# Patient Record
Sex: FEMALE | Race: WHITE | Employment: OTHER | URBAN - NONMETROPOLITAN AREA
[De-identification: names, ages, dates, MRNs, and addresses within clinical notes are randomized per-mention and may not be internally consistent; named-entity substitution may affect disease eponyms.]

---

## 2017-01-26 ENCOUNTER — OFFICE VISIT (OUTPATIENT)
Dept: FAMILY MEDICINE CLINIC | Age: 77
End: 2017-01-26
Payer: COMMERCIAL

## 2017-01-26 ENCOUNTER — HOSPITAL ENCOUNTER (OUTPATIENT)
Dept: OTHER | Age: 77
Discharge: HOME | End: 2017-01-26
Attending: FAMILY MEDICINE | Admitting: FAMILY MEDICINE

## 2017-01-26 VITALS
WEIGHT: 175 LBS | DIASTOLIC BLOOD PRESSURE: 68 MMHG | HEIGHT: 64 IN | OXYGEN SATURATION: 96 % | HEART RATE: 83 BPM | RESPIRATION RATE: 18 BRPM | SYSTOLIC BLOOD PRESSURE: 138 MMHG | BODY MASS INDEX: 29.88 KG/M2

## 2017-01-26 DIAGNOSIS — E11.9 TYPE 2 DIABETES MELLITUS WITHOUT COMPLICATION, WITHOUT LONG-TERM CURRENT USE OF INSULIN (HCC): ICD-10-CM

## 2017-01-26 DIAGNOSIS — F41.9 ANXIETY: ICD-10-CM

## 2017-01-26 DIAGNOSIS — E78.9 LIPID DISORDER: ICD-10-CM

## 2017-01-26 DIAGNOSIS — I10 HTN (HYPERTENSION), BENIGN: ICD-10-CM

## 2017-01-26 LAB
A/G RATIO: 1.5 (ref 0.8–2)
ALBUMIN SERPL-MCNC: 4.1 G/DL (ref 3.4–4.8)
ALP BLD-CCNC: 57 U/L (ref 25–100)
ALT SERPL-CCNC: 8 U/L (ref 4–36)
ANION GAP SERPL CALCULATED.3IONS-SCNC: 15 MMOL/L (ref 3–16)
AST SERPL-CCNC: 11 U/L (ref 8–33)
BILIRUB SERPL-MCNC: 0.4 MG/DL (ref 0.3–1.2)
BUN BLDV-MCNC: 20 MG/DL (ref 6–20)
CALCIUM SERPL-MCNC: 9.6 MG/DL (ref 8.5–10.5)
CHLORIDE BLD-SCNC: 98 MMOL/L (ref 98–107)
CO2: 27 MMOL/L (ref 20–30)
CREAT SERPL-MCNC: 0.9 MG/DL (ref 0.4–1.2)
GFR AFRICAN AMERICAN: >59
GFR NON-AFRICAN AMERICAN: >60
GLOBULIN: 2.8 G/DL
GLUCOSE BLD-MCNC: 191 MG/DL (ref 74–106)
HBA1C MFR BLD: 6.5 %
POTASSIUM SERPL-SCNC: 4.9 MMOL/L (ref 3.4–5.1)
SODIUM BLD-SCNC: 140 MMOL/L (ref 136–145)
TOTAL PROTEIN: 6.9 G/DL (ref 6.4–8.3)

## 2017-01-26 PROCEDURE — 99214 OFFICE O/P EST MOD 30 MIN: CPT | Performed by: FAMILY MEDICINE

## 2017-01-26 RX ORDER — SIMVASTATIN 20 MG
20 TABLET ORAL NIGHTLY
Qty: 30 TABLET | Refills: 5 | Status: SHIPPED | OUTPATIENT
Start: 2017-01-26 | End: 2017-07-05 | Stop reason: SDUPTHER

## 2017-01-26 RX ORDER — LISINOPRIL AND HYDROCHLOROTHIAZIDE 20; 12.5 MG/1; MG/1
TABLET ORAL
Qty: 45 TABLET | Refills: 5 | Status: SHIPPED | OUTPATIENT
Start: 2017-01-26 | End: 2017-07-05 | Stop reason: SDUPTHER

## 2017-01-26 RX ORDER — ATENOLOL 100 MG/1
100 TABLET ORAL DAILY
Qty: 30 TABLET | Refills: 5 | Status: SHIPPED | OUTPATIENT
Start: 2017-01-26 | End: 2017-07-05 | Stop reason: SDUPTHER

## 2017-01-26 RX ORDER — CLONAZEPAM 0.5 MG/1
0.5 TABLET ORAL 2 TIMES DAILY PRN
Qty: 60 TABLET | Refills: 2 | Status: SHIPPED | OUTPATIENT
Start: 2017-01-26 | End: 2017-04-26 | Stop reason: SDUPTHER

## 2017-04-26 ENCOUNTER — OFFICE VISIT (OUTPATIENT)
Dept: FAMILY MEDICINE CLINIC | Age: 77
End: 2017-04-26
Payer: COMMERCIAL

## 2017-04-26 ENCOUNTER — HOSPITAL ENCOUNTER (OUTPATIENT)
Dept: OTHER | Age: 77
Discharge: HOME | End: 2017-04-26
Attending: FAMILY MEDICINE | Admitting: FAMILY MEDICINE

## 2017-04-26 VITALS
WEIGHT: 171.2 LBS | BODY MASS INDEX: 29.23 KG/M2 | HEART RATE: 68 BPM | SYSTOLIC BLOOD PRESSURE: 120 MMHG | OXYGEN SATURATION: 98 % | RESPIRATION RATE: 16 BRPM | HEIGHT: 64 IN | DIASTOLIC BLOOD PRESSURE: 70 MMHG

## 2017-04-26 DIAGNOSIS — F41.9 ANXIETY: ICD-10-CM

## 2017-04-26 DIAGNOSIS — E78.9 LIPID DISORDER: ICD-10-CM

## 2017-04-26 DIAGNOSIS — I10 HTN (HYPERTENSION), BENIGN: ICD-10-CM

## 2017-04-26 DIAGNOSIS — R53.83 FATIGUE, UNSPECIFIED TYPE: ICD-10-CM

## 2017-04-26 DIAGNOSIS — E11.9 TYPE 2 DIABETES MELLITUS WITHOUT COMPLICATION, WITHOUT LONG-TERM CURRENT USE OF INSULIN (HCC): Primary | ICD-10-CM

## 2017-04-26 DIAGNOSIS — E11.9 TYPE 2 DIABETES MELLITUS WITHOUT COMPLICATION, WITHOUT LONG-TERM CURRENT USE OF INSULIN (HCC): ICD-10-CM

## 2017-04-26 LAB
A/G RATIO: 1.6 (ref 0.8–2)
ALBUMIN SERPL-MCNC: 4.1 G/DL (ref 3.4–4.8)
ALP BLD-CCNC: 55 U/L (ref 25–100)
ALT SERPL-CCNC: 7 U/L (ref 4–36)
ANION GAP SERPL CALCULATED.3IONS-SCNC: 15 MMOL/L (ref 3–16)
AST SERPL-CCNC: 13 U/L (ref 8–33)
BASOPHILS ABSOLUTE: 0 K/UL (ref 0–0.1)
BASOPHILS RELATIVE PERCENT: 0.3 %
BILIRUB SERPL-MCNC: <0.2 MG/DL (ref 0.3–1.2)
BUN BLDV-MCNC: 20 MG/DL (ref 6–20)
CALCIUM SERPL-MCNC: 9.4 MG/DL (ref 8.5–10.5)
CHLORIDE BLD-SCNC: 94 MMOL/L (ref 98–107)
CO2: 27 MMOL/L (ref 20–30)
CREAT SERPL-MCNC: 1 MG/DL (ref 0.4–1.2)
EOSINOPHILS ABSOLUTE: 0.3 K/UL (ref 0–0.4)
EOSINOPHILS RELATIVE PERCENT: 4.2 %
GFR AFRICAN AMERICAN: >59
GFR NON-AFRICAN AMERICAN: 54
GLOBULIN: 2.6 G/DL
GLUCOSE BLD-MCNC: 168 MG/DL (ref 74–106)
HBA1C MFR BLD: 6.8 %
HCT VFR BLD CALC: 38.1 % (ref 37–47)
HEMOGLOBIN: 12.5 G/DL (ref 11.5–16.5)
LYMPHOCYTES ABSOLUTE: 2.7 K/UL (ref 1.5–4)
LYMPHOCYTES RELATIVE PERCENT: 34.6 % (ref 20–40)
MCH RBC QN AUTO: 29.4 PG (ref 27–32)
MCHC RBC AUTO-ENTMCNC: 32.8 G/DL (ref 31–35)
MCV RBC AUTO: 89.6 FL (ref 80–100)
MONOCYTES ABSOLUTE: 0.5 K/UL (ref 0.2–0.8)
MONOCYTES RELATIVE PERCENT: 6.2 % (ref 3–10)
NEUTROPHILS ABSOLUTE: 4.2 K/UL (ref 2–7.5)
NEUTROPHILS RELATIVE PERCENT: 54.7 %
PDW BLD-RTO: 14.9 % (ref 11–16)
PLATELET # BLD: 254 K/UL (ref 150–400)
PMV BLD AUTO: 14 FL (ref 6–10)
POTASSIUM SERPL-SCNC: 4.4 MMOL/L (ref 3.4–5.1)
RBC # BLD: 4.25 M/UL (ref 3.8–5.8)
SODIUM BLD-SCNC: 136 MMOL/L (ref 136–145)
TOTAL PROTEIN: 6.7 G/DL (ref 6.4–8.3)
TSH SERPL DL<=0.05 MIU/L-ACNC: 5.1 UIU/ML (ref 0.35–5.5)
WBC # BLD: 7.7 K/UL (ref 4–11)

## 2017-04-26 PROCEDURE — 99214 OFFICE O/P EST MOD 30 MIN: CPT | Performed by: FAMILY MEDICINE

## 2017-04-26 RX ORDER — CLONAZEPAM 0.5 MG/1
0.5 TABLET ORAL 2 TIMES DAILY PRN
Qty: 60 TABLET | Refills: 2 | Status: CANCELLED | OUTPATIENT
Start: 2017-04-26

## 2017-04-26 RX ORDER — CLONAZEPAM 0.5 MG/1
0.5 TABLET ORAL 2 TIMES DAILY PRN
Qty: 60 TABLET | Refills: 2 | Status: SHIPPED | OUTPATIENT
Start: 2017-04-26 | End: 2017-07-05 | Stop reason: SDUPTHER

## 2017-04-26 ASSESSMENT — PATIENT HEALTH QUESTIONNAIRE - PHQ9
2. FEELING DOWN, DEPRESSED OR HOPELESS: 0
1. LITTLE INTEREST OR PLEASURE IN DOING THINGS: 0
SUM OF ALL RESPONSES TO PHQ QUESTIONS 1-9: 0
SUM OF ALL RESPONSES TO PHQ9 QUESTIONS 1 & 2: 0

## 2017-07-05 DIAGNOSIS — I10 HTN (HYPERTENSION), BENIGN: ICD-10-CM

## 2017-07-05 DIAGNOSIS — F41.9 ANXIETY: ICD-10-CM

## 2017-07-05 DIAGNOSIS — E11.9 TYPE 2 DIABETES MELLITUS WITHOUT COMPLICATION, WITHOUT LONG-TERM CURRENT USE OF INSULIN (HCC): ICD-10-CM

## 2017-07-05 DIAGNOSIS — E78.9 LIPID DISORDER: ICD-10-CM

## 2017-07-05 RX ORDER — CLONAZEPAM 0.5 MG/1
0.5 TABLET ORAL 2 TIMES DAILY PRN
Qty: 60 TABLET | Refills: 2 | Status: SHIPPED | OUTPATIENT
Start: 2017-07-05 | End: 2017-08-14 | Stop reason: SDUPTHER

## 2017-07-05 RX ORDER — SIMVASTATIN 20 MG
20 TABLET ORAL NIGHTLY
Qty: 30 TABLET | Refills: 5 | Status: SHIPPED | OUTPATIENT
Start: 2017-07-05 | End: 2017-12-29 | Stop reason: SDUPTHER

## 2017-07-05 RX ORDER — GLIMEPIRIDE 4 MG/1
TABLET ORAL
Qty: 60 TABLET | Refills: 5 | Status: SHIPPED | OUTPATIENT
Start: 2017-07-05 | End: 2018-03-13 | Stop reason: SDUPTHER

## 2017-07-05 RX ORDER — LISINOPRIL AND HYDROCHLOROTHIAZIDE 20; 12.5 MG/1; MG/1
TABLET ORAL
Qty: 45 TABLET | Refills: 5 | Status: SHIPPED | OUTPATIENT
Start: 2017-07-05 | End: 2017-12-29 | Stop reason: SDUPTHER

## 2017-07-05 RX ORDER — ATENOLOL 100 MG/1
100 TABLET ORAL DAILY
Qty: 30 TABLET | Refills: 5 | Status: SHIPPED | OUTPATIENT
Start: 2017-07-05 | End: 2017-12-29 | Stop reason: SDUPTHER

## 2017-08-14 ENCOUNTER — OFFICE VISIT (OUTPATIENT)
Dept: FAMILY MEDICINE CLINIC | Age: 77
End: 2017-08-14
Payer: COMMERCIAL

## 2017-08-14 ENCOUNTER — HOSPITAL ENCOUNTER (OUTPATIENT)
Dept: OTHER | Age: 77
Discharge: HOME | End: 2017-08-14
Attending: FAMILY MEDICINE | Admitting: FAMILY MEDICINE

## 2017-08-14 VITALS
DIASTOLIC BLOOD PRESSURE: 74 MMHG | BODY MASS INDEX: 29.71 KG/M2 | HEART RATE: 64 BPM | HEIGHT: 64 IN | OXYGEN SATURATION: 98 % | SYSTOLIC BLOOD PRESSURE: 138 MMHG | WEIGHT: 174 LBS | RESPIRATION RATE: 18 BRPM

## 2017-08-14 DIAGNOSIS — E78.9 LIPID DISORDER: ICD-10-CM

## 2017-08-14 DIAGNOSIS — I10 HTN (HYPERTENSION), BENIGN: ICD-10-CM

## 2017-08-14 DIAGNOSIS — E11.9 TYPE 2 DIABETES MELLITUS WITHOUT COMPLICATION, WITHOUT LONG-TERM CURRENT USE OF INSULIN (HCC): Primary | ICD-10-CM

## 2017-08-14 DIAGNOSIS — E11.9 TYPE 2 DIABETES MELLITUS WITHOUT COMPLICATION, WITHOUT LONG-TERM CURRENT USE OF INSULIN (HCC): ICD-10-CM

## 2017-08-14 DIAGNOSIS — F41.9 ANXIETY: ICD-10-CM

## 2017-08-14 LAB
A/G RATIO: 1.6 (ref 0.8–2)
ALBUMIN SERPL-MCNC: 4.2 G/DL (ref 3.4–4.8)
ALP BLD-CCNC: 52 U/L (ref 25–100)
ALT SERPL-CCNC: 9 U/L (ref 4–36)
ANION GAP SERPL CALCULATED.3IONS-SCNC: 11 MMOL/L (ref 3–16)
AST SERPL-CCNC: 12 U/L (ref 8–33)
BILIRUB SERPL-MCNC: <0.2 MG/DL (ref 0.3–1.2)
BUN BLDV-MCNC: 23 MG/DL (ref 6–20)
CALCIUM SERPL-MCNC: 9.5 MG/DL (ref 8.5–10.5)
CHLORIDE BLD-SCNC: 98 MMOL/L (ref 98–107)
CHOLESTEROL, TOTAL: 145 MG/DL (ref 0–200)
CO2: 29 MMOL/L (ref 20–30)
CREAT SERPL-MCNC: 0.8 MG/DL (ref 0.4–1.2)
GFR AFRICAN AMERICAN: >59
GFR NON-AFRICAN AMERICAN: >60
GLOBULIN: 2.6 G/DL
GLUCOSE BLD-MCNC: 218 MG/DL (ref 74–106)
HBA1C MFR BLD: 6.7 %
HDLC SERPL-MCNC: 40 MG/DL (ref 40–60)
LDL CHOLESTEROL CALCULATED: 64 MG/DL
POTASSIUM SERPL-SCNC: 4.6 MMOL/L (ref 3.4–5.1)
SODIUM BLD-SCNC: 138 MMOL/L (ref 136–145)
TOTAL PROTEIN: 6.8 G/DL (ref 6.4–8.3)
TRIGL SERPL-MCNC: 207 MG/DL (ref 0–249)
VLDLC SERPL CALC-MCNC: 41 MG/DL

## 2017-08-14 PROCEDURE — 99214 OFFICE O/P EST MOD 30 MIN: CPT | Performed by: FAMILY MEDICINE

## 2017-08-14 RX ORDER — CLONAZEPAM 0.5 MG/1
0.5 TABLET ORAL 2 TIMES DAILY PRN
Qty: 60 TABLET | Refills: 0 | Status: SHIPPED | OUTPATIENT
Start: 2017-08-14 | End: 2018-02-21 | Stop reason: ALTCHOICE

## 2017-11-02 RX ORDER — LANCETS 30 GAUGE
1 EACH MISCELLANEOUS DAILY
Qty: 100 EACH | Refills: 3 | Status: SHIPPED | OUTPATIENT
Start: 2017-11-02

## 2017-11-02 RX ORDER — GLUCOSAMINE HCL/CHONDROITIN SU 500-400 MG
CAPSULE ORAL
Qty: 100 STRIP | Refills: 5 | Status: SHIPPED | OUTPATIENT
Start: 2017-11-02

## 2017-11-22 ENCOUNTER — OFFICE VISIT (OUTPATIENT)
Dept: FAMILY MEDICINE CLINIC | Age: 77
End: 2017-11-22
Payer: COMMERCIAL

## 2017-11-22 ENCOUNTER — HOSPITAL ENCOUNTER (OUTPATIENT)
Dept: OTHER | Age: 77
Discharge: HOME | End: 2017-11-22
Attending: FAMILY MEDICINE | Admitting: FAMILY MEDICINE

## 2017-11-22 VITALS
HEIGHT: 64 IN | DIASTOLIC BLOOD PRESSURE: 76 MMHG | OXYGEN SATURATION: 97 % | RESPIRATION RATE: 18 BRPM | WEIGHT: 163 LBS | BODY MASS INDEX: 27.83 KG/M2 | SYSTOLIC BLOOD PRESSURE: 134 MMHG | HEART RATE: 61 BPM

## 2017-11-22 DIAGNOSIS — I10 HTN (HYPERTENSION), BENIGN: ICD-10-CM

## 2017-11-22 DIAGNOSIS — E11.9 TYPE 2 DIABETES MELLITUS WITHOUT COMPLICATION, WITHOUT LONG-TERM CURRENT USE OF INSULIN (HCC): ICD-10-CM

## 2017-11-22 DIAGNOSIS — E78.9 LIPID DISORDER: ICD-10-CM

## 2017-11-22 DIAGNOSIS — Z23 NEED FOR PROPHYLACTIC VACCINATION AGAINST STREPTOCOCCUS PNEUMONIAE (PNEUMOCOCCUS): Primary | ICD-10-CM

## 2017-11-22 DIAGNOSIS — J30.2 CHRONIC SEASONAL ALLERGIC RHINITIS, UNSPECIFIED TRIGGER: ICD-10-CM

## 2017-11-22 DIAGNOSIS — F41.9 ANXIETY: ICD-10-CM

## 2017-11-22 LAB
A/G RATIO: 1.6 (ref 0.8–2)
ALBUMIN SERPL-MCNC: 4.1 G/DL (ref 3.4–4.8)
ALP BLD-CCNC: 44 U/L (ref 25–100)
ALT SERPL-CCNC: 9 U/L (ref 4–36)
ANION GAP SERPL CALCULATED.3IONS-SCNC: 14 MMOL/L (ref 3–16)
AST SERPL-CCNC: 14 U/L (ref 8–33)
BILIRUB SERPL-MCNC: <0.2 MG/DL (ref 0.3–1.2)
BUN BLDV-MCNC: 17 MG/DL (ref 6–20)
CALCIUM SERPL-MCNC: 9.6 MG/DL (ref 8.5–10.5)
CHLORIDE BLD-SCNC: 95 MMOL/L (ref 98–107)
CO2: 27 MMOL/L (ref 20–30)
CREAT SERPL-MCNC: 1 MG/DL (ref 0.4–1.2)
GFR AFRICAN AMERICAN: >59
GFR NON-AFRICAN AMERICAN: 53
GLOBULIN: 2.6 G/DL
GLUCOSE BLD-MCNC: 171 MG/DL (ref 74–106)
HBA1C MFR BLD: 6.6 %
POTASSIUM SERPL-SCNC: 4.7 MMOL/L (ref 3.4–5.1)
SODIUM BLD-SCNC: 136 MMOL/L (ref 136–145)
TOTAL PROTEIN: 6.7 G/DL (ref 6.4–8.3)

## 2017-11-22 PROCEDURE — 90732 PPSV23 VACC 2 YRS+ SUBQ/IM: CPT | Performed by: FAMILY MEDICINE

## 2017-11-22 PROCEDURE — 99214 OFFICE O/P EST MOD 30 MIN: CPT | Performed by: FAMILY MEDICINE

## 2017-11-22 PROCEDURE — G0009 ADMIN PNEUMOCOCCAL VACCINE: HCPCS | Performed by: FAMILY MEDICINE

## 2017-11-22 RX ORDER — TRAZODONE HYDROCHLORIDE 50 MG/1
TABLET ORAL
Qty: 60 TABLET | Refills: 3 | Status: SHIPPED | OUTPATIENT
Start: 2017-11-22 | End: 2018-02-21 | Stop reason: SDUPTHER

## 2017-11-22 RX ORDER — FLUTICASONE PROPIONATE 50 MCG
2 SPRAY, SUSPENSION (ML) NASAL DAILY
Qty: 1 BOTTLE | Refills: 2 | Status: SHIPPED | OUTPATIENT
Start: 2017-11-22 | End: 2019-02-19 | Stop reason: SDUPTHER

## 2017-12-06 ENCOUNTER — TELEPHONE (OUTPATIENT)
Dept: FAMILY MEDICINE CLINIC | Age: 77
End: 2017-12-06

## 2017-12-07 RX ORDER — AZITHROMYCIN 250 MG/1
TABLET, FILM COATED ORAL
Qty: 1 PACKET | Refills: 0 | Status: SHIPPED | OUTPATIENT
Start: 2017-12-07 | End: 2017-12-17

## 2017-12-29 DIAGNOSIS — E78.9 LIPID DISORDER: ICD-10-CM

## 2017-12-29 DIAGNOSIS — I10 HTN (HYPERTENSION), BENIGN: ICD-10-CM

## 2017-12-29 RX ORDER — ATENOLOL 50 MG/1
TABLET ORAL
Qty: 180 TABLET | Refills: 4 | Status: SHIPPED | OUTPATIENT
Start: 2017-12-29 | End: 2018-03-29 | Stop reason: SDUPTHER

## 2017-12-29 RX ORDER — SIMVASTATIN 20 MG
TABLET ORAL
Qty: 90 TABLET | Refills: 4 | Status: SHIPPED | OUTPATIENT
Start: 2017-12-29 | End: 2018-03-29 | Stop reason: SDUPTHER

## 2017-12-29 RX ORDER — LISINOPRIL AND HYDROCHLOROTHIAZIDE 20; 12.5 MG/1; MG/1
TABLET ORAL
Qty: 90 TABLET | Refills: 4 | Status: SHIPPED | OUTPATIENT
Start: 2017-12-29 | End: 2018-03-29 | Stop reason: SDUPTHER

## 2017-12-29 NOTE — TELEPHONE ENCOUNTER
Refill request received from pharmacy.  Medication pending for approval.     Patient information below:      Hemoglobin A1C (%)   Date Value   11/22/2017 6.6 (H)   08/14/2017 6.7 (H)   04/26/2017 6.8 (H)     LDL Calculated (mg/dL)   Date Value   08/14/2017 64     AST (U/L)   Date Value   11/22/2017 14     ALT (U/L)   Date Value   11/22/2017 9     BUN (mg/dL)   Date Value   11/22/2017 17      (goal A1C is < 7)   (goal LDL is <100) need 30-50% reduction from baseline     BP Readings from Last 3 Encounters:   11/22/17 134/76   08/14/17 138/74   04/26/17 120/70    (goal /80)      All Future Testing planned in [facility]:      Last Office Visit With PCP:  11/22/2017      Next Visit Date:  Future Appointments  Date Time Provider Vonda Lincoln   2/21/2018 11:00 AM Clif Zhang MD [address]            Patient Active Problem List:     Lipid disorder     Type 2 diabetes mellitus ([facility] [address]     HTN (hypertension), benign     Anxiety

## 2018-02-21 ENCOUNTER — HOSPITAL ENCOUNTER (OUTPATIENT)
Dept: OTHER | Age: 78
Discharge: HOME | End: 2018-02-21
Attending: FAMILY MEDICINE | Admitting: FAMILY MEDICINE

## 2018-02-21 ENCOUNTER — OFFICE VISIT (OUTPATIENT)
Dept: FAMILY MEDICINE CLINIC | Age: 78
End: 2018-02-21
Payer: COMMERCIAL

## 2018-02-21 VITALS
WEIGHT: 170 LBS | BODY MASS INDEX: 29.02 KG/M2 | RESPIRATION RATE: 18 BRPM | OXYGEN SATURATION: 97 % | SYSTOLIC BLOOD PRESSURE: 138 MMHG | HEIGHT: 64 IN | DIASTOLIC BLOOD PRESSURE: 68 MMHG | HEART RATE: 69 BPM

## 2018-02-21 DIAGNOSIS — E11.9 TYPE 2 DIABETES MELLITUS WITHOUT COMPLICATION, WITHOUT LONG-TERM CURRENT USE OF INSULIN (HCC): ICD-10-CM

## 2018-02-21 LAB
A/G RATIO: 1.8 (ref 0.8–2)
ALBUMIN SERPL-MCNC: 4.3 G/DL (ref 3.4–4.8)
ALP BLD-CCNC: 49 U/L (ref 25–100)
ALT SERPL-CCNC: 8 U/L (ref 4–36)
ANION GAP SERPL CALCULATED.3IONS-SCNC: 14 MMOL/L (ref 3–16)
AST SERPL-CCNC: 12 U/L (ref 8–33)
BILIRUB SERPL-MCNC: 0.3 MG/DL (ref 0.3–1.2)
BUN BLDV-MCNC: 26 MG/DL (ref 6–20)
CALCIUM SERPL-MCNC: 9.6 MG/DL (ref 8.5–10.5)
CHLORIDE BLD-SCNC: 99 MMOL/L (ref 98–107)
CO2: 27 MMOL/L (ref 20–30)
CREAT SERPL-MCNC: 1.1 MG/DL (ref 0.4–1.2)
GFR AFRICAN AMERICAN: 58
GFR NON-AFRICAN AMERICAN: 48
GLOBULIN: 2.4 G/DL
GLUCOSE BLD-MCNC: 154 MG/DL (ref 74–106)
HBA1C MFR BLD: 6.6 %
POTASSIUM SERPL-SCNC: 5.1 MMOL/L (ref 3.4–5.1)
SODIUM BLD-SCNC: 140 MMOL/L (ref 136–145)
TOTAL PROTEIN: 6.7 G/DL (ref 6.4–8.3)

## 2018-02-21 PROCEDURE — 99214 OFFICE O/P EST MOD 30 MIN: CPT | Performed by: FAMILY MEDICINE

## 2018-02-21 RX ORDER — TRAZODONE HYDROCHLORIDE 50 MG/1
TABLET ORAL
Qty: 60 TABLET | Refills: 5 | Status: SHIPPED | OUTPATIENT
Start: 2018-02-21 | End: 2018-11-19 | Stop reason: ALTCHOICE

## 2018-03-13 DIAGNOSIS — E11.9 TYPE 2 DIABETES MELLITUS WITHOUT COMPLICATION, WITHOUT LONG-TERM CURRENT USE OF INSULIN (HCC): ICD-10-CM

## 2018-03-13 RX ORDER — GLIMEPIRIDE 4 MG/1
TABLET ORAL
Qty: 60 TABLET | Refills: 5 | Status: SHIPPED | OUTPATIENT
Start: 2018-03-13 | End: 2018-06-12 | Stop reason: SDUPTHER

## 2018-03-29 DIAGNOSIS — E78.9 LIPID DISORDER: ICD-10-CM

## 2018-03-29 DIAGNOSIS — I10 HTN (HYPERTENSION), BENIGN: ICD-10-CM

## 2018-03-29 RX ORDER — LISINOPRIL AND HYDROCHLOROTHIAZIDE 20; 12.5 MG/1; MG/1
TABLET ORAL
Qty: 90 TABLET | Refills: 4 | Status: SHIPPED | OUTPATIENT
Start: 2018-03-29 | End: 2019-01-15 | Stop reason: SDUPTHER

## 2018-03-29 RX ORDER — SIMVASTATIN 20 MG
TABLET ORAL
Qty: 90 TABLET | Refills: 4 | Status: SHIPPED | OUTPATIENT
Start: 2018-03-29 | End: 2019-03-19 | Stop reason: SDUPTHER

## 2018-03-29 RX ORDER — ATENOLOL 50 MG/1
TABLET ORAL
Qty: 180 TABLET | Refills: 4 | Status: SHIPPED | OUTPATIENT
Start: 2018-03-29 | End: 2019-04-15 | Stop reason: SDUPTHER

## 2018-05-21 ENCOUNTER — OFFICE VISIT (OUTPATIENT)
Dept: FAMILY MEDICINE CLINIC | Age: 78
End: 2018-05-21
Payer: COMMERCIAL

## 2018-05-21 ENCOUNTER — HOSPITAL ENCOUNTER (OUTPATIENT)
Dept: OTHER | Age: 78
Discharge: HOME | End: 2018-05-21
Attending: FAMILY MEDICINE | Admitting: FAMILY MEDICINE

## 2018-05-21 VITALS
OXYGEN SATURATION: 98 % | HEART RATE: 78 BPM | BODY MASS INDEX: 29.02 KG/M2 | HEIGHT: 64 IN | DIASTOLIC BLOOD PRESSURE: 70 MMHG | WEIGHT: 170 LBS | RESPIRATION RATE: 20 BRPM | SYSTOLIC BLOOD PRESSURE: 117 MMHG

## 2018-05-21 DIAGNOSIS — I10 HTN (HYPERTENSION), BENIGN: ICD-10-CM

## 2018-05-21 DIAGNOSIS — F41.9 ANXIETY: ICD-10-CM

## 2018-05-21 DIAGNOSIS — E11.8 TYPE 2 DIABETES MELLITUS WITH COMPLICATION, WITHOUT LONG-TERM CURRENT USE OF INSULIN (HCC): ICD-10-CM

## 2018-05-21 DIAGNOSIS — E78.9 LIPID DISORDER: ICD-10-CM

## 2018-05-21 DIAGNOSIS — E11.8 TYPE 2 DIABETES MELLITUS WITH COMPLICATION, WITHOUT LONG-TERM CURRENT USE OF INSULIN (HCC): Primary | ICD-10-CM

## 2018-05-21 LAB
A/G RATIO: 1.6 (ref 0.8–2)
ALBUMIN SERPL-MCNC: 4.2 G/DL (ref 3.4–4.8)
ALP BLD-CCNC: 52 U/L (ref 25–100)
ALT SERPL-CCNC: 7 U/L (ref 4–36)
ANION GAP SERPL CALCULATED.3IONS-SCNC: 14 MMOL/L (ref 3–16)
AST SERPL-CCNC: 14 U/L (ref 8–33)
BILIRUB SERPL-MCNC: <0.2 MG/DL (ref 0.3–1.2)
BUN BLDV-MCNC: 22 MG/DL (ref 6–20)
CALCIUM SERPL-MCNC: 9.6 MG/DL (ref 8.5–10.5)
CHLORIDE BLD-SCNC: 95 MMOL/L (ref 98–107)
CO2: 28 MMOL/L (ref 20–30)
CREAT SERPL-MCNC: 1 MG/DL (ref 0.4–1.2)
GFR AFRICAN AMERICAN: >59
GFR NON-AFRICAN AMERICAN: 53
GLOBULIN: 2.6 G/DL
GLUCOSE BLD-MCNC: 171 MG/DL (ref 74–106)
HBA1C MFR BLD: 6.2 %
POTASSIUM SERPL-SCNC: 5.1 MMOL/L (ref 3.4–5.1)
SODIUM BLD-SCNC: 137 MMOL/L (ref 136–145)
TOTAL PROTEIN: 6.8 G/DL (ref 6.4–8.3)

## 2018-05-21 PROCEDURE — 99214 OFFICE O/P EST MOD 30 MIN: CPT | Performed by: FAMILY MEDICINE

## 2018-05-21 RX ORDER — BENZONATATE 200 MG/1
200 CAPSULE ORAL 3 TIMES DAILY PRN
Qty: 21 CAPSULE | Refills: 0 | Status: SHIPPED | OUTPATIENT
Start: 2018-05-21 | End: 2018-05-28

## 2018-05-21 RX ORDER — CEFDINIR 300 MG/1
300 CAPSULE ORAL 2 TIMES DAILY
Qty: 20 CAPSULE | Refills: 0 | Status: SHIPPED | OUTPATIENT
Start: 2018-05-21 | End: 2018-05-31

## 2018-05-21 ASSESSMENT — PATIENT HEALTH QUESTIONNAIRE - PHQ9
2. FEELING DOWN, DEPRESSED OR HOPELESS: 0
SUM OF ALL RESPONSES TO PHQ QUESTIONS 1-9: 0
SUM OF ALL RESPONSES TO PHQ9 QUESTIONS 1 & 2: 0
1. LITTLE INTEREST OR PLEASURE IN DOING THINGS: 0

## 2018-06-12 DIAGNOSIS — E11.9 TYPE 2 DIABETES MELLITUS WITHOUT COMPLICATION, WITHOUT LONG-TERM CURRENT USE OF INSULIN (HCC): ICD-10-CM

## 2018-06-13 RX ORDER — GLIMEPIRIDE 4 MG/1
TABLET ORAL
Qty: 180 TABLET | Refills: 3 | Status: SHIPPED | OUTPATIENT
Start: 2018-06-13 | End: 2019-06-18 | Stop reason: SDUPTHER

## 2018-06-25 ENCOUNTER — TELEPHONE (OUTPATIENT)
Dept: FAMILY MEDICINE CLINIC | Age: 78
End: 2018-06-25

## 2018-08-21 ENCOUNTER — OFFICE VISIT (OUTPATIENT)
Dept: FAMILY MEDICINE CLINIC | Age: 78
End: 2018-08-21
Payer: MEDICARE

## 2018-08-21 ENCOUNTER — HOSPITAL ENCOUNTER (OUTPATIENT)
Facility: HOSPITAL | Age: 78
Discharge: HOME | End: 2018-08-21
Payer: MEDICARE

## 2018-08-21 VITALS
HEART RATE: 78 BPM | OXYGEN SATURATION: 98 % | DIASTOLIC BLOOD PRESSURE: 72 MMHG | RESPIRATION RATE: 20 BRPM | HEIGHT: 64 IN | SYSTOLIC BLOOD PRESSURE: 130 MMHG | BODY MASS INDEX: 27.83 KG/M2 | WEIGHT: 163 LBS

## 2018-08-21 DIAGNOSIS — F41.9 ANXIETY: ICD-10-CM

## 2018-08-21 DIAGNOSIS — I10 HTN (HYPERTENSION), BENIGN: ICD-10-CM

## 2018-08-21 DIAGNOSIS — E03.9 HYPOTHYROIDISM, UNSPECIFIED TYPE: ICD-10-CM

## 2018-08-21 DIAGNOSIS — E78.9 LIPID DISORDER: ICD-10-CM

## 2018-08-21 DIAGNOSIS — E11.9 TYPE 2 DIABETES MELLITUS WITHOUT COMPLICATION, WITHOUT LONG-TERM CURRENT USE OF INSULIN (HCC): Primary | ICD-10-CM

## 2018-08-21 DIAGNOSIS — E11.9 TYPE 2 DIABETES MELLITUS WITHOUT COMPLICATION, WITHOUT LONG-TERM CURRENT USE OF INSULIN (HCC): ICD-10-CM

## 2018-08-21 LAB
A/G RATIO: 1.6 (ref 0.8–2)
ALBUMIN SERPL-MCNC: 4.2 G/DL (ref 3.4–4.8)
ALP BLD-CCNC: 52 U/L (ref 25–100)
ALT SERPL-CCNC: 12 U/L (ref 4–36)
ANION GAP SERPL CALCULATED.3IONS-SCNC: 16 MMOL/L (ref 3–16)
AST SERPL-CCNC: 16 U/L (ref 8–33)
BILIRUB SERPL-MCNC: <0.2 MG/DL (ref 0.3–1.2)
BUN BLDV-MCNC: 21 MG/DL (ref 6–20)
CALCIUM SERPL-MCNC: 9.8 MG/DL (ref 8.5–10.5)
CHLORIDE BLD-SCNC: 98 MMOL/L (ref 98–107)
CHOLESTEROL, TOTAL: 188 MG/DL (ref 0–200)
CO2: 26 MMOL/L (ref 20–30)
CREAT SERPL-MCNC: 1.2 MG/DL (ref 0.4–1.2)
GFR AFRICAN AMERICAN: 52
GFR NON-AFRICAN AMERICAN: 43
GLOBULIN: 2.6 G/DL
GLUCOSE BLD-MCNC: 194 MG/DL (ref 74–106)
HBA1C MFR BLD: 7 %
HCT VFR BLD CALC: 38.4 % (ref 37–47)
HDLC SERPL-MCNC: 42 MG/DL (ref 40–60)
HEMOGLOBIN: 11.6 G/DL (ref 11.5–16.5)
LDL CHOLESTEROL CALCULATED: 95 MG/DL
MCH RBC QN AUTO: 28.3 PG (ref 27–32)
MCHC RBC AUTO-ENTMCNC: 30.2 G/DL (ref 31–35)
MCV RBC AUTO: 93.7 FL (ref 80–100)
PDW BLD-RTO: 13.9 % (ref 11–16)
PLATELET # BLD: 262 K/UL (ref 150–400)
PMV BLD AUTO: 12.6 FL (ref 6–10)
POTASSIUM SERPL-SCNC: 4.8 MMOL/L (ref 3.4–5.1)
RBC # BLD: 4.1 M/UL (ref 3.8–5.8)
SODIUM BLD-SCNC: 140 MMOL/L (ref 136–145)
T4 FREE: 1.06 NG/DL (ref 0.89–1.76)
TOTAL PROTEIN: 6.8 G/DL (ref 6.4–8.3)
TRIGL SERPL-MCNC: 254 MG/DL (ref 0–249)
TSH SERPL DL<=0.05 MIU/L-ACNC: 5.7 UIU/ML (ref 0.35–5.5)
VLDLC SERPL CALC-MCNC: 51 MG/DL
WBC # BLD: 7.1 K/UL (ref 4–11)

## 2018-08-21 PROCEDURE — 85027 COMPLETE CBC AUTOMATED: CPT

## 2018-08-21 PROCEDURE — 80061 LIPID PANEL: CPT

## 2018-08-21 PROCEDURE — 83036 HEMOGLOBIN GLYCOSYLATED A1C: CPT

## 2018-08-21 PROCEDURE — 99214 OFFICE O/P EST MOD 30 MIN: CPT | Performed by: FAMILY MEDICINE

## 2018-08-21 PROCEDURE — 84439 ASSAY OF FREE THYROXINE: CPT

## 2018-08-21 PROCEDURE — 36415 COLL VENOUS BLD VENIPUNCTURE: CPT

## 2018-08-21 PROCEDURE — 84443 ASSAY THYROID STIM HORMONE: CPT

## 2018-08-21 PROCEDURE — 80053 COMPREHEN METABOLIC PANEL: CPT

## 2018-08-21 NOTE — PROGRESS NOTES
SUBJECTIVE:    Patient ID: Naldo White is a [de-identified] y.o. female. Chief Complaint   Patient presents with    Diabetes     f/u        HPI: she is doing well with her medication. She is still struggling to try to take care of her ill . She says she's had good blood pressure readings at home. She's not have any medication problems that she can tell. She's not taking anything to help her sleep anymore. She says she feels like she's doing okay with that. Her blood pressures have been good. She denies any chest pain or shortness of breath. She does complain of significant amount of fatigue. Review of Systems   Constitutional: Positive for fatigue. Musculoskeletal: Positive for arthralgias. Psychiatric/Behavioral: The patient is nervous/anxious. All other systems reviewed and are negative. OBJECTIVE:  Wt Readings from Last 3 Encounters:   08/21/18 163 lb (73.9 kg)   05/21/18 170 lb (77.1 kg)   02/21/18 170 lb (77.1 kg)     BP Readings from Last 3 Encounters:   08/21/18 130/72   05/21/18 117/70   02/21/18 138/68      Pulse Readings from Last 3 Encounters:   08/21/18 78   05/21/18 78   02/21/18 69     Body mass index is 27.98 kg/m². Resp Readings from Last 3 Encounters:   08/21/18 20   05/21/18 20   02/21/18 18     Physical Exam   Constitutional: She is oriented to person, place, and time. Vital signs are normal. She appears well-developed and well-nourished. HENT:   Head: Normocephalic and atraumatic. Right Ear: Hearing and external ear normal.   Left Ear: Hearing and external ear normal.   Nose: Nose normal.   Mouth/Throat: Uvula is midline, oropharynx is clear and moist and mucous membranes are normal.   Eyes: Pupils are equal, round, and reactive to light. Conjunctivae, EOM and lids are normal.   Neck: Trachea normal, normal range of motion and phonation normal. Neck supple. Carotid bruit is not present. No thyroid mass and no thyromegaly present.    Cardiovascular: Normal rate, regular

## 2018-09-20 DIAGNOSIS — E11.9 TYPE 2 DIABETES MELLITUS WITHOUT COMPLICATION, WITHOUT LONG-TERM CURRENT USE OF INSULIN (HCC): ICD-10-CM

## 2018-11-19 ENCOUNTER — OFFICE VISIT (OUTPATIENT)
Dept: FAMILY MEDICINE CLINIC | Age: 78
End: 2018-11-19
Payer: MEDICARE

## 2018-11-19 ENCOUNTER — HOSPITAL ENCOUNTER (OUTPATIENT)
Facility: HOSPITAL | Age: 78
Discharge: HOME | End: 2018-11-19
Payer: MEDICARE

## 2018-11-19 VITALS
BODY MASS INDEX: 28.09 KG/M2 | SYSTOLIC BLOOD PRESSURE: 130 MMHG | OXYGEN SATURATION: 98 % | DIASTOLIC BLOOD PRESSURE: 70 MMHG | WEIGHT: 164.5 LBS | RESPIRATION RATE: 20 BRPM | HEIGHT: 64 IN | HEART RATE: 78 BPM

## 2018-11-19 DIAGNOSIS — E11.9 TYPE 2 DIABETES MELLITUS WITHOUT COMPLICATION, WITHOUT LONG-TERM CURRENT USE OF INSULIN (HCC): Primary | ICD-10-CM

## 2018-11-19 DIAGNOSIS — R53.83 FATIGUE, UNSPECIFIED TYPE: ICD-10-CM

## 2018-11-19 DIAGNOSIS — I49.9 IRREGULAR HEARTBEAT: ICD-10-CM

## 2018-11-19 DIAGNOSIS — I10 HTN (HYPERTENSION), BENIGN: ICD-10-CM

## 2018-11-19 DIAGNOSIS — E11.9 TYPE 2 DIABETES MELLITUS WITHOUT COMPLICATION, WITHOUT LONG-TERM CURRENT USE OF INSULIN (HCC): ICD-10-CM

## 2018-11-19 LAB
A/G RATIO: 1.9 (ref 0.8–2)
ALBUMIN SERPL-MCNC: 4.5 G/DL (ref 3.4–4.8)
ALP BLD-CCNC: 50 U/L (ref 25–100)
ALT SERPL-CCNC: 8 U/L (ref 4–36)
ANION GAP SERPL CALCULATED.3IONS-SCNC: 13 MMOL/L (ref 3–16)
AST SERPL-CCNC: 14 U/L (ref 8–33)
BILIRUB SERPL-MCNC: 0.3 MG/DL (ref 0.3–1.2)
BUN BLDV-MCNC: 23 MG/DL (ref 6–20)
CALCIUM SERPL-MCNC: 10 MG/DL (ref 8.5–10.5)
CHLORIDE BLD-SCNC: 94 MMOL/L (ref 98–107)
CO2: 28 MMOL/L (ref 20–30)
CREAT SERPL-MCNC: 1.1 MG/DL (ref 0.4–1.2)
GFR AFRICAN AMERICAN: 58
GFR NON-AFRICAN AMERICAN: 48
GLOBULIN: 2.4 G/DL
GLUCOSE BLD-MCNC: 216 MG/DL (ref 74–106)
HBA1C MFR BLD: 6.8 %
POTASSIUM SERPL-SCNC: 4.5 MMOL/L (ref 3.4–5.1)
SODIUM BLD-SCNC: 135 MMOL/L (ref 136–145)
T4 FREE: 1.21 NG/DL (ref 0.89–1.76)
TOTAL PROTEIN: 6.9 G/DL (ref 6.4–8.3)
TSH SERPL DL<=0.05 MIU/L-ACNC: 6.66 UIU/ML (ref 0.35–5.5)

## 2018-11-19 PROCEDURE — 99214 OFFICE O/P EST MOD 30 MIN: CPT | Performed by: FAMILY MEDICINE

## 2018-11-19 PROCEDURE — 80053 COMPREHEN METABOLIC PANEL: CPT

## 2018-11-19 PROCEDURE — 36415 COLL VENOUS BLD VENIPUNCTURE: CPT

## 2018-11-19 PROCEDURE — 84439 ASSAY OF FREE THYROXINE: CPT

## 2018-11-19 PROCEDURE — 84443 ASSAY THYROID STIM HORMONE: CPT

## 2018-11-19 PROCEDURE — 83036 HEMOGLOBIN GLYCOSYLATED A1C: CPT

## 2018-11-19 RX ORDER — LEVOTHYROXINE SODIUM 50 MCG
50 TABLET ORAL DAILY
Qty: 30 TABLET | Refills: 2 | Status: SHIPPED | OUTPATIENT
Start: 2018-11-19 | End: 2019-02-19 | Stop reason: SDUPTHER

## 2018-11-19 NOTE — PROGRESS NOTES
and no thyromegaly present. Cardiovascular: Normal rate, regular rhythm, S1 normal, S2 normal, normal heart sounds, intact distal pulses and normal pulses. Exam reveals no gallop and no friction rub. No murmur heard. Pulmonary/Chest: Effort normal and breath sounds normal.   Abdominal: Soft. Bowel sounds are normal.   Musculoskeletal: Normal range of motion. Neurological: She is alert and oriented to person, place, and time. Skin: Skin is warm and dry. Psychiatric: Her mood appears anxious. Nursing note and vitals reviewed.       Results in Past 30 Days  Result Component Current Result Ref Range Previous Result Ref Range   Alb 4.5 (11/19/2018) 3.4 - 4.8 g/dL Not in Time Range    Albumin/Globulin Ratio 1.9 (11/19/2018) 0.8 - 2.0 Not in Time Range    Alkaline Phosphatase 50 (11/19/2018) 25 - 100 U/L Not in Time Range    ALT 8 (11/19/2018) 4 - 36 U/L Not in Time Range    AST 14 (11/19/2018) 8 - 33 U/L Not in Time Range    BUN 23 (H) (11/19/2018) 6 - 20 mg/dL Not in Time Range    Calcium 10.0 (11/19/2018) 8.5 - 10.5 mg/dL Not in Time Range    Chloride 94 (L) (11/19/2018) 98 - 107 mmol/L Not in Time Range    CO2 28 (11/19/2018) 20 - 30 mmol/L Not in Time Range    CREATININE 1.1 (11/19/2018) 0.4 - 1.2 mg/dL Not in Time Range    GFR  58 (L) (11/19/2018) >59 Not in Time Range    GFR Non- 48 (L) (11/19/2018) >59 Not in Time Range    Globulin 2.4 (11/19/2018) g/dL Not in Time Range    Glucose 216 (H) (11/19/2018) 74 - 106 mg/dL Not in Time Range    Potassium 4.5 (11/19/2018) 3.4 - 5.1 mmol/L Not in Time Range    Sodium 135 (L) (11/19/2018) 136 - 145 mmol/L Not in Time Range    Total Bilirubin 0.3 (11/19/2018) 0.3 - 1.2 mg/dL Not in Time Range    Total Protein 6.9 (11/19/2018) 6.4 - 8.3 g/dL Not in Time Range        Hemoglobin A1C (%)   Date Value   11/19/2018 6.8 (H)     LDL Calculated (mg/dL)   Date Value   08/21/2018 95         Lab Results   Component Value Date    WBC 7.1 08/21/2018    NEUTROABS 4.2 04/26/2017    HGB 11.6 08/21/2018    HCT 38.4 08/21/2018    HCT 37.3 05/25/2012    MCV 93.7 08/21/2018     08/21/2018     05/25/2012       Lab Results   Component Value Date    TSH 6.66 (H) 11/19/2018         ASSESSMENT:    Diagnosis Orders   1. Type 2 diabetes mellitus without complication, without long-term current use of insulin ([facility])  COMPREHENSIVE METABOLIC PANEL    HEMOGLOBIN A1C   2. HTN (hypertension), benign     3. Fatigue, unspecified type  TSH without Reflex    T4, FREE   4. Irregular heartbeat  EKG 12 Lead        PLAN:  Orders Placed This Encounter   Medications    SYNTHROID 50 MCG tablet     Sig: Take 1 tablet by mouth daily Take with water on an empty stomach- wait 30 minutes before eating or taking other meds.      Dispense:  30 tablet     Refill:  2        Medications Discontinued During This Encounter   Medication Reason    traZODone (DESYREL) 50 MG tablet Therapy completed       Controlled Substances Monitoring:

## 2019-01-15 DIAGNOSIS — I10 HTN (HYPERTENSION), BENIGN: ICD-10-CM

## 2019-01-15 RX ORDER — LISINOPRIL AND HYDROCHLOROTHIAZIDE 20; 12.5 MG/1; MG/1
TABLET ORAL
Qty: 135 TABLET | Refills: 3 | Status: SHIPPED | OUTPATIENT
Start: 2019-01-15 | End: 2019-05-20 | Stop reason: SDUPTHER

## 2019-02-19 ENCOUNTER — HOSPITAL ENCOUNTER (OUTPATIENT)
Facility: HOSPITAL | Age: 79
Discharge: HOME | End: 2019-02-19
Payer: MEDICARE

## 2019-02-19 ENCOUNTER — OFFICE VISIT (OUTPATIENT)
Dept: FAMILY MEDICINE CLINIC | Age: 79
End: 2019-02-19
Payer: MEDICARE

## 2019-02-19 VITALS
BODY MASS INDEX: 29.37 KG/M2 | OXYGEN SATURATION: 98 % | HEART RATE: 60 BPM | SYSTOLIC BLOOD PRESSURE: 160 MMHG | RESPIRATION RATE: 18 BRPM | DIASTOLIC BLOOD PRESSURE: 84 MMHG | HEIGHT: 64 IN | WEIGHT: 172 LBS

## 2019-02-19 DIAGNOSIS — E03.9 HYPOTHYROIDISM, UNSPECIFIED TYPE: ICD-10-CM

## 2019-02-19 DIAGNOSIS — I10 HTN (HYPERTENSION), BENIGN: ICD-10-CM

## 2019-02-19 DIAGNOSIS — E11.9 TYPE 2 DIABETES MELLITUS WITHOUT COMPLICATION, WITHOUT LONG-TERM CURRENT USE OF INSULIN (HCC): ICD-10-CM

## 2019-02-19 DIAGNOSIS — R03.0 ELEVATED BLOOD PRESSURE READING: ICD-10-CM

## 2019-02-19 DIAGNOSIS — E11.9 TYPE 2 DIABETES MELLITUS WITHOUT COMPLICATION, WITHOUT LONG-TERM CURRENT USE OF INSULIN (HCC): Primary | ICD-10-CM

## 2019-02-19 DIAGNOSIS — E78.9 LIPID DISORDER: ICD-10-CM

## 2019-02-19 LAB
A/G RATIO: 1.6 (ref 0.8–2)
ALBUMIN SERPL-MCNC: 4.1 G/DL (ref 3.4–4.8)
ALP BLD-CCNC: 49 U/L (ref 25–100)
ALT SERPL-CCNC: 10 U/L (ref 4–36)
ANION GAP SERPL CALCULATED.3IONS-SCNC: 10 MMOL/L (ref 3–16)
AST SERPL-CCNC: 14 U/L (ref 8–33)
BILIRUB SERPL-MCNC: <0.2 MG/DL (ref 0.3–1.2)
BUN BLDV-MCNC: 24 MG/DL (ref 6–20)
CALCIUM SERPL-MCNC: 9.2 MG/DL (ref 8.5–10.5)
CHLORIDE BLD-SCNC: 99 MMOL/L (ref 98–107)
CO2: 27 MMOL/L (ref 20–30)
CREAT SERPL-MCNC: 1 MG/DL (ref 0.4–1.2)
GFR AFRICAN AMERICAN: >59
GFR NON-AFRICAN AMERICAN: 53
GLOBULIN: 2.5 G/DL
GLUCOSE BLD-MCNC: 207 MG/DL (ref 74–106)
HBA1C MFR BLD: 7 %
POTASSIUM SERPL-SCNC: 4.9 MMOL/L (ref 3.4–5.1)
SODIUM BLD-SCNC: 136 MMOL/L (ref 136–145)
TOTAL PROTEIN: 6.6 G/DL (ref 6.4–8.3)
TSH SERPL DL<=0.05 MIU/L-ACNC: 3.03 UIU/ML (ref 0.35–5.5)

## 2019-02-19 PROCEDURE — 84443 ASSAY THYROID STIM HORMONE: CPT

## 2019-02-19 PROCEDURE — 99214 OFFICE O/P EST MOD 30 MIN: CPT | Performed by: FAMILY MEDICINE

## 2019-02-19 PROCEDURE — 83036 HEMOGLOBIN GLYCOSYLATED A1C: CPT

## 2019-02-19 PROCEDURE — 80053 COMPREHEN METABOLIC PANEL: CPT

## 2019-02-19 RX ORDER — FLUTICASONE PROPIONATE 50 MCG
2 SPRAY, SUSPENSION (ML) NASAL DAILY
Qty: 1 BOTTLE | Refills: 2 | Status: SHIPPED | OUTPATIENT
Start: 2019-02-19 | End: 2019-08-20 | Stop reason: SDUPTHER

## 2019-02-19 RX ORDER — CLONIDINE HYDROCHLORIDE 0.1 MG/1
0.1 TABLET ORAL ONCE
Status: COMPLETED | OUTPATIENT
Start: 2019-02-19 | End: 2019-02-19

## 2019-02-19 RX ORDER — LORATADINE 10 MG/1
10 TABLET ORAL DAILY
Qty: 30 TABLET | Refills: 5 | Status: SHIPPED | OUTPATIENT
Start: 2019-02-19 | End: 2019-05-20 | Stop reason: SDUPTHER

## 2019-02-19 RX ORDER — LEVOTHYROXINE SODIUM 50 MCG
50 TABLET ORAL DAILY
Qty: 90 TABLET | Refills: 3 | Status: SHIPPED | OUTPATIENT
Start: 2019-02-19 | End: 2019-05-20

## 2019-02-19 RX ADMIN — CLONIDINE HYDROCHLORIDE 0.1 MG: 0.1 TABLET ORAL at 11:29

## 2019-02-19 ASSESSMENT — PATIENT HEALTH QUESTIONNAIRE - PHQ9
SUM OF ALL RESPONSES TO PHQ QUESTIONS 1-9: 0
SUM OF ALL RESPONSES TO PHQ QUESTIONS 1-9: 0
SUM OF ALL RESPONSES TO PHQ9 QUESTIONS 1 & 2: 0
2. FEELING DOWN, DEPRESSED OR HOPELESS: 0
1. LITTLE INTEREST OR PLEASURE IN DOING THINGS: 0

## 2019-03-19 DIAGNOSIS — E78.9 LIPID DISORDER: ICD-10-CM

## 2019-03-19 RX ORDER — SIMVASTATIN 20 MG
TABLET ORAL
Qty: 45 TABLET | Refills: 3 | Status: SHIPPED | OUTPATIENT
Start: 2019-03-19 | End: 2019-05-20

## 2019-04-15 DIAGNOSIS — I10 HTN (HYPERTENSION), BENIGN: ICD-10-CM

## 2019-04-15 RX ORDER — ATENOLOL 50 MG/1
TABLET ORAL
Qty: 60 TABLET | Refills: 3 | Status: SHIPPED | OUTPATIENT
Start: 2019-04-15 | End: 2019-07-31 | Stop reason: SDUPTHER

## 2019-05-20 ENCOUNTER — OFFICE VISIT (OUTPATIENT)
Dept: FAMILY MEDICINE CLINIC | Age: 79
End: 2019-05-20
Payer: MEDICARE

## 2019-05-20 ENCOUNTER — HOSPITAL ENCOUNTER (OUTPATIENT)
Facility: HOSPITAL | Age: 79
Discharge: HOME | End: 2019-05-20
Payer: MEDICARE

## 2019-05-20 VITALS
RESPIRATION RATE: 20 BRPM | OXYGEN SATURATION: 98 % | HEIGHT: 64 IN | WEIGHT: 166 LBS | SYSTOLIC BLOOD PRESSURE: 132 MMHG | DIASTOLIC BLOOD PRESSURE: 74 MMHG | BODY MASS INDEX: 28.34 KG/M2 | HEART RATE: 72 BPM

## 2019-05-20 DIAGNOSIS — I10 HTN (HYPERTENSION), BENIGN: ICD-10-CM

## 2019-05-20 DIAGNOSIS — E11.9 TYPE 2 DIABETES MELLITUS WITHOUT COMPLICATION, WITHOUT LONG-TERM CURRENT USE OF INSULIN (HCC): ICD-10-CM

## 2019-05-20 DIAGNOSIS — E03.9 HYPOTHYROIDISM, UNSPECIFIED TYPE: ICD-10-CM

## 2019-05-20 DIAGNOSIS — E78.9 LIPID DISORDER: ICD-10-CM

## 2019-05-20 DIAGNOSIS — E11.9 TYPE 2 DIABETES MELLITUS WITHOUT COMPLICATION, WITHOUT LONG-TERM CURRENT USE OF INSULIN (HCC): Primary | ICD-10-CM

## 2019-05-20 LAB
A/G RATIO: 1.7 (ref 0.8–2)
ALBUMIN SERPL-MCNC: 4.4 G/DL (ref 3.4–4.8)
ALP BLD-CCNC: 54 U/L (ref 25–100)
ALT SERPL-CCNC: 9 U/L (ref 4–36)
ANION GAP SERPL CALCULATED.3IONS-SCNC: 12 MMOL/L (ref 3–16)
AST SERPL-CCNC: 14 U/L (ref 8–33)
BILIRUB SERPL-MCNC: <0.2 MG/DL (ref 0.3–1.2)
BUN BLDV-MCNC: 26 MG/DL (ref 6–20)
CALCIUM SERPL-MCNC: 9.8 MG/DL (ref 8.5–10.5)
CHLORIDE BLD-SCNC: 99 MMOL/L (ref 98–107)
CO2: 26 MMOL/L (ref 20–30)
CREAT SERPL-MCNC: 1.1 MG/DL (ref 0.4–1.2)
GFR AFRICAN AMERICAN: 58
GFR NON-AFRICAN AMERICAN: 48
GLOBULIN: 2.6 G/DL
GLUCOSE BLD-MCNC: 174 MG/DL (ref 74–106)
HBA1C MFR BLD: 6.6 %
POTASSIUM SERPL-SCNC: 5 MMOL/L (ref 3.4–5.1)
SODIUM BLD-SCNC: 137 MMOL/L (ref 136–145)
TOTAL PROTEIN: 7 G/DL (ref 6.4–8.3)
TSH SERPL DL<=0.05 MIU/L-ACNC: 1.8 UIU/ML (ref 0.35–5.5)

## 2019-05-20 PROCEDURE — 99214 OFFICE O/P EST MOD 30 MIN: CPT | Performed by: FAMILY MEDICINE

## 2019-05-20 PROCEDURE — 80053 COMPREHEN METABOLIC PANEL: CPT

## 2019-05-20 PROCEDURE — 84443 ASSAY THYROID STIM HORMONE: CPT

## 2019-05-20 PROCEDURE — 83036 HEMOGLOBIN GLYCOSYLATED A1C: CPT

## 2019-05-20 RX ORDER — LORATADINE 10 MG/1
10 TABLET ORAL DAILY
Qty: 30 TABLET | Refills: 5 | Status: SHIPPED | OUTPATIENT
Start: 2019-05-20 | End: 2019-08-20

## 2019-05-20 RX ORDER — SIMVASTATIN 20 MG
TABLET ORAL
Qty: 90 TABLET | Refills: 3 | Status: SHIPPED | OUTPATIENT
Start: 2019-05-20

## 2019-05-20 RX ORDER — LEVOTHYROXINE SODIUM 0.05 MG/1
50 TABLET ORAL DAILY
Qty: 30 TABLET | Refills: 5 | Status: SHIPPED | OUTPATIENT
Start: 2019-05-20 | End: 2019-08-20 | Stop reason: SDUPTHER

## 2019-05-20 RX ORDER — LISINOPRIL AND HYDROCHLOROTHIAZIDE 20; 12.5 MG/1; MG/1
TABLET ORAL
Qty: 135 TABLET | Refills: 3 | Status: SHIPPED | OUTPATIENT
Start: 2019-05-20 | End: 2019-08-20 | Stop reason: SDUPTHER

## 2019-05-20 NOTE — PROGRESS NOTES
SUBJECTIVE:    Patient ID: Dyan Baker is a [de-identified] y.o. female. Chief Complaint   Patient presents with    Diabetes     f/u        HPI: she is doing pretty well with her medication. She is having good blood pressures at home. She is not having high blood sugars. She is not having any chest pain or increase in sob. She is not having any medication problmes. She is staying active. She is not havign any othr new issues. Review of Systems   Constitutional: Positive for fatigue. Musculoskeletal: Positive for arthralgias. Psychiatric/Behavioral: The patient is nervous/anxious. All other systems reviewed and are negative. OBJECTIVE:  Wt Readings from Last 3 Encounters:   05/20/19 166 lb (75.3 kg)   02/19/19 172 lb (78 kg)   11/19/18 164 lb 8 oz (74.6 kg)     BP Readings from Last 3 Encounters:   05/20/19 132/74   02/19/19 (!) 160/84   11/19/18 130/70      Pulse Readings from Last 3 Encounters:   05/20/19 72   02/19/19 60   11/19/18 78     Body mass index is 28.49 kg/m². Resp Readings from Last 3 Encounters:   05/20/19 20   02/19/19 18   11/19/18 20     Physical Exam   Constitutional: She is oriented to person, place, and time. Vital signs are normal. She appears well-developed and well-nourished. HENT:   Head: Normocephalic and atraumatic. Right Ear: Hearing and external ear normal.   Left Ear: Hearing and external ear normal.   Nose: Nose normal.   Mouth/Throat: Uvula is midline, oropharynx is clear and moist and mucous membranes are normal.   Eyes: Pupils are equal, round, and reactive to light. Conjunctivae, EOM and lids are normal.   Neck: Trachea normal, normal range of motion and phonation normal. Neck supple. Carotid bruit is not present. No thyroid mass and no thyromegaly present. Cardiovascular: Normal rate, regular rhythm, S1 normal, S2 normal, normal heart sounds, intact distal pulses and normal pulses. Exam reveals no gallop and no friction rub.    No murmur heard.  Pulmonary/Chest: Effort normal and breath sounds normal.   Abdominal: Soft. Bowel sounds are normal.   Musculoskeletal: Normal range of motion. Neurological: She is alert and oriented to person, place, and time. Skin: Skin is warm and dry. Psychiatric: Her mood appears anxious. Nursing note and vitals reviewed. No results found for requested labs within last 30 days. Hemoglobin A1C (%)   Date Value   02/19/2019 7.0 (H)     LDL Calculated (mg/dL)   Date Value   08/21/2018 95         Lab Results   Component Value Date    WBC 7.1 08/21/2018    NEUTROABS 4.2 04/26/2017    HGB 11.6 08/21/2018    HCT 38.4 08/21/2018    HCT 37.3 05/25/2012    MCV 93.7 08/21/2018     08/21/2018     05/25/2012       Lab Results   Component Value Date    TSH 3.03 02/19/2019         ASSESSMENT:    Diagnosis Orders   1. Type 2 diabetes mellitus without complication, without long-term current use of insulin ([facility])  COMPREHENSIVE METABOLIC PANEL    HEMOGLOBIN A1C   2. HTN (hypertension), benign  lisinopril-hydrochlorothiazide (PRINZIDE;ZESTORETIC) 20-12.5 MG per tablet   3. Lipid disorder  simvastatin (ZOCOR) 20 MG tablet   4.  Hypothyroidism, unspecified type  TSH without Reflex        PLAN:  Orders Placed This Encounter   Medications    lisinopril-hydrochlorothiazide (PRINZIDE;ZESTORETIC) 20-12.5 MG per tablet     Sig: TAKE ONE TABLET BY MOUTH EVERY MORNING, AND ONE-HALF TABLET EVERY EVENING     Dispense:  135 tablet     Refill:  3    loratadine (CLARITIN) 10 MG tablet     Sig: Take 1 tablet by mouth daily     Dispense:  30 tablet     Refill:  5    simvastatin (ZOCOR) 20 MG tablet     Sig: TAKE ONE TABLET BY MOUTH ONCE NIGHTLY     Dispense:  90 tablet     Refill:  3    levothyroxine (SYNTHROID) 50 MCG tablet     Sig: Take 1 tablet by mouth daily     Dispense:  30 tablet     Refill:  5           Medications Discontinued During This Encounter   Medication Reason    SYNTHROID 50 MCG tablet     simvastatin (ZOCOR) 20 MG tablet     lisinopril-hydrochlorothiazide (PRINZIDE;ZESTORETIC) 20-12.5 MG per tablet REORDER    loratadine (CLARITIN) 10 MG tablet REORDER       Controlled Substances Monitoring:

## 2019-06-18 DIAGNOSIS — E11.9 TYPE 2 DIABETES MELLITUS WITHOUT COMPLICATION, WITHOUT LONG-TERM CURRENT USE OF INSULIN (HCC): ICD-10-CM

## 2019-06-18 RX ORDER — GLIMEPIRIDE 4 MG/1
TABLET ORAL
Qty: 60 TABLET | Refills: 2 | Status: SHIPPED | OUTPATIENT
Start: 2019-06-18 | End: 2019-08-20 | Stop reason: SDUPTHER

## 2019-06-18 NOTE — TELEPHONE ENCOUNTER
Refill request received from pharmacy.  Medication pending for approval.     Patient information below:      Hemoglobin A1C (%)   Date Value   05/20/2019 6.6 (H)   02/19/2019 7.0 (H)   11/19/2018 6.8 (H)     LDL Calculated (mg/dL)   Date Value   08/21/2018 95     AST (U/L)   Date Value   05/20/2019 14     ALT (U/L)   Date Value   05/20/2019 9     BUN (mg/dL)   Date Value   05/20/2019 26 (H)      (goal A1C is < 7)   (goal LDL is <100) need 30-50% reduction from baseline     BP Readings from Last 3 Encounters:   05/20/19 132/74   02/19/19 (!) 160/84   11/19/18 130/70    (goal /80)      All Future Testing planned in [facility]:      Last Office Visit With PCP:  6/14/2019      Next Visit Date:  Future Appointments   Date Time Provider Vonda Carbonei   8/20/2019 10:30 AM Minerva Combs MD [address] [place] [address]            Patient Active Problem List:     Lipid disorder     Type 2 diabetes mellitus ([facility] [address]     HTN (hypertension), benign     Anxiety

## 2019-07-31 DIAGNOSIS — I10 HTN (HYPERTENSION), BENIGN: ICD-10-CM

## 2019-07-31 RX ORDER — ATENOLOL 50 MG/1
TABLET ORAL
Qty: 36 TABLET | Refills: 2 | Status: SHIPPED | OUTPATIENT
Start: 2019-07-31 | End: 2019-08-02 | Stop reason: SDUPTHER

## 2019-08-02 DIAGNOSIS — I10 HTN (HYPERTENSION), BENIGN: ICD-10-CM

## 2019-08-02 RX ORDER — ATENOLOL 50 MG/1
TABLET ORAL
Qty: 60 TABLET | Refills: 2 | OUTPATIENT
Start: 2019-08-02 | End: 2019-08-20 | Stop reason: SDUPTHER

## 2019-08-20 ENCOUNTER — HOSPITAL ENCOUNTER (OUTPATIENT)
Facility: HOSPITAL | Age: 79
Discharge: HOME | End: 2019-08-20
Payer: MEDICARE

## 2019-08-20 ENCOUNTER — OFFICE VISIT (OUTPATIENT)
Dept: FAMILY MEDICINE CLINIC | Age: 79
End: 2019-08-20
Payer: MEDICARE

## 2019-08-20 VITALS
HEIGHT: 64 IN | SYSTOLIC BLOOD PRESSURE: 124 MMHG | HEART RATE: 78 BPM | RESPIRATION RATE: 20 BRPM | OXYGEN SATURATION: 98 % | DIASTOLIC BLOOD PRESSURE: 72 MMHG | WEIGHT: 166.5 LBS | BODY MASS INDEX: 28.42 KG/M2

## 2019-08-20 DIAGNOSIS — E78.9 LIPID DISORDER: ICD-10-CM

## 2019-08-20 DIAGNOSIS — E11.9 TYPE 2 DIABETES MELLITUS WITHOUT COMPLICATION, WITHOUT LONG-TERM CURRENT USE OF INSULIN (HCC): ICD-10-CM

## 2019-08-20 DIAGNOSIS — E11.9 TYPE 2 DIABETES MELLITUS WITHOUT COMPLICATION, WITHOUT LONG-TERM CURRENT USE OF INSULIN (HCC): Primary | ICD-10-CM

## 2019-08-20 DIAGNOSIS — I10 HTN (HYPERTENSION), BENIGN: ICD-10-CM

## 2019-08-20 LAB
A/G RATIO: 1.6 (ref 0.8–2)
ALBUMIN SERPL-MCNC: 4.1 G/DL (ref 3.4–4.8)
ALP BLD-CCNC: 47 U/L (ref 25–100)
ALT SERPL-CCNC: 9 U/L (ref 4–36)
ANION GAP SERPL CALCULATED.3IONS-SCNC: 15 MMOL/L (ref 3–16)
AST SERPL-CCNC: 13 U/L (ref 8–33)
BILIRUB SERPL-MCNC: 0.3 MG/DL (ref 0.3–1.2)
BUN BLDV-MCNC: 18 MG/DL (ref 6–20)
CALCIUM SERPL-MCNC: 9.6 MG/DL (ref 8.5–10.5)
CHLORIDE BLD-SCNC: 95 MMOL/L (ref 98–107)
CHOLESTEROL, TOTAL: 152 MG/DL (ref 0–200)
CO2: 25 MMOL/L (ref 20–30)
CREAT SERPL-MCNC: 1 MG/DL (ref 0.4–1.2)
GFR AFRICAN AMERICAN: >59
GFR NON-AFRICAN AMERICAN: 53
GLOBULIN: 2.5 G/DL
GLUCOSE BLD-MCNC: 202 MG/DL (ref 74–106)
HBA1C MFR BLD: 7 %
HDLC SERPL-MCNC: 45 MG/DL (ref 40–60)
LDL CHOLESTEROL CALCULATED: 72 MG/DL
POTASSIUM SERPL-SCNC: 5 MMOL/L (ref 3.4–5.1)
SODIUM BLD-SCNC: 135 MMOL/L (ref 136–145)
TOTAL PROTEIN: 6.6 G/DL (ref 6.4–8.3)
TRIGL SERPL-MCNC: 175 MG/DL (ref 0–249)
VLDLC SERPL CALC-MCNC: 35 MG/DL

## 2019-08-20 PROCEDURE — 99214 OFFICE O/P EST MOD 30 MIN: CPT | Performed by: FAMILY MEDICINE

## 2019-08-20 PROCEDURE — 80053 COMPREHEN METABOLIC PANEL: CPT

## 2019-08-20 PROCEDURE — 80061 LIPID PANEL: CPT

## 2019-08-20 PROCEDURE — 83036 HEMOGLOBIN GLYCOSYLATED A1C: CPT

## 2019-08-20 RX ORDER — ATENOLOL 50 MG/1
TABLET ORAL
Qty: 180 TABLET | Refills: 3 | Status: SHIPPED | OUTPATIENT
Start: 2019-08-20

## 2019-08-20 RX ORDER — GLIMEPIRIDE 4 MG/1
TABLET ORAL
Qty: 180 TABLET | Refills: 3 | Status: SHIPPED | OUTPATIENT
Start: 2019-08-20

## 2019-08-20 RX ORDER — LISINOPRIL AND HYDROCHLOROTHIAZIDE 20; 12.5 MG/1; MG/1
TABLET ORAL
Qty: 135 TABLET | Refills: 3 | Status: SHIPPED | OUTPATIENT
Start: 2019-08-20

## 2019-08-20 RX ORDER — LEVOTHYROXINE SODIUM 0.05 MG/1
50 TABLET ORAL DAILY
Qty: 90 TABLET | Refills: 3 | Status: SHIPPED | OUTPATIENT
Start: 2019-08-20

## 2019-08-20 RX ORDER — FLUTICASONE PROPIONATE 50 MCG
2 SPRAY, SUSPENSION (ML) NASAL DAILY
Qty: 3 BOTTLE | Refills: 3 | Status: SHIPPED | OUTPATIENT
Start: 2019-08-20

## 2019-10-21 ENCOUNTER — OFFICE VISIT (OUTPATIENT)
Dept: FAMILY MEDICINE CLINIC | Facility: CLINIC | Age: 79
End: 2019-10-21
Payer: MEDICARE

## 2019-10-21 VITALS
SYSTOLIC BLOOD PRESSURE: 138 MMHG | WEIGHT: 162 LBS | HEIGHT: 63.5 IN | DIASTOLIC BLOOD PRESSURE: 86 MMHG | BODY MASS INDEX: 28.35 KG/M2 | HEART RATE: 80 BPM | TEMPERATURE: 98 F

## 2019-10-21 DIAGNOSIS — E78.2 MIXED HYPERLIPIDEMIA: ICD-10-CM

## 2019-10-21 DIAGNOSIS — Z23 IMMUNIZATION DUE: ICD-10-CM

## 2019-10-21 DIAGNOSIS — Z00.00 ENCOUNTER FOR ANNUAL HEALTH EXAMINATION: Primary | ICD-10-CM

## 2019-10-21 DIAGNOSIS — I10 ESSENTIAL HYPERTENSION: ICD-10-CM

## 2019-10-21 DIAGNOSIS — E11.9 TYPE 2 DIABETES MELLITUS WITHOUT COMPLICATION, WITHOUT LONG-TERM CURRENT USE OF INSULIN (HCC): ICD-10-CM

## 2019-10-21 DIAGNOSIS — E03.9 ACQUIRED HYPOTHYROIDISM: ICD-10-CM

## 2019-10-21 DIAGNOSIS — Z13.820 SCREENING FOR OSTEOPOROSIS: ICD-10-CM

## 2019-10-21 PROCEDURE — G0439 PPPS, SUBSEQ VISIT: HCPCS | Performed by: FAMILY MEDICINE

## 2019-10-21 PROCEDURE — 99397 PER PM REEVAL EST PAT 65+ YR: CPT | Performed by: FAMILY MEDICINE

## 2019-10-21 PROCEDURE — 96160 PT-FOCUSED HLTH RISK ASSMT: CPT | Performed by: FAMILY MEDICINE

## 2019-10-21 RX ORDER — ATENOLOL 100 MG/1
100 TABLET ORAL DAILY
Qty: 90 TABLET | Refills: 1 | Status: SHIPPED | OUTPATIENT
Start: 2019-10-21 | End: 2019-12-09

## 2019-10-21 RX ORDER — LEVOTHYROXINE SODIUM 0.05 MG/1
50 TABLET ORAL
Qty: 90 TABLET | Refills: 1 | Status: SHIPPED | OUTPATIENT
Start: 2019-10-21 | End: 2019-12-09

## 2019-10-21 RX ORDER — LISINOPRIL AND HYDROCHLOROTHIAZIDE 20; 12.5 MG/1; MG/1
TABLET ORAL
COMMUNITY
End: 2019-10-21

## 2019-10-21 RX ORDER — LEVOTHYROXINE SODIUM 0.05 MG/1
50 TABLET ORAL
COMMUNITY
End: 2019-10-21

## 2019-10-21 RX ORDER — SIMVASTATIN 20 MG
20 TABLET ORAL NIGHTLY
COMMUNITY
End: 2019-10-21

## 2019-10-21 RX ORDER — SIMVASTATIN 20 MG
20 TABLET ORAL NIGHTLY
Qty: 90 TABLET | Refills: 1 | Status: SHIPPED | OUTPATIENT
Start: 2019-10-21 | End: 2019-12-09

## 2019-10-21 RX ORDER — ATENOLOL 50 MG/1
100 TABLET ORAL DAILY
COMMUNITY
End: 2019-12-09

## 2019-10-21 RX ORDER — GLIMEPIRIDE 4 MG/1
4 TABLET ORAL 2 TIMES DAILY WITH MEALS
Qty: 180 TABLET | Refills: 1 | Status: SHIPPED | OUTPATIENT
Start: 2019-10-21 | End: 2019-12-09

## 2019-10-21 RX ORDER — GLIMEPIRIDE 4 MG/1
TABLET ORAL
COMMUNITY
End: 2019-10-21

## 2019-10-21 RX ORDER — LISINOPRIL AND HYDROCHLOROTHIAZIDE 20; 12.5 MG/1; MG/1
TABLET ORAL
Qty: 135 TABLET | Refills: 0 | Status: SHIPPED | OUTPATIENT
Start: 2019-10-21 | End: 2019-12-09

## 2019-10-21 NOTE — PATIENT INSTRUCTIONS
-- Check BP at home daily  -- goal is less than 140/90  -- if higher than that consistently, followup in 1 month    -- if hearing on right side worsening, let me know    -- Continue all other meds    -- [facility]    -- followup in 3 month • Men who are 73-68 years old and have smoked more than 100 cigarettes in their lifetime   • Anyone with a family history    Colorectal Cancer Screening  Covered up to Age 76     Colonoscopy Screen   Covered every 10 years- more often if abnormal There a Influenza  Covered Annually No orders found for this or any previous visit. Please get every year    Pneumococcal 13 (Prevnar)  Covered Once after 65 No orders found for this or any previous visit.  Please get once after your 65th birthday    Pneumococcal 2

## 2019-10-31 NOTE — PROGRESS NOTES
HPI:   Abraham Bond is a 78year old female who presents for a MA (Medicare Advantage) Supervisit (Once per calendar year). Her last annual assessment has been over 1 year: Annual Physical due on 07/16/1943      1.  Encounter for annual health examination Health Maintenance Summary     Topic Due On Due Status Completed On Postpone Until Reason    Pap Smear - Cervical Cancer Screening  May 20, 2021 Not Due May 20, 2016      Immunization - TDAP Pregnancy  Hidden       IMMUNIZATION - DTaP/Tdap/Td Sep 19, 2018 Not Due Sep 19, 2008      Immunization-Influenza Sep 1, 2017 Postponed  Jun 1, 2018 Patient Refused    Depression Screening Mar 22, 2019 Not Due Mar 22, 2018            Patient is up to date, no discussion needed .              for [facility] on file in [place].    Advance care planning including the explanation and discussion of advance directives standard forms performed Face to Face with patient and Family/surrogate (if present), and forms available to patient in AVS history includes Other in her father and mother. SOCIAL HISTORY:   She  reports that she has quit smoking. She smoked 0.00 packs per day. She has never used smokeless tobacco. She reports that she does not drink alcohol or use drugs.      REVIEW OF SYSTEM ASSESSMENT AND OTHER RELEVANT CHRONIC CONDITIONS:   Radha Ryan is a 78year old female who presents for a Medicare Assessment.      PLAN SUMMARY:   Diagnoses and all orders for this visit:    Encounter for annual health examination  -reviewed age appro you describe your current health state?: Good  How do you maintain positive mental well-being?: Visiting Friends      This section provided for quick review of chart, separate sheet to patient  [address] Internal Lab o year    Pneumococcal 13 (Prevnar)  Covered Once after 65 No vaccine history found Please get once after your 65th birthday    Pneumococcal 23 (Pneumovax)  Covered Once after 65 No vaccine history found Please get once after your 65th birthday    Hepatitis

## 2019-11-14 ENCOUNTER — TELEPHONE (OUTPATIENT)
Dept: FAMILY MEDICINE CLINIC | Facility: CLINIC | Age: 79
End: 2019-11-14

## 2019-11-14 ENCOUNTER — APPOINTMENT (OUTPATIENT)
Dept: GENERAL RADIOLOGY | Age: 79
End: 2019-11-14
Attending: FAMILY MEDICINE
Payer: MEDICARE

## 2019-11-14 ENCOUNTER — HOSPITAL ENCOUNTER (OUTPATIENT)
Age: 79
Discharge: HOME | End: 2019-11-14
Attending: FAMILY MEDICINE
Payer: MEDICARE

## 2019-11-14 VITALS
RESPIRATION RATE: 18 BRPM | HEART RATE: 77 BPM | SYSTOLIC BLOOD PRESSURE: 160 MMHG | TEMPERATURE: 98 F | DIASTOLIC BLOOD PRESSURE: 100 MMHG | OXYGEN SATURATION: 97 %

## 2019-11-14 DIAGNOSIS — S90.122A CONTUSION OF SECOND TOE OF LEFT FOOT, INITIAL ENCOUNTER: ICD-10-CM

## 2019-11-14 DIAGNOSIS — L03.032 CELLULITIS OF SECOND TOE OF LEFT FOOT: Primary | ICD-10-CM

## 2019-11-14 PROCEDURE — 73630 X-RAY EXAM OF FOOT: CPT | Performed by: FAMILY MEDICINE

## 2019-11-14 PROCEDURE — 99203 OFFICE O/P NEW LOW 30 MIN: CPT

## 2019-11-14 PROCEDURE — 99213 OFFICE O/P EST LOW 20 MIN: CPT

## 2019-11-14 RX ORDER — CEPHALEXIN 500 MG/1
500 CAPSULE ORAL 3 TIMES DAILY
Qty: 30 CAPSULE | Refills: 0 | Status: SHIPPED | OUTPATIENT
Start: 2019-11-14 | End: 2019-11-24

## 2019-11-14 NOTE — ED INITIAL ASSESSMENT (HPI)
Patient presents with cc of stubbing left second toe on Monday on the bathroom door. Now swollen and red. +DM.+CMS. No fever noted,

## 2019-11-14 NOTE — TELEPHONE ENCOUNTER
Pt's daughter called and said Blas Mayen is diabetic and her 2nd toe is red and the redness is going up her leg. She said it funny looking.

## 2019-11-14 NOTE — TELEPHONE ENCOUNTER
Spoke with Caitie Griggs, reports redness started on great toe but has started to radiate up foot and ankle. C/o some pain. Denies fevers, chills, sweats, dark discoloration and loss of feeling.      Daughter asking for an appointment today as she does not want to

## 2019-11-16 NOTE — ED PROVIDER NOTES
Patient Seen in: [address] [facility]      History   Patient presents with:  Lower Extremity Injury (musculoskeletal)  Redness    Stated Complaint: toe injury poss infection,patient is diabetic    HPI    78year old female presents for le with mild soft tissue swelling and erythema of the second toe. Tenderness and pain with movement of the toe. Skin:     General: Skin is warm and dry. Neurological:      Mental Status: She is alert and oriented to person, place, and time.            ED

## 2019-12-09 ENCOUNTER — OFFICE VISIT (OUTPATIENT)
Dept: FAMILY MEDICINE CLINIC | Facility: CLINIC | Age: 79
End: 2019-12-09
Payer: MEDICARE

## 2019-12-09 VITALS
WEIGHT: 161 LBS | BODY MASS INDEX: 28.17 KG/M2 | HEART RATE: 72 BPM | DIASTOLIC BLOOD PRESSURE: 80 MMHG | TEMPERATURE: 98 F | HEIGHT: 63.5 IN | SYSTOLIC BLOOD PRESSURE: 192 MMHG

## 2019-12-09 DIAGNOSIS — I10 ESSENTIAL HYPERTENSION: ICD-10-CM

## 2019-12-09 DIAGNOSIS — M79.675 PAIN IN LEFT TOE(S): Primary | ICD-10-CM

## 2019-12-09 PROCEDURE — 99214 OFFICE O/P EST MOD 30 MIN: CPT | Performed by: FAMILY MEDICINE

## 2019-12-09 RX ORDER — LISINOPRIL AND HYDROCHLOROTHIAZIDE 20; 12.5 MG/1; MG/1
1 TABLET ORAL 2 TIMES DAILY
Qty: 180 TABLET | Refills: 1 | Status: SHIPPED | OUTPATIENT
Start: 2019-12-09 | End: 2019-12-11

## 2019-12-09 RX ORDER — ATENOLOL 100 MG/1
100 TABLET ORAL DAILY
Qty: 90 TABLET | Refills: 1 | Status: SHIPPED | OUTPATIENT
Start: 2019-12-09 | End: 2020-07-01

## 2019-12-09 RX ORDER — SIMVASTATIN 20 MG
20 TABLET ORAL NIGHTLY
Qty: 90 TABLET | Refills: 1 | Status: SHIPPED | OUTPATIENT
Start: 2019-12-09 | End: 2020-07-01

## 2019-12-09 RX ORDER — LEVOTHYROXINE SODIUM 0.05 MG/1
50 TABLET ORAL
Qty: 90 TABLET | Refills: 1 | Status: SHIPPED | OUTPATIENT
Start: 2019-12-09 | End: 2020-07-01

## 2019-12-09 RX ORDER — GLIMEPIRIDE 4 MG/1
4 TABLET ORAL 2 TIMES DAILY WITH MEALS
Qty: 180 TABLET | Refills: 1 | Status: SHIPPED | OUTPATIENT
Start: 2019-12-09 | End: 2020-07-01

## 2019-12-09 RX ORDER — LISINOPRIL AND HYDROCHLOROTHIAZIDE 20; 12.5 MG/1; MG/1
TABLET ORAL
Qty: 180 TABLET | Refills: 1 | Status: SHIPPED | OUTPATIENT
Start: 2019-12-09 | End: 2019-12-09

## 2019-12-09 RX ORDER — AMLODIPINE BESYLATE 5 MG/1
5 TABLET ORAL DAILY
Qty: 90 TABLET | Refills: 0 | Status: SHIPPED | OUTPATIENT
Start: 2019-12-09 | End: 2020-03-02

## 2019-12-09 NOTE — PROGRESS NOTES
Patient will schedule appointment with Ophthalmologist for DM eye exam and she will  have them fax consult report.

## 2019-12-09 NOTE — PATIENT INSTRUCTIONS
Increase lisinopril- hydrochlorothiazide to 1 full tablet 2x/day    Check BP daily at home  If consistently > 140/90, start new medication (amlodipine 5mg once daily)    Soak foot in epsom salt warm soaks 2x/day    Followup in 2 wks, sooner if needed

## 2019-12-09 NOTE — PROGRESS NOTES
Reuben Cheadle is a 78year old female here for Patient presents with:  Swelling: Left second toe swellings and pain x 3 weeks after injury      HPI:       1.  Pain in left toe(s)  -stubbed left 2nd toe 3 wks ago  -pain was worsening associated with spreading in the evening 180 tablet 1       Allergies:  No Known Allergies      ROS:     --GEN: Denies  --HEENT: Denies  --RESP: Denies  --CV: Denies  --GI: Denies  --: Denies  --MSK: Denies  --NEURO: Denies  --PSYCH: Denies  --HEME/LYMPH/IMMUN: Denies  --ENDO: [facility] Lisinopril-hydroCHLOROthiazide 20-12.5 MG Oral Tab 180 tablet 1     Sig: Take 1 tablet by mouth 2 (two) times daily.  1 tablet in the morning and half a tablet in the evening       Imaging & Referrals:  None     Nancy Bedolla MD    I spent a total of 25 m

## 2019-12-10 ENCOUNTER — TELEPHONE (OUTPATIENT)
Dept: FAMILY MEDICINE CLINIC | Facility: CLINIC | Age: 79
End: 2019-12-10

## 2019-12-11 RX ORDER — LISINOPRIL AND HYDROCHLOROTHIAZIDE 20; 12.5 MG/1; MG/1
1 TABLET ORAL 2 TIMES DAILY
Qty: 180 TABLET | Refills: 1 | Status: SHIPPED | OUTPATIENT
Start: 2019-12-11 | End: 2020-07-01

## 2019-12-11 NOTE — TELEPHONE ENCOUNTER
Prescription sig for lisinopril-hydrochlorothiazide changed to 1 tab BID and re-sent to [address].

## 2019-12-13 ENCOUNTER — HOSPITAL ENCOUNTER (OUTPATIENT)
Dept: BONE DENSITY | Age: 79
Discharge: HOME | End: 2019-12-13
Attending: FAMILY MEDICINE
Payer: MEDICARE

## 2019-12-13 DIAGNOSIS — Z13.820 SCREENING FOR OSTEOPOROSIS: ICD-10-CM

## 2019-12-13 PROCEDURE — 77080 DXA BONE DENSITY AXIAL: CPT | Performed by: FAMILY MEDICINE

## 2019-12-23 ENCOUNTER — OFFICE VISIT (OUTPATIENT)
Dept: FAMILY MEDICINE CLINIC | Facility: CLINIC | Age: 79
End: 2019-12-23
Payer: MEDICARE

## 2019-12-23 VITALS
SYSTOLIC BLOOD PRESSURE: 178 MMHG | HEIGHT: 63.5 IN | BODY MASS INDEX: 28.17 KG/M2 | WEIGHT: 161 LBS | DIASTOLIC BLOOD PRESSURE: 78 MMHG | TEMPERATURE: 97 F | HEART RATE: 80 BPM

## 2019-12-23 DIAGNOSIS — M79.675 PAIN IN LEFT TOE(S): ICD-10-CM

## 2019-12-23 DIAGNOSIS — I10 ESSENTIAL HYPERTENSION: Primary | ICD-10-CM

## 2019-12-23 PROCEDURE — 99214 OFFICE O/P EST MOD 30 MIN: CPT | Performed by: FAMILY MEDICINE

## 2019-12-23 NOTE — PROGRESS NOTES
Linda Garcia is a 78year old female here for Patient presents with:  Blood Pressure: Follow up      HPI:       1. Essential hypertension  -started taking 1 full tab of hctz-lisinopril less than 1 wk ago  -at home, BP running 140-150s/80s  -no issues    2. Denies  All other systems reviewed are negative    PHYSICAL EXAM:   BP (!) 178/78 (BP Location: Left arm, Patient Position: Sitting, Cuff Size: adult)   Pulse 80   Temp 97.3 °F (36.3 °C) (Oral)   Ht 63.5\"   Wt 161 lb (73 kg)   BMI 28.07 kg/m²     Gen: NAD

## 2019-12-23 NOTE — PATIENT INSTRUCTIONS
Continue full tab of lisinopril- hydrochlorothiazide 2x/day    Continue to check BP at home    Goal in top number under 140 consistently    If after 2 wks, still above 140, increase amlodipine to 10mg (2 tablets)    Followup in early Feb as planned, touc

## 2020-01-29 ENCOUNTER — OFFICE VISIT (OUTPATIENT)
Dept: FAMILY MEDICINE CLINIC | Facility: CLINIC | Age: 80
End: 2020-01-29
Payer: MEDICARE

## 2020-01-29 ENCOUNTER — TELEPHONE (OUTPATIENT)
Dept: FAMILY MEDICINE CLINIC | Facility: CLINIC | Age: 80
End: 2020-01-29

## 2020-01-29 VITALS
SYSTOLIC BLOOD PRESSURE: 138 MMHG | WEIGHT: 160 LBS | DIASTOLIC BLOOD PRESSURE: 60 MMHG | BODY MASS INDEX: 28 KG/M2 | TEMPERATURE: 97 F | OXYGEN SATURATION: 97 % | HEART RATE: 72 BPM

## 2020-01-29 DIAGNOSIS — Z13.820 SCREENING FOR OSTEOPOROSIS: ICD-10-CM

## 2020-01-29 DIAGNOSIS — I10 ESSENTIAL HYPERTENSION: ICD-10-CM

## 2020-01-29 DIAGNOSIS — E11.9 TYPE 2 DIABETES MELLITUS WITHOUT COMPLICATION, WITHOUT LONG-TERM CURRENT USE OF INSULIN (HCC): ICD-10-CM

## 2020-01-29 DIAGNOSIS — E78.2 MIXED HYPERLIPIDEMIA: ICD-10-CM

## 2020-01-29 DIAGNOSIS — Z00.00 ENCOUNTER FOR ANNUAL HEALTH EXAMINATION: Primary | ICD-10-CM

## 2020-01-29 DIAGNOSIS — E03.9 ACQUIRED HYPOTHYROIDISM: ICD-10-CM

## 2020-01-29 PROCEDURE — G0439 PPPS, SUBSEQ VISIT: HCPCS | Performed by: FAMILY MEDICINE

## 2020-01-29 PROCEDURE — 96160 PT-FOCUSED HLTH RISK ASSMT: CPT | Performed by: FAMILY MEDICINE

## 2020-01-29 PROCEDURE — 99397 PER PM REEVAL EST PAT 65+ YR: CPT | Performed by: FAMILY MEDICINE

## 2020-01-29 NOTE — TELEPHONE ENCOUNTER
Patient signed HIPAA medical records authorization form for the Facility identified below to disclose patient health information to [address]:        [facility]  Fax [phone]   [phone]    Specific PHI to be [facility]

## 2020-01-29 NOTE — PROGRESS NOTES
HPI:   El Chatman is a 78year old female who presents for a MA (Medicare Advantage) Supervisit (Once per calendar year). Her last annual assessment has been over 1 year: Annual Physical due on 07/16/1943      1.  Encounter for annual health examination and forms available to patient in AVS         She smoked tobacco in the past but quit greater than 12 months ago.   Social History    Tobacco Use      Smoking status: Former Smoker        Packs/day: 0.00      Smokeless tobacco: Never Used         CAGE Alcoh never used smokeless tobacco. She reports that she does not drink alcohol or use drugs.      REVIEW OF SYSTEMS:      Negative except above    EXAM:   /60 (BP Location: Left arm, Patient Position: Sitting, Cuff Size: adult)   Pulse 72   Temp 97.4 °F (3 Date(s) Administered   • Influenza 09/30/2019        ASSESSMENT AND OTHER RELEVANT CHRONIC CONDITIONS:   Scarlett Martinez is a 78year old female who presents for a Medicare Assessment.      PLAN SUMMARY:   Diagnoses and all orders for this visit:    Encounter f Annually No results found for: FOB No flowsheet data found.     Glaucoma Screening      Ophthalmology Visit Annually: Diabetics, FHx Glaucoma, AA>50, > 65 Data entered on: 1/11/2020   Last Dilated Eye Exam 1/10/2020       Bone Density Screening DISEASE MONITORING Internal Lab or Procedure External Lab or Procedure      Annual Monitoring of Persistent     Medications (ACE/ARB, digoxin diuretics, anticonvulsants.)    Potassium  Annually No results found for: K No flowsheet data found.     Creatinine

## 2020-01-29 NOTE — PATIENT INSTRUCTIONS
Check urine test     Continue all meds as prescribed    Continue to check BP at home    If under 140/90, followup in 6 months    If running over 140/90, come back in 3 months      Ciarra Salcido's SCREENING SCHEDULE   Tests on this list are recommended by y Anyone with a family history    Colorectal Cancer Screening  Covered up to Age 76     Colonoscopy Screen   Covered every 10 years- more often if abnormal There are no preventive care reminders to display for this patient.  Update [employer] if appli orders found for this or any previous visit. Please get every year    Pneumococcal 13 (Prevnar)  Covered Once after 65 No orders found for this or any previous visit.  Please get once after your 65th birthday    Pneumococcal 23 (Pneumovax)  Covered Once aft

## 2020-01-30 LAB — MICROALBUMIN: 29 MG/DL

## 2020-02-11 ENCOUNTER — MED REC SCAN ONLY (OUTPATIENT)
Dept: FAMILY MEDICINE CLINIC | Facility: CLINIC | Age: 80
End: 2020-02-11

## 2020-02-11 LAB
ALBUMIN/GLOBULIN RATIO: 1.5 (CALC) (ref 1–2.5)
ALBUMIN: 4.1 G/DL (ref 3.6–5.1)
ALKALINE PHOSPHATASE: 36 U/L (ref 37–153)
ALT: 7 U/L (ref 6–29)
AST: 14 U/L (ref 10–35)
BILIRUBIN, TOTAL: 0.4 MG/DL (ref 0.2–1.2)
BUN/CREATININE RATIO: 28 (CALC) (ref 6–22)
BUN: 34 MG/DL (ref 7–25)
CALCIUM: 10.5 MG/DL (ref 8.6–10.4)
CARBON DIOXIDE: 28 MMOL/L (ref 20–32)
CHLORIDE: 96 MMOL/L (ref 98–110)
CHOL/HDLC RATIO: 3.7 (CALC)
CHOLESTEROL, TOTAL: 146 MG/DL
CREATININE: 1.23 MG/DL (ref 0.6–0.88)
EGFR IF AFRICN AM: 48 ML/MIN/1.73M2
EGFR IF NONAFRICN AM: 41 ML/MIN/1.73M2
GLOBULIN: 2.8 G/DL (CALC) (ref 1.9–3.7)
GLUCOSE: 330 MG/DL (ref 65–99)
HDL CHOLESTEROL: 40 MG/DL
HEMOGLOBIN A1C: 7 % OF TOTAL HGB
LDL-CHOLESTEROL: 79 MG/DL (CALC)
NON-HDL CHOLESTEROL: 106 MG/DL (CALC)
POTASSIUM: 4.8 MMOL/L (ref 3.5–5.3)
PROTEIN, TOTAL: 6.9 G/DL (ref 6.1–8.1)
SODIUM: 134 MMOL/L (ref 135–146)
T4, FREE: 1.4 NG/DL (ref 0.8–1.8)
TRIGLYCERIDES: 169 MG/DL
TSH: 3.42 MIU/L (ref 0.4–4.5)

## 2020-03-02 RX ORDER — AMLODIPINE BESYLATE 5 MG/1
5 TABLET ORAL DAILY
Qty: 90 TABLET | Refills: 0 | Status: SHIPPED | OUTPATIENT
Start: 2020-03-02 | End: 2020-06-02

## 2020-03-02 NOTE — TELEPHONE ENCOUNTER
Bud Houser is calling regarding Ciarra's blood pressure medication, her mom only has 5 pills left and she needs a refill sent to her [address], it is the new medication of Amlodipine 5 mg Dr Katey Mishra put her on, Please call Bud Houser at [phone] with any Bud Multanis

## 2020-03-25 ENCOUNTER — TELEPHONE (OUTPATIENT)
Dept: FAMILY MEDICINE CLINIC | Facility: CLINIC | Age: 80
End: 2020-03-25

## 2020-03-25 NOTE — TELEPHONE ENCOUNTER
Refills for 90 days plus 1 refill was sent 12/2019. Request is to soon. Patient should be okay until June. Left message for patient advising above.

## 2020-03-25 NOTE — TELEPHONE ENCOUNTER
Patient daughter called requesting refill on Lisinopril-hydroCHLOROthiazide 20-12.5 MG Oral Tab, simvastatin 20 MG Oral Tab, Levothyroxine Sodium 50 MCG Oral Tab, glimepiride 4 MG Oral Tab, metFORMIN HCl 850 MG Oral Tab and atenolol 100 MG Oral Tab to [facility]

## 2020-03-26 DIAGNOSIS — E11.9 TYPE 2 DIABETES MELLITUS WITHOUT COMPLICATION, WITHOUT LONG-TERM CURRENT USE OF INSULIN (HCC): Primary | ICD-10-CM

## 2020-03-26 RX ORDER — BLOOD-GLUCOSE METER
1 EACH MISCELLANEOUS 2 TIMES DAILY
Qty: 1 KIT | Refills: 0 | Status: SHIPPED | OUTPATIENT
Start: 2020-03-26 | End: 2021-03-26

## 2020-03-26 RX ORDER — BLOOD SUGAR DIAGNOSTIC
STRIP MISCELLANEOUS
Qty: 200 STRIP | Refills: 1 | Status: SHIPPED | OUTPATIENT
Start: 2020-03-26 | End: 2021-03-26

## 2020-03-26 RX ORDER — LANCETS
1 EACH MISCELLANEOUS 2 TIMES DAILY
Qty: 2 BOX | Refills: 0 | Status: SHIPPED | OUTPATIENT
Start: 2020-03-26 | End: 2020-12-30

## 2020-03-26 NOTE — TELEPHONE ENCOUNTER
Requesting   Accu-Chek Chiara Plus Meter  ACCU-CHEK CHIARA PLUS TEST STRP  ACCU-CHEK SOFTCLIX LANCETS   This is a NEW RX request

## 2020-05-26 ENCOUNTER — HOSPITAL ENCOUNTER (EMERGENCY)
Facility: HOSPITAL | Age: 80
Discharge: HOME | End: 2020-05-26
Payer: MEDICARE

## 2020-05-26 ENCOUNTER — HOSPITAL ENCOUNTER (EMERGENCY)
Age: 80
Discharge: HOME | End: 2020-05-26
Attending: EMERGENCY MEDICINE
Payer: MEDICARE

## 2020-05-26 VITALS
SYSTOLIC BLOOD PRESSURE: 141 MMHG | TEMPERATURE: 98 F | RESPIRATION RATE: 18 BRPM | BODY MASS INDEX: 25.58 KG/M2 | DIASTOLIC BLOOD PRESSURE: 58 MMHG | OXYGEN SATURATION: 97 % | HEART RATE: 77 BPM | WEIGHT: 163 LBS | HEIGHT: 67 IN

## 2020-05-26 DIAGNOSIS — B02.9 HERPES ZOSTER WITHOUT COMPLICATION: Primary | ICD-10-CM

## 2020-05-26 PROCEDURE — 99283 EMERGENCY DEPT VISIT LOW MDM: CPT

## 2020-05-26 RX ORDER — VALACYCLOVIR HYDROCHLORIDE 1 G/1
1 TABLET, FILM COATED ORAL 3 TIMES DAILY
Qty: 21 TABLET | Refills: 0 | Status: SHIPPED | OUTPATIENT
Start: 2020-05-26 | End: 2020-06-02

## 2020-05-26 NOTE — ED PROVIDER NOTES
Patient Seen in: THE [facility] In [place]      History   Patient presents with:  Rash Skin Problem    Stated Complaint: rash to the back and abd area since Sat.     HPI    78-year-old female complaint of rash that started about 4 days ago pain              Disposition and Plan     Clinical Impression:  Herpes zoster without complication  (primary encounter diagnosis)    Disposition:  Discharge  5/26/2020  9:33 am    Follow-up:  [address]  [place] [id number]

## 2020-05-29 ENCOUNTER — TELEPHONE (OUTPATIENT)
Dept: FAMILY MEDICINE CLINIC | Facility: CLINIC | Age: 80
End: 2020-05-29

## 2020-05-29 RX ORDER — ACETAMINOPHEN AND CODEINE PHOSPHATE 300; 30 MG/1; MG/1
1 TABLET ORAL EVERY 8 HOURS PRN
Qty: 20 TABLET | Refills: 0 | Status: SHIPPED | OUTPATIENT
Start: 2020-05-29 | End: 2020-06-02

## 2020-05-29 NOTE — TELEPHONE ENCOUNTER
Sent in tylenol with codeine - only use as needed for most severe pain    Followup with me next week - via virtual doximity visit     Thanks

## 2020-05-29 NOTE — TELEPHONE ENCOUNTER
doximity appt scheduled with daughter.  She will be at appointment with patient to help with video call

## 2020-05-29 NOTE — TELEPHONE ENCOUNTER
ED Visit 5/26/20:  Clinical Impression:  Herpes zoster without complication   valACYclovir HCl 1 G Oral Tab  Take 1 tablet (1,000 mg total) by mouth 3 (three) times daily for 7 days.   Qty: 21 tablet Refills: 0  Disposition:  Discharge  5/26/2020  9:33 am

## 2020-05-29 NOTE — TELEPHONE ENCOUNTER
Pt's daughter called and said she took her mom to [facility] on Thursday and she was diagnosed with shingles. They told her to take Tylenol but her mom needs something stronger. She was also given Veltrex at the ER. She uses [facility] on [address].

## 2020-06-02 ENCOUNTER — TELEMEDICINE (OUTPATIENT)
Dept: FAMILY MEDICINE CLINIC | Facility: CLINIC | Age: 80
End: 2020-06-02
Payer: MEDICARE

## 2020-06-02 DIAGNOSIS — E11.9 TYPE 2 DIABETES MELLITUS WITHOUT COMPLICATION, WITHOUT LONG-TERM CURRENT USE OF INSULIN (HCC): ICD-10-CM

## 2020-06-02 DIAGNOSIS — B02.9 HERPES ZOSTER WITHOUT COMPLICATION: Primary | ICD-10-CM

## 2020-06-02 DIAGNOSIS — I10 ESSENTIAL HYPERTENSION: ICD-10-CM

## 2020-06-02 PROBLEM — N18.30 CKD (CHRONIC KIDNEY DISEASE) STAGE 3, GFR 30-59 ML/MIN (HCC): Chronic | Status: ACTIVE | Noted: 2020-06-02

## 2020-06-02 PROCEDURE — 99443 PHONE E/M BY PHYS 21-30 MIN: CPT | Performed by: FAMILY MEDICINE

## 2020-06-02 RX ORDER — ACETAMINOPHEN AND CODEINE PHOSPHATE 300; 30 MG/1; MG/1
1 TABLET ORAL EVERY 8 HOURS PRN
Qty: 20 TABLET | Refills: 0 | Status: SHIPPED | OUTPATIENT
Start: 2020-06-02 | End: 2020-06-15

## 2020-06-02 RX ORDER — AMLODIPINE BESYLATE 5 MG/1
5 TABLET ORAL DAILY
Qty: 90 TABLET | Refills: 1 | Status: SHIPPED | OUTPATIENT
Start: 2020-06-02 | End: 2020-07-01

## 2020-06-02 NOTE — PROGRESS NOTES
Virtual/Telephone Check-In    Diego Brooks is a 78year old female here today for a telemedicine audio only visit. Patient understands and accepts financial responsibility for any deductible, co-insurance and/or co-pays associated with this service.     D Family History   Problem Relation Age of Onset   • Other (Other) Mother         Old Age   • Other (Other) Father         Old age      Social History: Social History    Tobacco Use      Smoking status: Former Smoker        Packs/day: 0.00      Smokeless t Denies  --RESP: Denies  --CV: Denies  --GI: Denies  --: Denies  --MSK: Denies  All other systems reviewed are negative      Milli Koroma MD     Please note that the following visit was completed using two-way, real-time interactive audio communication

## 2020-06-15 DIAGNOSIS — B02.9 HERPES ZOSTER WITHOUT COMPLICATION: Primary | ICD-10-CM

## 2020-06-15 RX ORDER — ACETAMINOPHEN AND CODEINE PHOSPHATE 300; 30 MG/1; MG/1
1 TABLET ORAL EVERY 8 HOURS PRN
Qty: 20 TABLET | Refills: 0 | Status: SHIPPED | OUTPATIENT
Start: 2020-06-15 | End: 2020-07-27 | Stop reason: ALTCHOICE

## 2020-06-15 NOTE — TELEPHONE ENCOUNTER
Pt's daughter called and said her mom is still in a lot of pain and wants to know if she can get more Tylenol #3. She uses [facility] on [address].

## 2020-06-15 NOTE — TELEPHONE ENCOUNTER
Per daughter, patient is still having shingle pain. Reports pain during the day is 5-6/10 on pain scale but at night it goes up to 8/10. Reports blisters are almost all scabbed over and dry. Does not appear to be worsening.    Requesting refill of Tylenol #

## 2020-07-01 DIAGNOSIS — I10 ESSENTIAL HYPERTENSION: ICD-10-CM

## 2020-07-01 DIAGNOSIS — E03.9 ACQUIRED HYPOTHYROIDISM: ICD-10-CM

## 2020-07-01 DIAGNOSIS — E78.2 MIXED HYPERLIPIDEMIA: ICD-10-CM

## 2020-07-01 DIAGNOSIS — E11.9 TYPE 2 DIABETES MELLITUS WITHOUT COMPLICATION, WITHOUT LONG-TERM CURRENT USE OF INSULIN (HCC): ICD-10-CM

## 2020-07-01 DIAGNOSIS — N18.30 CKD (CHRONIC KIDNEY DISEASE) STAGE 3, GFR 30-59 ML/MIN (HCC): Primary | Chronic | ICD-10-CM

## 2020-07-01 RX ORDER — LEVOTHYROXINE SODIUM 0.05 MG/1
50 TABLET ORAL
Qty: 90 TABLET | Refills: 1 | Status: SHIPPED | OUTPATIENT
Start: 2020-07-01 | End: 2020-12-30

## 2020-07-01 RX ORDER — LISINOPRIL AND HYDROCHLOROTHIAZIDE 20; 12.5 MG/1; MG/1
1 TABLET ORAL 2 TIMES DAILY
Qty: 180 TABLET | Refills: 1 | Status: SHIPPED | OUTPATIENT
Start: 2020-07-01 | End: 2020-07-15

## 2020-07-01 RX ORDER — AMLODIPINE BESYLATE 5 MG/1
5 TABLET ORAL DAILY
Qty: 90 TABLET | Refills: 1 | Status: SHIPPED | OUTPATIENT
Start: 2020-07-01 | End: 2020-12-30

## 2020-07-01 RX ORDER — SIMVASTATIN 20 MG
20 TABLET ORAL NIGHTLY
Qty: 90 TABLET | Refills: 1 | Status: SHIPPED | OUTPATIENT
Start: 2020-07-01 | End: 2020-12-30

## 2020-07-01 RX ORDER — ATENOLOL 100 MG/1
100 TABLET ORAL DAILY
Qty: 90 TABLET | Refills: 1 | Status: SHIPPED | OUTPATIENT
Start: 2020-07-01 | End: 2020-12-30

## 2020-07-01 RX ORDER — GLIMEPIRIDE 4 MG/1
4 TABLET ORAL 2 TIMES DAILY WITH MEALS
Qty: 180 TABLET | Refills: 1 | Status: SHIPPED | OUTPATIENT
Start: 2020-07-01 | End: 2020-12-30

## 2020-07-01 NOTE — TELEPHONE ENCOUNTER
Britni Eric is calling to get refills of amLODIPine Besylate 5 MGOralTab,Lisinopril-hydroCHLOROthiazide 20-12.5 MG Oral Tab,simvastatin 20 MG Oral Tab,Levothyroxine Sodium 50 MCG Oral Tab,glimepiride 4 MG Oral Tab,metFORMIN HCl 850 MG Oral Tab,and atenolol 100 M

## 2020-07-15 ENCOUNTER — TELEPHONE (OUTPATIENT)
Dept: FAMILY MEDICINE CLINIC | Facility: CLINIC | Age: 80
End: 2020-07-15

## 2020-07-15 DIAGNOSIS — N18.30 CKD (CHRONIC KIDNEY DISEASE) STAGE 3, GFR 30-59 ML/MIN (HCC): Chronic | ICD-10-CM

## 2020-07-15 DIAGNOSIS — I10 ESSENTIAL HYPERTENSION: ICD-10-CM

## 2020-07-15 RX ORDER — LISINOPRIL AND HYDROCHLOROTHIAZIDE 20; 12.5 MG/1; MG/1
1 TABLET ORAL 2 TIMES DAILY
Qty: 14 TABLET | Refills: 0 | Status: SHIPPED | OUTPATIENT
Start: 2020-07-15 | End: 2020-07-27

## 2020-07-15 NOTE — TELEPHONE ENCOUNTER
Pt's daughter called and said Ciarra's mailorder meds won't be delivered for a few more days. She wants to know if she can get a 5-7 day supply to hold her over. It's for Lisinopril 20/12.5mg. [facility] on [address].

## 2020-07-27 ENCOUNTER — OFFICE VISIT (OUTPATIENT)
Dept: FAMILY MEDICINE CLINIC | Facility: CLINIC | Age: 80
End: 2020-07-27
Payer: MEDICARE

## 2020-07-27 VITALS
DIASTOLIC BLOOD PRESSURE: 60 MMHG | HEART RATE: 74 BPM | SYSTOLIC BLOOD PRESSURE: 100 MMHG | HEIGHT: 67 IN | TEMPERATURE: 98 F | WEIGHT: 160 LBS | BODY MASS INDEX: 25.11 KG/M2

## 2020-07-27 DIAGNOSIS — N18.30 CKD (CHRONIC KIDNEY DISEASE) STAGE 3, GFR 30-59 ML/MIN (HCC): Chronic | ICD-10-CM

## 2020-07-27 DIAGNOSIS — E11.9 TYPE 2 DIABETES MELLITUS WITHOUT COMPLICATION, WITHOUT LONG-TERM CURRENT USE OF INSULIN (HCC): Primary | ICD-10-CM

## 2020-07-27 DIAGNOSIS — I10 ESSENTIAL HYPERTENSION: ICD-10-CM

## 2020-07-27 PROCEDURE — 3074F SYST BP LT 130 MM HG: CPT | Performed by: FAMILY MEDICINE

## 2020-07-27 PROCEDURE — 3008F BODY MASS INDEX DOCD: CPT | Performed by: FAMILY MEDICINE

## 2020-07-27 PROCEDURE — 99214 OFFICE O/P EST MOD 30 MIN: CPT | Performed by: FAMILY MEDICINE

## 2020-07-27 PROCEDURE — 3078F DIAST BP <80 MM HG: CPT | Performed by: FAMILY MEDICINE

## 2020-07-27 RX ORDER — LISINOPRIL AND HYDROCHLOROTHIAZIDE 20; 12.5 MG/1; MG/1
1 TABLET ORAL 2 TIMES DAILY
Qty: 180 TABLET | Refills: 1 | COMMUNITY
Start: 2020-07-27 | End: 2020-12-30

## 2020-07-27 NOTE — PATIENT INSTRUCTIONS
Go to [facility] for fasting bloodwork    Continue all meds as prescribed    Followup in 6 months, sooner if needed

## 2020-08-02 NOTE — PROGRESS NOTES
Silvano Espino is a 78year old female here for No chief complaint on file. HPI:       1. Type 2 diabetes mellitus without complication, without long-term current use of insulin ([facility] [address]  -doing well on meds  -due for labs    2.  Essential hypertension  -BP Other route 2 (two) times daily. 1 kit 0   • Glucose Blood (ACCU-CHEK JASWANT PLUS) In Vitro Strip Test blood sugar 2 times daily as directed 200 strip 1   • Accu-Chek Softclix Lancets Does not apply Misc 1 lancet by Finger stick route 2 (two) times daily.  U

## 2020-08-06 LAB
ALBUMIN/GLOBULIN RATIO: 1.5 (CALC) (ref 1–2.5)
ALBUMIN: 4.1 G/DL (ref 3.6–5.1)
ALKALINE PHOSPHATASE: 43 U/L (ref 37–153)
ALT: 13 U/L (ref 6–29)
AST: 15 U/L (ref 10–35)
BILIRUBIN, TOTAL: 0.4 MG/DL (ref 0.2–1.2)
BUN/CREATININE RATIO: 25 (CALC) (ref 6–22)
BUN: 26 MG/DL (ref 7–25)
CALCIUM: 9.8 MG/DL (ref 8.6–10.4)
CARBON DIOXIDE: 30 MMOL/L (ref 20–32)
CHLORIDE: 101 MMOL/L (ref 98–110)
CHOL/HDLC RATIO: 4 (CALC)
CHOLESTEROL, TOTAL: 176 MG/DL
CREATININE: 1.04 MG/DL (ref 0.6–0.88)
EGFR IF AFRICN AM: 58 ML/MIN/1.73M2
EGFR IF NONAFRICN AM: 50 ML/MIN/1.73M2
GLOBULIN: 2.7 G/DL (CALC) (ref 1.9–3.7)
GLUCOSE: 197 MG/DL (ref 65–99)
HDL CHOLESTEROL: 44 MG/DL
HEMOGLOBIN A1C: 7.2 % OF TOTAL HGB
LDL-CHOLESTEROL: 101 MG/DL (CALC)
NON-HDL CHOLESTEROL: 132 MG/DL (CALC)
POTASSIUM: 4.7 MMOL/L (ref 3.5–5.3)
PROTEIN, TOTAL: 6.8 G/DL (ref 6.1–8.1)
SODIUM: 137 MMOL/L (ref 135–146)
TRIGLYCERIDES: 191 MG/DL

## 2020-09-17 ENCOUNTER — NURSE ONLY (OUTPATIENT)
Dept: FAMILY MEDICINE CLINIC | Facility: CLINIC | Age: 80
End: 2020-09-17
Payer: MEDICARE

## 2020-09-17 ENCOUNTER — IMMUNIZATION (OUTPATIENT)
Dept: FAMILY MEDICINE CLINIC | Facility: CLINIC | Age: 80
End: 2020-09-17
Payer: MEDICARE

## 2020-09-17 DIAGNOSIS — Z23 NEED FOR VACCINATION: ICD-10-CM

## 2020-09-17 PROCEDURE — G0008 ADMIN INFLUENZA VIRUS VAC: HCPCS | Performed by: FAMILY MEDICINE

## 2020-09-17 PROCEDURE — 90662 IIV NO PRSV INCREASED AG IM: CPT | Performed by: FAMILY MEDICINE

## 2020-09-17 PROCEDURE — G0009 ADMIN PNEUMOCOCCAL VACCINE: HCPCS | Performed by: FAMILY MEDICINE

## 2020-09-17 PROCEDURE — 90732 PPSV23 VACC 2 YRS+ SUBQ/IM: CPT | Performed by: FAMILY MEDICINE

## 2020-12-30 DIAGNOSIS — I10 ESSENTIAL HYPERTENSION: ICD-10-CM

## 2020-12-30 DIAGNOSIS — E11.9 TYPE 2 DIABETES MELLITUS WITHOUT COMPLICATION, WITHOUT LONG-TERM CURRENT USE OF INSULIN (HCC): ICD-10-CM

## 2020-12-30 DIAGNOSIS — N18.30 CKD (CHRONIC KIDNEY DISEASE) STAGE 3, GFR 30-59 ML/MIN (HCC): Chronic | ICD-10-CM

## 2020-12-30 DIAGNOSIS — E78.2 MIXED HYPERLIPIDEMIA: ICD-10-CM

## 2020-12-30 DIAGNOSIS — E03.9 ACQUIRED HYPOTHYROIDISM: ICD-10-CM

## 2020-12-30 RX ORDER — LANCETS
EACH MISCELLANEOUS
Qty: 200 EACH | Refills: 0 | Status: SHIPPED | OUTPATIENT
Start: 2020-12-30 | End: 2021-05-03 | Stop reason: CLARIF

## 2020-12-30 RX ORDER — LEVOTHYROXINE SODIUM 0.05 MG/1
TABLET ORAL
Qty: 90 TABLET | Refills: 1 | Status: SHIPPED | OUTPATIENT
Start: 2020-12-30 | End: 2021-03-22

## 2020-12-30 RX ORDER — ATENOLOL 100 MG/1
TABLET ORAL
Qty: 90 TABLET | Refills: 1 | Status: SHIPPED | OUTPATIENT
Start: 2020-12-30 | End: 2021-03-22

## 2020-12-30 RX ORDER — GLIMEPIRIDE 4 MG/1
TABLET ORAL
Qty: 180 TABLET | Refills: 1 | Status: SHIPPED | OUTPATIENT
Start: 2020-12-30 | End: 2021-05-03 | Stop reason: CLARIF

## 2020-12-30 RX ORDER — AMLODIPINE BESYLATE 5 MG/1
TABLET ORAL
Qty: 90 TABLET | Refills: 1 | Status: SHIPPED | OUTPATIENT
Start: 2020-12-30 | End: 2021-05-03 | Stop reason: CLARIF

## 2020-12-30 RX ORDER — SIMVASTATIN 20 MG
TABLET ORAL
Qty: 90 TABLET | Refills: 1 | Status: SHIPPED | OUTPATIENT
Start: 2020-12-30 | End: 2021-05-03 | Stop reason: CLARIF

## 2020-12-30 RX ORDER — LISINOPRIL AND HYDROCHLOROTHIAZIDE 20; 12.5 MG/1; MG/1
TABLET ORAL
Qty: 180 TABLET | Refills: 1 | Status: SHIPPED | OUTPATIENT
Start: 2020-12-30 | End: 2021-05-03 | Stop reason: CLARIF

## 2021-03-03 DIAGNOSIS — Z23 NEED FOR VACCINATION: ICD-10-CM

## 2021-03-22 DIAGNOSIS — I10 ESSENTIAL HYPERTENSION: ICD-10-CM

## 2021-03-22 DIAGNOSIS — E03.9 ACQUIRED HYPOTHYROIDISM: ICD-10-CM

## 2021-03-22 DIAGNOSIS — N18.30 CKD (CHRONIC KIDNEY DISEASE) STAGE 3, GFR 30-59 ML/MIN (HCC): Chronic | ICD-10-CM

## 2021-03-22 RX ORDER — LEVOTHYROXINE SODIUM 0.05 MG/1
TABLET ORAL
Qty: 90 TABLET | Refills: 0 | Status: SHIPPED | OUTPATIENT
Start: 2021-03-22 | End: 2021-05-03 | Stop reason: CLARIF

## 2021-03-22 RX ORDER — ATENOLOL 100 MG/1
TABLET ORAL
Qty: 90 TABLET | Refills: 0 | Status: SHIPPED | OUTPATIENT
Start: 2021-03-22 | End: 2021-05-03 | Stop reason: CLARIF

## 2021-03-24 ENCOUNTER — TELEPHONE (OUTPATIENT)
Dept: FAMILY MEDICINE CLINIC | Facility: CLINIC | Age: 81
End: 2021-03-24

## 2021-03-24 ENCOUNTER — OFFICE VISIT (OUTPATIENT)
Dept: FAMILY MEDICINE CLINIC | Facility: CLINIC | Age: 81
End: 2021-03-24
Payer: MEDICARE

## 2021-03-24 VITALS
TEMPERATURE: 98 F | HEART RATE: 70 BPM | HEIGHT: 64.17 IN | DIASTOLIC BLOOD PRESSURE: 80 MMHG | OXYGEN SATURATION: 98 % | SYSTOLIC BLOOD PRESSURE: 162 MMHG | BODY MASS INDEX: 27.14 KG/M2 | WEIGHT: 159 LBS

## 2021-03-24 DIAGNOSIS — N18.31 TYPE 2 DIABETES MELLITUS WITH STAGE 3A CHRONIC KIDNEY DISEASE, WITHOUT LONG-TERM CURRENT USE OF INSULIN (HCC): ICD-10-CM

## 2021-03-24 DIAGNOSIS — I10 ESSENTIAL HYPERTENSION: ICD-10-CM

## 2021-03-24 DIAGNOSIS — N18.31 STAGE 3A CHRONIC KIDNEY DISEASE (HCC): ICD-10-CM

## 2021-03-24 DIAGNOSIS — E11.9 TYPE 2 DIABETES MELLITUS WITHOUT COMPLICATION, WITHOUT LONG-TERM CURRENT USE OF INSULIN (HCC): ICD-10-CM

## 2021-03-24 DIAGNOSIS — R42 EPISODIC LIGHTHEADEDNESS: ICD-10-CM

## 2021-03-24 DIAGNOSIS — M85.88 OSTEOPENIA OF OTHER SITE: ICD-10-CM

## 2021-03-24 DIAGNOSIS — E78.2 MIXED HYPERLIPIDEMIA: ICD-10-CM

## 2021-03-24 DIAGNOSIS — E03.9 ACQUIRED HYPOTHYROIDISM: ICD-10-CM

## 2021-03-24 DIAGNOSIS — E11.22 TYPE 2 DIABETES MELLITUS WITH STAGE 3A CHRONIC KIDNEY DISEASE, WITHOUT LONG-TERM CURRENT USE OF INSULIN (HCC): ICD-10-CM

## 2021-03-24 DIAGNOSIS — Z00.00 ENCOUNTER FOR ANNUAL HEALTH EXAMINATION: Primary | ICD-10-CM

## 2021-03-24 PROBLEM — M85.80 OSTEOPENIA: Status: ACTIVE | Noted: 2021-03-24

## 2021-03-24 LAB
CARTRIDGE LOT#: 742 NUMERIC
HEMOGLOBIN A1C: 6.6 % (ref 4.3–5.6)

## 2021-03-24 PROCEDURE — 3079F DIAST BP 80-89 MM HG: CPT | Performed by: FAMILY MEDICINE

## 2021-03-24 PROCEDURE — 96160 PT-FOCUSED HLTH RISK ASSMT: CPT | Performed by: FAMILY MEDICINE

## 2021-03-24 PROCEDURE — 3008F BODY MASS INDEX DOCD: CPT | Performed by: FAMILY MEDICINE

## 2021-03-24 PROCEDURE — G0439 PPPS, SUBSEQ VISIT: HCPCS | Performed by: FAMILY MEDICINE

## 2021-03-24 PROCEDURE — 99397 PER PM REEVAL EST PAT 65+ YR: CPT | Performed by: FAMILY MEDICINE

## 2021-03-24 PROCEDURE — 93000 ELECTROCARDIOGRAM COMPLETE: CPT | Performed by: FAMILY MEDICINE

## 2021-03-24 PROCEDURE — 3077F SYST BP >= 140 MM HG: CPT | Performed by: FAMILY MEDICINE

## 2021-03-24 PROCEDURE — 83036 HEMOGLOBIN GLYCOSYLATED A1C: CPT | Performed by: FAMILY MEDICINE

## 2021-03-24 NOTE — TELEPHONE ENCOUNTER
Patient signed HIPAA medical records authorization form for the Facility identified below to disclose patient health information to Eduardo 26:     [address], [place], [facility] [address]  Phone: [phone]

## 2021-03-24 NOTE — PATIENT INSTRUCTIONS
Increase amlodipine to 10mg daily (2 tabs of 5mg for now)  Check BP at home  Check fasting sugar daily    Go to [facility] for fasting bloodwork    Followup in 1 month for BP check    Ciarra Salcido's SCREENING SCHEDULE   Tests on this list are recommended by your who are 73-68 years old and have smoked more than 100 cigarettes in their lifetime   • Anyone with a family history    Colorectal Cancer Screening  Covered up to Age 76     Colonoscopy Screen   Covered every 10 years- more often if abnormal There are no pr Please get this Mammogram regularly   Immunizations      Influenza  Covered Annually Orders placed or performed in visit on 09/17/20   • FLU VACC HIGH DOSE PRSV FREE    Please get every year    Pneumococcal 13 (Prevnar)  Covered Once after 65 No orders fou [facility] regarding Advance Directives.

## 2021-03-30 RX ORDER — BLOOD SUGAR DIAGNOSTIC
STRIP MISCELLANEOUS
Qty: 200 STRIP | Refills: 1 | Status: SHIPPED | OUTPATIENT
Start: 2021-03-30 | End: 2021-05-03 | Stop reason: CLARIF

## 2021-03-30 NOTE — TELEPHONE ENCOUNTER
Requested Prescriptions     Signed Prescriptions Disp Refills   • ACCU-CHEK JASWANT PLUS In Vitro Strip 200 strip 1     Sig: TEST BLOOD SUGAR TWICE DAILY AS DIRECTED     Authorizing Provider: Henna Jones     Ordering User: Olivia Tellezmet protocol

## 2021-04-13 ENCOUNTER — TELEPHONE (OUTPATIENT)
Dept: FAMILY MEDICINE CLINIC | Facility: CLINIC | Age: 81
End: 2021-04-13

## 2021-04-13 NOTE — TELEPHONE ENCOUNTER
Pt daughter called believes Dr. Malgorzata Montelongo increased Amlodopine and pt is experiencing dizziness and swelling of the ankles and pt daughter Fartun Weaver would like to speak to a nurse.

## 2021-04-13 NOTE — TELEPHONE ENCOUNTER
Spoke to daughter [employer]. States mother continues to have dizzy spells to the point that she has to sit down. C.o feeling very weak. Also, has bilat ankle swelling which has started since the increase in the amlodipine. No shortness of breath.  Does have

## 2021-04-22 ENCOUNTER — HOSPITAL ENCOUNTER (OUTPATIENT)
Dept: CV DIAGNOSTICS | Age: 81
Discharge: HOME | End: 2021-04-22
Attending: FAMILY MEDICINE
Payer: MEDICARE

## 2021-04-22 DIAGNOSIS — R42 EPISODIC LIGHTHEADEDNESS: ICD-10-CM

## 2021-04-22 PROCEDURE — 93306 TTE W/DOPPLER COMPLETE: CPT | Performed by: FAMILY MEDICINE

## 2021-04-26 NOTE — TELEPHONE ENCOUNTER
Please call daughter, patient did not show up to appt and no one was answered phone    If still having episodes, can decrease amlodipine to 5mg daily (1/2 tab)  Check bp at home daily    Echocardiogram looks fine - no explanation of symptoms from that    I

## 2021-04-26 NOTE — TELEPHONE ENCOUNTER
Spoke to daughter. States patient is no longer dizzy. States she thinks swelling is less but has appeared on great toe and thinks it is gout. She checked with her mother and the toe now feels better.   She will keep appointment scheduled in May and call b

## 2021-05-03 ENCOUNTER — APPOINTMENT (OUTPATIENT)
Dept: GENERAL RADIOLOGY | Facility: HOSPITAL | Age: 81
DRG: 683 | End: 2021-05-03
Attending: EMERGENCY MEDICINE
Payer: MEDICARE

## 2021-05-03 ENCOUNTER — HOSPITAL ENCOUNTER (INPATIENT)
Facility: HOSPITAL | Age: 81
LOS: 2 days | Discharge: HOME | DRG: 683 | End: 2021-05-05
Attending: EMERGENCY MEDICINE | Admitting: INTERNAL MEDICINE
Payer: MEDICARE

## 2021-05-03 DIAGNOSIS — N17.9 ACUTE KIDNEY INJURY (HCC): ICD-10-CM

## 2021-05-03 DIAGNOSIS — R00.1 SYMPTOMATIC BRADYCARDIA: Primary | ICD-10-CM

## 2021-05-03 DIAGNOSIS — E87.5 HYPERKALEMIA: ICD-10-CM

## 2021-05-03 PROBLEM — R73.9 HYPERGLYCEMIA: Status: ACTIVE | Noted: 2021-05-03

## 2021-05-03 PROBLEM — E87.1 HYPONATREMIA: Status: ACTIVE | Noted: 2021-05-03

## 2021-05-03 PROBLEM — E87.20 METABOLIC ACIDOSIS: Status: ACTIVE | Noted: 2021-05-03

## 2021-05-03 PROBLEM — E87.2 METABOLIC ACIDOSIS: Status: ACTIVE | Noted: 2021-05-03

## 2021-05-03 PROCEDURE — 71045 X-RAY EXAM CHEST 1 VIEW: CPT | Performed by: EMERGENCY MEDICINE

## 2021-05-03 PROCEDURE — 99223 1ST HOSP IP/OBS HIGH 75: CPT | Performed by: INTERNAL MEDICINE

## 2021-05-03 RX ORDER — DEXTROSE MONOHYDRATE 25 G/50ML
50 INJECTION, SOLUTION INTRAVENOUS
Status: DISCONTINUED | OUTPATIENT
Start: 2021-05-03 | End: 2021-05-05

## 2021-05-03 RX ORDER — ATENOLOL 100 MG/1
100 TABLET ORAL DAILY
Status: ON HOLD | COMMUNITY
End: 2021-05-05

## 2021-05-03 RX ORDER — LEVOTHYROXINE SODIUM 0.05 MG/1
50 TABLET ORAL
Status: DISCONTINUED | OUTPATIENT
Start: 2021-05-04 | End: 2021-05-05

## 2021-05-03 RX ORDER — GLIMEPIRIDE 4 MG/1
4 TABLET ORAL 2 TIMES DAILY WITH MEALS
Status: ON HOLD | COMMUNITY
End: 2021-05-05

## 2021-05-03 RX ORDER — LISINOPRIL AND HYDROCHLOROTHIAZIDE 20; 12.5 MG/1; MG/1
1 TABLET ORAL DAILY
Status: ON HOLD | COMMUNITY
End: 2021-05-05

## 2021-05-03 RX ORDER — SODIUM CHLORIDE 9 MG/ML
INJECTION, SOLUTION INTRAVENOUS CONTINUOUS
Status: DISCONTINUED | OUTPATIENT
Start: 2021-05-03 | End: 2021-05-04

## 2021-05-03 RX ORDER — SODIUM POLYSTYRENE SULFONATE 4.1 MEQ/G
15 POWDER, FOR SUSPENSION ORAL; RECTAL ONCE
Status: COMPLETED | OUTPATIENT
Start: 2021-05-03 | End: 2021-05-03

## 2021-05-03 RX ORDER — ACETAMINOPHEN 500 MG
500 TABLET ORAL EVERY 6 HOURS PRN
Status: DISCONTINUED | OUTPATIENT
Start: 2021-05-03 | End: 2021-05-05

## 2021-05-03 RX ORDER — AMLODIPINE BESYLATE 5 MG/1
5 TABLET ORAL 2 TIMES DAILY
Status: DISCONTINUED | OUTPATIENT
Start: 2021-05-03 | End: 2021-05-05

## 2021-05-03 RX ORDER — SODIUM CHLORIDE 9 MG/ML
INJECTION, SOLUTION INTRAVENOUS CONTINUOUS
Status: DISCONTINUED | OUTPATIENT
Start: 2021-05-03 | End: 2021-05-05

## 2021-05-03 RX ORDER — DEXTROSE MONOHYDRATE 25 G/50ML
50 INJECTION, SOLUTION INTRAVENOUS ONCE
Status: COMPLETED | OUTPATIENT
Start: 2021-05-03 | End: 2021-05-03

## 2021-05-03 RX ORDER — SIMVASTATIN 20 MG
20 TABLET ORAL NIGHTLY
COMMUNITY
End: 2021-05-25

## 2021-05-03 RX ORDER — HEPARIN SODIUM 5000 [USP'U]/ML
5000 INJECTION, SOLUTION INTRAVENOUS; SUBCUTANEOUS EVERY 8 HOURS SCHEDULED
Status: DISCONTINUED | OUTPATIENT
Start: 2021-05-03 | End: 2021-05-05

## 2021-05-03 RX ORDER — AMLODIPINE BESYLATE 5 MG/1
5 TABLET ORAL DAILY
Status: ON HOLD | COMMUNITY
End: 2021-05-05

## 2021-05-03 RX ORDER — LEVOTHYROXINE SODIUM 0.05 MG/1
50 TABLET ORAL
COMMUNITY
End: 2021-05-25

## 2021-05-03 RX ORDER — ACETAMINOPHEN 500 MG
1000 TABLET ORAL EVERY 6 HOURS PRN
COMMUNITY

## 2021-05-04 PROCEDURE — 99222 1ST HOSP IP/OBS MODERATE 55: CPT | Performed by: INTERNAL MEDICINE

## 2021-05-04 PROCEDURE — 99232 SBSQ HOSP IP/OBS MODERATE 35: CPT | Performed by: HOSPITALIST

## 2021-05-04 NOTE — PLAN OF CARE
Assumed care of patient ~2200 when admitted from ED. Admitted for bradycardia/hyperkalemia, but no bradycardia noted since transfer. K+ trending down. Pt treated for hypoglycemia last night, protocol followed, see FS and MAR.  Pt has no c/o dizziness, weakn

## 2021-05-04 NOTE — ED PROVIDER NOTES
Patient Seen in: [facility] Emergency Department      History   Patient presents with:  Dizziness    Stated Complaint: dizziness    HPI/Subjective:   HPI    78year-old with history of hypertension, high cholesterol, diabetes presents for evaluation icteric. Conjunctivae within normal limits. Cardiovascular: Bradycardia  Respiratory: No cough no conversational dyspnea. Abdomen: Soft, nontender, nondistended. Back: No CVA tenderness. Extremities: No edema.   No unilateral calf swelling or calf interstitial edema  2D echo 4/22/2021:  1. Left ventricle:  The cavity size was normal. Wall thickness was normal.      Systolic function was normal. The estimated ejection fraction was 60-65%.      No diagnostic evidence for regional wall motion abnormalit condition. Necessary history is obtained from the patient and previous medical record. The patient required my constant attention. Time was spent ordering, reviewing, and interpreting ancillary testing and imaging.  The patient was frequently reevaluated, a

## 2021-05-04 NOTE — PHYSICAL THERAPY NOTE
PHYSICAL THERAPY QUICK EVALUATION - INPATIENT    Room Number: 6295/7119-M  Evaluation Date: 5/4/2021  Presenting Problem: Symptomatic Bradycardia, Hyperkalemia, PRISCILLA  Physician Order: PT Eval and Treat    Problem List  Principal Problem:    Symptomatic br Very slight   2 Slight   3 Moderate   4 Somewhat severe   5 Strong or hard breathing   6    7 Very hard breathing   8    9 Very, Very Severe   10 MAX - You need to stop       AM-PAC '6-Clicks' INPATIENT SHORT FORM - BASIC MOBILITY  How much difficulty does Impairment: Walks 20’, slow speed, abnormal gait pattern, evidence of imbalance. (0)  Severe Impairment: Cannot walk 20’ without assistance, severe gait deviations or imbalance.      2. Change in gait speed: 1  Grading: Dayday the lowest category that applie Impairment: Performs head turns smoothly with slight change in gait velocity, i.e., minor disruption to smooth gait path or uses walking aid.   (1) Moderate Impairment: Performs head turns with moderate change in gait velocity, slows down, staggers but keila therapist. The rehab aide will communicate with overseeing PT regarding any change in functional mobility. RN aware. GOALS  Patient was able to achieve the following goals . ..     Patient was able to transfer At previous, functional level  Safely and in

## 2021-05-04 NOTE — PLAN OF CARE
Assumed care at 0730. Pt alert and oriented x4, neurologically intact. VSS, NSR with occasiobal PVCs on tele. Pt up to bathroom and ambulating with standby assist, tolerating well. Pt denies pain or shortness of breath. Report called to Casandra Blair RN.  Pt to

## 2021-05-04 NOTE — OCCUPATIONAL THERAPY NOTE
OCCUPATIONAL THERAPY QUICK EVALUATION - INPATIENT    Room Number: 9213/2004-Y  Evaluation Date: 5/4/2021     Type of Evaluation: Quick Eval  Presenting Problem: bradycardia, dizziness    Physician Order: IP Consult to Occupational Therapy  Reason for CESAR SAMUELS [facility] Alternating Movement; Coordination - Finger Opposition  Coordination - Finger to Nose: Symmetrical  Coordination - Rapid Alternating Movement: Symmetrical  Coordination - Finger Opposition: Symmetrical       ACTIVITY TOLERANCE                         O2 SAT records    Specific performance deficits impacting engagement in ADL/IADL  LOW  1 - 3 performance deficits    Client Assessment/Performance Deficits  LOW - No comorbidities nor modifications of tasks    Clinical Decision Making  LOW - Analysis of occupatio

## 2021-05-04 NOTE — CONSULTS
[facility]  Cardiology Consultation    Scarlett Martinez Patient Status:  Inpatient    1940 MRN [id number]   [facility] 6NE-A Attending Donna Ta MD   Hosp Day # 1 PCP Benito Gonzalez MD     Reason for Consultation:  Bradycardia in PRN **OR** dextrose 50 % injection 50 mL, 50 mL, Intravenous, Q15 Min PRN **OR** glucose (DEX4) oral liquid 30 g, 30 g, Oral, Q15 Min PRN **OR** Glucose-Vitamin C (DEX-4) chewable tab 8 tablet, 8 tablet, Oral, Q15 Min PRN  •  0.9% NaCl infusion, , Intraven 05/04/2021     05/04/2021    CA 8.9 05/04/2021    ALB 3.2 05/04/2021    ALKPHO 59 05/04/2021    BILT 0.3 05/04/2021    TP 6.5 05/04/2021    AST 79 05/04/2021    ALT 90 05/04/2021    TSH 3.240 05/03/2021    MG 2.2 05/04/2021    PHOS 4.0 05/04/2021 testing needed  - ok to transfer out of CCU from cardiac standpoint    Thank you for allowing me to participate in the care of your patient.     Steve Gilman MD  5/4/2021  9:59 AM

## 2021-05-04 NOTE — CM/SW NOTE
PT recommending OP PT. Our therapy team will provide prescription to patient for OP PT. Patient to call for her appointments.

## 2021-05-04 NOTE — PAYOR COMM NOTE
Appropriate for inpatient status per guidelines for weakness, difficulty walking, dizzy due to symptomatic bradycardia with severe hyperkalemia and PRISCILLA.     --------------  ADMISSION REVIEW     Payor: [employer] MEDICARE ADV PPO  Subscriber #:  [id number]  TTZUTERI the bed and able to converse without difficulty  HEENT:  Sclera are not icteric. Conjunctivae within normal limits. Cardiovascular: Bradycardia  Respiratory: No cough no conversational dyspnea. Abdomen: Soft, nontender, nondistended.   Back: No CVA te or life threatening deterioration of the patient's clinical status due to bradycardia.   The patient required my time at the bedside performing direct personal management, the absence of which would likely result in sudden, clinically significant or life th BMI 27.14 kg/m²   General: No acute distress. Alert and oriented x 3. HEENT: Normocephalic atraumatic. Moist mucous membranes. EOM-I. PERRLA. Anicteric. Neck: No lymphadenopathy. Respiratory: Clear to auscultation bilaterally. No wheezes. No rhonchi. ACE-I/HCTZ  2.  Amlodipine      **Certification      PHYSICIAN Certification of Need for Inpatient Hospitalization - Initial Certification    Patient will require inpatient services that will reasonably be expected to span two midnight's based on the clinic 23.0   ALKPHO 72  --  59   *  --  79*   *  --  90*   BILT 0.3  --  0.3   TP 7.4  --  6.5             05/04/21 0400 96.8 °F (36 °C) 76 18 139/66 96 % — Nasal cannula 4 L/min       05/03/21 1945 — 42Abnormal  21 119/51 97 %             MEDICATI Date Action Dose Route User    5/3/2021 2326 New Bag (none) Intravenous Kerry Beverly RN      sodium chloride 0.9% IV bolus 1,000 mL     Date Action Dose Route User    5/3/2021 2053 New Bag 1,000 mL Intravenous Ines Martin, RN      Sodium Polysty

## 2021-05-04 NOTE — PROGRESS NOTES
[facility]  Progress Note     Melanie Randall Patient Status:  Inpatient    1940 MRN [id number]   [facility] 6NE-A Attending Alysa Solorzano MD   Hosp Day # 1 PCP Jose Strauss MD     Chief Complaint: feel much better    S: Refugio Quezada 3.240 05/03/2021       COVID-19 Lab Results    COVID-19  Lab Results   Component Value Date    COVID19 Not Detected 05/03/2021       Pro-Calcitonin  No results for input(s): PCT in the last 168 hours. Cardiac  Recent Labs   Lab 05/03/21 1945   TROP <0. Pulse 78   Temp 98.2 °F (36.8 °C) (Oral)   Resp 20   Ht 5' 4.17\" (1.63 m)   Wt 158 lb 15.2 oz (72.1 kg)   SpO2 93%   BMI 27.14 kg/m²   AOx3, NAD  S1+S2, RRR  CTA b/l  Soft, NT, ND, +BS  No LE edema    K improved, HR improved, dc planning likely tomorrow.

## 2021-05-05 ENCOUNTER — APPOINTMENT (OUTPATIENT)
Dept: GENERAL RADIOLOGY | Facility: HOSPITAL | Age: 81
DRG: 683 | End: 2021-05-05
Attending: HOSPITALIST
Payer: MEDICARE

## 2021-05-05 ENCOUNTER — TELEPHONE (OUTPATIENT)
Dept: FAMILY MEDICINE CLINIC | Facility: CLINIC | Age: 81
End: 2021-05-05

## 2021-05-05 VITALS
SYSTOLIC BLOOD PRESSURE: 166 MMHG | DIASTOLIC BLOOD PRESSURE: 80 MMHG | HEART RATE: 89 BPM | RESPIRATION RATE: 16 BRPM | BODY MASS INDEX: 27.13 KG/M2 | WEIGHT: 158.94 LBS | HEIGHT: 64.17 IN | OXYGEN SATURATION: 92 % | TEMPERATURE: 98 F

## 2021-05-05 PROCEDURE — 99239 HOSP IP/OBS DSCHRG MGMT >30: CPT | Performed by: HOSPITALIST

## 2021-05-05 PROCEDURE — 99232 SBSQ HOSP IP/OBS MODERATE 35: CPT | Performed by: NURSE PRACTITIONER

## 2021-05-05 PROCEDURE — 71045 X-RAY EXAM CHEST 1 VIEW: CPT | Performed by: HOSPITALIST

## 2021-05-05 RX ORDER — GLIMEPIRIDE 4 MG/1
4 TABLET ORAL DAILY
Qty: 30 TABLET | Refills: 0 | Status: SHIPPED | OUTPATIENT
Start: 2021-05-05 | End: 2021-05-27

## 2021-05-05 RX ORDER — HYDROCHLOROTHIAZIDE 12.5 MG/1
12.5 TABLET ORAL DAILY
Qty: 30 TABLET | Refills: 0 | Status: SHIPPED | OUTPATIENT
Start: 2021-05-05 | End: 2021-05-27

## 2021-05-05 RX ORDER — AMLODIPINE BESYLATE 5 MG/1
5 TABLET ORAL 2 TIMES DAILY
Qty: 60 TABLET | Refills: 3 | Status: SHIPPED | OUTPATIENT
Start: 2021-05-05 | End: 2021-05-27

## 2021-05-05 NOTE — PROGRESS NOTES
05/05/21 0104 05/05/21 0105 05/05/21 0106   Oxygen Therapy   SpO2 (!) 85 % (!) 85 % (!) 84 %      05/05/21 0107 05/05/21 0108 05/05/21 0109   Oxygen Therapy   SpO2 (!) 83 % (!) 84 % (!) 88 %      05/05/21 0110 05/05/21 0111 05/05/21 0112   Oxygen Therap

## 2021-05-05 NOTE — DIETARY NOTE
[facility]     Admitting diagnosis:  Hyperkalemia [E87.5]  Acute kidney injury ([facility] [address] [N17.9]  Symptomatic bradycardia [R00.1]    Ht: 163 cm (5' 4.17\")  Wt: 72.1 kg (158 lb 15.2 oz).    BMI: Body mass index is 27.14

## 2021-05-05 NOTE — PLAN OF CARE
Received pt at 2300. A&Ox4, NSR on tele, lungs clear, on RA. No complaints of pain. QID. Up with SBA.  Plan is to monitor K+, possible discharge in am. Pt resting comfortably in bed and updated on poc  Problem: Diabetes/Glucose Control  Goal: Glucose mainta

## 2021-05-05 NOTE — DISCHARGE SUMMARY
[facility] [place] [facility]  DISCHARGE SUMMARY     Jovany Ervin Patient Status:  Inpatient    1940 MRN [id number]   [facility] 2NE-A Attending Jostin Gonzalez MD   Hosp Day # 2 PCP Rosalia Ledesma MD     Date of Admission: 5/3/2021  Date of Disc once daily. Repeat labs this coming Friday at PCP office. She was discharged once cleared by consultants. Lace+ Score: 56  59-90 High Risk  29-58 Medium Risk  0-28   Low Risk         TCM Follow-Up Recommendation:  LACE 29-58:  Moderate Risk of readmissi Please  your prescriptions at the location directed by your doctor or nurse    Bring a paper prescription for each of these medications  · amLODIPine Besylate 5 MG Tabs  · glimepiride 4 MG Tabs  · hydrochlorothiazide 12.5 MG Tabs         [place]

## 2021-05-05 NOTE — PROGRESS NOTES
Patient sitting at side of chair, denies CP, SOB and dizziness. Patient AOx4; NSR on cardiac monitor; no edema noted. Plan for possible discharge. PT oxygen saturations 98% on RA. Plan reviewed with patient, verbalized understanding.      Patient ambulated

## 2021-05-05 NOTE — PLAN OF CARE
Patient tele D/C, IV discontinued with catheter intact. Patient denies CP, SOB, dizziness, or palpitations. Patient denies calf tenderness. Patient discharge instructions reviewed with patient and son, verbalize understanding.  Patient medication and their

## 2021-05-05 NOTE — PROGRESS NOTES
[facility]  Cardiology Progress Note    Subjective:  No chest pain or shortness of breath.     Objective:  BP (!) 122/95 (BP Location: Left arm)   Pulse 79   Temp 97.7 °F (36.5 °C) (Oral)   Resp 16   Ht 5' 4.17\" (1.63 m)   Wt 158 lb 15.2 oz (72.1 kg)

## 2021-05-05 NOTE — PROGRESS NOTES
[facility]  Progress Note     Morgan Parsons Patient Status:  Inpatient    1940 MRN [id number]   [facility] 6NE-A Attending Calvin hernández MD   Hosp Day # 2 PCP Heather Barraza MD     Chief Complaint: feel much better    S: Redia Lank 7.4  --   --  6.5  --     < > = values in this interval not displayed. Estimated Creatinine Clearance: 30.7 mL/min (A) (based on SCr of 1.23 mg/dL (H)). No results for input(s): PTP, INR in the last 168 hours.          COVID-19 Lab Results    COVID oral diabetics meds. F/u on cards recs also.      Giorgio A[place], [place]  9:03 AM     Patient seen and examined  Feels well  On room air  No chest pain or shortness of breath  Wants to go home    BP (!) 122/95 (BP Location: Left arm)   Pulse 79   Temp 97.7 °F

## 2021-05-05 NOTE — PAYOR COMM NOTE
--------------  CONTINUED STAY REVIEW    [employer]Arthur[employer] Pintos MEDICARE ADV PPO  Subscriber #:  [id number]  Authorization Number: [id number]        5/4 HOSP    1. Symptomatic Bradycardia 2/2 Severe Hyperkalemia, resolved  1.  S/p Calcium, Insulin/dextrose, Kayexelate (NORVASC) tab 5 mg     Date Action Dose Route User    5/4/2021 2001 Given 5 mg Oral Aderozinae [place]    5/4/2021 8054 Given 5 mg Oral Cyndi MOIRA Naylor      Heparin Sodium (Porcine) 5000 UNIT/ML injection 5,000 Units     Date Action Dose Rout

## 2021-05-05 NOTE — TELEPHONE ENCOUNTER
CESAR Cruz  called with update on patient:    Patient was admitted on 5/3/21 for dizziness and bradycardia. Due to bradycardia, lisinopril-hydrochlorothiazide and atenolol have been discontinued. BP is stable and bradycardia resolved .    Amlodipin

## 2021-05-05 NOTE — PLAN OF CARE
Assumed pt care around 1500 this afternoon. Pt A&O x4, glasses, bilat hearing aids. Slight right facial droop/enlarged right cheek. SPO2 maintained on RA, no c/o SOB. NSR on tele, receiving heparin subcutaneous for DVT prophylaxis.  K 4.1 this AM. Pt is con

## 2021-05-06 ENCOUNTER — PATIENT OUTREACH (OUTPATIENT)
Dept: CASE MANAGEMENT | Age: 81
End: 2021-05-06

## 2021-05-06 DIAGNOSIS — Z02.9 ENCOUNTERS FOR UNSPECIFIED ADMINISTRATIVE PURPOSE: ICD-10-CM

## 2021-05-06 PROCEDURE — 1111F DSCHRG MED/CURRENT MED MERGE: CPT

## 2021-05-06 NOTE — PAYOR COMM NOTE
Discharge Notification    Patient Name: Sonya Coroan  Payor: [facility] MEDICARE ADV PPO  Subscriber #: [id number]  Authorization Number: [id number]  Admit Date/Time: 5/3/2021 7:37 PM  Discharge Date/Time: 5/5/2021 3:12 PM

## 2021-05-08 LAB
BUN SERPL-MCNC: 27 MG/DL (ref 7–25)
BUN/CREAT SERPL: 21 (CALC) (ref 6–22)
CALCIUM SERPL-MCNC: 9.8 MG/DL (ref 8.6–10.4)
CHLORIDE SERPL-SCNC: 101 MMOL/L (ref 98–110)
CO2 SERPL-SCNC: 25 MMOL/L (ref 20–32)
CREAT SERPL-MCNC: 1.29 MG/DL (ref 0.6–0.88)
GLUCOSE SERPL-MCNC: 155 MG/DL (ref 65–99)
POTASSIUM SERPL-SCNC: 4.4 MMOL/L (ref 3.5–5.3)
SODIUM SERPL-SCNC: 138 MMOL/L (ref 135–146)

## 2021-05-11 ENCOUNTER — OFFICE VISIT (OUTPATIENT)
Dept: FAMILY MEDICINE CLINIC | Facility: CLINIC | Age: 81
End: 2021-05-11
Payer: MEDICARE

## 2021-05-11 VITALS
SYSTOLIC BLOOD PRESSURE: 158 MMHG | WEIGHT: 154 LBS | BODY MASS INDEX: 26.29 KG/M2 | TEMPERATURE: 98 F | HEIGHT: 64.17 IN | HEART RATE: 118 BPM | OXYGEN SATURATION: 98 % | DIASTOLIC BLOOD PRESSURE: 80 MMHG

## 2021-05-11 DIAGNOSIS — E87.5 HYPERKALEMIA: ICD-10-CM

## 2021-05-11 DIAGNOSIS — N18.31 TYPE 2 DIABETES MELLITUS WITH STAGE 3A CHRONIC KIDNEY DISEASE, WITHOUT LONG-TERM CURRENT USE OF INSULIN (HCC): ICD-10-CM

## 2021-05-11 DIAGNOSIS — R00.1 SYMPTOMATIC BRADYCARDIA: ICD-10-CM

## 2021-05-11 DIAGNOSIS — I10 ESSENTIAL HYPERTENSION: Primary | ICD-10-CM

## 2021-05-11 DIAGNOSIS — E11.22 TYPE 2 DIABETES MELLITUS WITH STAGE 3A CHRONIC KIDNEY DISEASE, WITHOUT LONG-TERM CURRENT USE OF INSULIN (HCC): ICD-10-CM

## 2021-05-11 PROCEDURE — 3008F BODY MASS INDEX DOCD: CPT | Performed by: FAMILY MEDICINE

## 2021-05-11 PROCEDURE — 3079F DIAST BP 80-89 MM HG: CPT | Performed by: FAMILY MEDICINE

## 2021-05-11 PROCEDURE — 3077F SYST BP >= 140 MM HG: CPT | Performed by: FAMILY MEDICINE

## 2021-05-11 PROCEDURE — 1111F DSCHRG MED/CURRENT MED MERGE: CPT | Performed by: FAMILY MEDICINE

## 2021-05-11 PROCEDURE — 99496 TRANSJ CARE MGMT HIGH F2F 7D: CPT | Performed by: FAMILY MEDICINE

## 2021-05-11 NOTE — PATIENT INSTRUCTIONS
Continue all medication as prescribed    Call to schedule appt with Dr. Lakisha Baxter with me in 4-6 wks, touch base sooner if needed

## 2021-05-13 NOTE — PROGRESS NOTES
Multiple attempts made to reach the patient for hospital follow up, with no response or return call. Patient completed HFU on 5-11-21 with PCP. NCM closing encounter.

## 2021-05-13 NOTE — PROGRESS NOTES
HPI:    Abraham Bond is a 78year old female here today for hospital follow up.    She was discharged from Inpatient hospital, [facility] to Home   Admission Date: 5/3/21   Discharge Date: 5/5/21  Hospital Discharge Diagnoses (since 4/13/2021)   None mouth daily. Dose reduced as your a1c is <7  amLODIPine Besylate 5 MG Oral Tab, Take 1 tablet (5 mg total) by mouth 2 (two) times a day. hydrochlorothiazide 12.5 MG Oral Tab, Take 1 tablet (12.5 mg total) by mouth daily.   acetaminophen 500 MG Oral Tab, Ta following:    Height as of this encounter: 5' 4.17\" (1.63 m). Weight as of this encounter: 154 lb (69.9 kg).    /80   Pulse 118   Temp 98.1 °F (36.7 °C) (Other)   Ht 5' 4.17\" (1.63 m)   Wt 154 lb (69.9 kg)   SpO2 98%   BMI 26.29 kg/m²    GENERAL: and/or Management Options: severe  · Amount and/or Complexity of Data to Be Reviewed: severe  · Risk of Significant Complications, Morbidity, and/or Mortality: severe    Overall Risk:   severe    Patient seen within 7 days from date of discharge.      CHERI

## 2021-05-19 ENCOUNTER — TELEPHONE (OUTPATIENT)
Dept: FAMILY MEDICINE CLINIC | Facility: CLINIC | Age: 81
End: 2021-05-19

## 2021-05-19 NOTE — TELEPHONE ENCOUNTER
If she is doing better, ok to wait and f/u with me in 1 month  If any issues, let us know, we can try to get her in sooner (with us and/or cardiology)  Thanks

## 2021-05-19 NOTE — TELEPHONE ENCOUNTER
LOV 5/11/21:  Continue all medication as prescribed  Call to schedule appt with Dr. Burak Webb with me in 4-6 wks, touch base sooner if needed.       Has future OV w/   6/15/21

## 2021-05-19 NOTE — TELEPHONE ENCOUNTER
Pt's daughter called and said they are not able to get in to see Dr. Néstor Perdomo until July, she wants to know if it is ok to wait until July. She said she will need an authorization code if it is ok to wait until July.

## 2021-05-19 NOTE — TELEPHONE ENCOUNTER
Relayed message to Jesus Mott. She agreed with plan  Requested auth referral be faxed to cardiology office.      Fax#[phone]

## 2021-05-24 DIAGNOSIS — E78.2 MIXED HYPERLIPIDEMIA: ICD-10-CM

## 2021-05-24 DIAGNOSIS — I10 ESSENTIAL HYPERTENSION: Primary | ICD-10-CM

## 2021-05-24 DIAGNOSIS — E03.9 ACQUIRED HYPOTHYROIDISM: ICD-10-CM

## 2021-05-24 DIAGNOSIS — E11.22 TYPE 2 DIABETES MELLITUS WITH STAGE 3A CHRONIC KIDNEY DISEASE, WITHOUT LONG-TERM CURRENT USE OF INSULIN (HCC): ICD-10-CM

## 2021-05-24 DIAGNOSIS — N18.31 TYPE 2 DIABETES MELLITUS WITH STAGE 3A CHRONIC KIDNEY DISEASE, WITHOUT LONG-TERM CURRENT USE OF INSULIN (HCC): ICD-10-CM

## 2021-05-25 RX ORDER — LEVOTHYROXINE SODIUM 0.05 MG/1
TABLET ORAL
Qty: 90 TABLET | Refills: 0 | Status: SHIPPED | OUTPATIENT
Start: 2021-05-25 | End: 2021-08-02

## 2021-05-25 RX ORDER — SIMVASTATIN 20 MG
TABLET ORAL
Qty: 90 TABLET | Refills: 0 | Status: SHIPPED | OUTPATIENT
Start: 2021-05-25 | End: 2021-08-06

## 2021-05-25 RX ORDER — LISINOPRIL AND HYDROCHLOROTHIAZIDE 20; 12.5 MG/1; MG/1
TABLET ORAL
Qty: 180 TABLET | Refills: 0 | Status: SHIPPED | OUTPATIENT
Start: 2021-05-25 | End: 2021-05-27

## 2021-05-26 ENCOUNTER — TELEPHONE (OUTPATIENT)
Dept: CARDIOLOGY | Age: 81
End: 2021-05-26

## 2021-05-26 RX ORDER — HYDROCHLOROTHIAZIDE 12.5 MG/1
12.5 TABLET ORAL DAILY
COMMUNITY
Start: 2021-05-05 | End: 2021-05-28 | Stop reason: SDUPTHER

## 2021-05-26 RX ORDER — GLIMEPIRIDE 4 MG/1
4 TABLET ORAL
COMMUNITY
Start: 2021-05-05 | End: 2023-10-11

## 2021-05-26 RX ORDER — LEVOTHYROXINE SODIUM 0.05 MG/1
TABLET ORAL
COMMUNITY
Start: 2021-05-25 | End: 2023-08-31 | Stop reason: DRUGHIGH

## 2021-05-26 RX ORDER — LISINOPRIL AND HYDROCHLOROTHIAZIDE 20; 12.5 MG/1; MG/1
TABLET ORAL
COMMUNITY
Start: 2021-05-25 | End: 2021-05-28

## 2021-05-26 RX ORDER — ATENOLOL 100 MG/1
TABLET ORAL DAILY
COMMUNITY
Start: 2021-03-24 | End: 2021-05-28

## 2021-05-26 RX ORDER — AMLODIPINE BESYLATE 5 MG/1
5 TABLET ORAL DAILY
COMMUNITY
Start: 2021-05-05 | End: 2023-10-11 | Stop reason: ALTCHOICE

## 2021-05-26 RX ORDER — SIMVASTATIN 20 MG
TABLET ORAL
COMMUNITY
Start: 2021-05-25

## 2021-05-26 RX ORDER — ACETAMINOPHEN 500 MG
1000 TABLET ORAL EVERY 6 HOURS PRN
COMMUNITY

## 2021-05-27 RX ORDER — HYDROCHLOROTHIAZIDE 12.5 MG/1
12.5 TABLET ORAL DAILY
Qty: 90 TABLET | Refills: 0 | Status: SHIPPED | OUTPATIENT
Start: 2021-05-27 | End: 2021-06-15

## 2021-05-27 RX ORDER — GLIMEPIRIDE 4 MG/1
4 TABLET ORAL DAILY
Qty: 90 TABLET | Refills: 0 | Status: SHIPPED | OUTPATIENT
Start: 2021-05-27 | End: 2021-08-10

## 2021-05-27 RX ORDER — AMLODIPINE BESYLATE 5 MG/1
5 TABLET ORAL 2 TIMES DAILY
Qty: 180 TABLET | Refills: 0 | Status: SHIPPED | OUTPATIENT
Start: 2021-05-27 | End: 2021-08-10

## 2021-05-27 NOTE — TELEPHONE ENCOUNTER
Requested Prescriptions     Signed Prescriptions Disp Refills   • glimepiride 4 MG Oral Tab 90 tablet 0     Sig: Take 1 tablet (4 mg total) by mouth daily.  Dose reduced as your a1c is <7     Authorizing Provider: Krystal Hassan     Ordering User: Laure Eugene

## 2021-05-28 ENCOUNTER — TELEPHONE (OUTPATIENT)
Dept: CARDIOLOGY | Age: 81
End: 2021-05-28

## 2021-05-28 ENCOUNTER — OFFICE VISIT (OUTPATIENT)
Dept: CARDIOLOGY | Age: 81
End: 2021-05-28

## 2021-05-28 ENCOUNTER — HOSPITAL ENCOUNTER (OUTPATIENT)
Dept: LAB | Facility: HOSPITAL | Age: 81
Discharge: HOME | End: 2021-05-28
Attending: INTERNAL MEDICINE
Payer: MEDICARE

## 2021-05-28 ENCOUNTER — TELEPHONE (OUTPATIENT)
Dept: FAMILY MEDICINE CLINIC | Facility: CLINIC | Age: 81
End: 2021-05-28

## 2021-05-28 VITALS
SYSTOLIC BLOOD PRESSURE: 146 MMHG | HEART RATE: 104 BPM | HEIGHT: 64 IN | WEIGHT: 154 LBS | DIASTOLIC BLOOD PRESSURE: 84 MMHG | BODY MASS INDEX: 26.29 KG/M2

## 2021-05-28 DIAGNOSIS — E78.00 PURE HYPERCHOLESTEROLEMIA: ICD-10-CM

## 2021-05-28 DIAGNOSIS — I10 ESSENTIAL HYPERTENSION, BENIGN: Primary | ICD-10-CM

## 2021-05-28 DIAGNOSIS — N18.31 STAGE 3A CHRONIC KIDNEY DISEASE (CMD): ICD-10-CM

## 2021-05-28 PROCEDURE — 99214 OFFICE O/P EST MOD 30 MIN: CPT | Performed by: INTERNAL MEDICINE

## 2021-05-28 PROCEDURE — 3079F DIAST BP 80-89 MM HG: CPT | Performed by: INTERNAL MEDICINE

## 2021-05-28 PROCEDURE — 3077F SYST BP >= 140 MM HG: CPT | Performed by: INTERNAL MEDICINE

## 2021-05-28 PROCEDURE — 36415 COLL VENOUS BLD VENIPUNCTURE: CPT | Performed by: INTERNAL MEDICINE

## 2021-05-28 PROCEDURE — 80048 BASIC METABOLIC PNL TOTAL CA: CPT | Performed by: INTERNAL MEDICINE

## 2021-05-28 RX ORDER — ATENOLOL 25 MG/1
25 TABLET ORAL DAILY
Qty: 90 TABLET | Refills: 3 | Status: SHIPPED | OUTPATIENT
Start: 2021-05-28 | End: 2023-06-28

## 2021-05-28 RX ORDER — HYDROCHLOROTHIAZIDE 12.5 MG/1
12.5 TABLET ORAL DAILY
Qty: 14 TABLET | Refills: 0 | Status: SHIPPED | OUTPATIENT
Start: 2021-05-28 | End: 2023-08-31

## 2021-05-28 RX ORDER — HYDROCHLOROTHIAZIDE 12.5 MG/1
12.5 TABLET ORAL DAILY
Qty: 90 TABLET | Refills: 3 | Status: SHIPPED | OUTPATIENT
Start: 2021-05-28 | End: 2021-05-28 | Stop reason: SDUPTHER

## 2021-05-28 ASSESSMENT — ENCOUNTER SYMPTOMS
BRUISES/BLEEDS EASILY: 0
ALLERGIC/IMMUNOLOGIC COMMENTS: NO NEW FOOD ALLERGIES
CHILLS: 0
HEMOPTYSIS: 0
SUSPICIOUS LESIONS: 0
WEIGHT LOSS: 0
FEVER: 0
COUGH: 0
HEMATOCHEZIA: 0
WEIGHT GAIN: 0

## 2021-05-28 ASSESSMENT — PATIENT HEALTH QUESTIONNAIRE - PHQ9
SUM OF ALL RESPONSES TO PHQ9 QUESTIONS 1 AND 2: 0
CLINICAL INTERPRETATION OF PHQ9 SCORE: NO FURTHER SCREENING NEEDED
CLINICAL INTERPRETATION OF PHQ2 SCORE: NO FURTHER SCREENING NEEDED
1. LITTLE INTEREST OR PLEASURE IN DOING THINGS: NOT AT ALL
SUM OF ALL RESPONSES TO PHQ9 QUESTIONS 1 AND 2: 0
2. FEELING DOWN, DEPRESSED OR HOPELESS: NOT AT ALL

## 2021-05-28 NOTE — TELEPHONE ENCOUNTER
Pt's daughter called and wants to know if we received [facility] She said [facility] has sent it [facility] sometime this week.     Kandy Jones (daughter) also needed the referral sent to the office of the doctor Kerrie Irving is seeing so I faxed it to [phone]

## 2021-05-28 NOTE — TELEPHONE ENCOUNTER
Called Sobeida Akers and let her know we sent for refills yesterday to [facility].

## 2021-06-14 ENCOUNTER — TELEPHONE (OUTPATIENT)
Dept: CARDIOLOGY | Age: 81
End: 2021-06-14

## 2021-06-15 ENCOUNTER — OFFICE VISIT (OUTPATIENT)
Dept: FAMILY MEDICINE CLINIC | Facility: CLINIC | Age: 81
End: 2021-06-15
Payer: MEDICARE

## 2021-06-15 VITALS
WEIGHT: 154 LBS | SYSTOLIC BLOOD PRESSURE: 162 MMHG | HEART RATE: 82 BPM | BODY MASS INDEX: 26.29 KG/M2 | HEIGHT: 64.17 IN | TEMPERATURE: 98 F | DIASTOLIC BLOOD PRESSURE: 70 MMHG

## 2021-06-15 DIAGNOSIS — E87.5 HYPERKALEMIA: ICD-10-CM

## 2021-06-15 DIAGNOSIS — E11.22 TYPE 2 DIABETES MELLITUS WITH STAGE 3A CHRONIC KIDNEY DISEASE, WITHOUT LONG-TERM CURRENT USE OF INSULIN (HCC): ICD-10-CM

## 2021-06-15 DIAGNOSIS — I10 ESSENTIAL HYPERTENSION: Primary | ICD-10-CM

## 2021-06-15 DIAGNOSIS — N18.31 TYPE 2 DIABETES MELLITUS WITH STAGE 3A CHRONIC KIDNEY DISEASE, WITHOUT LONG-TERM CURRENT USE OF INSULIN (HCC): ICD-10-CM

## 2021-06-15 PROCEDURE — 3078F DIAST BP <80 MM HG: CPT | Performed by: FAMILY MEDICINE

## 2021-06-15 PROCEDURE — 3008F BODY MASS INDEX DOCD: CPT | Performed by: FAMILY MEDICINE

## 2021-06-15 PROCEDURE — 3077F SYST BP >= 140 MM HG: CPT | Performed by: FAMILY MEDICINE

## 2021-06-15 PROCEDURE — 99214 OFFICE O/P EST MOD 30 MIN: CPT | Performed by: FAMILY MEDICINE

## 2021-06-15 RX ORDER — HYDROCHLOROTHIAZIDE 25 MG/1
25 TABLET ORAL DAILY
Qty: 90 TABLET | Refills: 0 | Status: SHIPPED | OUTPATIENT
Start: 2021-06-15 | End: 2021-08-10

## 2021-06-15 RX ORDER — ATENOLOL 25 MG/1
25 TABLET ORAL DAILY
COMMUNITY
Start: 2021-05-28

## 2021-06-15 NOTE — PATIENT INSTRUCTIONS
Increase hydrochlorothiazide to 25mg daily (can take 2 until you run out)    Look for [employer] brand blood pressure machine  Cheaper at [facility]    Check bloodwork in 2 wks    Followup with me in August    We can also check BP again when Leni Iron comes in

## 2021-06-15 NOTE — PROGRESS NOTES
Diego Brooks is a 78year old female here for Patient presents with:  Hypertension      HPI:       1. Essential hypertension  3.  Hyperkalemia  -bp still high on amlodipine 5 bid and hydrochlorothiazide 12.5  -not checking at home  -recent potassium on this See HPI for relevant ROS    --GEN: No other complaints  --HEENT: No other complaints  --RESP: No other complaints  --CV: No other complaints  --GI: No other complaints  --: No other complaints  --MSK: No other complaints  --NEURO: No other complaints

## 2021-07-23 ENCOUNTER — TELEPHONE (OUTPATIENT)
Dept: FAMILY MEDICINE CLINIC | Facility: CLINIC | Age: 81
End: 2021-07-23

## 2021-07-23 DIAGNOSIS — N18.31 TYPE 2 DIABETES MELLITUS WITH STAGE 3A CHRONIC KIDNEY DISEASE, WITHOUT LONG-TERM CURRENT USE OF INSULIN (HCC): Primary | ICD-10-CM

## 2021-07-23 DIAGNOSIS — E11.22 TYPE 2 DIABETES MELLITUS WITH STAGE 3A CHRONIC KIDNEY DISEASE, WITHOUT LONG-TERM CURRENT USE OF INSULIN (HCC): Primary | ICD-10-CM

## 2021-07-23 DIAGNOSIS — D64.89 ANEMIA DUE TO OTHER CAUSE, NOT CLASSIFIED: ICD-10-CM

## 2021-07-23 NOTE — TELEPHONE ENCOUNTER
Daughter states she has been seeing this physician for years for eye problems. Referral placed. Will fax to office once approved.

## 2021-07-23 NOTE — TELEPHONE ENCOUNTER
Pt's daughter called and said Ciarra needs a referral for the eye doctor, Dr. Jose Franklin in [place]. She would like it faxed to [phone].

## 2021-08-02 DIAGNOSIS — E03.9 ACQUIRED HYPOTHYROIDISM: ICD-10-CM

## 2021-08-02 RX ORDER — LEVOTHYROXINE SODIUM 0.05 MG/1
TABLET ORAL
Qty: 90 TABLET | Refills: 1 | Status: SHIPPED | OUTPATIENT
Start: 2021-08-02

## 2021-08-02 NOTE — TELEPHONE ENCOUNTER
Pt requesting refill of LEVOTHYROXINE     Passed protocol, refill approved, sent to pharmacy.     Last Office Visit with Provider: 6/15/21  Last thyroid labs 5/3/21    Appt scheduled on 8/10/21

## 2021-08-03 LAB
ABSOLUTE BASOPHILS: 55 CELLS/UL (ref 0–200)
ABSOLUTE EOSINOPHILS: 473 CELLS/UL (ref 15–500)
ABSOLUTE LYMPHOCYTES: 2912 CELLS/UL (ref 850–3900)
ABSOLUTE MONOCYTES: 573 CELLS/UL (ref 200–950)
ABSOLUTE NEUTROPHILS: 5087 CELLS/UL (ref 1500–7800)
ALBUMIN/GLOBULIN RATIO: 1.4 (CALC) (ref 1–2.5)
ALBUMIN: 3.9 G/DL (ref 3.6–5.1)
ALKALINE PHOSPHATASE: 50 U/L (ref 37–153)
ALT: 29 U/L (ref 6–29)
AST: 17 U/L (ref 10–35)
BASOPHILS: 0.6 %
BILIRUBIN, TOTAL: 0.4 MG/DL (ref 0.2–1.2)
BUN/CREATININE RATIO: 24 (CALC) (ref 6–22)
BUN: 34 MG/DL (ref 7–25)
CALCIUM: 9.3 MG/DL (ref 8.6–10.4)
CARBON DIOXIDE: 26 MMOL/L (ref 20–32)
CHLORIDE: 102 MMOL/L (ref 98–110)
CREATININE: 1.4 MG/DL (ref 0.6–0.88)
EGFR IF AFRICN AM: 40 ML/MIN/1.73M2
EGFR IF NONAFRICN AM: 35 ML/MIN/1.73M2
EOSINOPHILS: 5.2 %
GLOBULIN: 2.8 G/DL (CALC) (ref 1.9–3.7)
GLUCOSE: 203 MG/DL (ref 65–99)
HEMATOCRIT: 37.8 % (ref 35–45)
HEMOGLOBIN: 12 G/DL (ref 11.7–15.5)
LYMPHOCYTES: 32 %
MCH: 28.7 PG (ref 27–33)
MCHC: 31.7 G/DL (ref 32–36)
MCV: 90.4 FL (ref 80–100)
MONOCYTES: 6.3 %
MPV: 13.8 FL (ref 7.5–12.5)
NEUTROPHILS: 55.9 %
PLATELET COUNT: 227 THOUSAND/UL (ref 140–400)
POTASSIUM: 5.1 MMOL/L (ref 3.5–5.3)
PROTEIN, TOTAL: 6.7 G/DL (ref 6.1–8.1)
RDW: 14.1 % (ref 11–15)
RED BLOOD CELL COUNT: 4.18 MILLION/UL (ref 3.8–5.1)
SODIUM: 137 MMOL/L (ref 135–146)
WHITE BLOOD CELL COUNT: 9.1 THOUSAND/UL (ref 3.8–10.8)

## 2021-08-06 DIAGNOSIS — E11.22 TYPE 2 DIABETES MELLITUS WITH STAGE 3A CHRONIC KIDNEY DISEASE, WITHOUT LONG-TERM CURRENT USE OF INSULIN (HCC): ICD-10-CM

## 2021-08-06 DIAGNOSIS — E78.2 MIXED HYPERLIPIDEMIA: ICD-10-CM

## 2021-08-06 DIAGNOSIS — N18.31 TYPE 2 DIABETES MELLITUS WITH STAGE 3A CHRONIC KIDNEY DISEASE, WITHOUT LONG-TERM CURRENT USE OF INSULIN (HCC): ICD-10-CM

## 2021-08-06 RX ORDER — SIMVASTATIN 20 MG
TABLET ORAL
Qty: 90 TABLET | Refills: 1 | Status: SHIPPED | OUTPATIENT
Start: 2021-08-06 | End: 2022-01-02

## 2021-08-06 NOTE — TELEPHONE ENCOUNTER
Requested Prescriptions     Signed Prescriptions Disp Refills   • METFORMIN  MG Oral Tab 180 tablet 1     Sig: TAKE 1 TABLET TWICE DAILY WITH MEALS     Authorizing Provider: Carrie Sadny     Ordering User: Da Villa   • SIMVASTATIN 20 MG Oral

## 2021-08-07 DIAGNOSIS — I10 ESSENTIAL HYPERTENSION: ICD-10-CM

## 2021-08-09 RX ORDER — LISINOPRIL AND HYDROCHLOROTHIAZIDE 20; 12.5 MG/1; MG/1
TABLET ORAL
Qty: 180 TABLET | Refills: 0 | Status: SHIPPED | OUTPATIENT
Start: 2021-08-09 | End: 2021-08-10

## 2021-08-09 NOTE — TELEPHONE ENCOUNTER
Pt requesting refill of LISINOPRIL HCTZ    Passed protocol, refill approved, sent to pharmacy.     Last Office Visit with Provider: 6/15/21    Appt scheduled on 8/10/21

## 2021-08-10 ENCOUNTER — TELEPHONE (OUTPATIENT)
Dept: FAMILY MEDICINE CLINIC | Facility: CLINIC | Age: 81
End: 2021-08-10

## 2021-08-10 ENCOUNTER — OFFICE VISIT (OUTPATIENT)
Dept: FAMILY MEDICINE CLINIC | Facility: CLINIC | Age: 81
End: 2021-08-10
Payer: MEDICARE

## 2021-08-10 VITALS
SYSTOLIC BLOOD PRESSURE: 136 MMHG | WEIGHT: 154 LBS | OXYGEN SATURATION: 97 % | TEMPERATURE: 97 F | BODY MASS INDEX: 26.29 KG/M2 | DIASTOLIC BLOOD PRESSURE: 70 MMHG | HEIGHT: 64.17 IN | HEART RATE: 82 BPM

## 2021-08-10 DIAGNOSIS — E87.5 HYPERKALEMIA: ICD-10-CM

## 2021-08-10 DIAGNOSIS — I10 ESSENTIAL HYPERTENSION: Primary | ICD-10-CM

## 2021-08-10 PROCEDURE — 3008F BODY MASS INDEX DOCD: CPT | Performed by: FAMILY MEDICINE

## 2021-08-10 PROCEDURE — 3078F DIAST BP <80 MM HG: CPT | Performed by: FAMILY MEDICINE

## 2021-08-10 PROCEDURE — 3075F SYST BP GE 130 - 139MM HG: CPT | Performed by: FAMILY MEDICINE

## 2021-08-10 PROCEDURE — 99214 OFFICE O/P EST MOD 30 MIN: CPT | Performed by: FAMILY MEDICINE

## 2021-08-10 RX ORDER — AMLODIPINE BESYLATE 5 MG/1
5 TABLET ORAL 2 TIMES DAILY
Qty: 180 TABLET | Refills: 1 | Status: SHIPPED | OUTPATIENT
Start: 2021-08-10 | End: 2021-12-29

## 2021-08-10 RX ORDER — HYDROCHLOROTHIAZIDE 25 MG/1
25 TABLET ORAL DAILY
Qty: 90 TABLET | Refills: 1 | Status: SHIPPED | OUTPATIENT
Start: 2021-08-10 | End: 2022-01-17

## 2021-08-10 NOTE — TELEPHONE ENCOUNTER
Spoke with Daughter, Kandy Jones. Went over medication list.  States only thing we did not have was the glimepiride 4mg daily. States needs a refill. Pended. Advised we could not stop shipment of lisinopril-hydrochlorothiazide.   She said will either call H

## 2021-08-10 NOTE — TELEPHONE ENCOUNTER
Advised by Dr. Everett Camacho to please cancel refill on lisinopril - hydrochlorothiazide script that was sent yesterday. Called [facility]. Unfortunately, they shipped the script early this a.m.   They did provide number for Clinical Services that the patient can cassie

## 2021-08-11 RX ORDER — AMLODIPINE BESYLATE 5 MG/1
TABLET ORAL
Qty: 180 TABLET | Refills: 0 | OUTPATIENT
Start: 2021-08-11

## 2021-08-11 RX ORDER — GLIMEPIRIDE 4 MG/1
TABLET ORAL
Qty: 90 TABLET | Refills: 0 | OUTPATIENT
Start: 2021-08-11

## 2021-08-11 RX ORDER — GLIMEPIRIDE 4 MG/1
4 TABLET ORAL DAILY
Qty: 90 TABLET | Refills: 1 | Status: SHIPPED | OUTPATIENT
Start: 2021-08-11 | End: 2021-12-29

## 2021-08-11 RX ORDER — HYDROCHLOROTHIAZIDE 25 MG/1
TABLET ORAL
Qty: 90 TABLET | Refills: 0 | OUTPATIENT
Start: 2021-08-11

## 2021-08-11 NOTE — PROGRESS NOTES
Monita Mohs is a 78year old female here for Patient presents with:  Hypertension  Lab Results      HPI:       1. Essential hypertension  2.  Hyperkalemia  -here for followup  -BP is better  -recent labs are better - potassium is on high end of normal  -she complaints  --GI: No other complaints  --: No other complaints  --MSK: No other complaints  --NEURO: No other complaints  --PSYCH: No other complaints  --HEME/LYMPH/IMMUN: No other complaints  --ENDO: No other complaints  --SKIN: No other complaints  All

## 2021-08-11 NOTE — TELEPHONE ENCOUNTER
Requested Prescriptions     Refused Prescriptions Disp Refills   • GLIMEPIRIDE 4 MG Oral Tab [Pharmacy Med Name: GLIMEPIRIDE 4 MG Tablet] 90 tablet 0     Sig: TAKE 1 TABLET (4 MG TOTAL) BY MOUTH DAILY. DOSE REDUCED AS YOUR A1C IS LESS THAN 7.      Refused

## 2021-10-04 ENCOUNTER — PATIENT OUTREACH (OUTPATIENT)
Dept: FAMILY MEDICINE CLINIC | Facility: CLINIC | Age: 81
End: 2021-10-04

## 2021-10-09 DIAGNOSIS — E03.9 ACQUIRED HYPOTHYROIDISM: ICD-10-CM

## 2021-10-11 ENCOUNTER — IMMUNIZATION (OUTPATIENT)
Dept: FAMILY MEDICINE CLINIC | Facility: CLINIC | Age: 81
End: 2021-10-11
Payer: MEDICARE

## 2021-10-11 DIAGNOSIS — Z23 NEED FOR VACCINATION: Primary | ICD-10-CM

## 2021-10-11 PROCEDURE — 90662 IIV NO PRSV INCREASED AG IM: CPT | Performed by: FAMILY MEDICINE

## 2021-10-11 PROCEDURE — G0008 ADMIN INFLUENZA VIRUS VAC: HCPCS | Performed by: FAMILY MEDICINE

## 2021-10-11 RX ORDER — LEVOTHYROXINE SODIUM 0.05 MG/1
TABLET ORAL
Qty: 90 TABLET | Refills: 1 | OUTPATIENT
Start: 2021-10-11

## 2021-10-11 NOTE — TELEPHONE ENCOUNTER
Requested Prescriptions     Refused Prescriptions Disp Refills   • LEVOTHYROXINE 50 MCG Oral Tab [Pharmacy Med Name: LEVOTHYROXINE SODIUM 50 MCG Tablet] 90 tablet 1     Sig: TAKE 1 TABLET BEFORE BREAKFAST     Refused By: Bud Dill     Reason for Refusa

## 2021-10-29 ENCOUNTER — HOSPITAL ENCOUNTER (OUTPATIENT)
Dept: CV DIAGNOSTICS | Age: 81
Discharge: HOME | End: 2021-10-29
Attending: INTERNAL MEDICINE
Payer: MEDICARE

## 2021-10-29 DIAGNOSIS — R00.1 BRADYCARDIA: ICD-10-CM

## 2021-10-29 PROCEDURE — 93227 XTRNL ECG REC<48 HR R&I: CPT | Performed by: INTERNAL MEDICINE

## 2021-10-29 PROCEDURE — 93225 XTRNL ECG REC<48 HRS REC: CPT | Performed by: INTERNAL MEDICINE

## 2021-10-29 PROCEDURE — 93226 XTRNL ECG REC<48 HR SCAN A/R: CPT | Performed by: INTERNAL MEDICINE

## 2021-12-13 ENCOUNTER — TELEPHONE (OUTPATIENT)
Dept: FAMILY MEDICINE CLINIC | Facility: CLINIC | Age: 81
End: 2021-12-13

## 2021-12-13 DIAGNOSIS — E87.5 HYPERKALEMIA: ICD-10-CM

## 2021-12-13 DIAGNOSIS — I10 ESSENTIAL HYPERTENSION: Primary | ICD-10-CM

## 2021-12-13 NOTE — TELEPHONE ENCOUNTER
Pt's daughter called and said she would like to get an order for a blood test for her mom for her potassium level. She will be going to [place] tomorrow with her dad and she would like to get it done while she is there.

## 2021-12-13 NOTE — TELEPHONE ENCOUNTER
Daughter is wanting a potassium level checked because it was high in the past.  Pended CMP. FOV 2/22/22.

## 2021-12-29 RX ORDER — GLIMEPIRIDE 4 MG/1
TABLET ORAL
Qty: 90 TABLET | Refills: 0 | Status: SHIPPED | OUTPATIENT
Start: 2021-12-29

## 2021-12-29 RX ORDER — AMLODIPINE BESYLATE 5 MG/1
5 TABLET ORAL 2 TIMES DAILY
Qty: 180 TABLET | Refills: 0 | Status: SHIPPED | OUTPATIENT
Start: 2021-12-29

## 2021-12-29 NOTE — TELEPHONE ENCOUNTER
Requested Prescriptions     Signed Prescriptions Disp Refills   • GLIMEPIRIDE 4 MG Oral Tab 90 tablet 0     Sig: TAKE 1 TABLET EVERY DAY     Authorizing Provider: Magui Chacon     Ordering User: Itzel Shaffer   • AMLODIPINE 5 MG Oral Tab 180 tablet 0

## 2022-01-01 DIAGNOSIS — N18.31 TYPE 2 DIABETES MELLITUS WITH STAGE 3A CHRONIC KIDNEY DISEASE, WITHOUT LONG-TERM CURRENT USE OF INSULIN (HCC): ICD-10-CM

## 2022-01-01 DIAGNOSIS — E78.2 MIXED HYPERLIPIDEMIA: ICD-10-CM

## 2022-01-01 DIAGNOSIS — E11.22 TYPE 2 DIABETES MELLITUS WITH STAGE 3A CHRONIC KIDNEY DISEASE, WITHOUT LONG-TERM CURRENT USE OF INSULIN (HCC): ICD-10-CM

## 2022-01-02 RX ORDER — SIMVASTATIN 20 MG
TABLET ORAL
Qty: 90 TABLET | Refills: 0 | Status: SHIPPED | OUTPATIENT
Start: 2022-01-02

## 2022-01-10 RX ORDER — AMLODIPINE BESYLATE 5 MG/1
TABLET ORAL
Qty: 180 TABLET | Refills: 0 | OUTPATIENT
Start: 2022-01-10

## 2022-01-10 RX ORDER — GLIMEPIRIDE 4 MG/1
TABLET ORAL
Qty: 90 TABLET | Refills: 0 | OUTPATIENT
Start: 2022-01-10

## 2022-01-10 NOTE — TELEPHONE ENCOUNTER
Requested Prescriptions     Refused Prescriptions Disp Refills   • AMLODIPINE 5 MG Oral Tab [Pharmacy Med Name: AMLODIPINE BESYLATE 5 MG Tablet] 180 tablet 0     Sig: TAKE 1 TABLET TWICE DAILY     Refused By: Danay Morales     Reason for Refusal: Patient h

## 2022-01-17 RX ORDER — HYDROCHLOROTHIAZIDE 25 MG/1
25 TABLET ORAL DAILY
Qty: 90 TABLET | Refills: 0 | Status: SHIPPED | OUTPATIENT
Start: 2022-01-17

## 2022-01-17 NOTE — TELEPHONE ENCOUNTER
Requested Prescriptions     Pending Prescriptions Disp Refills   • HYDROCHLOROTHIAZIDE 25 MG Oral Tab [Pharmacy Med Name: HYDROCHLOROTHIAZIDE 25 MG Tablet] 90 tablet 1     Sig: TAKE 1 TABLET (25 MG TOTAL) BY MOUTH DAILY.      Last fill was 8/10/21 90 1 refi

## 2022-02-22 ENCOUNTER — OFFICE VISIT (OUTPATIENT)
Dept: FAMILY MEDICINE CLINIC | Facility: CLINIC | Age: 82
End: 2022-02-22
Payer: MEDICARE

## 2022-02-22 VITALS
WEIGHT: 152 LBS | DIASTOLIC BLOOD PRESSURE: 60 MMHG | HEIGHT: 64.17 IN | TEMPERATURE: 97 F | OXYGEN SATURATION: 96 % | BODY MASS INDEX: 25.95 KG/M2 | SYSTOLIC BLOOD PRESSURE: 132 MMHG | HEART RATE: 70 BPM

## 2022-02-22 DIAGNOSIS — M85.88 OSTEOPENIA OF OTHER SITE: ICD-10-CM

## 2022-02-22 DIAGNOSIS — Z00.00 ENCOUNTER FOR ANNUAL HEALTH EXAMINATION: Primary | ICD-10-CM

## 2022-02-22 DIAGNOSIS — E78.2 MIXED HYPERLIPIDEMIA: ICD-10-CM

## 2022-02-22 DIAGNOSIS — E11.22 TYPE 2 DIABETES MELLITUS WITH STAGE 3A CHRONIC KIDNEY DISEASE, WITHOUT LONG-TERM CURRENT USE OF INSULIN (HCC): ICD-10-CM

## 2022-02-22 DIAGNOSIS — R00.1 SYMPTOMATIC BRADYCARDIA: ICD-10-CM

## 2022-02-22 DIAGNOSIS — N18.31 TYPE 2 DIABETES MELLITUS WITH STAGE 3A CHRONIC KIDNEY DISEASE, WITHOUT LONG-TERM CURRENT USE OF INSULIN (HCC): ICD-10-CM

## 2022-02-22 DIAGNOSIS — N18.31 STAGE 3A CHRONIC KIDNEY DISEASE (HCC): ICD-10-CM

## 2022-02-22 DIAGNOSIS — D22.9 ATYPICAL MOLE: ICD-10-CM

## 2022-02-22 DIAGNOSIS — I10 ESSENTIAL HYPERTENSION: ICD-10-CM

## 2022-02-22 DIAGNOSIS — E11.42 DIABETIC POLYNEUROPATHY ASSOCIATED WITH TYPE 2 DIABETES MELLITUS (HCC): ICD-10-CM

## 2022-02-22 DIAGNOSIS — E03.9 ACQUIRED HYPOTHYROIDISM: ICD-10-CM

## 2022-02-22 PROBLEM — R73.9 HYPERGLYCEMIA: Status: RESOLVED | Noted: 2021-05-03 | Resolved: 2022-02-22

## 2022-02-22 PROBLEM — E87.1 HYPONATREMIA: Status: RESOLVED | Noted: 2021-05-03 | Resolved: 2022-02-22

## 2022-02-22 PROBLEM — E87.5 HYPERKALEMIA: Status: RESOLVED | Noted: 2021-05-03 | Resolved: 2022-02-22

## 2022-02-22 PROBLEM — N17.9 ACUTE KIDNEY INJURY (HCC): Status: RESOLVED | Noted: 2021-05-03 | Resolved: 2022-02-22

## 2022-02-22 PROBLEM — E87.2 METABOLIC ACIDOSIS: Status: RESOLVED | Noted: 2021-05-03 | Resolved: 2022-02-22

## 2022-02-22 PROBLEM — N17.9 ACUTE RENAL FAILURE (ARF): Status: RESOLVED | Noted: 2021-05-03 | Resolved: 2022-02-22

## 2022-02-22 PROBLEM — N17.9 ACUTE KIDNEY INJURY: Status: RESOLVED | Noted: 2021-05-03 | Resolved: 2022-02-22

## 2022-02-22 PROBLEM — E87.20 METABOLIC ACIDOSIS: Status: RESOLVED | Noted: 2021-05-03 | Resolved: 2022-02-22

## 2022-02-22 PROBLEM — N17.9 ACUTE RENAL FAILURE (ARF) (HCC): Status: RESOLVED | Noted: 2021-05-03 | Resolved: 2022-02-22

## 2022-02-22 PROCEDURE — G0439 PPPS, SUBSEQ VISIT: HCPCS | Performed by: FAMILY MEDICINE

## 2022-02-22 PROCEDURE — 3078F DIAST BP <80 MM HG: CPT | Performed by: FAMILY MEDICINE

## 2022-02-22 PROCEDURE — 96160 PT-FOCUSED HLTH RISK ASSMT: CPT | Performed by: FAMILY MEDICINE

## 2022-02-22 PROCEDURE — 3075F SYST BP GE 130 - 139MM HG: CPT | Performed by: FAMILY MEDICINE

## 2022-02-22 PROCEDURE — 99397 PER PM REEVAL EST PAT 65+ YR: CPT | Performed by: FAMILY MEDICINE

## 2022-02-22 PROCEDURE — 3008F BODY MASS INDEX DOCD: CPT | Performed by: FAMILY MEDICINE

## 2022-02-22 RX ORDER — GABAPENTIN 100 MG/1
100 CAPSULE ORAL NIGHTLY
Qty: 90 CAPSULE | Refills: 0 | Status: SHIPPED | OUTPATIENT
Start: 2022-02-22 | End: 2022-03-31

## 2022-02-28 RX ORDER — LEVOTHYROXINE SODIUM 0.05 MG/1
TABLET ORAL
Qty: 90 TABLET | Refills: 1 | Status: SHIPPED | OUTPATIENT
Start: 2022-02-28

## 2022-03-17 RX ORDER — SIMVASTATIN 20 MG
TABLET ORAL
Qty: 90 TABLET | Refills: 0 | Status: SHIPPED | OUTPATIENT
Start: 2022-03-17

## 2022-03-21 ENCOUNTER — TELEPHONE (OUTPATIENT)
Dept: FAMILY MEDICINE CLINIC | Facility: CLINIC | Age: 82
End: 2022-03-21

## 2022-03-22 LAB
ABSOLUTE BASOPHILS: 62 CELLS/UL (ref 0–200)
ABSOLUTE EOSINOPHILS: 476 CELLS/UL (ref 15–500)
ABSOLUTE LYMPHOCYTES: 2077 CELLS/UL (ref 850–3900)
ABSOLUTE MONOCYTES: 386 CELLS/UL (ref 200–950)
ABSOLUTE NEUTROPHILS: 3899 CELLS/UL (ref 1500–7800)
ALBUMIN/GLOBULIN RATIO: 1.2 (CALC) (ref 1–2.5)
ALBUMIN: 4.1 G/DL (ref 3.6–5.1)
ALKALINE PHOSPHATASE: 60 U/L (ref 37–153)
ALT: 39 U/L (ref 6–29)
AST: 30 U/L (ref 10–35)
BASOPHILS: 0.9 %
BILIRUBIN, TOTAL: 0.5 MG/DL (ref 0.2–1.2)
BUN/CREATININE RATIO: 31 (CALC) (ref 6–22)
BUN: 44 MG/DL (ref 7–25)
CALCIUM: 9.7 MG/DL (ref 8.6–10.4)
CARBON DIOXIDE: 26 MMOL/L (ref 20–32)
CHLORIDE: 105 MMOL/L (ref 98–110)
CHOL/HDLC RATIO: 3.5 (CALC)
CHOLESTEROL, TOTAL: 149 MG/DL
CREATININE: 1.44 MG/DL (ref 0.6–0.88)
EGFR IF AFRICN AM: 39 ML/MIN/1.73M2
EGFR IF NONAFRICN AM: 33 ML/MIN/1.73M2
EOSINOPHILS: 6.9 %
GLOBULIN: 3.3 G/DL (CALC) (ref 1.9–3.7)
GLUCOSE: 131 MG/DL (ref 65–99)
HDL CHOLESTEROL: 43 MG/DL
HEMATOCRIT: 38.7 % (ref 35–45)
HEMOGLOBIN A1C: 6.8 % OF TOTAL HGB
HEMOGLOBIN: 12.6 G/DL (ref 11.7–15.5)
LDL-CHOLESTEROL: 77 MG/DL (CALC)
LYMPHOCYTES: 30.1 %
MCH: 28.8 PG (ref 27–33)
MCV: 88.6 FL (ref 80–100)
MICROALBUMIN: 41.2 MG/DL
MONOCYTES: 5.6 %
MPV: 13.6 FL (ref 7.5–12.5)
NEUTROPHILS: 56.5 %
NON-HDL CHOLESTEROL: 106 MG/DL (CALC)
PLATELET COUNT: 204 THOUSAND/UL (ref 140–400)
POTASSIUM: 5.6 MMOL/L (ref 3.5–5.3)
PROTEIN, TOTAL: 7.4 G/DL (ref 6.1–8.1)
RDW: 13.2 % (ref 11–15)
RED BLOOD CELL COUNT: 4.37 MILLION/UL (ref 3.8–5.1)
SODIUM: 141 MMOL/L (ref 135–146)
T4, FREE: 1.3 NG/DL (ref 0.8–1.8)
TRIGLYCERIDES: 191 MG/DL
TSH W/REFLEX TO FT4: 5.15 MIU/L (ref 0.4–4.5)
WHITE BLOOD CELL COUNT: 6.9 THOUSAND/UL (ref 3.8–10.8)

## 2022-03-25 ENCOUNTER — TELEPHONE (OUTPATIENT)
Dept: FAMILY MEDICINE CLINIC | Facility: CLINIC | Age: 82
End: 2022-03-25

## 2022-03-25 NOTE — TELEPHONE ENCOUNTER
Noemi Grande MD  [facility] Clinical Staff  Potassium and thyroid mildly elevated.   Limit potassium rich foods (banana, avocado, spinach)   Recheck bmp and tsh, free t4 prior to next appt (hyperkalemia, acquired hypothyroid)   Followup as planned

## 2022-03-31 ENCOUNTER — TELEPHONE (OUTPATIENT)
Dept: FAMILY MEDICINE CLINIC | Facility: CLINIC | Age: 82
End: 2022-03-31

## 2022-03-31 RX ORDER — GABAPENTIN 100 MG/1
100 CAPSULE ORAL 3 TIMES DAILY
Qty: 270 CAPSULE | Refills: 0 | Status: SHIPPED | OUTPATIENT
Start: 2022-03-31

## 2022-03-31 RX ORDER — BLOOD SUGAR DIAGNOSTIC
STRIP MISCELLANEOUS
Qty: 200 STRIP | Refills: 1 | Status: SHIPPED | OUTPATIENT
Start: 2022-03-31 | End: 2022-08-25

## 2022-03-31 NOTE — TELEPHONE ENCOUNTER
Patient daughter calling states patient has appt with Derm Dr Hayder Enrique April 11th waiting for referral to be approved     Please fax [phone]

## 2022-04-01 RX ORDER — HYDROCHLOROTHIAZIDE 25 MG/1
TABLET ORAL
Qty: 90 TABLET | Refills: 0 | Status: SHIPPED | OUTPATIENT
Start: 2022-04-01

## 2022-05-13 LAB
BUN/CREATININE RATIO: 28 (CALC) (ref 6–22)
BUN: 34 MG/DL (ref 7–25)
CALCIUM: 9.7 MG/DL (ref 8.6–10.4)
CARBON DIOXIDE: 23 MMOL/L (ref 20–32)
CHLORIDE: 104 MMOL/L (ref 98–110)
CREATININE: 1.22 MG/DL (ref 0.6–0.88)
EGFR IF AFRICN AM: 47 ML/MIN/1.73M2
EGFR IF NONAFRICN AM: 41 ML/MIN/1.73M2
GLUCOSE: 106 MG/DL (ref 65–99)
POTASSIUM: 5 MMOL/L (ref 3.5–5.3)
SODIUM: 137 MMOL/L (ref 135–146)
T4, FREE: 1.3 NG/DL (ref 0.8–1.8)
TSH: 5.98 MIU/L (ref 0.4–4.5)

## 2022-05-18 RX ORDER — GABAPENTIN 100 MG/1
CAPSULE ORAL
Qty: 270 CAPSULE | Refills: 0 | OUTPATIENT
Start: 2022-05-18

## 2022-05-24 ENCOUNTER — OFFICE VISIT (OUTPATIENT)
Dept: FAMILY MEDICINE CLINIC | Facility: CLINIC | Age: 82
End: 2022-05-24
Payer: MEDICARE

## 2022-05-24 VITALS
HEIGHT: 64 IN | OXYGEN SATURATION: 97 % | HEART RATE: 50 BPM | WEIGHT: 155 LBS | TEMPERATURE: 98 F | BODY MASS INDEX: 26.46 KG/M2 | DIASTOLIC BLOOD PRESSURE: 60 MMHG | SYSTOLIC BLOOD PRESSURE: 128 MMHG

## 2022-05-24 DIAGNOSIS — N18.31 STAGE 3A CHRONIC KIDNEY DISEASE (HCC): ICD-10-CM

## 2022-05-24 DIAGNOSIS — N18.31 TYPE 2 DIABETES MELLITUS WITH STAGE 3A CHRONIC KIDNEY DISEASE, WITHOUT LONG-TERM CURRENT USE OF INSULIN (HCC): Primary | ICD-10-CM

## 2022-05-24 DIAGNOSIS — E87.5 HYPERKALEMIA: ICD-10-CM

## 2022-05-24 DIAGNOSIS — I10 ESSENTIAL HYPERTENSION: ICD-10-CM

## 2022-05-24 DIAGNOSIS — R00.1 SYMPTOMATIC BRADYCARDIA: ICD-10-CM

## 2022-05-24 DIAGNOSIS — R42 EPISODIC LIGHTHEADEDNESS: ICD-10-CM

## 2022-05-24 DIAGNOSIS — E03.9 ACQUIRED HYPOTHYROIDISM: ICD-10-CM

## 2022-05-24 DIAGNOSIS — E11.22 TYPE 2 DIABETES MELLITUS WITH STAGE 3A CHRONIC KIDNEY DISEASE, WITHOUT LONG-TERM CURRENT USE OF INSULIN (HCC): Primary | ICD-10-CM

## 2022-05-24 PROCEDURE — 3008F BODY MASS INDEX DOCD: CPT | Performed by: FAMILY MEDICINE

## 2022-05-24 PROCEDURE — 99214 OFFICE O/P EST MOD 30 MIN: CPT | Performed by: FAMILY MEDICINE

## 2022-05-24 PROCEDURE — 3074F SYST BP LT 130 MM HG: CPT | Performed by: FAMILY MEDICINE

## 2022-05-24 PROCEDURE — 3078F DIAST BP <80 MM HG: CPT | Performed by: FAMILY MEDICINE

## 2022-05-24 RX ORDER — AMLODIPINE BESYLATE 5 MG/1
5 TABLET ORAL 2 TIMES DAILY
Qty: 28 TABLET | Refills: 0 | Status: SHIPPED | OUTPATIENT
Start: 2022-05-24

## 2022-05-24 RX ORDER — LEVOTHYROXINE SODIUM 0.05 MG/1
75 TABLET ORAL
Qty: 135 TABLET | Refills: 1 | COMMUNITY
Start: 2022-05-24

## 2022-05-24 RX ORDER — LEVOTHYROXINE SODIUM 0.07 MG/1
75 TABLET ORAL
Qty: 90 TABLET | Refills: 0 | Status: CANCELLED | OUTPATIENT
Start: 2022-05-24

## 2022-05-24 RX ORDER — AMLODIPINE BESYLATE 5 MG/1
5 TABLET ORAL 2 TIMES DAILY
Qty: 180 TABLET | Refills: 0 | Status: SHIPPED | OUTPATIENT
Start: 2022-05-24 | End: 2022-05-24

## 2022-05-30 DIAGNOSIS — E78.2 MIXED HYPERLIPIDEMIA: ICD-10-CM

## 2022-05-30 DIAGNOSIS — E11.22 TYPE 2 DIABETES MELLITUS WITH STAGE 3A CHRONIC KIDNEY DISEASE, WITHOUT LONG-TERM CURRENT USE OF INSULIN (HCC): ICD-10-CM

## 2022-05-30 DIAGNOSIS — N18.31 TYPE 2 DIABETES MELLITUS WITH STAGE 3A CHRONIC KIDNEY DISEASE, WITHOUT LONG-TERM CURRENT USE OF INSULIN (HCC): ICD-10-CM

## 2022-05-31 RX ORDER — SIMVASTATIN 20 MG
TABLET ORAL
Qty: 90 TABLET | Refills: 0 | Status: SHIPPED | OUTPATIENT
Start: 2022-05-31

## 2022-06-23 RX ORDER — AMLODIPINE BESYLATE 5 MG/1
TABLET ORAL
Qty: 180 TABLET | Refills: 0 | Status: SHIPPED | OUTPATIENT
Start: 2022-06-23

## 2022-06-24 RX ORDER — GABAPENTIN 100 MG/1
CAPSULE ORAL
Qty: 270 CAPSULE | Refills: 0 | Status: SHIPPED | OUTPATIENT
Start: 2022-06-24

## 2022-07-25 DIAGNOSIS — E03.9 ACQUIRED HYPOTHYROIDISM: ICD-10-CM

## 2022-07-25 RX ORDER — LEVOTHYROXINE SODIUM 0.05 MG/1
TABLET ORAL
Qty: 90 TABLET | Refills: 0 | OUTPATIENT
Start: 2022-07-25

## 2022-08-03 ENCOUNTER — OFFICE VISIT (OUTPATIENT)
Dept: FAMILY MEDICINE CLINIC | Facility: CLINIC | Age: 82
End: 2022-08-03
Payer: MEDICARE

## 2022-08-03 VITALS
WEIGHT: 151 LBS | BODY MASS INDEX: 25.78 KG/M2 | TEMPERATURE: 98 F | HEIGHT: 64.17 IN | DIASTOLIC BLOOD PRESSURE: 78 MMHG | OXYGEN SATURATION: 95 % | SYSTOLIC BLOOD PRESSURE: 150 MMHG | HEART RATE: 56 BPM

## 2022-08-03 DIAGNOSIS — F43.29 STRESS AND ADJUSTMENT REACTION: ICD-10-CM

## 2022-08-03 DIAGNOSIS — I10 ESSENTIAL HYPERTENSION: Primary | ICD-10-CM

## 2022-08-03 PROCEDURE — 99214 OFFICE O/P EST MOD 30 MIN: CPT | Performed by: FAMILY MEDICINE

## 2022-08-03 PROCEDURE — 3078F DIAST BP <80 MM HG: CPT | Performed by: FAMILY MEDICINE

## 2022-08-03 PROCEDURE — 3008F BODY MASS INDEX DOCD: CPT | Performed by: FAMILY MEDICINE

## 2022-08-03 PROCEDURE — 3077F SYST BP >= 140 MM HG: CPT | Performed by: FAMILY MEDICINE

## 2022-08-03 RX ORDER — ESCITALOPRAM OXALATE 5 MG/1
5 TABLET ORAL DAILY
Qty: 90 TABLET | Refills: 1 | Status: SHIPPED | OUTPATIENT
Start: 2022-08-03

## 2022-08-25 RX ORDER — BLOOD SUGAR DIAGNOSTIC
STRIP MISCELLANEOUS
Qty: 200 STRIP | Refills: 1 | Status: SHIPPED | OUTPATIENT
Start: 2022-08-25

## 2022-10-04 RX ORDER — GABAPENTIN 100 MG/1
CAPSULE ORAL
Qty: 270 CAPSULE | Refills: 0 | Status: SHIPPED | OUTPATIENT
Start: 2022-10-04

## 2022-10-10 ENCOUNTER — TELEPHONE (OUTPATIENT)
Dept: FAMILY MEDICINE CLINIC | Facility: CLINIC | Age: 82
End: 2022-10-10

## 2022-10-10 RX ORDER — ESCITALOPRAM OXALATE 5 MG/1
5 TABLET ORAL DAILY
Qty: 90 TABLET | Refills: 0 | Status: SHIPPED | OUTPATIENT
Start: 2022-10-10

## 2022-10-10 NOTE — TELEPHONE ENCOUNTER
Pt's daughter called requesting to have pt's med Escitalopram refilled through [facility] mail order. Pt had a refill left through [facility] but no longer wants med sent to [facility].      [address] [place] [address] [phone], [phone]

## 2022-10-14 LAB
ABSOLUTE BASOPHILS: 60 CELLS/UL (ref 0–200)
ABSOLUTE EOSINOPHILS: 369 CELLS/UL (ref 15–500)
ABSOLUTE LYMPHOCYTES: 1722 CELLS/UL (ref 850–3900)
ABSOLUTE MONOCYTES: 409 CELLS/UL (ref 200–950)
ABSOLUTE NEUTROPHILS: 4141 CELLS/UL (ref 1500–7800)
BASOPHILS: 0.9 %
EOSINOPHILS: 5.5 %
HEMATOCRIT: 40.2 % (ref 35–45)
HEMOGLOBIN A1C: 6.1 % OF TOTAL HGB
HEMOGLOBIN: 12.7 G/DL (ref 11.7–15.5)
LYMPHOCYTES: 25.7 %
MCH: 28.6 PG (ref 27–33)
MCHC: 31.6 G/DL (ref 32–36)
MCV: 90.5 FL (ref 80–100)
MONOCYTES: 6.1 %
MPV: 13.5 FL (ref 7.5–12.5)
NEUTROPHILS: 61.8 %
PLATELET COUNT: 297 THOUSAND/UL (ref 140–400)
RDW: 13.9 % (ref 11–15)
RED BLOOD CELL COUNT: 4.44 MILLION/UL (ref 3.8–5.1)
T4, FREE: 1.2 NG/DL (ref 0.8–1.8)
TSH: 1.56 MIU/L (ref 0.4–4.5)
WHITE BLOOD CELL COUNT: 6.7 THOUSAND/UL (ref 3.8–10.8)

## 2022-10-24 ENCOUNTER — OFFICE VISIT (OUTPATIENT)
Dept: FAMILY MEDICINE CLINIC | Facility: CLINIC | Age: 82
End: 2022-10-24
Payer: MEDICARE

## 2022-10-24 VITALS
BODY MASS INDEX: 25.3 KG/M2 | DIASTOLIC BLOOD PRESSURE: 62 MMHG | TEMPERATURE: 97 F | WEIGHT: 150 LBS | SYSTOLIC BLOOD PRESSURE: 118 MMHG | OXYGEN SATURATION: 97 % | HEART RATE: 50 BPM | HEIGHT: 64.7 IN

## 2022-10-24 DIAGNOSIS — F32.1 CURRENT MODERATE EPISODE OF MAJOR DEPRESSIVE DISORDER WITHOUT PRIOR EPISODE (HCC): ICD-10-CM

## 2022-10-24 DIAGNOSIS — F43.29 STRESS AND ADJUSTMENT REACTION: ICD-10-CM

## 2022-10-24 DIAGNOSIS — N18.31 STAGE 3A CHRONIC KIDNEY DISEASE (HCC): ICD-10-CM

## 2022-10-24 DIAGNOSIS — I10 ESSENTIAL HYPERTENSION: ICD-10-CM

## 2022-10-24 DIAGNOSIS — E11.22 TYPE 2 DIABETES MELLITUS WITH STAGE 3A CHRONIC KIDNEY DISEASE, WITHOUT LONG-TERM CURRENT USE OF INSULIN (HCC): Primary | ICD-10-CM

## 2022-10-24 DIAGNOSIS — E03.9 ACQUIRED HYPOTHYROIDISM: ICD-10-CM

## 2022-10-24 DIAGNOSIS — N18.31 TYPE 2 DIABETES MELLITUS WITH STAGE 3A CHRONIC KIDNEY DISEASE, WITHOUT LONG-TERM CURRENT USE OF INSULIN (HCC): Primary | ICD-10-CM

## 2022-10-24 PROCEDURE — 3078F DIAST BP <80 MM HG: CPT | Performed by: FAMILY MEDICINE

## 2022-10-24 PROCEDURE — 99214 OFFICE O/P EST MOD 30 MIN: CPT | Performed by: FAMILY MEDICINE

## 2022-10-24 PROCEDURE — 90662 IIV NO PRSV INCREASED AG IM: CPT | Performed by: FAMILY MEDICINE

## 2022-10-24 PROCEDURE — 3008F BODY MASS INDEX DOCD: CPT | Performed by: FAMILY MEDICINE

## 2022-10-24 PROCEDURE — G0008 ADMIN INFLUENZA VIRUS VAC: HCPCS | Performed by: FAMILY MEDICINE

## 2022-10-24 PROCEDURE — 3074F SYST BP LT 130 MM HG: CPT | Performed by: FAMILY MEDICINE

## 2022-10-24 RX ORDER — LEVOTHYROXINE SODIUM 0.07 MG/1
75 TABLET ORAL
Qty: 90 TABLET | Refills: 1 | Status: SHIPPED | OUTPATIENT
Start: 2022-10-24

## 2022-10-26 DIAGNOSIS — E11.22 TYPE 2 DIABETES MELLITUS WITH STAGE 3A CHRONIC KIDNEY DISEASE, WITHOUT LONG-TERM CURRENT USE OF INSULIN (HCC): ICD-10-CM

## 2022-10-26 DIAGNOSIS — E78.2 MIXED HYPERLIPIDEMIA: ICD-10-CM

## 2022-10-26 DIAGNOSIS — N18.31 TYPE 2 DIABETES MELLITUS WITH STAGE 3A CHRONIC KIDNEY DISEASE, WITHOUT LONG-TERM CURRENT USE OF INSULIN (HCC): ICD-10-CM

## 2022-10-26 RX ORDER — SIMVASTATIN 20 MG
TABLET ORAL
Qty: 90 TABLET | Refills: 1 | Status: SHIPPED | OUTPATIENT
Start: 2022-10-26

## 2022-10-27 DIAGNOSIS — E78.2 MIXED HYPERLIPIDEMIA: ICD-10-CM

## 2022-10-27 RX ORDER — SIMVASTATIN 20 MG
TABLET ORAL
Qty: 90 TABLET | Refills: 1 | OUTPATIENT
Start: 2022-10-27

## 2022-11-21 ENCOUNTER — TELEPHONE (OUTPATIENT)
Dept: FAMILY MEDICINE CLINIC | Facility: CLINIC | Age: 82
End: 2022-11-21

## 2022-11-21 DIAGNOSIS — E03.9 ACQUIRED HYPOTHYROIDISM: Primary | ICD-10-CM

## 2022-11-21 RX ORDER — LEVOTHYROXINE SODIUM 0.07 MG/1
75 TABLET ORAL
Qty: 90 TABLET | Refills: 1 | Status: SHIPPED | OUTPATIENT
Start: 2022-11-21

## 2022-11-21 NOTE — TELEPHONE ENCOUNTER
Tp's daughter called states patient   Needs 75 mg of Levothyroxine - she was on 50 mg -but Dr. Robel Christina changed dosage and she has about 10 left.  Tala Anaya takes 7-10 days to ship

## 2022-12-07 RX ORDER — ESCITALOPRAM OXALATE 5 MG/1
TABLET ORAL
Qty: 90 TABLET | Refills: 0 | Status: SHIPPED | OUTPATIENT
Start: 2022-12-07

## 2022-12-13 RX ORDER — GLIMEPIRIDE 4 MG/1
4 TABLET ORAL DAILY
Qty: 90 TABLET | Refills: 0 | Status: SHIPPED | OUTPATIENT
Start: 2022-12-13

## 2022-12-20 RX ORDER — GLIMEPIRIDE 4 MG/1
TABLET ORAL
Qty: 90 TABLET | Refills: 0 | OUTPATIENT
Start: 2022-12-20

## 2023-01-19 ENCOUNTER — TELEPHONE (OUTPATIENT)
Dept: FAMILY MEDICINE CLINIC | Facility: CLINIC | Age: 83
End: 2023-01-19

## 2023-01-19 ENCOUNTER — OFFICE VISIT (OUTPATIENT)
Dept: FAMILY MEDICINE CLINIC | Facility: CLINIC | Age: 83
End: 2023-01-19
Payer: MEDICARE

## 2023-01-19 VITALS
DIASTOLIC BLOOD PRESSURE: 60 MMHG | HEIGHT: 63.39 IN | HEART RATE: 78 BPM | SYSTOLIC BLOOD PRESSURE: 126 MMHG | TEMPERATURE: 98 F | BODY MASS INDEX: 27.82 KG/M2 | WEIGHT: 159 LBS

## 2023-01-19 DIAGNOSIS — I10 ESSENTIAL HYPERTENSION: ICD-10-CM

## 2023-01-19 DIAGNOSIS — F32.1 CURRENT MODERATE EPISODE OF MAJOR DEPRESSIVE DISORDER WITHOUT PRIOR EPISODE (HCC): ICD-10-CM

## 2023-01-19 DIAGNOSIS — M85.88 OSTEOPENIA OF OTHER SITE: ICD-10-CM

## 2023-01-19 DIAGNOSIS — Z66 DNAR (DO NOT ATTEMPT RESUSCITATION): ICD-10-CM

## 2023-01-19 DIAGNOSIS — R60.0 BILATERAL LOWER EXTREMITY EDEMA: ICD-10-CM

## 2023-01-19 DIAGNOSIS — R00.1 SYMPTOMATIC BRADYCARDIA: ICD-10-CM

## 2023-01-19 DIAGNOSIS — E11.22 TYPE 2 DIABETES MELLITUS WITH STAGE 3A CHRONIC KIDNEY DISEASE, WITHOUT LONG-TERM CURRENT USE OF INSULIN (HCC): ICD-10-CM

## 2023-01-19 DIAGNOSIS — E03.9 ACQUIRED HYPOTHYROIDISM: ICD-10-CM

## 2023-01-19 DIAGNOSIS — N18.31 TYPE 2 DIABETES MELLITUS WITH STAGE 3A CHRONIC KIDNEY DISEASE, WITHOUT LONG-TERM CURRENT USE OF INSULIN (HCC): ICD-10-CM

## 2023-01-19 DIAGNOSIS — Z00.00 ENCOUNTER FOR ANNUAL HEALTH EXAMINATION: Primary | ICD-10-CM

## 2023-01-19 DIAGNOSIS — D64.89 ANEMIA DUE TO OTHER CAUSE, NOT CLASSIFIED: ICD-10-CM

## 2023-01-19 DIAGNOSIS — E11.42 DIABETIC POLYNEUROPATHY ASSOCIATED WITH TYPE 2 DIABETES MELLITUS (HCC): ICD-10-CM

## 2023-01-19 DIAGNOSIS — N18.31 STAGE 3A CHRONIC KIDNEY DISEASE (HCC): ICD-10-CM

## 2023-01-19 DIAGNOSIS — E78.2 MIXED HYPERLIPIDEMIA: ICD-10-CM

## 2023-01-19 PROCEDURE — 3074F SYST BP LT 130 MM HG: CPT | Performed by: FAMILY MEDICINE

## 2023-01-19 PROCEDURE — 1125F AMNT PAIN NOTED PAIN PRSNT: CPT | Performed by: FAMILY MEDICINE

## 2023-01-19 PROCEDURE — 3078F DIAST BP <80 MM HG: CPT | Performed by: FAMILY MEDICINE

## 2023-01-19 PROCEDURE — 99397 PER PM REEVAL EST PAT 65+ YR: CPT | Performed by: FAMILY MEDICINE

## 2023-01-19 PROCEDURE — 3008F BODY MASS INDEX DOCD: CPT | Performed by: FAMILY MEDICINE

## 2023-01-19 PROCEDURE — G0439 PPPS, SUBSEQ VISIT: HCPCS | Performed by: FAMILY MEDICINE

## 2023-01-19 PROCEDURE — 93000 ELECTROCARDIOGRAM COMPLETE: CPT | Performed by: FAMILY MEDICINE

## 2023-01-19 PROCEDURE — 96160 PT-FOCUSED HLTH RISK ASSMT: CPT | Performed by: FAMILY MEDICINE

## 2023-01-19 RX ORDER — HYDROCHLOROTHIAZIDE 12.5 MG/1
12.5 TABLET ORAL DAILY
Qty: 90 TABLET | Refills: 0 | Status: SHIPPED | OUTPATIENT
Start: 2023-01-19 | End: 2024-01-14

## 2023-01-19 RX ORDER — AMLODIPINE BESYLATE 5 MG/1
5 TABLET ORAL DAILY
Qty: 90 TABLET | Refills: 1 | Status: SHIPPED | OUTPATIENT
Start: 2023-01-19

## 2023-01-19 NOTE — TELEPHONE ENCOUNTER
Daughter states edema to bilat lower extremities has been increasing over the last month. Legs are very tight. This is making ambulation difficult. I did schedule an appointment for today.

## 2023-01-19 NOTE — PATIENT INSTRUCTIONS
Start amlodipine 5mg once daily    Start hydrochlorothiazide 12.5mg every morning    Go to lab for fasting bloodwork in 2 weeks    Call to schedule ultrasound of heart    Call to schedule appt with Dr. Tasha Lizarraga for followup    Call to schedule bone density    Followup with me in 4-6 wks, sooner if needed      [facility]   Tests on this list are recommended by your physician but may not be covered, or covered at this frequency, by your insurer. Please check with your insurance carrier before scheduling to verify coverage.    PREVENTATIVE SERVICES FREQUENCY &  COVERAGE DETAILS LAST COMPLETION DATE   Diabetes Screening    Fasting Blood Sugar /  Glucose    One screening every 12 months if never tested or if previously tested but not diagnosed with pre-diabetes   One screening every 6 months if diagnosed with pre-diabetes Lab Results   Component Value Date     (H) 05/12/2022        Cardiovascular Disease Screening    Lipid Panel  Cholesterol  Lipoprotein (HDL)  Triglycerides Covered every 5 years for all Medicare beneficiaries without apparent signs or symptoms of cardiovascular disease Lab Results   Component Value Date    CHOLEST 149 03/21/2022    HDL 43 (L) 03/21/2022    LDL 77 03/21/2022    LDLD 72 08/20/2019    TRIG 191 (H) 03/21/2022         Electrocardiogram (EKG)   Covered if needed at Welcome to Medicare, and non-screening if indicated for medical reasons 05/04/2021      Ultrasound Screening for Abdominal Aortic Aneurysm (AAA) Covered once in a lifetime for one of the following risk factors    Men who are 73-68 years old and have ever smoked    Anyone with a family history -     Colorectal Cancer Screening  Covered for ages 52-80; only need ONE of the following:    Colonoscopy   Covered every 10 years    Covered every 2 years if patient is at high risk or previous colonoscopy was abnormal -    No recommendations at this time    Flexible Sigmoidoscopy   Covered every 4 years - Fecal Occult Blood Test Covered annually -   Bone Density Screening    Bone density screening    Covered every 2 years after age 72 if diagnosed with risk of osteoporosis or estrogen deficiency. Covered yearly for long-term glucocorticoid medication use (Steroids) Last Dexa Scan:    XR DEXA BONE DENSITOMETRY (CPT=77080) 12/13/2019      No recommendations at this time   Pap and Pelvic    Pap   Covered every 2 years for women at normal risk; Annually if at high risk -  No recommendations at this time    Chlamydia Annually if high risk -  No recommendations at this time   Screening Mammogram    Mammogram     Recommend annually for all female patients aged 36 and older    One baseline mammogram covered for patients aged 32-38 -    No recommendations at this time    Immunizations    Influenza Covered once per flu season  Please get every year 10/24/2022  No recommendations at this time    Pneumococcal Each vaccine (Koaijsl42 & Xffuystet14) covered once after 72 Prevnar 13: 09/28/2015    Hntuxswda96: 09/17/2020     No recommendations at this time    Hepatitis B One screening covered for patients with certain risk factors   -  No recommendations at this time    Tetanus Toxoid Not covered by Medicare Part B unless medically necessary (cut with metal); may be covered with your pharmacy prescription benefits -    Tetanus, Diptheria and Pertusis TD and TDaP Not covered by Medicare Part B -  No recommendations at this time    Zoster Not covered by Medicare Part B; may be covered with your pharmacy  prescription benefits -  No recommendations at this time       Diabetes      Hemoglobin A1C Annually; if last result is elevated, may be asked to retest more frequently.     Medicare covers every 3 months Lab Results   Component Value Date    A1C 6.1 (H) 10/13/2022       No recommendations at this time    Creat/alb ratio Annually No results found for: MICROALBCREA, MALBCRECALC    LDL Annually Lab Results   Component Value Date LDL 77 03/21/2022       Dilated Eye Exam Annually 01/14/2022       Annual Monitoring of Persistent Medications (ACE/ARB, digoxin diuretics, anticonvulsants)    Potassium Annually Lab Results   Component Value Date    K 5.0 05/12/2022         Creatinine   Annually Lab Results   Component Value Date    CREATSERUM 1.22 (H) 05/12/2022         BUN Annually Lab Results   Component Value Date    BUN 34 (H) 05/12/2022       Drug Serum Conc Annually No results found for: DIGOXIN, DIG, VALP

## 2023-01-19 NOTE — TELEPHONE ENCOUNTER
Pt's daughter called and states that pt's ankles and leg swelling has gotten worse.  Pt is daughter is seeking advice

## 2023-01-25 ENCOUNTER — APPOINTMENT (OUTPATIENT)
Dept: GENERAL RADIOLOGY | Age: 83
End: 2023-01-25
Attending: STUDENT IN AN ORGANIZED HEALTH CARE EDUCATION/TRAINING PROGRAM
Payer: MEDICARE

## 2023-01-25 ENCOUNTER — TELEPHONE (OUTPATIENT)
Dept: FAMILY MEDICINE CLINIC | Facility: CLINIC | Age: 83
End: 2023-01-25

## 2023-01-25 ENCOUNTER — HOSPITAL ENCOUNTER (INPATIENT)
Facility: HOSPITAL | Age: 83
LOS: 10 days | Discharge: NURSING, REHAB OR LONG-TERM CARE | End: 2023-02-04
Attending: STUDENT IN AN ORGANIZED HEALTH CARE EDUCATION/TRAINING PROGRAM | Admitting: STUDENT IN AN ORGANIZED HEALTH CARE EDUCATION/TRAINING PROGRAM
Payer: MEDICARE

## 2023-01-25 ENCOUNTER — HOSPITAL ENCOUNTER (OUTPATIENT)
Dept: CV DIAGNOSTICS | Age: 83
Discharge: HOME | End: 2023-01-25
Attending: FAMILY MEDICINE
Payer: MEDICARE

## 2023-01-25 DIAGNOSIS — R60.0 BILATERAL LOWER EXTREMITY EDEMA: ICD-10-CM

## 2023-01-25 DIAGNOSIS — J81.0 ACUTE PULMONARY EDEMA (HCC): ICD-10-CM

## 2023-01-25 DIAGNOSIS — R00.1 SYMPTOMATIC BRADYCARDIA: ICD-10-CM

## 2023-01-25 DIAGNOSIS — R06.02 SOB (SHORTNESS OF BREATH): Primary | ICD-10-CM

## 2023-01-25 DIAGNOSIS — I50.9 ACUTE ON CHRONIC CONGESTIVE HEART FAILURE, UNSPECIFIED HEART FAILURE TYPE (HCC): Primary | ICD-10-CM

## 2023-01-25 PROBLEM — N18.9 CHRONIC KIDNEY DISEASE: Status: ACTIVE | Noted: 2020-06-02

## 2023-01-25 LAB
ALBUMIN SERPL-MCNC: 3.2 G/DL (ref 3.4–5)
ALBUMIN/GLOB SERPL: 0.7 {RATIO} (ref 1–2)
ALP LIVER SERPL-CCNC: 55 U/L
ALT SERPL-CCNC: 14 U/L
ANION GAP SERPL CALC-SCNC: 10 MMOL/L (ref 0–18)
AST SERPL-CCNC: 12 U/L (ref 15–37)
ATRIAL RATE: 83 BPM
BASOPHILS # BLD AUTO: 0.05 X10(3) UL (ref 0–0.2)
BASOPHILS NFR BLD AUTO: 0.6 %
BILIRUB SERPL-MCNC: 0.4 MG/DL (ref 0.1–2)
BUN BLD-MCNC: 37 MG/DL (ref 7–18)
CALCIUM BLD-MCNC: 9.2 MG/DL (ref 8.5–10.1)
CHLORIDE SERPL-SCNC: 104 MMOL/L (ref 98–112)
CO2 SERPL-SCNC: 23 MMOL/L (ref 21–32)
CREAT BLD-MCNC: 1.45 MG/DL
EOSINOPHIL # BLD AUTO: 0.06 X10(3) UL (ref 0–0.7)
EOSINOPHIL NFR BLD AUTO: 0.7 %
ERYTHROCYTE [DISTWIDTH] IN BLOOD BY AUTOMATED COUNT: 15.1 %
GFR SERPLBLD BASED ON 1.73 SQ M-ARVRAT: 36 ML/MIN/1.73M2 (ref 60–?)
GLOBULIN PLAS-MCNC: 4.3 G/DL (ref 2.8–4.4)
GLUCOSE BLD-MCNC: 141 MG/DL (ref 70–99)
GLUCOSE BLD-MCNC: 144 MG/DL (ref 70–99)
GLUCOSE BLD-MCNC: 170 MG/DL (ref 70–99)
GLUCOSE BLD-MCNC: 61 MG/DL (ref 70–99)
HCT VFR BLD AUTO: 44.5 %
HGB BLD-MCNC: 14.2 G/DL
IMM GRANULOCYTES # BLD AUTO: 0.05 X10(3) UL (ref 0–1)
IMM GRANULOCYTES NFR BLD: 0.6 %
LYMPHOCYTES # BLD AUTO: 1.55 X10(3) UL (ref 1–4)
LYMPHOCYTES NFR BLD AUTO: 17.4 %
MCH RBC QN AUTO: 27.5 PG (ref 26–34)
MCHC RBC AUTO-ENTMCNC: 31.9 G/DL (ref 31–37)
MCV RBC AUTO: 86.2 FL
MONOCYTES # BLD AUTO: 0.67 X10(3) UL (ref 0.1–1)
MONOCYTES NFR BLD AUTO: 7.5 %
NEUTROPHILS # BLD AUTO: 6.55 X10 (3) UL (ref 1.5–7.7)
NEUTROPHILS # BLD AUTO: 6.55 X10(3) UL (ref 1.5–7.7)
NEUTROPHILS NFR BLD AUTO: 73.2 %
NT-PROBNP SERPL-MCNC: ABNORMAL PG/ML (ref ?–450)
OSMOLALITY SERPL CALC.SUM OF ELEC: 295 MOSM/KG (ref 275–295)
P AXIS: 61 DEGREES
P-R INTERVAL: 126 MS
PLATELET # BLD AUTO: 342 10(3)UL (ref 150–450)
POTASSIUM SERPL-SCNC: 5.1 MMOL/L (ref 3.5–5.1)
PROT SERPL-MCNC: 7.5 G/DL (ref 6.4–8.2)
Q-T INTERVAL: 364 MS
QRS DURATION: 74 MS
QTC CALCULATION (BEZET): 427 MS
R AXIS: 259 DEGREES
RBC # BLD AUTO: 5.16 X10(6)UL
SARS-COV-2 RNA RESP QL NAA+PROBE: NOT DETECTED
SODIUM SERPL-SCNC: 137 MMOL/L (ref 136–145)
T AXIS: 32 DEGREES
TROPONIN I HIGH SENSITIVITY: 44 NG/L
VENTRICULAR RATE: 83 BPM
WBC # BLD AUTO: 8.9 X10(3) UL (ref 4–11)

## 2023-01-25 PROCEDURE — 71045 X-RAY EXAM CHEST 1 VIEW: CPT | Performed by: STUDENT IN AN ORGANIZED HEALTH CARE EDUCATION/TRAINING PROGRAM

## 2023-01-25 PROCEDURE — 93306 TTE W/DOPPLER COMPLETE: CPT | Performed by: FAMILY MEDICINE

## 2023-01-25 PROCEDURE — 99223 1ST HOSP IP/OBS HIGH 75: CPT | Performed by: HOSPITALIST

## 2023-01-25 RX ORDER — ATORVASTATIN CALCIUM 10 MG/1
10 TABLET, FILM COATED ORAL NIGHTLY
Status: DISCONTINUED | OUTPATIENT
Start: 2023-01-25 | End: 2023-02-04

## 2023-01-25 RX ORDER — POLYETHYLENE GLYCOL 3350 17 G/17G
17 POWDER, FOR SOLUTION ORAL DAILY PRN
Status: DISCONTINUED | OUTPATIENT
Start: 2023-01-25 | End: 2023-02-02

## 2023-01-25 RX ORDER — NICOTINE POLACRILEX 4 MG
30 LOZENGE BUCCAL
Status: DISCONTINUED | OUTPATIENT
Start: 2023-01-25 | End: 2023-02-04

## 2023-01-25 RX ORDER — ONDANSETRON 2 MG/ML
4 INJECTION INTRAMUSCULAR; INTRAVENOUS EVERY 6 HOURS PRN
Status: DISCONTINUED | OUTPATIENT
Start: 2023-01-25 | End: 2023-02-04

## 2023-01-25 RX ORDER — LEVOTHYROXINE SODIUM 0.07 MG/1
75 TABLET ORAL
Status: DISCONTINUED | OUTPATIENT
Start: 2023-01-26 | End: 2023-02-04

## 2023-01-25 RX ORDER — ATENOLOL 25 MG/1
25 TABLET ORAL DAILY
Status: DISCONTINUED | OUTPATIENT
Start: 2023-01-26 | End: 2023-01-26

## 2023-01-25 RX ORDER — DEXTROSE MONOHYDRATE 25 G/50ML
INJECTION, SOLUTION INTRAVENOUS
Status: COMPLETED
Start: 2023-01-25 | End: 2023-01-25

## 2023-01-25 RX ORDER — ESCITALOPRAM OXALATE 5 MG/1
5 TABLET ORAL DAILY
Status: DISCONTINUED | OUTPATIENT
Start: 2023-01-26 | End: 2023-02-04

## 2023-01-25 RX ORDER — FUROSEMIDE 10 MG/ML
60 INJECTION INTRAMUSCULAR; INTRAVENOUS
Status: DISCONTINUED | OUTPATIENT
Start: 2023-01-25 | End: 2023-01-27

## 2023-01-25 RX ORDER — ACETAMINOPHEN 500 MG
500 TABLET ORAL EVERY 4 HOURS PRN
Status: DISCONTINUED | OUTPATIENT
Start: 2023-01-25 | End: 2023-02-04

## 2023-01-25 RX ORDER — NICOTINE POLACRILEX 4 MG
15 LOZENGE BUCCAL
Status: DISCONTINUED | OUTPATIENT
Start: 2023-01-25 | End: 2023-02-04

## 2023-01-25 RX ORDER — FUROSEMIDE 10 MG/ML
40 INJECTION INTRAMUSCULAR; INTRAVENOUS ONCE
Status: COMPLETED | OUTPATIENT
Start: 2023-01-25 | End: 2023-01-25

## 2023-01-25 RX ORDER — DEXTROSE MONOHYDRATE 25 G/50ML
50 INJECTION, SOLUTION INTRAVENOUS AS NEEDED
Status: DISCONTINUED | OUTPATIENT
Start: 2023-01-25 | End: 2023-02-04

## 2023-01-25 RX ORDER — METOCLOPRAMIDE HYDROCHLORIDE 5 MG/ML
5 INJECTION INTRAMUSCULAR; INTRAVENOUS EVERY 8 HOURS PRN
Status: DISCONTINUED | OUTPATIENT
Start: 2023-01-25 | End: 2023-02-04

## 2023-01-25 RX ORDER — BISACODYL 10 MG
10 SUPPOSITORY, RECTAL RECTAL
Status: DISCONTINUED | OUTPATIENT
Start: 2023-01-25 | End: 2023-02-04

## 2023-01-25 RX ORDER — GABAPENTIN 100 MG/1
100 CAPSULE ORAL 3 TIMES DAILY
Status: DISCONTINUED | OUTPATIENT
Start: 2023-01-25 | End: 2023-02-04

## 2023-01-25 RX ORDER — SENNOSIDES 8.6 MG
17.2 TABLET ORAL NIGHTLY PRN
Status: DISCONTINUED | OUTPATIENT
Start: 2023-01-25 | End: 2023-02-02

## 2023-01-25 RX ORDER — HEPARIN SODIUM 5000 [USP'U]/ML
5000 INJECTION, SOLUTION INTRAVENOUS; SUBCUTANEOUS EVERY 8 HOURS SCHEDULED
Status: DISCONTINUED | OUTPATIENT
Start: 2023-01-25 | End: 2023-01-28

## 2023-01-25 RX ORDER — DEXTROSE MONOHYDRATE 25 G/50ML
50 INJECTION, SOLUTION INTRAVENOUS
Status: DISCONTINUED | OUTPATIENT
Start: 2023-01-25 | End: 2023-02-04

## 2023-01-25 RX ORDER — AMLODIPINE BESYLATE 5 MG/1
5 TABLET ORAL DAILY
Status: DISCONTINUED | OUTPATIENT
Start: 2023-01-26 | End: 2023-01-26

## 2023-01-25 RX ORDER — HYDROCHLOROTHIAZIDE 12.5 MG/1
12.5 TABLET ORAL DAILY
Status: DISCONTINUED | OUTPATIENT
Start: 2023-01-26 | End: 2023-01-27

## 2023-01-25 RX ORDER — MELATONIN
3 NIGHTLY PRN
Status: DISCONTINUED | OUTPATIENT
Start: 2023-01-25 | End: 2023-02-04

## 2023-01-25 NOTE — TELEPHONE ENCOUNTER
Roddy Ta from [facility] calling just completed Echo Heart patient is c/o SOB for the last 2 days     Also did notice possible fluid on lung     Wants to know if Dr Avis Kim is wanting to do further testing while she is at the hospital

## 2023-01-25 NOTE — TELEPHONE ENCOUNTER
CXR order placed by Joaquina Walton informed. Per Jamey Cogan patient is short of breath, frail, lives alone, asking if she should be evaluated at ER. Also spoke to patient's daughter Joel Heard, stated patient's legs are swollen and she is short of breath. Concerned because she lives alone, agrees that she should go to the ER. Instructed to proceed to ER for evaluation. Verbalized understanding.

## 2023-01-25 NOTE — ED QUICK NOTES
Orders for admission, patient is aware of plan and ready to go upstairs. Any questions, please call ED RN ronen  at extension [phone]     Vaccinated? yes  Type of COVID test sent:rapid  COVID Suspicion level: negative      Titratable drug(s) infusing:n/a  Rate:  n/a  LOC at time of transport:aaox4    Other pertinent information:on bipap, large pleural effusions.      CIWA score=  NIH score=

## 2023-01-26 LAB
ANION GAP SERPL CALC-SCNC: 6 MMOL/L (ref 0–18)
BUN BLD-MCNC: 34 MG/DL (ref 7–18)
CALCIUM BLD-MCNC: 8.7 MG/DL (ref 8.5–10.1)
CHLORIDE SERPL-SCNC: 105 MMOL/L (ref 98–112)
CO2 SERPL-SCNC: 28 MMOL/L (ref 21–32)
CREAT BLD-MCNC: 1.32 MG/DL
EST. AVERAGE GLUCOSE BLD GHB EST-MCNC: 134 MG/DL (ref 68–126)
GFR SERPLBLD BASED ON 1.73 SQ M-ARVRAT: 40 ML/MIN/1.73M2 (ref 60–?)
GLUCOSE BLD-MCNC: 135 MG/DL (ref 70–99)
GLUCOSE BLD-MCNC: 138 MG/DL (ref 70–99)
GLUCOSE BLD-MCNC: 152 MG/DL (ref 70–99)
GLUCOSE BLD-MCNC: 152 MG/DL (ref 70–99)
GLUCOSE BLD-MCNC: 244 MG/DL (ref 70–99)
GLUCOSE BLD-MCNC: 264 MG/DL (ref 70–99)
GLUCOSE BLD-MCNC: 63 MG/DL (ref 70–99)
GLUCOSE BLD-MCNC: 88 MG/DL (ref 70–99)
GLUCOSE BLD-MCNC: 97 MG/DL (ref 70–99)
GLUCOSE BLD-MCNC: 98 MG/DL (ref 70–99)
HBA1C MFR BLD: 6.3 % (ref ?–5.7)
OSMOLALITY SERPL CALC.SUM OF ELEC: 298 MOSM/KG (ref 275–295)
POTASSIUM SERPL-SCNC: 4.2 MMOL/L (ref 3.5–5.1)
SODIUM SERPL-SCNC: 139 MMOL/L (ref 136–145)

## 2023-01-26 PROCEDURE — 99232 SBSQ HOSP IP/OBS MODERATE 35: CPT | Performed by: INTERNAL MEDICINE

## 2023-01-26 RX ORDER — ATENOLOL 25 MG/1
50 TABLET ORAL DAILY
Status: DISCONTINUED | OUTPATIENT
Start: 2023-01-27 | End: 2023-02-04

## 2023-01-26 RX ORDER — LISINOPRIL 2.5 MG/1
2.5 TABLET ORAL DAILY
Status: DISCONTINUED | OUTPATIENT
Start: 2023-01-26 | End: 2023-02-03

## 2023-01-26 NOTE — PLAN OF CARE
NURSING ADMISSION NOTE      Patient admitted via cart. Oriented to room. Safety precautions initiated. Bed in low position. Call light in reach. Admitted an 78 yrs old female from [facility] ER alert and oriented x4 with diagnosis of CHF. Placed on bed comfortably and on cardiac monitor hr 60's sinus rhythm. On bipap in used. Denies any pain. C/o feeling hungry upon arrival to floor and accucheck done and 61. d50 iv given as ordered and dr. Carol Maher notified. Plan of care given and verbalized understanding. All needs attended and will continue to monitor. Dr. Olamide Hoyos notified of accucheck result 80 @ 01:00 inspite patient ate and d50iv given during admission.

## 2023-01-26 NOTE — ED QUICK NOTES
Assumed care of PT from [address] [place] [address], [facility].

## 2023-01-26 NOTE — PLAN OF CARE
Received patient at 0730. Alert and Oriented x4. Tele Rhythm NSR. Pt on 4L per nasal cannula (baseline RA). Breath sounds clear/diminished. Bed is locked and in low position. Call light and personal items within reach. No C/O chest pain. Pt voiding with purewick in place. Pt ambulates with stand by assist. Skin dry and intact. IV lasix. Spoke to MD about blood sugar and orders to hold levemir. POLST form explained to pt and family. Will continue education when daughter is present. Reviewed plan of care and patient verbalizes understanding. Plan:  diurese  Wean O2    Weaned to 3L    Problem: CARDIOVASCULAR - ADULT  Goal: Maintains optimal cardiac output and hemodynamic stability  Description: INTERVENTIONS:  - Monitor vital signs, rhythm, and trends  - Monitor for bleeding, hypotension and signs of decreased cardiac output  - Evaluate effectiveness of vasoactive medications to optimize hemodynamic stability  - Monitor arterial and/or venous puncture sites for bleeding and/or hematoma  - Assess quality of pulses, skin color and temperature  - Assess for signs of decreased coronary artery perfusion - ex.  Angina  - Evaluate fluid balance, assess for edema, trend weights  Outcome: Progressing  Goal: Absence of cardiac arrhythmias or at baseline  Description: INTERVENTIONS:  - Continuous cardiac monitoring, monitor vital signs, obtain 12 lead EKG if indicated  - Evaluate effectiveness of antiarrhythmic and heart rate control medications as ordered  - Initiate emergency measures for life threatening arrhythmias  - Monitor electrolytes and administer replacement therapy as ordered  Outcome: Progressing     Problem: RESPIRATORY - ADULT  Goal: Achieves optimal ventilation and oxygenation  Description: INTERVENTIONS:  - Assess for changes in respiratory status  - Assess for changes in mentation and behavior  - Position to facilitate oxygenation and minimize respiratory effort  - Oxygen supplementation based on oxygen saturation or ABGs  - Provide Smoking Cessation handout, if applicable  - Encourage broncho-pulmonary hygiene including cough, deep breathe, Incentive Spirometry  - Assess the need for suctioning and perform as needed  - Assess and instruct to report SOB or any respiratory difficulty  - Respiratory Therapy support as indicated  - Manage/alleviate anxiety  - Monitor for signs/symptoms of CO2 retention  Outcome: Progressing     Problem: METABOLIC/FLUID AND ELECTROLYTES - ADULT  Goal: Glucose maintained within prescribed range  Description: INTERVENTIONS:  - Monitor Blood Glucose as ordered  - Assess for signs and symptoms of hyperglycemia and hypoglycemia  - Administer ordered medications to maintain glucose within target range  - Assess barriers to adequate nutritional intake and initiate nutrition consult as needed  - Instruct patient on self management of diabetes  Outcome: Progressing

## 2023-01-27 ENCOUNTER — APPOINTMENT (OUTPATIENT)
Dept: GENERAL RADIOLOGY | Facility: HOSPITAL | Age: 83
End: 2023-01-27
Attending: INTERNAL MEDICINE
Payer: MEDICARE

## 2023-01-27 LAB
ALBUMIN SERPL-MCNC: 2.1 G/DL (ref 3.4–5)
ALBUMIN/GLOB SERPL: 0.6 {RATIO} (ref 1–2)
ALP LIVER SERPL-CCNC: 41 U/L
ALT SERPL-CCNC: 7 U/L
ANION GAP SERPL CALC-SCNC: 6 MMOL/L (ref 0–18)
AST SERPL-CCNC: 8 U/L (ref 15–37)
BILIRUB SERPL-MCNC: 0.4 MG/DL (ref 0.1–2)
BILIRUB UR QL STRIP.AUTO: NEGATIVE
BUN BLD-MCNC: 37 MG/DL (ref 7–18)
CALCIUM BLD-MCNC: 8.4 MG/DL (ref 8.5–10.1)
CHLORIDE SERPL-SCNC: 102 MMOL/L (ref 98–112)
CLARITY UR REFRACT.AUTO: CLEAR
CO2 SERPL-SCNC: 30 MMOL/L (ref 21–32)
COLOR UR AUTO: YELLOW
CREAT BLD-MCNC: 1.47 MG/DL
ERYTHROCYTE [DISTWIDTH] IN BLOOD BY AUTOMATED COUNT: 14.8 %
GFR SERPLBLD BASED ON 1.73 SQ M-ARVRAT: 35 ML/MIN/1.73M2 (ref 60–?)
GLOBULIN PLAS-MCNC: 3.3 G/DL (ref 2.8–4.4)
GLUCOSE BLD-MCNC: 129 MG/DL (ref 70–99)
GLUCOSE BLD-MCNC: 133 MG/DL (ref 70–99)
GLUCOSE BLD-MCNC: 198 MG/DL (ref 70–99)
GLUCOSE BLD-MCNC: 253 MG/DL (ref 70–99)
GLUCOSE UR STRIP.AUTO-MCNC: NEGATIVE MG/DL
HCT VFR BLD AUTO: 38 %
HGB BLD-MCNC: 11.8 G/DL
KETONES UR STRIP.AUTO-MCNC: NEGATIVE MG/DL
L PNEUMO AG UR QL: NEGATIVE
LEUKOCYTE ESTERASE UR QL STRIP.AUTO: NEGATIVE
MAGNESIUM SERPL-MCNC: 1.7 MG/DL (ref 1.6–2.6)
MCH RBC QN AUTO: 27.3 PG (ref 26–34)
MCHC RBC AUTO-ENTMCNC: 31.1 G/DL (ref 31–37)
MCV RBC AUTO: 88 FL
NITRITE UR QL STRIP.AUTO: POSITIVE
OSMOLALITY SERPL CALC.SUM OF ELEC: 297 MOSM/KG (ref 275–295)
PH UR STRIP.AUTO: 6.5 [PH] (ref 5–8)
PLATELET # BLD AUTO: 216 10(3)UL (ref 150–450)
POTASSIUM SERPL-SCNC: 4.1 MMOL/L (ref 3.5–5.1)
PROCALCITONIN SERPL-MCNC: 0.17 NG/ML (ref ?–0.16)
PROT SERPL-MCNC: 5.4 G/DL (ref 6.4–8.2)
RBC # BLD AUTO: 4.32 X10(6)UL
RBC UR QL AUTO: NEGATIVE
SODIUM SERPL-SCNC: 138 MMOL/L (ref 136–145)
SP GR UR STRIP.AUTO: 1.01 (ref 1–1.03)
STREP PNEUMO ANTIGEN, URINE: NEGATIVE
UROBILINOGEN UR STRIP.AUTO-MCNC: 0.2 MG/DL
WBC # BLD AUTO: 8.3 X10(3) UL (ref 4–11)

## 2023-01-27 PROCEDURE — 99291 CRITICAL CARE FIRST HOUR: CPT | Performed by: INTERNAL MEDICINE

## 2023-01-27 PROCEDURE — 71045 X-RAY EXAM CHEST 1 VIEW: CPT | Performed by: INTERNAL MEDICINE

## 2023-01-27 RX ORDER — FUROSEMIDE 10 MG/ML
80 INJECTION INTRAMUSCULAR; INTRAVENOUS ONCE
Status: COMPLETED | OUTPATIENT
Start: 2023-01-27 | End: 2023-01-27

## 2023-01-27 RX ORDER — MAGNESIUM OXIDE 400 MG/1
400 TABLET ORAL ONCE
Status: COMPLETED | OUTPATIENT
Start: 2023-01-27 | End: 2023-01-27

## 2023-01-27 RX ORDER — FUROSEMIDE 10 MG/ML
40 INJECTION INTRAMUSCULAR; INTRAVENOUS ONCE
Status: DISCONTINUED | OUTPATIENT
Start: 2023-01-27 | End: 2023-01-27

## 2023-01-27 RX ORDER — FUROSEMIDE 10 MG/ML
40 INJECTION INTRAMUSCULAR; INTRAVENOUS
Status: DISCONTINUED | OUTPATIENT
Start: 2023-01-27 | End: 2023-01-27

## 2023-01-27 RX ORDER — FUROSEMIDE 10 MG/ML
40 INJECTION INTRAMUSCULAR; INTRAVENOUS
Status: DISCONTINUED | OUTPATIENT
Start: 2023-01-27 | End: 2023-01-28

## 2023-01-27 RX ORDER — FUROSEMIDE 20 MG/1
20 TABLET ORAL DAILY
Status: DISCONTINUED | OUTPATIENT
Start: 2023-01-27 | End: 2023-01-27

## 2023-01-27 NOTE — PLAN OF CARE
Alert and oriented x4 on tele monitor hr 80's sinus rhythm. Denies any pain. O2 at 3l nc in used. Updated w/ poc and verbalized understanding. All needs attended and will continue to monitor. Call light within reach. Problem: CARDIOVASCULAR - ADULT  Goal: Maintains optimal cardiac output and hemodynamic stability  Description: INTERVENTIONS:  - Monitor vital signs, rhythm, and trends  - Monitor for bleeding, hypotension and signs of decreased cardiac output  - Evaluate effectiveness of vasoactive medications to optimize hemodynamic stability  - Monitor arterial and/or venous puncture sites for bleeding and/or hematoma  - Assess quality of pulses, skin color and temperature  - Assess for signs of decreased coronary artery perfusion - ex.  Angina  - Evaluate fluid balance, assess for edema, trend weights  Outcome: Progressing  Goal: Absence of cardiac arrhythmias or at baseline  Description: INTERVENTIONS:  - Continuous cardiac monitoring, monitor vital signs, obtain 12 lead EKG if indicated  - Evaluate effectiveness of antiarrhythmic and heart rate control medications as ordered  - Initiate emergency measures for life threatening arrhythmias  - Monitor electrolytes and administer replacement therapy as ordered  Outcome: Progressing     Problem: RESPIRATORY - ADULT  Goal: Achieves optimal ventilation and oxygenation  Description: INTERVENTIONS:  - Assess for changes in respiratory status  - Assess for changes in mentation and behavior  - Position to facilitate oxygenation and minimize respiratory effort  - Oxygen supplementation based on oxygen saturation or ABGs  - Provide Smoking Cessation handout, if applicable  - Encourage broncho-pulmonary hygiene including cough, deep breathe, Incentive Spirometry  - Assess the need for suctioning and perform as needed  - Assess and instruct to report SOB or any respiratory difficulty  - Respiratory Therapy support as indicated  - Manage/alleviate anxiety  - Monitor for signs/symptoms of CO2 retention  Outcome: Progressing     Problem: METABOLIC/FLUID AND ELECTROLYTES - ADULT  Goal: Glucose maintained within prescribed range  Description: INTERVENTIONS:  - Monitor Blood Glucose as ordered  - Assess for signs and symptoms of hyperglycemia and hypoglycemia  - Administer ordered medications to maintain glucose within target range  - Assess barriers to adequate nutritional intake and initiate nutrition consult as needed  - Instruct patient on self management of diabetes  Outcome: Progressing

## 2023-01-27 NOTE — PLAN OF CARE
Received patient at 0730. Alert and Oriented x4. Tele Rhythm NSR. Pt on 2L per nasal cannula. Breath sounds diminished and crackles present. Bed is locked and in low position. Call light and personal items within reach. No C/O chest pain or shortness of breath. Pt voiding with purewick in. Pt standby assist and up in chair. Skin dry and intact. Pt on IV lasix, may change to PO. Chest x ray improved so no thoracentesis. Edema present in lower extremities. Reviewed plan of care and patient verbalizes understanding. Plan:  Continue diuresing  Wean O2    O2 weaned to 2L      Pt had sudden increase in oxygen needs. Pt is now on 10L high flow nasal cannula. Respiratory notified. Pt placed back on Bipap. Pulm notified    Bladder scanned and retaining >800, Straight cath performed and 700ml out. Pt UA came back, IV rocephin started and zithromax. Problem: CARDIOVASCULAR - ADULT  Goal: Maintains optimal cardiac output and hemodynamic stability  Description: INTERVENTIONS:  - Monitor vital signs, rhythm, and trends  - Monitor for bleeding, hypotension and signs of decreased cardiac output  - Evaluate effectiveness of vasoactive medications to optimize hemodynamic stability  - Monitor arterial and/or venous puncture sites for bleeding and/or hematoma  - Assess quality of pulses, skin color and temperature  - Assess for signs of decreased coronary artery perfusion - ex.  Angina  - Evaluate fluid balance, assess for edema, trend weights  Outcome: Progressing  Goal: Absence of cardiac arrhythmias or at baseline  Description: INTERVENTIONS:  - Continuous cardiac monitoring, monitor vital signs, obtain 12 lead EKG if indicated  - Evaluate effectiveness of antiarrhythmic and heart rate control medications as ordered  - Initiate emergency measures for life threatening arrhythmias  - Monitor electrolytes and administer replacement therapy as ordered  Outcome: Progressing     Problem: RESPIRATORY - ADULT  Goal: Achieves optimal ventilation and oxygenation  Description: INTERVENTIONS:  - Assess for changes in respiratory status  - Assess for changes in mentation and behavior  - Position to facilitate oxygenation and minimize respiratory effort  - Oxygen supplementation based on oxygen saturation or ABGs  - Provide Smoking Cessation handout, if applicable  - Encourage broncho-pulmonary hygiene including cough, deep breathe, Incentive Spirometry  - Assess the need for suctioning and perform as needed  - Assess and instruct to report SOB or any respiratory difficulty  - Respiratory Therapy support as indicated  - Manage/alleviate anxiety  - Monitor for signs/symptoms of CO2 retention  Outcome: Progressing     Problem: METABOLIC/FLUID AND ELECTROLYTES - ADULT  Goal: Glucose maintained within prescribed range  Description: INTERVENTIONS:  - Monitor Blood Glucose as ordered  - Assess for signs and symptoms of hyperglycemia and hypoglycemia  - Administer ordered medications to maintain glucose within target range  - Assess barriers to adequate nutritional intake and initiate nutrition consult as needed  - Instruct patient on self management of diabetes  Outcome: Progressing

## 2023-01-28 ENCOUNTER — APPOINTMENT (OUTPATIENT)
Dept: CT IMAGING | Facility: HOSPITAL | Age: 83
End: 2023-01-28
Attending: NURSE PRACTITIONER
Payer: MEDICARE

## 2023-01-28 ENCOUNTER — APPOINTMENT (OUTPATIENT)
Dept: ULTRASOUND IMAGING | Facility: HOSPITAL | Age: 83
End: 2023-01-28
Attending: NURSE PRACTITIONER
Payer: MEDICARE

## 2023-01-28 LAB
ANION GAP SERPL CALC-SCNC: 4 MMOL/L (ref 0–18)
ANION GAP SERPL CALC-SCNC: 6 MMOL/L (ref 0–18)
APTT PPP: 115.9 SECONDS (ref 23.3–35.6)
APTT PPP: 32.5 SECONDS (ref 23.3–35.6)
ATRIAL RATE: 84 BPM
BUN BLD-MCNC: 41 MG/DL (ref 7–18)
BUN BLD-MCNC: 42 MG/DL (ref 7–18)
CALCIUM BLD-MCNC: 8.9 MG/DL (ref 8.5–10.1)
CALCIUM BLD-MCNC: 9 MG/DL (ref 8.5–10.1)
CHLORIDE SERPL-SCNC: 100 MMOL/L (ref 98–112)
CHLORIDE SERPL-SCNC: 99 MMOL/L (ref 98–112)
CO2 SERPL-SCNC: 30 MMOL/L (ref 21–32)
CO2 SERPL-SCNC: 32 MMOL/L (ref 21–32)
CREAT BLD-MCNC: 1.3 MG/DL
CREAT BLD-MCNC: 1.42 MG/DL
ERYTHROCYTE [DISTWIDTH] IN BLOOD BY AUTOMATED COUNT: 14.7 %
GFR SERPLBLD BASED ON 1.73 SQ M-ARVRAT: 36 ML/MIN/1.73M2 (ref 60–?)
GFR SERPLBLD BASED ON 1.73 SQ M-ARVRAT: 41 ML/MIN/1.73M2 (ref 60–?)
GLUCOSE BLD-MCNC: 174 MG/DL (ref 70–99)
GLUCOSE BLD-MCNC: 189 MG/DL (ref 70–99)
GLUCOSE BLD-MCNC: 209 MG/DL (ref 70–99)
GLUCOSE BLD-MCNC: 251 MG/DL (ref 70–99)
GLUCOSE BLD-MCNC: 275 MG/DL (ref 70–99)
GLUCOSE BLD-MCNC: 321 MG/DL (ref 70–99)
HCT VFR BLD AUTO: 42.1 %
HGB BLD-MCNC: 13.3 G/DL
MAGNESIUM SERPL-MCNC: 1.9 MG/DL (ref 1.6–2.6)
MCH RBC QN AUTO: 27.3 PG (ref 26–34)
MCHC RBC AUTO-ENTMCNC: 31.6 G/DL (ref 31–37)
MCV RBC AUTO: 86.4 FL
OSMOLALITY SERPL CALC.SUM OF ELEC: 297 MOSM/KG (ref 275–295)
OSMOLALITY SERPL CALC.SUM OF ELEC: 303 MOSM/KG (ref 275–295)
P AXIS: 48 DEGREES
P-R INTERVAL: 146 MS
PLATELET # BLD AUTO: 203 10(3)UL (ref 150–450)
PLATELET # BLD AUTO: 216 10(3)UL (ref 150–450)
POTASSIUM SERPL-SCNC: 3.8 MMOL/L (ref 3.5–5.1)
POTASSIUM SERPL-SCNC: 4.1 MMOL/L (ref 3.5–5.1)
Q-T INTERVAL: 362 MS
QRS DURATION: 70 MS
QTC CALCULATION (BEZET): 427 MS
R AXIS: -50 DEGREES
RBC # BLD AUTO: 4.87 X10(6)UL
SODIUM SERPL-SCNC: 135 MMOL/L (ref 136–145)
SODIUM SERPL-SCNC: 136 MMOL/L (ref 136–145)
T AXIS: 0 DEGREES
TROPONIN I HIGH SENSITIVITY: 42 NG/L
VENTRICULAR RATE: 84 BPM
WBC # BLD AUTO: 10.4 X10(3) UL (ref 4–11)

## 2023-01-28 PROCEDURE — 99233 SBSQ HOSP IP/OBS HIGH 50: CPT | Performed by: INTERNAL MEDICINE

## 2023-01-28 PROCEDURE — 71260 CT THORAX DX C+: CPT | Performed by: NURSE PRACTITIONER

## 2023-01-28 PROCEDURE — 93970 EXTREMITY STUDY: CPT | Performed by: NURSE PRACTITIONER

## 2023-01-28 RX ORDER — HEPARIN SODIUM 1000 [USP'U]/ML
80 INJECTION, SOLUTION INTRAVENOUS; SUBCUTANEOUS ONCE
Status: COMPLETED | OUTPATIENT
Start: 2023-01-28 | End: 2023-01-28

## 2023-01-28 RX ORDER — FUROSEMIDE 10 MG/ML
40 INJECTION INTRAMUSCULAR; INTRAVENOUS
Status: DISCONTINUED | OUTPATIENT
Start: 2023-01-28 | End: 2023-01-28

## 2023-01-28 RX ORDER — HEPARIN SODIUM AND DEXTROSE 10000; 5 [USP'U]/100ML; G/100ML
18 INJECTION INTRAVENOUS ONCE
Status: COMPLETED | OUTPATIENT
Start: 2023-01-28 | End: 2023-01-28

## 2023-01-28 RX ORDER — BUMETANIDE 0.25 MG/ML
2 INJECTION, SOLUTION INTRAMUSCULAR; INTRAVENOUS
Status: DISCONTINUED | OUTPATIENT
Start: 2023-01-28 | End: 2023-01-28

## 2023-01-28 RX ORDER — HEPARIN SODIUM AND DEXTROSE 10000; 5 [USP'U]/100ML; G/100ML
INJECTION INTRAVENOUS CONTINUOUS
Status: DISPENSED | OUTPATIENT
Start: 2023-01-28 | End: 2023-02-01

## 2023-01-28 RX ORDER — POTASSIUM CHLORIDE 20 MEQ/1
40 TABLET, EXTENDED RELEASE ORAL ONCE
Status: COMPLETED | OUTPATIENT
Start: 2023-01-28 | End: 2023-01-28

## 2023-01-28 NOTE — PLAN OF CARE
Assumed care of patient @ 0730. Patient is A&O x4, up standby assist with walker x1. No complaints of pain. Patient is normal sinus rhythm with PVCs. Began shift on 15L HFNC. Weaned to 7L. SpO2 95%. Heparin gtt started per PE/DVT protocol. APTT redraw @ 1830. No chest pain, dyspnea on exertion noted. Patient is continent of bladder and bowel. Last BM 1/25. Fall precautions in place, bed and chair alarm on. Patient and family updated on plan of care. Problem: CARDIOVASCULAR - ADULT  Goal: Maintains optimal cardiac output and hemodynamic stability  Description: INTERVENTIONS:  - Monitor vital signs, rhythm, and trends  - Monitor for bleeding, hypotension and signs of decreased cardiac output  - Evaluate effectiveness of vasoactive medications to optimize hemodynamic stability  - Monitor arterial and/or venous puncture sites for bleeding and/or hematoma  - Assess quality of pulses, skin color and temperature  - Assess for signs of decreased coronary artery perfusion - ex.  Angina  - Evaluate fluid balance, assess for edema, trend weights  Outcome: Progressing  Goal: Absence of cardiac arrhythmias or at baseline  Description: INTERVENTIONS:  - Continuous cardiac monitoring, monitor vital signs, obtain 12 lead EKG if indicated  - Evaluate effectiveness of antiarrhythmic and heart rate control medications as ordered  - Initiate emergency measures for life threatening arrhythmias  - Monitor electrolytes and administer replacement therapy as ordered  Outcome: Progressing

## 2023-01-29 LAB
ALBUMIN SERPL-MCNC: 2.1 G/DL (ref 3.4–5)
ANION GAP SERPL CALC-SCNC: 4 MMOL/L (ref 0–18)
APTT PPP: 73.8 SECONDS (ref 23.3–35.6)
BUN BLD-MCNC: 41 MG/DL (ref 7–18)
CALCIUM BLD-MCNC: 8.7 MG/DL (ref 8.5–10.1)
CHLORIDE SERPL-SCNC: 100 MMOL/L (ref 98–112)
CO2 SERPL-SCNC: 34 MMOL/L (ref 21–32)
CREAT BLD-MCNC: 1.39 MG/DL
ERYTHROCYTE [DISTWIDTH] IN BLOOD BY AUTOMATED COUNT: 14.7 %
GFR SERPLBLD BASED ON 1.73 SQ M-ARVRAT: 37 ML/MIN/1.73M2 (ref 60–?)
GLUCOSE BLD-MCNC: 169 MG/DL (ref 70–99)
GLUCOSE BLD-MCNC: 176 MG/DL (ref 70–99)
GLUCOSE BLD-MCNC: 181 MG/DL (ref 70–99)
GLUCOSE BLD-MCNC: 228 MG/DL (ref 70–99)
GLUCOSE BLD-MCNC: 314 MG/DL (ref 70–99)
HCT VFR BLD AUTO: 39.7 %
HGB BLD-MCNC: 12.4 G/DL
MCH RBC QN AUTO: 27.4 PG (ref 26–34)
MCHC RBC AUTO-ENTMCNC: 31.2 G/DL (ref 31–37)
MCV RBC AUTO: 87.8 FL
OSMOLALITY SERPL CALC.SUM OF ELEC: 301 MOSM/KG (ref 275–295)
PHOSPHATE SERPL-MCNC: 3.5 MG/DL (ref 2.5–4.9)
PLATELET # BLD AUTO: 186 10(3)UL (ref 150–450)
PLATELET # BLD AUTO: 186 10(3)UL (ref 150–450)
POTASSIUM SERPL-SCNC: 4.8 MMOL/L (ref 3.5–5.1)
POTASSIUM SERPL-SCNC: 4.8 MMOL/L (ref 3.5–5.1)
RBC # BLD AUTO: 4.52 X10(6)UL
SODIUM SERPL-SCNC: 138 MMOL/L (ref 136–145)
WBC # BLD AUTO: 10 X10(3) UL (ref 4–11)

## 2023-01-29 PROCEDURE — 99232 SBSQ HOSP IP/OBS MODERATE 35: CPT | Performed by: INTERNAL MEDICINE

## 2023-01-29 RX ORDER — COLCHICINE 0.6 MG/1
0.6 TABLET ORAL DAILY
Status: DISCONTINUED | OUTPATIENT
Start: 2023-01-29 | End: 2023-02-04

## 2023-01-29 NOTE — PLAN OF CARE
Received pt at 0730. AxOx4. 7L HF O2 w stats maintaining >90%, kept . Denies pain/SOB. Vitals stable. NSR on tele. Heparin gtt infusing per PE/DVT protocol. Please refer to flowsheets for further assessments. Plan to continue heparin gtt, wean O2 as able, IV abx. Family at bedside. Plan of care updated with pt and family, questions answered, verbalized understanding. Safety precautions in place. Instructed to call staff for help. Will continue to monitor. Problem: CARDIOVASCULAR - ADULT  Goal: Maintains optimal cardiac output and hemodynamic stability  Description: INTERVENTIONS:  - Monitor vital signs, rhythm, and trends  - Monitor for bleeding, hypotension and signs of decreased cardiac output  - Evaluate effectiveness of vasoactive medications to optimize hemodynamic stability  - Monitor arterial and/or venous puncture sites for bleeding and/or hematoma  - Assess quality of pulses, skin color and temperature  - Assess for signs of decreased coronary artery perfusion - ex.  Angina  - Evaluate fluid balance, assess for edema, trend weights  Outcome: Progressing  Goal: Absence of cardiac arrhythmias or at baseline  Description: INTERVENTIONS:  - Continuous cardiac monitoring, monitor vital signs, obtain 12 lead EKG if indicated  - Evaluate effectiveness of antiarrhythmic and heart rate control medications as ordered  - Initiate emergency measures for life threatening arrhythmias  - Monitor electrolytes and administer replacement therapy as ordered  Outcome: Progressing     Problem: RESPIRATORY - ADULT  Goal: Achieves optimal ventilation and oxygenation  Description: INTERVENTIONS:  - Assess for changes in respiratory status  - Assess for changes in mentation and behavior  - Position to facilitate oxygenation and minimize respiratory effort  - Oxygen supplementation based on oxygen saturation or ABGs  - Provide Smoking Cessation handout, if applicable  - Encourage broncho-pulmonary hygiene including cough, deep breathe, Incentive Spirometry  - Assess the need for suctioning and perform as needed  - Assess and instruct to report SOB or any respiratory difficulty  - Respiratory Therapy support as indicated  - Manage/alleviate anxiety  - Monitor for signs/symptoms of CO2 retention  Outcome: Progressing     Problem: METABOLIC/FLUID AND ELECTROLYTES - ADULT  Goal: Glucose maintained within prescribed range  Description: INTERVENTIONS:  - Monitor Blood Glucose as ordered  - Assess for signs and symptoms of hyperglycemia and hypoglycemia  - Administer ordered medications to maintain glucose within target range  - Assess barriers to adequate nutritional intake and initiate nutrition consult as needed  - Instruct patient on self management of diabetes  Outcome: Progressing

## 2023-01-30 ENCOUNTER — APPOINTMENT (OUTPATIENT)
Dept: GENERAL RADIOLOGY | Facility: HOSPITAL | Age: 83
End: 2023-01-30
Attending: INTERNAL MEDICINE
Payer: MEDICARE

## 2023-01-30 LAB
ANION GAP SERPL CALC-SCNC: 3 MMOL/L (ref 0–18)
APTT PPP: 52.1 SECONDS (ref 23.3–35.6)
APTT PPP: 57.7 SECONDS (ref 23.3–35.6)
APTT PPP: 59 SECONDS (ref 23.3–35.6)
BUN BLD-MCNC: 50 MG/DL (ref 7–18)
CALCIUM BLD-MCNC: 8 MG/DL (ref 8.5–10.1)
CHLORIDE SERPL-SCNC: 102 MMOL/L (ref 98–112)
CO2 SERPL-SCNC: 32 MMOL/L (ref 21–32)
CREAT BLD-MCNC: 1.67 MG/DL
ERYTHROCYTE [DISTWIDTH] IN BLOOD BY AUTOMATED COUNT: 14.9 %
GFR SERPLBLD BASED ON 1.73 SQ M-ARVRAT: 30 ML/MIN/1.73M2 (ref 60–?)
GLUCOSE BLD-MCNC: 123 MG/DL (ref 70–99)
GLUCOSE BLD-MCNC: 158 MG/DL (ref 70–99)
GLUCOSE BLD-MCNC: 175 MG/DL (ref 70–99)
GLUCOSE BLD-MCNC: 185 MG/DL (ref 70–99)
GLUCOSE BLD-MCNC: 275 MG/DL (ref 70–99)
HCT VFR BLD AUTO: 36.1 %
HGB BLD-MCNC: 11.3 G/DL
MCH RBC QN AUTO: 27.6 PG (ref 26–34)
MCHC RBC AUTO-ENTMCNC: 31.3 G/DL (ref 31–37)
MCV RBC AUTO: 88.3 FL
OSMOLALITY SERPL CALC.SUM OF ELEC: 302 MOSM/KG (ref 275–295)
PLATELET # BLD AUTO: 196 10(3)UL (ref 150–450)
PLATELET # BLD AUTO: 196 10(3)UL (ref 150–450)
POTASSIUM SERPL-SCNC: 4.2 MMOL/L (ref 3.5–5.1)
RBC # BLD AUTO: 4.09 X10(6)UL
SODIUM SERPL-SCNC: 137 MMOL/L (ref 136–145)
WBC # BLD AUTO: 10 X10(3) UL (ref 4–11)

## 2023-01-30 PROCEDURE — 73630 X-RAY EXAM OF FOOT: CPT | Performed by: INTERNAL MEDICINE

## 2023-01-30 PROCEDURE — 99233 SBSQ HOSP IP/OBS HIGH 50: CPT | Performed by: INTERNAL MEDICINE

## 2023-01-30 PROCEDURE — 71045 X-RAY EXAM CHEST 1 VIEW: CPT | Performed by: INTERNAL MEDICINE

## 2023-01-30 RX ORDER — FUROSEMIDE 20 MG/1
20 TABLET ORAL DAILY
Status: DISCONTINUED | OUTPATIENT
Start: 2023-01-30 | End: 2023-02-04

## 2023-01-30 RX ORDER — HEPARIN SODIUM 1000 [USP'U]/ML
30 INJECTION, SOLUTION INTRAVENOUS; SUBCUTANEOUS ONCE
Status: COMPLETED | OUTPATIENT
Start: 2023-01-30 | End: 2023-01-30

## 2023-01-30 NOTE — PROGRESS NOTES
01/30/23 0346   BiPAP   $ RT Standby Charge (per 15 min) 1   Device V60   BiPAP / CPAP CE# v24   BiPAP bacteria filter Yes   BIPAP plugged into main power? Yes   Mode Spontaneous/Timed   Interface Full face mask   Mask Size Medium   Control Settings   Set Rate 18 breaths/min   Set IPAP 15   Set EPAP 8   Oxygen Percent (S)  35 %   Inspiratory time 0.9   Insp rise time 3   BiPAP/CPAP Alarm Settings   Hi Rate 40   Low Rate 10   Hi VT 1200   Low    Hi Pressure 20   Low Pressure 2   Low Pressure Delay 20   Low MV 2   BiPAP/CPAP Monitored Parameters   PIP 16   Total Rate 18 breaths/min   Minute Volume 9   Tidal Volume 450   Total Leak 32   Trigger % 30   Ti/Ttot % 24   IPAP 15   EPAP 8   Toleration Well   Received patient on bipap 15/8 @ 50%. FiO2 weaned down to 35% overnight. Patient wore bipap throughout night, and tolerated well. Will continue to monitor.

## 2023-01-30 NOTE — PLAN OF CARE
Assumed care at [facility]. Pt is A&Ox4. Pt is on bipap with , sats maintaining >90%. NSR on tele, VSS. No complaints of cardiac symptoms. Incontinent, purewick in place. Denies pain at this time. Up x1 w/ walker, tolerating well. Plan of care reviewed with patient, verbalizes understanding, all needs addressed at this time, pt seems to be resting comfortably. Problem: CARDIOVASCULAR - ADULT  Goal: Maintains optimal cardiac output and hemodynamic stability  Description: INTERVENTIONS:  - Monitor vital signs, rhythm, and trends  - Monitor for bleeding, hypotension and signs of decreased cardiac output  - Evaluate effectiveness of vasoactive medications to optimize hemodynamic stability  - Monitor arterial and/or venous puncture sites for bleeding and/or hematoma  - Assess quality of pulses, skin color and temperature  - Assess for signs of decreased coronary artery perfusion - ex.  Angina  - Evaluate fluid balance, assess for edema, trend weights  Outcome: Progressing  Goal: Absence of cardiac arrhythmias or at baseline  Description: INTERVENTIONS:  - Continuous cardiac monitoring, monitor vital signs, obtain 12 lead EKG if indicated  - Evaluate effectiveness of antiarrhythmic and heart rate control medications as ordered  - Initiate emergency measures for life threatening arrhythmias  - Monitor electrolytes and administer replacement therapy as ordered  Outcome: Progressing     Problem: RESPIRATORY - ADULT  Goal: Achieves optimal ventilation and oxygenation  Description: INTERVENTIONS:  - Assess for changes in respiratory status  - Assess for changes in mentation and behavior  - Position to facilitate oxygenation and minimize respiratory effort  - Oxygen supplementation based on oxygen saturation or ABGs  - Provide Smoking Cessation handout, if applicable  - Encourage broncho-pulmonary hygiene including cough, deep breathe, Incentive Spirometry  - Assess the need for suctioning and perform as needed  - Assess and instruct to report SOB or any respiratory difficulty  - Respiratory Therapy support as indicated  - Manage/alleviate anxiety  - Monitor for signs/symptoms of CO2 retention  Outcome: Progressing     Problem: METABOLIC/FLUID AND ELECTROLYTES - ADULT  Goal: Glucose maintained within prescribed range  Description: INTERVENTIONS:  - Monitor Blood Glucose as ordered  - Assess for signs and symptoms of hyperglycemia and hypoglycemia  - Administer ordered medications to maintain glucose within target range  - Assess barriers to adequate nutritional intake and initiate nutrition consult as needed  - Instruct patient on self management of diabetes  Outcome: Progressing

## 2023-01-30 NOTE — PLAN OF CARE
Received patient at 0730. Alert and Oriented x4. Tele Rhythm NSR. Pt has been weaned to 4L high flow and is 94%. Will continue to wean as able. Breath sounds diminished. Bed is locked and in low position. Call light and personal items within reach. Pt with urgency, purewick used while in bed. Pt up with walker and assist, generalized weakness noted, PT/OT consulted. PO diuretic started at low dose, heparin drip continues until decision made about thoracentesis. Mirilax given for constipation. Reviewed plan of care and patient verbalizes understanding. Problem: CARDIOVASCULAR - ADULT  Goal: Maintains optimal cardiac output and hemodynamic stability  Description: INTERVENTIONS:  - Monitor vital signs, rhythm, and trends  - Monitor for bleeding, hypotension and signs of decreased cardiac output  - Evaluate effectiveness of vasoactive medications to optimize hemodynamic stability  - Monitor arterial and/or venous puncture sites for bleeding and/or hematoma  - Assess quality of pulses, skin color and temperature  - Assess for signs of decreased coronary artery perfusion - ex.  Angina  - Evaluate fluid balance, assess for edema, trend weights  Outcome: Progressing  Goal: Absence of cardiac arrhythmias or at baseline  Description: INTERVENTIONS:  - Continuous cardiac monitoring, monitor vital signs, obtain 12 lead EKG if indicated  - Evaluate effectiveness of antiarrhythmic and heart rate control medications as ordered  - Initiate emergency measures for life threatening arrhythmias  - Monitor electrolytes and administer replacement therapy as ordered  Outcome: Progressing     Problem: RESPIRATORY - ADULT  Goal: Achieves optimal ventilation and oxygenation  Description: INTERVENTIONS:  - Assess for changes in respiratory status  - Assess for changes in mentation and behavior  - Position to facilitate oxygenation and minimize respiratory effort  - Oxygen supplementation based on oxygen saturation or ABGs  - Provide Smoking Cessation handout, if applicable  - Encourage broncho-pulmonary hygiene including cough, deep breathe, Incentive Spirometry  - Assess the need for suctioning and perform as needed  - Assess and instruct to report SOB or any respiratory difficulty  - Respiratory Therapy support as indicated  - Manage/alleviate anxiety  - Monitor for signs/symptoms of CO2 retention  Outcome: Progressing     Problem: METABOLIC/FLUID AND ELECTROLYTES - ADULT  Goal: Glucose maintained within prescribed range  Description: INTERVENTIONS:  - Monitor Blood Glucose as ordered  - Assess for signs and symptoms of hyperglycemia and hypoglycemia  - Administer ordered medications to maintain glucose within target range  - Assess barriers to adequate nutritional intake and initiate nutrition consult as needed  - Instruct patient on self management of diabetes  Outcome: Progressing     Problem: Patient/Family Goals  Goal: Patient/Family Long Term Goal  Description: Patient's Long Term Goal: to go home    Interventions:  - diuretics, consults, labs, tele monitoring, increase activity, wean O2      - See additional Care Plan goals for specific interventions  Outcome: Progressing  Goal: Patient/Family Short Term Goal  Description: Patient's Short Term Goal: feel better    Interventions:   - - diuretics, consults, labs, tele monitoring, increase activity, wean O2    - See additional Care Plan goals for specific interventions  Outcome: Progressing

## 2023-01-31 ENCOUNTER — APPOINTMENT (OUTPATIENT)
Dept: ULTRASOUND IMAGING | Facility: HOSPITAL | Age: 83
End: 2023-01-31
Attending: INTERNAL MEDICINE
Payer: MEDICARE

## 2023-01-31 PROBLEM — I26.94 MULTIPLE SUBSEGMENTAL PULMONARY EMBOLI WITHOUT ACUTE COR PULMONALE (HCC): Status: ACTIVE | Noted: 2023-01-31

## 2023-01-31 PROBLEM — I82.431 ACUTE DEEP VEIN THROMBOSIS (DVT) OF POPLITEAL VEIN OF RIGHT LOWER EXTREMITY (HCC): Status: ACTIVE | Noted: 2023-01-31

## 2023-01-31 PROBLEM — J90 PLEURAL EFFUSION: Status: ACTIVE | Noted: 2023-01-31

## 2023-01-31 LAB
ANION GAP SERPL CALC-SCNC: 5 MMOL/L (ref 0–18)
APTT PPP: 72.1 SECONDS (ref 23.3–35.6)
BUN BLD-MCNC: 51 MG/DL (ref 7–18)
CALCIUM BLD-MCNC: 8.7 MG/DL (ref 8.5–10.1)
CHLORIDE SERPL-SCNC: 99 MMOL/L (ref 98–112)
CO2 SERPL-SCNC: 31 MMOL/L (ref 21–32)
CREAT BLD-MCNC: 1.48 MG/DL
GFR SERPLBLD BASED ON 1.73 SQ M-ARVRAT: 35 ML/MIN/1.73M2 (ref 60–?)
GLUCOSE BLD-MCNC: 126 MG/DL (ref 70–99)
GLUCOSE BLD-MCNC: 127 MG/DL (ref 70–99)
GLUCOSE BLD-MCNC: 140 MG/DL (ref 70–99)
GLUCOSE BLD-MCNC: 169 MG/DL (ref 70–99)
GLUCOSE BLD-MCNC: 220 MG/DL (ref 70–99)
OSMOLALITY SERPL CALC.SUM OF ELEC: 295 MOSM/KG (ref 275–295)
PLATELET # BLD AUTO: 208 10(3)UL (ref 150–450)
POTASSIUM SERPL-SCNC: 4.6 MMOL/L (ref 3.5–5.1)
SODIUM SERPL-SCNC: 135 MMOL/L (ref 136–145)

## 2023-01-31 PROCEDURE — 99233 SBSQ HOSP IP/OBS HIGH 50: CPT | Performed by: INTERNAL MEDICINE

## 2023-01-31 PROCEDURE — 76604 US EXAM CHEST: CPT | Performed by: INTERNAL MEDICINE

## 2023-01-31 RX ORDER — FUROSEMIDE 10 MG/ML
40 INJECTION INTRAMUSCULAR; INTRAVENOUS ONCE
Status: COMPLETED | OUTPATIENT
Start: 2023-01-31 | End: 2023-01-31

## 2023-01-31 NOTE — PLAN OF CARE
Patient alert and oriented x 4. Up  Up with x1-2 and walker. On 4L via NC, Bipap at night, O2 sat> 90%. NSR on tele. Patient with urgency, purewick in placed. Heparin drip infusing. No complaints of pain, shortness of breath or chest pain/discomfort. POC : Heparin drip. Fall precautions in place. Call light within reach. Problem: CARDIOVASCULAR - ADULT  Goal: Maintains optimal cardiac output and hemodynamic stability  Description: INTERVENTIONS:  - Monitor vital signs, rhythm, and trends  - Monitor for bleeding, hypotension and signs of decreased cardiac output  - Evaluate effectiveness of vasoactive medications to optimize hemodynamic stability  - Monitor arterial and/or venous puncture sites for bleeding and/or hematoma  - Assess quality of pulses, skin color and temperature  - Assess for signs of decreased coronary artery perfusion - ex.  Angina  - Evaluate fluid balance, assess for edema, trend weights  Outcome: Progressing  Goal: Absence of cardiac arrhythmias or at baseline  Description: INTERVENTIONS:  - Continuous cardiac monitoring, monitor vital signs, obtain 12 lead EKG if indicated  - Evaluate effectiveness of antiarrhythmic and heart rate control medications as ordered  - Initiate emergency measures for life threatening arrhythmias  - Monitor electrolytes and administer replacement therapy as ordered  Outcome: Progressing     Problem: RESPIRATORY - ADULT  Goal: Achieves optimal ventilation and oxygenation  Description: INTERVENTIONS:  - Assess for changes in respiratory status  - Assess for changes in mentation and behavior  - Position to facilitate oxygenation and minimize respiratory effort  - Oxygen supplementation based on oxygen saturation or ABGs  - Provide Smoking Cessation handout, if applicable  - Encourage broncho-pulmonary hygiene including cough, deep breathe, Incentive Spirometry  - Assess the need for suctioning and perform as needed  - Assess and instruct to report SOB or any respiratory difficulty  - Respiratory Therapy support as indicated  - Manage/alleviate anxiety  - Monitor for signs/symptoms of CO2 retention  Outcome: Progressing     Problem: METABOLIC/FLUID AND ELECTROLYTES - ADULT  Goal: Glucose maintained within prescribed range  Description: INTERVENTIONS:  - Monitor Blood Glucose as ordered  - Assess for signs and symptoms of hyperglycemia and hypoglycemia  - Administer ordered medications to maintain glucose within target range  - Assess barriers to adequate nutritional intake and initiate nutrition consult as needed  - Instruct patient on self management of diabetes  Outcome: Progressing     Problem: Patient/Family Goals  Goal: Patient/Family Long Term Goal  Description: Patient's Long Term Goal: to go home    Interventions:  - diuretics, consults, labs, tele monitoring, increase activity, wean O2      - See additional Care Plan goals for specific interventions  Outcome: Progressing  Goal: Patient/Family Short Term Goal  Description: Patient's Short Term Goal: feel better    Interventions:   - - diuretics, consults, labs, tele monitoring, increase activity, wean O2    - See additional Care Plan goals for specific interventions  Outcome: Progressing

## 2023-01-31 NOTE — PLAN OF CARE
Assumed care at 0730. Pt is A&Ox4. Pt is on 4L NC with lung sounds diminished. NSR on tele, VSS. No complaints of cardiac symptoms. Incontinent of B&B, purewick in place. Denies pain at this time. 1-2 person assist with walker, tolerating well. Plan of care reviewed with patient, verbalizes understanding, all needs addressed at this time. Bed locked and in lowest position. Call light at bedside. Problem: CARDIOVASCULAR - ADULT  Goal: Maintains optimal cardiac output and hemodynamic stability  Description: INTERVENTIONS:  - Monitor vital signs, rhythm, and trends  - Monitor for bleeding, hypotension and signs of decreased cardiac output  - Evaluate effectiveness of vasoactive medications to optimize hemodynamic stability  - Monitor arterial and/or venous puncture sites for bleeding and/or hematoma  - Assess quality of pulses, skin color and temperature  - Assess for signs of decreased coronary artery perfusion - ex.  Angina  - Evaluate fluid balance, assess for edema, trend weights  Outcome: Progressing  Goal: Absence of cardiac arrhythmias or at baseline  Description: INTERVENTIONS:  - Continuous cardiac monitoring, monitor vital signs, obtain 12 lead EKG if indicated  - Evaluate effectiveness of antiarrhythmic and heart rate control medications as ordered  - Initiate emergency measures for life threatening arrhythmias  - Monitor electrolytes and administer replacement therapy as ordered  Outcome: Progressing     Problem: RESPIRATORY - ADULT  Goal: Achieves optimal ventilation and oxygenation  Description: INTERVENTIONS:  - Assess for changes in respiratory status  - Assess for changes in mentation and behavior  - Position to facilitate oxygenation and minimize respiratory effort  - Oxygen supplementation based on oxygen saturation or ABGs  - Provide Smoking Cessation handout, if applicable  - Encourage broncho-pulmonary hygiene including cough, deep breathe, Incentive Spirometry  - Assess the need for suctioning and perform as needed  - Assess and instruct to report SOB or any respiratory difficulty  - Respiratory Therapy support as indicated  - Manage/alleviate anxiety  - Monitor for signs/symptoms of CO2 retention  Outcome: Progressing

## 2023-02-01 LAB
ANION GAP SERPL CALC-SCNC: 2 MMOL/L (ref 0–18)
APTT PPP: 81.5 SECONDS (ref 23.3–35.6)
BUN BLD-MCNC: 52 MG/DL (ref 7–18)
CALCIUM BLD-MCNC: 8.6 MG/DL (ref 8.5–10.1)
CHLORIDE SERPL-SCNC: 100 MMOL/L (ref 98–112)
CO2 SERPL-SCNC: 33 MMOL/L (ref 21–32)
CREAT BLD-MCNC: 1.42 MG/DL
ERYTHROCYTE [DISTWIDTH] IN BLOOD BY AUTOMATED COUNT: 14.6 %
GFR SERPLBLD BASED ON 1.73 SQ M-ARVRAT: 36 ML/MIN/1.73M2 (ref 60–?)
GLUCOSE BLD-MCNC: 139 MG/DL (ref 70–99)
GLUCOSE BLD-MCNC: 146 MG/DL (ref 70–99)
GLUCOSE BLD-MCNC: 150 MG/DL (ref 70–99)
GLUCOSE BLD-MCNC: 177 MG/DL (ref 70–99)
GLUCOSE BLD-MCNC: 190 MG/DL (ref 70–99)
HCT VFR BLD AUTO: 36.1 %
HGB BLD-MCNC: 11.2 G/DL
MCH RBC QN AUTO: 26.8 PG (ref 26–34)
MCHC RBC AUTO-ENTMCNC: 31 G/DL (ref 31–37)
MCV RBC AUTO: 86.4 FL
OSMOLALITY SERPL CALC.SUM OF ELEC: 297 MOSM/KG (ref 275–295)
PLATELET # BLD AUTO: 210 10(3)UL (ref 150–450)
POTASSIUM SERPL-SCNC: 4.4 MMOL/L (ref 3.5–5.1)
RBC # BLD AUTO: 4.18 X10(6)UL
SODIUM SERPL-SCNC: 135 MMOL/L (ref 136–145)
WBC # BLD AUTO: 6.3 X10(3) UL (ref 4–11)

## 2023-02-01 PROCEDURE — 99232 SBSQ HOSP IP/OBS MODERATE 35: CPT | Performed by: INTERNAL MEDICINE

## 2023-02-01 RX ORDER — CIPROFLOXACIN 500 MG/1
500 TABLET, FILM COATED ORAL
Status: DISCONTINUED | OUTPATIENT
Start: 2023-02-01 | End: 2023-02-01

## 2023-02-01 RX ORDER — CIPROFLOXACIN 500 MG/1
500 TABLET, FILM COATED ORAL NIGHTLY
Status: DISCONTINUED | OUTPATIENT
Start: 2023-02-01 | End: 2023-02-03

## 2023-02-01 NOTE — CONSULTS
[facility] Pharmacy Note:  Renal Adjustment for ciprofloxacin (CIPRO)    Joselin Flaherty is a 78year old patient who has been prescribed ciprofloxacin (CIPRO) 500 mg every 12 hrs. The estimated creatinine clearance is 24.4 mL/min (A) (based on SCr of 1.42 mg/dL (H)). The dose has been adjusted to ciprofloxacin (CIPRO) 500 mg every 24 hrs per hospital renal dose adjustment protocol for treatment of UTI. Pharmacy will follow and adjust dose as warranted for additional renal function changes.     Thank you,    Cuba Nuñez, PharmD  2/1/2023  12:56 PM

## 2023-02-01 NOTE — PLAN OF CARE
Assumed care of patient at 1. Pt A/Ox 4. O2 sats maintained on 4L NC, room air baseline. BiPAP at night. NSR/SB on tele. Last BM 1/31, uses commode. Purewick in place for increased frequency. Pt reports no pain. Pt up x1 and walker. Pt updated on plan of care. Care needs met. Bed in lowest position, Call light within reach. Bed alarm on. Heparin drip per PE/DVT protocol. 1935: Paged Dr. Pedro Guardado with pulm asking about resuming heparin drip overnight that had been off all day. Orders given to resume heparin drip at previous rate, 1400 units, with no bolus. Order to stop heparin drip at 0300 on 2/1 for possible thoracentesis. POC: heparin drip, possible thoracentesis?, wean oxygen, IV antibiotics     Problem: CARDIOVASCULAR - ADULT  Goal: Maintains optimal cardiac output and hemodynamic stability  Description: INTERVENTIONS:  - Monitor vital signs, rhythm, and trends  - Monitor for bleeding, hypotension and signs of decreased cardiac output  - Evaluate effectiveness of vasoactive medications to optimize hemodynamic stability  - Monitor arterial and/or venous puncture sites for bleeding and/or hematoma  - Assess quality of pulses, skin color and temperature  - Assess for signs of decreased coronary artery perfusion - ex.  Angina  - Evaluate fluid balance, assess for edema, trend weights  Outcome: Progressing  Goal: Absence of cardiac arrhythmias or at baseline  Description: INTERVENTIONS:  - Continuous cardiac monitoring, monitor vital signs, obtain 12 lead EKG if indicated  - Evaluate effectiveness of antiarrhythmic and heart rate control medications as ordered  - Initiate emergency measures for life threatening arrhythmias  - Monitor electrolytes and administer replacement therapy as ordered  Outcome: Progressing     Problem: RESPIRATORY - ADULT  Goal: Achieves optimal ventilation and oxygenation  Description: INTERVENTIONS:  - Assess for changes in respiratory status  - Assess for changes in mentation and behavior  - Position to facilitate oxygenation and minimize respiratory effort  - Oxygen supplementation based on oxygen saturation or ABGs  - Provide Smoking Cessation handout, if applicable  - Encourage broncho-pulmonary hygiene including cough, deep breathe, Incentive Spirometry  - Assess the need for suctioning and perform as needed  - Assess and instruct to report SOB or any respiratory difficulty  - Respiratory Therapy support as indicated  - Manage/alleviate anxiety  - Monitor for signs/symptoms of CO2 retention  Outcome: Progressing     Problem: METABOLIC/FLUID AND ELECTROLYTES - ADULT  Goal: Glucose maintained within prescribed range  Description: INTERVENTIONS:  - Monitor Blood Glucose as ordered  - Assess for signs and symptoms of hyperglycemia and hypoglycemia  - Administer ordered medications to maintain glucose within target range  - Assess barriers to adequate nutritional intake and initiate nutrition consult as needed  - Instruct patient on self management of diabetes  Outcome: Progressing     Problem: Patient/Family Goals  Goal: Patient/Family Long Term Goal  Description: Patient's Long Term Goal: to go home    Interventions:  - diuretics, consults, labs, tele monitoring, increase activity, wean O2      - See additional Care Plan goals for specific interventions  Outcome: Progressing  Goal: Patient/Family Short Term Goal  Description: Patient's Short Term Goal: feel better    Interventions:   - - diuretics, consults, labs, tele monitoring, increase activity, wean O2    - See additional Care Plan goals for specific interventions  Outcome: Progressing

## 2023-02-01 NOTE — PLAN OF CARE
Assumed care at 0730. Pt is A&Ox4. Pt is on [facility] lung sounds diminished bilaterally. NSR on tele, VSS. No complaints of cardiac symptoms. Incontinent of B&B, purewick in place. Denies pain at this time. One person assist with walker, tolerating well. Plan of care reviewed with patient, verbalizes understanding, all needs addressed at this time. Bed locked and in lowest position. Call light at bedside. POC:  No thoracentesis per pulm  Will continue heparin gtt @ 14mL, no bolus. Transition to eliquis tonight. Problem: CARDIOVASCULAR - ADULT  Goal: Maintains optimal cardiac output and hemodynamic stability  Description: INTERVENTIONS:  - Monitor vital signs, rhythm, and trends  - Monitor for bleeding, hypotension and signs of decreased cardiac output  - Evaluate effectiveness of vasoactive medications to optimize hemodynamic stability  - Monitor arterial and/or venous puncture sites for bleeding and/or hematoma  - Assess quality of pulses, skin color and temperature  - Assess for signs of decreased coronary artery perfusion - ex.  Angina  - Evaluate fluid balance, assess for edema, trend weights  Outcome: Progressing  Goal: Absence of cardiac arrhythmias or at baseline  Description: INTERVENTIONS:  - Continuous cardiac monitoring, monitor vital signs, obtain 12 lead EKG if indicated  - Evaluate effectiveness of antiarrhythmic and heart rate control medications as ordered  - Initiate emergency measures for life threatening arrhythmias  - Monitor electrolytes and administer replacement therapy as ordered  Outcome: Progressing     Problem: RESPIRATORY - ADULT  Goal: Achieves optimal ventilation and oxygenation  Description: INTERVENTIONS:  - Assess for changes in respiratory status  - Assess for changes in mentation and behavior  - Position to facilitate oxygenation and minimize respiratory effort  - Oxygen supplementation based on oxygen saturation or ABGs  - Provide Smoking Cessation handout, if applicable  - Encourage broncho-pulmonary hygiene including cough, deep breathe, Incentive Spirometry  - Assess the need for suctioning and perform as needed  - Assess and instruct to report SOB or any respiratory difficulty  - Respiratory Therapy support as indicated  - Manage/alleviate anxiety  - Monitor for signs/symptoms of CO2 retention  Outcome: Progressing

## 2023-02-02 LAB
ANION GAP SERPL CALC-SCNC: 3 MMOL/L (ref 0–18)
BUN BLD-MCNC: 49 MG/DL (ref 7–18)
CALCIUM BLD-MCNC: 8.7 MG/DL (ref 8.5–10.1)
CHLORIDE SERPL-SCNC: 102 MMOL/L (ref 98–112)
CO2 SERPL-SCNC: 31 MMOL/L (ref 21–32)
CREAT BLD-MCNC: 1.29 MG/DL
GFR SERPLBLD BASED ON 1.73 SQ M-ARVRAT: 41 ML/MIN/1.73M2 (ref 60–?)
GLUCOSE BLD-MCNC: 143 MG/DL (ref 70–99)
GLUCOSE BLD-MCNC: 153 MG/DL (ref 70–99)
GLUCOSE BLD-MCNC: 161 MG/DL (ref 70–99)
GLUCOSE BLD-MCNC: 171 MG/DL (ref 70–99)
GLUCOSE BLD-MCNC: 186 MG/DL (ref 70–99)
OSMOLALITY SERPL CALC.SUM OF ELEC: 300 MOSM/KG (ref 275–295)
POTASSIUM SERPL-SCNC: 4.5 MMOL/L (ref 3.5–5.1)
SODIUM SERPL-SCNC: 136 MMOL/L (ref 136–145)

## 2023-02-02 PROCEDURE — 99232 SBSQ HOSP IP/OBS MODERATE 35: CPT | Performed by: INTERNAL MEDICINE

## 2023-02-02 RX ORDER — SENNA AND DOCUSATE SODIUM 50; 8.6 MG/1; MG/1
2 TABLET, FILM COATED ORAL DAILY
Status: DISCONTINUED | OUTPATIENT
Start: 2023-02-02 | End: 2023-02-03

## 2023-02-02 RX ORDER — POLYETHYLENE GLYCOL 3350 17 G/17G
17 POWDER, FOR SOLUTION ORAL DAILY
Status: DISCONTINUED | OUTPATIENT
Start: 2023-02-02 | End: 2023-02-03

## 2023-02-02 NOTE — PLAN OF CARE
Assumed care at 0730. Pt alert, oriented x4. Oxygen saturation maintained greater than 89% on 1L nasal cannula. Lung sounds clear, diminished bilaterally. Tele: NSR. Continent. Denies pain. Ambulates standby with walker. Plan of care: PO antibiotics, wean oxygen as tolerated, discharge to [facility]. Pat's ZACHERY when cleared by all consults. Pt updated on plan of care. Questions answered. 1630 - /70, 174/69. Cardiology MD notified. No new orders. Problem: CARDIOVASCULAR - ADULT  Goal: Maintains optimal cardiac output and hemodynamic stability  Description: INTERVENTIONS:  - Monitor vital signs, rhythm, and trends  - Monitor for bleeding, hypotension and signs of decreased cardiac output  - Evaluate effectiveness of vasoactive medications to optimize hemodynamic stability  - Monitor arterial and/or venous puncture sites for bleeding and/or hematoma  - Assess quality of pulses, skin color and temperature  - Assess for signs of decreased coronary artery perfusion - ex.  Angina  - Evaluate fluid balance, assess for edema, trend weights  Outcome: Progressing  Goal: Absence of cardiac arrhythmias or at baseline  Description: INTERVENTIONS:  - Continuous cardiac monitoring, monitor vital signs, obtain 12 lead EKG if indicated  - Evaluate effectiveness of antiarrhythmic and heart rate control medications as ordered  - Initiate emergency measures for life threatening arrhythmias  - Monitor electrolytes and administer replacement therapy as ordered  Outcome: Progressing     Problem: RESPIRATORY - ADULT  Goal: Achieves optimal ventilation and oxygenation  Description: INTERVENTIONS:  - Assess for changes in respiratory status  - Assess for changes in mentation and behavior  - Position to facilitate oxygenation and minimize respiratory effort  - Oxygen supplementation based on oxygen saturation or ABGs  - Provide Smoking Cessation handout, if applicable  - Encourage broncho-pulmonary hygiene including cough, deep breathe, Incentive Spirometry  - Assess the need for suctioning and perform as needed  - Assess and instruct to report SOB or any respiratory difficulty  - Respiratory Therapy support as indicated  - Manage/alleviate anxiety  - Monitor for signs/symptoms of CO2 retention  Outcome: Progressing     Problem: METABOLIC/FLUID AND ELECTROLYTES - ADULT  Goal: Glucose maintained within prescribed range  Description: INTERVENTIONS:  - Monitor Blood Glucose as ordered  - Assess for signs and symptoms of hyperglycemia and hypoglycemia  - Administer ordered medications to maintain glucose within target range  - Assess barriers to adequate nutritional intake and initiate nutrition consult as needed  - Instruct patient on self management of diabetes  Outcome: Progressing     Problem: Patient/Family Goals  Goal: Patient/Family Long Term Goal  Description: Patient's Long Term Goal: to go home    Interventions:  - diuretics, consults, labs, tele monitoring, increase activity, wean O2      - See additional Care Plan goals for specific interventions  Outcome: Progressing  Goal: Patient/Family Short Term Goal  Description: Patient's Short Term Goal: feel better    Interventions:   - - diuretics, consults, labs, tele monitoring, increase activity, wean O2    - See additional Care Plan goals for specific interventions  Outcome: Progressing

## 2023-02-02 NOTE — PAYOR COMM NOTE
--------------  CONTINUED STAY REVIEW    [facility] [employer] MEDICARE ADV PPO  Subscriber #:  [id number]  Authorization Number: [id number]    Admit date: 1/25/23  Admit time:  8:43 PM    Admitting Physician: Krista Guardado MD  Attending Physician:  Quentin Adams MD  Primary Care Physician: Jeannette Valiente MD    REVIEW DOCUMENTATION:    DISCHARGE PLANNING    Met with patient and provided accepting ZACHERY list from [employer]. Left at bedside. Spoke with patient's daughter Pavel Hutchison) who confirmed they would like . [facility] for ZACHERY. Reserved in [place]. Spoke with Dixon Leon from [facility] to update her.  Will have [address] submit for [employer] auth once closer to discharge

## 2023-02-02 NOTE — PLAN OF CARE
Assumed care of patient at [facility]. Pt A/Ox 4. O2 sats maintained on 1L NC, room air baseline. BiPAP at night. NSR/SB on tele. Last BM 1/31, uses commode. Purewick in place for increased frequency. Pt reports no pain. Pt up x1 and walker. Pt updated on plan of care. Care needs met. Bed in lowest position, Call light within reach. Bed alarm on. Heparin drip stopped tonight with first dose of Eliquis. PO antibiotic started tonight as well. Mepilex on sacrum for redness. POC: wean oxygen, ZACHERY at discharge       Problem: CARDIOVASCULAR - ADULT  Goal: Maintains optimal cardiac output and hemodynamic stability  Description: INTERVENTIONS:  - Monitor vital signs, rhythm, and trends  - Monitor for bleeding, hypotension and signs of decreased cardiac output  - Evaluate effectiveness of vasoactive medications to optimize hemodynamic stability  - Monitor arterial and/or venous puncture sites for bleeding and/or hematoma  - Assess quality of pulses, skin color and temperature  - Assess for signs of decreased coronary artery perfusion - ex.  Angina  - Evaluate fluid balance, assess for edema, trend weights  Outcome: Progressing  Goal: Absence of cardiac arrhythmias or at baseline  Description: INTERVENTIONS:  - Continuous cardiac monitoring, monitor vital signs, obtain 12 lead EKG if indicated  - Evaluate effectiveness of antiarrhythmic and heart rate control medications as ordered  - Initiate emergency measures for life threatening arrhythmias  - Monitor electrolytes and administer replacement therapy as ordered  Outcome: Progressing     Problem: RESPIRATORY - ADULT  Goal: Achieves optimal ventilation and oxygenation  Description: INTERVENTIONS:  - Assess for changes in respiratory status  - Assess for changes in mentation and behavior  - Position to facilitate oxygenation and minimize respiratory effort  - Oxygen supplementation based on oxygen saturation or ABGs  - Provide Smoking Cessation handout, if applicable  - Encourage broncho-pulmonary hygiene including cough, deep breathe, Incentive Spirometry  - Assess the need for suctioning and perform as needed  - Assess and instruct to report SOB or any respiratory difficulty  - Respiratory Therapy support as indicated  - Manage/alleviate anxiety  - Monitor for signs/symptoms of CO2 retention  Outcome: Progressing     Problem: METABOLIC/FLUID AND ELECTROLYTES - ADULT  Goal: Glucose maintained within prescribed range  Description: INTERVENTIONS:  - Monitor Blood Glucose as ordered  - Assess for signs and symptoms of hyperglycemia and hypoglycemia  - Administer ordered medications to maintain glucose within target range  - Assess barriers to adequate nutritional intake and initiate nutrition consult as needed  - Instruct patient on self management of diabetes  Outcome: Progressing     Problem: Patient/Family Goals  Goal: Patient/Family Long Term Goal  Description: Patient's Long Term Goal: to go home    Interventions:  - diuretics, consults, labs, tele monitoring, increase activity, wean O2      - See additional Care Plan goals for specific interventions  Outcome: Progressing  Goal: Patient/Family Short Term Goal  Description: Patient's Short Term Goal: feel better    Interventions:   - - diuretics, consults, labs, tele monitoring, increase activity, wean O2    - See additional Care Plan goals for specific interventions  Outcome: Progressing

## 2023-02-03 LAB
ANION GAP SERPL CALC-SCNC: 3 MMOL/L (ref 0–18)
BUN BLD-MCNC: 43 MG/DL (ref 7–18)
CALCIUM BLD-MCNC: 9 MG/DL (ref 8.5–10.1)
CHLORIDE SERPL-SCNC: 103 MMOL/L (ref 98–112)
CO2 SERPL-SCNC: 31 MMOL/L (ref 21–32)
CREAT BLD-MCNC: 1.09 MG/DL
GFR SERPLBLD BASED ON 1.73 SQ M-ARVRAT: 50 ML/MIN/1.73M2 (ref 60–?)
GLUCOSE BLD-MCNC: 136 MG/DL (ref 70–99)
GLUCOSE BLD-MCNC: 167 MG/DL (ref 70–99)
GLUCOSE BLD-MCNC: 167 MG/DL (ref 70–99)
GLUCOSE BLD-MCNC: 168 MG/DL (ref 70–99)
GLUCOSE BLD-MCNC: 227 MG/DL (ref 70–99)
OSMOLALITY SERPL CALC.SUM OF ELEC: 299 MOSM/KG (ref 275–295)
POTASSIUM SERPL-SCNC: 4.4 MMOL/L (ref 3.5–5.1)
SODIUM SERPL-SCNC: 137 MMOL/L (ref 136–145)

## 2023-02-03 PROCEDURE — 99232 SBSQ HOSP IP/OBS MODERATE 35: CPT | Performed by: INTERNAL MEDICINE

## 2023-02-03 RX ORDER — LISINOPRIL 5 MG/1
5 TABLET ORAL DAILY
Qty: 30 TABLET | Refills: 2 | Status: SHIPPED | OUTPATIENT
Start: 2023-02-04

## 2023-02-03 RX ORDER — FUROSEMIDE 20 MG/1
20 TABLET ORAL DAILY
Qty: 30 TABLET | Refills: 1 | Status: SHIPPED | OUTPATIENT
Start: 2023-02-04

## 2023-02-03 RX ORDER — COLCHICINE 0.6 MG/1
0.6 TABLET ORAL DAILY
Qty: 30 TABLET | Refills: 0 | Status: SHIPPED | OUTPATIENT
Start: 2023-02-04 | End: 2023-02-04

## 2023-02-03 RX ORDER — ATENOLOL 50 MG/1
50 TABLET ORAL DAILY
Qty: 30 TABLET | Refills: 2 | Status: SHIPPED | OUTPATIENT
Start: 2023-02-04

## 2023-02-03 RX ORDER — LISINOPRIL 2.5 MG/1
2.5 TABLET ORAL ONCE
Status: COMPLETED | OUTPATIENT
Start: 2023-02-03 | End: 2023-02-03

## 2023-02-03 RX ORDER — SENNA AND DOCUSATE SODIUM 50; 8.6 MG/1; MG/1
2 TABLET, FILM COATED ORAL
Status: DISCONTINUED | OUTPATIENT
Start: 2023-02-03 | End: 2023-02-04

## 2023-02-03 RX ORDER — LISINOPRIL 5 MG/1
5 TABLET ORAL DAILY
Status: DISCONTINUED | OUTPATIENT
Start: 2023-02-04 | End: 2023-02-04

## 2023-02-03 NOTE — CM/SW NOTE
01/27/23 1500   CM/SW Referral Data   Referral Source Social Work (self-referral); Nurse   Reason for Referral Discharge planning   Informant Patient;Son;Daughter;EMR   Patient [address]   Number of Levels in Home 1   Patient lives with Alone   Patient Status Prior to Admission   Independent with ADLs and Mobility Yes   Discharge Needs   Anticipated D/C needs To be determined     Informed by RN that patient/family requesting to meet with SW. Patient is an 77 y/o female who admitted with acute pulmonary edema and acute on chronic congestive heart failure. Met with patient, daughter Reaure Dieter), FAZAL, and son (Vignesh Mooring) at bedside. Patient was unable to participate in conversation as she was on bipap. Patient lives alone in a one story house, no stairs, in the [facility] in [place]. She is normally independent with ambulation and ADLs. Discussed SW will continue to follow patient during clinical course and at this time patient is not stable for discharge therefore discharge needs are uncertain. Family states patient has never had HH or ZACHERY. RN updated. SW will continue to follow. Addendum  Family provided POLST, living will, and poa paperwork which RN placed in patient's paper chart.     KEO Altamirano  Discharge Planner  [phone]
Informed by PT that they are recommending ZACHERY. Met with patient, who was alert and oriented (sitting up in the chair), and son (Mike So) at bedside to discuss discharge planning. Discussed rec of ZACHERY, they were agreeable. Patient's  was recently at [address] [place] [address] but has since passed away. She/son state they prefer [address] [place] [address] for ZACHERY, sent message to [address] [place] [address] to inform them. Sent referrals in aidin. Will complete PASRR. Await accepting ZACHERY. SW will remain available.     KEO Garcia  Discharge Planner  [phone]
Met with patient and provided accepting ZACHERY list from su. Left at bedside. Spoke with patient's daughter Akira Walton) who confirmed they would like [facility] for ZACHERY. Reserved in [place]. Spoke with Silvestre Sanchez from [facility] to update her. Will have [address] [facility] [address] [facility] [address] submit for [employer] auth once closer to discharge. MARTIN will continue to follow.      KEO Meadows  Discharge Planner  [phone]
PASRR assessment uploaded in [address].
Spoke with Bryan Emerson from [address] [place] [address] and she will submit for [employer]. Spoke with patient's [address] who states Eliquis costs $47/month with her insurance. Informed them that she will be discharging to rehab so patient to bring 1 month free coupon when she picks up medication after ZACHERY. SW will remain available.      KEO Seals  Discharge Planner  [phone]
Spoke with Paresh Mccain from [address] [place] [address] who confirmed insurance Maria Guadalupe Lino has been approved however they do not have bed available until Saturday. Updated RN and discussed arranging discharge for Saturday 1/4 at [facility] with Attila Lagos and scheduled  for 2pm on Saturday 2/4. PCS form completed. Spoke with patient's daughter Earl Ramsay who was agreeable with discharge tomorrow. Discussed medicar and cost, she was agreeable. SW will remain available. [address]  Phone: [phone].     [facility]  [phone] or Joseph Barr , [facility]  Discharge Planner  [phone]
no

## 2023-02-03 NOTE — PLAN OF CARE
Assumed care of pt at 0730  A&Ox4, up with standby assist and a walker, no complaints of pain  R/A, NSR, no chest pain, no shortness of breath  Skin is clean, dry, intact, no wounds present, sacrum assessed, no redness noted  Continent of bowel and bladder, last BM 2/2, briefed for stress incontinence  Fall precautions in place, bed and chair alarm on, call light within reach, pt updated on plan of care, will continue to monitor  Awaiting insurance authorization for discharge to [facility]                      Problem: CARDIOVASCULAR - ADULT  Goal: Maintains optimal cardiac output and hemodynamic stability  Description: INTERVENTIONS:  - Monitor vital signs, rhythm, and trends  - Monitor for bleeding, hypotension and signs of decreased cardiac output  - Evaluate effectiveness of vasoactive medications to optimize hemodynamic stability  - Monitor arterial and/or venous puncture sites for bleeding and/or hematoma  - Assess quality of pulses, skin color and temperature  - Assess for signs of decreased coronary artery perfusion - ex.  Angina  - Evaluate fluid balance, assess for edema, trend weights  Outcome: Progressing  Goal: Absence of cardiac arrhythmias or at baseline  Description: INTERVENTIONS:  - Continuous cardiac monitoring, monitor vital signs, obtain 12 lead EKG if indicated  - Evaluate effectiveness of antiarrhythmic and heart rate control medications as ordered  - Initiate emergency measures for life threatening arrhythmias  - Monitor electrolytes and administer replacement therapy as ordered  Outcome: Progressing     Problem: RESPIRATORY - ADULT  Goal: Achieves optimal ventilation and oxygenation  Description: INTERVENTIONS:  - Assess for changes in respiratory status  - Assess for changes in mentation and behavior  - Position to facilitate oxygenation and minimize respiratory effort  - Oxygen supplementation based on oxygen saturation or ABGs  - Provide Smoking Cessation handout, if applicable  - Encourage broncho-pulmonary hygiene including cough, deep breathe, Incentive Spirometry  - Assess the need for suctioning and perform as needed  - Assess and instruct to report SOB or any respiratory difficulty  - Respiratory Therapy support as indicated  - Manage/alleviate anxiety  - Monitor for signs/symptoms of CO2 retention  Outcome: Progressing     Problem: METABOLIC/FLUID AND ELECTROLYTES - ADULT  Goal: Glucose maintained within prescribed range  Description: INTERVENTIONS:  - Monitor Blood Glucose as ordered  - Assess for signs and symptoms of hyperglycemia and hypoglycemia  - Administer ordered medications to maintain glucose within target range  - Assess barriers to adequate nutritional intake and initiate nutrition consult as needed  - Instruct patient on self management of diabetes  Outcome: Progressing     Problem: Patient/Family Goals  Goal: Patient/Family Long Term Goal  Description: Patient's Long Term Goal: to go home    Interventions:  - diuretics, consults, labs, tele monitoring, increase activity, wean O2      - See additional Care Plan goals for specific interventions  Outcome: Progressing  Goal: Patient/Family Short Term Goal  Description: Patient's Short Term Goal: feel better    Interventions:   - - diuretics, consults, labs, tele monitoring, increase activity, wean O2    - See additional Care Plan goals for specific interventions  Outcome: Progressing

## 2023-02-03 NOTE — PLAN OF CARE
Assumed care of pt around [address] x4, up x1 and walker  1 L NC during day, pt refused bipap tonight, currently on 2 L NC sating >90  NSR/SB, no cardiac symptoms  Pt denies any pain  Po cipro, Po lasix  Continent of bowel, last BM 1/31, purewick in place  Fall precautions in place, bed alarm on  Pt updated on plan of care, all needs met at this time          Problem: CARDIOVASCULAR - ADULT  Goal: Maintains optimal cardiac output and hemodynamic stability  Description: INTERVENTIONS:  - Monitor vital signs, rhythm, and trends  - Monitor for bleeding, hypotension and signs of decreased cardiac output  - Evaluate effectiveness of vasoactive medications to optimize hemodynamic stability  - Monitor arterial and/or venous puncture sites for bleeding and/or hematoma  - Assess quality of pulses, skin color and temperature  - Assess for signs of decreased coronary artery perfusion - ex.  Angina  - Evaluate fluid balance, assess for edema, trend weights  Outcome: Progressing  Goal: Absence of cardiac arrhythmias or at baseline  Description: INTERVENTIONS:  - Continuous cardiac monitoring, monitor vital signs, obtain 12 lead EKG if indicated  - Evaluate effectiveness of antiarrhythmic and heart rate control medications as ordered  - Initiate emergency measures for life threatening arrhythmias  - Monitor electrolytes and administer replacement therapy as ordered  Outcome: Progressing     Problem: RESPIRATORY - ADULT  Goal: Achieves optimal ventilation and oxygenation  Description: INTERVENTIONS:  - Assess for changes in respiratory status  - Assess for changes in mentation and behavior  - Position to facilitate oxygenation and minimize respiratory effort  - Oxygen supplementation based on oxygen saturation or ABGs  - Provide Smoking Cessation handout, if applicable  - Encourage broncho-pulmonary hygiene including cough, deep breathe, Incentive Spirometry  - Assess the need for suctioning and perform as needed  - Assess and instruct to report SOB or any respiratory difficulty  - Respiratory Therapy support as indicated  - Manage/alleviate anxiety  - Monitor for signs/symptoms of CO2 retention  Outcome: Progressing     Problem: METABOLIC/FLUID AND ELECTROLYTES - ADULT  Goal: Glucose maintained within prescribed range  Description: INTERVENTIONS:  - Monitor Blood Glucose as ordered  - Assess for signs and symptoms of hyperglycemia and hypoglycemia  - Administer ordered medications to maintain glucose within target range  - Assess barriers to adequate nutritional intake and initiate nutrition consult as needed  - Instruct patient on self management of diabetes  Outcome: Progressing     Problem: Patient/Family Goals  Goal: Patient/Family Long Term Goal  Description: Patient's Long Term Goal: to go home    Interventions:  - diuretics, consults, labs, tele monitoring, increase activity, wean O2      - See additional Care Plan goals for specific interventions  Outcome: Progressing  Goal: Patient/Family Short Term Goal  Description: Patient's Short Term Goal: feel better    Interventions:   - - diuretics, consults, labs, tele monitoring, increase activity, wean O2    - See additional Care Plan goals for specific interventions  Outcome: Progressing

## 2023-02-04 VITALS
RESPIRATION RATE: 18 BRPM | HEART RATE: 57 BPM | BODY MASS INDEX: 26.75 KG/M2 | TEMPERATURE: 98 F | HEIGHT: 63 IN | OXYGEN SATURATION: 97 % | DIASTOLIC BLOOD PRESSURE: 64 MMHG | SYSTOLIC BLOOD PRESSURE: 176 MMHG | WEIGHT: 151 LBS

## 2023-02-04 LAB
GLUCOSE BLD-MCNC: 162 MG/DL (ref 70–99)
GLUCOSE BLD-MCNC: 224 MG/DL (ref 70–99)

## 2023-02-04 PROCEDURE — 99239 HOSP IP/OBS DSCHRG MGMT >30: CPT | Performed by: INTERNAL MEDICINE

## 2023-02-04 RX ORDER — COLCHICINE 0.6 MG/1
0.6 TABLET ORAL DAILY
Qty: 30 TABLET | Refills: 0 | Status: SHIPPED | OUTPATIENT
Start: 2023-02-04

## 2023-02-04 RX ORDER — HYDROCHLOROTHIAZIDE 12.5 MG/1
12.5 TABLET ORAL ONCE
Status: COMPLETED | OUTPATIENT
Start: 2023-02-04 | End: 2023-02-04

## 2023-02-04 RX ORDER — AMLODIPINE BESYLATE 5 MG/1
5 TABLET ORAL DAILY
Status: DISCONTINUED | OUTPATIENT
Start: 2023-02-04 | End: 2023-02-04

## 2023-02-04 NOTE — PLAN OF CARE
Alert and oriented x4 on tele monitor hr 70's sinus rhythm. Refused bipap tonight. Placed on o2 2l nc in used. Denies any pain. Updated w/ poc and verbalized understanding. All needs attended and will continue to monitor. Call light within reach. Problem: CARDIOVASCULAR - ADULT  Goal: Maintains optimal cardiac output and hemodynamic stability  Description: INTERVENTIONS:  - Monitor vital signs, rhythm, and trends  - Monitor for bleeding, hypotension and signs of decreased cardiac output  - Evaluate effectiveness of vasoactive medications to optimize hemodynamic stability  - Monitor arterial and/or venous puncture sites for bleeding and/or hematoma  - Assess quality of pulses, skin color and temperature  - Assess for signs of decreased coronary artery perfusion - ex.  Angina  - Evaluate fluid balance, assess for edema, trend weights  Outcome: Progressing  Goal: Absence of cardiac arrhythmias or at baseline  Description: INTERVENTIONS:  - Continuous cardiac monitoring, monitor vital signs, obtain 12 lead EKG if indicated  - Evaluate effectiveness of antiarrhythmic and heart rate control medications as ordered  - Initiate emergency measures for life threatening arrhythmias  - Monitor electrolytes and administer replacement therapy as ordered  Outcome: Progressing     Problem: RESPIRATORY - ADULT  Goal: Achieves optimal ventilation and oxygenation  Description: INTERVENTIONS:  - Assess for changes in respiratory status  - Assess for changes in mentation and behavior  - Position to facilitate oxygenation and minimize respiratory effort  - Oxygen supplementation based on oxygen saturation or ABGs  - Provide Smoking Cessation handout, if applicable  - Encourage broncho-pulmonary hygiene including cough, deep breathe, Incentive Spirometry  - Assess the need for suctioning and perform as needed  - Assess and instruct to report SOB or any respiratory difficulty  - Respiratory Therapy support as indicated  - Manage/alleviate anxiety  - Monitor for signs/symptoms of CO2 retention  Outcome: Progressing     Problem: METABOLIC/FLUID AND ELECTROLYTES - ADULT  Goal: Glucose maintained within prescribed range  Description: INTERVENTIONS:  - Monitor Blood Glucose as ordered  - Assess for signs and symptoms of hyperglycemia and hypoglycemia  - Administer ordered medications to maintain glucose within target range  - Assess barriers to adequate nutritional intake and initiate nutrition consult as needed  - Instruct patient on self management of diabetes  Outcome: Progressing     Problem: Patient/Family Goals  Goal: Patient/Family Long Term Goal  Description: Patient's Long Term Goal: to go home    Interventions:  - diuretics, consults, labs, tele monitoring, increase activity, wean O2      - See additional Care Plan goals for specific interventions  Outcome: Progressing  Goal: Patient/Family Short Term Goal  Description: Patient's Short Term Goal: feel better    Interventions:   - - diuretics, consults, labs, tele monitoring, increase activity, wean O2    - See additional Care Plan goals for specific interventions  Outcome: Progressing

## 2023-02-04 NOTE — PLAN OF CARE
Received pt at 0730. AxOx4. 2L O2 w stats maintaining >90%. Denies pain/SOB. Vitals stable. SBP elevated this am, amlodipine added per cards. NSR on tele. Please refer to flowsheets for further assessments. Plan to dc to [facility] today. Plan of care updated with pt, questions answered, verbalized understanding. Safety precautions in place. Instructed to call staff for help. Will continue to monitor. Problem: CARDIOVASCULAR - ADULT  Goal: Maintains optimal cardiac output and hemodynamic stability  Description: INTERVENTIONS:  - Monitor vital signs, rhythm, and trends  - Monitor for bleeding, hypotension and signs of decreased cardiac output  - Evaluate effectiveness of vasoactive medications to optimize hemodynamic stability  - Monitor arterial and/or venous puncture sites for bleeding and/or hematoma  - Assess quality of pulses, skin color and temperature  - Assess for signs of decreased coronary artery perfusion - ex.  Angina  - Evaluate fluid balance, assess for edema, trend weights  Outcome: Progressing  Goal: Absence of cardiac arrhythmias or at baseline  Description: INTERVENTIONS:  - Continuous cardiac monitoring, monitor vital signs, obtain 12 lead EKG if indicated  - Evaluate effectiveness of antiarrhythmic and heart rate control medications as ordered  - Initiate emergency measures for life threatening arrhythmias  - Monitor electrolytes and administer replacement therapy as ordered  Outcome: Progressing     Problem: RESPIRATORY - ADULT  Goal: Achieves optimal ventilation and oxygenation  Description: INTERVENTIONS:  - Assess for changes in respiratory status  - Assess for changes in mentation and behavior  - Position to facilitate oxygenation and minimize respiratory effort  - Oxygen supplementation based on oxygen saturation or ABGs  - Provide Smoking Cessation handout, if applicable  - Encourage broncho-pulmonary hygiene including cough, deep breathe, Incentive Spirometry  - Assess the need for suctioning and perform as needed  - Assess and instruct to report SOB or any respiratory difficulty  - Respiratory Therapy support as indicated  - Manage/alleviate anxiety  - Monitor for signs/symptoms of CO2 retention  Outcome: Progressing     Problem: METABOLIC/FLUID AND ELECTROLYTES - ADULT  Goal: Glucose maintained within prescribed range  Description: INTERVENTIONS:  - Monitor Blood Glucose as ordered  - Assess for signs and symptoms of hyperglycemia and hypoglycemia  - Administer ordered medications to maintain glucose within target range  - Assess barriers to adequate nutritional intake and initiate nutrition consult as needed  - Instruct patient on self management of diabetes  Outcome: Progressing     Problem: Patient/Family Goals  Goal: Patient/Family Long Term Goal  Description: Patient's Long Term Goal: to go home    Interventions:  - diuretics, consults, labs, tele monitoring, increase activity, wean O2      - See additional Care Plan goals for specific interventions  Outcome: Progressing  Goal: Patient/Family Short Term Goal  Description: Patient's Short Term Goal: feel better    Interventions:   - - diuretics, consults, labs, tele monitoring, increase activity, wean O2    - See additional Care Plan goals for specific interventions  Outcome: Progressing

## 2023-02-04 NOTE — PROGRESS NOTES
Called [facility] RN for report. Explained discharge instructions including medications and follow-ups. Copy of POLST sent to [facility], original sent w family. IV removed, tele monitor discontinued. [facility] [address] [address]

## 2023-02-17 ENCOUNTER — PATIENT OUTREACH (OUTPATIENT)
Dept: CASE MANAGEMENT | Age: 83
End: 2023-02-17

## 2023-02-17 DIAGNOSIS — Z02.9 ENCOUNTERS FOR ADMINISTRATIVE PURPOSE: ICD-10-CM

## 2023-02-23 ENCOUNTER — TELEPHONE (OUTPATIENT)
Dept: FAMILY MEDICINE CLINIC | Facility: CLINIC | Age: 83
End: 2023-02-23

## 2023-02-23 ENCOUNTER — OFFICE VISIT (OUTPATIENT)
Dept: FAMILY MEDICINE CLINIC | Facility: CLINIC | Age: 83
End: 2023-02-23
Payer: MEDICARE

## 2023-02-23 VITALS
DIASTOLIC BLOOD PRESSURE: 60 MMHG | OXYGEN SATURATION: 96 % | TEMPERATURE: 97 F | HEIGHT: 63.39 IN | SYSTOLIC BLOOD PRESSURE: 124 MMHG | BODY MASS INDEX: 24.5 KG/M2 | HEART RATE: 76 BPM | WEIGHT: 140 LBS

## 2023-02-23 DIAGNOSIS — M10.272 ACUTE DRUG-INDUCED GOUT OF LEFT FOOT: ICD-10-CM

## 2023-02-23 DIAGNOSIS — N18.31 TYPE 2 DIABETES MELLITUS WITH STAGE 3A CHRONIC KIDNEY DISEASE, WITHOUT LONG-TERM CURRENT USE OF INSULIN (HCC): Primary | ICD-10-CM

## 2023-02-23 DIAGNOSIS — J90 PLEURAL EFFUSION: ICD-10-CM

## 2023-02-23 DIAGNOSIS — E11.22 TYPE 2 DIABETES MELLITUS WITH STAGE 3A CHRONIC KIDNEY DISEASE, WITHOUT LONG-TERM CURRENT USE OF INSULIN (HCC): Primary | ICD-10-CM

## 2023-02-23 DIAGNOSIS — E11.22 TYPE 2 DIABETES MELLITUS WITH STAGE 3A CHRONIC KIDNEY DISEASE, WITHOUT LONG-TERM CURRENT USE OF INSULIN (HCC): ICD-10-CM

## 2023-02-23 DIAGNOSIS — I82.4Y2 ACUTE DEEP VEIN THROMBOSIS (DVT) OF PROXIMAL VEIN OF LEFT LOWER EXTREMITY (HCC): ICD-10-CM

## 2023-02-23 DIAGNOSIS — N18.31 TYPE 2 DIABETES MELLITUS WITH STAGE 3A CHRONIC KIDNEY DISEASE, WITHOUT LONG-TERM CURRENT USE OF INSULIN (HCC): ICD-10-CM

## 2023-02-23 DIAGNOSIS — I10 ESSENTIAL HYPERTENSION: ICD-10-CM

## 2023-02-23 DIAGNOSIS — I26.94 MULTIPLE SUBSEGMENTAL PULMONARY EMBOLI WITHOUT ACUTE COR PULMONALE (HCC): ICD-10-CM

## 2023-02-23 DIAGNOSIS — I50.33 ACUTE ON CHRONIC DIASTOLIC CONGESTIVE HEART FAILURE (HCC): Primary | ICD-10-CM

## 2023-02-23 PROCEDURE — 99215 OFFICE O/P EST HI 40 MIN: CPT | Performed by: FAMILY MEDICINE

## 2023-02-23 PROCEDURE — 3074F SYST BP LT 130 MM HG: CPT | Performed by: FAMILY MEDICINE

## 2023-02-23 PROCEDURE — 3078F DIAST BP <80 MM HG: CPT | Performed by: FAMILY MEDICINE

## 2023-02-23 PROCEDURE — 1111F DSCHRG MED/CURRENT MED MERGE: CPT | Performed by: FAMILY MEDICINE

## 2023-02-23 PROCEDURE — 3008F BODY MASS INDEX DOCD: CPT | Performed by: FAMILY MEDICINE

## 2023-02-23 NOTE — TELEPHONE ENCOUNTER
Radha from [facility] requesting referral for DM check up       Patient has appt 2/28 please write referral for 3 visits    Dr Pieter Alcala  Fax# [phone]

## 2023-02-23 NOTE — PATIENT INSTRUCTIONS
Continue all meds as prescribed    Call to schedule appt with hematologist    Followup with me in 2 months, sooner if needed    Call [facility] to see if you have a preferred lab or if you can go to [facility] for bloodwork

## 2023-03-09 ENCOUNTER — TELEPHONE (OUTPATIENT)
Dept: FAMILY MEDICINE CLINIC | Facility: CLINIC | Age: 83
End: 2023-03-09

## 2023-03-16 RX ORDER — GLIMEPIRIDE 4 MG/1
TABLET ORAL
Qty: 90 TABLET | Refills: 0 | Status: SHIPPED | OUTPATIENT
Start: 2023-03-16

## 2023-03-17 ENCOUNTER — OFFICE VISIT (OUTPATIENT)
Dept: HEMATOLOGY/ONCOLOGY | Facility: HOSPITAL | Age: 83
End: 2023-03-17
Attending: SPECIALIST
Payer: MEDICARE

## 2023-03-17 VITALS
DIASTOLIC BLOOD PRESSURE: 67 MMHG | BODY MASS INDEX: 24.32 KG/M2 | SYSTOLIC BLOOD PRESSURE: 178 MMHG | RESPIRATION RATE: 16 BRPM | WEIGHT: 139 LBS | HEART RATE: 68 BPM | OXYGEN SATURATION: 95 % | TEMPERATURE: 97 F | HEIGHT: 63.39 IN

## 2023-03-17 DIAGNOSIS — I82.419 ACUTE DEEP VEIN THROMBOSIS (DVT) OF FEMORAL VEIN, UNSPECIFIED LATERALITY (HCC): ICD-10-CM

## 2023-03-17 DIAGNOSIS — I26.94 MULTIPLE SUBSEGMENTAL PULMONARY EMBOLI WITHOUT ACUTE COR PULMONALE (HCC): ICD-10-CM

## 2023-03-17 DIAGNOSIS — I26.09 ACUTE PULMONARY EMBOLISM WITH ACUTE COR PULMONALE, UNSPECIFIED PULMONARY EMBOLISM TYPE (HCC): Primary | ICD-10-CM

## 2023-03-17 DIAGNOSIS — I82.431 ACUTE DEEP VEIN THROMBOSIS (DVT) OF POPLITEAL VEIN OF RIGHT LOWER EXTREMITY (HCC): ICD-10-CM

## 2023-03-17 DIAGNOSIS — Z79.01 ON ANTICOAGULANT THERAPY: ICD-10-CM

## 2023-03-17 PROCEDURE — 99205 OFFICE O/P NEW HI 60 MIN: CPT | Performed by: SPECIALIST

## 2023-03-17 NOTE — PROGRESS NOTES
Patient is here today for Consult with [facility] for DVT - Eliquis Anticoagulation. Patient denies pain. Medication list and medical history were reviewed and updated. Education Record    Learner:  Patient and daughter    Disease / Diagnosis: Consult DVT    Barriers / Limitations:  None   Comments:    Method:  Brief focused, Discussion, Printed material and Reinforcement   Comments:    General Topics:  Medication, Pain, Procedure and Plan of care reviewed   Comments:    Outcome:  Shows understanding   Comments:    AVS provided and follow up reviewed. Patient instructed to call as needed.

## 2023-03-21 ENCOUNTER — HOME HEALTH CHARGES (OUTPATIENT)
Dept: FAMILY MEDICINE CLINIC | Facility: CLINIC | Age: 83
End: 2023-03-21

## 2023-03-21 ENCOUNTER — MED REC SCAN ONLY (OUTPATIENT)
Dept: FAMILY MEDICINE CLINIC | Facility: CLINIC | Age: 83
End: 2023-03-21

## 2023-03-21 DIAGNOSIS — E11.22 TYPE 2 DIABETES MELLITUS WITH DIABETIC CHRONIC KIDNEY DISEASE, UNSPECIFIED CKD STAGE, UNSPECIFIED WHETHER LONG TERM INSULIN USE (HCC): ICD-10-CM

## 2023-03-21 DIAGNOSIS — I13.0: Primary | ICD-10-CM

## 2023-03-21 DIAGNOSIS — I50.9 HEART FAILURE, UNSPECIFIED HF CHRONICITY, UNSPECIFIED HEART FAILURE TYPE (HCC): ICD-10-CM

## 2023-03-21 DIAGNOSIS — N18.9 CHRONIC KIDNEY DISEASE, UNSPECIFIED CKD STAGE: ICD-10-CM

## 2023-03-23 ENCOUNTER — TELEPHONE (OUTPATIENT)
Dept: FAMILY MEDICINE CLINIC | Facility: CLINIC | Age: 83
End: 2023-03-23

## 2023-03-23 NOTE — TELEPHONE ENCOUNTER
I wouldn't worry about it - as she is being treated for the blood clot    Ok to wait     If worsening significantly or causing pain before upcoming appt, let me know

## 2023-03-23 NOTE — TELEPHONE ENCOUNTER
Patient daughter calling states patient has noticed bumpy area on side of thigh  Is worried because she just had a blood clot

## 2023-03-23 NOTE — TELEPHONE ENCOUNTER
Shubhamchaitanya Gunderson describes what seem like varicose veins to bilateral outer thighs of patient. Veins protrude and are \"lumpy\". Denies pain or swelling. Denies warmth. She never noticed them before so was a little concerned that they just showed up. She does have FOV 4/25/23 and is ok to wait to show you if you are not concerned.

## 2023-03-24 RX ORDER — ESCITALOPRAM OXALATE 5 MG/1
TABLET ORAL
Qty: 90 TABLET | Refills: 0 | Status: SHIPPED | OUTPATIENT
Start: 2023-03-24

## 2023-04-13 ENCOUNTER — TELEPHONE (OUTPATIENT)
Dept: FAMILY MEDICINE CLINIC | Facility: CLINIC | Age: 83
End: 2023-04-13

## 2023-04-13 NOTE — TELEPHONE ENCOUNTER
Agree with instructions  If doesn't hear from cardiologist, can cut atenolol dose in half (from whatever dose she is currently taking) in the meantime
Informed Lottie Cat
Patient daughter called requesting to talk to nurse, states moms pulse has been at 40 last night and at 47 today. Mom is in an oxygen.      [phone] Danya Kelly    Please advise     Thank you
Spoke to daughter. States Ciarra's HR has been in the 40s since yesterday. States she feels fine. No dizziness or shortness of breath or chest pains. She has had a recent dose change in her atenolol. I advised she contact cardiologist office. She will do that now. I advised for any symptoms to take her directly to ER. Please advise further if needed.
call  PACU  [phone]/OSKARJ ASU (Adult):

## 2023-04-16 DIAGNOSIS — E03.9 ACQUIRED HYPOTHYROIDISM: ICD-10-CM

## 2023-04-17 RX ORDER — LEVOTHYROXINE SODIUM 0.07 MG/1
TABLET ORAL
Qty: 90 TABLET | Refills: 1 | Status: SHIPPED | OUTPATIENT
Start: 2023-04-17

## 2023-04-18 ENCOUNTER — HOSPITAL ENCOUNTER (OUTPATIENT)
Dept: GENERAL RADIOLOGY | Age: 83
Discharge: HOME | End: 2023-04-18
Attending: FAMILY MEDICINE
Payer: MEDICARE

## 2023-04-18 DIAGNOSIS — R06.02 SOB (SHORTNESS OF BREATH): ICD-10-CM

## 2023-04-18 PROCEDURE — 71046 X-RAY EXAM CHEST 2 VIEWS: CPT | Performed by: FAMILY MEDICINE

## 2023-04-25 ENCOUNTER — OFFICE VISIT (OUTPATIENT)
Dept: FAMILY MEDICINE CLINIC | Facility: CLINIC | Age: 83
End: 2023-04-25
Payer: MEDICARE

## 2023-04-25 VITALS
OXYGEN SATURATION: 96 % | TEMPERATURE: 97 F | BODY MASS INDEX: 23.97 KG/M2 | HEIGHT: 63.39 IN | SYSTOLIC BLOOD PRESSURE: 130 MMHG | DIASTOLIC BLOOD PRESSURE: 80 MMHG | HEART RATE: 66 BPM | WEIGHT: 137 LBS

## 2023-04-25 DIAGNOSIS — I13.0: ICD-10-CM

## 2023-04-25 DIAGNOSIS — I26.94 MULTIPLE SUBSEGMENTAL PULMONARY EMBOLI WITHOUT ACUTE COR PULMONALE (HCC): ICD-10-CM

## 2023-04-25 DIAGNOSIS — I10 ESSENTIAL HYPERTENSION: ICD-10-CM

## 2023-04-25 DIAGNOSIS — R00.1 BRADYCARDIA: ICD-10-CM

## 2023-04-25 DIAGNOSIS — I50.33 ACUTE ON CHRONIC DIASTOLIC CONGESTIVE HEART FAILURE (HCC): ICD-10-CM

## 2023-04-25 DIAGNOSIS — E11.22 TYPE 2 DIABETES MELLITUS WITH DIABETIC CHRONIC KIDNEY DISEASE, UNSPECIFIED CKD STAGE, UNSPECIFIED WHETHER LONG TERM INSULIN USE (HCC): Primary | ICD-10-CM

## 2023-04-25 DIAGNOSIS — I82.4Y2 ACUTE DEEP VEIN THROMBOSIS (DVT) OF PROXIMAL VEIN OF LEFT LOWER EXTREMITY (HCC): ICD-10-CM

## 2023-04-25 DIAGNOSIS — E03.9 ACQUIRED HYPOTHYROIDISM: ICD-10-CM

## 2023-04-25 PROBLEM — J41.0 SMOKERS' COUGH (HCC): Chronic | Status: ACTIVE | Noted: 2023-04-25

## 2023-04-25 LAB
ALBUMIN SERPL-MCNC: 3.6 G/DL (ref 3.4–5)
ALBUMIN/GLOB SERPL: 0.8 {RATIO} (ref 1–2)
ALP LIVER SERPL-CCNC: 59 U/L
ALT SERPL-CCNC: 31 U/L
ANION GAP SERPL CALC-SCNC: 7 MMOL/L (ref 0–18)
AST SERPL-CCNC: 15 U/L (ref 15–37)
BASOPHILS # BLD AUTO: 0.03 X10(3) UL (ref 0–0.2)
BASOPHILS NFR BLD AUTO: 0.4 %
BILIRUB SERPL-MCNC: 0.4 MG/DL (ref 0.1–2)
BUN BLD-MCNC: 33 MG/DL (ref 7–18)
CALCIUM BLD-MCNC: 9.5 MG/DL (ref 8.5–10.1)
CHLORIDE SERPL-SCNC: 105 MMOL/L (ref 98–112)
CHOLEST SERPL-MCNC: 141 MG/DL (ref ?–200)
CO2 SERPL-SCNC: 24 MMOL/L (ref 21–32)
CREAT BLD-MCNC: 1.33 MG/DL
CREAT UR-SCNC: <13 MG/DL
EOSINOPHIL # BLD AUTO: 0.38 X10(3) UL (ref 0–0.7)
EOSINOPHIL NFR BLD AUTO: 4.8 %
ERYTHROCYTE [DISTWIDTH] IN BLOOD BY AUTOMATED COUNT: 21 %
EST. AVERAGE GLUCOSE BLD GHB EST-MCNC: 134 MG/DL (ref 68–126)
FASTING PATIENT LIPID ANSWER: YES
FASTING STATUS PATIENT QL REPORTED: YES
GFR SERPLBLD BASED ON 1.73 SQ M-ARVRAT: 39 ML/MIN/1.73M2 (ref 60–?)
GLOBULIN PLAS-MCNC: 4.3 G/DL (ref 2.8–4.4)
GLUCOSE BLD-MCNC: 110 MG/DL (ref 70–99)
HBA1C MFR BLD: 6.3 % (ref ?–5.7)
HCT VFR BLD AUTO: 40 %
HDLC SERPL-MCNC: 56 MG/DL (ref 40–59)
HGB BLD-MCNC: 12.3 G/DL
IMM GRANULOCYTES # BLD AUTO: 0.03 X10(3) UL (ref 0–1)
IMM GRANULOCYTES NFR BLD: 0.4 %
LDLC SERPL CALC-MCNC: 65 MG/DL (ref ?–100)
LYMPHOCYTES # BLD AUTO: 2.11 X10(3) UL (ref 1–4)
LYMPHOCYTES NFR BLD AUTO: 26.7 %
MCH RBC QN AUTO: 27.2 PG (ref 26–34)
MCHC RBC AUTO-ENTMCNC: 30.8 G/DL (ref 31–37)
MCV RBC AUTO: 88.5 FL
MICROALBUMIN UR-MCNC: 63.8 MG/DL
MONOCYTES # BLD AUTO: 0.53 X10(3) UL (ref 0.1–1)
MONOCYTES NFR BLD AUTO: 6.7 %
NEUTROPHILS # BLD AUTO: 4.82 X10 (3) UL (ref 1.5–7.7)
NEUTROPHILS # BLD AUTO: 4.82 X10(3) UL (ref 1.5–7.7)
NEUTROPHILS NFR BLD AUTO: 61 %
NONHDLC SERPL-MCNC: 85 MG/DL (ref ?–130)
OSMOLALITY SERPL CALC.SUM OF ELEC: 290 MOSM/KG (ref 275–295)
PLATELET # BLD AUTO: 232 10(3)UL (ref 150–450)
POTASSIUM SERPL-SCNC: 4.7 MMOL/L (ref 3.5–5.1)
PROT SERPL-MCNC: 7.9 G/DL (ref 6.4–8.2)
RBC # BLD AUTO: 4.52 X10(6)UL
SODIUM SERPL-SCNC: 136 MMOL/L (ref 136–145)
T4 FREE SERPL-MCNC: 1.2 NG/DL (ref 0.8–1.7)
TRIGL SERPL-MCNC: 114 MG/DL (ref 30–149)
TSI SER-ACNC: 2.53 MIU/ML (ref 0.36–3.74)
URATE SERPL-MCNC: 8.2 MG/DL
VLDLC SERPL CALC-MCNC: 17 MG/DL (ref 0–30)
WBC # BLD AUTO: 7.9 X10(3) UL (ref 4–11)

## 2023-04-25 PROCEDURE — 83036 HEMOGLOBIN GLYCOSYLATED A1C: CPT | Performed by: FAMILY MEDICINE

## 2023-04-25 PROCEDURE — 82570 ASSAY OF URINE CREATININE: CPT | Performed by: FAMILY MEDICINE

## 2023-04-25 PROCEDURE — 82043 UR ALBUMIN QUANTITATIVE: CPT | Performed by: FAMILY MEDICINE

## 2023-04-25 PROCEDURE — 80053 COMPREHEN METABOLIC PANEL: CPT | Performed by: FAMILY MEDICINE

## 2023-04-25 PROCEDURE — 84550 ASSAY OF BLOOD/URIC ACID: CPT | Performed by: FAMILY MEDICINE

## 2023-04-25 PROCEDURE — 3008F BODY MASS INDEX DOCD: CPT | Performed by: FAMILY MEDICINE

## 2023-04-25 PROCEDURE — 3075F SYST BP GE 130 - 139MM HG: CPT | Performed by: FAMILY MEDICINE

## 2023-04-25 PROCEDURE — 3079F DIAST BP 80-89 MM HG: CPT | Performed by: FAMILY MEDICINE

## 2023-04-25 PROCEDURE — 84439 ASSAY OF FREE THYROXINE: CPT | Performed by: FAMILY MEDICINE

## 2023-04-25 PROCEDURE — 99215 OFFICE O/P EST HI 40 MIN: CPT | Performed by: FAMILY MEDICINE

## 2023-04-25 PROCEDURE — 80061 LIPID PANEL: CPT | Performed by: FAMILY MEDICINE

## 2023-04-25 PROCEDURE — 85025 COMPLETE CBC W/AUTO DIFF WBC: CPT | Performed by: FAMILY MEDICINE

## 2023-04-25 PROCEDURE — 84443 ASSAY THYROID STIM HORMONE: CPT | Performed by: FAMILY MEDICINE

## 2023-04-25 RX ORDER — ATENOLOL 25 MG/1
25 TABLET ORAL DAILY
COMMUNITY
End: 2023-04-28

## 2023-04-25 NOTE — PATIENT INSTRUCTIONS
Continue all meds as prescribed    If heart rate continues to go down to 40s frequently, call cardiologist to let them know    Followup with me in August, touch base sooner if needed

## 2023-04-27 ENCOUNTER — HOSPITAL ENCOUNTER (OUTPATIENT)
Facility: HOSPITAL | Age: 83
Setting detail: OBSERVATION
Discharge: HOME | End: 2023-04-28
Attending: EMERGENCY MEDICINE | Admitting: HOSPITALIST
Payer: MEDICARE

## 2023-04-27 ENCOUNTER — APPOINTMENT (OUTPATIENT)
Dept: GENERAL RADIOLOGY | Facility: HOSPITAL | Age: 83
End: 2023-04-27
Attending: EMERGENCY MEDICINE
Payer: MEDICARE

## 2023-04-27 DIAGNOSIS — I50.9 ACUTE ON CHRONIC CONGESTIVE HEART FAILURE, UNSPECIFIED HEART FAILURE TYPE (HCC): Primary | ICD-10-CM

## 2023-04-27 DIAGNOSIS — R00.1 BRADYCARDIA: ICD-10-CM

## 2023-04-27 LAB
ALBUMIN SERPL-MCNC: 3.4 G/DL (ref 3.4–5)
ALBUMIN/GLOB SERPL: 0.8 {RATIO} (ref 1–2)
ALP LIVER SERPL-CCNC: 60 U/L
ALT SERPL-CCNC: 22 U/L
ANION GAP SERPL CALC-SCNC: 9 MMOL/L (ref 0–18)
AST SERPL-CCNC: 16 U/L (ref 15–37)
BASOPHILS # BLD AUTO: 0.03 X10(3) UL (ref 0–0.2)
BASOPHILS NFR BLD AUTO: 0.4 %
BILIRUB SERPL-MCNC: 0.2 MG/DL (ref 0.1–2)
BUN BLD-MCNC: 39 MG/DL (ref 7–18)
CALCIUM BLD-MCNC: 9.1 MG/DL (ref 8.5–10.1)
CHLORIDE SERPL-SCNC: 103 MMOL/L (ref 98–112)
CO2 SERPL-SCNC: 26 MMOL/L (ref 21–32)
CREAT BLD-MCNC: 1.53 MG/DL
EOSINOPHIL # BLD AUTO: 0.24 X10(3) UL (ref 0–0.7)
EOSINOPHIL NFR BLD AUTO: 3.4 %
ERYTHROCYTE [DISTWIDTH] IN BLOOD BY AUTOMATED COUNT: 20.2 %
GFR SERPLBLD BASED ON 1.73 SQ M-ARVRAT: 33 ML/MIN/1.73M2 (ref 60–?)
GLOBULIN PLAS-MCNC: 4.4 G/DL (ref 2.8–4.4)
GLUCOSE BLD-MCNC: 134 MG/DL (ref 70–99)
GLUCOSE BLD-MCNC: 145 MG/DL (ref 70–99)
GLUCOSE BLD-MCNC: 256 MG/DL (ref 70–99)
HCT VFR BLD AUTO: 39.4 %
HGB BLD-MCNC: 12.5 G/DL
IMM GRANULOCYTES # BLD AUTO: 0.02 X10(3) UL (ref 0–1)
IMM GRANULOCYTES NFR BLD: 0.3 %
INR BLD: 1.3 (ref 0.85–1.16)
LYMPHOCYTES # BLD AUTO: 2.32 X10(3) UL (ref 1–4)
LYMPHOCYTES NFR BLD AUTO: 32.6 %
MAGNESIUM SERPL-MCNC: 2 MG/DL (ref 1.6–2.6)
MCH RBC QN AUTO: 27.5 PG (ref 26–34)
MCHC RBC AUTO-ENTMCNC: 31.7 G/DL (ref 31–37)
MCV RBC AUTO: 86.8 FL
MONOCYTES # BLD AUTO: 0.53 X10(3) UL (ref 0.1–1)
MONOCYTES NFR BLD AUTO: 7.5 %
NEUTROPHILS # BLD AUTO: 3.97 X10 (3) UL (ref 1.5–7.7)
NEUTROPHILS # BLD AUTO: 3.97 X10(3) UL (ref 1.5–7.7)
NEUTROPHILS NFR BLD AUTO: 55.8 %
NT-PROBNP SERPL-MCNC: 3083 PG/ML (ref ?–450)
OSMOLALITY SERPL CALC.SUM OF ELEC: 297 MOSM/KG (ref 275–295)
PLATELET # BLD AUTO: 263 10(3)UL (ref 150–450)
POTASSIUM SERPL-SCNC: 4.4 MMOL/L (ref 3.5–5.1)
PROT SERPL-MCNC: 7.8 G/DL (ref 6.4–8.2)
PROTHROMBIN TIME: 16.1 SECONDS (ref 11.6–14.8)
RBC # BLD AUTO: 4.54 X10(6)UL
SODIUM SERPL-SCNC: 138 MMOL/L (ref 136–145)
TROPONIN I HIGH SENSITIVITY: 14 NG/L
TSI SER-ACNC: 3.13 MIU/ML (ref 0.36–3.74)
WBC # BLD AUTO: 7.1 X10(3) UL (ref 4–11)

## 2023-04-27 PROCEDURE — 71045 X-RAY EXAM CHEST 1 VIEW: CPT | Performed by: EMERGENCY MEDICINE

## 2023-04-27 PROCEDURE — 99223 1ST HOSP IP/OBS HIGH 75: CPT | Performed by: INTERNAL MEDICINE

## 2023-04-27 RX ORDER — METOCLOPRAMIDE HYDROCHLORIDE 5 MG/ML
5 INJECTION INTRAMUSCULAR; INTRAVENOUS EVERY 8 HOURS PRN
Status: DISCONTINUED | OUTPATIENT
Start: 2023-04-27 | End: 2023-04-28

## 2023-04-27 RX ORDER — ACETAMINOPHEN 500 MG
500 TABLET ORAL EVERY 4 HOURS PRN
Status: DISCONTINUED | OUTPATIENT
Start: 2023-04-27 | End: 2023-04-28

## 2023-04-27 RX ORDER — LEVOTHYROXINE SODIUM 0.07 MG/1
75 TABLET ORAL
Status: DISCONTINUED | OUTPATIENT
Start: 2023-04-28 | End: 2023-04-28

## 2023-04-27 RX ORDER — NICOTINE POLACRILEX 4 MG
15 LOZENGE BUCCAL
Status: DISCONTINUED | OUTPATIENT
Start: 2023-04-27 | End: 2023-04-28

## 2023-04-27 RX ORDER — SENNOSIDES 8.6 MG
17.2 TABLET ORAL NIGHTLY PRN
Status: DISCONTINUED | OUTPATIENT
Start: 2023-04-27 | End: 2023-04-28

## 2023-04-27 RX ORDER — GABAPENTIN 100 MG/1
100 CAPSULE ORAL 3 TIMES DAILY
Status: DISCONTINUED | OUTPATIENT
Start: 2023-04-27 | End: 2023-04-28

## 2023-04-27 RX ORDER — FUROSEMIDE 10 MG/ML
40 INJECTION INTRAMUSCULAR; INTRAVENOUS ONCE
Status: COMPLETED | OUTPATIENT
Start: 2023-04-27 | End: 2023-04-27

## 2023-04-27 RX ORDER — FUROSEMIDE 10 MG/ML
40 INJECTION INTRAMUSCULAR; INTRAVENOUS
Status: DISCONTINUED | OUTPATIENT
Start: 2023-04-28 | End: 2023-04-28

## 2023-04-27 RX ORDER — ATORVASTATIN CALCIUM 10 MG/1
10 TABLET, FILM COATED ORAL NIGHTLY
Status: DISCONTINUED | OUTPATIENT
Start: 2023-04-27 | End: 2023-04-28

## 2023-04-27 RX ORDER — MELATONIN
3 NIGHTLY PRN
Status: DISCONTINUED | OUTPATIENT
Start: 2023-04-27 | End: 2023-04-28

## 2023-04-27 RX ORDER — BUTALBITAL, ACETAMINOPHEN AND CAFFEINE 50; 325; 40 MG/1; MG/1; MG/1
1 TABLET ORAL EVERY 4 HOURS PRN
Status: DISCONTINUED | OUTPATIENT
Start: 2023-04-27 | End: 2023-04-28

## 2023-04-27 RX ORDER — DEXTROSE MONOHYDRATE 25 G/50ML
50 INJECTION, SOLUTION INTRAVENOUS
Status: DISCONTINUED | OUTPATIENT
Start: 2023-04-27 | End: 2023-04-28

## 2023-04-27 RX ORDER — BENZONATATE 200 MG/1
200 CAPSULE ORAL 3 TIMES DAILY PRN
Status: DISCONTINUED | OUTPATIENT
Start: 2023-04-27 | End: 2023-04-28

## 2023-04-27 RX ORDER — BISACODYL 10 MG
10 SUPPOSITORY, RECTAL RECTAL
Status: DISCONTINUED | OUTPATIENT
Start: 2023-04-27 | End: 2023-04-28

## 2023-04-27 RX ORDER — NICOTINE POLACRILEX 4 MG
30 LOZENGE BUCCAL
Status: DISCONTINUED | OUTPATIENT
Start: 2023-04-27 | End: 2023-04-28

## 2023-04-27 RX ORDER — ATENOLOL 25 MG/1
25 TABLET ORAL
Status: DISCONTINUED | OUTPATIENT
Start: 2023-04-28 | End: 2023-04-28

## 2023-04-27 RX ORDER — AMLODIPINE BESYLATE 5 MG/1
5 TABLET ORAL DAILY
Status: DISCONTINUED | OUTPATIENT
Start: 2023-04-28 | End: 2023-04-28

## 2023-04-27 RX ORDER — ECHINACEA PURPUREA EXTRACT 125 MG
1 TABLET ORAL
Status: DISCONTINUED | OUTPATIENT
Start: 2023-04-27 | End: 2023-04-28

## 2023-04-27 RX ORDER — ESCITALOPRAM OXALATE 5 MG/1
5 TABLET ORAL DAILY
Status: DISCONTINUED | OUTPATIENT
Start: 2023-04-28 | End: 2023-04-28

## 2023-04-27 RX ORDER — ONDANSETRON 2 MG/ML
4 INJECTION INTRAMUSCULAR; INTRAVENOUS EVERY 6 HOURS PRN
Status: DISCONTINUED | OUTPATIENT
Start: 2023-04-27 | End: 2023-04-28

## 2023-04-27 RX ORDER — LISINOPRIL 5 MG/1
5 TABLET ORAL DAILY
Status: DISCONTINUED | OUTPATIENT
Start: 2023-04-28 | End: 2023-04-28

## 2023-04-27 RX ORDER — POLYETHYLENE GLYCOL 3350 17 G/17G
17 POWDER, FOR SOLUTION ORAL DAILY PRN
Status: DISCONTINUED | OUTPATIENT
Start: 2023-04-27 | End: 2023-04-28

## 2023-04-27 RX ORDER — HYDRALAZINE HYDROCHLORIDE 20 MG/ML
10 INJECTION INTRAMUSCULAR; INTRAVENOUS EVERY 6 HOURS PRN
Status: DISCONTINUED | OUTPATIENT
Start: 2023-04-27 | End: 2023-04-28

## 2023-04-27 NOTE — ED QUICK NOTES
Orders for admission, patient is aware of plan and ready to go upstairs.  Any questions, please call ED MOIRA green  at extension [phone]     Patient Covid vaccination status: Fully vaccinated     COVID Test Ordered in ED: None    COVID Suspicion at Admission: N/A    Running Infusions:  None    Mental Status/LOC at time of transport: a&ox4    Other pertinent information:   CIWA score: N/A   NIH score:  N/A

## 2023-04-27 NOTE — ED INITIAL ASSESSMENT (HPI)
Pt states her moms heart rate has been dropping, fluctuating x 3 weeks. Daughter states she called cardiology and was instructed to cut atenolol from 50mg to 25 mg. Pt has no complaints.

## 2023-04-28 VITALS
TEMPERATURE: 98 F | BODY MASS INDEX: 22.09 KG/M2 | RESPIRATION RATE: 20 BRPM | HEART RATE: 64 BPM | WEIGHT: 129.38 LBS | DIASTOLIC BLOOD PRESSURE: 57 MMHG | OXYGEN SATURATION: 96 % | SYSTOLIC BLOOD PRESSURE: 146 MMHG | HEIGHT: 64 IN

## 2023-04-28 LAB
ANION GAP SERPL CALC-SCNC: 4 MMOL/L (ref 0–18)
ATRIAL RATE: 49 BPM
BASOPHILS # BLD AUTO: 0.07 X10(3) UL (ref 0–0.2)
BASOPHILS NFR BLD AUTO: 1.1 %
BUN BLD-MCNC: 35 MG/DL (ref 7–18)
CALCIUM BLD-MCNC: 9.1 MG/DL (ref 8.5–10.1)
CHLORIDE SERPL-SCNC: 105 MMOL/L (ref 98–112)
CO2 SERPL-SCNC: 27 MMOL/L (ref 21–32)
CREAT BLD-MCNC: 1.41 MG/DL
EOSINOPHIL # BLD AUTO: 0.32 X10(3) UL (ref 0–0.7)
EOSINOPHIL NFR BLD AUTO: 4.9 %
ERYTHROCYTE [DISTWIDTH] IN BLOOD BY AUTOMATED COUNT: 20 %
GFR SERPLBLD BASED ON 1.73 SQ M-ARVRAT: 37 ML/MIN/1.73M2 (ref 60–?)
GLUCOSE BLD-MCNC: 112 MG/DL (ref 70–99)
GLUCOSE BLD-MCNC: 121 MG/DL (ref 70–99)
GLUCOSE BLD-MCNC: 205 MG/DL (ref 70–99)
GLUCOSE BLD-MCNC: 207 MG/DL (ref 70–99)
HCT VFR BLD AUTO: 38.1 %
HGB BLD-MCNC: 12.3 G/DL
IMM GRANULOCYTES # BLD AUTO: 0.02 X10(3) UL (ref 0–1)
IMM GRANULOCYTES NFR BLD: 0.3 %
LYMPHOCYTES # BLD AUTO: 1.93 X10(3) UL (ref 1–4)
LYMPHOCYTES NFR BLD AUTO: 29.6 %
MCH RBC QN AUTO: 27.8 PG (ref 26–34)
MCHC RBC AUTO-ENTMCNC: 32.3 G/DL (ref 31–37)
MCV RBC AUTO: 86 FL
MONOCYTES # BLD AUTO: 0.6 X10(3) UL (ref 0.1–1)
MONOCYTES NFR BLD AUTO: 9.2 %
NEUTROPHILS # BLD AUTO: 3.57 X10 (3) UL (ref 1.5–7.7)
NEUTROPHILS # BLD AUTO: 3.57 X10(3) UL (ref 1.5–7.7)
NEUTROPHILS NFR BLD AUTO: 54.9 %
OSMOLALITY SERPL CALC.SUM OF ELEC: 291 MOSM/KG (ref 275–295)
P AXIS: 39 DEGREES
P-R INTERVAL: 114 MS
PLATELET # BLD AUTO: 262 10(3)UL (ref 150–450)
POTASSIUM SERPL-SCNC: 3.9 MMOL/L (ref 3.5–5.1)
Q-T INTERVAL: 454 MS
QRS DURATION: 84 MS
QTC CALCULATION (BEZET): 410 MS
R AXIS: -15 DEGREES
RBC # BLD AUTO: 4.43 X10(6)UL
SODIUM SERPL-SCNC: 136 MMOL/L (ref 136–145)
T AXIS: 20 DEGREES
VENTRICULAR RATE: 49 BPM
WBC # BLD AUTO: 6.5 X10(3) UL (ref 4–11)

## 2023-04-28 PROCEDURE — 99239 HOSP IP/OBS DSCHRG MGMT >30: CPT | Performed by: STUDENT IN AN ORGANIZED HEALTH CARE EDUCATION/TRAINING PROGRAM

## 2023-04-28 RX ORDER — ATENOLOL 25 MG/1
25 TABLET ORAL
Status: DISCONTINUED | OUTPATIENT
Start: 2023-04-28 | End: 2023-04-28

## 2023-04-28 NOTE — PROGRESS NOTES
Patient ambulated in the halls, walked approximately 375 ft around unit. Starting HR was in the 60s, Brief moment where HR dipped to 49-50s, and immeditely recovered back to mid 60s. Ending HR in 76s. Patient denied any breathing difficulty or cardiac related symptoms.

## 2023-04-28 NOTE — PLAN OF CARE
Shift Note:  Assumed care of patient. Patient alert and oriented x4. Hard of hearing with bilateral hearing aids and  at the bedside. Also uses upper/lower dentures and glasses. Patient on room air, denies difficulty breathing, lung sounds diminished/clear. Denies any cardiac symptoms, NSR on tele. HR remained in 60s overnight and this morning, dipped down to upper 50s after med pass. Denies pain at this time. Continent of bowel and bladder, last BM 4/28. Ambulates with stand by assist and a walker, call light within reach, tolerating care well.      POC:  - Monitor HR  - Diurese // Daily weight

## 2023-04-28 NOTE — PROGRESS NOTES
Discharge Note:     Patient tele discontinued, IV discontinued with catheter intact. Patient discharge instructions reviewed with patient and family member, verbalize understanding. Patient escorted via wheelchair to the [facility] by this RN.

## 2023-04-28 NOTE — PROGRESS NOTES
Pt oriented to room. Call light within reach. Tele monitor on and VS taken. Head-to-toe assessment complete. Admission Navigators, including PTA medications, complete. Consults aware of proper medications and of new patient admission. Pt needs met by staff.

## 2023-05-01 ENCOUNTER — PATIENT OUTREACH (OUTPATIENT)
Dept: CASE MANAGEMENT | Age: 83
End: 2023-05-01

## 2023-05-01 ENCOUNTER — TELEPHONE (OUTPATIENT)
Dept: FAMILY MEDICINE CLINIC | Facility: CLINIC | Age: 83
End: 2023-05-01

## 2023-05-01 DIAGNOSIS — Z02.9 ENCOUNTERS FOR ADMINISTRATIVE PURPOSE: ICD-10-CM

## 2023-05-01 DIAGNOSIS — I50.9 ACUTE ON CHRONIC CONGESTIVE HEART FAILURE, UNSPECIFIED HEART FAILURE TYPE (HCC): ICD-10-CM

## 2023-05-01 DIAGNOSIS — I82.4Y2 ACUTE DEEP VEIN THROMBOSIS (DVT) OF PROXIMAL VEIN OF LEFT LOWER EXTREMITY (HCC): ICD-10-CM

## 2023-05-01 DIAGNOSIS — I50.33 ACUTE ON CHRONIC DIASTOLIC CONGESTIVE HEART FAILURE (HCC): Primary | ICD-10-CM

## 2023-05-01 DIAGNOSIS — R00.1 BRADYCARDIA: ICD-10-CM

## 2023-05-01 PROCEDURE — 1111F DSCHRG MED/CURRENT MED MERGE: CPT

## 2023-05-01 RX ORDER — LISINOPRIL 10 MG/1
10 TABLET ORAL DAILY
COMMUNITY
Start: 2023-05-01

## 2023-05-01 NOTE — TELEPHONE ENCOUNTER
1. Patient's daughter Sheri Gunderson concerned about elevated SBP which has been in the 150's all day. DBP is fine, ranging between 68 and 74. Atenolol was discontinued by Dr. Ry Michaels during hospital stay. Has left message with his office but response time is usually more than a day or 2. Sheri Gunderson is asking for guidance for elevated BP. 2. Referrals for both specialists updated.

## 2023-05-01 NOTE — TELEPHONE ENCOUNTER
Pt's daughter, Chava Schwarz calling to let Dr. Tasneem Hitchcock know her mom was in the hospital for 1 night and was just discharged. Pt needs to follow up with Cardiology and doesn't feel she needs to follow up with Dr. Tasneem Hitchcock at this time.

## 2023-05-01 NOTE — TELEPHONE ENCOUNTER
Daughter called   Referrals placed  Will increase lisinopril to 10mg daily for now (med list updated)  They will update us with BP readings in 1 month

## 2023-05-01 NOTE — TELEPHONE ENCOUNTER
Angi Oliva calling back to ask for referrals for upcoming appts to see Dr. Vangie Jones for 5/25/23 and Dr. Mamie Smith 7/25/23. Angi Oliva also mention the hospital took her mom off of Atenolol 25mg and now mom's BP has been high. Angi Oliva said her reading this morning was 151/73. Please advise. Thank you.

## 2023-05-01 NOTE — TELEPHONE ENCOUNTER
ED to hospital admission  4/27/23 to 4/28/23 for diuresis and medication adjustment. Discharge instructions include follow-ups with PCP and cardiologist.TCM order placed. Dx: acute on chronic CHF, bradycardia. Daughter Ayse Jeffers stated mom is going to follow up with Dr. Jake Hercules but doesn't feel she needs a f/u with Dr. Zackary Espinosa at this time. Please advise.

## 2023-05-25 ENCOUNTER — TELEPHONE (OUTPATIENT)
Dept: FAMILY MEDICINE CLINIC | Facility: CLINIC | Age: 83
End: 2023-05-25

## 2023-05-25 DIAGNOSIS — E78.2 MIXED HYPERLIPIDEMIA: ICD-10-CM

## 2023-05-25 DIAGNOSIS — E11.22 TYPE 2 DIABETES MELLITUS WITH STAGE 3A CHRONIC KIDNEY DISEASE, WITHOUT LONG-TERM CURRENT USE OF INSULIN (HCC): ICD-10-CM

## 2023-05-25 DIAGNOSIS — N18.31 TYPE 2 DIABETES MELLITUS WITH STAGE 3A CHRONIC KIDNEY DISEASE, WITHOUT LONG-TERM CURRENT USE OF INSULIN (HCC): ICD-10-CM

## 2023-05-25 DIAGNOSIS — R00.1 BRADYCARDIA: Primary | ICD-10-CM

## 2023-05-25 RX ORDER — SIMVASTATIN 20 MG
TABLET ORAL
Qty: 90 TABLET | Refills: 1 | Status: SHIPPED | OUTPATIENT
Start: 2023-05-25

## 2023-05-25 RX ORDER — LISINOPRIL 5 MG/1
5 TABLET ORAL 2 TIMES DAILY
Qty: 60 TABLET | Refills: 0 | COMMUNITY
Start: 2023-05-25 | End: 2023-05-31

## 2023-05-25 NOTE — TELEPHONE ENCOUNTER
Only updated the med list for the lisinopril. Not sure which metoprolol it is :succinate or tartrate and do not see atenolol on med list.    Doc  To review and advise further.

## 2023-05-25 NOTE — TELEPHONE ENCOUNTER
Pt's daughter, Prasanth Perez walked in requesting a referral for Dr. Kristal Muro. Pt just was seen by Dr. Brandon Garcia is recommending the pt see Dr. Мария Nieto. Pt has appt on 6/5/2023. Prasanth Perez also dropped off pt's updated medication list. Dr. Cresencio Shields has changed pt's Lisinopril 10mg 1 time daily to Lisinopril 5mg 1 tablet in the morning and 1 tablet at night.  Also added Metoprolol 25mg tabs 2x per day. Discontinued Atenolol 50 mg tab. Pt's med list was place in Dr. Avis diaz. [facility] will be sending refill requests for medications as well.

## 2023-05-31 RX ORDER — LISINOPRIL 5 MG/1
5 TABLET ORAL 2 TIMES DAILY
Qty: 180 TABLET | Refills: 1 | Status: SHIPPED | OUTPATIENT
Start: 2023-05-31 | End: 2023-06-02

## 2023-05-31 RX ORDER — COLCHICINE 0.6 MG/1
TABLET ORAL
COMMUNITY
Start: 2023-02-15

## 2023-06-01 ENCOUNTER — HOSPITAL ENCOUNTER (OUTPATIENT)
Dept: CV DIAGNOSTICS | Age: 83
Discharge: HOME | End: 2023-06-01
Attending: INTERNAL MEDICINE
Payer: MEDICARE

## 2023-06-01 DIAGNOSIS — E03.9 ACQUIRED HYPOTHYROIDISM: ICD-10-CM

## 2023-06-01 DIAGNOSIS — I10 PRIMARY HYPERTENSION: ICD-10-CM

## 2023-06-01 DIAGNOSIS — E78.5 HYPERLIPIDEMIA, UNSPECIFIED HYPERLIPIDEMIA TYPE: ICD-10-CM

## 2023-06-01 DIAGNOSIS — I50.9 ACUTE ON CHRONIC CONGESTIVE HEART FAILURE, UNSPECIFIED HEART FAILURE TYPE (HCC): ICD-10-CM

## 2023-06-01 DIAGNOSIS — I50.9 CHF EXACERBATION (HCC): ICD-10-CM

## 2023-06-01 PROCEDURE — 93306 TTE W/DOPPLER COMPLETE: CPT | Performed by: INTERNAL MEDICINE

## 2023-06-02 ENCOUNTER — TELEPHONE (OUTPATIENT)
Dept: FAMILY MEDICINE CLINIC | Facility: CLINIC | Age: 83
End: 2023-06-02

## 2023-06-02 RX ORDER — LISINOPRIL 5 MG/1
5 TABLET ORAL 2 TIMES DAILY
Qty: 180 TABLET | Refills: 1 | Status: SHIPPED | OUTPATIENT
Start: 2023-06-02

## 2023-06-02 NOTE — TELEPHONE ENCOUNTER
Pt called and stated that the pharmacy informed her she can not get her refill on her LISINOPRIL because it is to early to fill but med dosage was changed to twice a day and pt will need this refill. Requesting to speak to someone.

## 2023-06-02 NOTE — TELEPHONE ENCOUNTER
Daughter Darby Faulkner stated pharmacy has emailed refill too soon on lisinopril, not sending medication until July. Prescription re-sent with note to pharmacy. Enedina Darrin to call office if not received by Wednesday next week or if she does not receive an updated email confirming medication will be dispensed, verbalized understanding.

## 2023-06-28 ENCOUNTER — OFFICE VISIT (OUTPATIENT)
Dept: CARDIOLOGY | Age: 83
End: 2023-06-28

## 2023-06-28 VITALS
DIASTOLIC BLOOD PRESSURE: 78 MMHG | BODY MASS INDEX: 24.03 KG/M2 | SYSTOLIC BLOOD PRESSURE: 142 MMHG | WEIGHT: 140 LBS | HEART RATE: 125 BPM

## 2023-06-28 DIAGNOSIS — I48.19 PERSISTENT ATRIAL FIBRILLATION (CMD): Primary | ICD-10-CM

## 2023-06-28 DIAGNOSIS — Z01.812 PRE-PROCEDURE LAB EXAM: ICD-10-CM

## 2023-06-28 DIAGNOSIS — I49.5 TACHY-BRADY SYNDROME (CMD): ICD-10-CM

## 2023-06-28 PROCEDURE — 99215 OFFICE O/P EST HI 40 MIN: CPT | Performed by: INTERNAL MEDICINE

## 2023-06-28 PROCEDURE — 3078F DIAST BP <80 MM HG: CPT | Performed by: INTERNAL MEDICINE

## 2023-06-28 PROCEDURE — 3077F SYST BP >= 140 MM HG: CPT | Performed by: INTERNAL MEDICINE

## 2023-06-28 RX ORDER — LISINOPRIL 5 MG/1
TABLET ORAL
COMMUNITY
Start: 2023-06-01 | End: 2023-08-28 | Stop reason: SINTOL

## 2023-06-28 RX ORDER — GABAPENTIN 100 MG/1
100 CAPSULE ORAL 3 TIMES DAILY
COMMUNITY

## 2023-06-28 RX ORDER — METOPROLOL SUCCINATE 25 MG/1
25 TABLET, EXTENDED RELEASE ORAL DAILY
COMMUNITY
End: 2023-06-28 | Stop reason: DRUGHIGH

## 2023-06-28 RX ORDER — METOPROLOL SUCCINATE 50 MG/1
TABLET, EXTENDED RELEASE ORAL
Qty: 90 TABLET | Refills: 3 | Status: ON HOLD | OUTPATIENT
Start: 2023-06-28 | End: 2023-09-05 | Stop reason: HOSPADM

## 2023-06-28 RX ORDER — ESCITALOPRAM OXALATE 5 MG/1
1 TABLET ORAL DAILY
COMMUNITY
Start: 2023-03-24

## 2023-06-28 RX ORDER — FUROSEMIDE 20 MG/1
20 TABLET ORAL DAILY
COMMUNITY
Start: 2023-06-08

## 2023-07-07 ENCOUNTER — TELEPHONE (OUTPATIENT)
Dept: CARDIOLOGY | Age: 83
End: 2023-07-07

## 2023-07-07 ENCOUNTER — PREP FOR CASE (OUTPATIENT)
Dept: CARDIOLOGY | Age: 83
End: 2023-07-07

## 2023-07-07 DIAGNOSIS — I49.5 TACHY-BRADY SYNDROME (CMD): ICD-10-CM

## 2023-07-07 DIAGNOSIS — I48.19 PERSISTENT ATRIAL FIBRILLATION (CMD): Primary | ICD-10-CM

## 2023-07-07 RX ORDER — AMLODIPINE BESYLATE 5 MG/1
5 TABLET ORAL DAILY
Qty: 90 TABLET | Refills: 1 | Status: SHIPPED | OUTPATIENT
Start: 2023-07-07

## 2023-07-07 RX ORDER — SODIUM CHLORIDE 9 MG/ML
INJECTION, SOLUTION INTRAVENOUS CONTINUOUS
Status: CANCELLED | OUTPATIENT
Start: 2023-07-07

## 2023-07-17 ENCOUNTER — HOSPITAL ENCOUNTER (OUTPATIENT)
Dept: ULTRASOUND IMAGING | Age: 83
Discharge: HOME | End: 2023-07-17
Attending: SPECIALIST
Payer: MEDICARE

## 2023-07-17 DIAGNOSIS — I26.09 ACUTE PULMONARY EMBOLISM WITH ACUTE COR PULMONALE, UNSPECIFIED PULMONARY EMBOLISM TYPE (HCC): ICD-10-CM

## 2023-07-17 DIAGNOSIS — I82.419 ACUTE DEEP VEIN THROMBOSIS (DVT) OF FEMORAL VEIN, UNSPECIFIED LATERALITY (HCC): ICD-10-CM

## 2023-07-17 PROCEDURE — 93970 EXTREMITY STUDY: CPT | Performed by: SPECIALIST

## 2023-07-25 ENCOUNTER — OFFICE VISIT (OUTPATIENT)
Dept: HEMATOLOGY/ONCOLOGY | Facility: HOSPITAL | Age: 83
End: 2023-07-25
Attending: SPECIALIST
Payer: MEDICARE

## 2023-07-25 VITALS
RESPIRATION RATE: 20 BRPM | TEMPERATURE: 99 F | DIASTOLIC BLOOD PRESSURE: 86 MMHG | HEART RATE: 107 BPM | OXYGEN SATURATION: 95 % | WEIGHT: 142 LBS | BODY MASS INDEX: 24.85 KG/M2 | SYSTOLIC BLOOD PRESSURE: 147 MMHG | HEIGHT: 63.39 IN

## 2023-07-25 DIAGNOSIS — Z86.711 HISTORY OF PULMONARY EMBOLISM: ICD-10-CM

## 2023-07-25 DIAGNOSIS — Z79.01 ON ANTICOAGULANT THERAPY: Primary | ICD-10-CM

## 2023-07-25 DIAGNOSIS — Z86.718 HISTORY OF DVT (DEEP VEIN THROMBOSIS): ICD-10-CM

## 2023-07-25 PROCEDURE — 99214 OFFICE O/P EST MOD 30 MIN: CPT | Performed by: SPECIALIST

## 2023-07-27 ENCOUNTER — PATIENT OUTREACH (OUTPATIENT)
Dept: CASE MANAGEMENT | Age: 83
End: 2023-07-27

## 2023-07-27 RX ORDER — GLIMEPIRIDE 4 MG/1
TABLET ORAL
Qty: 90 TABLET | Refills: 0 | Status: SHIPPED | OUTPATIENT
Start: 2023-07-27

## 2023-07-28 NOTE — TELEPHONE ENCOUNTER
Requested Prescriptions     Signed Prescriptions Disp Refills    GLIMEPIRIDE 4 MG Oral Tab 90 tablet 0     Sig: TAKE 1 TABLET EVERY DAY     Authorizing Provider: Álvaro Cuevas     Ordering User: Kathleen Single         Refill approved per protocol.

## 2023-08-10 NOTE — PROGRESS NOTES
Patient identified with a potential need for Chronic Care Management services. Called patient to introduce self and availability of Chronic Care Management services. Patient informed about the following service elements:  Health information sharing - complying with all laws related to privacy and security of their health information  Patient/Insurance Cost sharing - includes deductible and any ongoing copays and/or coinsurance  Only one provider can bill for this service monthly  Patient right to discontinue services at any time for any reason effective last day of current month  Patient verbally accepts to participate in Chronic Care Management services and any associated charges.

## 2023-08-23 ENCOUNTER — PATIENT OUTREACH (OUTPATIENT)
Dept: CASE MANAGEMENT | Age: 83
End: 2023-08-23

## 2023-08-24 ENCOUNTER — EXTERNAL LAB (OUTPATIENT)
Dept: CARDIOLOGY | Age: 83
End: 2023-08-24

## 2023-08-24 LAB
BASOPHILS # BLD: 28 CELLS/UL (ref 0–200)
BASOPHILS NFR BLD: 0.5 %
BUN SERPL-MCNC: 38 MG/DL (ref 7–25)
BUN/CREAT SERPL: 25 (CALC) (ref 6–22)
CALCIUM SERPL-MCNC: 9.3 MG/DL (ref 8.6–10.4)
CHLORIDE SERPL-SCNC: 99 MMOL/L (ref 98–110)
CO2 SERPL-SCNC: 25 MMOL/L (ref 20–32)
CREAT SERPL-MCNC: 1.51 MG/DL (ref 0.6–0.95)
EOSINOPHIL # BLD: 190 CELLS/UL (ref 15–500)
EOSINOPHIL NFR BLD: 3.4 %
ERYTHROCYTE [DISTWIDTH] IN BLOOD: 13.5 % (ref 11–15)
GFR SERPLBLD SCHWARTZ-ARVRAT: 34 ML/MIN/1.73M2
GLUCOSE SERPL-MCNC: 201 MG/DL (ref 65–139)
HCT VFR BLD CALC: 38.3 % (ref 35–45)
HGB BLD-MCNC: 11.6 G/DL (ref 11.7–15.5)
LENGTH OF FAST TIME PATIENT: NO H
LYMPHOCYTES # BLD: 1590 CELLS/UL (ref 850–3900)
LYMPHOCYTES NFR BLD: 28.4 %
MAGNESIUM SERPL-MCNC: 1.9 MG/DL (ref 1.5–2.5)
MCH RBC QN AUTO: 27 PG (ref 27–33)
MCHC RBC AUTO-ENTMCNC: 30.3 G/DL (ref 32–36)
MCV RBC AUTO: 89.3 FL (ref 80–100)
MONOCYTES # BLD: 392 CELLS/UL (ref 200–950)
MONOCYTES NFR BLD: 7 %
MPV (OFFPRE2): 14.2 FL (ref 7.5–12.5)
NEUTROPHILS # BLD: 3399 CELLS/UL (ref 1500–7800)
NEUTROPHILS NFR BLD: 60.7 %
PLATELET # BLD: 182 THOUSAND/UL (ref 140–400)
POTASSIUM SERPL-SCNC: 5.5 MMOL/L (ref 3.5–5.3)
RBC # BLD: 4.29 THOUSAND/UL (ref 3.8–10.8)
SODIUM SERPL-SCNC: 138 MMOL/L (ref 135–146)
WBC # BLD: 5.6 THOUSAND/UL (ref 3.8–10.8)

## 2023-08-25 LAB
BASOPHILS # BLD AUTO: 28 CELLS/UL (ref 0–200)
BASOPHILS NFR BLD AUTO: 0.5 %
BUN SERPL-MCNC: 38 MG/DL (ref 7–25)
BUN/CREAT SERPL: 25 (CALC) (ref 6–22)
CALCIUM SERPL-MCNC: 9.3 MG/DL (ref 8.6–10.4)
CHLORIDE SERPL-SCNC: 99 MMOL/L (ref 98–110)
CO2 SERPL-SCNC: 25 MMOL/L (ref 20–32)
CREAT SERPL-MCNC: 1.51 MG/DL (ref 0.6–0.95)
EGFRCR SERPLBLD CKD-EPI 2021: 34 ML/MIN/1.73M2
EOSINOPHIL # BLD AUTO: 190 CELLS/UL (ref 15–500)
EOSINOPHIL NFR BLD AUTO: 3.4 %
ERYTHROCYTE [DISTWIDTH] IN BLOOD BY AUTOMATED COUNT: 13.5 % (ref 11–15)
GLUCOSE SERPL-MCNC: 201 MG/DL (ref 65–139)
HCT VFR BLD AUTO: 38.3 % (ref 35–45)
HGB BLD-MCNC: 11.6 G/DL (ref 11.7–15.5)
LYMPHOCYTES # BLD AUTO: 1590 CELLS/UL (ref 850–3900)
LYMPHOCYTES NFR BLD AUTO: 28.4 %
MAGNESIUM SERPL-MCNC: 1.9 MG/DL (ref 1.5–2.5)
MCH RBC QN AUTO: 27 PG (ref 27–33)
MCHC RBC AUTO-ENTMCNC: 30.3 G/DL (ref 32–36)
MCV RBC AUTO: 89.3 FL (ref 80–100)
MONOCYTES # BLD AUTO: 392 CELLS/UL (ref 200–950)
MONOCYTES NFR BLD AUTO: 7 %
NEUTROPHILS # BLD AUTO: 3399 CELLS/UL (ref 1500–7800)
NEUTROPHILS NFR BLD AUTO: 60.7 %
PLATELET # BLD AUTO: 182 THOUSAND/UL (ref 140–400)
PMV BLD REES-ECKER: 14.2 FL (ref 7.5–12.5)
POTASSIUM SERPL-SCNC: 5.5 MMOL/L (ref 3.5–5.3)
RBC # BLD AUTO: 4.29 MILLION/UL (ref 3.8–5.1)
SODIUM SERPL-SCNC: 138 MMOL/L (ref 135–146)
WBC # BLD AUTO: 5.6 THOUSAND/UL (ref 3.8–10.8)

## 2023-08-25 NOTE — PROGRESS NOTES
08/23/23      CCM assessment call, no answer LM to CB at [phone]. [address] /Health   [address] [place] [address] Management Dept. [address] [facility] Floor  [address] [place] [address]   Office [phone]  [email or web address]

## 2023-08-28 ENCOUNTER — TELEPHONE (OUTPATIENT)
Dept: CARDIOLOGY | Age: 83
End: 2023-08-28

## 2023-08-28 DIAGNOSIS — E87.5 SERUM POTASSIUM ELEVATED: ICD-10-CM

## 2023-08-28 DIAGNOSIS — R79.89 ELEVATED SERUM CREATININE: ICD-10-CM

## 2023-08-28 DIAGNOSIS — I48.19 PERSISTENT ATRIAL FIBRILLATION (CMD): Primary | ICD-10-CM

## 2023-08-30 ENCOUNTER — OFFICE VISIT (OUTPATIENT)
Dept: FAMILY MEDICINE CLINIC | Facility: CLINIC | Age: 83
End: 2023-08-30
Payer: MEDICARE

## 2023-08-30 VITALS
HEIGHT: 63.39 IN | SYSTOLIC BLOOD PRESSURE: 138 MMHG | DIASTOLIC BLOOD PRESSURE: 80 MMHG | BODY MASS INDEX: 25.2 KG/M2 | WEIGHT: 144 LBS | TEMPERATURE: 98 F | OXYGEN SATURATION: 96 % | HEART RATE: 84 BPM

## 2023-08-30 DIAGNOSIS — E11.22 TYPE 2 DIABETES MELLITUS WITH STAGE 3A CHRONIC KIDNEY DISEASE, WITHOUT LONG-TERM CURRENT USE OF INSULIN (HCC): ICD-10-CM

## 2023-08-30 DIAGNOSIS — I50.33 ACUTE ON CHRONIC DIASTOLIC CONGESTIVE HEART FAILURE (HCC): ICD-10-CM

## 2023-08-30 DIAGNOSIS — I82.4Y2 ACUTE DEEP VEIN THROMBOSIS (DVT) OF PROXIMAL VEIN OF LEFT LOWER EXTREMITY (HCC): ICD-10-CM

## 2023-08-30 DIAGNOSIS — I10 ESSENTIAL HYPERTENSION: ICD-10-CM

## 2023-08-30 DIAGNOSIS — J41.0 SMOKERS' COUGH (HCC): ICD-10-CM

## 2023-08-30 DIAGNOSIS — I49.5 TACHY-BRADY SYNDROME (HCC): Primary | ICD-10-CM

## 2023-08-30 DIAGNOSIS — N18.31 TYPE 2 DIABETES MELLITUS WITH STAGE 3A CHRONIC KIDNEY DISEASE, WITHOUT LONG-TERM CURRENT USE OF INSULIN (HCC): ICD-10-CM

## 2023-08-30 PROCEDURE — 3008F BODY MASS INDEX DOCD: CPT | Performed by: FAMILY MEDICINE

## 2023-08-30 PROCEDURE — 1160F RVW MEDS BY RX/DR IN RCRD: CPT | Performed by: FAMILY MEDICINE

## 2023-08-30 PROCEDURE — 99215 OFFICE O/P EST HI 40 MIN: CPT | Performed by: FAMILY MEDICINE

## 2023-08-30 PROCEDURE — 1159F MED LIST DOCD IN RCRD: CPT | Performed by: FAMILY MEDICINE

## 2023-08-30 PROCEDURE — 3075F SYST BP GE 130 - 139MM HG: CPT | Performed by: FAMILY MEDICINE

## 2023-08-30 PROCEDURE — 3079F DIAST BP 80-89 MM HG: CPT | Performed by: FAMILY MEDICINE

## 2023-08-30 RX ORDER — METOPROLOL TARTRATE 50 MG/1
50 TABLET, FILM COATED ORAL 2 TIMES DAILY
COMMUNITY
Start: 2023-06-09

## 2023-08-31 RX ORDER — LEVOTHYROXINE SODIUM 0.07 MG/1
75 TABLET ORAL DAILY
COMMUNITY

## 2023-09-05 ENCOUNTER — HOSPITAL ENCOUNTER (OUTPATIENT)
Dept: CARDIOLOGY | Age: 83
Discharge: HOME | End: 2023-09-05
Attending: INTERNAL MEDICINE | Admitting: INTERNAL MEDICINE

## 2023-09-05 ENCOUNTER — HOSPITAL ENCOUNTER (OUTPATIENT)
Age: 83
Discharge: HOME | End: 2023-09-05
Attending: INTERNAL MEDICINE | Admitting: INTERNAL MEDICINE

## 2023-09-05 ENCOUNTER — APPOINTMENT (OUTPATIENT)
Dept: GENERAL RADIOLOGY | Age: 83
End: 2023-09-05
Attending: INTERNAL MEDICINE

## 2023-09-05 ENCOUNTER — ANESTHESIA EVENT (OUTPATIENT)
Dept: CARDIOLOGY | Age: 83
End: 2023-09-05

## 2023-09-05 ENCOUNTER — ANESTHESIA (OUTPATIENT)
Dept: CARDIOLOGY | Age: 83
End: 2023-09-05

## 2023-09-05 VITALS
SYSTOLIC BLOOD PRESSURE: 132 MMHG | OXYGEN SATURATION: 96 % | TEMPERATURE: 32 F | HEART RATE: 100 BPM | DIASTOLIC BLOOD PRESSURE: 78 MMHG | HEIGHT: 64 IN | WEIGHT: 138.23 LBS | RESPIRATION RATE: 16 BRPM | BODY MASS INDEX: 23.6 KG/M2

## 2023-09-05 DIAGNOSIS — I48.19 PERSISTENT ATRIAL FIBRILLATION (CMD): ICD-10-CM

## 2023-09-05 DIAGNOSIS — Z95.818 PRESENCE OF CARDIAC DEVICE: ICD-10-CM

## 2023-09-05 DIAGNOSIS — I49.5 TACHY-BRADY SYNDROME (CMD): ICD-10-CM

## 2023-09-05 LAB
AV STENOSIS SEVERITY TEXT: NORMAL
LV EF: NORMAL %

## 2023-09-05 PROCEDURE — 10004452 HB PACU ADDL 30 MINUTES: Performed by: INTERNAL MEDICINE

## 2023-09-05 PROCEDURE — 71045 X-RAY EXAM CHEST 1 VIEW: CPT

## 2023-09-05 PROCEDURE — 93312 ECHO TRANSESOPHAGEAL: CPT | Performed by: INTERNAL MEDICINE

## 2023-09-05 PROCEDURE — 10006025 HB SUPPLY 275: Performed by: INTERNAL MEDICINE

## 2023-09-05 PROCEDURE — 33208 INSRT HEART PM ATRIAL & VENT: CPT | Performed by: INTERNAL MEDICINE

## 2023-09-05 PROCEDURE — 10002805 HB CONTRAST AGENT: Performed by: INTERNAL MEDICINE

## 2023-09-05 PROCEDURE — 10002801 HB RX 250 W/O HCPCS: Performed by: INTERNAL MEDICINE

## 2023-09-05 PROCEDURE — C1894 INTRO/SHEATH, NON-LASER: HCPCS | Performed by: INTERNAL MEDICINE

## 2023-09-05 PROCEDURE — 10004451 HB PACU RECOVERY 1ST 30 MINUTES: Performed by: INTERNAL MEDICINE

## 2023-09-05 PROCEDURE — 13000004 HB  ANESTHESIA  GENERAL OUTSIDE OR: Performed by: INTERNAL MEDICINE

## 2023-09-05 PROCEDURE — 10002801 HB RX 250 W/O HCPCS: Performed by: ANESTHESIOLOGY

## 2023-09-05 PROCEDURE — 93320 DOPPLER ECHO COMPLETE: CPT | Performed by: INTERNAL MEDICINE

## 2023-09-05 PROCEDURE — 10006023 HB SUPPLY 272: Performed by: INTERNAL MEDICINE

## 2023-09-05 PROCEDURE — 13000001 HB PHASE II RECOVERY EA 30 MINUTES: Performed by: INTERNAL MEDICINE

## 2023-09-05 PROCEDURE — 93320 DOPPLER ECHO COMPLETE: CPT

## 2023-09-05 PROCEDURE — C1785 PMKR, DUAL, RATE-RESP: HCPCS | Performed by: INTERNAL MEDICINE

## 2023-09-05 PROCEDURE — C1898 LEAD, PMKR, OTHER THAN TRANS: HCPCS | Performed by: INTERNAL MEDICINE

## 2023-09-05 PROCEDURE — 10002800 HB RX 250 W HCPCS: Performed by: ANESTHESIOLOGY

## 2023-09-05 PROCEDURE — 10002807 HB RX 258: Performed by: ANESTHESIOLOGY

## 2023-09-05 PROCEDURE — 93319 3D ECHO IMG CGEN CAR ANOMAL: CPT | Performed by: INTERNAL MEDICINE

## 2023-09-05 DEVICE — STEROX BIPOLAR IS-1 ATRIAL/VENTRICULAR
Type: IMPLANTABLE DEVICE | Site: ATRIUM | Status: FUNCTIONAL
Brand: FINELINE® II EZ STEROX

## 2023-09-05 DEVICE — PACEMAKER
Type: IMPLANTABLE DEVICE | Site: CHEST | Status: FUNCTIONAL
Brand: ACCOLADE™ MRI DR

## 2023-09-05 DEVICE — PACE/SENSE LEAD
Type: IMPLANTABLE DEVICE | Site: VENTRICLE | Status: FUNCTIONAL
Brand: INGEVITY™+

## 2023-09-05 RX ORDER — PROPOFOL 10 MG/ML
INJECTION, EMULSION INTRAVENOUS PRN
Status: DISCONTINUED | OUTPATIENT
Start: 2023-09-05 | End: 2023-09-05

## 2023-09-05 RX ORDER — ONDANSETRON 2 MG/ML
INJECTION INTRAMUSCULAR; INTRAVENOUS PRN
Status: DISCONTINUED | OUTPATIENT
Start: 2023-09-05 | End: 2023-09-05

## 2023-09-05 RX ORDER — LIDOCAINE HYDROCHLORIDE 20 MG/ML
INJECTION, SOLUTION EPIDURAL; INFILTRATION; INTRACAUDAL; PERINEURAL PRN
Status: DISCONTINUED | OUTPATIENT
Start: 2023-09-05 | End: 2023-09-05 | Stop reason: HOSPADM

## 2023-09-05 RX ORDER — METOCLOPRAMIDE HYDROCHLORIDE 5 MG/ML
5 INJECTION INTRAMUSCULAR; INTRAVENOUS
Status: ACTIVE | OUTPATIENT
Start: 2023-09-05 | End: 2023-09-05

## 2023-09-05 RX ORDER — ONDANSETRON 2 MG/ML
4 INJECTION INTRAMUSCULAR; INTRAVENOUS
Status: ACTIVE | OUTPATIENT
Start: 2023-09-05 | End: 2023-09-05

## 2023-09-05 RX ORDER — IODIXANOL 320 MG/ML
INJECTION, SOLUTION INTRAVASCULAR PRN
Status: DISCONTINUED | OUTPATIENT
Start: 2023-09-05 | End: 2023-09-05 | Stop reason: HOSPADM

## 2023-09-05 RX ORDER — NEOSTIGMINE METHYLSULFATE 1 MG/ML
INJECTION, SOLUTION INTRAVENOUS PRN
Status: DISCONTINUED | OUTPATIENT
Start: 2023-09-05 | End: 2023-09-05

## 2023-09-05 RX ORDER — HYDRALAZINE HYDROCHLORIDE 20 MG/ML
5 INJECTION INTRAMUSCULAR; INTRAVENOUS EVERY 10 MIN PRN
Status: DISCONTINUED | OUTPATIENT
Start: 2023-09-05 | End: 2023-09-05 | Stop reason: HOSPADM

## 2023-09-05 RX ORDER — NALOXONE HCL 0.4 MG/ML
0.2 VIAL (ML) INJECTION EVERY 5 MIN PRN
Status: DISCONTINUED | OUTPATIENT
Start: 2023-09-05 | End: 2023-09-05 | Stop reason: HOSPADM

## 2023-09-05 RX ORDER — METOPROLOL SUCCINATE 100 MG/1
100 TABLET, EXTENDED RELEASE ORAL 2 TIMES DAILY
Qty: 60 TABLET | Refills: 5 | Status: SHIPPED | OUTPATIENT
Start: 2023-09-05

## 2023-09-05 RX ORDER — 0.9 % SODIUM CHLORIDE 0.9 %
2 VIAL (ML) INJECTION EVERY 12 HOURS SCHEDULED
Status: DISCONTINUED | OUTPATIENT
Start: 2023-09-05 | End: 2023-09-05 | Stop reason: HOSPADM

## 2023-09-05 RX ORDER — SODIUM CHLORIDE, SODIUM LACTATE, POTASSIUM CHLORIDE, CALCIUM CHLORIDE 600; 310; 30; 20 MG/100ML; MG/100ML; MG/100ML; MG/100ML
INJECTION, SOLUTION INTRAVENOUS CONTINUOUS
Status: DISCONTINUED | OUTPATIENT
Start: 2023-09-05 | End: 2023-09-05 | Stop reason: HOSPADM

## 2023-09-05 RX ORDER — ROCURONIUM BROMIDE 10 MG/ML
INJECTION, SOLUTION INTRAVENOUS PRN
Status: DISCONTINUED | OUTPATIENT
Start: 2023-09-05 | End: 2023-09-05

## 2023-09-05 RX ORDER — SODIUM CHLORIDE 9 MG/ML
INJECTION, SOLUTION INTRAVENOUS CONTINUOUS PRN
Status: DISCONTINUED | OUTPATIENT
Start: 2023-09-05 | End: 2023-09-05

## 2023-09-05 RX ORDER — NALBUPHINE HYDROCHLORIDE 10 MG/ML
2.5 INJECTION, SOLUTION INTRAMUSCULAR; INTRAVENOUS; SUBCUTANEOUS
Status: DISCONTINUED | OUTPATIENT
Start: 2023-09-05 | End: 2023-09-05 | Stop reason: HOSPADM

## 2023-09-05 RX ORDER — SODIUM CHLORIDE 9 MG/ML
INJECTION, SOLUTION INTRAVENOUS CONTINUOUS
Status: DISCONTINUED | OUTPATIENT
Start: 2023-09-05 | End: 2023-09-05 | Stop reason: HOSPADM

## 2023-09-05 RX ORDER — METOPROLOL SUCCINATE 100 MG/1
100 TABLET, EXTENDED RELEASE ORAL DAILY
Qty: 60 TABLET | Refills: 5 | Status: SHIPPED | OUTPATIENT
Start: 2023-09-05 | End: 2023-09-05 | Stop reason: SDUPTHER

## 2023-09-05 RX ORDER — GLYCOPYRROLATE 0.2 MG/ML
INJECTION, SOLUTION INTRAMUSCULAR; INTRAVENOUS PRN
Status: DISCONTINUED | OUTPATIENT
Start: 2023-09-05 | End: 2023-09-05

## 2023-09-05 RX ADMIN — PERFLUTREN 1 ML: 6.52 INJECTION, SUSPENSION INTRAVENOUS at 08:54

## 2023-09-05 RX ADMIN — FENTANYL CITRATE 50 MCG: 50 INJECTION, SOLUTION INTRAMUSCULAR; INTRAVENOUS at 08:45

## 2023-09-05 RX ADMIN — ONDANSETRON 4 MG: 2 INJECTION INTRAMUSCULAR; INTRAVENOUS at 09:27

## 2023-09-05 RX ADMIN — GLYCOPYRROLATE 0.6 MG: 0.2 INJECTION INTRAMUSCULAR; INTRAVENOUS at 10:03

## 2023-09-05 RX ADMIN — SODIUM CHLORIDE: 9 INJECTION, SOLUTION INTRAVENOUS at 07:26

## 2023-09-05 RX ADMIN — PROPOFOL 100 MG: 10 INJECTION, EMULSION INTRAVENOUS at 08:49

## 2023-09-05 RX ADMIN — FENTANYL CITRATE 50 MCG: 50 INJECTION, SOLUTION INTRAMUSCULAR; INTRAVENOUS at 08:48

## 2023-09-05 RX ADMIN — ROCURONIUM BROMIDE 30 MG: 10 INJECTION INTRAVENOUS at 08:49

## 2023-09-05 RX ADMIN — NEOSTIGMINE METHYLSULFATE 3 MG: 1 INJECTION INTRAVENOUS at 10:03

## 2023-09-05 RX ADMIN — CEFAZOLIN SODIUM 2000 MG: 300 INJECTION, POWDER, LYOPHILIZED, FOR SOLUTION INTRAVENOUS at 08:53

## 2023-09-05 RX ADMIN — LABETALOL HYDROCHLORIDE 5 MG: 5 INJECTION, SOLUTION INTRAVENOUS at 09:13

## 2023-09-05 ASSESSMENT — PAIN SCALES - GENERAL
PAINLEVEL_OUTOF10: 0

## 2023-09-05 ASSESSMENT — LIFESTYLE VARIABLES: SMOKING_HISTORY: NO

## 2023-09-10 ENCOUNTER — APPOINTMENT (OUTPATIENT)
Dept: CT IMAGING | Facility: HOSPITAL | Age: 83
End: 2023-09-10
Attending: EMERGENCY MEDICINE
Payer: MEDICARE

## 2023-09-10 ENCOUNTER — HOSPITAL ENCOUNTER (INPATIENT)
Facility: HOSPITAL | Age: 83
LOS: 3 days | Discharge: NURSING, REHAB OR LONG-TERM CARE | End: 2023-09-13
Attending: EMERGENCY MEDICINE | Admitting: HOSPITALIST
Payer: MEDICARE

## 2023-09-10 ENCOUNTER — APPOINTMENT (OUTPATIENT)
Dept: GENERAL RADIOLOGY | Facility: HOSPITAL | Age: 83
End: 2023-09-10
Attending: EMERGENCY MEDICINE
Payer: MEDICARE

## 2023-09-10 DIAGNOSIS — Z79.01 ANTICOAGULANT LONG-TERM USE: ICD-10-CM

## 2023-09-10 DIAGNOSIS — I48.91 ATRIAL FIBRILLATION WITH RAPID VENTRICULAR RESPONSE (HCC): Primary | ICD-10-CM

## 2023-09-10 DIAGNOSIS — S00.03XA CONTUSION OF SCALP, INITIAL ENCOUNTER: ICD-10-CM

## 2023-09-10 PROBLEM — I10 PRIMARY HYPERTENSION: Status: ACTIVE | Noted: 2021-03-24
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
## 2023-09-10 PROBLEM — I50.32 CHRONIC HEART FAILURE WITH PRESERVED EJECTION FRACTION (HCC): Status: ACTIVE | Noted: 2023-09-10

## 2023-09-10 PROBLEM — B34.9 VIRAL INFECTION: Status: ACTIVE | Noted: 2023-09-10

## 2023-09-10 LAB
ADENOVIRUS PCR:: NOT DETECTED
ALBUMIN SERPL-MCNC: 3.1 G/DL (ref 3.4–5)
ALBUMIN/GLOB SERPL: 0.7 {RATIO} (ref 1–2)
ALP LIVER SERPL-CCNC: 61 U/L
ALT SERPL-CCNC: 36 U/L
ANION GAP SERPL CALC-SCNC: 8 MMOL/L (ref 0–18)
APTT PPP: 43.7 SECONDS (ref 23.3–35.6)
AST SERPL-CCNC: 25 U/L (ref 15–37)
B PARAPERT DNA SPEC QL NAA+PROBE: NOT DETECTED
B PERT DNA SPEC QL NAA+PROBE: NOT DETECTED
BASOPHILS # BLD AUTO: 0.03 X10(3) UL (ref 0–0.2)
BASOPHILS NFR BLD AUTO: 0.4 %
BILIRUB SERPL-MCNC: 0.3 MG/DL (ref 0.1–2)
BILIRUB UR QL STRIP.AUTO: NEGATIVE
BUN BLD-MCNC: 33 MG/DL (ref 7–18)
C PNEUM DNA SPEC QL NAA+PROBE: NOT DETECTED
CALCIUM BLD-MCNC: 8.8 MG/DL (ref 8.5–10.1)
CHLORIDE SERPL-SCNC: 100 MMOL/L (ref 98–112)
CLARITY UR REFRACT.AUTO: CLEAR
CO2 SERPL-SCNC: 27 MMOL/L (ref 21–32)
CORONAVIRUS 229E PCR:: NOT DETECTED
CORONAVIRUS HKU1 PCR:: NOT DETECTED
CORONAVIRUS NL63 PCR:: NOT DETECTED
CORONAVIRUS OC43 PCR:: NOT DETECTED
CREAT BLD-MCNC: 1.69 MG/DL
EGFRCR SERPLBLD CKD-EPI 2021: 29 ML/MIN/1.73M2 (ref 60–?)
EOSINOPHIL # BLD AUTO: 0.07 X10(3) UL (ref 0–0.7)
EOSINOPHIL NFR BLD AUTO: 1 %
ERYTHROCYTE [DISTWIDTH] IN BLOOD BY AUTOMATED COUNT: 15.9 %
FLUAV RNA SPEC QL NAA+PROBE: NOT DETECTED
FLUBV RNA SPEC QL NAA+PROBE: NOT DETECTED
GLOBULIN PLAS-MCNC: 4.3 G/DL (ref 2.8–4.4)
GLUCOSE BLD-MCNC: 161 MG/DL (ref 70–99)
GLUCOSE BLD-MCNC: 206 MG/DL (ref 70–99)
GLUCOSE BLD-MCNC: 234 MG/DL (ref 70–99)
GLUCOSE UR STRIP.AUTO-MCNC: NORMAL MG/DL
HCT VFR BLD AUTO: 40.5 %
HGB BLD-MCNC: 13.1 G/DL
HYALINE CASTS #/AREA URNS AUTO: PRESENT /LPF
IMM GRANULOCYTES # BLD AUTO: 0.04 X10(3) UL (ref 0–1)
IMM GRANULOCYTES NFR BLD: 0.6 %
INR BLD: 1.51 (ref 0.85–1.16)
KETONES UR STRIP.AUTO-MCNC: NEGATIVE MG/DL
LEUKOCYTE ESTERASE UR QL STRIP.AUTO: NEGATIVE
LYMPHOCYTES # BLD AUTO: 0.97 X10(3) UL (ref 1–4)
LYMPHOCYTES NFR BLD AUTO: 14.2 %
MCH RBC QN AUTO: 27.6 PG (ref 26–34)
MCHC RBC AUTO-ENTMCNC: 32.3 G/DL (ref 31–37)
MCV RBC AUTO: 85.3 FL
METAPNEUMOVIRUS PCR:: NOT DETECTED
MONOCYTES # BLD AUTO: 0.5 X10(3) UL (ref 0.1–1)
MONOCYTES NFR BLD AUTO: 7.3 %
MYCOPLASMA PNEUMONIA PCR:: NOT DETECTED
NEUTROPHILS # BLD AUTO: 5.2 X10 (3) UL (ref 1.5–7.7)
NEUTROPHILS # BLD AUTO: 5.2 X10(3) UL (ref 1.5–7.7)
NEUTROPHILS NFR BLD AUTO: 76.5 %
NITRITE UR QL STRIP.AUTO: NEGATIVE
OSMOLALITY SERPL CALC.SUM OF ELEC: 295 MOSM/KG (ref 275–295)
PARAINFLUENZA 1 PCR:: NOT DETECTED
PARAINFLUENZA 2 PCR:: NOT DETECTED
PARAINFLUENZA 3 PCR:: NOT DETECTED
PARAINFLUENZA 4 PCR:: NOT DETECTED
PH UR STRIP.AUTO: 6.5 [PH] (ref 5–8)
PLATELET # BLD AUTO: 235 10(3)UL (ref 150–450)
POTASSIUM SERPL-SCNC: 4.6 MMOL/L (ref 3.5–5.1)
PROT SERPL-MCNC: 7.4 G/DL (ref 6.4–8.2)
PROT UR STRIP.AUTO-MCNC: 70 MG/DL
PROTHROMBIN TIME: 18.1 SECONDS (ref 11.6–14.8)
RBC # BLD AUTO: 4.75 X10(6)UL
RBC UR QL AUTO: NEGATIVE
RHINOVIRUS/ENTERO PCR:: NOT DETECTED
RSV RNA SPEC QL NAA+PROBE: NOT DETECTED
SARS-COV-2 RNA NPH QL NAA+NON-PROBE: DETECTED
SODIUM SERPL-SCNC: 135 MMOL/L (ref 136–145)
SP GR UR STRIP.AUTO: 1.01 (ref 1–1.03)
TROPONIN I HIGH SENSITIVITY: 14 NG/L
UROBILINOGEN UR STRIP.AUTO-MCNC: NORMAL MG/DL
WBC # BLD AUTO: 6.8 X10(3) UL (ref 4–11)

## 2023-09-10 PROCEDURE — 70450 CT HEAD/BRAIN W/O DYE: CPT | Performed by: EMERGENCY MEDICINE

## 2023-09-10 PROCEDURE — 71045 X-RAY EXAM CHEST 1 VIEW: CPT | Performed by: EMERGENCY MEDICINE

## 2023-09-10 PROCEDURE — 99223 1ST HOSP IP/OBS HIGH 75: CPT | Performed by: STUDENT IN AN ORGANIZED HEALTH CARE EDUCATION/TRAINING PROGRAM

## 2023-09-10 RX ORDER — ACETAMINOPHEN 500 MG
1000 TABLET ORAL EVERY 8 HOURS PRN
Status: DISCONTINUED | OUTPATIENT
Start: 2023-09-10 | End: 2023-09-13

## 2023-09-10 RX ORDER — SENNOSIDES 8.6 MG
17.2 TABLET ORAL NIGHTLY PRN
Status: DISCONTINUED | OUTPATIENT
Start: 2023-09-10 | End: 2023-09-13

## 2023-09-10 RX ORDER — ECHINACEA PURPUREA EXTRACT 125 MG
1 TABLET ORAL
Status: DISCONTINUED | OUTPATIENT
Start: 2023-09-10 | End: 2023-09-13

## 2023-09-10 RX ORDER — BENZONATATE 100 MG/1
200 CAPSULE ORAL 3 TIMES DAILY PRN
Status: DISCONTINUED | OUTPATIENT
Start: 2023-09-10 | End: 2023-09-13

## 2023-09-10 RX ORDER — POLYETHYLENE GLYCOL 3350 17 G/17G
17 POWDER, FOR SOLUTION ORAL DAILY PRN
Status: DISCONTINUED | OUTPATIENT
Start: 2023-09-10 | End: 2023-09-13

## 2023-09-10 RX ORDER — ESCITALOPRAM OXALATE 5 MG/1
5 TABLET ORAL DAILY
Status: DISCONTINUED | OUTPATIENT
Start: 2023-09-11 | End: 2023-09-13

## 2023-09-10 RX ORDER — DEXTROSE MONOHYDRATE 25 G/50ML
50 INJECTION, SOLUTION INTRAVENOUS
Status: DISCONTINUED | OUTPATIENT
Start: 2023-09-10 | End: 2023-09-13

## 2023-09-10 RX ORDER — METOCLOPRAMIDE HYDROCHLORIDE 5 MG/ML
5 INJECTION INTRAMUSCULAR; INTRAVENOUS EVERY 8 HOURS PRN
Status: DISCONTINUED | OUTPATIENT
Start: 2023-09-10 | End: 2023-09-13

## 2023-09-10 RX ORDER — NICOTINE POLACRILEX 4 MG
15 LOZENGE BUCCAL
Status: DISCONTINUED | OUTPATIENT
Start: 2023-09-10 | End: 2023-09-13

## 2023-09-10 RX ORDER — BISACODYL 10 MG
10 SUPPOSITORY, RECTAL RECTAL
Status: DISCONTINUED | OUTPATIENT
Start: 2023-09-10 | End: 2023-09-13

## 2023-09-10 RX ORDER — MELATONIN
3 NIGHTLY PRN
Status: DISCONTINUED | OUTPATIENT
Start: 2023-09-10 | End: 2023-09-13

## 2023-09-10 RX ORDER — FUROSEMIDE 20 MG/1
20 TABLET ORAL DAILY
Status: DISCONTINUED | OUTPATIENT
Start: 2023-09-11 | End: 2023-09-13

## 2023-09-10 RX ORDER — METOPROLOL SUCCINATE 100 MG/1
100 TABLET, EXTENDED RELEASE ORAL
Status: DISCONTINUED | OUTPATIENT
Start: 2023-09-10 | End: 2023-09-13

## 2023-09-10 RX ORDER — NICOTINE POLACRILEX 4 MG
30 LOZENGE BUCCAL
Status: DISCONTINUED | OUTPATIENT
Start: 2023-09-10 | End: 2023-09-13

## 2023-09-10 RX ORDER — GABAPENTIN 100 MG/1
100 CAPSULE ORAL 3 TIMES DAILY
Status: DISCONTINUED | OUTPATIENT
Start: 2023-09-10 | End: 2023-09-10

## 2023-09-10 RX ORDER — LEVOTHYROXINE SODIUM 0.07 MG/1
75 TABLET ORAL
Status: DISCONTINUED | OUTPATIENT
Start: 2023-09-11 | End: 2023-09-13

## 2023-09-10 RX ORDER — ATORVASTATIN CALCIUM 10 MG/1
10 TABLET, FILM COATED ORAL NIGHTLY
Status: DISCONTINUED | OUTPATIENT
Start: 2023-09-10 | End: 2023-09-13

## 2023-09-10 RX ORDER — SODIUM CHLORIDE 9 MG/ML
INJECTION, SOLUTION INTRAVENOUS CONTINUOUS
Status: DISCONTINUED | OUTPATIENT
Start: 2023-09-10 | End: 2023-09-10

## 2023-09-10 RX ORDER — ONDANSETRON 2 MG/ML
4 INJECTION INTRAMUSCULAR; INTRAVENOUS EVERY 6 HOURS PRN
Status: DISCONTINUED | OUTPATIENT
Start: 2023-09-10 | End: 2023-09-13

## 2023-09-11 ENCOUNTER — ANCILLARY PROCEDURE (OUTPATIENT)
Dept: CARDIOLOGY | Age: 83
End: 2023-09-11
Attending: INTERNAL MEDICINE

## 2023-09-11 ENCOUNTER — TELEPHONE (OUTPATIENT)
Dept: CARDIOLOGY | Age: 83
End: 2023-09-11

## 2023-09-11 DIAGNOSIS — Z95.0 PACEMAKER: ICD-10-CM

## 2023-09-11 PROBLEM — U07.1 COVID-19: Status: ACTIVE | Noted: 2023-09-11

## 2023-09-11 LAB
ALBUMIN SERPL-MCNC: 2.6 G/DL (ref 3.4–5)
ALBUMIN/GLOB SERPL: 0.7 {RATIO} (ref 1–2)
ALP LIVER SERPL-CCNC: 49 U/L
ALT SERPL-CCNC: 28 U/L
ANION GAP SERPL CALC-SCNC: 5 MMOL/L (ref 0–18)
AST SERPL-CCNC: 25 U/L (ref 15–37)
BASOPHILS # BLD AUTO: 0.03 X10(3) UL (ref 0–0.2)
BASOPHILS NFR BLD AUTO: 0.6 %
BILIRUB SERPL-MCNC: 0.4 MG/DL (ref 0.1–2)
BUN BLD-MCNC: 29 MG/DL (ref 7–18)
CALCIUM BLD-MCNC: 8.3 MG/DL (ref 8.5–10.1)
CHLORIDE SERPL-SCNC: 104 MMOL/L (ref 98–112)
CO2 SERPL-SCNC: 27 MMOL/L (ref 21–32)
CREAT BLD-MCNC: 1.27 MG/DL
EGFRCR SERPLBLD CKD-EPI 2021: 41 ML/MIN/1.73M2 (ref 60–?)
EOSINOPHIL # BLD AUTO: 0.02 X10(3) UL (ref 0–0.7)
EOSINOPHIL NFR BLD AUTO: 0.4 %
ERYTHROCYTE [DISTWIDTH] IN BLOOD BY AUTOMATED COUNT: 15.9 %
EST. AVERAGE GLUCOSE BLD GHB EST-MCNC: 160 MG/DL (ref 68–126)
GLOBULIN PLAS-MCNC: 4 G/DL (ref 2.8–4.4)
GLUCOSE BLD-MCNC: 159 MG/DL (ref 70–99)
GLUCOSE BLD-MCNC: 166 MG/DL (ref 70–99)
GLUCOSE BLD-MCNC: 178 MG/DL (ref 70–99)
GLUCOSE BLD-MCNC: 183 MG/DL (ref 70–99)
GLUCOSE BLD-MCNC: 192 MG/DL (ref 70–99)
HBA1C MFR BLD: 7.2 % (ref ?–5.7)
HCT VFR BLD AUTO: 36.2 %
HGB BLD-MCNC: 11.5 G/DL
IMM GRANULOCYTES # BLD AUTO: 0.02 X10(3) UL (ref 0–1)
IMM GRANULOCYTES NFR BLD: 0.4 %
INR BLD: 1.68 (ref 0.85–1.16)
LYMPHOCYTES # BLD AUTO: 1.27 X10(3) UL (ref 1–4)
LYMPHOCYTES NFR BLD AUTO: 25.5 %
MAGNESIUM SERPL-MCNC: 1.9 MG/DL (ref 1.6–2.6)
MCH RBC QN AUTO: 27.3 PG (ref 26–34)
MCHC RBC AUTO-ENTMCNC: 31.8 G/DL (ref 31–37)
MCV RBC AUTO: 86 FL
MDC_IDC_LEAD_IMPLANT_DT: NORMAL
MDC_IDC_LEAD_IMPLANT_DT: NORMAL
MDC_IDC_LEAD_LOCATION: NORMAL
MDC_IDC_LEAD_LOCATION: NORMAL
MDC_IDC_LEAD_LOCATION_DETAIL_1: NORMAL
MDC_IDC_LEAD_LOCATION_DETAIL_1: NORMAL
MDC_IDC_LEAD_MFG: NORMAL
MDC_IDC_LEAD_MFG: NORMAL
MDC_IDC_LEAD_MODEL: NORMAL
MDC_IDC_LEAD_MODEL: NORMAL
MDC_IDC_LEAD_POLARITY_TYPE: NORMAL
MDC_IDC_LEAD_POLARITY_TYPE: NORMAL
MDC_IDC_LEAD_SERIAL: NORMAL
MDC_IDC_LEAD_SERIAL: NORMAL
MDC_IDC_PG_IMPLANT_DTM: NORMAL
MDC_IDC_PG_MFG: NORMAL
MDC_IDC_PG_MODEL: NORMAL
MDC_IDC_PG_SERIAL: NORMAL
MDC_IDC_PG_TYPE: NORMAL
MDC_IDC_SESS_CLINIC_NAME: NORMAL
MDC_IDC_SESS_TYPE: NORMAL
MONOCYTES # BLD AUTO: 0.49 X10(3) UL (ref 0.1–1)
MONOCYTES NFR BLD AUTO: 9.8 %
NEUTROPHILS # BLD AUTO: 3.16 X10 (3) UL (ref 1.5–7.7)
NEUTROPHILS # BLD AUTO: 3.16 X10(3) UL (ref 1.5–7.7)
NEUTROPHILS NFR BLD AUTO: 63.3 %
OSMOLALITY SERPL CALC.SUM OF ELEC: 292 MOSM/KG (ref 275–295)
PHOSPHATE SERPL-MCNC: 3.7 MG/DL (ref 2.5–4.9)
PLATELET # BLD AUTO: 210 10(3)UL (ref 150–450)
POTASSIUM SERPL-SCNC: 4.2 MMOL/L (ref 3.5–5.1)
PROT SERPL-MCNC: 6.6 G/DL (ref 6.4–8.2)
PROTHROMBIN TIME: 19.6 SECONDS (ref 11.6–14.8)
Q-T INTERVAL: 288 MS
QRS DURATION: 66 MS
QTC CALCULATION (BEZET): 391 MS
R AXIS: 166 DEGREES
RBC # BLD AUTO: 4.21 X10(6)UL
SODIUM SERPL-SCNC: 136 MMOL/L (ref 136–145)
T AXIS: 58 DEGREES
VENTRICULAR RATE: 111 BPM
WBC # BLD AUTO: 5 X10(3) UL (ref 4–11)

## 2023-09-11 PROCEDURE — 99232 SBSQ HOSP IP/OBS MODERATE 35: CPT | Performed by: STUDENT IN AN ORGANIZED HEALTH CARE EDUCATION/TRAINING PROGRAM

## 2023-09-12 ENCOUNTER — APPOINTMENT (OUTPATIENT)
Dept: CV DIAGNOSTICS | Facility: HOSPITAL | Age: 83
End: 2023-09-12
Attending: NURSE PRACTITIONER
Payer: MEDICARE

## 2023-09-12 LAB
GLUCOSE BLD-MCNC: 112 MG/DL (ref 70–99)
GLUCOSE BLD-MCNC: 172 MG/DL (ref 70–99)
GLUCOSE BLD-MCNC: 200 MG/DL (ref 70–99)
GLUCOSE BLD-MCNC: 227 MG/DL (ref 70–99)

## 2023-09-12 PROCEDURE — 93306 TTE W/DOPPLER COMPLETE: CPT | Performed by: NURSE PRACTITIONER

## 2023-09-12 PROCEDURE — 99232 SBSQ HOSP IP/OBS MODERATE 35: CPT | Performed by: STUDENT IN AN ORGANIZED HEALTH CARE EDUCATION/TRAINING PROGRAM

## 2023-09-12 RX ORDER — METOPROLOL SUCCINATE 100 MG/1
100 TABLET, EXTENDED RELEASE ORAL
Qty: 60 TABLET | Refills: 3 | Status: SHIPPED | OUTPATIENT
Start: 2023-09-12

## 2023-09-13 ENCOUNTER — PATIENT OUTREACH (OUTPATIENT)
Dept: CASE MANAGEMENT | Age: 83
End: 2023-09-13

## 2023-09-13 ENCOUNTER — TELEPHONE (OUTPATIENT)
Dept: CARDIOLOGY | Age: 83
End: 2023-09-13

## 2023-09-13 VITALS
WEIGHT: 138 LBS | TEMPERATURE: 98 F | BODY MASS INDEX: 24 KG/M2 | RESPIRATION RATE: 16 BRPM | OXYGEN SATURATION: 91 % | SYSTOLIC BLOOD PRESSURE: 147 MMHG | DIASTOLIC BLOOD PRESSURE: 81 MMHG | HEART RATE: 85 BPM

## 2023-09-13 LAB
GLUCOSE BLD-MCNC: 138 MG/DL (ref 70–99)
GLUCOSE BLD-MCNC: 233 MG/DL (ref 70–99)

## 2023-09-13 PROCEDURE — 99239 HOSP IP/OBS DSCHRG MGMT >30: CPT | Performed by: HOSPITALIST

## 2023-09-14 RX ORDER — FUROSEMIDE 20 MG/1
TABLET ORAL
Qty: 120 TABLET | Refills: 1 | Status: SHIPPED | OUTPATIENT
Start: 2023-09-14

## 2023-10-03 RX ORDER — ESCITALOPRAM OXALATE 5 MG/1
5 TABLET ORAL DAILY
Qty: 90 TABLET | Refills: 0 | Status: SHIPPED | OUTPATIENT
Start: 2023-10-03

## 2023-10-03 NOTE — TELEPHONE ENCOUNTER
Medication(s) to Refill:   Requested Prescriptions     Pending Prescriptions Disp Refills    ESCITALOPRAM 5 MG Oral Tab [Pharmacy Med Name: ESCITALOPRAM OXALATE 5 MG Tablet] 90 tablet 0     Sig: TAKE 1 TABLET EVERY DAY     Last Time Medication was Filled:  3/24/23    Recent Visits  Date Type Provider Dept   08/30/23 Office Visit Sandor Duran MD [facility]     Future Appointments  Date Type Provider Dept   10/26/23 Appointment Sandor Duran MD [facility]

## 2023-10-11 ENCOUNTER — TELEPHONE (OUTPATIENT)
Dept: FAMILY MEDICINE CLINIC | Facility: CLINIC | Age: 83
End: 2023-10-11

## 2023-10-11 ENCOUNTER — OFFICE VISIT (OUTPATIENT)
Dept: CARDIOLOGY | Age: 83
End: 2023-10-11

## 2023-10-11 ENCOUNTER — ANCILLARY PROCEDURE (OUTPATIENT)
Dept: CARDIOLOGY | Age: 83
End: 2023-10-11
Attending: NURSE PRACTITIONER

## 2023-10-11 VITALS
HEART RATE: 96 BPM | OXYGEN SATURATION: 94 % | WEIGHT: 140.1 LBS | BODY MASS INDEX: 23.92 KG/M2 | DIASTOLIC BLOOD PRESSURE: 79 MMHG | SYSTOLIC BLOOD PRESSURE: 125 MMHG | HEIGHT: 64 IN

## 2023-10-11 VITALS — HEART RATE: 104 BPM

## 2023-10-11 DIAGNOSIS — N18.32 STAGE 3B CHRONIC KIDNEY DISEASE (CMD): ICD-10-CM

## 2023-10-11 DIAGNOSIS — Z45.018 ADJUSTMENT AND MANAGEMENT OF CARDIAC PACEMAKER: ICD-10-CM

## 2023-10-11 DIAGNOSIS — Z09 HOSPITAL DISCHARGE FOLLOW-UP: Primary | ICD-10-CM

## 2023-10-11 DIAGNOSIS — Z95.0 PRESENCE OF CARDIAC PACEMAKER: ICD-10-CM

## 2023-10-11 DIAGNOSIS — Z45.018 ADJUSTMENT AND MANAGEMENT OF CARDIAC PACEMAKER: Primary | ICD-10-CM

## 2023-10-11 DIAGNOSIS — I48.19 PERSISTENT ATRIAL FIBRILLATION (CMD): ICD-10-CM

## 2023-10-11 LAB
MDC_IDC_LEAD_IMPLANT_DT: NORMAL
MDC_IDC_LEAD_IMPLANT_DT: NORMAL
MDC_IDC_LEAD_LOCATION: NORMAL
MDC_IDC_LEAD_LOCATION: NORMAL
MDC_IDC_LEAD_LOCATION_DETAIL_1: NORMAL
MDC_IDC_LEAD_LOCATION_DETAIL_1: NORMAL
MDC_IDC_LEAD_MFG: NORMAL
MDC_IDC_LEAD_MFG: NORMAL
MDC_IDC_LEAD_MODEL: NORMAL
MDC_IDC_LEAD_MODEL: NORMAL
MDC_IDC_LEAD_POLARITY_TYPE: NORMAL
MDC_IDC_LEAD_POLARITY_TYPE: NORMAL
MDC_IDC_LEAD_SERIAL: NORMAL
MDC_IDC_LEAD_SERIAL: NORMAL
MDC_IDC_MSMT_BATTERY_DTM: NORMAL
MDC_IDC_MSMT_BATTERY_REMAINING_LONGEVITY: 90 MO
MDC_IDC_MSMT_BATTERY_REMAINING_PERCENTAGE: 100 %
MDC_IDC_MSMT_LEADCHNL_RA_IMPEDANCE_VALUE: 558 OHM
MDC_IDC_MSMT_LEADCHNL_RA_IMPEDANCE_VALUE: 558 OHM
MDC_IDC_MSMT_LEADCHNL_RA_LEAD_CHANNEL_STATUS: NORMAL
MDC_IDC_MSMT_LEADCHNL_RA_SENSING_INTR_AMPL: 0.6 MV
MDC_IDC_MSMT_LEADCHNL_RV_IMPEDANCE_VALUE: 631 OHM
MDC_IDC_MSMT_LEADCHNL_RV_IMPEDANCE_VALUE: 631 OHM
MDC_IDC_MSMT_LEADCHNL_RV_PACING_THRESHOLD_AMPLITUDE: 0.9 V
MDC_IDC_MSMT_LEADCHNL_RV_PACING_THRESHOLD_PULSEWIDTH: 0.4 MS
MDC_IDC_PG_IMPLANT_DTM: NORMAL
MDC_IDC_PG_MFG: NORMAL
MDC_IDC_PG_MODEL: NORMAL
MDC_IDC_PG_SERIAL: NORMAL
MDC_IDC_PG_TYPE: NORMAL
MDC_IDC_SESS_CLINIC_NAME: NORMAL
MDC_IDC_SESS_DTM: NORMAL
MDC_IDC_SESS_TYPE: NORMAL
MDC_IDC_SET_BRADY_AT_MODE_SWITCH_MODE: NORMAL
MDC_IDC_SET_BRADY_AT_MODE_SWITCH_RATE: 170 {BEATS}/MIN
MDC_IDC_SET_BRADY_LOWRATE: 60 {BEATS}/MIN
MDC_IDC_SET_BRADY_MAX_SENSOR_RATE: 130 {BEATS}/MIN
MDC_IDC_SET_BRADY_MAX_TRACKING_RATE: 130 {BEATS}/MIN
MDC_IDC_SET_BRADY_MODE: NORMAL
MDC_IDC_SET_BRADY_PAV_DELAY_HIGH: 150 MS
MDC_IDC_SET_BRADY_PAV_DELAY_LOW: 300 MS
MDC_IDC_SET_BRADY_SAV_DELAY_HIGH: 150 MS
MDC_IDC_SET_BRADY_SAV_DELAY_LOW: 300 MS
MDC_IDC_SET_LEADCHNL_RA_PACING_AMPLITUDE: 3.5 V
MDC_IDC_SET_LEADCHNL_RA_PACING_CAPTURE_MODE: NORMAL
MDC_IDC_SET_LEADCHNL_RA_PACING_POLARITY: NORMAL
MDC_IDC_SET_LEADCHNL_RA_PACING_PULSEWIDTH: 0.4 MS
MDC_IDC_SET_LEADCHNL_RA_SENSING_ADAPTATION_MODE: NORMAL
MDC_IDC_SET_LEADCHNL_RA_SENSING_POLARITY: NORMAL
MDC_IDC_SET_LEADCHNL_RA_SENSING_SENSITIVITY: 0.25 MV
MDC_IDC_SET_LEADCHNL_RV_PACING_AMPLITUDE: 3.5 V
MDC_IDC_SET_LEADCHNL_RV_PACING_CAPTURE_MODE: NORMAL
MDC_IDC_SET_LEADCHNL_RV_PACING_POLARITY: NORMAL
MDC_IDC_SET_LEADCHNL_RV_PACING_PULSEWIDTH: 0.4 MS
MDC_IDC_SET_LEADCHNL_RV_SENSING_ADAPTATION_MODE: NORMAL
MDC_IDC_SET_LEADCHNL_RV_SENSING_POLARITY: NORMAL
MDC_IDC_SET_LEADCHNL_RV_SENSING_SENSITIVITY: 0.6 MV
MDC_IDC_SET_ZONE_DETECTION_INTERVAL: 375 MS
MDC_IDC_SET_ZONE_TYPE: NORMAL
MDC_IDC_SET_ZONE_VENDOR_TYPE: NORMAL
MDC_IDC_STAT_AT_BURDEN_PERCENT: 100 %
MDC_IDC_STAT_AT_DTM_END: NORMAL
MDC_IDC_STAT_AT_DTM_START: NORMAL
MDC_IDC_STAT_BRADY_DTM_END: NORMAL
MDC_IDC_STAT_BRADY_DTM_START: NORMAL
MDC_IDC_STAT_BRADY_RA_PERCENT_PACED: 0 %
MDC_IDC_STAT_BRADY_RV_PERCENT_PACED: 7 %
MDC_IDC_STAT_EPISODE_RECENT_COUNT: 0
MDC_IDC_STAT_EPISODE_RECENT_COUNT: 1
MDC_IDC_STAT_EPISODE_RECENT_COUNT_DTM_END: NORMAL
MDC_IDC_STAT_EPISODE_RECENT_COUNT_DTM_START: NORMAL
MDC_IDC_STAT_EPISODE_TYPE: NORMAL
MDC_IDC_STAT_EPISODE_VENDOR_TYPE: NORMAL

## 2023-10-11 PROCEDURE — 3074F SYST BP LT 130 MM HG: CPT | Performed by: NURSE PRACTITIONER

## 2023-10-11 PROCEDURE — 99214 OFFICE O/P EST MOD 30 MIN: CPT | Performed by: NURSE PRACTITIONER

## 2023-10-11 PROCEDURE — 93280 PM DEVICE PROGR EVAL DUAL: CPT | Performed by: INTERNAL MEDICINE

## 2023-10-11 PROCEDURE — 3078F DIAST BP <80 MM HG: CPT | Performed by: NURSE PRACTITIONER

## 2023-10-11 RX ORDER — DILTIAZEM HYDROCHLORIDE 120 MG/1
120 CAPSULE, COATED, EXTENDED RELEASE ORAL DAILY
Qty: 30 CAPSULE | Refills: 3 | Status: SHIPPED | OUTPATIENT
Start: 2023-10-11

## 2023-10-11 SDOH — HEALTH STABILITY: PHYSICAL HEALTH: ON AVERAGE, HOW MANY MINUTES DO YOU ENGAGE IN EXERCISE AT THIS LEVEL?: 60 MIN

## 2023-10-11 SDOH — HEALTH STABILITY: PHYSICAL HEALTH: ON AVERAGE, HOW MANY DAYS PER WEEK DO YOU ENGAGE IN MODERATE TO STRENUOUS EXERCISE (LIKE A BRISK WALK)?: 2 DAYS

## 2023-10-11 ASSESSMENT — PATIENT HEALTH QUESTIONNAIRE - PHQ9
1. LITTLE INTEREST OR PLEASURE IN DOING THINGS: NOT AT ALL
SUM OF ALL RESPONSES TO PHQ9 QUESTIONS 1 AND 2: 0
CLINICAL INTERPRETATION OF PHQ2 SCORE: NO FURTHER SCREENING NEEDED
2. FEELING DOWN, DEPRESSED OR HOPELESS: NOT AT ALL
SUM OF ALL RESPONSES TO PHQ9 QUESTIONS 1 AND 2: 0

## 2023-10-11 NOTE — TELEPHONE ENCOUNTER
Faxed signed [address] and Plan of Care to [facility] to fax# [phone] . Original sent to scanning, copy given to supervisor.

## 2023-10-12 ENCOUNTER — HOME HEALTH CHARGES (OUTPATIENT)
Dept: FAMILY MEDICINE CLINIC | Facility: CLINIC | Age: 83
End: 2023-10-12

## 2023-10-12 ENCOUNTER — TELEPHONE (OUTPATIENT)
Dept: CARDIOLOGY | Age: 83
End: 2023-10-12

## 2023-10-12 DIAGNOSIS — Z48.812 AFTERCARE FOLLOWING SURGERY OF THE CIRCULATORY SYSTEM: Primary | ICD-10-CM

## 2023-10-12 DIAGNOSIS — I48.19 PERSISTENT ATRIAL FIBRILLATION (CMD): Primary | ICD-10-CM

## 2023-10-13 ENCOUNTER — PREP FOR CASE (OUTPATIENT)
Dept: CARDIOLOGY | Age: 83
End: 2023-10-13

## 2023-10-13 RX ORDER — SODIUM CHLORIDE 9 MG/ML
INJECTION, SOLUTION INTRAVENOUS CONTINUOUS
Status: CANCELLED | OUTPATIENT
Start: 2023-10-13

## 2023-10-16 ENCOUNTER — OFFICE VISIT (OUTPATIENT)
Dept: FAMILY MEDICINE CLINIC | Facility: CLINIC | Age: 83
End: 2023-10-16

## 2023-10-16 VITALS
HEART RATE: 100 BPM | OXYGEN SATURATION: 95 % | TEMPERATURE: 97 F | WEIGHT: 143 LBS | SYSTOLIC BLOOD PRESSURE: 132 MMHG | BODY MASS INDEX: 25.02 KG/M2 | HEIGHT: 63.39 IN | DIASTOLIC BLOOD PRESSURE: 69 MMHG

## 2023-10-16 DIAGNOSIS — I82.4Y2 ACUTE DEEP VEIN THROMBOSIS (DVT) OF PROXIMAL VEIN OF LEFT LOWER EXTREMITY (HCC): ICD-10-CM

## 2023-10-16 DIAGNOSIS — Z95.0 S/P PLACEMENT OF CARDIAC PACEMAKER: ICD-10-CM

## 2023-10-16 DIAGNOSIS — I49.5 TACHY-BRADY SYNDROME (HCC): Primary | ICD-10-CM

## 2023-10-16 DIAGNOSIS — I50.33 ACUTE ON CHRONIC DIASTOLIC CONGESTIVE HEART FAILURE (HCC): ICD-10-CM

## 2023-10-16 DIAGNOSIS — U07.1 COVID-19: ICD-10-CM

## 2023-10-16 DIAGNOSIS — D22.9 ATYPICAL MOLE: ICD-10-CM

## 2023-10-16 DIAGNOSIS — E79.0 ELEVATED URIC ACID IN BLOOD: ICD-10-CM

## 2023-10-16 DIAGNOSIS — I10 ESSENTIAL HYPERTENSION: ICD-10-CM

## 2023-10-16 DIAGNOSIS — E11.39 TYPE 2 DIABETES MELLITUS WITH OTHER OPHTHALMIC COMPLICATION, WITHOUT LONG-TERM CURRENT USE OF INSULIN (HCC): ICD-10-CM

## 2023-10-16 DIAGNOSIS — N18.4 CHRONIC RENAL DISEASE, STAGE IV (HCC): ICD-10-CM

## 2023-10-16 PROBLEM — N18.30 CKD (CHRONIC KIDNEY DISEASE) STAGE 3, GFR 30-59 ML/MIN (HCC): Chronic | Status: ACTIVE | Noted: 2023-10-16

## 2023-10-16 LAB
ALBUMIN SERPL-MCNC: 2.9 G/DL (ref 3.4–5)
ALBUMIN/GLOB SERPL: 0.6 {RATIO} (ref 1–2)
ALP LIVER SERPL-CCNC: 53 U/L
ALT SERPL-CCNC: 27 U/L
ANION GAP SERPL CALC-SCNC: 9 MMOL/L (ref 0–18)
AST SERPL-CCNC: 19 U/L (ref 15–37)
BASOPHILS # BLD AUTO: 0.05 X10(3) UL (ref 0–0.2)
BASOPHILS NFR BLD AUTO: 0.7 %
BILIRUB SERPL-MCNC: 0.3 MG/DL (ref 0.1–2)
BUN BLD-MCNC: 29 MG/DL (ref 7–18)
CALCIUM BLD-MCNC: 8.8 MG/DL (ref 8.5–10.1)
CHLORIDE SERPL-SCNC: 105 MMOL/L (ref 98–112)
CO2 SERPL-SCNC: 24 MMOL/L (ref 21–32)
CREAT BLD-MCNC: 1.74 MG/DL
EGFRCR SERPLBLD CKD-EPI 2021: 28 ML/MIN/1.73M2 (ref 60–?)
EOSINOPHIL # BLD AUTO: 0.26 X10(3) UL (ref 0–0.7)
EOSINOPHIL NFR BLD AUTO: 3.8 %
ERYTHROCYTE [DISTWIDTH] IN BLOOD BY AUTOMATED COUNT: 18.4 %
FASTING STATUS PATIENT QL REPORTED: NO
GLOBULIN PLAS-MCNC: 4.5 G/DL (ref 2.8–4.4)
GLUCOSE BLD-MCNC: 141 MG/DL (ref 70–99)
HCT VFR BLD AUTO: 36.6 %
HGB BLD-MCNC: 11.4 G/DL
IMM GRANULOCYTES # BLD AUTO: 0.03 X10(3) UL (ref 0–1)
IMM GRANULOCYTES NFR BLD: 0.4 %
LYMPHOCYTES # BLD AUTO: 1.82 X10(3) UL (ref 1–4)
LYMPHOCYTES NFR BLD AUTO: 26.9 %
MCH RBC QN AUTO: 27 PG (ref 26–34)
MCHC RBC AUTO-ENTMCNC: 31.1 G/DL (ref 31–37)
MCV RBC AUTO: 86.7 FL
MONOCYTES # BLD AUTO: 0.49 X10(3) UL (ref 0.1–1)
MONOCYTES NFR BLD AUTO: 7.2 %
NEUTROPHILS # BLD AUTO: 4.12 X10 (3) UL (ref 1.5–7.7)
NEUTROPHILS # BLD AUTO: 4.12 X10(3) UL (ref 1.5–7.7)
NEUTROPHILS NFR BLD AUTO: 61 %
OSMOLALITY SERPL CALC.SUM OF ELEC: 294 MOSM/KG (ref 275–295)
PLATELET # BLD AUTO: 281 10(3)UL (ref 150–450)
POTASSIUM SERPL-SCNC: 4.5 MMOL/L (ref 3.5–5.1)
PROT SERPL-MCNC: 7.4 G/DL (ref 6.4–8.2)
RBC # BLD AUTO: 4.22 X10(6)UL
SODIUM SERPL-SCNC: 138 MMOL/L (ref 136–145)
URATE SERPL-MCNC: 10 MG/DL
WBC # BLD AUTO: 6.8 X10(3) UL (ref 4–11)

## 2023-10-16 PROCEDURE — 99215 OFFICE O/P EST HI 40 MIN: CPT | Performed by: FAMILY MEDICINE

## 2023-10-16 PROCEDURE — 84550 ASSAY OF BLOOD/URIC ACID: CPT | Performed by: FAMILY MEDICINE

## 2023-10-16 PROCEDURE — 90662 IIV NO PRSV INCREASED AG IM: CPT | Performed by: FAMILY MEDICINE

## 2023-10-16 PROCEDURE — G0008 ADMIN INFLUENZA VIRUS VAC: HCPCS | Performed by: FAMILY MEDICINE

## 2023-10-16 PROCEDURE — 1159F MED LIST DOCD IN RCRD: CPT | Performed by: FAMILY MEDICINE

## 2023-10-16 PROCEDURE — 1111F DSCHRG MED/CURRENT MED MERGE: CPT | Performed by: FAMILY MEDICINE

## 2023-10-16 PROCEDURE — 3078F DIAST BP <80 MM HG: CPT | Performed by: FAMILY MEDICINE

## 2023-10-16 PROCEDURE — 85025 COMPLETE CBC W/AUTO DIFF WBC: CPT | Performed by: FAMILY MEDICINE

## 2023-10-16 PROCEDURE — 80053 COMPREHEN METABOLIC PANEL: CPT | Performed by: FAMILY MEDICINE

## 2023-10-16 PROCEDURE — 3008F BODY MASS INDEX DOCD: CPT | Performed by: FAMILY MEDICINE

## 2023-10-16 PROCEDURE — 3075F SYST BP GE 130 - 139MM HG: CPT | Performed by: FAMILY MEDICINE

## 2023-10-16 PROCEDURE — 1170F FXNL STATUS ASSESSED: CPT | Performed by: FAMILY MEDICINE

## 2023-10-16 PROCEDURE — 1160F RVW MEDS BY RX/DR IN RCRD: CPT | Performed by: FAMILY MEDICINE

## 2023-10-16 RX ORDER — GABAPENTIN 100 MG/1
100 CAPSULE ORAL 3 TIMES DAILY
COMMUNITY
Start: 2023-10-02

## 2023-10-16 RX ORDER — DILTIAZEM HYDROCHLORIDE 120 MG/1
120 CAPSULE, COATED, EXTENDED RELEASE ORAL DAILY
COMMUNITY
Start: 2023-10-11

## 2023-10-16 NOTE — PATIENT INSTRUCTIONS
Continue all meds as prescribed    Continue to use incentive spirometry (breathing tube) to keep lungs open    Followup with cardiology as planned    Followup here in 2 months, sooner if needed

## 2023-10-23 ENCOUNTER — TELEPHONE (OUTPATIENT)
Dept: FAMILY MEDICINE CLINIC | Facility: CLINIC | Age: 83
End: 2023-10-23

## 2023-10-26 ENCOUNTER — OFFICE VISIT (OUTPATIENT)
Dept: NEPHROLOGY | Facility: CLINIC | Age: 83
End: 2023-10-26

## 2023-10-26 VITALS — BODY MASS INDEX: 26 KG/M2 | WEIGHT: 146.38 LBS | SYSTOLIC BLOOD PRESSURE: 164 MMHG | DIASTOLIC BLOOD PRESSURE: 66 MMHG

## 2023-10-26 DIAGNOSIS — R80.9 PROTEINURIA, UNSPECIFIED TYPE: ICD-10-CM

## 2023-10-26 DIAGNOSIS — N18.30 STAGE 3 CHRONIC KIDNEY DISEASE, UNSPECIFIED WHETHER STAGE 3A OR 3B CKD (HCC): Primary | ICD-10-CM

## 2023-10-26 DIAGNOSIS — E79.0 HYPERURICEMIA: ICD-10-CM

## 2023-10-26 PROCEDURE — 99204 OFFICE O/P NEW MOD 45 MIN: CPT | Performed by: INTERNAL MEDICINE

## 2023-10-26 RX ORDER — ALLOPURINOL 100 MG/1
100 TABLET ORAL DAILY
Qty: 30 TABLET | Refills: 11 | Status: SHIPPED | OUTPATIENT
Start: 2023-10-26

## 2023-10-30 ENCOUNTER — APPOINTMENT (OUTPATIENT)
Dept: URBAN - METROPOLITAN AREA CLINIC 247 | Age: 83
Setting detail: DERMATOLOGY
End: 2023-10-30

## 2023-10-30 ENCOUNTER — PATIENT OUTREACH (OUTPATIENT)
Dept: CASE MANAGEMENT | Age: 83
End: 2023-10-30

## 2023-10-30 DIAGNOSIS — L57.0 ACTINIC KERATOSIS: ICD-10-CM

## 2023-10-30 DIAGNOSIS — L57.8 OTHER SKIN CHANGES DUE TO CHRONIC EXPOSURE TO NONIONIZING RADIATION: ICD-10-CM

## 2023-10-30 DIAGNOSIS — D22 MELANOCYTIC NEVI: ICD-10-CM

## 2023-10-30 DIAGNOSIS — D18.0 HEMANGIOMA: ICD-10-CM

## 2023-10-30 PROBLEM — D22.39 MELANOCYTIC NEVI OF OTHER PARTS OF FACE: Status: ACTIVE | Noted: 2023-10-30

## 2023-10-30 PROBLEM — D18.01 HEMANGIOMA OF SKIN AND SUBCUTANEOUS TISSUE: Status: ACTIVE | Noted: 2023-10-30

## 2023-10-30 PROCEDURE — OTHER MIPS QUALITY: OTHER

## 2023-10-30 PROCEDURE — 17000 DESTRUCT PREMALG LESION: CPT

## 2023-10-30 PROCEDURE — OTHER LIQUID NITROGEN: OTHER

## 2023-10-30 PROCEDURE — 99213 OFFICE O/P EST LOW 20 MIN: CPT | Mod: 25

## 2023-10-30 PROCEDURE — OTHER COUNSELING: OTHER

## 2023-10-30 ASSESSMENT — LOCATION ZONE DERM
LOCATION ZONE: FACE
LOCATION ZONE: NOSE

## 2023-10-30 ASSESSMENT — LOCATION DETAILED DESCRIPTION DERM
LOCATION DETAILED: LEFT SUPERIOR CENTRAL MALAR CHEEK
LOCATION DETAILED: LEFT INFERIOR CENTRAL MALAR CHEEK
LOCATION DETAILED: NASAL SUPRATIP

## 2023-10-30 ASSESSMENT — LOCATION SIMPLE DESCRIPTION DERM
LOCATION SIMPLE: LEFT CHEEK
LOCATION SIMPLE: NOSE

## 2023-10-30 NOTE — PROGRESS NOTES
10/30/23      CCM assessment call, no answer LM to CB at [phone]. [address] Manager/Health   [address] [place] [address] Management Dept. [address] [facility] Floor  [address] [place] [address]   Office [phone]  Awaisflako Quigley. [facility]

## 2023-10-31 ENCOUNTER — ANCILLARY PROCEDURE (OUTPATIENT)
Dept: CARDIOLOGY | Age: 83
End: 2023-10-31
Attending: INTERNAL MEDICINE

## 2023-10-31 ENCOUNTER — TELEPHONE (OUTPATIENT)
Dept: CARDIOLOGY | Age: 83
End: 2023-10-31

## 2023-10-31 DIAGNOSIS — Z95.0 PACEMAKER: ICD-10-CM

## 2023-10-31 LAB
MDC_IDC_LEAD_IMPLANT_DT: NORMAL
MDC_IDC_LEAD_IMPLANT_DT: NORMAL
MDC_IDC_LEAD_LOCATION: NORMAL
MDC_IDC_LEAD_LOCATION: NORMAL
MDC_IDC_LEAD_LOCATION_DETAIL_1: NORMAL
MDC_IDC_LEAD_LOCATION_DETAIL_1: NORMAL
MDC_IDC_LEAD_MFG: NORMAL
MDC_IDC_LEAD_MFG: NORMAL
MDC_IDC_LEAD_MODEL: NORMAL
MDC_IDC_LEAD_MODEL: NORMAL
MDC_IDC_LEAD_POLARITY_TYPE: NORMAL
MDC_IDC_LEAD_POLARITY_TYPE: NORMAL
MDC_IDC_LEAD_SERIAL: NORMAL
MDC_IDC_LEAD_SERIAL: NORMAL
MDC_IDC_PG_IMPLANT_DTM: NORMAL
MDC_IDC_PG_MFG: NORMAL
MDC_IDC_PG_MODEL: NORMAL
MDC_IDC_PG_SERIAL: NORMAL
MDC_IDC_PG_TYPE: NORMAL
MDC_IDC_SESS_CLINIC_NAME: NORMAL
MDC_IDC_SESS_TYPE: NORMAL

## 2023-11-08 RX ORDER — ALLOPURINOL 100 MG/1
100 TABLET ORAL DAILY
COMMUNITY

## 2023-11-10 ENCOUNTER — HOSPITAL ENCOUNTER (OUTPATIENT)
Dept: CARDIOLOGY | Age: 83
Discharge: HOME | End: 2023-11-10
Attending: INTERNAL MEDICINE

## 2023-11-10 VITALS
WEIGHT: 156.53 LBS | OXYGEN SATURATION: 93 % | HEIGHT: 64 IN | TEMPERATURE: 97.9 F | RESPIRATION RATE: 15 BRPM | SYSTOLIC BLOOD PRESSURE: 142 MMHG | HEART RATE: 70 BPM | DIASTOLIC BLOOD PRESSURE: 76 MMHG | BODY MASS INDEX: 26.72 KG/M2

## 2023-11-10 VITALS
RESPIRATION RATE: 13 BRPM | HEART RATE: 95 BPM | DIASTOLIC BLOOD PRESSURE: 92 MMHG | OXYGEN SATURATION: 93 % | SYSTOLIC BLOOD PRESSURE: 130 MMHG

## 2023-11-10 DIAGNOSIS — I48.19 PERSISTENT ATRIAL FIBRILLATION (CMD): ICD-10-CM

## 2023-11-10 LAB
ATRIAL RATE (BPM): 108
AV STENOSIS SEVERITY TEXT: NORMAL
LV EF: NORMAL %
MAGNESIUM SERPL-MCNC: 1.9 MG/DL (ref 1.7–2.4)
POTASSIUM SERPL-SCNC: 4.6 MMOL/L (ref 3.4–5.1)
QRS-INTERVAL (MSEC): 78
QT-INTERVAL (MSEC): 428
QTC: 435
R AXIS (DEGREES): -117
REPORT TEXT: NORMAL
RV END SYSTOLIC LONGITUDINAL STRAIN FREE WALL (RVGS): 2.2
T AXIS (DEGREES): 67
VENTRICULAR RATE EKG/MIN (BPM): 62

## 2023-11-10 PROCEDURE — 93319 3D ECHO IMG CGEN CAR ANOMAL: CPT | Performed by: INTERNAL MEDICINE

## 2023-11-10 PROCEDURE — 93320 DOPPLER ECHO COMPLETE: CPT | Performed by: INTERNAL MEDICINE

## 2023-11-10 PROCEDURE — 13000001 HB PHASE II RECOVERY EA 30 MINUTES

## 2023-11-10 PROCEDURE — 99152 MOD SED SAME PHYS/QHP 5/>YRS: CPT

## 2023-11-10 PROCEDURE — 84132 ASSAY OF SERUM POTASSIUM: CPT | Performed by: INTERNAL MEDICINE

## 2023-11-10 PROCEDURE — 93005 ELECTROCARDIOGRAM TRACING: CPT | Performed by: INTERNAL MEDICINE

## 2023-11-10 PROCEDURE — 36415 COLL VENOUS BLD VENIPUNCTURE: CPT | Performed by: INTERNAL MEDICINE

## 2023-11-10 PROCEDURE — 83735 ASSAY OF MAGNESIUM: CPT | Performed by: INTERNAL MEDICINE

## 2023-11-10 PROCEDURE — 10002801 HB RX 250 W/O HCPCS: Performed by: INTERNAL MEDICINE

## 2023-11-10 PROCEDURE — 93312 ECHO TRANSESOPHAGEAL: CPT | Performed by: INTERNAL MEDICINE

## 2023-11-10 PROCEDURE — 99152 MOD SED SAME PHYS/QHP 5/>YRS: CPT | Performed by: INTERNAL MEDICINE

## 2023-11-10 PROCEDURE — 93319 3D ECHO IMG CGEN CAR ANOMAL: CPT

## 2023-11-10 PROCEDURE — 92960 CARDIOVERSION ELECTRIC EXT: CPT

## 2023-11-10 PROCEDURE — 92960 CARDIOVERSION ELECTRIC EXT: CPT | Performed by: INTERNAL MEDICINE

## 2023-11-10 PROCEDURE — 10002805 HB CONTRAST AGENT: Performed by: INTERNAL MEDICINE

## 2023-11-10 PROCEDURE — 10002800 HB RX 250 W HCPCS: Performed by: INTERNAL MEDICINE

## 2023-11-10 RX ORDER — METHOHEXITAL IN WATER/PF 100MG/10ML
SYRINGE (ML) INTRAVENOUS PRN
Status: DISCONTINUED | OUTPATIENT
Start: 2023-11-10 | End: 2023-11-11 | Stop reason: HOSPADM

## 2023-11-10 RX ORDER — MIDAZOLAM HYDROCHLORIDE 1 MG/ML
INJECTION, SOLUTION INTRAMUSCULAR; INTRAVENOUS PRN
Status: DISCONTINUED | OUTPATIENT
Start: 2023-11-10 | End: 2023-11-11 | Stop reason: HOSPADM

## 2023-11-10 RX ORDER — FLUMAZENIL 0.1 MG/ML
INJECTION, SOLUTION INTRAVENOUS
Status: DISCONTINUED
Start: 2023-11-10 | End: 2023-11-10 | Stop reason: WASHOUT

## 2023-11-10 RX ORDER — NALOXONE HCL 0.4 MG/ML
VIAL (ML) INJECTION
Status: DISCONTINUED
Start: 2023-11-10 | End: 2023-11-10 | Stop reason: WASHOUT

## 2023-11-10 RX ORDER — SODIUM CHLORIDE 9 MG/ML
INJECTION, SOLUTION INTRAVENOUS CONTINUOUS
Status: DISCONTINUED | OUTPATIENT
Start: 2023-11-10 | End: 2023-11-11 | Stop reason: HOSPADM

## 2023-11-10 RX ORDER — METHOHEXITAL IN WATER/PF 100MG/10ML
100 SYRINGE (ML) INTRAVENOUS ONCE
Status: DISCONTINUED | OUTPATIENT
Start: 2023-11-10 | End: 2023-11-11 | Stop reason: HOSPADM

## 2023-11-10 RX ORDER — MIDAZOLAM HYDROCHLORIDE 1 MG/ML
INJECTION, SOLUTION INTRAMUSCULAR; INTRAVENOUS
Status: DISCONTINUED
Start: 2023-11-10 | End: 2023-11-10 | Stop reason: HOSPADM

## 2023-11-10 RX ADMIN — FENTANYL CITRATE 25 MCG: 50 INJECTION INTRAMUSCULAR; INTRAVENOUS at 08:17

## 2023-11-10 RX ADMIN — MIDAZOLAM HYDROCHLORIDE 1 MG: 1 INJECTION, SOLUTION INTRAMUSCULAR; INTRAVENOUS at 08:17

## 2023-11-10 RX ADMIN — MIDAZOLAM HYDROCHLORIDE 1 MG: 1 INJECTION, SOLUTION INTRAMUSCULAR; INTRAVENOUS at 08:21

## 2023-11-10 RX ADMIN — Medication 40 MG: at 08:46

## 2023-11-10 RX ADMIN — PERFLUTREN 2 ML: 6.52 INJECTION, SUSPENSION INTRAVENOUS at 08:40

## 2023-11-10 ASSESSMENT — LIFESTYLE VARIABLES: SMOKING_HISTORY: NO

## 2023-11-10 ASSESSMENT — PAIN SCALES - GENERAL
PAINLEVEL_OUTOF10: 0
PAINLEVEL_OUTOF10: 0

## 2023-11-13 ENCOUNTER — TELEPHONE (OUTPATIENT)
Dept: CARDIOLOGY | Age: 83
End: 2023-11-13

## 2023-11-13 ENCOUNTER — APPOINTMENT (OUTPATIENT)
Dept: GENERAL RADIOLOGY | Facility: HOSPITAL | Age: 83
End: 2023-11-13
Attending: STUDENT IN AN ORGANIZED HEALTH CARE EDUCATION/TRAINING PROGRAM
Payer: MEDICARE

## 2023-11-13 ENCOUNTER — HOSPITAL ENCOUNTER (INPATIENT)
Facility: HOSPITAL | Age: 83
LOS: 5 days | Discharge: HOME | End: 2023-11-18
Attending: STUDENT IN AN ORGANIZED HEALTH CARE EDUCATION/TRAINING PROGRAM | Admitting: HOSPITALIST
Payer: MEDICARE

## 2023-11-13 DIAGNOSIS — J90 PLEURAL EFFUSION: ICD-10-CM

## 2023-11-13 DIAGNOSIS — I50.9 ACUTE ON CHRONIC CONGESTIVE HEART FAILURE, UNSPECIFIED HEART FAILURE TYPE (HCC): Primary | ICD-10-CM

## 2023-11-13 DIAGNOSIS — J96.01 ACUTE RESPIRATORY FAILURE WITH HYPOXIA (HCC): ICD-10-CM

## 2023-11-13 LAB
ALBUMIN SERPL-MCNC: 2.9 G/DL (ref 3.4–5)
ALBUMIN/GLOB SERPL: 0.7 {RATIO} (ref 1–2)
ALP LIVER SERPL-CCNC: 48 U/L
ALT SERPL-CCNC: 20 U/L
ANION GAP SERPL CALC-SCNC: 9 MMOL/L (ref 0–18)
AST SERPL-CCNC: 25 U/L (ref 15–37)
BASOPHILS # BLD AUTO: 0.07 X10(3) UL (ref 0–0.2)
BASOPHILS NFR BLD AUTO: 1.1 %
BILIRUB SERPL-MCNC: 0.4 MG/DL (ref 0.1–2)
BUN BLD-MCNC: 34 MG/DL (ref 9–23)
CALCIUM BLD-MCNC: 8.6 MG/DL (ref 8.5–10.1)
CHLORIDE SERPL-SCNC: 105 MMOL/L (ref 98–112)
CO2 SERPL-SCNC: 22 MMOL/L (ref 21–32)
CREAT BLD-MCNC: 1.75 MG/DL
EGFRCR SERPLBLD CKD-EPI 2021: 28 ML/MIN/1.73M2 (ref 60–?)
EOSINOPHIL # BLD AUTO: 0.32 X10(3) UL (ref 0–0.7)
EOSINOPHIL NFR BLD AUTO: 4.9 %
ERYTHROCYTE [DISTWIDTH] IN BLOOD BY AUTOMATED COUNT: 19.3 %
GLOBULIN PLAS-MCNC: 4.4 G/DL (ref 2.8–4.4)
GLUCOSE BLD-MCNC: 112 MG/DL (ref 70–99)
GLUCOSE BLD-MCNC: 143 MG/DL (ref 70–99)
GLUCOSE BLD-MCNC: 204 MG/DL (ref 70–99)
HCT VFR BLD AUTO: 38.6 %
HGB BLD-MCNC: 12 G/DL
IMM GRANULOCYTES # BLD AUTO: 0.03 X10(3) UL (ref 0–1)
IMM GRANULOCYTES NFR BLD: 0.5 %
LYMPHOCYTES # BLD AUTO: 1.62 X10(3) UL (ref 1–4)
LYMPHOCYTES NFR BLD AUTO: 24.6 %
MCH RBC QN AUTO: 26.9 PG (ref 26–34)
MCHC RBC AUTO-ENTMCNC: 31.1 G/DL (ref 31–37)
MCV RBC AUTO: 86.5 FL
MONOCYTES # BLD AUTO: 0.48 X10(3) UL (ref 0.1–1)
MONOCYTES NFR BLD AUTO: 7.3 %
NEUTROPHILS # BLD AUTO: 4.07 X10 (3) UL (ref 1.5–7.7)
NEUTROPHILS # BLD AUTO: 4.07 X10(3) UL (ref 1.5–7.7)
NEUTROPHILS NFR BLD AUTO: 61.6 %
NT-PROBNP SERPL-MCNC: ABNORMAL PG/ML (ref ?–450)
OSMOLALITY SERPL CALC.SUM OF ELEC: 295 MOSM/KG (ref 275–295)
PLATELET # BLD AUTO: 298 10(3)UL (ref 150–450)
POTASSIUM SERPL-SCNC: 4.5 MMOL/L (ref 3.5–5.1)
PROT SERPL-MCNC: 7.3 G/DL (ref 6.4–8.2)
RBC # BLD AUTO: 4.46 X10(6)UL
SODIUM SERPL-SCNC: 136 MMOL/L (ref 136–145)
TROPONIN I SERPL HS-MCNC: 33 NG/L
WBC # BLD AUTO: 6.6 X10(3) UL (ref 4–11)

## 2023-11-13 PROCEDURE — 99223 1ST HOSP IP/OBS HIGH 75: CPT | Performed by: HOSPITALIST

## 2023-11-13 PROCEDURE — 71045 X-RAY EXAM CHEST 1 VIEW: CPT | Performed by: STUDENT IN AN ORGANIZED HEALTH CARE EDUCATION/TRAINING PROGRAM

## 2023-11-13 RX ORDER — METOPROLOL SUCCINATE 100 MG/1
100 TABLET, EXTENDED RELEASE ORAL
Status: DISCONTINUED | OUTPATIENT
Start: 2023-11-13 | End: 2023-11-18

## 2023-11-13 RX ORDER — LEVOTHYROXINE SODIUM 0.07 MG/1
75 TABLET ORAL
Status: DISCONTINUED | OUTPATIENT
Start: 2023-11-14 | End: 2023-11-18

## 2023-11-13 RX ORDER — ESCITALOPRAM OXALATE 5 MG/1
5 TABLET ORAL DAILY
Status: DISCONTINUED | OUTPATIENT
Start: 2023-11-14 | End: 2023-11-18

## 2023-11-13 RX ORDER — FUROSEMIDE 10 MG/ML
40 INJECTION INTRAMUSCULAR; INTRAVENOUS ONCE
Status: COMPLETED | OUTPATIENT
Start: 2023-11-13 | End: 2023-11-13

## 2023-11-13 RX ORDER — DILTIAZEM HYDROCHLORIDE 120 MG/1
120 CAPSULE, EXTENDED RELEASE ORAL DAILY
Status: DISCONTINUED | OUTPATIENT
Start: 2023-11-14 | End: 2023-11-18

## 2023-11-13 RX ORDER — NICOTINE POLACRILEX 4 MG
15 LOZENGE BUCCAL
Status: DISCONTINUED | OUTPATIENT
Start: 2023-11-13 | End: 2023-11-18

## 2023-11-13 RX ORDER — FUROSEMIDE 10 MG/ML
40 INJECTION INTRAMUSCULAR; INTRAVENOUS DAILY
Status: DISCONTINUED | OUTPATIENT
Start: 2023-11-14 | End: 2023-11-15

## 2023-11-13 RX ORDER — DEXTROSE MONOHYDRATE 25 G/50ML
50 INJECTION, SOLUTION INTRAVENOUS
Status: DISCONTINUED | OUTPATIENT
Start: 2023-11-13 | End: 2023-11-18

## 2023-11-13 RX ORDER — ATORVASTATIN CALCIUM 10 MG/1
10 TABLET, FILM COATED ORAL NIGHTLY
Status: DISCONTINUED | OUTPATIENT
Start: 2023-11-13 | End: 2023-11-18

## 2023-11-13 RX ORDER — ONDANSETRON 2 MG/ML
4 INJECTION INTRAMUSCULAR; INTRAVENOUS EVERY 6 HOURS PRN
Status: DISCONTINUED | OUTPATIENT
Start: 2023-11-13 | End: 2023-11-18

## 2023-11-13 RX ORDER — ALLOPURINOL 100 MG/1
100 TABLET ORAL DAILY
Status: DISCONTINUED | OUTPATIENT
Start: 2023-11-14 | End: 2023-11-18

## 2023-11-13 RX ORDER — NICOTINE POLACRILEX 4 MG
30 LOZENGE BUCCAL
Status: DISCONTINUED | OUTPATIENT
Start: 2023-11-13 | End: 2023-11-18

## 2023-11-13 RX ORDER — METOCLOPRAMIDE HYDROCHLORIDE 5 MG/ML
5 INJECTION INTRAMUSCULAR; INTRAVENOUS EVERY 8 HOURS PRN
Status: DISCONTINUED | OUTPATIENT
Start: 2023-11-13 | End: 2023-11-18

## 2023-11-13 RX ORDER — ACETAMINOPHEN 500 MG
1000 TABLET ORAL EVERY 6 HOURS PRN
Status: DISCONTINUED | OUTPATIENT
Start: 2023-11-13 | End: 2023-11-18

## 2023-11-13 RX ORDER — ACETAMINOPHEN 500 MG
500 TABLET ORAL EVERY 4 HOURS PRN
Status: DISCONTINUED | OUTPATIENT
Start: 2023-11-13 | End: 2023-11-18

## 2023-11-13 RX ORDER — GABAPENTIN 100 MG/1
100 CAPSULE ORAL 3 TIMES DAILY
Status: DISCONTINUED | OUTPATIENT
Start: 2023-11-13 | End: 2023-11-18

## 2023-11-13 NOTE — ED INITIAL ASSESSMENT (HPI)
Daughter reports intermittent elevated BP readings at home recently, visiting nurse came today and told patient to come to ER for BLE and abdominal distention. Generalized fatigue per daughter over the past two days. Pt denies c/o pain.

## 2023-11-14 ENCOUNTER — APPOINTMENT (OUTPATIENT)
Dept: CV DIAGNOSTICS | Facility: HOSPITAL | Age: 83
End: 2023-11-14
Attending: HOSPITALIST
Payer: MEDICARE

## 2023-11-14 ENCOUNTER — TELEPHONE (OUTPATIENT)
Dept: FAMILY MEDICINE CLINIC | Facility: CLINIC | Age: 83
End: 2023-11-14

## 2023-11-14 PROBLEM — N30.00 ACUTE CYSTITIS WITHOUT HEMATURIA: Status: ACTIVE | Noted: 2023-11-14

## 2023-11-14 LAB
ANION GAP SERPL CALC-SCNC: 5 MMOL/L (ref 0–18)
ATRIAL RATE: 76 BPM
BASOPHILS # BLD AUTO: 0.08 X10(3) UL (ref 0–0.2)
BASOPHILS NFR BLD AUTO: 1.5 %
BILIRUB UR QL STRIP.AUTO: NEGATIVE
BUN BLD-MCNC: 33 MG/DL (ref 9–23)
CALCIUM BLD-MCNC: 8.6 MG/DL (ref 8.5–10.1)
CHLORIDE SERPL-SCNC: 107 MMOL/L (ref 98–112)
CO2 SERPL-SCNC: 27 MMOL/L (ref 21–32)
CREAT BLD-MCNC: 1.41 MG/DL
EGFRCR SERPLBLD CKD-EPI 2021: 37 ML/MIN/1.73M2 (ref 60–?)
EOSINOPHIL # BLD AUTO: 0.42 X10(3) UL (ref 0–0.7)
EOSINOPHIL NFR BLD AUTO: 7.8 %
ERYTHROCYTE [DISTWIDTH] IN BLOOD BY AUTOMATED COUNT: 18.9 %
GLUCOSE BLD-MCNC: 130 MG/DL (ref 70–99)
GLUCOSE BLD-MCNC: 142 MG/DL (ref 70–99)
GLUCOSE BLD-MCNC: 145 MG/DL (ref 70–99)
GLUCOSE BLD-MCNC: 170 MG/DL (ref 70–99)
GLUCOSE UR STRIP.AUTO-MCNC: NORMAL MG/DL
HCT VFR BLD AUTO: 33.5 %
HGB BLD-MCNC: 10.5 G/DL
IMM GRANULOCYTES # BLD AUTO: 0.02 X10(3) UL (ref 0–1)
IMM GRANULOCYTES NFR BLD: 0.4 %
KETONES UR STRIP.AUTO-MCNC: NEGATIVE MG/DL
LEUKOCYTE ESTERASE UR QL STRIP.AUTO: 75
LYMPHOCYTES # BLD AUTO: 1.24 X10(3) UL (ref 1–4)
LYMPHOCYTES NFR BLD AUTO: 23.1 %
MAGNESIUM SERPL-MCNC: 1.6 MG/DL (ref 1.6–2.6)
MCH RBC QN AUTO: 26.3 PG (ref 26–34)
MCHC RBC AUTO-ENTMCNC: 31.3 G/DL (ref 31–37)
MCV RBC AUTO: 84 FL
MONOCYTES # BLD AUTO: 0.4 X10(3) UL (ref 0.1–1)
MONOCYTES NFR BLD AUTO: 7.5 %
NEUTROPHILS # BLD AUTO: 3.2 X10 (3) UL (ref 1.5–7.7)
NEUTROPHILS # BLD AUTO: 3.2 X10(3) UL (ref 1.5–7.7)
NEUTROPHILS NFR BLD AUTO: 59.7 %
NITRITE UR QL STRIP.AUTO: NEGATIVE
OSMOLALITY SERPL CALC.SUM OF ELEC: 298 MOSM/KG (ref 275–295)
P AXIS: 50 DEGREES
P-R INTERVAL: 146 MS
PH UR STRIP.AUTO: 6 [PH] (ref 5–8)
PLATELET # BLD AUTO: 256 10(3)UL (ref 150–450)
POTASSIUM SERPL-SCNC: 4.1 MMOL/L (ref 3.5–5.1)
PROT UR STRIP.AUTO-MCNC: 200 MG/DL
Q-T INTERVAL: 408 MS
QRS DURATION: 78 MS
QTC CALCULATION (BEZET): 459 MS
R AXIS: -75 DEGREES
RBC # BLD AUTO: 3.99 X10(6)UL
RBC #/AREA URNS AUTO: >10 /HPF
SODIUM SERPL-SCNC: 139 MMOL/L (ref 136–145)
SP GR UR STRIP.AUTO: 1.01 (ref 1–1.03)
T AXIS: 21 DEGREES
UROBILINOGEN UR STRIP.AUTO-MCNC: NORMAL MG/DL
VENTRICULAR RATE: 76 BPM
WBC # BLD AUTO: 5.4 X10(3) UL (ref 4–11)

## 2023-11-14 PROCEDURE — 93306 TTE W/DOPPLER COMPLETE: CPT | Performed by: HOSPITALIST

## 2023-11-14 PROCEDURE — 99232 SBSQ HOSP IP/OBS MODERATE 35: CPT | Performed by: STUDENT IN AN ORGANIZED HEALTH CARE EDUCATION/TRAINING PROGRAM

## 2023-11-14 RX ORDER — MAGNESIUM OXIDE 400 MG/1
400 TABLET ORAL ONCE
Status: COMPLETED | OUTPATIENT
Start: 2023-11-14 | End: 2023-11-14

## 2023-11-14 RX ORDER — LORAZEPAM 0.5 MG/1
0.5 TABLET ORAL EVERY 4 HOURS PRN
Status: DISCONTINUED | OUTPATIENT
Start: 2023-11-14 | End: 2023-11-18

## 2023-11-14 NOTE — CONSULTS
Heart Failure discharge instructions added to AVS  Will follow    Ha LAWSON, Rn  Heart Failure Navigator

## 2023-11-14 NOTE — PHYSICAL THERAPY NOTE
PHYSICAL THERAPY EVALUATION - INPATIENT     Room Number: 5312/9035-S  Evaluation Date: 2023  Type of Evaluation: Initial  Physician Order: PT Eval and Treat    Presenting Problem: HTN  Co-Morbidities : s/p ppm 23, Afib, CHF, pulm HTN, HTN, CKD, DM  Reason for Therapy: Mobility Dysfunction and Discharge Planning    Recent admissions:   9/10-23: fall (from home and DC [facility])    ASSESSMENT   Pt is a 78year old female admitted on 2023 for HTN, Afib, and CHF. Functional outcome measures completed include [facility]. The AM-PAC '6-Clicks' Inpatient Basic Mobility Short Form was completed and this patient is demonstrating a Approx Degree of Impairment: 28.97%  degree of impairment in mobility. Research supports that patients with this level of impairment may benefit from [address] PT Discharge Recommendations: Home with home health PT      PLAN  Patient has been evaluated and presents with no skilled Physical Therapy needs at this time. Patient discharged from Physical Therapy services. Please re-order if a new functional limitation presents during this admission. GOALS  Patient was able to achieve the following goals . .. Patient was able to transfer Safely and independently   Patient able to ambulate on level surfaces Safely and independently         HOME SITUATION  Type of Home: [address]: One level                Lives With: Alone  Drives: No  Patient Owned Equipment: Rolling walker;Cane       Prior Level of [place]: Pt lives alone in single story home. Pt typically independent with ADL and mobility. Pt ambulates primarily with cane. Pts daughter and sister live nearby. Pt reports someone visits daily. SUBJECTIVE  \"I feel fine. Do I need the oxygen? \"       OBJECTIVE  Precautions: Bed/chair alarm;Hard of hearing  Fall Risk: Standard fall risk    WEIGHT BEARING RESTRICTION  Weight Bearing Restriction: None                PAIN ASSESSMENT  Ratin          COGNITION  Overall Cognitive Status:  WFL - within functional limits    RANGE OF MOTION AND STRENGTH ASSESSMENT  Upper extremity ROM and strength are within functional limits     Lower extremity ROM is within functional limits     Lower extremity strength is within functional limits       BALANCE  Static Sitting: Good  Dynamic Sitting: Good  Static Standing: Fair  Dynamic Standing: Fair -    ADDITIONAL TESTS                                    ACTIVITY TOLERANCE                         O2 WALK  Oxygen Therapy  SPO2% on Room Air at Rest: 91  SPO2% on Oxygen at Rest: 95  At rest oxygen flow (liters per minute): 2  SPO2% Ambulation on Room Air: 83    NEUROLOGICAL FINDINGS                        AM-PAC '6-Clicks' INPATIENT SHORT FORM - BASIC MOBILITY  How much difficulty does the patient currently have. .. Patient Difficulty: Turning over in bed (including adjusting bedclothes, sheets and blankets)?: None   Patient Difficulty: Sitting down on and standing up from a chair with arms (e.g., wheelchair, bedside commode, etc.): None   Patient Difficulty: Moving from lying on back to sitting on the side of the bed?: None   How much help from another person does the patient currently need. .. Help from Another: Moving to and from a bed to a chair (including a wheelchair)?: A Little   Help from Another: Need to walk in hospital room?: A Little   Help from Another: Climbing 3-5 steps with a railing?: A Little       AM-PAC Score:  Raw Score: 21   Approx Degree of Impairment: 28.97%   Standardized Score (AM-PAC Scale): 50.25   CMS Modifier (G-Code): CJ    FUNCTIONAL ABILITY STATUS  Gait Assessment   Functional Mobility/Gait Assessment  Gait Assistance: Supervision  Distance (ft): 100  Assistive Device: Rolling walker  Pattern:  (excessive kyphosis)    Skilled Therapy Provided   Pt received supine in bed and is agreeable to therapy. Pt supine-sit with MOD I. Pt demonstrates good static sitting balance at EOB. Pt sit-stand with RW and MOD I.  Pt ambulated 100ft with RW and supervision. Pt ambulates with excessive kyphosis and slow francisco. Pt given VC for posture and pacing. Pt denies significant dyspnea. Pt returned to room sitting up in bedside chair. Pt given VC for pursed lip breathing. Pt educated on POC and activity recommendations. Pt left with call light in reach and alarm donned. Bed Mobility:  Rolling: MOD I  Supine to sit: MOD I   Sit to supine: MOD I     Transfer Mobility:  Sit to stand: MOD I  Stand to sit: MOD I  Gait = supervision    Therapist's comments: Recommend use of RW  02 desat to 83% on RA with ambulation. Exercise/Education Provided:  Bed mobility  Energy conservation  Functional activity tolerated  Gait training  Posture  Strengthening  Transfer training    Patient End of Session: Up in chair;Needs met;Call light within reach;RN aware of session/findings; All patient questions and concerns addressed    Patient Evaluation Complexity Level:  History High - 3 or more personal factors and/or co-morbidities   Examination of body systems Low - addressing 1-2 elements   Clinical Presentation Low - Stable   Clinical Decision Making Low Complexity       PT Session Time: 25 minutes  Gait Training: 10 minutes

## 2023-11-14 NOTE — PLAN OF CARE
Assumed care of patient @ 0730 patient resting in bed, AOX4, reports no pain. Lung sounds diminished with crackles  bilaterally, O2 saturation adequate on 2L nasal cannula. No complaints of shortness of breath or chest pain. Sinus rhythm, A paced at times, on tele, S1-S2 present, no adventitious sounds noted. Bowel sounds present and active in all quadrants, BM yesterday per patient. Patient voiding with increased frequency due to IV diuretics. Pt ambulating in lorenzo with assist and steady gait. Scattered bruising present, abrasion to nose, healed pacemaker site noted to left upper chest.     Plan of Care: IV diuretics. Fluid restriction. Increase activity as tolerated. Wean O2 as tolerated. Echo. Discussed plan of care with patient, verbalized understanding. Call light within reach.      Problem: Diabetes/Glucose Control  Goal: Glucose maintained within prescribed range  Description: INTERVENTIONS:  - Monitor Blood Glucose as ordered  - Assess for signs and symptoms of hyperglycemia and hypoglycemia  - Administer ordered medications to maintain glucose within target range  - Assess barriers to adequate nutritional intake and initiate nutrition consult as needed  - Instruct patient on self management of diabetes  Outcome: Progressing     Problem: Patient/Family Goals  Goal: Patient/Family Long Term Goal  Description: Patient's Long Term Goal: Stay out of the hospital    Interventions:  - Follow cardiac diet, fluid restriction at home  - Take medications as ordered  - Attend follow up appointments  - See additional Care Plan goals for specific interventions  Outcome: Progressing  Goal: Patient/Family Short Term Goal  Description: Patient's Short Term Goal: Get fluid off    Interventions:   - labs, tele, consults  - IV diuretics  - Fluid restriction  - See additional Care Plan goals for specific interventions  Outcome: Progressing     Problem: CARDIOVASCULAR - ADULT  Goal: Maintains optimal cardiac output and hemodynamic stability  Description: INTERVENTIONS:  - Monitor vital signs, rhythm, and trends  - Monitor for bleeding, hypotension and signs of decreased cardiac output  - Evaluate effectiveness of vasoactive medications to optimize hemodynamic stability  - Monitor arterial and/or venous puncture sites for bleeding and/or hematoma  - Assess quality of pulses, skin color and temperature  - Assess for signs of decreased coronary artery perfusion - ex.  Angina  - Evaluate fluid balance, assess for edema, trend weights  Outcome: Progressing  Goal: Absence of cardiac arrhythmias or at baseline  Description: INTERVENTIONS:  - Continuous cardiac monitoring, monitor vital signs, obtain 12 lead EKG if indicated  - Evaluate effectiveness of antiarrhythmic and heart rate control medications as ordered  - Initiate emergency measures for life threatening arrhythmias  - Monitor electrolytes and administer replacement therapy as ordered  Outcome: Progressing     Problem: METABOLIC/FLUID AND ELECTROLYTES - ADULT  Goal: Electrolytes maintained within normal limits  Description: INTERVENTIONS:  - Monitor labs and rhythm and assess patient for signs and symptoms of electrolyte imbalances  - Administer electrolyte replacement as ordered  - Monitor response to electrolyte replacements, including rhythm and repeat lab results as appropriate  - Fluid restriction as ordered  - Instruct patient on fluid and nutrition restrictions as appropriate  Outcome: Progressing     Problem: Impaired Functional Mobility  Goal: Achieve highest/safest level of mobility/gait  Description: Interventions:  - Assess patient's functional ability and stability  - Promote increasing activity/tolerance for mobility and gait  - Educate and engage patient/family in tolerated activity level and precautions  Outcome: Progressing

## 2023-11-14 NOTE — CDS QUERY
Valvular Disease  CLINICAL DOCUMENTATION CLARIFICATION   Dear CESAR Long   Clinical information (provided below) includes the diagnoses of moderate aortic insufficiency, moderate mitral regurgitation and moderate tricuspid regurgitation. For accurate ICD-10-CM code assignment, please clarify the etiology of the valvular disease. For coding purposes, disease of multiple valves is assumed to be rheumatic, unless otherwise stated. [   ] Rheumatic Aortic, Mitral & Tricuspid Valve Disease  [  x ] Non-Rheumatic Aortic, Mitral & Tricuspid Valve Disease  [   ] Other (please specify):      Documentation from the Medical Record:    Cardiology Consult Note: Moderate AI, Moderate MR, Moderate TR    9/12/23 Cardiac Echo: 1. Left ventricle: The cavity size was normal. Wall thickness was mildly      increased. Systolic function was normal. The estimated ejection fraction      was 55-60%, by visual assessment. Unable to assess LV diastolic function      due to heart rhythm. 2. Left atrium: The atrium was mildly to moderately dilated. 3. Right atrium: The atrium was mildly dilated. 4. Aortic valve: There was moderate regurgitation. 5. Mitral valve: There was mild to moderate regurgitation. 6. Tricuspid valve: There was moderate regurgitation. 7. Pulmonary arteries: Systolic pressure was moderately to markedly      increased, estimated to be 49mm Hg. Estimated pulmonary artery diastolic      pressure was 10mm Hg. Repeat Echo currently pending at the time of this writing. For questions regarding this query, please contact Clinical :   Shayne Resendiz RN  Cell# [phone]                          Thank you!                                                            THIS FORM IS A PERMANENT PART OF THE MEDICAL RECORD

## 2023-11-14 NOTE — PLAN OF CARE
Problem: Diabetes/Glucose Control  Goal: Glucose maintained within prescribed range  Description: INTERVENTIONS:  - Monitor Blood Glucose as ordered  - Assess for signs and symptoms of hyperglycemia and hypoglycemia  - Administer ordered medications to maintain glucose within target range  - Assess barriers to adequate nutritional intake and initiate nutrition consult as needed  - Instruct patient on self management of diabetes  Outcome: Progressing     Problem: Patient/Family Goals  Goal: Patient/Family Long Term Goal  Description: Patient's Long Term Goal: will be able to go home without complication    Interventions:  -   - See additional Care Plan goals for specific interventions  Outcome: Progressing  Goal: Patient/Family Short Term Goal  Description: Patient's Short Term Goal: will get rib of extra fluid from my body    Interventions:   - routine vs, diuretic as ordered, I and o, weight monitoring  - See additional Care Plan goals for specific interventions  Outcome: Progressing     Problem: CARDIOVASCULAR - ADULT  Goal: Maintains optimal cardiac output and hemodynamic stability  Description: INTERVENTIONS:  - Monitor vital signs, rhythm, and trends  - Monitor for bleeding, hypotension and signs of decreased cardiac output  - Evaluate effectiveness of vasoactive medications to optimize hemodynamic stability  - Monitor arterial and/or venous puncture sites for bleeding and/or hematoma  - Assess quality of pulses, skin color and temperature  - Assess for signs of decreased coronary artery perfusion - ex.  Angina  - Evaluate fluid balance, assess for edema, trend weights  Outcome: Progressing  Goal: Absence of cardiac arrhythmias or at baseline  Description: INTERVENTIONS:  - Continuous cardiac monitoring, monitor vital signs, obtain 12 lead EKG if indicated  - Evaluate effectiveness of antiarrhythmic and heart rate control medications as ordered  - Initiate emergency measures for life threatening arrhythmias  - Monitor electrolytes and administer replacement therapy as ordered  Outcome: Progressing     Problem: METABOLIC/FLUID AND ELECTROLYTES - ADULT  Goal: Electrolytes maintained within normal limits  Description: INTERVENTIONS:  - Monitor labs and rhythm and assess patient for signs and symptoms of electrolyte imbalances  - Administer electrolyte replacement as ordered  - Monitor response to electrolyte replacements, including rhythm and repeat lab results as appropriate  - Fluid restriction as ordered  - Instruct patient on fluid and nutrition restrictions as appropriate  Outcome: Progressing     Problem: Impaired Functional Mobility  Goal: Achieve highest/safest level of mobility/gait  Description: Interventions:  - Assess patient's functional ability and stability  - Promote increasing activity/tolerance for mobility and gait  - Educate and engage patient/family in tolerated activity level and precautions    Outcome: Progressing

## 2023-11-14 NOTE — PROGRESS NOTES
Received pt from ED around 1830. Oriented to room, admission navigator complete. Med rec completed with daughter/POA Mirtha Aguayo over phone. Patient A&O x4; glasses, bilat hearing aids, upper and lower dentures present. SPO2 maintained on 2L O2 (baseline RA), pt MARIE, lung sounds dim with crackles. BLE edema. Received IV lasix in ED, plan to continue tomorrow per orders. NSR on tele. Per daughter pt had [employer] PM/ICD placed in September, GILLES and cardioversion last Friday with Dr. Jesus Hairston at [facility]. On Eliquis. Betito Rachel in place per pt request, BM this AM. Denies pain. Skin assessment performed with MOIRA Bauer- abrasion to nose, healed PM insertion site left chest (approximated, KENNA, no complications), blanchable pinkness to buttocks and bilat heels. Up with assist and walker. Lives home alone and has [facility], though pt believes she only has a few visits left. PT to eval. Updated on POC, needs met.      POC: echo, diurese, wean O2

## 2023-11-14 NOTE — DISCHARGE INSTRUCTIONS
Increase Lasix to 40mg daily   Going Home Instructions    In this section you will find the tools which will guide you through the first few days after you leave the hospital. Continued use of these tools will help you develop the skills necessary to keep your heart failure under control. Home Care Instructions Following Heart Failure - the most important things to do every day include:     Weigh yourself  Take your medicines as prescribed  Limit your sodium (salt) and fluid intake  Know when to call your cardiologist, primary doctor, or nurse  Know when to seek emergency care    Things for You to Remember:   1. See your doctor or healthcare provider. It is important that you attend this appointment to make sure your symptoms are under control. 2. Your recommended sodium intake is [id number] mg daily    3. Limit your fluid intake to no more than 2 liters or 64 ounces per day    4. Some exercise and activity is important to help keep your heart functioning and strong. Unless instructed not to exercise, you may walk at a slow to moderate pace for 10-15 minutes 2-3 days per week to start. Pace your activity to prevent shortness of breath or fatigue. Stop exercise if you develop chest pain, lightheadedness, or significant shortness of breath.        Call Your Cardiologist If:   You gain 2 pounds overnight or 3-4 pounds in 3-5 days  You have more difficulty breathing  You are getting more tired with normal activity  You are more short of breath lying down, or awaken at night short of breath  You have swelling of your feet or legs  You urinate less often during the day and more often at night  You have cramps in your legs  You have blurred vision or see yellowish-green halos around objects of lights    Go to the Emergency Room If:   You have pain or tightness in your chest  You are extremely short of breath  You are coughing up pink-frothy mucus  You are traveling and develop symptoms of worsening heart failure      [address] [address] [facility] [address] @ [facility]  [address]: [phone]  F: [address] [place] [phone]

## 2023-11-14 NOTE — CM/SW NOTE
Patient is current with Giovani Horowitz, sent referral and CHARMAINE in aidin. SW/CM to remain available for support and/or discharge planning.     KEO Tabor  Discharge Planner  [phone]

## 2023-11-15 ENCOUNTER — MED REC SCAN ONLY (OUTPATIENT)
Dept: FAMILY MEDICINE CLINIC | Facility: CLINIC | Age: 83
End: 2023-11-15

## 2023-11-15 LAB
ANION GAP SERPL CALC-SCNC: 5 MMOL/L (ref 0–18)
BASOPHILS # BLD AUTO: 0.11 X10(3) UL (ref 0–0.2)
BASOPHILS NFR BLD AUTO: 1.7 %
BUN BLD-MCNC: 31 MG/DL (ref 9–23)
CALCIUM BLD-MCNC: 8.4 MG/DL (ref 8.5–10.1)
CHLORIDE SERPL-SCNC: 105 MMOL/L (ref 98–112)
CO2 SERPL-SCNC: 29 MMOL/L (ref 21–32)
CREAT BLD-MCNC: 1.49 MG/DL
EGFRCR SERPLBLD CKD-EPI 2021: 34 ML/MIN/1.73M2 (ref 60–?)
EOSINOPHIL # BLD AUTO: 0.42 X10(3) UL (ref 0–0.7)
EOSINOPHIL NFR BLD AUTO: 6.3 %
ERYTHROCYTE [DISTWIDTH] IN BLOOD BY AUTOMATED COUNT: 18.5 %
GLUCOSE BLD-MCNC: 131 MG/DL (ref 70–99)
GLUCOSE BLD-MCNC: 144 MG/DL (ref 70–99)
GLUCOSE BLD-MCNC: 158 MG/DL (ref 70–99)
GLUCOSE BLD-MCNC: 193 MG/DL (ref 70–99)
GLUCOSE BLD-MCNC: 223 MG/DL (ref 70–99)
HCT VFR BLD AUTO: 34.1 %
HGB BLD-MCNC: 11 G/DL
IMM GRANULOCYTES # BLD AUTO: 0.03 X10(3) UL (ref 0–1)
IMM GRANULOCYTES NFR BLD: 0.5 %
LYMPHOCYTES # BLD AUTO: 1.58 X10(3) UL (ref 1–4)
LYMPHOCYTES NFR BLD AUTO: 23.7 %
MAGNESIUM SERPL-MCNC: 1.8 MG/DL (ref 1.6–2.6)
MCH RBC QN AUTO: 26.9 PG (ref 26–34)
MCHC RBC AUTO-ENTMCNC: 32.3 G/DL (ref 31–37)
MCV RBC AUTO: 83.4 FL
MONOCYTES # BLD AUTO: 0.58 X10(3) UL (ref 0.1–1)
MONOCYTES NFR BLD AUTO: 8.7 %
NEUTROPHILS # BLD AUTO: 3.94 X10 (3) UL (ref 1.5–7.7)
NEUTROPHILS # BLD AUTO: 3.94 X10(3) UL (ref 1.5–7.7)
NEUTROPHILS NFR BLD AUTO: 59.1 %
OSMOLALITY SERPL CALC.SUM OF ELEC: 297 MOSM/KG (ref 275–295)
PHOSPHATE SERPL-MCNC: 3.8 MG/DL (ref 2.5–4.9)
PLATELET # BLD AUTO: 265 10(3)UL (ref 150–450)
POTASSIUM SERPL-SCNC: 4.2 MMOL/L (ref 3.5–5.1)
RBC # BLD AUTO: 4.09 X10(6)UL
SODIUM SERPL-SCNC: 139 MMOL/L (ref 136–145)
WBC # BLD AUTO: 6.7 X10(3) UL (ref 4–11)

## 2023-11-15 PROCEDURE — 99232 SBSQ HOSP IP/OBS MODERATE 35: CPT | Performed by: INTERNAL MEDICINE

## 2023-11-15 RX ORDER — AMLODIPINE BESYLATE 5 MG/1
5 TABLET ORAL DAILY
Status: DISCONTINUED | OUTPATIENT
Start: 2023-11-15 | End: 2023-11-17

## 2023-11-15 RX ORDER — MAGNESIUM OXIDE 400 MG/1
400 TABLET ORAL ONCE
Status: COMPLETED | OUTPATIENT
Start: 2023-11-15 | End: 2023-11-15

## 2023-11-15 RX ORDER — FUROSEMIDE 10 MG/ML
40 INJECTION INTRAMUSCULAR; INTRAVENOUS
Status: DISCONTINUED | OUTPATIENT
Start: 2023-11-15 | End: 2023-11-16

## 2023-11-15 NOTE — PLAN OF CARE
Pt. alert and oriented x3-4. Very [place]. Resp. regular and unlabored. On O2 at 2L/nc. SR in tele. Purewick in place. Up w/ assist w/ walker. Had 6 beats V-Tach. Asymptomatic. JODIE Frederick(APRN) notified. Problem: Diabetes/Glucose Control  Goal: Glucose maintained within prescribed range  Description: INTERVENTIONS:  - Monitor Blood Glucose as ordered  - Assess for signs and symptoms of hyperglycemia and hypoglycemia  - Administer ordered medications to maintain glucose within target range  - Assess barriers to adequate nutritional intake and initiate nutrition consult as needed  - Instruct patient on self management of diabetes  Outcome: Progressing     Problem: Patient/Family Goals  Goal: Patient/Family Long Term Goal  Description: Patient's Long Term Goal: Stay out of the hospital    Interventions:  - Follow cardiac diet, fluid restriction at home  - Take medications as ordered  - Attend follow up appointments  - See additional Care Plan goals for specific interventions  Outcome: Progressing  Goal: Patient/Family Short Term Goal  Description: Patient's Short Term Goal: Get fluid off    Interventions:   - labs, tele, consults  - IV diuretics  - Fluid restriction  - See additional Care Plan goals for specific interventions  Outcome: Progressing     Problem: CARDIOVASCULAR - ADULT  Goal: Maintains optimal cardiac output and hemodynamic stability  Description: INTERVENTIONS:  - Monitor vital signs, rhythm, and trends  - Monitor for bleeding, hypotension and signs of decreased cardiac output  - Evaluate effectiveness of vasoactive medications to optimize hemodynamic stability  - Monitor arterial and/or venous puncture sites for bleeding and/or hematoma  - Assess quality of pulses, skin color and temperature  - Assess for signs of decreased coronary artery perfusion - ex.  Angina  - Evaluate fluid balance, assess for edema, trend weights  Outcome: Progressing  Goal: Absence of cardiac arrhythmias or at baseline  Description: INTERVENTIONS:  - Continuous cardiac monitoring, monitor vital signs, obtain 12 lead EKG if indicated  - Evaluate effectiveness of antiarrhythmic and heart rate control medications as ordered  - Initiate emergency measures for life threatening arrhythmias  - Monitor electrolytes and administer replacement therapy as ordered  Outcome: Progressing     Problem: METABOLIC/FLUID AND ELECTROLYTES - ADULT  Goal: Electrolytes maintained within normal limits  Description: INTERVENTIONS:  - Monitor labs and rhythm and assess patient for signs and symptoms of electrolyte imbalances  - Administer electrolyte replacement as ordered  - Monitor response to electrolyte replacements, including rhythm and repeat lab results as appropriate  - Fluid restriction as ordered  - Instruct patient on fluid and nutrition restrictions as appropriate  Outcome: Progressing     Problem: Impaired Functional Mobility  Goal: Achieve highest/safest level of mobility/gait  Description: Interventions:  - Assess patient's functional ability and stability  - Promote increasing activity/tolerance for mobility and gait  - Educate and engage patient/family in tolerated activity level and precautions    Outcome: Progressing

## 2023-11-15 NOTE — PLAN OF CARE
Assumed care of patient @ 0730 patient resting in bed, AOX4, reports no pain. Lung sounds diminished with crackles bilaterally, O2 saturation adequate on 2L nasal cannula. No complaints of shortness of breath or chest pain. Sinus rhythm, A paced at times, on tele, S1-S2 present, no adventitious sounds noted. Bowel sounds present and active in all quadrants. Patient voiding with increased frequency due to IV diuretics. Pt ambulating in lorenzo with assist, walker, and steady gait. Scattered bruising present, healed pacemaker site noted to left upper chest. Pitting edema to BL extremities. Plan of Care: IV diuretics. Fluid restriction. Increase activity as tolerated. Wean O2 as tolerated. Echo pending. IV antibiotics, possible UTI, cultures pending. Discussed plan of care with patient, verbalized understanding. Call light within reach.     Problem: Diabetes/Glucose Control  Goal: Glucose maintained within prescribed range  Description: INTERVENTIONS:  - Monitor Blood Glucose as ordered  - Assess for signs and symptoms of hyperglycemia and hypoglycemia  - Administer ordered medications to maintain glucose within target range  - Assess barriers to adequate nutritional intake and initiate nutrition consult as needed  - Instruct patient on self management of diabetes  Outcome: Progressing     Problem: Patient/Family Goals  Goal: Patient/Family Long Term Goal  Description: Patient's Long Term Goal: Stay out of the hospital    Interventions:  - Follow cardiac diet, fluid restriction at home  - Take medications as ordered  - Attend follow up appointments  - See additional Care Plan goals for specific interventions  Outcome: Progressing  Goal: Patient/Family Short Term Goal  Description: Patient's Short Term Goal: Get fluid off    Interventions:   - labs, tele, consults  - IV diuretics  - Fluid restriction  - See additional Care Plan goals for specific interventions  Outcome: Progressing     Problem: CARDIOVASCULAR - ADULT  Goal: Maintains optimal cardiac output and hemodynamic stability  Description: INTERVENTIONS:  - Monitor vital signs, rhythm, and trends  - Monitor for bleeding, hypotension and signs of decreased cardiac output  - Evaluate effectiveness of vasoactive medications to optimize hemodynamic stability  - Monitor arterial and/or venous puncture sites for bleeding and/or hematoma  - Assess quality of pulses, skin color and temperature  - Assess for signs of decreased coronary artery perfusion - ex.  Angina  - Evaluate fluid balance, assess for edema, trend weights  Outcome: Progressing  Goal: Absence of cardiac arrhythmias or at baseline  Description: INTERVENTIONS:  - Continuous cardiac monitoring, monitor vital signs, obtain 12 lead EKG if indicated  - Evaluate effectiveness of antiarrhythmic and heart rate control medications as ordered  - Initiate emergency measures for life threatening arrhythmias  - Monitor electrolytes and administer replacement therapy as ordered  Outcome: Progressing     Problem: METABOLIC/FLUID AND ELECTROLYTES - ADULT  Goal: Electrolytes maintained within normal limits  Description: INTERVENTIONS:  - Monitor labs and rhythm and assess patient for signs and symptoms of electrolyte imbalances  - Administer electrolyte replacement as ordered  - Monitor response to electrolyte replacements, including rhythm and repeat lab results as appropriate  - Fluid restriction as ordered  - Instruct patient on fluid and nutrition restrictions as appropriate  Outcome: Progressing

## 2023-11-16 ENCOUNTER — APPOINTMENT (OUTPATIENT)
Dept: GENERAL RADIOLOGY | Facility: HOSPITAL | Age: 83
End: 2023-11-16
Attending: STUDENT IN AN ORGANIZED HEALTH CARE EDUCATION/TRAINING PROGRAM
Payer: MEDICARE

## 2023-11-16 LAB
ANION GAP SERPL CALC-SCNC: 4 MMOL/L (ref 0–18)
BUN BLD-MCNC: 34 MG/DL (ref 9–23)
CALCIUM BLD-MCNC: 8.5 MG/DL (ref 8.5–10.1)
CHLORIDE SERPL-SCNC: 105 MMOL/L (ref 98–112)
CO2 SERPL-SCNC: 29 MMOL/L (ref 21–32)
CREAT BLD-MCNC: 1.61 MG/DL
EGFRCR SERPLBLD CKD-EPI 2021: 31 ML/MIN/1.73M2 (ref 60–?)
GLUCOSE BLD-MCNC: 123 MG/DL (ref 70–99)
GLUCOSE BLD-MCNC: 157 MG/DL (ref 70–99)
GLUCOSE BLD-MCNC: 175 MG/DL (ref 70–99)
GLUCOSE BLD-MCNC: 195 MG/DL (ref 70–99)
GLUCOSE BLD-MCNC: 230 MG/DL (ref 70–99)
GLUCOSE BLD-MCNC: 302 MG/DL (ref 70–99)
MAGNESIUM SERPL-MCNC: 1.9 MG/DL (ref 1.6–2.6)
NT-PROBNP SERPL-MCNC: 9902 PG/ML (ref ?–450)
OSMOLALITY SERPL CALC.SUM OF ELEC: 297 MOSM/KG (ref 275–295)
POTASSIUM SERPL-SCNC: 4.3 MMOL/L (ref 3.5–5.1)
SODIUM SERPL-SCNC: 138 MMOL/L (ref 136–145)

## 2023-11-16 PROCEDURE — 71046 X-RAY EXAM CHEST 2 VIEWS: CPT | Performed by: STUDENT IN AN ORGANIZED HEALTH CARE EDUCATION/TRAINING PROGRAM

## 2023-11-16 PROCEDURE — 99232 SBSQ HOSP IP/OBS MODERATE 35: CPT | Performed by: INTERNAL MEDICINE

## 2023-11-16 RX ORDER — CEPHALEXIN 500 MG/1
500 CAPSULE ORAL EVERY 8 HOURS
Qty: 15 CAPSULE | Refills: 0 | Status: DISCONTINUED | OUTPATIENT
Start: 2023-11-16 | End: 2023-11-18

## 2023-11-16 RX ORDER — HYDRALAZINE HYDROCHLORIDE 20 MG/ML
10 INJECTION INTRAMUSCULAR; INTRAVENOUS EVERY 6 HOURS PRN
Status: DISCONTINUED | OUTPATIENT
Start: 2023-11-16 | End: 2023-11-18

## 2023-11-16 RX ORDER — FUROSEMIDE 10 MG/ML
20 INJECTION INTRAMUSCULAR; INTRAVENOUS ONCE
Status: COMPLETED | OUTPATIENT
Start: 2023-11-16 | End: 2023-11-16

## 2023-11-16 NOTE — PROGRESS NOTES
CHIEF COMPLAINT  Chief Complaint   Patient presents with   • Left Lower Leg - Follow-up   • Office Visit       SUBJECTIVE  Becky returns for follow-up evaluation 3 days s/p LEFT LOWER EXTREMITY REMOVAL OF EXTERNAL FIXATOR. OPEN REDUCTION INTERNAL FIXATION BICONDYLAR TIBIAL PLATEAU FRACTURE, LEFT (DOS: 10/13/2023). She reports pain is controlled with Tylenol and Ibuprofen. Cramping around her ankle is improved. She has been NWB as instructed. Home PT scheduled to begin tomorrow. She is accompanied by her  today.    MEDICATIONS  Medications were reviewed and updated today.    HISTORIES  I have personally reviewed and updated the following EMR sections: Current medications, Allergies, Problem list, Past Medical History, Past Surgical History, Social History and Family History.  Please reference intake form.     REVIEW OF SYSTEMS  All other systems are reviewed and are negative except as documented in the HPI (History of Present Illness)    PHYSICAL EXAM  There were no vitals taken for this visit.   Constitutional:  Well developed, well nourished female in no acute distress.  Skin: Warm, dry, intact without rash or lesion.  Neurologic:  Alert & oriented x 3,  Normal gait.    Psychiatric:  Speech and behavior appropriate.  Musculoskeletal: Dressings were removed.  Pt demonstrates strong pedal pulses.   There is appropriate post-operative swelling and ecchymosis. Multiple incisions are clean without signs of infection. Staples and sutures remain  Calf soft and nontender to compression. Pt is able to PF/DF at the ankle.     IMAGING  I have reviewed the pertinent imaging study reports. These are the pertinent findings:  No image results found.      ASSESSMENT/PLAN  Closed bicondylar fracture of left tibial plateau     1. Continue with the post-operative protocol as discussed. Dressings were changed today. Pt will keep clean and dry. She will remain NWB on her LLE. Begin home PT tomorrow as scheduled; okay for  [facility] Note:  Renal Adjustment for cephalexin (Sophie Kovacs)    Joseph Hernandez is a 78year old patient who has been prescribed cephalexin (KEFLEX) 500 mg every 12 hrs. The estimated creatinine clearance is 22.1 mL/min (A) (based on SCr of 1.61 mg/dL (H)). The dose has been adjusted to cephalexin (KEFLEX) 500 mg every 8 hrs per hospital renal dose adjustment protocol for treatment of UTI. Pharmacy will follow and adjust dose as warranted for additional renal function changes.     Thank you,    Lorna Pan PharmD  11/16/2023  9:02 AM knee ROM and gentle quad strengthening as tolerated.  2. Continue one aspirin 325mg daily for DVT prophylaxis for one month post-op.  3. Return for follow-up in 10/26/23 for radiographs and suture/staple removal.   4. Pt verbalizes understanding and is in agreement with this plan.

## 2023-11-16 NOTE — PROGRESS NOTES
11/15/23 1931   Clinical Encounter Type   Visited With Patient   Routine Visit   (consult for grief support, as Ciarra had just lost her son yesterday)   Continue Visiting No   Taxonomy   Intended Effects Helping someone feel comforted   Methods Encourage self care   Interventions Provide Grief Processing Session;Provide grief resources; Ask guided questions     Samanta Wilks had difficulty hearing, but  was able to offer condolences on the loss of her son, and provided Care Notes in this regard, as well as information on the Stages of Grief. She thanked  for visit, and identified as not having a Jainism, so the chart was changed to reflect that. Spiritual Care support can be requested via an Epic consult.

## 2023-11-16 NOTE — PLAN OF CARE
Petient alert and oriented x4. [place]. Resp regular and unlabored. On O2 at 2L/nc. SR/ sinus arrhythmia in tele. No pain complained. Parikh Alt in place draining to clear, yellow urine. Needs attended promptly. Bed alarm on. Call light w/in reach. Problem: Diabetes/Glucose Control  Goal: Glucose maintained within prescribed range  Description: INTERVENTIONS:  - Monitor Blood Glucose as ordered  - Assess for signs and symptoms of hyperglycemia and hypoglycemia  - Administer ordered medications to maintain glucose within target range  - Assess barriers to adequate nutritional intake and initiate nutrition consult as needed  - Instruct patient on self management of diabetes  Outcome: Progressing     Problem: Patient/Family Goals  Goal: Patient/Family Long Term Goal  Description: Patient's Long Term Goal: Stay out of the hospital    Interventions:  - Follow cardiac diet, fluid restriction at home  - Take medications as ordered  - Attend follow up appointments  - See additional Care Plan goals for specific interventions  Outcome: Progressing  Goal: Patient/Family Short Term Goal  Description: Patient's Short Term Goal: Get fluid off    Interventions:   - labs, tele, consults  - IV diuretics  - Fluid restriction  - See additional Care Plan goals for specific interventions  Outcome: Progressing     Problem: CARDIOVASCULAR - ADULT  Goal: Maintains optimal cardiac output and hemodynamic stability  Description: INTERVENTIONS:  - Monitor vital signs, rhythm, and trends  - Monitor for bleeding, hypotension and signs of decreased cardiac output  - Evaluate effectiveness of vasoactive medications to optimize hemodynamic stability  - Monitor arterial and/or venous puncture sites for bleeding and/or hematoma  - Assess quality of pulses, skin color and temperature  - Assess for signs of decreased coronary artery perfusion - ex.  Angina  - Evaluate fluid balance, assess for edema, trend weights  Outcome: Progressing  Goal: Absence of cardiac arrhythmias or at baseline  Description: INTERVENTIONS:  - Continuous cardiac monitoring, monitor vital signs, obtain 12 lead EKG if indicated  - Evaluate effectiveness of antiarrhythmic and heart rate control medications as ordered  - Initiate emergency measures for life threatening arrhythmias  - Monitor electrolytes and administer replacement therapy as ordered  Outcome: Progressing     Problem: METABOLIC/FLUID AND ELECTROLYTES - ADULT  Goal: Electrolytes maintained within normal limits  Description: INTERVENTIONS:  - Monitor labs and rhythm and assess patient for signs and symptoms of electrolyte imbalances  - Administer electrolyte replacement as ordered  - Monitor response to electrolyte replacements, including rhythm and repeat lab results as appropriate  - Fluid restriction as ordered  - Instruct patient on fluid and nutrition restrictions as appropriate  Outcome: Progressing     Problem: Impaired Functional Mobility  Goal: Achieve highest/safest level of mobility/gait  Description: Interventions:  - Assess patient's functional ability and stability  - Promote increasing activity/tolerance for mobility and gait  - Educate and engage patient/family in tolerated activity level and precautions    Outcome: Progressing

## 2023-11-16 NOTE — PLAN OF CARE
Patient alert and oriented, hard of hearing with hearing aids in place; VSS; cardiac monitor showing SR with PACs; lung sounds diminished, weaned from 2 to 1 liter of oxygen via nasal cannula, no cough noted; edema to BLE; incontinent of urine, purewick in place but had to wear a brief while in x ray and was heavily wet when she came back; patient continent of stool, per patient she had a bowel movement yesterday; patient up with an assist x1 and walker, up to chair and attempted to do an oxygen walk, patient walked about 50 feet and tolerated well but wanted to go back as she was wet, did not get a good pleath on the pulse ox; per Dr Giorgio Teixeira, to hold furosemide until pBNP and chest xray results are back. 1630: did confirm with patient her wish to be a DNAR, she said she does and her son was at bedside. Purple DNAR band applied. 1740: Evening blood pressure 165/70, gave metoprolol as ordered, repeat blood pressure one hour later 167/81. Paged [address] cardiology to inform. 1750: Spoke to CESAR Terrazas, from [address] Received telephone order for hydralazine 10 mg IVP every 6 hours as needed for SBP >180.     1830: Patient ambulated about 125 feet in hallway with walker, tolerated well with no complaints. Part of the time, the pleath reading was not well but when had a good pleath, oxygen saturation on 2 liters via nasal cannula was 89-92%.      Problem: Diabetes/Glucose Control  Goal: Glucose maintained within prescribed range  Description: INTERVENTIONS:  - Monitor Blood Glucose as ordered  - Assess for signs and symptoms of hyperglycemia and hypoglycemia  - Administer ordered medications to maintain glucose within target range  - Assess barriers to adequate nutritional intake and initiate nutrition consult as needed  - Instruct patient on self management of diabetes  Outcome: Progressing     Problem: Patient/Family Goals  Goal: Patient/Family Long Term Goal  Description: Patient's Long Term Goal: Stay out of the hospital    Interventions:  - Follow cardiac diet, fluid restriction at home  - Take medications as ordered  - Attend follow up appointments  - See additional Care Plan goals for specific interventions  Outcome: Progressing  Goal: Patient/Family Short Term Goal  Description: Patient's Short Term Goal: Get fluid off    Interventions:   - labs, tele, consults  - IV diuretics  - Fluid restriction  - See additional Care Plan goals for specific interventions  Outcome: Progressing     Problem: CARDIOVASCULAR - ADULT  Goal: Maintains optimal cardiac output and hemodynamic stability  Description: INTERVENTIONS:  - Monitor vital signs, rhythm, and trends  - Monitor for bleeding, hypotension and signs of decreased cardiac output  - Evaluate effectiveness of vasoactive medications to optimize hemodynamic stability  - Monitor arterial and/or venous puncture sites for bleeding and/or hematoma  - Assess quality of pulses, skin color and temperature  - Assess for signs of decreased coronary artery perfusion - ex.  Angina  - Evaluate fluid balance, assess for edema, trend weights  Outcome: Progressing  Goal: Absence of cardiac arrhythmias or at baseline  Description: INTERVENTIONS:  - Continuous cardiac monitoring, monitor vital signs, obtain 12 lead EKG if indicated  - Evaluate effectiveness of antiarrhythmic and heart rate control medications as ordered  - Initiate emergency measures for life threatening arrhythmias  - Monitor electrolytes and administer replacement therapy as ordered  Outcome: Progressing     Problem: METABOLIC/FLUID AND ELECTROLYTES - ADULT  Goal: Electrolytes maintained within normal limits  Description: INTERVENTIONS:  - Monitor labs and rhythm and assess patient for signs and symptoms of electrolyte imbalances  - Administer electrolyte replacement as ordered  - Monitor response to electrolyte replacements, including rhythm and repeat lab results as appropriate  - Fluid restriction as ordered  - Instruct patient on fluid and nutrition restrictions as appropriate  Outcome: Progressing

## 2023-11-16 NOTE — PHYSICAL THERAPY NOTE
Patient presented sitting upright in bed side chair; VSS and agreeable to participate. Transferred sit>stand w/min assist.  Ambulated 150' w/ RW; CGA. Maintained >95%SpO2 throughout session on 2L NC. Upon completion, patient positioned supine bed with call light in reach.

## 2023-11-16 NOTE — PHYSICAL THERAPY NOTE
New order received for PT eval and chart reviewed. PT evaluation completed 11/14/23 with DC recommendation of HHPT. Pt seen by PT aide this afternoon. Pt able to ambulate 150ft with RW/CGA. DC recommendation of HHPT remains appropriate. Recommend Pt ambulate 3 times per day with nursing staff to maintain current level of function.  Will continue to follow patient peripherally on restorative mobility team.

## 2023-11-17 LAB
ANION GAP SERPL CALC-SCNC: 6 MMOL/L (ref 0–18)
BUN BLD-MCNC: 30 MG/DL (ref 9–23)
CALCIUM BLD-MCNC: 8.7 MG/DL (ref 8.5–10.1)
CHLORIDE SERPL-SCNC: 100 MMOL/L (ref 98–112)
CO2 SERPL-SCNC: 28 MMOL/L (ref 21–32)
CREAT BLD-MCNC: 1.55 MG/DL
EGFRCR SERPLBLD CKD-EPI 2021: 33 ML/MIN/1.73M2 (ref 60–?)
GLUCOSE BLD-MCNC: 185 MG/DL (ref 70–99)
GLUCOSE BLD-MCNC: 192 MG/DL (ref 70–99)
GLUCOSE BLD-MCNC: 208 MG/DL (ref 70–99)
GLUCOSE BLD-MCNC: 258 MG/DL (ref 70–99)
GLUCOSE BLD-MCNC: 313 MG/DL (ref 70–99)
OSMOLALITY SERPL CALC.SUM OF ELEC: 289 MOSM/KG (ref 275–295)
POTASSIUM SERPL-SCNC: 4.1 MMOL/L (ref 3.5–5.1)
SODIUM SERPL-SCNC: 134 MMOL/L (ref 136–145)

## 2023-11-17 PROCEDURE — 99232 SBSQ HOSP IP/OBS MODERATE 35: CPT | Performed by: INTERNAL MEDICINE

## 2023-11-17 RX ORDER — AMLODIPINE BESYLATE 5 MG/1
5 TABLET ORAL 2 TIMES DAILY
Status: DISCONTINUED | OUTPATIENT
Start: 2023-11-17 | End: 2023-11-18

## 2023-11-17 RX ORDER — FUROSEMIDE 10 MG/ML
20 INJECTION INTRAMUSCULAR; INTRAVENOUS
Status: COMPLETED | OUTPATIENT
Start: 2023-11-17 | End: 2023-11-18

## 2023-11-17 NOTE — PLAN OF CARE
Patient sitting in chair, denies chest pain, shortness of breath and dizziness. Patient awake and confused at times, on nasal canula, up with x1 assist and walker. Normal sinus rhythm on cardiac monitor. Plan for diuresis, ambulation and in chair for all meals. Call light with in reach, fall precautions in place, all questions answered. This RN ambulated patient. At rest sitting at edge of bed prior to getting up oxygen saturations on RA 85-86%, applied 2LNC with oxygen saturations coming up to 90%. With ambulation on 2LNC oxygen saturations dropped to 86%, increased oxygen to 3LNC and oxygen saturations coming up to 89-90%. Informed Dr. Jossie Ortiz and Dayanara GUERRERO. Problem: CARDIOVASCULAR - ADULT  Goal: Maintains optimal cardiac output and hemodynamic stability  Description: INTERVENTIONS:  - Monitor vital signs, rhythm, and trends  - Monitor for bleeding, hypotension and signs of decreased cardiac output  - Evaluate effectiveness of vasoactive medications to optimize hemodynamic stability  - Monitor arterial and/or venous puncture sites for bleeding and/or hematoma  - Assess quality of pulses, skin color and temperature  - Assess for signs of decreased coronary artery perfusion - ex.  Angina  - Evaluate fluid balance, assess for edema, trend weights  Outcome: Progressing

## 2023-11-17 NOTE — PLAN OF CARE
Patient alert and oriented x4. Up with assist/walker. NSR on tele. Maintaining o2 sats >90% on 1.5L NC. Briefed/incontinent, purewick in place. Denies pain. Updated patient on POC, verbalized understanding. Safety precautions put in place, bed alarm on. Problem: Diabetes/Glucose Control  Goal: Glucose maintained within prescribed range  Description: INTERVENTIONS:  - Monitor Blood Glucose as ordered  - Assess for signs and symptoms of hyperglycemia and hypoglycemia  - Administer ordered medications to maintain glucose within target range  - Assess barriers to adequate nutritional intake and initiate nutrition consult as needed  - Instruct patient on self management of diabetes  Outcome: Progressing     Problem: Patient/Family Goals  Goal: Patient/Family Long Term Goal  Description: Patient's Long Term Goal: Stay out of the hospital    Interventions:  - Follow cardiac diet, fluid restriction at home  - Take medications as ordered  - Attend follow up appointments  - See additional Care Plan goals for specific interventions  Outcome: Progressing  Goal: Patient/Family Short Term Goal  Description: Patient's Short Term Goal: Get fluid off    Interventions:   - labs, tele, consults  - IV diuretics  - Fluid restriction  - See additional Care Plan goals for specific interventions  Outcome: Progressing     Problem: CARDIOVASCULAR - ADULT  Goal: Maintains optimal cardiac output and hemodynamic stability  Description: INTERVENTIONS:  - Monitor vital signs, rhythm, and trends  - Monitor for bleeding, hypotension and signs of decreased cardiac output  - Evaluate effectiveness of vasoactive medications to optimize hemodynamic stability  - Monitor arterial and/or venous puncture sites for bleeding and/or hematoma  - Assess quality of pulses, skin color and temperature  - Assess for signs of decreased coronary artery perfusion - ex.  Angina  - Evaluate fluid balance, assess for edema, trend weights  Outcome: Progressing  Goal: Absence of cardiac arrhythmias or at baseline  Description: INTERVENTIONS:  - Continuous cardiac monitoring, monitor vital signs, obtain 12 lead EKG if indicated  - Evaluate effectiveness of antiarrhythmic and heart rate control medications as ordered  - Initiate emergency measures for life threatening arrhythmias  - Monitor electrolytes and administer replacement therapy as ordered  Outcome: Progressing     Problem: METABOLIC/FLUID AND ELECTROLYTES - ADULT  Goal: Electrolytes maintained within normal limits  Description: INTERVENTIONS:  - Monitor labs and rhythm and assess patient for signs and symptoms of electrolyte imbalances  - Administer electrolyte replacement as ordered  - Monitor response to electrolyte replacements, including rhythm and repeat lab results as appropriate  - Fluid restriction as ordered  - Instruct patient on fluid and nutrition restrictions as appropriate  Outcome: Progressing     Problem: Impaired Functional Mobility  Goal: Achieve highest/safest level of mobility/gait  Description: Interventions:  - Assess patient's functional ability and stability  - Promote increasing activity/tolerance for mobility and gait  - Educate and engage patient/family in tolerated activity level and precautions    Outcome: Progressing

## 2023-11-18 VITALS
BODY MASS INDEX: 25.78 KG/M2 | HEART RATE: 64 BPM | WEIGHT: 151 LBS | DIASTOLIC BLOOD PRESSURE: 64 MMHG | SYSTOLIC BLOOD PRESSURE: 153 MMHG | RESPIRATION RATE: 18 BRPM | OXYGEN SATURATION: 93 % | TEMPERATURE: 97 F | HEIGHT: 64 IN

## 2023-11-18 LAB
ANION GAP SERPL CALC-SCNC: 6 MMOL/L (ref 0–18)
BUN BLD-MCNC: 34 MG/DL (ref 9–23)
CALCIUM BLD-MCNC: 8.5 MG/DL (ref 8.5–10.1)
CHLORIDE SERPL-SCNC: 102 MMOL/L (ref 98–112)
CO2 SERPL-SCNC: 28 MMOL/L (ref 21–32)
CREAT BLD-MCNC: 1.47 MG/DL
EGFRCR SERPLBLD CKD-EPI 2021: 35 ML/MIN/1.73M2 (ref 60–?)
GLUCOSE BLD-MCNC: 167 MG/DL (ref 70–99)
GLUCOSE BLD-MCNC: 195 MG/DL (ref 70–99)
GLUCOSE BLD-MCNC: 202 MG/DL (ref 70–99)
OSMOLALITY SERPL CALC.SUM OF ELEC: 295 MOSM/KG (ref 275–295)
POTASSIUM SERPL-SCNC: 3.9 MMOL/L (ref 3.5–5.1)
SODIUM SERPL-SCNC: 136 MMOL/L (ref 136–145)

## 2023-11-18 PROCEDURE — 99239 HOSP IP/OBS DSCHRG MGMT >30: CPT | Performed by: INTERNAL MEDICINE

## 2023-11-18 RX ORDER — LOSARTAN POTASSIUM 25 MG/1
25 TABLET ORAL DAILY
Status: DISCONTINUED | OUTPATIENT
Start: 2023-11-18 | End: 2023-11-18

## 2023-11-18 RX ORDER — FUROSEMIDE 20 MG/1
40 TABLET ORAL
Status: SHIPPED | COMMUNITY
Start: 2023-11-18

## 2023-11-18 RX ORDER — CEPHALEXIN 500 MG/1
500 CAPSULE ORAL EVERY 8 HOURS
Qty: 9 CAPSULE | Refills: 0 | Status: SHIPPED | OUTPATIENT
Start: 2023-11-18 | End: 2023-11-21

## 2023-11-18 RX ORDER — LOSARTAN POTASSIUM 25 MG/1
25 TABLET ORAL DAILY
Qty: 30 TABLET | Refills: 3 | Status: SHIPPED | OUTPATIENT
Start: 2023-11-19

## 2023-11-18 NOTE — PHYSICAL THERAPY NOTE
PHYSICAL THERAPY EVALUATION - INPATIENT     Room Number: 4761/8735-M  Evaluation Date: 2023  Type of Evaluation: Initial  Physician Order: PT Eval and Treat    Presenting Problem: HTN  Co-Morbidities : s/p ppm 23, Afib, CHF, pulm HTN, HTN, CKD, DM  Reason for Therapy: Mobility Dysfunction and Discharge Planning      ASSESSMENT   Pt is a 78year old female admitted on 2023 for HTN, Afib, and CHF. Functional outcome measures completed include [facility]. The AM-PAC '6-Clicks' Inpatient Basic Mobility Short Form was completed and this patient is demonstrating a Approx Degree of Impairment: 28.97%  degree of impairment in mobility. Research supports that patients with this level of impairment may benefit from discharge home with [facility] PT and intermittent supervision initially. PT Discharge Recommendations: Home with home health PT; Intermittent Supervision      PLAN  Patient has been evaluated and presents with no skilled Physical Therapy needs at this time. Patient discharged from Physical Therapy services. Please re-order if a new functional limitation presents during this admission. GOALS  Patient was able to achieve the following goals . .. Patient was able to transfer Safely and with supervision and RW   Patient able to ambulate on level surfaces Safely and with supervision and RW         HOME SITUATION  Type of Home: House   Home Layout: One level                Lives With: Alone  Drives: No  Patient Owned Equipment: Rolling walker;Cane     Prior Level of [place]: Pt lives alone in single story home. Pt typically independent with ADL and mobility. Pt ambulates primarily with cane. Pts daughter and son live 30-45 mins away and are not able to stay with pt. SUBJECTIVE  \"I want to go home. \"      OBJECTIVE  Precautions: Bed/chair alarm;Hard of hearing  Fall Risk: Standard fall risk    WEIGHT BEARING RESTRICTION  Weight Bearing Restriction: None                PAIN ASSESSMENT  Ratin COGNITION  Overall Cognitive Status:  WFL - within functional limits    RANGE OF MOTION AND STRENGTH ASSESSMENT  Upper extremity ROM and strength are within functional limits     Lower extremity ROM is within functional limits     Lower extremity strength is within functional limits       BALANCE  Static Sitting: Good  Dynamic Sitting: Good  Static Standing: Fair  Dynamic Standing: Fair -    ADDITIONAL TESTS                                    ACTIVITY TOLERANCE                         O2 WALK  Oxygen Therapy  SPO2% on Oxygen at Rest: 93  At rest oxygen flow (liters per minute): 2  SPO2% Ambulation on Oxygen: 87  Ambulation oxygen flow (liters per minute): 2    NEUROLOGICAL FINDINGS  Neurological Findings: None                     AM-PAC '6-Clicks' INPATIENT SHORT FORM - BASIC MOBILITY  How much difficulty does the patient currently have. .. Patient Difficulty: Turning over in bed (including adjusting bedclothes, sheets and blankets)?: None   Patient Difficulty: Sitting down on and standing up from a chair with arms (e.g., wheelchair, bedside commode, etc.): None   Patient Difficulty: Moving from lying on back to sitting on the side of the bed?: None   How much help from another person does the patient currently need. ..    Help from Another: Moving to and from a bed to a chair (including a wheelchair)?: A Little   Help from Another: Need to walk in hospital room?: A Little   Help from Another: Climbing 3-5 steps with a railing?: A Little       AM-PAC Score:  Raw Score: 21   Approx Degree of Impairment: 28.97%   Standardized Score (AM-PAC Scale): 50.25   CMS Modifier (G-Code): CJ    FUNCTIONAL ABILITY STATUS  Gait Assessment   Functional Mobility/Gait Assessment  Gait Assistance: Supervision  Distance (ft): 100  Assistive Device: Rolling walker  Pattern: Shuffle (verbal cues for upright posture)    Skilled Therapy Provided     Bed Mobility:  Rolling: not tested  Supine to sit: not tested   Sit to supine: not tested     Transfer Mobility:  Sit to stand: supervision from bedside chair to RW   Stand to sit: supervision  Gait = pt amb x 100 feet with RW on 2 LO2 with supervision and verbal cues for upright posture and proximity to RW    Therapist's comments:per RN pt ok to be seen. Pt received sitting in bedside chair on 2 L O2. Pt son and daughter in room during session. Pt vitals monitored and as above. Pt denies dizziness throughout session. Upon standing and during amb pt c/o B feet pain, but was able to amb as above. Pt had no LOB or reports of SOB. Pt desat to 87% on 2 LO2 after amb. Pt was seated and O2 sat recovered to >90% within 2-3 mins. Pt/pt family educated on activity/discharge recommendations, rationale rec, and questions answered. Pt left sitting in chair with chair alarm set, B LE elevated, RN/SW updated, and family in room. Exercise/Education Provided:  Functional activity tolerated  Gait training  Posture  Transfer training    Patient End of Session: Up in chair;Needs met;Call light within reach;RN aware of session/findings; All patient questions and concerns addressed; Alarm set; Family present; Discussed recommendations with /    Patient Evaluation Complexity Level:  History Moderate - 1 or 2 personal factors and/or co-morbidities   Examination of body systems Low - addressing 1-2 elements   Clinical Presentation Low - Stable   Clinical Decision Making Low Complexity       PT Session Time: 30 minutes  Gait Training: 10 minutes

## 2023-11-18 NOTE — CM/SW NOTE
CM asked by RN to speak with family regarding discharge concerns. Therapy notes reviewed and recommendations for home as pt was ambulating 150 feet with RW/CGA. Pt now requiring supplemental oxygen at rest/with activity, therefore referral sent to [facility] via [facility]. Notified hospitalist to document verbiage and cosign home o2 order. Call placed to pt's dtr, Chava Schwarz, to further discuss discharge planning. She was joined by pt's son, Carlie Zaidi, via phone. Both shared concerns regarding pt discharging home alone and reported that she required 2 nurses to transfer pt yesterday, therefore they are requesting she go to [facility]. Discussed ZACHERY criteria and need for insurance authorization to admit to [facility] under insurance. Offered option of private paying for a respite stay at SNF if they do not feel she is safe to discharge home. Based on information provided by the family, CM called pt's RN to further inquire about pt's current presentation. RN validated family's concerns and did share with hospitalist. PT/OT evals had been entered this morning with request to re-evaluate for ZACHERY today, however not completed as of yet. RN to reach out to therapy again with request to see pt as soon as possible to determine needs and to show family how she is mobilizing. Pt does have mild confusion and will have to be able to manage new oxygen. Will follow up with family after PT/OT re-evaluate pt to discuss options. Family can elect to hire a private duty caregiver or privately pay for SNF if ZACHERY is not medically necessary. Addendum: PT re-evaluated for family and recommend discharge home with HH/intermittent supervision. Family agreeable with plans for discharge home. Provided resources for private duty caregivers, SNF respite list, and contact for Sagr[facility] at [facility] [address] for Mom. Will notify Giovani Horowitz of plans for discharge today and will send AVS when available. Awaiting confirmation from [facility] regarding approval for home oxygen.     1530: Confirmation received from Parmjit Nixon at [address] [place] [address] that oxygen has been approved and that a HME tank can be dispensed for transport home. [facility]'s dtr, Sabrina Heard, to schedule delivery of oxygen to home. Updated RN.     Sarah Klein BSN, RN-BC    [id number]

## 2023-11-18 NOTE — PLAN OF CARE
Patient is alert & oriented x2, self and place. Pt ambulates w/ walker, x1 assist. Breath sounds diminished bilateral. On 1-2L NC at rest overnight. Normal sinus rhythm. No pain reported. Skin dry and intact. Bed in low position and call light within reach. Reviewed plan of care and patient verbalizes understanding. Problem: Diabetes/Glucose Control  Goal: Glucose maintained within prescribed range  Description: INTERVENTIONS:  - Monitor Blood Glucose as ordered  - Assess for signs and symptoms of hyperglycemia and hypoglycemia  - Administer ordered medications to maintain glucose within target range  - Assess barriers to adequate nutritional intake and initiate nutrition consult as needed  - Instruct patient on self management of diabetes  Outcome: Progressing     Problem: Patient/Family Goals  Goal: Patient/Family Long Term Goal  Description: Patient's Long Term Goal: Stay out of the hospital    Interventions:  - Follow cardiac diet, fluid restriction at home  - Take medications as ordered  - Attend follow up appointments  - See additional Care Plan goals for specific interventions  Outcome: Progressing  Goal: Patient/Family Short Term Goal  Description: Patient's Short Term Goal: Get fluid off    Interventions:   - labs, tele, consults  - IV diuretics  - Fluid restriction  - See additional Care Plan goals for specific interventions  Outcome: Progressing     Problem: CARDIOVASCULAR - ADULT  Goal: Maintains optimal cardiac output and hemodynamic stability  Description: INTERVENTIONS:  - Monitor vital signs, rhythm, and trends  - Monitor for bleeding, hypotension and signs of decreased cardiac output  - Evaluate effectiveness of vasoactive medications to optimize hemodynamic stability  - Monitor arterial and/or venous puncture sites for bleeding and/or hematoma  - Assess quality of pulses, skin color and temperature  - Assess for signs of decreased coronary artery perfusion - ex.  Angina  - Evaluate fluid balance, assess for edema, trend weights  Outcome: Progressing  Goal: Absence of cardiac arrhythmias or at baseline  Description: INTERVENTIONS:  - Continuous cardiac monitoring, monitor vital signs, obtain 12 lead EKG if indicated  - Evaluate effectiveness of antiarrhythmic and heart rate control medications as ordered  - Initiate emergency measures for life threatening arrhythmias  - Monitor electrolytes and administer replacement therapy as ordered  Outcome: Progressing     Problem: METABOLIC/FLUID AND ELECTROLYTES - ADULT  Goal: Electrolytes maintained within normal limits  Description: INTERVENTIONS:  - Monitor labs and rhythm and assess patient for signs and symptoms of electrolyte imbalances  - Administer electrolyte replacement as ordered  - Monitor response to electrolyte replacements, including rhythm and repeat lab results as appropriate  - Fluid restriction as ordered  - Instruct patient on fluid and nutrition restrictions as appropriate  Outcome: Progressing     Problem: Impaired Functional Mobility  Goal: Achieve highest/safest level of mobility/gait  Description: Interventions:  - Assess patient's functional ability and stability  - Promote increasing activity/tolerance for mobility and gait  - Educate and engage patient/family in tolerated activity level and precautions  - Recommend use of  RW for transfers and ambulation  Outcome: Progressing

## 2023-11-18 NOTE — PROGRESS NOTES
11/18/23 1218   Mobility   O2 walk? Yes   SPO2% on Room Air at Rest 87   SPO2% on Oxygen at Rest 90   At rest oxygen flow (liters per minute) 3   SPO2% Ambulation on Room Air 85   SPO2% Ambulation on Oxygen 90   Ambulation oxygen flow (liters per minute) 4     Oxygen walk completed. Pt ambulated 150 feet total. Tolerated well. Requiring 3L oxygen at rest and 4L oxygen with activity.

## 2023-11-18 NOTE — PLAN OF CARE
Assumed care at 0730. Pt alert, oriented x3. Oriented to person, place, and situation. Disoriented to time. Oxygen saturations adequate on 2L nasal cannula. Lung sounds diminished bilaterally. Tele: NSR. Incontinent of bowel and bladder. Denies pain. Ambulates x1 walker. Plan of care: QID, antibiotics PO, oxygen walk, possible discharge today pending clearance from consults. Patient and family updated on plan of care. Questions answered.        Problem: Diabetes/Glucose Control  Goal: Glucose maintained within prescribed range  Description: INTERVENTIONS:  - Monitor Blood Glucose as ordered  - Assess for signs and symptoms of hyperglycemia and hypoglycemia  - Administer ordered medications to maintain glucose within target range  - Assess barriers to adequate nutritional intake and initiate nutrition consult as needed  - Instruct patient on self management of diabetes  Outcome: Progressing     Problem: Patient/Family Goals  Goal: Patient/Family Long Term Goal  Description: Patient's Long Term Goal: Stay out of the hospital    Interventions:  - Follow cardiac diet, fluid restriction at home  - Take medications as ordered  - Attend follow up appointments  - See additional Care Plan goals for specific interventions  Outcome: Progressing  Goal: Patient/Family Short Term Goal  Description: Patient's Short Term Goal: Get fluid off    Interventions:   - labs, tele, consults  - IV diuretics  - Fluid restriction  - See additional Care Plan goals for specific interventions  Outcome: Progressing     Problem: CARDIOVASCULAR - ADULT  Goal: Maintains optimal cardiac output and hemodynamic stability  Description: INTERVENTIONS:  - Monitor vital signs, rhythm, and trends  - Monitor for bleeding, hypotension and signs of decreased cardiac output  - Evaluate effectiveness of vasoactive medications to optimize hemodynamic stability  - Monitor arterial and/or venous puncture sites for bleeding and/or hematoma  - Assess quality of pulses, skin color and temperature  - Assess for signs of decreased coronary artery perfusion - ex.  Angina  - Evaluate fluid balance, assess for edema, trend weights  Outcome: Progressing  Goal: Absence of cardiac arrhythmias or at baseline  Description: INTERVENTIONS:  - Continuous cardiac monitoring, monitor vital signs, obtain 12 lead EKG if indicated  - Evaluate effectiveness of antiarrhythmic and heart rate control medications as ordered  - Initiate emergency measures for life threatening arrhythmias  - Monitor electrolytes and administer replacement therapy as ordered  Outcome: Progressing     Problem: METABOLIC/FLUID AND ELECTROLYTES - ADULT  Goal: Electrolytes maintained within normal limits  Description: INTERVENTIONS:  - Monitor labs and rhythm and assess patient for signs and symptoms of electrolyte imbalances  - Administer electrolyte replacement as ordered  - Monitor response to electrolyte replacements, including rhythm and repeat lab results as appropriate  - Fluid restriction as ordered  - Instruct patient on fluid and nutrition restrictions as appropriate  Outcome: Progressing     Problem: Impaired Functional Mobility  Goal: Achieve highest/safest level of mobility/gait  Description: Interventions:  - Assess patient's functional ability and stability  - Promote increasing activity/tolerance for mobility and gait

## 2023-11-18 NOTE — PLAN OF CARE
Pt discharged home. IV removed. Tele dc'd and returned to monitor tech. Follow up instructions provided and discussed. Pt and family verbalized understanding. Rx scripts given. Discussed adverse reactions and side effects of all new medications and provided appropriate handouts. Pt and family verbalized understanding. Pt wheeled down by staff with all belongings, home oxygen equipment. Pt left denying complaints of pain, malaise, or cardiac symptoms. All needs met by staff.

## 2023-11-20 ENCOUNTER — PATIENT OUTREACH (OUTPATIENT)
Dept: CASE MANAGEMENT | Age: 83
End: 2023-11-20

## 2023-11-20 ENCOUNTER — TELEPHONE (OUTPATIENT)
Dept: FAMILY MEDICINE CLINIC | Facility: CLINIC | Age: 83
End: 2023-11-20

## 2023-11-20 DIAGNOSIS — Z02.9 ENCOUNTERS FOR UNSPECIFIED ADMINISTRATIVE PURPOSE: Primary | ICD-10-CM

## 2023-11-20 PROCEDURE — 1111F DSCHRG MED/CURRENT MED MERGE: CPT

## 2023-11-20 NOTE — TELEPHONE ENCOUNTER
Patient daughter called to schedule hospital follow-up appointment with Dr. Xochitl Loving. Patient went to [facility]  in regards to congestive heart failure. Patient was informed to follow-up asap with Provider. Patient did go ahead and schedule next available appt for 12/4/2023. Would like a call back from nurse to be advised, will she waits for appt. Please advise.

## 2023-11-20 NOTE — PROGRESS NOTES
LM for pt to call [facility] for TCM since discharge. NCM phone number was provided for pt to call back. [facility] contact information was provided for pt to call back as well.

## 2023-11-26 DIAGNOSIS — E03.9 ACQUIRED HYPOTHYROIDISM: ICD-10-CM

## 2023-11-27 ENCOUNTER — TELEPHONE (OUTPATIENT)
Dept: FAMILY MEDICINE CLINIC | Facility: CLINIC | Age: 83
End: 2023-11-27

## 2023-11-27 RX ORDER — LEVOTHYROXINE SODIUM 0.07 MG/1
75 TABLET ORAL
Qty: 90 TABLET | Refills: 3 | Status: SHIPPED | OUTPATIENT
Start: 2023-11-27

## 2023-11-27 NOTE — TELEPHONE ENCOUNTER
Hector Zimmerman  Questions answered  We will address at upcoming appt  
Patients daughter calling and would like to speak to Dr. Estevan Bah before her mothers appt. on Thursday regarding on getting help to putting her in short term care/respit care, she is very unstable living by herself and not capable of living on her own.  Please advise.   
Spoke to pt's daughter and she feels it is unsafe for pt to be alone in the home.  One episode of being confused, had 2 diapers on one day, sometimes does not take her meds, she goes over there daily because of the concerns.  Elsie and her brother are very concerned.  Pt has home health currently and they are documenting everything.  One day she left the oven on and the carbon monoxide alarms went off.  She will be bringing pt to the appt and the patient will be upset if she knows that she is being talked about.  She has been urinating over herself as she is on a water pill and has had accidents with stool as well      Yesterday her feet are starting to come down again from the swelling.    
good, to achieve stated therapy goals

## 2023-11-29 ENCOUNTER — TELEPHONE (OUTPATIENT)
Dept: FAMILY MEDICINE CLINIC | Facility: CLINIC | Age: 83
End: 2023-11-29

## 2023-11-30 ENCOUNTER — OFFICE VISIT (OUTPATIENT)
Dept: FAMILY MEDICINE CLINIC | Facility: CLINIC | Age: 83
End: 2023-11-30
Payer: MEDICARE

## 2023-11-30 VITALS
SYSTOLIC BLOOD PRESSURE: 132 MMHG | BODY MASS INDEX: 23.57 KG/M2 | RESPIRATION RATE: 18 BRPM | HEART RATE: 77 BPM | OXYGEN SATURATION: 99 % | TEMPERATURE: 97 F | WEIGHT: 133 LBS | HEIGHT: 63 IN | DIASTOLIC BLOOD PRESSURE: 70 MMHG

## 2023-11-30 DIAGNOSIS — R41.3 MEMORY DEFICIT: ICD-10-CM

## 2023-11-30 DIAGNOSIS — I49.5 TACHY-BRADY SYNDROME (HCC): ICD-10-CM

## 2023-11-30 DIAGNOSIS — Z95.0 S/P PLACEMENT OF CARDIAC PACEMAKER: ICD-10-CM

## 2023-11-30 DIAGNOSIS — I50.33 ACUTE ON CHRONIC DIASTOLIC CONGESTIVE HEART FAILURE (HCC): Primary | ICD-10-CM

## 2023-11-30 LAB
ALBUMIN SERPL-MCNC: 3 G/DL (ref 3.4–5)
ALBUMIN/GLOB SERPL: 0.7 {RATIO} (ref 1–2)
ALP LIVER SERPL-CCNC: 50 U/L
ALT SERPL-CCNC: 19 U/L
ANION GAP SERPL CALC-SCNC: 7 MMOL/L (ref 0–18)
AST SERPL-CCNC: 21 U/L (ref 15–37)
BASOPHILS # BLD AUTO: 0.1 X10(3) UL (ref 0–0.2)
BASOPHILS NFR BLD AUTO: 1.3 %
BILIRUB SERPL-MCNC: 0.5 MG/DL (ref 0.1–2)
BUN BLD-MCNC: 37 MG/DL (ref 9–23)
CALCIUM BLD-MCNC: 9.3 MG/DL (ref 8.5–10.1)
CHLORIDE SERPL-SCNC: 100 MMOL/L (ref 98–112)
CO2 SERPL-SCNC: 28 MMOL/L (ref 21–32)
CREAT BLD-MCNC: 1.7 MG/DL
EGFRCR SERPLBLD CKD-EPI 2021: 29 ML/MIN/1.73M2 (ref 60–?)
EOSINOPHIL # BLD AUTO: 0.19 X10(3) UL (ref 0–0.7)
EOSINOPHIL NFR BLD AUTO: 2.4 %
ERYTHROCYTE [DISTWIDTH] IN BLOOD BY AUTOMATED COUNT: 17.3 %
FASTING STATUS PATIENT QL REPORTED: YES
GLOBULIN PLAS-MCNC: 4.5 G/DL (ref 2.8–4.4)
GLUCOSE BLD-MCNC: 205 MG/DL (ref 70–99)
HCT VFR BLD AUTO: 37.2 %
HGB BLD-MCNC: 11.6 G/DL
IMM GRANULOCYTES # BLD AUTO: 0.02 X10(3) UL (ref 0–1)
IMM GRANULOCYTES NFR BLD: 0.3 %
LYMPHOCYTES # BLD AUTO: 0.94 X10(3) UL (ref 1–4)
LYMPHOCYTES NFR BLD AUTO: 11.9 %
MCH RBC QN AUTO: 26.5 PG (ref 26–34)
MCHC RBC AUTO-ENTMCNC: 31.2 G/DL (ref 31–37)
MCV RBC AUTO: 84.9 FL
MONOCYTES # BLD AUTO: 0.33 X10(3) UL (ref 0.1–1)
MONOCYTES NFR BLD AUTO: 4.2 %
NEUTROPHILS # BLD AUTO: 6.32 X10 (3) UL (ref 1.5–7.7)
NEUTROPHILS # BLD AUTO: 6.32 X10(3) UL (ref 1.5–7.7)
NEUTROPHILS NFR BLD AUTO: 79.9 %
OSMOLALITY SERPL CALC.SUM OF ELEC: 295 MOSM/KG (ref 275–295)
PLATELET # BLD AUTO: 421 10(3)UL (ref 150–450)
POTASSIUM SERPL-SCNC: 4.1 MMOL/L (ref 3.5–5.1)
PROT SERPL-MCNC: 7.5 G/DL (ref 6.4–8.2)
RBC # BLD AUTO: 4.38 X10(6)UL
SODIUM SERPL-SCNC: 135 MMOL/L (ref 136–145)
WBC # BLD AUTO: 7.9 X10(3) UL (ref 4–11)

## 2023-11-30 PROCEDURE — 3075F SYST BP GE 130 - 139MM HG: CPT | Performed by: FAMILY MEDICINE

## 2023-11-30 PROCEDURE — 99215 OFFICE O/P EST HI 40 MIN: CPT | Performed by: FAMILY MEDICINE

## 2023-11-30 PROCEDURE — 1159F MED LIST DOCD IN RCRD: CPT | Performed by: FAMILY MEDICINE

## 2023-11-30 PROCEDURE — 3008F BODY MASS INDEX DOCD: CPT | Performed by: FAMILY MEDICINE

## 2023-11-30 PROCEDURE — 80053 COMPREHEN METABOLIC PANEL: CPT | Performed by: FAMILY MEDICINE

## 2023-11-30 PROCEDURE — 1111F DSCHRG MED/CURRENT MED MERGE: CPT | Performed by: FAMILY MEDICINE

## 2023-11-30 PROCEDURE — 85025 COMPLETE CBC W/AUTO DIFF WBC: CPT | Performed by: FAMILY MEDICINE

## 2023-11-30 PROCEDURE — 3078F DIAST BP <80 MM HG: CPT | Performed by: FAMILY MEDICINE

## 2023-11-30 PROCEDURE — 1160F RVW MEDS BY RX/DR IN RCRD: CPT | Performed by: FAMILY MEDICINE

## 2023-11-30 NOTE — PROGRESS NOTES
Multiple attempts to reach pt and messages left with no return call. Patient went in for HFU appt with PCP on 11/30/23. Encounter closing.

## 2023-11-30 NOTE — PATIENT INSTRUCTIONS
Continue all meds as prescribed    Review home health aide with [facility] and home health agency for help at home    Call to schedule appt for bloodwork    Followup in 2 months, sooner if needed

## 2023-12-01 ENCOUNTER — TELEPHONE (OUTPATIENT)
Dept: CARDIOLOGY | Age: 83
End: 2023-12-01

## 2023-12-01 ENCOUNTER — PREP FOR CASE (OUTPATIENT)
Dept: CARDIOLOGY | Age: 83
End: 2023-12-01

## 2023-12-01 ENCOUNTER — ANCILLARY PROCEDURE (OUTPATIENT)
Dept: CARDIOLOGY | Age: 83
End: 2023-12-01
Attending: INTERNAL MEDICINE

## 2023-12-01 DIAGNOSIS — I49.5 TACHY-BRADY SYNDROME (CMD): ICD-10-CM

## 2023-12-01 DIAGNOSIS — Z95.0 PACEMAKER: ICD-10-CM

## 2023-12-01 DIAGNOSIS — I48.19 PERSISTENT ATRIAL FIBRILLATION (CMD): Primary | ICD-10-CM

## 2023-12-01 RX ORDER — AMIODARONE HYDROCHLORIDE 200 MG/1
200 TABLET ORAL DAILY
Qty: 90 TABLET | Refills: 1 | Status: SHIPPED | OUTPATIENT
Start: 2023-12-01

## 2023-12-01 RX ORDER — SODIUM CHLORIDE 9 MG/ML
INJECTION, SOLUTION INTRAVENOUS CONTINUOUS
Status: CANCELLED | OUTPATIENT
Start: 2023-12-01

## 2023-12-06 ENCOUNTER — HOME HEALTH CHARGES (OUTPATIENT)
Dept: FAMILY MEDICINE CLINIC | Facility: CLINIC | Age: 83
End: 2023-12-06

## 2023-12-06 DIAGNOSIS — I13.0: Primary | ICD-10-CM

## 2023-12-07 ENCOUNTER — TELEPHONE (OUTPATIENT)
Dept: CARDIOLOGY | Age: 83
End: 2023-12-07

## 2023-12-07 RX ORDER — LOSARTAN POTASSIUM 25 MG/1
25 TABLET ORAL DAILY
COMMUNITY

## 2023-12-11 ENCOUNTER — MED REC SCAN ONLY (OUTPATIENT)
Dept: FAMILY MEDICINE CLINIC | Facility: CLINIC | Age: 83
End: 2023-12-11

## 2023-12-11 ENCOUNTER — OFFICE VISIT (OUTPATIENT)
Dept: FAMILY MEDICINE CLINIC | Facility: CLINIC | Age: 83
End: 2023-12-11
Payer: MEDICARE

## 2023-12-11 ENCOUNTER — TELEPHONE (OUTPATIENT)
Dept: FAMILY MEDICINE CLINIC | Facility: CLINIC | Age: 83
End: 2023-12-11

## 2023-12-11 ENCOUNTER — HOSPITAL ENCOUNTER (OUTPATIENT)
Dept: CARDIOLOGY | Age: 83
Discharge: HOME | End: 2023-12-11
Attending: INTERNAL MEDICINE

## 2023-12-11 VITALS
TEMPERATURE: 96.3 F | SYSTOLIC BLOOD PRESSURE: 114 MMHG | WEIGHT: 142.42 LBS | OXYGEN SATURATION: 99 % | DIASTOLIC BLOOD PRESSURE: 66 MMHG | HEIGHT: 59 IN | RESPIRATION RATE: 16 BRPM | BODY MASS INDEX: 28.71 KG/M2 | HEART RATE: 64 BPM

## 2023-12-11 VITALS
DIASTOLIC BLOOD PRESSURE: 78 MMHG | OXYGEN SATURATION: 98 % | TEMPERATURE: 97 F | SYSTOLIC BLOOD PRESSURE: 136 MMHG | RESPIRATION RATE: 16 BRPM | HEART RATE: 66 BPM | WEIGHT: 140 LBS | BODY MASS INDEX: 25 KG/M2

## 2023-12-11 DIAGNOSIS — N18.4 CHRONIC RENAL DISEASE, STAGE IV (HCC): ICD-10-CM

## 2023-12-11 DIAGNOSIS — I48.19 PERSISTENT ATRIAL FIBRILLATION (CMD): ICD-10-CM

## 2023-12-11 DIAGNOSIS — R41.3 MEMORY DEFICIT: ICD-10-CM

## 2023-12-11 DIAGNOSIS — L89.151 PRESSURE INJURY OF SACRAL REGION, STAGE 1: ICD-10-CM

## 2023-12-11 DIAGNOSIS — I50.33 ACUTE ON CHRONIC DIASTOLIC CONGESTIVE HEART FAILURE (HCC): ICD-10-CM

## 2023-12-11 DIAGNOSIS — I49.5 TACHY-BRADY SYNDROME (CMD): ICD-10-CM

## 2023-12-11 DIAGNOSIS — I48.91 ATRIAL FIBRILLATION WITH RAPID VENTRICULAR RESPONSE (HCC): Primary | ICD-10-CM

## 2023-12-11 LAB
ATRIAL RATE (BPM): 68
P AXIS (DEGREES): 62
PR-INTERVAL (MSEC): 152
QRS-INTERVAL (MSEC): 80
QT-INTERVAL (MSEC): 364
QTC: 387
R AXIS (DEGREES): -126
REPORT TEXT: NORMAL
T AXIS (DEGREES): -5
VENTRICULAR RATE EKG/MIN (BPM): 68

## 2023-12-11 PROCEDURE — 99215 OFFICE O/P EST HI 40 MIN: CPT | Performed by: FAMILY MEDICINE

## 2023-12-11 PROCEDURE — 1159F MED LIST DOCD IN RCRD: CPT | Performed by: FAMILY MEDICINE

## 2023-12-11 PROCEDURE — 10002801 HB RX 250 W/O HCPCS: Performed by: INTERNAL MEDICINE

## 2023-12-11 PROCEDURE — 13000001 HB PHASE II RECOVERY EA 30 MINUTES

## 2023-12-11 PROCEDURE — 1111F DSCHRG MED/CURRENT MED MERGE: CPT | Performed by: FAMILY MEDICINE

## 2023-12-11 PROCEDURE — 1160F RVW MEDS BY RX/DR IN RCRD: CPT | Performed by: FAMILY MEDICINE

## 2023-12-11 PROCEDURE — 3078F DIAST BP <80 MM HG: CPT | Performed by: FAMILY MEDICINE

## 2023-12-11 PROCEDURE — 92960 CARDIOVERSION ELECTRIC EXT: CPT | Performed by: INTERNAL MEDICINE

## 2023-12-11 PROCEDURE — 3075F SYST BP GE 130 - 139MM HG: CPT | Performed by: FAMILY MEDICINE

## 2023-12-11 PROCEDURE — 93005 ELECTROCARDIOGRAM TRACING: CPT | Performed by: INTERNAL MEDICINE

## 2023-12-11 PROCEDURE — 92960 CARDIOVERSION ELECTRIC EXT: CPT

## 2023-12-11 RX ORDER — METHOHEXITAL IN WATER/PF 100MG/10ML
100 SYRINGE (ML) INTRAVENOUS ONCE
Status: DISCONTINUED | OUTPATIENT
Start: 2023-12-11 | End: 2023-12-12 | Stop reason: HOSPADM

## 2023-12-11 RX ORDER — METHOHEXITAL IN WATER/PF 100MG/10ML
SYRINGE (ML) INTRAVENOUS PRN
Status: COMPLETED | OUTPATIENT
Start: 2023-12-11 | End: 2023-12-11

## 2023-12-11 RX ORDER — SODIUM CHLORIDE 9 MG/ML
INJECTION, SOLUTION INTRAVENOUS CONTINUOUS
Status: DISCONTINUED | OUTPATIENT
Start: 2023-12-11 | End: 2023-12-12 | Stop reason: HOSPADM

## 2023-12-11 RX ORDER — AMIODARONE HYDROCHLORIDE 200 MG/1
200 TABLET ORAL DAILY
COMMUNITY
Start: 2023-12-01

## 2023-12-11 RX ADMIN — Medication 40 MG: at 08:16

## 2023-12-11 ASSESSMENT — PAIN SCALES - GENERAL
PAINLEVEL_OUTOF10: 0

## 2023-12-11 ASSESSMENT — LIFESTYLE VARIABLES: SMOKING_HISTORY: NO

## 2023-12-12 ENCOUNTER — TELEPHONE (OUTPATIENT)
Dept: FAMILY MEDICINE CLINIC | Facility: CLINIC | Age: 83
End: 2023-12-12

## 2023-12-12 ENCOUNTER — MED REC SCAN ONLY (OUTPATIENT)
Dept: FAMILY MEDICINE CLINIC | Facility: CLINIC | Age: 83
End: 2023-12-12

## 2023-12-13 ENCOUNTER — MED REC SCAN ONLY (OUTPATIENT)
Dept: FAMILY MEDICINE CLINIC | Facility: CLINIC | Age: 83
End: 2023-12-13

## 2023-12-13 ENCOUNTER — TELEPHONE (OUTPATIENT)
Dept: FAMILY MEDICINE CLINIC | Facility: CLINIC | Age: 83
End: 2023-12-13

## 2023-12-13 ENCOUNTER — HOME HEALTH CHARGES (OUTPATIENT)
Dept: FAMILY MEDICINE CLINIC | Facility: CLINIC | Age: 83
End: 2023-12-13

## 2023-12-13 DIAGNOSIS — I13.0 HYPERTENSIVE HEART AND RENAL DISEASE WITH CONGESTIVE HEART FAILURE (HCC): Primary | ICD-10-CM

## 2023-12-14 ENCOUNTER — MED REC SCAN ONLY (OUTPATIENT)
Dept: FAMILY MEDICINE CLINIC | Facility: CLINIC | Age: 83
End: 2023-12-14

## 2023-12-14 ENCOUNTER — HOME HEALTH CHARGES (OUTPATIENT)
Dept: FAMILY MEDICINE CLINIC | Facility: CLINIC | Age: 83
End: 2023-12-14

## 2023-12-14 ENCOUNTER — TELEPHONE (OUTPATIENT)
Dept: FAMILY MEDICINE CLINIC | Facility: CLINIC | Age: 83
End: 2023-12-14

## 2023-12-14 DIAGNOSIS — I13.0 HYPERTENSIVE HEART AND RENAL DISEASE WITH CONGESTIVE HEART FAILURE (HCC): Primary | ICD-10-CM

## 2023-12-19 ENCOUNTER — TELEPHONE (OUTPATIENT)
Dept: FAMILY MEDICINE CLINIC | Facility: CLINIC | Age: 83
End: 2023-12-19

## 2023-12-19 ENCOUNTER — MED REC SCAN ONLY (OUTPATIENT)
Dept: FAMILY MEDICINE CLINIC | Facility: CLINIC | Age: 83
End: 2023-12-19

## 2023-12-19 NOTE — TELEPHONE ENCOUNTER
Order signed and faxed to [facility] home health @[phone]

## 2023-12-20 ENCOUNTER — PATIENT OUTREACH (OUTPATIENT)
Dept: CASE MANAGEMENT | Age: 83
End: 2023-12-20

## 2023-12-20 ENCOUNTER — TELEPHONE (OUTPATIENT)
Dept: FAMILY MEDICINE CLINIC | Facility: CLINIC | Age: 83
End: 2023-12-20

## 2023-12-20 ENCOUNTER — ANCILLARY PROCEDURE (OUTPATIENT)
Dept: CARDIOLOGY | Age: 83
End: 2023-12-20
Attending: INTERNAL MEDICINE

## 2023-12-20 DIAGNOSIS — Z95.0 PRESENCE OF CARDIAC PACEMAKER: ICD-10-CM

## 2023-12-20 DIAGNOSIS — I10 PRIMARY HYPERTENSION: ICD-10-CM

## 2023-12-20 DIAGNOSIS — E78.2 MIXED HYPERLIPIDEMIA: ICD-10-CM

## 2023-12-20 DIAGNOSIS — E03.9 ACQUIRED HYPOTHYROIDISM: Primary | ICD-10-CM

## 2023-12-20 RX ORDER — GABAPENTIN 100 MG/1
100 CAPSULE ORAL 3 TIMES DAILY
Qty: 270 CAPSULE | Refills: 3 | Status: SHIPPED | OUTPATIENT
Start: 2023-12-20

## 2023-12-20 NOTE — TELEPHONE ENCOUNTER
[id number] Shae Ponce for med  She is being monitoring by a cardiologist (electrophysiologist) and QT intervals have been normal

## 2023-12-20 NOTE — TELEPHONE ENCOUNTER
Cassius reports that: Concomitant use of escitalopram 5 mg tab with amiodarone 200 mg may cause QT prolongation. Please confirm OK to fill escitalopram 5 mg tab while pt. Is taking Amiodarone 200 mg.

## 2023-12-21 NOTE — PROGRESS NOTES
Called patient for daily call to motivate patient to get out of bed. No answer, LVM.   Total time -  1 min  Total Monthly time-  26 min

## 2023-12-26 ENCOUNTER — PATIENT OUTREACH (OUTPATIENT)
Dept: CASE MANAGEMENT | Age: 83
End: 2023-12-26

## 2023-12-26 ENCOUNTER — TELEPHONE (OUTPATIENT)
Dept: FAMILY MEDICINE CLINIC | Facility: CLINIC | Age: 83
End: 2023-12-26

## 2023-12-26 RX ORDER — FUROSEMIDE 40 MG/1
TABLET ORAL
Qty: 6 TABLET | Refills: 0 | Status: SHIPPED | OUTPATIENT
Start: 2023-12-26

## 2023-12-26 NOTE — TELEPHONE ENCOUNTER
Spoke to MOIRA Zabala and also pt's daughter with the lasix instructions and ER directions and a new script will be sent over to the pharmacy for 3 days of lasix so as not to be confused with the last script sent in on 11/18/23

## 2023-12-26 NOTE — TELEPHONE ENCOUNTER
Sagrario from [facility] calling - Patient has bilat crackles in lungs and when not on oxygen it drops to 70's   Please advise.

## 2023-12-26 NOTE — TELEPHONE ENCOUNTER
Increase lasix to 40mg 2x/day for next 3 days    If symptoms not improving or worsening, go to ER - low threshold

## 2023-12-26 NOTE — TELEPHONE ENCOUNTER
Spoke to [facility]    Friday when she saw  pt was fine lungs clear and good /66    Today pt is noted to have Middle and lower lobes bilaterally crackles, denies shortness of breath, denies fever or any other symptoms.         144 86  BP today    O2 sat 99% on 3.5 L but when O2 removed she desats to 70%

## 2023-12-27 ENCOUNTER — PATIENT OUTREACH (OUTPATIENT)
Dept: CASE MANAGEMENT | Age: 83
End: 2023-12-27

## 2023-12-28 DIAGNOSIS — E78.2 MIXED HYPERLIPIDEMIA: ICD-10-CM

## 2023-12-28 RX ORDER — GLIMEPIRIDE 4 MG/1
TABLET ORAL
Qty: 90 TABLET | Refills: 1 | Status: SHIPPED | OUTPATIENT
Start: 2023-12-28

## 2023-12-28 RX ORDER — SIMVASTATIN 20 MG
TABLET ORAL
Qty: 90 TABLET | Refills: 1 | Status: SHIPPED | OUTPATIENT
Start: 2023-12-28

## 2023-12-29 NOTE — PROGRESS NOTES
Patient called back, informed me that today is a good day. She ate breakfast and got dressed this morning.    Advised patient to focus on eating healthier meals,     Total time -  3 min  Total Monthly time-  51 min

## 2023-12-30 DIAGNOSIS — N18.31 TYPE 2 DIABETES MELLITUS WITH STAGE 3A CHRONIC KIDNEY DISEASE, WITHOUT LONG-TERM CURRENT USE OF INSULIN (HCC): ICD-10-CM

## 2023-12-30 DIAGNOSIS — E11.22 TYPE 2 DIABETES MELLITUS WITH STAGE 3A CHRONIC KIDNEY DISEASE, WITHOUT LONG-TERM CURRENT USE OF INSULIN (HCC): ICD-10-CM

## 2024-01-02 ENCOUNTER — PATIENT OUTREACH (OUTPATIENT)
Dept: CASE MANAGEMENT | Age: 84
End: 2024-01-02

## 2024-01-02 DIAGNOSIS — I48.91 ATRIAL FIBRILLATION WITH RAPID VENTRICULAR RESPONSE (HCC): ICD-10-CM

## 2024-01-02 DIAGNOSIS — I10 PRIMARY HYPERTENSION: ICD-10-CM

## 2024-01-02 DIAGNOSIS — N18.4 CHRONIC RENAL DISEASE, STAGE IV (HCC): ICD-10-CM

## 2024-01-02 DIAGNOSIS — E03.9 ACQUIRED HYPOTHYROIDISM: Primary | ICD-10-CM

## 2024-01-02 NOTE — PROGRESS NOTES
Called patient to check in on her. Patients mood is stable. Encouraged patient to take the time to do the things she/he loves. Motivated patient to increase physical activity to help maintain independence and improve quality of life. Patient states feeling overall good. Patient was waiting for Caretaker that should be arriving at 5.     Total time -  5 min  Total Monthly time-  5 min

## 2024-01-03 NOTE — PROGRESS NOTES
1/3/2024  Spoke to Ciarra for CCM.      Updates to patient care team/ comments: Reviewed with patient, ASA.  Patient reported changes in medications: None  Med Adherence  Comment: Taking as directed, daughter takes care of putting all medication in a pill pack     Health Maintenance:  Reviewed with patient.   Health Maintenance   Topic Date Due    COVID-19 Vaccine (4 - 2023-24 season) 09/01/2023- patient declined    MA Annual Health Assessment  01/01/2024-Patient is scheduled 01/18/2024    Annual Depression Screening  01/01/2024-Will be done at PCP's office 01/18/2023    Fall Risk Screening (Annual)  01/01/2024- Done today.  Fall Risk Screening   Do you feel unsteady when standing or walking? No  Do you worry about falling? No  Have you fallen in the past year? No    Diabetes Care Foot Exam (Annual)  01/01/2024-Will be done at PCP's office 01/18/2023    Diabetes Care A1C  03/10/2024    Diabetes Care: Microalb/Creat Ratio  04/25/2024    Diabetes Care Dilated Eye Exam  08/22/2024    Diabetes Care: GFR  11/30/2024    Influenza Vaccine  Completed    DEXA Scan  Completed    Pneumococcal Vaccine: 65+ Years  Completed    Zoster Vaccines  Completed      Health Maintenance   Topic Date Due    MA Annual Health Assessment  01/01/2024    Annual Depression Screening  01/01/2024    Fall Risk Screening (Annual)  01/01/2024    Diabetes Care Foot Exam (Annual)  01/01/2024    Diabetes Care A1C  03/10/2024    Diabetes Care: Microalb/Creat Ratio  04/25/2024    Diabetes Care Dilated Eye Exam  08/22/2024    Diabetes Care: GFR  11/30/2024    Influenza Vaccine  Completed    DEXA Scan  Completed    Pneumococcal Vaccine: 65+ Years  Completed    Zoster Vaccines  Completed       Patient current concerns:   Patient voiced no new concerns.     Patient voiced no interested in increasing activity or doing morning stretches.     Patients mood is stable and reports feeling over all well.    Patient lives herself her  passed away a little over a  year ago, CCM noticed patient is feeling a lack of motivation.  Patient reports she is still able to perform her ADLs independently, such as personal hygiene and grooming. She is not able to cook but she reports a care giver comes to the house to clean and cook for her daily after 5 pm. Patient reports she enjoys the care takers company.   Patient voice that daughter is  active in her life, daughter calls patient several times a week.   CCM notices patient has a lack of motivation. Patient answers with short direct answers, not showing interest in continuing conversation.    Goals/Action Plan:    Active goal from previous outreach: Stay Motivated    Patient reported progress towards goals: Ongoing               - What: Get out of bed and dressed everyday. Stay Motivated.            - Where/When/How: CCM will continue to call patient everyday to keep patient posItive and motivate patient.   Patient Reported Barriers and Concerns: No concerns reported by patient, CCM noticed patient feeling lonely and having lack of motivation                    - Plan for overcoming barriers:CCM will follow up daily with a phone call to motivate patient.       Care managers interventions:   CCM encouraged patient to take the time to do the things she loves. MotivateD patient to increase physical activity to help maintain independence and improve quality of life.       Appointments reviewed with patient.     Future Appointments   Date Time Provider Department Center   1/18/2024 11:30 AM Estevan Bah MD [facility] [place]   1/30/2024  1:00 PM Estevan Bah MD [facility] [place]        Next Care Manager Follow Up Date: 1 Day    Reason For Follow Up: review progress and or barriers towards patient's goals.     Time Spent This Encounter Total: 10 min medical record review, telephone communication, care plan updates where needed, education, goals, and action plan recreation/update. Provided acknowledgment and validation to  patient's concerns.   Monthly Minute Total including today: 21 min  Physical assessment, complete health history, and need for CCM established by JUDY CYR MD.

## 2024-01-04 ENCOUNTER — PATIENT OUTREACH (OUTPATIENT)
Dept: CASE MANAGEMENT | Age: 84
End: 2024-01-04

## 2024-01-04 NOTE — PROGRESS NOTES
Spoke with patient for [facility] Daily call, patient sounded up beat. She slept in until 9 am this morning had scramble eggs with black and blue berries for breakfast. Patient voiced to [facility] that has one sister that lives in [place], [place] and another sister that lives in [place]. Patient voiced to me that she would very much like to move in or have her sister move in with her. That way they can keep themselves company. She was recalling her childhood days, she is one of 7 kids. Patient states that 3 of her brothers and 1 of sister passed. Patient recalls growing up poor, she states that they had \"nothing\", she recalls playing tag and Hide N' seek. [facility] noticed patient enjoyed talking about her siblings.   Patient will talk to daughter about possibly sister and her living together. Patient did mention that sister  is in and out of the hospital.   [facility] will call patient tomorrow.  Total time -  7 min  Total Monthly time-  28 min

## 2024-01-05 ENCOUNTER — TELEPHONE (OUTPATIENT)
Dept: FAMILY MEDICINE CLINIC | Facility: CLINIC | Age: 84
End: 2024-01-05

## 2024-01-05 DIAGNOSIS — N18.31 TYPE 2 DIABETES MELLITUS WITH STAGE 3A CHRONIC KIDNEY DISEASE, WITHOUT LONG-TERM CURRENT USE OF INSULIN (HCC): ICD-10-CM

## 2024-01-05 DIAGNOSIS — E11.22 TYPE 2 DIABETES MELLITUS WITH STAGE 3A CHRONIC KIDNEY DISEASE, WITHOUT LONG-TERM CURRENT USE OF INSULIN (HCC): ICD-10-CM

## 2024-01-05 NOTE — PROGRESS NOTES
Called patient for daily check in. Patient sounded optimistic  about moving into an Assistant living facility in about 2 weeks. Patient voiced to CCM \" I feel good about it\". Daily call was a little short due to patient getting ready to take her nap.     Total time -  3 min  Total Monthly time-  31 min

## 2024-01-05 NOTE — TELEPHONE ENCOUNTER
Pt's daughter, Elsie calling to ask Dr. Bah if he's is in agreement with home health nurse practioners to take pt off metFORMIN 850 MG Oral Tab due to constant diarrhea. Elsie will also be dropping off forms for [facility] to have Dr. Bah complete. Elsie aware Dr. Bah not in office until Monday and said that would be fine for response.

## 2024-01-08 NOTE — TELEPHONE ENCOUNTER
Spoke to patient's daughter and she is having pt only take the metformin once a day because she was experiencing diarrhea.        She would like to make sure that when the paperwork is filled out for pt to move to assisted living that the medication list reflects the metformin to be once daily    She will also be following the in house provider there and daughter wanted to know if there is any reason pt needs to be seen on 1/18 at our office?

## 2024-01-09 ENCOUNTER — PATIENT OUTREACH (OUTPATIENT)
Dept: CASE MANAGEMENT | Age: 84
End: 2024-01-09

## 2024-01-09 ENCOUNTER — VIRTUAL PHONE E/M (OUTPATIENT)
Dept: FAMILY MEDICINE CLINIC | Facility: CLINIC | Age: 84
End: 2024-01-09
Payer: MEDICARE

## 2024-01-09 DIAGNOSIS — R41.3 MEMORY DEFICIT: ICD-10-CM

## 2024-01-09 DIAGNOSIS — N18.31 TYPE 2 DIABETES MELLITUS WITH STAGE 3A CHRONIC KIDNEY DISEASE, WITHOUT LONG-TERM CURRENT USE OF INSULIN (HCC): Primary | ICD-10-CM

## 2024-01-09 DIAGNOSIS — N18.4 CHRONIC RENAL DISEASE, STAGE IV (HCC): ICD-10-CM

## 2024-01-09 DIAGNOSIS — I13.0 HYPERTENSIVE HEART AND RENAL DISEASE WITH CONGESTIVE HEART FAILURE (HCC): ICD-10-CM

## 2024-01-09 DIAGNOSIS — I48.91 ATRIAL FIBRILLATION WITH RAPID VENTRICULAR RESPONSE (HCC): ICD-10-CM

## 2024-01-09 DIAGNOSIS — I50.33 ACUTE ON CHRONIC DIASTOLIC CONGESTIVE HEART FAILURE (HCC): ICD-10-CM

## 2024-01-09 DIAGNOSIS — E11.22 TYPE 2 DIABETES MELLITUS WITH STAGE 3A CHRONIC KIDNEY DISEASE, WITHOUT LONG-TERM CURRENT USE OF INSULIN (HCC): Primary | ICD-10-CM

## 2024-01-09 PROCEDURE — 1160F RVW MEDS BY RX/DR IN RCRD: CPT | Performed by: FAMILY MEDICINE

## 2024-01-09 PROCEDURE — 99443 PHONE E/M BY PHYS 21-30 MIN: CPT | Performed by: FAMILY MEDICINE

## 2024-01-09 PROCEDURE — 1159F MED LIST DOCD IN RCRD: CPT | Performed by: FAMILY MEDICINE

## 2024-01-09 NOTE — TELEPHONE ENCOUNTER
Called daughter and clarified situation  Patient will no longer be my patient as of this week as she will be moving to facility with its own provider     Please remove her from my quality list    Will remove metformin from her medication list    Would recommend rechecking A1c in 3 months - daughter aware and will relay this to new provider

## 2024-01-09 NOTE — PROCEDURES
The office order for PCP removal request is Denied and finalized on January 9, 2024.      [employer] patient is listed on the December 2023 [facility] eligibility list.  [facility] Attributed PCP is Dr. Estevan Bah.     ~ Patient must contact [facility] to inform she will be moving into an assisted living facility and will be following their in-house provider.     Office must wait until the January or February [facility] eligibility list is available and then verify if patient is listed.   After the office has verified, the patient is no longer listed on the [facility] monthly eligibility list and not assigned to Dr. Bah, then the office can resubmit a new order for PCP removal request.    Thanks,  [facility] Team

## 2024-01-10 NOTE — PROGRESS NOTES
Virtual/Telephone Check-In    Ciarra Salcido is a 83 year old female here today for a telemedicine audio only visit.      HPI:       1. Type 2 diabetes mellitus with stage 3a chronic kidney disease, without long-term current use of insulin (HCC)  2. Hypertensive heart and renal disease with congestive heart failure (HCC)  3. Atrial fibrillation with rapid ventricular response (HCC)  4. Acute on chronic diastolic congestive heart failure (HCC)  5. Chronic renal disease, stage IV (HCC)  6. Memory deficit  -phone visit today to review and complete forms for admission to assisted  living  -facility will have its own provider there, and unfortunately patient will no longer be able to see me  -patient is agreeable as there has been another fall at home  -she needs closer supervision      Health Maintenance Due   Topic    MA Annual Health Assessment     Annual Depression Screening     Diabetes Care Foot Exam (Annual)          ASSESSMENT/PLAN:     1. Type 2 diabetes mellitus with stage 3a chronic kidney disease, without long-term current use of insulin (HCC)  2. Hypertensive heart and renal disease with congestive heart failure (HCC)  3. Atrial fibrillation with rapid ventricular response (HCC)  4. Acute on chronic diastolic congestive heart failure (HCC)  5. Chronic renal disease, stage IV (HCC)  6. Memory deficit  -forms completed and left at  for pickup  -copy scanned into chart  -questions answered  -they will call us with any needs and wished patient well  -daughter will call insurance to change PCP status after she has been admitted later this week      Orders This Visit:  No orders of the defined types were placed in this encounter.      Meds This Visit:  Requested Prescriptions      No prescriptions requested or ordered in this encounter       Imaging & Referrals:  None       PHYSICAL EXAM:     Patient Reported Vitals                             Gen: NAD, alert and oriented x 3, able to speak in full  sentences  Pulm: No labored breathing or appreciable shortness of breath, no coughing during duration of visit  Psych: normal affect      HISTORY:       Past Medical History:   Diagnosis Date    (HFpEF) heart failure with preserved ejection fraction (HCC)     Cataract     CKD (chronic kidney disease)     Congestive heart disease (HCC)     Diabetes (HCC)     Disorder of thyroid     DM2 (diabetes mellitus, type 2) (HCC)     Hearing impairment     High blood pressure     HTN (hypertension)     Hyperlipidemia     Hypothyroidism     Pulmonary embolism (HCC)     Shingles 2020    Visual impairment       Past Surgical History:   Procedure Laterality Date    CATARACT      EYE SURGERY      HYSTERECTOMY      [facility]      Family History   Problem Relation Age of Onset    Other (Other) Mother         Old Age    Other (Other) Father         Old age      Social History:   Social History     Socioeconomic History    Marital status: Single   Tobacco Use    Smoking status: Former     Packs/day: 1.00     Years: 50.00     Additional pack years: 0.00     Total pack years: 50.00     Types: Cigarettes     Quit date:      Years since quittin.0    Smokeless tobacco: Never   Vaping Use    Vaping Use: Never used   Substance and Sexual Activity    Alcohol use: Never    Drug use: Never   Other Topics Concern    Caffeine Concern Yes     Comment: 2 cups of coffee daily     Exercise Yes     Comment: PT 2 X Weekly, OT 2 x weekly    Seat Belt Yes     Social Determinants of Health     Financial Resource Strain: Low Risk  (1/3/2024)    Financial Resource Strain     Difficulty of Paying Living Expenses: Not very hard     Med Affordability: No   Food Insecurity: No Food Insecurity (1/3/2024)    Food Insecurity     Food Insecurity: Never true   Transportation Needs: No Transportation Needs (1/3/2024)    Transportation Needs     Lack of Transportation: No   Physical Activity: Inactive (1/3/2024)    Exercise Vital Sign     Days of  Exercise per Week: 0 days     Minutes of Exercise per Session: 0 min   Stress: No Stress Concern Present (1/3/2024)    Stress     Feeling of Stress : No   Social Connections: Socially Isolated (1/3/2024)    Social Connections     Frequency of Socialization with Friends and Family: 0   Housing Stability: Low Risk  (1/3/2024)    Housing Stability     Housing Instability: No          Medications (Active prior to today's visit):  Current Outpatient Medications   Medication Sig Dispense Refill    glimepiride 4 MG Oral Tab TAKE 1 TABLET EVERY DAY 90 tablet 1    simvastatin 20 MG Oral Tab TAKE 1 TABLET EVERY NIGHT 90 tablet 1    GABAPENTIN 100 MG Oral Cap TAKE 1 CAPSULE THREE TIMES DAILY 270 capsule 3    amiodarone 200 MG Oral Tab Take 1 tablet (200 mg total) by mouth daily.      levothyroxine 75 MCG Oral Tab Take 1 tablet (75 mcg total) by mouth before breakfast. 90 tablet 3    losartan 25 MG Oral Tab Take 1 tablet (25 mg total) by mouth daily. 30 tablet 3    furosemide 20 MG Oral Tab Take 2 tablets (40 mg total) by mouth daily. TAKE 1 TABLET DAILY. MAY TAKE ADDITIONAL TAB DAILY AS NEEDED FOR EDEMA - WHEN DIRECTED BY MD (EXTRA TABLETS INCLUDED)      allopurinol 100 MG Oral Tab Take 1 tablet (100 mg total) by mouth daily. 30 tablet 11    dilTIAZem  MG Oral Capsule SR 24 Hr Take 1 capsule (120 mg total) by mouth daily.      escitalopram 5 MG Oral Tab Take 1 tablet (5 mg total) by mouth daily. 90 tablet 0    metoprolol succinate  MG Oral Tablet 24 Hr Take 1 tablet (100 mg total) by mouth 2x Daily(Beta Blocker). 60 tablet 3    Cholecalciferol (VITAMIN D3) 25 MCG (1000 UT) Oral Cap Take 1 tablet by mouth daily.      apixaban 5 MG Oral Tab Take 2 tabs (10mg) by mouth twice daily for 7 days, then take 1 tab (5mg) by mouth twice daily thereafter. (Patient taking differently: Take 1 tablet (5 mg total) by mouth 2 (two) times daily. Take 2 tabs (10mg) by mouth twice daily for 7 days, then take 1 tab (5mg) by mouth twice  daily thereafter.) 74 tablet 0    ACCU-CHEK JASWANT PLUS In Vitro Strip TEST BLOOD SUGAR TWICE DAILY AS DIRECTED 200 strip 1    acetaminophen 500 MG Oral Tab Take 2 tablets (1,000 mg total) by mouth every 6 (six) hours as needed for Pain or Fever.         Allergies:  No Known Allergies      ROS:   --See HPI for relevant ROS    --GEN: No other complaints  --HEENT: No other complaints  --RESP: No other complaints  --CV: No other complaints  --GI: No other complaints  --: No other complaints  --MSK: No other complaints  All other systems reviewed are negative      JUDY CYR MD     Please note that the following visit was completed using two-way, real-time interactive audio communication.  This has been done in good twan to provide continuity of care in the best interest of the provider-patient relationship, due to the ongoing public health crisis/national emergency and because of restrictions of visitation.  There are limitations of this visit as no physical exam could be performed.  Every conscious effort was taken to allow for sufficient and adequate time.  This billing was spent on reviewing labs, medications, radiology tests and decision making.  Appropriate medical decision-making and tests are ordered as detailed in the plan of care above.    Patient understands and accepts financial responsibility for any deductible, co-insurance and/or co-pays associated with this service.    Duration of the service: 30 min   [facility]

## 2024-01-11 ENCOUNTER — MED REC SCAN ONLY (OUTPATIENT)
Dept: FAMILY MEDICINE CLINIC | Facility: CLINIC | Age: 84
End: 2024-01-11

## 2024-01-11 ENCOUNTER — TELEPHONE (OUTPATIENT)
Dept: FAMILY MEDICINE CLINIC | Facility: CLINIC | Age: 84
End: 2024-01-11

## 2024-01-18 ENCOUNTER — LAB REQUISITION (OUTPATIENT)
Dept: LAB | Age: 84
End: 2024-01-18

## 2024-01-18 ENCOUNTER — MED REC SCAN ONLY (OUTPATIENT)
Dept: FAMILY MEDICINE CLINIC | Facility: CLINIC | Age: 84
End: 2024-01-18

## 2024-01-18 ENCOUNTER — TELEPHONE (OUTPATIENT)
Dept: FAMILY MEDICINE CLINIC | Facility: CLINIC | Age: 84
End: 2024-01-18

## 2024-01-18 PROCEDURE — PSEU9049 QUANTIFERON TB PLUS: Performed by: CLINICAL MEDICAL LABORATORY

## 2024-01-18 PROCEDURE — 86480 TB TEST CELL IMMUN MEASURE: CPT | Performed by: CLINICAL MEDICAL LABORATORY

## 2024-01-19 ENCOUNTER — PATIENT OUTREACH (OUTPATIENT)
Dept: CASE MANAGEMENT | Age: 84
End: 2024-01-19

## 2024-01-19 NOTE — PROGRESS NOTES
Per Dr. Bah's note from Virtual phone E/M 01/09/2023.  6. Memory deficit  -phone visit today to review and complete forms for admission to assisted  living  -facility will have its own provider there, and unfortunately patient will no longer be able to see me  -patient is agreeable as there has been another fall at home  -she needs closer supervision    Patient no longer qualifies for CCM program

## 2024-01-20 LAB
GAMMA INTERFERON BACKGROUND BLD IA-ACNC: 0.03 IU/ML
M TB IFN-G BLD-IMP: ABNORMAL
M TB IFN-G CD4+ BCKGRND COR BLD-ACNC: 0 IU/ML
M TB IFN-G CD4+CD8+ BCKGRND COR BLD-ACNC: 0 IU/ML
MITOGEN IGNF BCKGRD COR BLD-ACNC: 0.15 IU/ML

## 2024-01-24 ENCOUNTER — TELEPHONE (OUTPATIENT)
Dept: FAMILY MEDICINE CLINIC | Facility: CLINIC | Age: 84
End: 2024-01-24

## 2024-01-24 ENCOUNTER — MED REC SCAN ONLY (OUTPATIENT)
Dept: FAMILY MEDICINE CLINIC | Facility: CLINIC | Age: 84
End: 2024-01-24

## 2024-01-24 DIAGNOSIS — Z95.0 PACEMAKER: ICD-10-CM

## 2024-01-24 DIAGNOSIS — I50.22 CHRONIC SYSTOLIC HEART FAILURE (HCC): Primary | ICD-10-CM

## 2024-01-24 DIAGNOSIS — I27.82 CHRONIC PULMONARY THROMBOEMBOLISM SYNDROME (HCC): ICD-10-CM

## 2024-01-24 NOTE — TELEPHONE ENCOUNTER
Pam requesting call back from nurse, calling for an referral on physical therapy for patient, patient is at staying at a living facility. For her to obtain physical therapy patient would need a referral from doctor with Codes [id number]. Patient insurance is out of network. Pam states it would facilitate and more convenient for patient to have PT there.       Pam [phone]

## 2024-01-24 NOTE — TELEPHONE ENCOUNTER
This pt is a resident of [facility]. She saw a house doctor who is recommending PT but to have it there is OON w/ her insurance. They are requesting that PCP place referral in hopes that insurance might approve it. Please advise if you are OK w/ this. Thanks.   Diagnosis codes:  I27.82-chronic PE  I50.22-heart failure  Z95.0

## 2024-01-25 NOTE — TELEPHONE ENCOUNTER
Ok for referral.      However, due to her insurance, it may require she change PCP listed through her insurance prior them to approving PT in the facility.  As that facility will remain out of network through [facility].

## 2024-01-25 NOTE — TELEPHONE ENCOUNTER
I called [facility] back and spoke w/ Cristela. There is some confusion over who is to obtain authorization so I gave her the phone number for the referral department to obtain more info.

## 2024-01-25 NOTE — TELEPHONE ENCOUNTER
---- Message -----  From: Beth Fonseca  Sent: 1/25/2024  10:12 AM CST  To: [facility] Clinical Staff  Subject: Authorization                                     Hello,  The assisted living facility is responsible for obtaining Authorization for any treatment the resident receives on site. I have Approved for tracking purposes only.  Thank you,  [place]

## 2024-02-06 ENCOUNTER — TELEPHONE (OUTPATIENT)
Dept: FAMILY MEDICINE CLINIC | Facility: CLINIC | Age: 84
End: 2024-02-06

## 2024-02-06 DIAGNOSIS — Z97.16: Primary | ICD-10-CM

## 2024-02-06 NOTE — TELEPHONE ENCOUNTER
Faxed over OT order to Rochelle at [facility] @ [phone].

## 2024-02-14 ENCOUNTER — TELEPHONE (OUTPATIENT)
Dept: FAMILY MEDICINE CLINIC | Facility: CLINIC | Age: 84
End: 2024-02-14

## 2024-02-14 NOTE — TELEPHONE ENCOUNTER
Rochelle from [facility] is looking for a PA for OT and PT   Rep states she has received orders but no PA.  Please advise   Phone#[phone]

## 2024-02-14 NOTE — TELEPHONE ENCOUNTER
Spoke to Pam. I faxed her the approved referral from 1/25/24. This appears to have approval for both PT/OT. Advised her to call back if she needs anything else.

## 2024-02-15 ENCOUNTER — TELEPHONE (OUTPATIENT)
Dept: CARDIOLOGY | Age: 84
End: 2024-02-15

## 2024-02-15 ENCOUNTER — TELEPHONE (OUTPATIENT)
Dept: FAMILY MEDICINE CLINIC | Facility: CLINIC | Age: 84
End: 2024-02-15

## 2024-02-15 DIAGNOSIS — Z95.0 PACEMAKER: ICD-10-CM

## 2024-02-15 DIAGNOSIS — I48.91 ATRIAL FIBRILLATION WITH RAPID VENTRICULAR RESPONSE (HCC): Primary | ICD-10-CM

## 2024-02-15 DIAGNOSIS — N18.31 TYPE 2 DIABETES MELLITUS WITH STAGE 3A CHRONIC KIDNEY DISEASE, WITHOUT LONG-TERM CURRENT USE OF INSULIN (HCC): ICD-10-CM

## 2024-02-15 DIAGNOSIS — E11.22 TYPE 2 DIABETES MELLITUS WITH STAGE 3A CHRONIC KIDNEY DISEASE, WITHOUT LONG-TERM CURRENT USE OF INSULIN (HCC): ICD-10-CM

## 2024-02-15 RX ORDER — DILTIAZEM HYDROCHLORIDE 120 MG/1
120 CAPSULE, COATED, EXTENDED RELEASE ORAL DAILY
Qty: 30 CAPSULE | Refills: 0 | Status: SHIPPED | OUTPATIENT
Start: 2024-02-15

## 2024-02-15 NOTE — TELEPHONE ENCOUNTER
Patient called requesting a referral for ophthalmology/retinal specialist to see Dr. 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       LOV: 12/11/2023  Has this issue been discussed with PCP previously (Yes/No): Yes  Number of visits Requesting: 3  Does patient have an HMO or PPO plan: [employer]    Pt has an appointment scheduled for tomorrow. Pt was referred by Dr. Antwon Londono to see Dr. Oscar Xiong in the same office due to Dr. Londono no longer doing surgeries.    Pt also needs a referral to see Dr. Davy Kaur for pacemaker check and office visit.  Dx: R001 - Bradycardia unspecified     Please review and advise on these 2 referrals. Thank you.

## 2024-02-22 ENCOUNTER — MED REC SCAN ONLY (OUTPATIENT)
Dept: FAMILY MEDICINE CLINIC | Facility: CLINIC | Age: 84
End: 2024-02-22

## 2024-02-22 ENCOUNTER — TELEPHONE (OUTPATIENT)
Dept: FAMILY MEDICINE CLINIC | Facility: CLINIC | Age: 84
End: 2024-02-22

## 2024-02-26 RX ORDER — DILTIAZEM HYDROCHLORIDE 120 MG/1
CAPSULE, COATED, EXTENDED RELEASE ORAL
Qty: 30 CAPSULE | Refills: 0 | Status: SHIPPED | OUTPATIENT
Start: 2024-02-26

## 2024-03-04 RX ORDER — ESCITALOPRAM OXALATE 5 MG/1
5 TABLET ORAL DAILY
Qty: 90 TABLET | Refills: 0 | Status: SHIPPED | OUTPATIENT
Start: 2024-03-04

## 2024-03-05 ENCOUNTER — OFFICE VISIT (OUTPATIENT)
Dept: CARDIOLOGY | Age: 84
End: 2024-03-05

## 2024-03-05 ENCOUNTER — ANCILLARY PROCEDURE (OUTPATIENT)
Dept: CARDIOLOGY | Age: 84
End: 2024-03-05
Attending: NURSE PRACTITIONER

## 2024-03-05 VITALS
OXYGEN SATURATION: 97 % | HEART RATE: 68 BPM | DIASTOLIC BLOOD PRESSURE: 72 MMHG | SYSTOLIC BLOOD PRESSURE: 140 MMHG | BODY MASS INDEX: 24.45 KG/M2 | HEIGHT: 64 IN

## 2024-03-05 VITALS — HEART RATE: 68 BPM | DIASTOLIC BLOOD PRESSURE: 72 MMHG | SYSTOLIC BLOOD PRESSURE: 140 MMHG

## 2024-03-05 DIAGNOSIS — Z45.018 ADJUSTMENT AND MANAGEMENT OF CARDIAC PACEMAKER: ICD-10-CM

## 2024-03-05 DIAGNOSIS — I48.19 PERSISTENT ATRIAL FIBRILLATION (CMD): Primary | ICD-10-CM

## 2024-03-05 LAB
ATRIAL RATE (BPM): 62
MDC_IDC_LEAD_IMPLANT_DT: NORMAL
MDC_IDC_LEAD_IMPLANT_DT: NORMAL
MDC_IDC_LEAD_LOCATION: NORMAL
MDC_IDC_LEAD_LOCATION: NORMAL
MDC_IDC_LEAD_LOCATION_DETAIL_1: NORMAL
MDC_IDC_LEAD_LOCATION_DETAIL_1: NORMAL
MDC_IDC_LEAD_MFG: NORMAL
MDC_IDC_LEAD_MFG: NORMAL
MDC_IDC_LEAD_MODEL: NORMAL
MDC_IDC_LEAD_MODEL: NORMAL
MDC_IDC_LEAD_POLARITY_TYPE: NORMAL
MDC_IDC_LEAD_POLARITY_TYPE: NORMAL
MDC_IDC_LEAD_SERIAL: NORMAL
MDC_IDC_LEAD_SERIAL: NORMAL
MDC_IDC_MSMT_BATTERY_DTM: NORMAL
MDC_IDC_MSMT_BATTERY_REMAINING_LONGEVITY: 96 MO
MDC_IDC_MSMT_BATTERY_REMAINING_PERCENTAGE: 100 %
MDC_IDC_MSMT_BATTERY_STATUS: NORMAL
MDC_IDC_MSMT_LEADCHNL_RA_IMPEDANCE_VALUE: 461 OHM
MDC_IDC_MSMT_LEADCHNL_RA_PACING_THRESHOLD_AMPLITUDE: 0.6 V
MDC_IDC_MSMT_LEADCHNL_RA_PACING_THRESHOLD_PULSEWIDTH: 0.4 MS
MDC_IDC_MSMT_LEADCHNL_RV_IMPEDANCE_VALUE: 613 OHM
MDC_IDC_MSMT_LEADCHNL_RV_PACING_THRESHOLD_AMPLITUDE: 1 V
MDC_IDC_MSMT_LEADCHNL_RV_PACING_THRESHOLD_PULSEWIDTH: 0.4 MS
MDC_IDC_PG_IMPLANT_DTM: NORMAL
MDC_IDC_PG_MFG: NORMAL
MDC_IDC_PG_MODEL: NORMAL
MDC_IDC_PG_SERIAL: NORMAL
MDC_IDC_PG_TYPE: NORMAL
MDC_IDC_SESS_CLINIC_NAME: NORMAL
MDC_IDC_SESS_DTM: NORMAL
MDC_IDC_SESS_TYPE: NORMAL
MDC_IDC_SET_BRADY_AT_MODE_SWITCH_MODE: NORMAL
MDC_IDC_SET_BRADY_AT_MODE_SWITCH_RATE: 170 {BEATS}/MIN
MDC_IDC_SET_BRADY_LOWRATE: 60 {BEATS}/MIN
MDC_IDC_SET_BRADY_MAX_SENSOR_RATE: 130 {BEATS}/MIN
MDC_IDC_SET_BRADY_MAX_TRACKING_RATE: 130 {BEATS}/MIN
MDC_IDC_SET_BRADY_MODE: NORMAL
MDC_IDC_SET_BRADY_PAV_DELAY_HIGH: 150 MS
MDC_IDC_SET_BRADY_PAV_DELAY_LOW: 300 MS
MDC_IDC_SET_BRADY_SAV_DELAY_HIGH: 150 MS
MDC_IDC_SET_BRADY_SAV_DELAY_LOW: 300 MS
MDC_IDC_SET_LEADCHNL_RA_PACING_AMPLITUDE: 2 V
MDC_IDC_SET_LEADCHNL_RA_PACING_CAPTURE_MODE: NORMAL
MDC_IDC_SET_LEADCHNL_RA_PACING_POLARITY: NORMAL
MDC_IDC_SET_LEADCHNL_RA_PACING_PULSEWIDTH: 0.4 MS
MDC_IDC_SET_LEADCHNL_RA_SENSING_ADAPTATION_MODE: NORMAL
MDC_IDC_SET_LEADCHNL_RA_SENSING_POLARITY: NORMAL
MDC_IDC_SET_LEADCHNL_RA_SENSING_SENSITIVITY: 0.25 MV
MDC_IDC_SET_LEADCHNL_RV_PACING_AMPLITUDE: 1.5 V
MDC_IDC_SET_LEADCHNL_RV_PACING_CAPTURE_MODE: NORMAL
MDC_IDC_SET_LEADCHNL_RV_PACING_POLARITY: NORMAL
MDC_IDC_SET_LEADCHNL_RV_PACING_PULSEWIDTH: 0.4 MS
MDC_IDC_SET_LEADCHNL_RV_SENSING_ADAPTATION_MODE: NORMAL
MDC_IDC_SET_LEADCHNL_RV_SENSING_POLARITY: NORMAL
MDC_IDC_SET_LEADCHNL_RV_SENSING_SENSITIVITY: 0.6 MV
MDC_IDC_SET_ZONE_DETECTION_INTERVAL: 375 MS
MDC_IDC_SET_ZONE_TYPE: NORMAL
MDC_IDC_SET_ZONE_VENDOR_TYPE: NORMAL
MDC_IDC_STAT_AT_BURDEN_PERCENT: 27 %
MDC_IDC_STAT_AT_DTM_END: NORMAL
MDC_IDC_STAT_AT_DTM_START: NORMAL
MDC_IDC_STAT_BRADY_DTM_END: NORMAL
MDC_IDC_STAT_BRADY_DTM_START: NORMAL
MDC_IDC_STAT_BRADY_RA_PERCENT_PACED: 26 %
MDC_IDC_STAT_BRADY_RV_PERCENT_PACED: 15 %
MDC_IDC_STAT_EPISODE_RECENT_COUNT: 0
MDC_IDC_STAT_EPISODE_RECENT_COUNT: 0
MDC_IDC_STAT_EPISODE_RECENT_COUNT: 1
MDC_IDC_STAT_EPISODE_RECENT_COUNT: 17
MDC_IDC_STAT_EPISODE_RECENT_COUNT: 4
MDC_IDC_STAT_EPISODE_RECENT_COUNT_DTM_END: NORMAL
MDC_IDC_STAT_EPISODE_RECENT_COUNT_DTM_START: NORMAL
MDC_IDC_STAT_EPISODE_TYPE: NORMAL
MDC_IDC_STAT_EPISODE_VENDOR_TYPE: NORMAL
QRS-INTERVAL (MSEC): 176
QT-INTERVAL (MSEC): 490
QTC: 498
R AXIS (DEGREES): -61
REPORT TEXT: NORMAL
T AXIS (DEGREES): 100
VENTRICULAR RATE EKG/MIN (BPM): 62

## 2024-03-05 PROCEDURE — 93280 PM DEVICE PROGR EVAL DUAL: CPT | Performed by: INTERNAL MEDICINE

## 2024-03-07 RX ORDER — AMIODARONE HYDROCHLORIDE 200 MG/1
200 TABLET ORAL DAILY
Qty: 90 TABLET | Refills: 1 | Status: SHIPPED | OUTPATIENT
Start: 2024-03-07

## 2024-03-07 RX ORDER — METOPROLOL SUCCINATE 100 MG/1
100 TABLET, EXTENDED RELEASE ORAL 2 TIMES DAILY
Qty: 180 TABLET | Refills: 1 | Status: SHIPPED | OUTPATIENT
Start: 2024-03-07

## 2024-03-07 RX ORDER — DILTIAZEM HYDROCHLORIDE 120 MG/1
120 CAPSULE, COATED, EXTENDED RELEASE ORAL DAILY
Qty: 90 CAPSULE | Refills: 1 | Status: SHIPPED | OUTPATIENT
Start: 2024-03-07

## 2024-03-16 NOTE — PROGRESS NOTES
[facility] Progress Note      Patient Name: Ciarra Salcido   YOB: 1940  Medical Record Number: [id number]  Attending Physician: Vinnie Villanueva M.D.      The 21st Century Cures Act makes medical notes like these available to patients in the interest of transparency. Please be advised this is a medical document. Medical documents are intended to carry relevant information, facts as evident, and the clinical opinion of the practitioner. The medical note is intended as peer to peer communication and may appear blunt or direct. It is written in medical language and may contain abbreviations or verbiage that are unfamiliar.     Date of Visit: 3/19/2024     Chief Complaint  Right lower extremity DVT and bilateral pulmonary emboli - follow up.    History of Present Illness  Ciarra Salcido is a 83 year old female who presented to the ED on 01/25/2023 with complaints of shortness of breath. She was initially treated for CHF. Echocardiogram showed increased PA pressures. When patient did not improve as expected CTA chest was performed on 01/28/2023 which showed bilateral pulmonary emboli. Bilateral lower extremity Doppler ultrasound on the same day showed acute thrombus involving the right posterior tibial veins. She was started on IV heparin. She was switched to apixaban prior to discharge.     In 09/2023 patient had a GILLES at [facility] which showed a thrombus in the appendage. Repeat GILLES in 11/2023 showed no thrombus. Her anticoagulation was not altered.     Patient remains on apixaban. She denies any bleeding. She denies any falls.     Past Medical History   Past Medical History:   Diagnosis Date    (HFpEF) heart failure with preserved ejection fraction (HCC)     Cataract     CKD (chronic kidney disease)     Congestive heart disease (HCC)     Diabetes (HCC)     Disorder of thyroid     DM2 (diabetes mellitus, type 2) (HCC)     Hearing impairment     High blood pressure     HTN  (hypertension)     Hyperlipidemia     Hypothyroidism     Pulmonary embolism (HCC)     Shingles 2020    Visual impairment      Past Surgical History   Past Surgical History:   Procedure Laterality Date    CATARACT      EYE SURGERY      HYSTERECTOMY      [facility]     Family History   Family History   Problem Relation Age of Onset    Other (Other) Mother         Old Age    Other (Other) Father         Old age     Social History   Social History     Socioeconomic History    Marital status: Single   Tobacco Use    Smoking status: Former     Packs/day: 1.00     Years: 50.00     Additional pack years: 0.00     Total pack years: 50.00     Types: Cigarettes     Quit date:      Years since quittin.2    Smokeless tobacco: Never   Vaping Use    Vaping Use: Never used   Substance and Sexual Activity    Alcohol use: Never    Drug use: Never   Other Topics Concern    Caffeine Concern Yes     Comment: 2 cups of coffee daily     Exercise Yes     Comment: PT 2 X Weekly, OT 2 x weekly    Seat Belt Yes     Current Medications   apixaban 5 MG Oral Tab take 1 tab (5mg) by mouth twice daily 60 tablet 0    escitalopram 5 MG Oral Tab TAKE 1 TABLET EVERY DAY 90 tablet 0    glimepiride 4 MG Oral Tab TAKE 1 TABLET EVERY DAY 90 tablet 1    simvastatin 20 MG Oral Tab TAKE 1 TABLET EVERY NIGHT 90 tablet 1    GABAPENTIN 100 MG Oral Cap TAKE 1 CAPSULE THREE TIMES DAILY 270 capsule 3    amiodarone 200 MG Oral Tab Take 1 tablet (200 mg total) by mouth daily.      levothyroxine 75 MCG Oral Tab Take 1 tablet (75 mcg total) by mouth before breakfast. 90 tablet 3    losartan 25 MG Oral Tab Take 1 tablet (25 mg total) by mouth daily. 30 tablet 3    furosemide 20 MG Oral Tab Take 2 tablets (40 mg total) by mouth daily. TAKE 1 TABLET DAILY. MAY TAKE ADDITIONAL TAB DAILY AS NEEDED FOR EDEMA - WHEN DIRECTED BY MD (EXTRA TABLETS INCLUDED)      allopurinol 100 MG Oral Tab Take 1 tablet (100 mg total) by mouth daily. 30 tablet 11     dilTIAZem  MG Oral Capsule SR 24 Hr Take 1 capsule (120 mg total) by mouth daily.      metoprolol succinate  MG Oral Tablet 24 Hr Take 1 tablet (100 mg total) by mouth 2x Daily(Beta Blocker). 60 tablet 3    Cholecalciferol (VITAMIN D3) 25 MCG (1000 UT) Oral Cap Take 1 tablet by mouth daily.      ACCU-CHEK JASWANT PLUS In Vitro Strip TEST BLOOD SUGAR TWICE DAILY AS DIRECTED 200 strip 1    acetaminophen 500 MG Oral Tab Take 2 tablets (1,000 mg total) by mouth every 6 (six) hours as needed for Pain or Fever.       Allergies   Ms. Salcido has No Known Allergies.    Vital Signs   BP (!) 184/79 (BP Location: Right arm, Patient Position: Sitting, Cuff Size: large)   Pulse 68   Temp 96.9 °F (36.1 °C) (Temporal)   Resp 16   Wt 62.5 kg (137 lb 12.8 oz)   SpO2 91%   BMI 24.41 kg/m²     Physical Examination   Constitutional Well developed, well nourished. Appears close to chronological age. No apparent distress.   Head  Normocephalic and atraumatic.  Eyes  Conjunctiva clear; sclera anicteric.  ENMT  External nose normal; external ears normal.  Neck  No JVD.   Respiratory Normal effort; no respiratory distress.  Extremities No lower extremity edema.   Neurologic Motor and sensory grossly intact.  Psychiatric Mood and affect appropriate.    Laboratory   No results found for this or any previous visit (from the past 48 hour(s)).    Cardiology  Patient: Ciarra Salcido   Study Date/Time:            Sep 5 2023 8:52AM  MRN:     [id number]       FIN#:                       [id number]  :     1940     Ht/Wt:                      162.6cm 63.5kg  Age:     83             BSA/BMI:                    1.7m^2 24kg/m^2  Gender:  F              Baseline BP:  Ordering Physician:   Davy Kaur MD    Referring Physician:  MD Davy Vee MD    Attending Physician:  Davy Kaur MD  Performing Physician: Ronnie Zambrano DO  Diagnostic Physician: Ronnie Zambrano DO  Sonographer:          KERRI Kaye,  RCCS    ------------------------------------------------------------------------------  INDICATIONS:   Atrial fibrillation.    ------------------------------------------------------------------------------  STUDY CONCLUSIONS  SUMMARY:    1. Left ventricle: Systolic function is normal. The ejection fraction was     measured by 3D assessment. The ejection fraction is 55%.  2. Aortic valve: There is mild regurgitation.  3. Left atrium: There is a thrombus in the appendage.  4. Right ventricle: The cavity size is normal.    ------------------------------------------------------------------------------  STUDY DATA:  [place]  Procedure:   Surface ECG leads, blood pressure  measurements, and pulse oximetric signals were monitored.  Time out performed.  Sedation. was administered by anesthesiology. Sedation commenced at 09/05/2023  08:54 AM and concluded at 09/05/2023 09:00 AM. A transesophageal  echocardiogram was performed. A transesophageal probe was inserted by the  attending cardiologist without difficulty. 3D post processing was performed at  an independent workstation. Intravenous contrast (Definity 2ml) was  administered to assess the left atrial appendage. The transesophageal probe  was removed. Specimens removed: N/A. Estimated blood loss: 0 cc. Grafts or  implants placed: N/A.  Complete 2D, complete spectral Doppler, and color  Doppler.  Study status:  Routine.  Consent:  The risks, benefits, and  alternatives to the procedure were explained. Consent was obtained.  Study  completion:  The patient tolerated the procedure well. There were no  complications.    FINDINGS    LEFT VENTRICLE:  Systolic function is normal.    The ejection fraction was  measured by 3D assessment. The ejection fraction is 55%.    AORTIC VALVE:   There is no stenosis.   There is mild regurgitation.    MITRAL VALVE:  There is mild regurgitation.    LEFT ATRIUM:  There is a thrombus in the appendage.    RIGHT VENTRICLE:  The cavity size  is normal.    PULMONIC VALVE:   There is mild regurgitation.    TRICUSPID VALVE:  There is mild regurgitation.    RIGHT ATRIUM:  The atrium is normal in size.    PERICARDIUM:   There is no pericardial effusion.    ------------------------------------------------------------------------------  Measurements     Left ventricle     Value   Ref   EF             (N) 55    % 54 - 74  Legend:  (L)  and  (H)  rafael values outside specified reference range.    (N)  marks values inside specified reference range.    Prepared and electronically signed by  Ronnie Zambrano DO  2023 13:57  Procedure Note    Ronnie Zambrano DO - 2023  Formatting of this note might be different from the original.    *[facility]*  [address] [place] [address] [id number] [phone]  Transesophageal Echocardiogram (GILLES)    Patient: Ciarra Salcido   Study Date/Time:            Sep 5 2023 8:52AM  MRN:     [id number]       FIN#:                       [id number]  :     1940     Ht/Wt:                      162.6cm 63.5kg  Age:     83             BSA/BMI:                    1.7m^2 24kg/m^2  Gender:  F              Baseline BP:  Ordering Physician:   aDvy Kaur MD    Referring Physician:  MD Davy Vee MD    Attending Physician:  Davy Kaur MD  Performing Physician: Ronnie Zambrano DO  Diagnostic Physician: Ronnie Zambrano DO  Sonographer:          KERRI Kaye, [facility]    ------------------------------------------------------------------------------  INDICATIONS:   Atrial fibrillation.    ------------------------------------------------------------------------------  STUDY CONCLUSIONS  SUMMARY:    1. Left ventricle: Systolic function is normal. The ejection fraction was     measured by 3D assessment. The ejection fraction is 55%.  2. Aortic valve: There is mild regurgitation.  3. Left atrium: There is a thrombus in the appendage.  4. Right ventricle: The cavity size is  normal.    ------------------------------------------------------------------------------  [facility]  Procedure:   Surface ECG leads, blood pressure  measurements, and pulse oximetric signals were monitored.  Time out performed.  Sedation. was administered by anesthesiology. Sedation commenced at 09/05/2023  08:54 AM and concluded at 09/05/2023 09:00 AM. A transesophageal  echocardiogram was performed. A transesophageal probe was inserted by the  attending cardiologist without difficulty. 3D post processing was performed at  an independent workstation. Intravenous contrast (Definity 2ml) was  administered to assess the left atrial appendage. The transesophageal probe  was removed. Specimens removed: N/A. Estimated blood loss: 0 cc. Grafts or  implants placed: N/A.  Complete 2D, complete spectral Doppler, and color  Doppler.  Study status:  Routine.  Consent:  The risks, benefits, and  alternatives to the procedure were explained. Consent was obtained.  Study  completion:  The patient tolerated the procedure well. There were no  complications.    FINDINGS    LEFT VENTRICLE:  Systolic function is normal.    The ejection fraction was  measured by 3D assessment. The ejection fraction is 55%.    AORTIC VALVE:   There is no stenosis.   There is mild regurgitation.    MITRAL VALVE:  There is mild regurgitation.    LEFT ATRIUM:  There is a thrombus in the appendage.    RIGHT VENTRICLE:  The cavity size is normal.    PULMONIC VALVE:   There is mild regurgitation.    TRICUSPID VALVE:  There is mild regurgitation.    RIGHT ATRIUM:  The atrium is normal in size.    PERICARDIUM:   There is no pericardial effusion.    ------------------------------------------------------------------------------  Measurements     Left ventricle     Value   Ref   EF             (N) 55    % 54 - 74  Legend:  (L)  and  (H)  rafael values outside specified reference range.    (N)  marks values inside specified reference  range.    Prepared and electronically signed by  Ronnie Zambrano DO  2023 13:57  Specimen Collected: 23 08:52    Performed by: [facility] RADIOLOGY Last Resulted: 23 13:57   Received From: [facility]  Result Received: 23 10:26     Patient: Ciarra Salcido   Study Date/Time:          Nov 10 2023 7:56AM  MRN:     [id number]       FIN#:                     [id number]  :     1940     Ht/Wt:                    162.6cm 63.5kg  Age:     83             BSA/BMI:                  1.7m^2 24kg/m^2  Gender:  F              Baseline BP:              154 / 111  Ordering Physician:   Davy Kaur MD    Referring Physician:  Davy Kaur MD    Attending Physician:  Davy Kaur MD  Performing Physician: Ronnie Zambrano DO  Diagnostic Physician: Ronnie Zambrano DO  Sonographer:          Carrie Bender RDCS    ------------------------------------------------------------------------------  INDICATIONS:   Atrial fibrillation.    ------------------------------------------------------------------------------  STUDY CONCLUSIONS  SUMMARY:    1. Left ventricle: Systolic function is normal. The ejection fraction was     measured by 3D assessment. The ejection fraction is 60%.  2. Left atrium: The atrium is dilated. There is no evidence of a thrombus in     the atrial cavity or appendage revealed by acoustic contrast opacification.     There is spontaneous echo contrast (\"smoke\").  3. Right ventricle: The cavity size is normal. Pacemaker lead noted in right     ventricle.  4. Right atrium: Pacemaker lead noted in right atrium.  5. Aortic valve: There is mild to moderate regurgitation.  6. Mitral valve: There is moderate regurgitation.  7. Tricuspid valve: There is moderate regurgitation.    ------------------------------------------------------------------------------  STUDY DATA:  [place]  Procedure:   Surface ECG leads, blood pressure  measurements, and pulse oximetric signals were  monitored.  Time out performed.  Sedation. Moderate sedation was administered by the performing cardiologist.  Sedation commenced at 11/10/2023 08:17 AM and concluded at 11/10/2023 08:37  AM. A transesophageal echocardiogram was performed. A transesophageal probe  was inserted by the attending cardiologist. 3D post processing was performed  at an independent workstation. Image quality was good. Intravenous contrast  (Definity 2ml) was administered to assess the left atrial appendage. The  transesophageal probe was removed. Specimens removed: N/A. Estimated blood  loss: 0 cc. Grafts or implants placed: N/A.  Complete 2D, complete spectral  Doppler, and color Doppler.  Study status:  Routine.  Consent:  The risks,  benefits, and alternatives to the procedure were explained. Consent was  obtained.  Study completion:  The patient tolerated the procedure well. There  were no complications.  Administered medications:   Midazolam , 2 mg , IV.  Fentanyl , 25 mcg , IV.    FINDINGS    LEFT VENTRICLE:  Systolic function is normal.    The ejection fraction was  measured by 3D assessment. The ejection fraction is 60%.    AORTIC VALVE:   There is no stenosis.   There is mild to moderate  regurgitation.    MITRAL VALVE:  There is moderate regurgitation.    ATRIAL SEPTUM:   Color doppler shows no obvious shunt.    LEFT ATRIUM:  The atrium is dilated.  There is no evidence of a thrombus in  the atrial cavity or appendage revealed by acoustic contrast opacification.  There is spontaneous echo contrast (\"smoke\").    PULMONARY VEINS:  Left upper pulmonary vein:    The vein is normal-sized.  Right upper pulmonary vein:    The vein is normal-sized.  Left upper pulmonary vein: The vein is normal-sized.  Right upper pulmonary vein: The vein is normal-sized.    RIGHT VENTRICLE:  The cavity size is normal. Pacemaker lead noted in right  ventricle.    PULMONIC VALVE:   There is mild regurgitation.    TRICUSPID VALVE:  There is moderate  regurgitation.    RIGHT ATRIUM:  The atrium is normal in size. Pacemaker lead noted in right  atrium.    PERICARDIUM:   There is no pericardial effusion.    ------------------------------------------------------------------------------  Measurements     Left ventricle     Value      Ref     2023  Aortic valve  Value        Ref  2023   EF             (N) 60    %    54 - 74 55          AR ED v       3.96  m/s    ---- ----------                                                     AR decel      255   cm/s^2 ---- ----------   LVOT               Value      Ref     2023  AR PHT        456   ms     ---- ----------   Diam, S            2.2   cm   ------- ----------  AR ED grad    63    mm Hg  ---- ----------   Area               3.8   cm^2 ------- ----------    Legend:  (L)  and  (H)  rafael values outside specified reference range.    (N)  marks values inside specified reference range.    Prepared and electronically signed by  Ronnie Zambrano DO  11/10/2023 13:30  Procedure Note    Ronnie Zmabrano DO - 11/10/2023  Formatting of this note might be different from the original.    *[facility]*  [address] [place] [id number] [phone]  Transesophageal Echocardiogram (IGLLES)    Patient: Ciarra Salcido   Study Date/Time:          Nov 10 2023 7:56AM  MRN:     [id number]       FIN#:                     [id number]  :     1940     Ht/Wt:                    162.6cm 63.5kg  Age:     83             BSA/BMI:                  1.7m^2 24kg/m^2  Gender:  F              Baseline BP:              154 / 111  Ordering Physician:   Davy Kaur MD    Referring Physician:  Davy Kaur MD    Attending Physician:  Davy Kaur MD  Performing Physician: Ronnie Zambrano DO  Diagnostic Physician: Ronnie Zambrano DO  Sonographer:          Carrie Bender RDCS    ------------------------------------------------------------------------------  INDICATIONS:   Atrial  fibrillation.    ------------------------------------------------------------------------------  STUDY CONCLUSIONS  SUMMARY:    1. Left ventricle: Systolic function is normal. The ejection fraction was     measured by 3D assessment. The ejection fraction is 60%.  2. Left atrium: The atrium is dilated. There is no evidence of a thrombus in     the atrial cavity or appendage revealed by acoustic contrast opacification.     There is spontaneous echo contrast (\"smoke\").  3. Right ventricle: The cavity size is normal. Pacemaker lead noted in right     ventricle.  4. Right atrium: Pacemaker lead noted in right atrium.  5. Aortic valve: There is mild to moderate regurgitation.  6. Mitral valve: There is moderate regurgitation.  7. Tricuspid valve: There is moderate regurgitation.    ------------------------------------------------------------------------------  STUDY DATA:  [place]  Procedure:   Surface ECG leads, blood pressure  measurements, and pulse oximetric signals were monitored.  Time out performed.  Sedation. Moderate sedation was administered by the performing cardiologist.  Sedation commenced at 11/10/2023 08:17 AM and concluded at 11/10/2023 08:37  AM. A transesophageal echocardiogram was performed. A transesophageal probe  was inserted by the attending cardiologist. 3D post processing was performed  at an independent workstation. Image quality was good. Intravenous contrast  (Definity 2ml) was administered to assess the left atrial appendage. The  transesophageal probe was removed. Specimens removed: N/A. Estimated blood  loss: 0 cc. Grafts or implants placed: N/A.  Complete 2D, complete spectral  Doppler, and color Doppler.  Study status:  Routine.  Consent:  The risks,  benefits, and alternatives to the procedure were explained. Consent was  obtained.  Study completion:  The patient tolerated the procedure well. There  were no complications.  Administered medications:   Midazolam , 2 mg , IV.  Fentanyl  , 25 mcg , IV.    FINDINGS    LEFT VENTRICLE:  Systolic function is normal.    The ejection fraction was  measured by 3D assessment. The ejection fraction is 60%.    AORTIC VALVE:   There is no stenosis.   There is mild to moderate  regurgitation.    MITRAL VALVE:  There is moderate regurgitation.    ATRIAL SEPTUM:   Color doppler shows no obvious shunt.    LEFT ATRIUM:  The atrium is dilated.  There is no evidence of a thrombus in  the atrial cavity or appendage revealed by acoustic contrast opacification.  There is spontaneous echo contrast (\"smoke\").    PULMONARY VEINS:  Left upper pulmonary vein:    The vein is normal-sized.  Right upper pulmonary vein:    The vein is normal-sized.  Left upper pulmonary vein: The vein is normal-sized.  Right upper pulmonary vein: The vein is normal-sized.    RIGHT VENTRICLE:  The cavity size is normal. Pacemaker lead noted in right  ventricle.    PULMONIC VALVE:   There is mild regurgitation.    TRICUSPID VALVE:  There is moderate regurgitation.    RIGHT ATRIUM:  The atrium is normal in size. Pacemaker lead noted in right  atrium.    PERICARDIUM:   There is no pericardial effusion.    ------------------------------------------------------------------------------  Measurements     Left ventricle     Value      Ref     09/05/2023  Aortic valve  Value        Ref  09/05/2023   EF             (N) 60    %    54 - 74 55          AR ED v       3.96  m/s    ---- ----------                                                     AR decel      255   cm/s^2 ---- ----------   LVOT               Value      Ref     09/05/2023  AR PHT        456   ms     ---- ----------   Diam, S            2.2   cm   ------- ----------  AR ED grad    63    mm Hg  ---- ----------   Area               3.8   cm^2 ------- ----------    Legend:  (L)  and  (H)  rafael values outside specified reference range.    (N)  marks values inside specified reference range.    Prepared and electronically signed by  Ronnie Zambrano,  DO  11/10/2023 13:30  Specimen Collected: 11/10/23 07:56    Performed by: [facility] RADIOLOGY Last Resulted: 11/10/23 13:30   [facility]  Result Received: 11/30/23 08:44     Impression and Plan   Patient with history of DVT and PE and intracardiac thrombus. Continue apixaban without modification.     Planned Follow Up   Patient will return for follow up in 1 year and PRN.    Electronically signed by:    Vinnie Villanueva M.D.  System Medical Director of Oncology Services  [facility]

## 2024-03-19 ENCOUNTER — OFFICE VISIT (OUTPATIENT)
Dept: HEMATOLOGY/ONCOLOGY | Facility: HOSPITAL | Age: 84
End: 2024-03-19
Attending: SPECIALIST
Payer: MEDICARE

## 2024-03-19 VITALS
DIASTOLIC BLOOD PRESSURE: 79 MMHG | WEIGHT: 137.81 LBS | RESPIRATION RATE: 16 BRPM | OXYGEN SATURATION: 91 % | HEART RATE: 68 BPM | TEMPERATURE: 97 F | BODY MASS INDEX: 24 KG/M2 | SYSTOLIC BLOOD PRESSURE: 184 MMHG

## 2024-03-19 DIAGNOSIS — I51.3 MURAL THROMBUS OF CARDIAC APEX: ICD-10-CM

## 2024-03-19 DIAGNOSIS — Z86.718 HISTORY OF DVT (DEEP VEIN THROMBOSIS): Primary | ICD-10-CM

## 2024-03-19 DIAGNOSIS — Z86.711 HISTORY OF PULMONARY EMBOLISM: ICD-10-CM

## 2024-03-19 PROCEDURE — 99213 OFFICE O/P EST LOW 20 MIN: CPT | Performed by: SPECIALIST

## 2024-03-19 NOTE — PROGRESS NOTES
Patient is here for MD f/u for DVT and PE. Patient continues on Eliquis BID. Tolerating well. Denies any bleeding. Daughter states patient is now at an assisted living. Doing more activities with her walker. No concerns at present time.       Education Record    Learner:  Patient    Disease / Diagnosis:  DVT/PE     Barriers / Limitations:  None   Comments:    Method:  Discussion   Comments:    General Topics:  Plan of care reviewed   Comments:    Outcome:  Shows understanding   Comments:

## 2024-04-11 ENCOUNTER — TELEPHONE (OUTPATIENT)
Dept: FAMILY MEDICINE CLINIC | Facility: CLINIC | Age: 84
End: 2024-04-11

## 2024-04-11 ENCOUNTER — OFFICE VISIT (OUTPATIENT)
Dept: FAMILY MEDICINE CLINIC | Facility: CLINIC | Age: 84
End: 2024-04-11
Payer: MEDICARE

## 2024-04-11 ENCOUNTER — PATIENT MESSAGE (OUTPATIENT)
Dept: FAMILY MEDICINE CLINIC | Facility: CLINIC | Age: 84
End: 2024-04-11

## 2024-04-11 VITALS
WEIGHT: 142.19 LBS | BODY MASS INDEX: 25.2 KG/M2 | RESPIRATION RATE: 18 BRPM | HEART RATE: 60 BPM | OXYGEN SATURATION: 96 % | SYSTOLIC BLOOD PRESSURE: 138 MMHG | HEIGHT: 63 IN | DIASTOLIC BLOOD PRESSURE: 86 MMHG | TEMPERATURE: 98 F

## 2024-04-11 DIAGNOSIS — I48.91 ATRIAL FIBRILLATION WITH RAPID VENTRICULAR RESPONSE (HCC): ICD-10-CM

## 2024-04-11 DIAGNOSIS — J41.0 SMOKERS' COUGH (HCC): Chronic | ICD-10-CM

## 2024-04-11 DIAGNOSIS — E11.42 DIABETIC POLYNEUROPATHY ASSOCIATED WITH TYPE 2 DIABETES MELLITUS (HCC): ICD-10-CM

## 2024-04-11 DIAGNOSIS — M10.9 GOUT, UNSPECIFIED CAUSE, UNSPECIFIED CHRONICITY, UNSPECIFIED SITE: ICD-10-CM

## 2024-04-11 DIAGNOSIS — I26.94 MULTIPLE SUBSEGMENTAL PULMONARY EMBOLI WITHOUT ACUTE COR PULMONALE (HCC): ICD-10-CM

## 2024-04-11 DIAGNOSIS — I10 PRIMARY HYPERTENSION: ICD-10-CM

## 2024-04-11 DIAGNOSIS — I50.32 CHRONIC HEART FAILURE WITH PRESERVED EJECTION FRACTION (HCC): ICD-10-CM

## 2024-04-11 DIAGNOSIS — E78.2 MIXED HYPERLIPIDEMIA: ICD-10-CM

## 2024-04-11 DIAGNOSIS — F32.1 CURRENT MODERATE EPISODE OF MAJOR DEPRESSIVE DISORDER WITHOUT PRIOR EPISODE (HCC): ICD-10-CM

## 2024-04-11 DIAGNOSIS — Z00.00 ENCOUNTER FOR ANNUAL HEALTH EXAMINATION: Primary | ICD-10-CM

## 2024-04-11 DIAGNOSIS — N18.31 TYPE 2 DIABETES MELLITUS WITH STAGE 3A CHRONIC KIDNEY DISEASE, WITHOUT LONG-TERM CURRENT USE OF INSULIN (HCC): ICD-10-CM

## 2024-04-11 DIAGNOSIS — E03.9 ACQUIRED HYPOTHYROIDISM: ICD-10-CM

## 2024-04-11 DIAGNOSIS — I50.9 ACUTE ON CHRONIC CONGESTIVE HEART FAILURE, UNSPECIFIED HEART FAILURE TYPE (HCC): ICD-10-CM

## 2024-04-11 DIAGNOSIS — Z79.01 ANTICOAGULANT LONG-TERM USE: ICD-10-CM

## 2024-04-11 DIAGNOSIS — I82.5Y9 CHRONIC DEEP VEIN THROMBOSIS (DVT) OF PROXIMAL VEIN OF LOWER EXTREMITY, UNSPECIFIED LATERALITY (HCC): ICD-10-CM

## 2024-04-11 DIAGNOSIS — E11.22 TYPE 2 DIABETES MELLITUS WITH STAGE 3A CHRONIC KIDNEY DISEASE, WITHOUT LONG-TERM CURRENT USE OF INSULIN (HCC): ICD-10-CM

## 2024-04-11 DIAGNOSIS — M85.88 OSTEOPENIA OF OTHER SITE: ICD-10-CM

## 2024-04-11 DIAGNOSIS — N18.4 CHRONIC RENAL DISEASE, STAGE IV (HCC): ICD-10-CM

## 2024-04-11 PROBLEM — I82.431 ACUTE DEEP VEIN THROMBOSIS (DVT) OF POPLITEAL VEIN OF RIGHT LOWER EXTREMITY (HCC): Status: RESOLVED | Noted: 2023-01-31 | Resolved: 2024-01-01

## 2024-04-11 PROBLEM — B34.9 VIRAL INFECTION: Status: RESOLVED | Noted: 2023-09-10 | Resolved: 2024-04-11

## 2024-04-11 PROBLEM — J96.01 ACUTE RESPIRATORY FAILURE WITH HYPOXIA (HCC): Status: RESOLVED | Noted: 2023-11-13 | Resolved: 2024-01-01

## 2024-04-11 PROBLEM — J96.01 ACUTE RESPIRATORY FAILURE WITH HYPOXIA (HCC): Status: RESOLVED | Noted: 2023-11-13 | Resolved: 2024-04-11

## 2024-04-11 PROBLEM — S00.03XA CONTUSION OF SCALP, INITIAL ENCOUNTER: Status: RESOLVED | Noted: 2023-09-10 | Resolved: 2024-04-11

## 2024-04-11 PROBLEM — U07.1 COVID-19: Status: RESOLVED | Noted: 2023-09-11 | Resolved: 2024-04-11
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
## 2024-04-11 PROBLEM — R00.1 BRADYCARDIA: Status: RESOLVED | Noted: 2023-04-27 | Resolved: 2024-04-11

## 2024-04-11 PROBLEM — R00.1 SYMPTOMATIC BRADYCARDIA: Status: RESOLVED | Noted: 2021-05-03 | Resolved: 2024-01-01

## 2024-04-11 PROBLEM — J81.0 ACUTE PULMONARY EDEMA (HCC): Status: RESOLVED | Noted: 2023-01-25 | Resolved: 2024-04-11

## 2024-04-11 PROBLEM — S00.03XA CONTUSION OF SCALP, INITIAL ENCOUNTER: Status: RESOLVED | Noted: 2023-09-10 | Resolved: 2024-01-01

## 2024-04-11 PROBLEM — N18.30 CKD (CHRONIC KIDNEY DISEASE) STAGE 3, GFR 30-59 ML/MIN (HCC): Chronic | Status: RESOLVED | Noted: 2023-10-16 | Resolved: 2024-01-01

## 2024-04-11 PROBLEM — R00.1 SYMPTOMATIC BRADYCARDIA: Status: RESOLVED | Noted: 2021-05-03 | Resolved: 2024-04-11

## 2024-04-11 PROBLEM — J90 PLEURAL EFFUSION: Status: RESOLVED | Noted: 2023-01-31 | Resolved: 2024-04-11

## 2024-04-11 PROBLEM — I82.431 ACUTE DEEP VEIN THROMBOSIS (DVT) OF POPLITEAL VEIN OF RIGHT LOWER EXTREMITY (HCC): Status: RESOLVED | Noted: 2023-01-31 | Resolved: 2024-04-11

## 2024-04-11 PROBLEM — N30.00 ACUTE CYSTITIS WITHOUT HEMATURIA: Status: RESOLVED | Noted: 2023-11-14 | Resolved: 2024-04-11

## 2024-04-11 PROBLEM — J90 PLEURAL EFFUSION: Status: RESOLVED | Noted: 2023-01-31 | Resolved: 2024-01-01
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
## 2024-04-11 PROBLEM — N18.30 CKD (CHRONIC KIDNEY DISEASE) STAGE 3, GFR 30-59 ML/MIN (HCC): Chronic | Status: RESOLVED | Noted: 2023-10-16 | Resolved: 2024-04-11

## 2024-04-11 PROBLEM — N30.00 ACUTE CYSTITIS WITHOUT HEMATURIA: Status: RESOLVED | Noted: 2023-11-14 | Resolved: 2024-01-01

## 2024-04-11 PROBLEM — R00.1 BRADYCARDIA: Status: RESOLVED | Noted: 2023-04-27 | Resolved: 2024-01-01

## 2024-04-11 PROBLEM — B34.9 VIRAL INFECTION: Status: RESOLVED | Noted: 2023-09-10 | Resolved: 2024-01-01

## 2024-04-11 PROBLEM — U07.1 COVID-19: Status: RESOLVED | Noted: 2023-09-11 | Resolved: 2024-01-01

## 2024-04-11 PROBLEM — J81.0 ACUTE PULMONARY EDEMA (HCC): Status: RESOLVED | Noted: 2023-01-25 | Resolved: 2024-01-01

## 2024-04-11 LAB
ALBUMIN SERPL-MCNC: 3.4 G/DL (ref 3.4–5)
ALBUMIN/GLOB SERPL: 0.8 {RATIO} (ref 1–2)
ALP LIVER SERPL-CCNC: 67 U/L
ALT SERPL-CCNC: 20 U/L
ANION GAP SERPL CALC-SCNC: 8 MMOL/L (ref 0–18)
AST SERPL-CCNC: 12 U/L (ref 15–37)
BASOPHILS # BLD AUTO: 0.04 X10(3) UL (ref 0–0.2)
BASOPHILS NFR BLD AUTO: 0.6 %
BILIRUB SERPL-MCNC: 0.2 MG/DL (ref 0.1–2)
BUN BLD-MCNC: 72 MG/DL (ref 9–23)
CALCIUM BLD-MCNC: 9.3 MG/DL (ref 8.5–10.1)
CHLORIDE SERPL-SCNC: 103 MMOL/L (ref 98–112)
CHOLEST SERPL-MCNC: 184 MG/DL (ref ?–200)
CO2 SERPL-SCNC: 22 MMOL/L (ref 21–32)
CREAT BLD-MCNC: 2.96 MG/DL
EGFRCR SERPLBLD CKD-EPI 2021: 15 ML/MIN/1.73M2 (ref 60–?)
EOSINOPHIL # BLD AUTO: 0.25 X10(3) UL (ref 0–0.7)
EOSINOPHIL NFR BLD AUTO: 3.8 %
ERYTHROCYTE [DISTWIDTH] IN BLOOD BY AUTOMATED COUNT: 15.7 %
FASTING PATIENT LIPID ANSWER: NO
FASTING STATUS PATIENT QL REPORTED: NO
GLOBULIN PLAS-MCNC: 4.5 G/DL (ref 2.8–4.4)
GLUCOSE BLD-MCNC: 255 MG/DL (ref 70–99)
HCT VFR BLD AUTO: 38.9 %
HDLC SERPL-MCNC: 53 MG/DL (ref 40–59)
HEMOGLOBIN A1C: 9 % (ref 4.3–5.6)
HGB BLD-MCNC: 12.3 G/DL
IMM GRANULOCYTES # BLD AUTO: 0.03 X10(3) UL (ref 0–1)
IMM GRANULOCYTES NFR BLD: 0.5 %
LDLC SERPL CALC-MCNC: 102 MG/DL (ref ?–100)
LYMPHOCYTES # BLD AUTO: 2.08 X10(3) UL (ref 1–4)
LYMPHOCYTES NFR BLD AUTO: 32 %
MCH RBC QN AUTO: 29.2 PG (ref 26–34)
MCHC RBC AUTO-ENTMCNC: 31.6 G/DL (ref 31–37)
MCV RBC AUTO: 92.4 FL
MONOCYTES # BLD AUTO: 0.38 X10(3) UL (ref 0.1–1)
MONOCYTES NFR BLD AUTO: 5.8 %
NEUTROPHILS # BLD AUTO: 3.73 X10 (3) UL (ref 1.5–7.7)
NEUTROPHILS # BLD AUTO: 3.73 X10(3) UL (ref 1.5–7.7)
NEUTROPHILS NFR BLD AUTO: 57.3 %
NONHDLC SERPL-MCNC: 131 MG/DL (ref ?–130)
OSMOLALITY SERPL CALC.SUM OF ELEC: 306 MOSM/KG (ref 275–295)
PLATELET # BLD AUTO: 190 10(3)UL (ref 150–450)
PLATELETS.RETICULATED NFR BLD AUTO: 11.1 % (ref 0–7)
POTASSIUM SERPL-SCNC: 5.5 MMOL/L (ref 3.5–5.1)
PROT SERPL-MCNC: 7.9 G/DL (ref 6.4–8.2)
RBC # BLD AUTO: 4.21 X10(6)UL
SODIUM SERPL-SCNC: 133 MMOL/L (ref 136–145)
TRIGL SERPL-MCNC: 166 MG/DL (ref 30–149)
TSI SER-ACNC: 3.2 MIU/ML (ref 0.36–3.74)
URATE SERPL-MCNC: 5.8 MG/DL
VLDLC SERPL CALC-MCNC: 28 MG/DL (ref 0–30)
WBC # BLD AUTO: 6.5 X10(3) UL (ref 4–11)

## 2024-04-11 PROCEDURE — 80053 COMPREHEN METABOLIC PANEL: CPT | Performed by: FAMILY MEDICINE

## 2024-04-11 PROCEDURE — 84443 ASSAY THYROID STIM HORMONE: CPT | Performed by: FAMILY MEDICINE

## 2024-04-11 PROCEDURE — 84550 ASSAY OF BLOOD/URIC ACID: CPT | Performed by: FAMILY MEDICINE

## 2024-04-11 PROCEDURE — 85025 COMPLETE CBC W/AUTO DIFF WBC: CPT | Performed by: FAMILY MEDICINE

## 2024-04-11 PROCEDURE — 80061 LIPID PANEL: CPT | Performed by: FAMILY MEDICINE

## 2024-04-11 RX ORDER — LANCETS 28 GAUGE
1 EACH MISCELLANEOUS DAILY
Qty: 100 EACH | Refills: 3 | Status: SHIPPED | OUTPATIENT
Start: 2024-04-11 | End: 2025-04-11

## 2024-04-11 RX ORDER — BLOOD-GLUCOSE METER
KIT MISCELLANEOUS
Qty: 100 STRIP | Refills: 0 | Status: SHIPPED | OUTPATIENT
Start: 2024-04-11

## 2024-04-11 RX ORDER — BLOOD-GLUCOSE METER
1 KIT MISCELLANEOUS DAILY
Qty: 1 KIT | Refills: 0 | Status: SHIPPED | OUTPATIENT
Start: 2024-04-11 | End: 2025-04-11

## 2024-04-11 NOTE — PATIENT INSTRUCTIONS
Try to limit carbs, potatoes and sweets in diet a little    More exercise    Try to nap a little less    Start januvia 100mg every day    Continue all other meds    Update me with sugar readings in 3-4 weeks    Followup in 3 months    Ciarra Salcido's SCREENING SCHEDULE   Tests on this list are recommended by your physician but may not be covered, or covered at this frequency, by your insurer.   Please check with your insurance carrier before scheduling to verify coverage.   PREVENTATIVE SERVICES FREQUENCY &  COVERAGE DETAILS LAST COMPLETION DATE   Diabetes Screening    Fasting Blood Sugar /  Glucose    One screening every 12 months if never tested or if previously tested but not diagnosed with pre-diabetes   One screening every 6 months if diagnosed with pre-diabetes Lab Results   Component Value Date     (H) 11/30/2023        Cardiovascular Disease Screening    Lipid Panel  Cholesterol  Lipoprotein (HDL)  Triglycerides Covered every 5 years for all Medicare beneficiaries without apparent signs or symptoms of cardiovascular disease Lab Results   Component Value Date    CHOLEST 141 04/25/2023    HDL 56 04/25/2023    LDL 65 04/25/2023    LDLD 72 08/20/2019    TRIG 114 04/25/2023         Electrocardiogram (EKG)   Covered if needed at Welcome to Medicare, and non-screening if indicated for medical reasons 11/14/2023      Ultrasound Screening for Abdominal Aortic Aneurysm (AAA) Covered once in a lifetime for one of the following risk factors    Men who are 65-75 years old and have ever smoked    Anyone with a family history -     Colorectal Cancer Screening  Covered for ages 50-85; only need ONE of the following:    Colonoscopy   Covered every 10 years    Covered every 2 years if patient is at high risk or previous colonoscopy was abnormal -    No recommendations at this time    Flexible Sigmoidoscopy   Covered every 4 years -    Fecal Occult Blood Test Covered annually -   Bone Density Screening    Bone density  screening    Covered every 2 years after age 65 if diagnosed with risk of osteoporosis or estrogen deficiency.    Covered yearly for long-term glucocorticoid medication use (Steroids) Last Dexa Scan:    XR DEXA BONE DENSITOMETRY (CPT=77080) 12/13/2019      No recommendations at this time   Pap and Pelvic    Pap   Covered every 2 years for women at normal risk; Annually if at high risk -  No recommendations at this time    Chlamydia Annually if high risk -  No recommendations at this time   Screening Mammogram    Mammogram     Recommend annually for all female patients aged 40 and older    One baseline mammogram covered for patients aged 35-39 -    No recommendations at this time    Immunizations    Influenza Covered once per flu season  Please get every year 10/16/2023  No recommendations at this time    Pneumococcal Each vaccine ([id number] & [id number]) covered once after 65 Prevnar 13: 09/28/2015    Xmwewaili75: 09/17/2020     No recommendations at this time    Hepatitis B One screening covered for patients with certain risk factors   -  No recommendations at this time    Tetanus Toxoid Not covered by Medicare Part B unless medically necessary (cut with metal); may be covered with your pharmacy prescription benefits -    Tetanus, Diptheria and Pertusis TD and TDaP Not covered by Medicare Part B -  No recommendations at this time    Zoster Not covered by Medicare Part B; may be covered with your pharmacy  prescription benefits -  No recommendations at this time     Diabetes      Hemoglobin A1C Annually; if last result is elevated, may be asked to retest more frequently.    Medicare covers every 3 months Lab Results   Component Value Date     (H) 09/10/2023    A1C 7.2 (H) 09/10/2023       Hemoglobin A1C Test due on 03/10/2024    Creat/alb ratio Annually Lab Results   Component Value Date    MICROALBCREA  04/25/2023      Comment:      Unable to calculate due to Urine Creatinine <13.00 mg/dL         LDL  Annually Lab Results   Component Value Date    LDL 65 04/25/2023       Dilated Eye Exam Annually [unfilled]     Annual Monitoring of Persistent Medications (ACE/ARB, digoxin diuretics, anticonvulsants)    Potassium Annually Lab Results   Component Value Date    K 4.1 11/30/2023         Creatinine   Annually Lab Results   Component Value Date    CREATSERUM 1.70 (H) 11/30/2023         BUN Annually Lab Results   Component Value Date    BUN 37 (H) 11/30/2023       Drug Serum Conc Annually No results found for: \"DIGOXIN\", \"DIG\", \"VALP\"         Chronic Obstructive Pulmonary Disease (COPD)    Spirometry Annually Spirometry date:

## 2024-04-11 NOTE — PROGRESS NOTES
Subjective:   Ciarra Salcido is a 83 year old female who presents for a MA (Medicare Advantage) Supervisit (Once per calendar year).     1. Encounter for annual health examination  -due for wellness    2. Type 2 diabetes mellitus with stage 3a chronic kidney disease, without long-term current use of insulin (HCC)  3. Diabetic polyneuropathy associated with type 2 diabetes mellitus (HCC)  -sugars worse  -appetite better  -now living in facility in [place] - eating 3 meals per day  -A1c in office 9.0 (from about 6)  -taking meds as prescribed    4. Acute on chronic congestive heart failure, unspecified heart failure type (HCC)  5. Atrial fibrillation with rapid ventricular response (HCC)  6. Anticoagulant long-term use  7. Chronic heart failure with preserved ejection fraction (HCC)  8. Primary hypertension  9. Mixed hyperlipidemia  -doing much better from cardiac perspective  -tolerating meds  -energy levels better  -denies palpitations or chest pain  -continues to see Dr. Fong and Vincent    10. Smokers' cough (formerly [facility])  -stable, will continue to monitor    11. Chronic renal disease, stage IV (HCC)  -stable on last labs - due for repeat    12. Multiple subsegmental pulmonary emboli without acute cor pulmonale (HCC)  13. Chronic deep vein thrombosis (DVT) of proximal vein of lower extremity, unspecified laterality (HCC)  -recently seen by hematology  -plan is to c/w apixaban    14. Current moderate episode of major depressive disorder without prior episode (HCC)  -mood better    15. Acquired hypothyroidism  -due for repeat labs  -tolerating 75mcg    16. Osteopenia of other site  -on vitamins  -will consider DEXA at next appt    17. Gout, unspecified cause, unspecified chronicity, unspecified site  -no attacks  -c/w allopurinol  -recheck lab        History/Other:   Fall Risk Assessment:   She has been screened for Falls and is High Risk. Fall Prevention information provided to patient in After Visit Summary.    Do you  feel unsteady when standing or walking?: Yes  Do you worry about falling?: No  Have you fallen in the past year?: No     Cognitive Assessment:   Abnormal  What day of the week is this?: Correct  What month is it?: Incorrect  What year is it?: Correct  Recall \"Ball\": Correct  Recall \"Flag\": Correct  Recall \"Tree\": Correct    Functional Ability/Status:   Ciarra Salcido has some abnormal functions as listed below:  She has Dressing and/or Bathing issues based on screening of functional status.  Difficulty dressing or bathing?: No  Bathing or Showering: Need some help  Dressing: Able without help  She has Driving difficulties based on screening of functional status. She has Meal Preparation difficulties based on screening of functional status.She has difficulties Shopping for Groceries based on screening of functional status. She has difficulties Taking Meds as Rx'd based on screening of functional status. She has Hearing problems based on screening of functional status.She has Vision problems based on screening of functional status. She has Walking problems based on screening of functional status. She has problems with Memory based on screening of functional status.       Depression Screening (PHQ-2/PHQ-9): PHQ-9 TOTAL SCORE: 6  , done 4/11/2024   Trouble falling or staying asleep, or sleeping too much: 3     Trouble concentrating on things, such as reading the newspaper or watching television: 3    If you checked off any problems, how difficult have these problems made it for you to do your work, take care of things at home, or get along with other people?: Somewhat difficult    [facility] Suicide Screening on 4/11/2024 was No Risk.       Advanced Directives:   She has a Living Will on file in [place]; reviewed and discussed documents with patient (and family/surrogate if present).  She has a Power of  for Health Care on file in [facility].  Discussed Advance Care Planning with patient (and family/surrogate if  present). Standard forms made available to patient in After Visit Summary.      Patient Active Problem List   Diagnosis    Chronic renal disease, stage IV (HCC)    Diabetes mellitus, type 2 (HCC)    Primary hypertension    Mixed hyperlipidemia    Acquired hypothyroidism    Osteopenia    Diabetic polyneuropathy associated with type 2 diabetes mellitus (HCC)    Current moderate episode of major depressive disorder without prior episode (HCC)    Acute on chronic congestive heart failure, unspecified heart failure type (HCC)    Multiple subsegmental pulmonary emboli without acute cor pulmonale (HCC)    Smokers' cough (HCC)    Chronic deep vein thrombosis (DVT) of proximal vein of lower extremity (HCC)    Atrial fibrillation with rapid ventricular response (HCC)    Chronic heart failure with preserved ejection fraction (HCC)     Allergies:  She has No Known Allergies.    Current Medications:  Outpatient Medications Marked as Taking for the 4/11/24 encounter (Office Visit) with Estevan Bah MD   Medication Sig    Blood Glucose Monitoring Suppl (FREESTYLE FREEDOM LITE) w/Device Does not apply Kit 1 Device by Other route daily. Use as directed. Dx: 11.65    FreeStyle Lancets Does not apply Misc 1 Lancet by Finger stick route daily. Use as directed.    Glucose Blood (FREESTYLE LITE TEST) In Vitro Strip Use as directed.    SITagliptin Phosphate (JANUVIA) 100 MG Oral Tab Take 1 tablet (100 mg total) by mouth daily.    apixaban 5 MG Oral Tab take 1 tab (5mg) by mouth twice daily    escitalopram 5 MG Oral Tab TAKE 1 TABLET EVERY DAY    glimepiride 4 MG Oral Tab TAKE 1 TABLET EVERY DAY    simvastatin 20 MG Oral Tab TAKE 1 TABLET EVERY NIGHT    GABAPENTIN 100 MG Oral Cap TAKE 1 CAPSULE THREE TIMES DAILY    amiodarone 200 MG Oral Tab Take 1 tablet (200 mg total) by mouth daily.    levothyroxine 75 MCG Oral Tab Take 1 tablet (75 mcg total) by mouth before breakfast.    losartan 25 MG Oral Tab Take 1 tablet (25 mg total) by mouth  daily.    furosemide 20 MG Oral Tab Take 2 tablets (40 mg total) by mouth daily. TAKE 1 TABLET DAILY. MAY TAKE ADDITIONAL TAB DAILY AS NEEDED FOR EDEMA - WHEN DIRECTED BY MD (EXTRA TABLETS INCLUDED)    allopurinol 100 MG Oral Tab Take 1 tablet (100 mg total) by mouth daily.    dilTIAZem  MG Oral Capsule SR 24 Hr Take 1 capsule (120 mg total) by mouth daily.    metoprolol succinate  MG Oral Tablet 24 Hr Take 1 tablet (100 mg total) by mouth 2x Daily(Beta Blocker).    acetaminophen 500 MG Oral Tab Take 2 tablets (1,000 mg total) by mouth every 6 (six) hours as needed for Pain or Fever.       Medical History:  She  has a past medical history of (HFpEF) heart failure with preserved ejection fraction (HCC), Acute cystitis without hematuria (11/14/2023), Acute deep vein thrombosis (DVT) of popliteal vein of right lower extremity ([facility]) (01/31/2023), Cataract, CHF exacerbation ([facility]) (01/25/2023), CKD (chronic kidney disease), Congestive heart disease ([facility]), COVID-19 (09/11/2023), Diabetes ([facility]), Disorder of thyroid, DM2 (diabetes mellitus, type 2) ([facility]), Hearing impairment, High blood pressure, HTN (hypertension), Hyperlipidemia, Hypothyroidism, Pulmonary embolism ([facility]), Shingles (07/06/2020), and Visual impairment.  Surgical History:  She  has a past surgical history that includes hysterectomy (1980); cataract; and Eye surgery.   Family History:  Her family history includes Other in her father and mother.  Social History:  She  reports that she quit smoking about 13 years ago. Her smoking use included cigarettes. She started smoking about 63 years ago. She has a 50 pack-year smoking history. She has never used smokeless tobacco. She reports that she does not drink alcohol and does not use drugs.    Tobacco:  She smoked tobacco in the past but quit greater than 12 months ago.  Social History     Tobacco Use   Smoking Status Former    Current packs/day: 0.00    Average packs/day: 1 pack/day for 50.0 years (50.0  ttl pk-yrs)    Types: Cigarettes    Start date:     Quit date:     Years since quittin.2   Smokeless Tobacco Never          CAGE Alcohol Screen:   Cage screening not needed because reported NO to Alcohol use on social history.      Patient Care Team:  Estevan Bah MD as PCP - General (Family Medicine)  Lucas Fong MD (CARDIOLOGY)  Vinnie Villanueva MD (ONCOLOGY)  Davy Kaur MD (Cardiac Electrophysiology)  Oscar Xiong MD (Ophthalmology, Retinal-Vitreous)    Review of Systems     Negative except above    Objective:   Physical Exam     General Appearance:  Alert, cooperative, no distress, appears stated age   Head:  Normocephalic, without obvious abnormality, atraumatic   Eyes:  PERRL   Ears:  Normal TM's and external ear canals, both ears   Lungs:   Clear to auscultation bilaterally, respirations unlabored   Heart:  irregular rhythm   Extremities: Extremities normal, no cyanosis or edema   Skin: Skin color, texture, turgor normal   Neurologic: No appreciable abnormality         /86 (BP Location: Left arm, Patient Position: Sitting)   Pulse 60   Temp 97.5 °F (36.4 °C) (Temporal)   Resp 18   Ht 5' 3\" (1.6 m)   Wt 142 lb 3.2 oz (64.5 kg)   SpO2 96%   BMI 25.19 kg/m²  Estimated body mass index is 25.19 kg/m² as calculated from the following:    Height as of this encounter: 5' 3\" (1.6 m).    Weight as of this encounter: 142 lb 3.2 oz (64.5 kg).    Medicare Hearing Assessment:  Hearing Screening    Screening Method: Finger Rub  Finger Rub Result: Pass           Assessment & Plan:   Ciarra Salcido is a 83 year old female who presents for a Medicare Assessment.     1. Encounter for annual health examination  -reviewed age appropriate screening  -reviewed code status discussion and DPOA  -patient is DNR - this has been updated  -documents completed and sent to scanning    2. Type 2 diabetes mellitus with stage 3a chronic kidney disease, without long-term current use of insulin  (HCC)  Diabetic polyneuropathy associated with type 2 diabetes mellitus (HCC)  -sugars worse  -A1c 9  -counseled on limiting carbs a bit  -start januvia 50mg daily  -c/w glimiperide 4mg daily  -update me on sugars in 3-4 wks  -up to date on eye exam and foot exam  -f/u in 3 months    4. Acute on chronic congestive heart failure, unspecified heart failure type ([facility])  5. Atrial fibrillation with rapid ventricular response ([facility])  6. Anticoagulant long-term use  7. Chronic heart failure with preserved ejection fraction ([facility])  8. Primary hypertension  9. Mixed hyperlipidemia  -much better  -c/w meds  -f/u with cardiology and EP as planned    10. Smokers' cough ([facility])  -stable, will continue to monitor    11. Chronic renal disease, stage IV ([facility])  -has been stable - recheck labs    12. Multiple subsegmental pulmonary emboli without acute cor pulmonale ([facility])  13. Chronic deep vein thrombosis (DVT) of proximal vein of lower extremity, unspecified laterality ([facility])  -recently seen by hematology  -plan is to c/w apixaban  -f/u with hematology as planned    14. Current moderate episode of major depressive disorder without prior episode ([facility])  -mood better - will continue to monitor    15. Acquired hypothyroidism  -due for repeat labs  -tolerating 75mcg    16. Osteopenia of other site  -on vitamins  -will consider DEXA at next appt    17. Gout, unspecified cause, unspecified chronicity, unspecified site  -no attacks  -c/w allopurinol  -recheck lab    The patient indicates understanding of these issues and agrees to the plan.  Reinforced healthy diet, lifestyle, and exercise.      No follow-ups on file.     JUDY CYR MD, 2/22/2022     Supplementary Documentation:   General Health:  In the past six months, have you lost more than 10 pounds without trying?: 2 - No  Has your appetite been poor?: No  Type of Diet: Other (none)  How does the patient maintain a good energy level?: Daily Walks  How would you describe your daily  physical activity?: Light  How would you describe your current health state?: Good  How do you maintain positive mental well-being?: Visiting Friends  On a scale of 0 to 10, with 0 being no pain and 10 being severe pain, what is your pain level?: 2 - (Mild)  In the past six months, have you experienced urine leakage?: 1-Yes  At any time do you feel concerned for the safety/well-being of yourself and/or your children, in your home or elsewhere?: No  Have you had any immunizations at another office such as Influenza, Hepatitis B, Tetanus, or Pneumococcal?: No       Ciarrasalome Salcido's SCREENING SCHEDULE   Tests on this list are recommended by your physician but may not be covered, or covered at this frequency, by your insurer.   Please check with your insurance carrier before scheduling to verify coverage.   PREVENTATIVE SERVICES FREQUENCY &  COVERAGE DETAILS LAST COMPLETION DATE   Diabetes Screening    Fasting Blood Sugar /  Glucose    One screening every 12 months if never tested or if previously tested but not diagnosed with pre-diabetes   One screening every 6 months if diagnosed with pre-diabetes Lab Results   Component Value Date     (H) 11/30/2023        Cardiovascular Disease Screening    Lipid Panel  Cholesterol  Lipoprotein (HDL)  Triglycerides Covered every 5 years for all Medicare beneficiaries without apparent signs or symptoms of cardiovascular disease Lab Results   Component Value Date    CHOLEST 141 04/25/2023    HDL 56 04/25/2023    LDL 65 04/25/2023    LDLD 72 08/20/2019    TRIG 114 04/25/2023         Electrocardiogram (EKG)   Covered if needed at Welcome to Medicare, and non-screening if indicated for medical reasons 11/14/2023      Ultrasound Screening for Abdominal Aortic Aneurysm (AAA) Covered once in a lifetime for one of the following risk factors    Men who are 65-75 years old and have ever smoked    Anyone with a family history -     Colorectal Cancer Screening  Covered for ages 50-85; only  need ONE of the following:    Colonoscopy   Covered every 10 years    Covered every 2 years if patient is at high risk or previous colonoscopy was abnormal -    No recommendations at this time    Flexible Sigmoidoscopy   Covered every 4 years -    Fecal Occult Blood Test Covered annually -   Bone Density Screening    Bone density screening    Covered every 2 years after age 65 if diagnosed with risk of osteoporosis or estrogen deficiency.    Covered yearly for long-term glucocorticoid medication use (Steroids) Last Dexa Scan:    XR DEXA BONE DENSITOMETRY (CPT=77080) 12/13/2019      No recommendations at this time   Pap and Pelvic    Pap   Covered every 2 years for women at normal risk; Annually if at high risk -  No recommendations at this time    Chlamydia Annually if high risk -  No recommendations at this time   Screening Mammogram    Mammogram     Recommend annually for all female patients aged 40 and older    One baseline mammogram covered for patients aged 35-39 -    No recommendations at this time    Immunizations    Influenza Covered once per flu season  Please get every year 10/16/2023  No recommendations at this time    Pneumococcal Each vaccine (Glpipnd41 & [id number]) covered once after 65 Prevnar 13: 09/28/2015    Jeackudks97: 09/17/2020     No recommendations at this time    Hepatitis B One screening covered for patients with certain risk factors   -  No recommendations at this time    Tetanus Toxoid Not covered by Medicare Part B unless medically necessary (cut with metal); may be covered with your pharmacy prescription benefits -    Tetanus, Diptheria and Pertusis TD and TDaP Not covered by Medicare Part B -  No recommendations at this time    Zoster Not covered by Medicare Part B; may be covered with your pharmacy  prescription benefits -  No recommendations at this time     Diabetes      Hemoglobin A1C Annually; if last result is elevated, may be asked to retest more frequently.    Medicare covers  every 3 months Lab Results   Component Value Date     (H) 09/10/2023    A1C 9.0 (A) 04/11/2024       No recommendations at this time    Creat/alb ratio Annually Lab Results   Component Value Date    MICROALBCREA  04/25/2023      Comment:      Unable to calculate due to Urine Creatinine <13.00 mg/dL         LDL Annually Lab Results   Component Value Date    LDL 65 04/25/2023       Dilated Eye Exam Annually Last Diabetic Eye Exam:  Last Dilated Eye Exam: 08/22/23  Eye Exam shows Diabetic Retinopathy?: Yes       Annual Monitoring of Persistent Medications (ACE/ARB, digoxin diuretics, anticonvulsants)    Potassium Annually Lab Results   Component Value Date    K 4.1 11/30/2023         Creatinine   Annually Lab Results   Component Value Date    CREATSERUM 1.70 (H) 11/30/2023         BUN Annually Lab Results   Component Value Date    BUN 37 (H) 11/30/2023       Drug Serum Conc Annually No results found for: \"DIGOXIN\", \"DIG\", \"VALP\"           Chronic Obstructive Pulmonary Disease (COPD)    Spirometry Annually Spirometry date:              Overall 60 minutes was spent on this encounter, 40 mins spent reviewing and providing care coordination for these conditions: afib, chf, dm, htn, lipid, ckd  20  minutes was spent reviewing wellness elements and providing age appropriate screenings.

## 2024-04-11 NOTE — TELEPHONE ENCOUNTER
Daughter is calling and they are in need of letter stating that patient no longer needs her oxygen tank and for company to come and remove from home. needs her name and  on there as well faxed to [phone]

## 2024-04-12 NOTE — TELEPHONE ENCOUNTER
This pt is needing a letter to discontinue home oxygen so the company will pick it up. Looks like this was ordered as part of her discharge from the hospital this past November. Could you please advise on this? Thanks.

## 2024-04-24 ENCOUNTER — PATIENT MESSAGE (OUTPATIENT)
Dept: FAMILY MEDICINE CLINIC | Facility: CLINIC | Age: 84
End: 2024-04-24

## 2024-04-25 ENCOUNTER — TELEPHONE (OUTPATIENT)
Dept: FAMILY MEDICINE CLINIC | Facility: CLINIC | Age: 84
End: 2024-04-25

## 2024-04-25 NOTE — TELEPHONE ENCOUNTER
From: Ciarra Salcido  To: JUDY CYR  Sent: 4/24/2024 7:09 PM CDT  Subject: Test strips    Insurance denied the glucose test machine and strips.  Thankd

## 2024-04-25 NOTE — TELEPHONE ENCOUNTER
Spoke w/ daughter Elsie. She says Mom has had increased urination. Began around the time she started Januvia. She isn't going frequently but when she does she goes large amounts at a time and is causing some incontinence. Using a depends and pad. She is on furosemide but has been for years, same dose. There are no other urinary complaints. Denies burning, pain/pressure. Urine is clear yellow, no blood. Elsie says mom has been feeling much better recently so is eating and drinking a lot more. Explained this is likely from the increased fluid intake. I did recommend a bedside commode to keep nearby her to help w/ incontinence but Elsie says her place is very small and worries she would trip. Advised to continue to monitor. Keep clean and dry as much as possible. Report any new symptoms. Will call her back if  has anything additional.

## 2024-04-25 NOTE — TELEPHONE ENCOUNTER
Patients daughter calling and is very concerned with her wetting/urinating herself uncontrollably (wetting  through a pad) - she thinks this is a side affect of her medications SITagliptin Phosphate (JANUVIA) and Furosemide (Tab.   Would  like to speak  to nurse.   Please advise.

## 2024-04-26 NOTE — TELEPHONE ENCOUNTER
Patient's daughter, Elsie calling to inform Dr. Estevan Bah patient needs Prior Authorization for all 3, FREESTYLE LITE GLUCOSE MONITOR, FREESTYLE LITE TEST STRIPS, FREESTYLE LITE LANCETS.    Please review and advise.

## 2024-04-29 RX ORDER — BLOOD SUGAR DIAGNOSTIC
STRIP MISCELLANEOUS
COMMUNITY
Start: 2024-04-29

## 2024-04-29 RX ORDER — BLOOD-GLUCOSE METER
EACH MISCELLANEOUS
COMMUNITY
Start: 2024-04-29

## 2024-04-29 RX ORDER — LANCETS
1 EACH MISCELLANEOUS
COMMUNITY
Start: 2024-04-29

## 2024-04-29 NOTE — TELEPHONE ENCOUNTER
Spoke w/ pharmacist -Accu Chek Guide is covered so we changed it to that.  Pharmacist Tiffanie

## 2024-05-09 NOTE — TELEPHONE ENCOUNTER
Faxed signed Physician order to [facility] to fax # [phone] From: Adriana Beckman  To: Adrianna Rhodes  Sent: 5/9/2024 1:58 PM CDT  Subject: results    I am leaving from [place] May 19th, and just had a visit with my DrLuther here. 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 He told me to get a Mohs surgeon and have it done in the next few months. Do you do this or can you refer?  This is on my face by my nose, so I need someone good so minimal scaring.  Thank you,  Adriana Beckman

## 2024-05-14 ENCOUNTER — HOSPITAL ENCOUNTER (EMERGENCY)
Age: 84
Discharge: NURSING, REHAB OR LONG-TERM CARE | End: 2024-05-14

## 2024-05-14 ENCOUNTER — TELEPHONE (OUTPATIENT)
Dept: FAMILY MEDICINE CLINIC | Facility: CLINIC | Age: 84
End: 2024-05-14

## 2024-05-14 ENCOUNTER — APPOINTMENT (OUTPATIENT)
Dept: GENERAL RADIOLOGY | Age: 84
End: 2024-05-14
Attending: PHYSICIAN ASSISTANT

## 2024-05-14 VITALS
SYSTOLIC BLOOD PRESSURE: 166 MMHG | HEART RATE: 59 BPM | DIASTOLIC BLOOD PRESSURE: 49 MMHG | RESPIRATION RATE: 18 BRPM | WEIGHT: 140 LBS | BODY MASS INDEX: 23.9 KG/M2 | OXYGEN SATURATION: 97 % | HEIGHT: 64 IN | TEMPERATURE: 98 F

## 2024-05-14 DIAGNOSIS — S22.088A OTHER CLOSED FRACTURE OF TWELFTH THORACIC VERTEBRA, INITIAL ENCOUNTER (HCC): Primary | ICD-10-CM

## 2024-05-14 DIAGNOSIS — J20.8 VIRAL BRONCHITIS: ICD-10-CM

## 2024-05-14 DIAGNOSIS — M54.9 BACK PAIN WITHOUT RADIATION: ICD-10-CM

## 2024-05-14 DIAGNOSIS — S22.080A T12 COMPRESSION FRACTURE, INITIAL ENCOUNTER (HCC): Primary | ICD-10-CM

## 2024-05-14 PROCEDURE — 72110 X-RAY EXAM L-2 SPINE 4/>VWS: CPT | Performed by: PHYSICIAN ASSISTANT

## 2024-05-14 PROCEDURE — 99284 EMERGENCY DEPT VISIT MOD MDM: CPT

## 2024-05-14 PROCEDURE — 71046 X-RAY EXAM CHEST 2 VIEWS: CPT | Performed by: PHYSICIAN ASSISTANT

## 2024-05-14 RX ORDER — HYDROCODONE BITARTRATE AND ACETAMINOPHEN 5; 325 MG/1; MG/1
1 TABLET ORAL ONCE
Status: COMPLETED | OUTPATIENT
Start: 2024-05-14 | End: 2024-05-14

## 2024-05-14 RX ORDER — HYDROCODONE BITARTRATE AND ACETAMINOPHEN 5; 325 MG/1; MG/1
1 TABLET ORAL EVERY 8 HOURS PRN
Qty: 10 TABLET | Refills: 0 | Status: SHIPPED | OUTPATIENT
Start: 2024-05-14

## 2024-05-14 NOTE — ED INITIAL ASSESSMENT (HPI)
Pt reports she rolled out of bed about 1.5-2 weeks ago and is now having lower back pain. Also reports a cough for the last week and wants to make sure she does not have pneumonia.

## 2024-05-14 NOTE — ED PROVIDER NOTES
Patient Seen in: [facility] In [place]      History     Chief Complaint   Patient presents with    Back Pain     Stated Complaint: lower back pain after falling out of bed 2 weeks ago    Subjective:   HPI    83-year-old female with known history of hyperlipidemia, hypertension, hypothyroidism type 2 diabetes chronic kidney disease and pulmonary embolism on anticoagulation with pacemaker comes in today complaining of axial mid to low back pain that started after falling out of her bed.  Patient is also had a cough with production for the past 5 days no fevers no chills.  Patient though states that Tylenol is not helping her pain.  Patient lives in an assisted living facility.  She did not hit her head.  The original injury occurred 2 weeks ago.  Denies numbness tingling bowel or bladder changes.  Denies radiation of pain.  Objective:   Past Medical History:    (HFpEF) heart failure with preserved ejection fraction (HCC)    Acute cystitis without hematuria    Acute deep vein thrombosis (DVT) of popliteal vein of right lower extremity (HCC)    Cataract    CHF exacerbation (HCC)    CKD (chronic kidney disease)    Congestive heart disease (HCC)    COVID-19    Diabetes (HCC)    Disorder of thyroid    DM2 (diabetes mellitus, type 2) (HCC)    Hearing impairment    High blood pressure    HTN (hypertension)    Hyperlipidemia    Hypothyroidism    Pulmonary embolism (HCC)    Shingles    Visual impairment              Past Surgical History:   Procedure Laterality Date    Cataract      Eye surgery      Hysterectomy      [facility]    Pacemaker monitor                  Social History     Socioeconomic History    Marital status: Single   Tobacco Use    Smoking status: Former     Current packs/day: 0.00     Average packs/day: 1 pack/day for 50.0 years (50.0 ttl pk-yrs)     Types: Cigarettes     Start date:      Quit date:      Years since quittin.3    Smokeless tobacco: Never   Vaping Use     Vaping status: Never Used   Substance and Sexual Activity    Alcohol use: Never    Drug use: Never   Other Topics Concern    Caffeine Concern Yes     Comment: 2 cups of coffee daily     Stress Concern No    Weight Concern No    Special Diet No    Exercise Yes     Comment: PT 2 X Weekly, OT 2 x weekly    Seat Belt Yes     Social Determinants of Health     Financial Resource Strain: Low Risk  (1/3/2024)    Financial Resource Strain     Difficulty of Paying Living Expenses: Not very hard     Med Affordability: No   Food Insecurity: No Food Insecurity (1/3/2024)    Food Insecurity     Food Insecurity: Never true   Transportation Needs: No Transportation Needs (1/3/2024)    Transportation Needs     Lack of Transportation: No   Physical Activity: Inactive (1/3/2024)    Exercise Vital Sign     Days of Exercise per Week: 0 days     Minutes of Exercise per Session: 0 min   Stress: No Stress Concern Present (1/3/2024)    Stress     Feeling of Stress : No   Social Connections: Socially Isolated (1/3/2024)    Social Connections     Frequency of Socialization with Friends and Family: 0   Housing Stability: Low Risk  (1/3/2024)    Housing Stability     Housing Instability: No              Review of Systems    Positive for stated complaint: lower back pain after falling out of bed 2 weeks ago  Other systems are as noted in HPI.  Constitutional and vital signs reviewed.      All other systems reviewed and negative except as noted above.    Physical Exam     ED Triage Vitals [05/14/24 1157]   BP (!) 181/70   Pulse 65   Resp 17   Temp 98 °F (36.7 °C)   Temp src Oral   SpO2 97 %   O2 Device None (Room air)       Current Vitals:   Vital Signs  BP: (!) 181/70  Pulse: 65  Resp: 17  Temp: 98 °F (36.7 °C)  Temp src: Oral    Oxygen Therapy  SpO2: 97 %  O2 Device: None (Room air)            Physical Exam    General Appearance:  Alert, cooperative, no distress, appropriate for age  Head:  Normocephalic, without obvious abnormality  Neck:   Supple; symmetrical, trachea midline, no adenopathy  Lungs:  Clear to auscultation bilaterally, respirations unlabored   Heart:  Regular rate & rhythm, S1 and S2 normal, no murmurs, rubs, or gallops  Abdomen:  Soft, non-tender, bowel sounds active all four quadrants, no mass or organomegaly. No rebound tenderness or guarding  Lumbar:  Limited ROM secondary to pain, + bony ttp over upper lumbar spine, + ttp over lumbar paraspinal muscles, negative SLR Bilaterally, 5/5 bilateral lower extremity motor strength exam, SILT, + 2/4 bilateral patella and achilles reflexes                Skin/Hair/Nails:  Skin warm, dry and intact, no rashes or abnormal dyspigmentation  Neurologic:  Alert and oriented x3,  normal strength and tone, gait steady walks with walker      ED Course   Labs Reviewed - No data to display  XR LUMBAR SPINE (MIN 4 VIEWS) (CPT=72110)    Result Date: 5/14/2024  PROCEDURE:  XR LUMBAR SPINE (MIN 4 VIEWS) (CPT=72110)  TECHNIQUE:  AP, lateral, oblique, and coned down L5-S1 views were obtained.  COMPARISON:  [facility] , XR, XR CHEST PA + LAT CHEST (CPT=71046), 11/16/2023, 10:47 AM.  [facility], XR, XR CHEST PA + LAT CHEST (JNH=[id number]), 4/18/2023, 12:46 PM.  INDICATIONS:  lower back pain after falling out of bed 2 weeks ago  PATIENT STATED HISTORY: (As transcribed by Technologist)  Pt fell about one week ago and c/o pain across her lower back that radiates to the right side.              CONCLUSION:    The bones are considerably osteopenic.  There is mild loss of height from the upper endplate of the T12 vertebra seen on the lateral view consistent with recent deformity in this patient with back pain and recently falling down.  Correlate  with location of symptoms.  No other potential recent fracture.  Minimal scoliosis concave to the right.  Multilevel degenerative changes most advanced L5-S1 disc degeneration.  Heavily calcified aorta maximum AP dimension 1.9 cm.   LOCATION:  [facility]   Dictated by (CST): Renee  MD Mahamed on 5/14/2024 at 1:19 PM     Finalized by (CST): Mahamed Duran MD on 5/14/2024 at 1:23 PM       XR CHEST PA + LAT CHEST (CPT=71046)    Result Date: 5/14/2024  PROCEDURE:  XR CHEST PA + LAT CHEST (CPT=71046)  INDICATIONS:  lower back pain after falling out of bed 2 weeks ago  COMPARISON:  [facility] , XR, XR CHEST PA + LAT CHEST (CPT=71046), 11/16/2023, 10:47 AM.  TECHNIQUE:  PA and lateral chest radiographs were obtained.  PATIENT STATED HISTORY: (As transcribed by Technologist)  Pt fell about one week ago and c/o pain across her lower back that radiates to the right side.              CONCLUSION:    Moderate cardiomegaly.  Vascular congestion including redistribution to the upper lobes, but no corby pulmonary edema or overt CHF.  Nonspecific interstitial markings accentuated chronicity uncertain.  No lobar consolidation, pleural effusion pneumothorax or hyperinflation.  Stable pacemaker.  LOCATION:  [facility]   Dictated by (CST): Mahamed Duran MD on 5/14/2024 at 1:18 PM     Finalized by (CST): Mahamed Duran MD on 5/14/2024 at 1:19 PM             MDM        Medical Decision Making  83-year-old male with upper respiratory complaints for the past 5 days with productive cough but also 2 weeks ago fell out of her bed and now has pain to the lower back.  Did not hit her head or loss of consciousness.  Denies numbness tingling or bowel and bladder changes.    Problems Addressed:  Back pain without radiation: acute illness or injury  T12 compression fracture, initial encounter (HCC): acute illness or injury  Viral bronchitis: acute illness or injury    Amount and/or Complexity of Data Reviewed  Independent Historian: caregiver  Radiology: ordered and independent interpretation performed. Decision-making details documented in ED Course.     Details: I personally reviewed the patient's chest x-ray there is no evidence of acute consolidation or patient does have mild cardiomegaly this is consistent with patient's  previous x-rays.    Reviewed patient's lumbar spine x-ray patient does have what appears to be a possible compression fracture to the T12 vertebral body  ECG/medicine tests: ordered and independent interpretation performed. Decision-making details documented in ED Course.     Details: Lidoderm patch applied to the area, Norco    Risk  OTC drugs.  Prescription drug management.  Risk Details: Clinical Impression: T12 compression fracture, viral bronchitis       The differential diagnosis before testing included sciatica, cauda equina, lumbar muscular strain, lumbar compression fracture which is a medical condition that poses a threat to life/function.     Discussed with the patient ice and moist heat to the area, we discussed anti-inflammatory medications and muscle relaxants; these medications can make you drowsy do not use these medications if you will be working or driving.  Discussed with the patient when to return to the emergency room including worsening pain, bowel and bladder changes or numbness and tingling to the perineal region    Norco given for severe pain only as this can be constipating and sedating             Disposition and Plan     Clinical Impression:  1. T12 compression fracture, initial encounter (formerly [facility])    2. Back pain without radiation    3. Viral bronchitis         Disposition:  Discharge  5/14/2024  1:34 pm    Follow-up:  Eliel Sarabia MD  [id number] [address] [address] [phone]    Schedule an appointment as soon as possible for a visit in 2 day(s)      [address]  [facility] [address]  [facility] [address] [phone]  Call  choose option 2 for neurosurgery or pain follow up          Medications Prescribed:  Current Discharge Medication List        This report has been produced using speech recognition software and may contain errors related to that system including, but not limited to, errors in grammar,  punctuation, and spelling, as well as words and phrases that possibly may have been recognized inappropriately.  If there are any questions or concerns, contact the dictating provider for clarification.     NOTE: The 21st Century Cares Act makes medical notes available to patients.  Be advised that this is a medical document written in medical language and may contain abbreviations or verbiage that is unfamiliar or direct.  It is primarily intended to carry relevant historical information, physical exam findings, and the clinical assessment of the physician.

## 2024-05-14 NOTE — TELEPHONE ENCOUNTER
Patient's daughter called to report mom fell out of bed and has a compression fracture at T12. She says the ER says she needs to be seen by an orthopedic surgeon or a neurosurgeon. They would like referral.

## 2024-05-14 NOTE — TELEPHONE ENCOUNTER
Patient and daughter notified and given contact information. Patient verbalized agreement and understanding.

## 2024-05-14 NOTE — DISCHARGE INSTRUCTIONS
Please follow up with your PCP if no improvement within 5-7 days. Go directly to the ER for any acute worsening of symptoms.    Push oral fluids to help loosen up chest mucous and move it out of your body.  Use a cold mist humidifier.  Get enough rest and sleep.  Take Guaifenesin (Robitussin), an expectorant to loosen mucous.  To suppress a dry, hacking cough, you may take Dextromethorphan (Robitussin DM)  Seek immediate medical attention if symptoms worsen, if fever higher than 102, if no improvement in a few days.  Symptoms usually last 10 days.    Take the Norco at bedtime as needed for severe pain. Do not operate machinery or drive or consume alcohol while taking this medication. You may use ice or moist heat 10-15 minutes several times a day for comfort.  Ice through clothing or a towel to avoid frostbite. Do not sleep with a heating pad as this will make the spasm worse.  Change positions frequently throughout the day.  Avoid heavy lifting. Once your pain has improved, start stretching exercises. Go to the closest Emergency Department if you develop uncontrolled pain, spontaneous loss of urine or stool, numbness or weakness into your arms or legs. See your doctor in 3-5 days if not better.

## 2024-05-14 NOTE — TELEPHONE ENCOUNTER
Patient daughter requesting a call back from nurse regarding patient. Patient fell out of bed last week was taken to hospital patient has a T12 compression fracture.     Please advise

## 2024-05-16 RX ORDER — ESCITALOPRAM OXALATE 5 MG/1
5 TABLET ORAL DAILY
Qty: 90 TABLET | Refills: 0 | Status: SHIPPED | OUTPATIENT
Start: 2024-05-16

## 2024-05-17 ENCOUNTER — OFFICE VISIT (OUTPATIENT)
Dept: ORTHOPEDICS CLINIC | Facility: CLINIC | Age: 84
End: 2024-05-17

## 2024-05-17 VITALS — WEIGHT: 140 LBS | HEIGHT: 64 IN | BODY MASS INDEX: 23.9 KG/M2

## 2024-05-17 DIAGNOSIS — S32.010A CLOSED COMPRESSION FRACTURE OF L1 VERTEBRA, INITIAL ENCOUNTER (HCC): Primary | ICD-10-CM

## 2024-05-17 PROCEDURE — 3008F BODY MASS INDEX DOCD: CPT | Performed by: NURSE PRACTITIONER

## 2024-05-17 PROCEDURE — 1160F RVW MEDS BY RX/DR IN RCRD: CPT | Performed by: NURSE PRACTITIONER

## 2024-05-17 PROCEDURE — 99204 OFFICE O/P NEW MOD 45 MIN: CPT | Performed by: NURSE PRACTITIONER

## 2024-05-17 PROCEDURE — 1159F MED LIST DOCD IN RCRD: CPT | Performed by: NURSE PRACTITIONER

## 2024-05-17 NOTE — PATIENT INSTRUCTIONS
- Start with over the counter lumbar support binder as needed for comfort when out of bed while waiting for prescription LSO brace.     - LSO to be worn when out of bed as needed for pain for the next 6 weeks. Will need assistance donning and doffing the brace.     - Continue pain medications per current prescribing provider.     - Consider pain clinic if the pain does not improve with time, brace, and medications.

## 2024-05-17 NOTE — H&P
[facility] - ORTHOPEDICS  [address], [address] [address]  [address] [place] [address]  [phone]     NEW PATIENT VISIT - HISTORY AND PHYSICAL EXAMINATION     Name: Ciarra Salcido   MRN: [id number]  Date: 5/17/2024     CC:   Chief Complaint   Patient presents with    Er F/u     Low back injury     REFERRED BY: Urgent care  PCP: JUDY CYR MD    HPI:   Ciarra Salcido is a very pleasant 83 year old female who presents today for evaluation of back pain. The distribution of symptoms are: 100% left > right back pain and 0% leg pain. The symptoms began 1.5 week(s) ago  after a fall out of bed at her assisted living facility . Since the onset, the symptoms have become slowly worse over time. Patient feels pain is aggravated by movement and improved by laying/sitting. The patient reports no numbness and no weakness.  The symptom characteristics are as follows: ache.     Prior spine surgery: none.     Bowel and bladder symptoms: absent.  Fall/injury: ~5/1/2024 fall with start of back pain    The patient has not had issues with balance and/or hand dexterity problems such as changes in penmanship or the use of buttons or zippers.    Treatment up to this time has included:  Evaluation: ED 5/14/2024 for lower back pain after falling out of bed 2 weeks ago, diagnosed with T12 compression fracture.  On review of xray count appears to be L1 with fx.   NSAIDS: are contraindicated  Narcotic use: norco provided by   Physical therapy: None  Spinal injections: None    + pacemaker    Daughter at visit provides history due to patient [place]    PMH:   Past Medical History:    (HFpEF) heart failure with preserved ejection fraction (HCC)    Acute cystitis without hematuria    Acute deep vein thrombosis (DVT) of popliteal vein of right lower extremity (HCC)    Cataract    CHF exacerbation (HCC)    CKD (chronic kidney disease)    Congestive heart disease (HCC)    COVID-19    Diabetes  (HCC)    Disorder of thyroid    DM2 (diabetes mellitus, type 2) ([facility])    Hearing impairment    High blood pressure    HTN (hypertension)    Hyperlipidemia    Hypothyroidism    Pulmonary embolism (HCC)    Shingles    Visual impairment       PAST SURGICAL HX:  Past Surgical History:   Procedure Laterality Date    Cataract      Eye surgery      Hysterectomy  1980    [facility]    Pacemaker monitor         FAMILY HX:  Family History   Problem Relation Age of Onset    Other (Other) Mother         Old Age    Other (Other) Father         Old age       ALLERGIES:  Patient has no known allergies.    MEDICATIONS:   Current Outpatient Medications   Medication Sig Dispense Refill    escitalopram 5 MG Oral Tab TAKE 1 TABLET EVERY DAY 90 tablet 0    HYDROcodone-acetaminophen 5-325 MG Oral Tab Take 1 tablet by mouth every 8 (eight) hours as needed for Pain. 10 tablet 0    Glucose Blood (ACCU-CHEK GUIDE) In Vitro Strip       Blood Glucose Monitoring Suppl (ACCU-CHEK GUIDE) w/Device Does not apply Kit       Accu-Chek FastClix Lancets Does not apply Misc 1 Lancet by Finger stick route. Use as directed.      SITagliptin Phosphate (JANUVIA) 50 MG Oral Tab Take 1 tablet (50 mg total) by mouth daily. 90 tablet 0    apixaban 5 MG Oral Tab take 1 tab (5mg) by mouth twice daily 60 tablet 0    glimepiride 4 MG Oral Tab TAKE 1 TABLET EVERY DAY 90 tablet 1    simvastatin 20 MG Oral Tab TAKE 1 TABLET EVERY NIGHT 90 tablet 1    GABAPENTIN 100 MG Oral Cap TAKE 1 CAPSULE THREE TIMES DAILY 270 capsule 3    amiodarone 200 MG Oral Tab Take 1 tablet (200 mg total) by mouth daily.      levothyroxine 75 MCG Oral Tab Take 1 tablet (75 mcg total) by mouth before breakfast. 90 tablet 3    losartan 25 MG Oral Tab Take 1 tablet (25 mg total) by mouth daily. 30 tablet 3    furosemide 20 MG Oral Tab Take 2 tablets (40 mg total) by mouth daily. TAKE 1 TABLET DAILY. MAY TAKE ADDITIONAL TAB DAILY AS NEEDED FOR EDEMA - WHEN DIRECTED BY MD (EXTRA TABLETS  INCLUDED)      allopurinol 100 MG Oral Tab Take 1 tablet (100 mg total) by mouth daily. 30 tablet 11    dilTIAZem  MG Oral Capsule SR 24 Hr Take 1 capsule (120 mg total) by mouth daily.      metoprolol succinate  MG Oral Tablet 24 Hr Take 1 tablet (100 mg total) by mouth 2x Daily(Beta Blocker). 60 tablet 3    Cholecalciferol (VITAMIN D3) 25 MCG (1000 UT) Oral Cap Take 1 tablet by mouth daily.      acetaminophen 500 MG Oral Tab Take 2 tablets (1,000 mg total) by mouth every 6 (six) hours as needed for Pain or Fever.         ROS: A comprehensive 14 point review of systems was performed and was negative aside from the aforementioned per history of present illness.    SOCIAL HX:  Social History     Tobacco Use    Smoking status: Former     Current packs/day: 0.00     Average packs/day: 1 pack/day for 50.0 years (50.0 ttl pk-yrs)     Types: Cigarettes     Start date:      Quit date:      Years since quittin.3    Smokeless tobacco: Never   Substance Use Topics    Alcohol use: Never       Lives with assisted living facility - [facility]  Daughter present at visit and provides hx due to [place] and no hearing aides at the visit today.     PE:   Vitals:    24 1111   Weight: 140 lb (63.5 kg)   Height: 5' 4\" (1.626 m)     Estimated body mass index is 24.03 kg/m² as calculated from the following:    Height as of this encounter: 5' 4\" (1.626 m).    Weight as of this encounter: 140 lb (63.5 kg).    Physical Exam  Constitutional:       Appearance: Normal appearance.   HENT:      Head: Normocephalic and atraumatic.   Eyes:      Extraocular Movements: Extraocular movements intact.   Cardiovascular:      Pulses: Normal pulses. Skin warm and well perfused.  Pulmonary:      Effort: Pulmonary effort is normal. No respiratory distress.   Skin:     General: Skin is warm.   Psychiatric:         Mood and Affect: Mood normal.     Spine Exam:    Normal gait with walker   Able to heel, toe, tandem gait without  difficulty  Level shoulders and hips in even stance    Deferred L-spine ROM due to fracture    + tenderness to palpation of L-spine, L1-L5 spinous process, bilateral lumbar paraspinals    Straight leg raise test: negative    Sustained clonus: negative    LE Strength: 5/5 IP QUAD TA EHL GSC  LE Sensation: normal in L2-S1 distribution  LE reflexes: diminished throughout the LE  No clonus   Terrell's negative    Radiographic Examination/Diagnostics:  Xray personally viewed, independently interpreted and radiology report was reviewed.    Radiographs  Dated:05/14/24   Study:Lumbar spine xray  Demonstrates: L1 with compression deformity with no obvious retropulsion     IMPRESSION: Ciarra Salcido is a 83 year old female with    1. Closed compression fracture of L1 vertebra, initial encounter (HCC)  - DME - External -- OTC abdominal binder for lumbar support to be worn PRN for comfort while waiting for LSO brace  - DME - External -- LSO brace  - Pain Management Referral - In Network      PLAN:     We reviewed the patients history, symptoms, exam findings, and imaging today.  We had a detailed discussion outlining the etiology, anatomy, pathophysiology, and natural history of osteoporotic compression fracture. The typical management of this condition may include lifestyle modification, NSAIDs, physical therapy, oral steroids, epidural injections, neuromodulatory medications, and sometimes pain medications.  Based on our discussion today we would like to have the patient initiate our recommendations for continued conservative therapy in the treatment of their condition noted in the assessment section.       -LSO brace ordered, home delivery/fitting is best. Did express some pain reduction with LSO attempted in clinic. To use OTC lumbar corsette style brace PRN for comfort while awaiting insurance approval for the appropriate brace. This is advised since there was pain reduction with trial of brace in clinic. The brace is  utilized when out of bed as needed for pain relief in an attempt to reduce the need for pain medications.   - Consider one refill of norco as needed   - No MRI due to pacemaker, to see interventional pain clinic to review possible kyphoplasty.   - Handout provided on osteoporotic compression fracture.     XRAY: lumbar spine AP/LAT at the follow up visit.       FOLLOW-UP:  We will see her back in follow-up in 5 weeks, or sooner if any problems arise. Patient understands and agrees with plan.    MEME Nieto   Collaborative: Eliel Sarabia MD  Orthopedic Spine Surgeon  [facility]   [address] [place] [address]   t: [phone]   f: [phone]        This note was dictated using Dragon software.  While it was briefly proofread prior to completion, some grammatical, spelling, and word choice errors due to dictation may still occur.

## 2024-05-18 DIAGNOSIS — E78.2 MIXED HYPERLIPIDEMIA: ICD-10-CM

## 2024-05-20 ENCOUNTER — PATIENT OUTREACH (OUTPATIENT)
Dept: CASE MANAGEMENT | Age: 84
End: 2024-05-20

## 2024-05-20 RX ORDER — GLIMEPIRIDE 4 MG/1
TABLET ORAL
Qty: 90 TABLET | Refills: 0 | Status: SHIPPED | OUTPATIENT
Start: 2024-05-20

## 2024-05-20 RX ORDER — SIMVASTATIN 20 MG
TABLET ORAL
Qty: 90 TABLET | Refills: 0 | Status: SHIPPED | OUTPATIENT
Start: 2024-05-20

## 2024-05-20 NOTE — PROGRESS NOTES
1st attempt ER f/up apt request  PCP -existing apt (7/18), pt doesn't want sooner apt  ORTHO -existing apt (5/17)  NEURO SURG -decline, pt dtr doesn't want to schedule at this time  Closing encounter

## 2024-05-22 ENCOUNTER — TELEPHONE (OUTPATIENT)
Dept: FAMILY MEDICINE CLINIC | Facility: CLINIC | Age: 84
End: 2024-05-22

## 2024-05-22 RX ORDER — GLIMEPIRIDE 4 MG/1
4 TABLET ORAL 2 TIMES DAILY
Qty: 180 TABLET | Refills: 1 | Status: SHIPPED | OUTPATIENT
Start: 2024-05-22

## 2024-06-06 RX ORDER — FUROSEMIDE 20 MG/1
TABLET ORAL
Qty: 120 TABLET | Refills: 3 | Status: SHIPPED | OUTPATIENT
Start: 2024-06-06

## 2024-06-13 ENCOUNTER — TELEPHONE (OUTPATIENT)
Dept: FAMILY MEDICINE CLINIC | Facility: CLINIC | Age: 84
End: 2024-06-13

## 2024-06-13 RX ORDER — HYDROCORTISONE 25 MG/G
CREAM TOPICAL
COMMUNITY
Start: 2024-06-13

## 2024-06-13 NOTE — TELEPHONE ENCOUNTER
Elsie, patient's daughter calling to request orders for hemorrhoid cream. Patient lives at [facility] in [place]. Patient was bleeding from her rectum and had the nurse check her out. It's a hemorrhoid. [facility] unable to dispense hemorrhoid cream to patient without an order. Please fax order to:    [facility] in [place] [phone].

## 2024-06-13 NOTE — TELEPHONE ENCOUNTER
Patient in senior living and they are requesting an order for hemorrhoid cream. Could you please provide? Thanks.

## 2024-06-20 DIAGNOSIS — S32.010A CLOSED COMPRESSION FRACTURE OF L1 VERTEBRA, INITIAL ENCOUNTER (HCC): Primary | ICD-10-CM

## 2024-06-21 ENCOUNTER — TELEPHONE (OUTPATIENT)
Dept: ORTHOPEDICS CLINIC | Facility: CLINIC | Age: 84
End: 2024-06-21

## 2024-06-21 ENCOUNTER — HOSPITAL ENCOUNTER (OUTPATIENT)
Dept: GENERAL RADIOLOGY | Age: 84
Discharge: HOME | End: 2024-06-21
Attending: NURSE PRACTITIONER

## 2024-06-21 ENCOUNTER — OFFICE VISIT (OUTPATIENT)
Dept: ORTHOPEDICS CLINIC | Facility: CLINIC | Age: 84
End: 2024-06-21

## 2024-06-21 VITALS — WEIGHT: 140 LBS | BODY MASS INDEX: 23.9 KG/M2 | HEIGHT: 64 IN

## 2024-06-21 DIAGNOSIS — S32.010A CLOSED COMPRESSION FRACTURE OF L1 VERTEBRA, INITIAL ENCOUNTER (HCC): ICD-10-CM

## 2024-06-21 DIAGNOSIS — S32.010D CLOSED COMPRESSION FRACTURE OF L1 LUMBAR VERTEBRA WITH ROUTINE HEALING, SUBSEQUENT ENCOUNTER: Primary | ICD-10-CM

## 2024-06-21 PROCEDURE — 3008F BODY MASS INDEX DOCD: CPT | Performed by: NURSE PRACTITIONER

## 2024-06-21 PROCEDURE — 72100 X-RAY EXAM L-S SPINE 2/3 VWS: CPT | Performed by: NURSE PRACTITIONER

## 2024-06-21 PROCEDURE — 1160F RVW MEDS BY RX/DR IN RCRD: CPT | Performed by: NURSE PRACTITIONER

## 2024-06-21 PROCEDURE — 99213 OFFICE O/P EST LOW 20 MIN: CPT | Performed by: NURSE PRACTITIONER

## 2024-06-21 PROCEDURE — 1159F MED LIST DOCD IN RCRD: CPT | Performed by: NURSE PRACTITIONER

## 2024-06-21 PROCEDURE — 1126F AMNT PAIN NOTED NONE PRSNT: CPT | Performed by: NURSE PRACTITIONER

## 2024-06-21 NOTE — TELEPHONE ENCOUNTER
Per Maria Ines fitch to fax over demographic and office visit notes from today's visit with Jeannette Crowley.

## 2024-06-21 NOTE — PATIENT INSTRUCTIONS
- wean out of lumbar brace, to use as needed for back pain.   - start home health physical therapy  - no spine precautions- start activities as tolerated.

## 2024-06-21 NOTE — PROGRESS NOTES
[facility] - ORTHOPEDICS  [address], [address] [address]  [address] [place] [id number]  [phone]     FOLLOW-UP PATIENT VISIT    Name: Ciarra Salcido   MRN: [id number]  Date: 6/21/2024     CC:   Chief Complaint   Patient presents with    Follow - Up     LOW BACK PAIN ; PAIN HAS IMPROVED WITH BACK BRACE        INTERVAL HISTORY:   Ciarra Salcido is a 83 year old female  follow-up patient whom I have been treating conservatively. Patient returns today for reevaluation of L1 fracture from fall on 4/30/2024 and back pain.     Last office visit with myself was on 5/17/2024. Recommended spine precautions and brace at that time. The patient reports pain is significantly improved. Dtr at visit today and agrees, Ciarra is no longer talking about the back pain. Wearing the lumbar corsette brace PRN and most of the time currently. Has decreased ambulation due to assisted living setting and they have fear of her falling.     Prior to fracture was ambulating with a walker. Currently walking when the daughter arrives to visit, otherwise minimal ambulation.     The patient reports 0/10 Pain today.The distribution of symptoms are: 100% back pain and 0% leg pain. The patient reports no numbness and no weakness.    Bowel and bladder symptoms: absent.    The patient has had issues with balance that is unchanged. Denies hand dexterity problems such as changes in penmanship or the use of buttons or zippers.    Wheel chair since fracture. Lives at assisted living and insurance does not cover the in house PT.     We have tried the following interventions thus far: Lumbar corsette brace, rest, opioids.     ROS: No fever/chills or other constitutional issues.     Medical/Social/Surgical history are unchanged since the last visit unless noted above     PE:   Vitals:    06/21/24 1037   Weight: 140 lb (63.5 kg)   Height: 5' 4\" (1.626 m)     Estimated body mass index is 24.03 kg/m² as  calculated from the following:    Height as of this encounter: 5' 4\" (1.626 m).    Weight as of this encounter: 140 lb (63.5 kg).    On physical examination, she is awake, alert and oriented x 3 and in no acute distress. Mood, affect and language are normal. She appears well developed and well nourished.  She walks with a short stride non antalgic, nonmyelopathic, non-Trendelenburg gait. Motor strength testing of the lower extremities shows 5/5 strength in hip flexors, knee extensors, ankle dorsiflexors, toe extensor, and gastroc-soleus complex.   Sensation is intact to light touch L2-S1 distributions bilaterally. SLR negative. No palp tenderness over the lumbar spinous process area.    Radiographic Examination/Diagnostics:  Xray personally viewed, independently interpreted and radiology report was reviewed.    Radiographs  Dated:06/21/24  Comparison:5/14/2024  Study:lumbar spine xray  Demonstrates: L1 with increase in compression appearance, stable lordosis. No new fracture.     IMPRESSION: Ciarra Salcido is a 83 year old female with  1. Closed compression fracture of L1 lumbar vertebra with routine healing, subsequent encounter  - Home Health Referral - External- gait, strengthening, balance, wean out of brace.     PLAN:   We had a detailed discussion outlining the etiology, anatomy, pathophysiology, and natural history of compression fracture. Discontinue spine precautions. Resume activity as tolerated. Add home health physical therapy with current home bound status. Has been wheel chair bound since fracture, and prior used a walker to ambulate. Follow up in 6 weeks after PT as needed. Questions encouraged and answered.     FOLLOW-UP:  We will see her back in follow-up in 6 weeks, or sooner if any problems arise. Patient understands and agrees with plan.    I spent 30 minutes in preparation to see the patient, counseling/education of relevant pathology, discussing imaging results, ordering medication/therapy  intervention, and care coordination.      MEEM Nieto  Collaborative: Eliel Sarabia MD  Orthopedic Spine Surgeon  [facility]   [address] [place] [address], [address], [place] [address]   t: [phone]   f: [phone]        This note was dictated using Dragon software.  While it was briefly proofread prior to completion, some grammatical, spelling, and word choice errors due to dictation may still occur.

## 2024-06-21 NOTE — TELEPHONE ENCOUNTER
RN with [employer] called - they received an order from Naomi Crowley but they need office notes and patient demographics/ face sheet to process the request with insurance.    Please fax office note and patient demographics/ face sheet to [employer] (formerly [employer]) @ Fax # [phone]

## 2024-06-26 RX ORDER — SITAGLIPTIN 50 MG/1
50 TABLET, FILM COATED ORAL DAILY
Qty: 90 TABLET | Refills: 0 | Status: SHIPPED | OUTPATIENT
Start: 2024-06-26

## 2024-07-01 ENCOUNTER — MED REC SCAN ONLY (OUTPATIENT)
Facility: CLINIC | Age: 84
End: 2024-07-01

## 2024-07-03 ENCOUNTER — ANCILLARY PROCEDURE (OUTPATIENT)
Dept: CARDIOLOGY | Age: 84
End: 2024-07-03
Attending: INTERNAL MEDICINE

## 2024-07-03 ENCOUNTER — ANCILLARY ORDERS (OUTPATIENT)
Dept: CARDIOLOGY | Age: 84
End: 2024-07-03

## 2024-07-03 DIAGNOSIS — Z45.018 ADJUSTMENT AND MANAGEMENT OF CARDIAC PACEMAKER: ICD-10-CM

## 2024-07-03 DIAGNOSIS — Z45.018 ADJUSTMENT AND MANAGEMENT OF CARDIAC PACEMAKER: Primary | ICD-10-CM

## 2024-07-03 LAB
MDC_IDC_LEAD_CONNECTION_STATUS: NORMAL
MDC_IDC_LEAD_CONNECTION_STATUS: NORMAL
MDC_IDC_LEAD_IMPLANT_DT: NORMAL
MDC_IDC_LEAD_IMPLANT_DT: NORMAL
MDC_IDC_LEAD_LOCATION: NORMAL
MDC_IDC_LEAD_LOCATION: NORMAL
MDC_IDC_LEAD_LOCATION_DETAIL_1: NORMAL
MDC_IDC_LEAD_LOCATION_DETAIL_1: NORMAL
MDC_IDC_LEAD_MFG: NORMAL
MDC_IDC_LEAD_MFG: NORMAL
MDC_IDC_LEAD_MODEL: NORMAL
MDC_IDC_LEAD_MODEL: NORMAL
MDC_IDC_LEAD_POLARITY_TYPE: NORMAL
MDC_IDC_LEAD_POLARITY_TYPE: NORMAL
MDC_IDC_LEAD_SERIAL: NORMAL
MDC_IDC_LEAD_SERIAL: NORMAL
MDC_IDC_PG_IMPLANT_DTM: NORMAL
MDC_IDC_PG_MFG: NORMAL
MDC_IDC_PG_MODEL: NORMAL
MDC_IDC_PG_SERIAL: NORMAL
MDC_IDC_PG_TYPE: NORMAL
MDC_IDC_SESS_CLINIC_NAME: NORMAL
MDC_IDC_SESS_TYPE: NORMAL

## 2024-07-03 PROCEDURE — 93294 REM INTERROG EVL PM/LDLS PM: CPT | Performed by: INTERNAL MEDICINE

## 2024-07-03 PROCEDURE — 93296 REM INTERROG EVL PM/IDS: CPT | Performed by: INTERNAL MEDICINE

## 2024-07-08 ENCOUNTER — APPOINTMENT (OUTPATIENT)
Dept: ULTRASOUND IMAGING | Facility: HOSPITAL | Age: 84
End: 2024-07-08
Attending: EMERGENCY MEDICINE
Payer: MEDICARE

## 2024-07-08 ENCOUNTER — APPOINTMENT (OUTPATIENT)
Dept: GENERAL RADIOLOGY | Facility: HOSPITAL | Age: 84
End: 2024-07-08
Attending: EMERGENCY MEDICINE
Payer: MEDICARE

## 2024-07-08 ENCOUNTER — TELEPHONE (OUTPATIENT)
Dept: FAMILY MEDICINE CLINIC | Facility: CLINIC | Age: 84
End: 2024-07-08

## 2024-07-08 ENCOUNTER — APPOINTMENT (OUTPATIENT)
Dept: CT IMAGING | Facility: HOSPITAL | Age: 84
End: 2024-07-08
Attending: EMERGENCY MEDICINE
Payer: MEDICARE

## 2024-07-08 ENCOUNTER — HOSPITAL ENCOUNTER (INPATIENT)
Facility: HOSPITAL | Age: 84
LOS: 9 days | Discharge: NURSING, REHAB OR LONG-TERM CARE | End: 2024-07-18
Attending: EMERGENCY MEDICINE | Admitting: HOSPITALIST
Payer: MEDICARE

## 2024-07-08 DIAGNOSIS — R53.1 WEAKNESS: Primary | ICD-10-CM

## 2024-07-08 DIAGNOSIS — R73.9 HYPERGLYCEMIA: ICD-10-CM

## 2024-07-08 DIAGNOSIS — M25.561 ARTHRALGIA OF RIGHT LOWER LEG: ICD-10-CM

## 2024-07-08 DIAGNOSIS — R41.82 ALTERED MENTAL STATUS, UNSPECIFIED ALTERED MENTAL STATUS TYPE: ICD-10-CM

## 2024-07-08 DIAGNOSIS — K62.5 RECTAL BLEEDING: ICD-10-CM

## 2024-07-08 DIAGNOSIS — N30.01 ACUTE CYSTITIS WITH HEMATURIA: ICD-10-CM

## 2024-07-08 DIAGNOSIS — I10 HYPERTENSION, UNSPECIFIED TYPE: Primary | ICD-10-CM

## 2024-07-08 LAB
ALBUMIN SERPL-MCNC: 3 G/DL (ref 3.4–5)
ALBUMIN/GLOB SERPL: 0.7 {RATIO} (ref 1–2)
ALP LIVER SERPL-CCNC: 159 U/L
ALT SERPL-CCNC: 48 U/L
ANION GAP SERPL CALC-SCNC: 7 MMOL/L (ref 0–18)
APTT PPP: 42.3 SECONDS (ref 23–36)
AST SERPL-CCNC: 26 U/L (ref 15–37)
BASOPHILS # BLD AUTO: 0.07 X10(3) UL (ref 0–0.2)
BASOPHILS NFR BLD AUTO: 1 %
BILIRUB SERPL-MCNC: 0.3 MG/DL (ref 0.1–2)
BILIRUB UR QL STRIP.AUTO: NEGATIVE
BUN BLD-MCNC: 51 MG/DL (ref 9–23)
CALCIUM BLD-MCNC: 8.6 MG/DL (ref 8.5–10.1)
CHLORIDE SERPL-SCNC: 108 MMOL/L (ref 98–112)
CO2 SERPL-SCNC: 19 MMOL/L (ref 21–32)
CREAT BLD-MCNC: 2.7 MG/DL
EGFRCR SERPLBLD CKD-EPI 2021: 17 ML/MIN/1.73M2 (ref 60–?)
EOSINOPHIL # BLD AUTO: 0.25 X10(3) UL (ref 0–0.7)
EOSINOPHIL NFR BLD AUTO: 3.7 %
ERYTHROCYTE [DISTWIDTH] IN BLOOD BY AUTOMATED COUNT: 15.4 %
FLUAV + FLUBV RNA SPEC NAA+PROBE: NEGATIVE
FLUAV + FLUBV RNA SPEC NAA+PROBE: NEGATIVE
GLOBULIN PLAS-MCNC: 4.6 G/DL (ref 2.8–4.4)
GLUCOSE BLD-MCNC: 246 MG/DL (ref 70–99)
GLUCOSE BLD-MCNC: 361 MG/DL (ref 70–99)
GLUCOSE UR STRIP.AUTO-MCNC: 150 MG/DL
HCT VFR BLD AUTO: 35.9 %
HGB BLD-MCNC: 11.3 G/DL
IMM GRANULOCYTES # BLD AUTO: 0.06 X10(3) UL (ref 0–1)
IMM GRANULOCYTES NFR BLD: 0.9 %
INR BLD: 1.58 (ref 0.8–1.2)
KETONES UR STRIP.AUTO-MCNC: NEGATIVE MG/DL
LEUKOCYTE ESTERASE UR QL STRIP.AUTO: 25
LYMPHOCYTES # BLD AUTO: 1.12 X10(3) UL (ref 1–4)
LYMPHOCYTES NFR BLD AUTO: 16.4 %
MCH RBC QN AUTO: 30.1 PG (ref 26–34)
MCHC RBC AUTO-ENTMCNC: 31.5 G/DL (ref 31–37)
MCV RBC AUTO: 95.7 FL
MONOCYTES # BLD AUTO: 0.47 X10(3) UL (ref 0.1–1)
MONOCYTES NFR BLD AUTO: 6.9 %
NEUTROPHILS # BLD AUTO: 4.86 X10 (3) UL (ref 1.5–7.7)
NEUTROPHILS # BLD AUTO: 4.86 X10(3) UL (ref 1.5–7.7)
NEUTROPHILS NFR BLD AUTO: 71.1 %
NITRITE UR QL STRIP.AUTO: NEGATIVE
NT-PROBNP SERPL-MCNC: ABNORMAL PG/ML (ref ?–450)
OSMOLALITY SERPL CALC.SUM OF ELEC: 306 MOSM/KG (ref 275–295)
PH UR STRIP.AUTO: 6 [PH] (ref 5–8)
PLATELET # BLD AUTO: 269 10(3)UL (ref 150–450)
POTASSIUM SERPL-SCNC: 5.3 MMOL/L (ref 3.5–5.1)
PROT SERPL-MCNC: 7.6 G/DL (ref 6.4–8.2)
PROT UR STRIP.AUTO-MCNC: 100 MG/DL
PROTHROMBIN TIME: 19 SECONDS (ref 11.6–14.8)
RBC # BLD AUTO: 3.75 X10(6)UL
RBC #/AREA URNS AUTO: >10 /HPF
RSV RNA SPEC NAA+PROBE: NEGATIVE
SARS-COV-2 RNA RESP QL NAA+PROBE: NOT DETECTED
SODIUM SERPL-SCNC: 134 MMOL/L (ref 136–145)
SP GR UR STRIP.AUTO: 1.01 (ref 1–1.03)
TROPONIN I SERPL HS-MCNC: 24 NG/L
UROBILINOGEN UR STRIP.AUTO-MCNC: NORMAL MG/DL
WBC # BLD AUTO: 6.8 X10(3) UL (ref 4–11)

## 2024-07-08 PROCEDURE — 99223 1ST HOSP IP/OBS HIGH 75: CPT | Performed by: STUDENT IN AN ORGANIZED HEALTH CARE EDUCATION/TRAINING PROGRAM

## 2024-07-08 PROCEDURE — 71045 X-RAY EXAM CHEST 1 VIEW: CPT | Performed by: EMERGENCY MEDICINE

## 2024-07-08 PROCEDURE — 70450 CT HEAD/BRAIN W/O DYE: CPT | Performed by: EMERGENCY MEDICINE

## 2024-07-08 PROCEDURE — 93970 EXTREMITY STUDY: CPT | Performed by: EMERGENCY MEDICINE

## 2024-07-08 RX ORDER — INSULIN ASPART 100 [IU]/ML
0.15 INJECTION, SOLUTION INTRAVENOUS; SUBCUTANEOUS ONCE
Status: DISCONTINUED | OUTPATIENT
Start: 2024-07-08 | End: 2024-07-13

## 2024-07-08 RX ORDER — HYDRALAZINE HYDROCHLORIDE 20 MG/ML
5 INJECTION INTRAMUSCULAR; INTRAVENOUS ONCE
Status: COMPLETED | OUTPATIENT
Start: 2024-07-08 | End: 2024-07-08

## 2024-07-08 NOTE — TELEPHONE ENCOUNTER
Delta left for Aleja advising her I will be faxing urine orders. If conditions worsens or she develops new symptoms needs to go to the IC/ER.    I also called daughter Elsie to advise her of POC. While talking to patient's daughter she expressed further concern. She is confused. She had a BM the other day and didn't realize she had been incontinent. She was extremely weak yesterday and very lethargic. Legs are swollen as well. I recommended a more urgent eval in the ER today. Daughter was agreeable to POC and was going to have her transported via ambulance.

## 2024-07-08 NOTE — TELEPHONE ENCOUNTER
Spoke w/ home health RN Aleja. She went to see patient this morning. She seems a little more confused today and complaining of leg weakness. When Aleja saw her Friday she was ambulating without difficulty but having a harder time today. She did speak w/ patient's daughter who said she did seem a little off yesterday. I did suggest that she should probably be evlauated at least in the immediate care but Aleja says her daughter would not be able to take her today. She is in assisted living so she is being monitored. OK for UA/C&S and or blood work for now? Home Health can have mobile lab do today. I did tell Aleja that if she continues to decline or develops any other new symptoms than she should be taken to the ER by ambulance today.

## 2024-07-08 NOTE — TELEPHONE ENCOUNTER
Aleja from [facility] requesting orders for patient, for urine culture.     Please fax orders to [phone]

## 2024-07-08 NOTE — ED INITIAL ASSESSMENT (HPI)
Family reports SBP in 200's this afternoon. She states she checked the Pts blood pressure due to her being more sleepy. This RN notices a questionable R facial droop. Family has not noticed it until now. Unknown LKW. PT is on blood thinners. PT denies headache, visual changes. No slurred speech or arm drift noted.

## 2024-07-09 PROBLEM — R41.82 ALTERED MENTAL STATUS, UNSPECIFIED ALTERED MENTAL STATUS TYPE: Status: ACTIVE | Noted: 2024-01-01

## 2024-07-09 PROBLEM — N30.01 ACUTE CYSTITIS WITH HEMATURIA: Status: ACTIVE | Noted: 2024-07-09

## 2024-07-09 PROBLEM — R73.9 HYPERGLYCEMIA: Status: ACTIVE | Noted: 2024-07-09

## 2024-07-09 PROBLEM — M25.561 ARTHRALGIA OF RIGHT LOWER LEG: Status: ACTIVE | Noted: 2024-07-09

## 2024-07-09 PROBLEM — R41.82 ALTERED MENTAL STATUS, UNSPECIFIED ALTERED MENTAL STATUS TYPE: Status: ACTIVE | Noted: 2024-07-09

## 2024-07-09 PROBLEM — N30.01 ACUTE CYSTITIS WITH HEMATURIA: Status: ACTIVE | Noted: 2024-01-01

## 2024-07-09 PROBLEM — K62.5 RECTAL BLEEDING: Status: ACTIVE | Noted: 2024-01-01

## 2024-07-09 PROBLEM — R73.9 HYPERGLYCEMIA: Status: ACTIVE | Noted: 2024-01-01

## 2024-07-09 PROBLEM — K62.5 RECTAL BLEEDING: Status: ACTIVE | Noted: 2024-07-09

## 2024-07-09 PROBLEM — M25.561 ARTHRALGIA OF RIGHT LOWER LEG: Status: ACTIVE | Noted: 2024-01-01

## 2024-07-09 LAB
ALBUMIN SERPL-MCNC: 2.7 G/DL (ref 3.4–5)
ALBUMIN/GLOB SERPL: 0.6 {RATIO} (ref 1–2)
ALP LIVER SERPL-CCNC: 142 U/L
ALT SERPL-CCNC: 36 U/L
ANION GAP SERPL CALC-SCNC: 5 MMOL/L (ref 0–18)
AST SERPL-CCNC: 14 U/L (ref 15–37)
ATRIAL RATE: 61 BPM
BASOPHILS # BLD AUTO: 0.06 X10(3) UL (ref 0–0.2)
BASOPHILS NFR BLD AUTO: 0.8 %
BILIRUB SERPL-MCNC: 0.4 MG/DL (ref 0.1–2)
BUN BLD-MCNC: 46 MG/DL (ref 9–23)
CALCIUM BLD-MCNC: 8.9 MG/DL (ref 8.5–10.1)
CHLORIDE SERPL-SCNC: 113 MMOL/L (ref 98–112)
CO2 SERPL-SCNC: 22 MMOL/L (ref 21–32)
CREAT BLD-MCNC: 2.29 MG/DL
EGFRCR SERPLBLD CKD-EPI 2021: 20 ML/MIN/1.73M2 (ref 60–?)
EOSINOPHIL # BLD AUTO: 0.33 X10(3) UL (ref 0–0.7)
EOSINOPHIL NFR BLD AUTO: 4.4 %
ERYTHROCYTE [DISTWIDTH] IN BLOOD BY AUTOMATED COUNT: 15.4 %
GLOBULIN PLAS-MCNC: 4.2 G/DL (ref 2.8–4.4)
GLUCOSE BLD-MCNC: 170 MG/DL (ref 70–99)
GLUCOSE BLD-MCNC: 174 MG/DL (ref 70–99)
GLUCOSE BLD-MCNC: 182 MG/DL (ref 70–99)
GLUCOSE BLD-MCNC: 209 MG/DL (ref 70–99)
GLUCOSE BLD-MCNC: 243 MG/DL (ref 70–99)
GLUCOSE BLD-MCNC: 264 MG/DL (ref 70–99)
GLUCOSE BLD-MCNC: 265 MG/DL (ref 70–99)
GLUCOSE BLD-MCNC: 268 MG/DL (ref 70–99)
GLUCOSE BLD-MCNC: 386 MG/DL (ref 70–99)
HCT VFR BLD AUTO: 35.1 %
HGB BLD-MCNC: 11.3 G/DL
IMM GRANULOCYTES # BLD AUTO: 0.04 X10(3) UL (ref 0–1)
IMM GRANULOCYTES NFR BLD: 0.5 %
LYMPHOCYTES # BLD AUTO: 0.93 X10(3) UL (ref 1–4)
LYMPHOCYTES NFR BLD AUTO: 12.4 %
MAGNESIUM SERPL-MCNC: 1.8 MG/DL (ref 1.6–2.6)
MCH RBC QN AUTO: 30.4 PG (ref 26–34)
MCHC RBC AUTO-ENTMCNC: 32.2 G/DL (ref 31–37)
MCV RBC AUTO: 94.4 FL
MONOCYTES # BLD AUTO: 0.45 X10(3) UL (ref 0.1–1)
MONOCYTES NFR BLD AUTO: 6 %
NEUTROPHILS # BLD AUTO: 5.66 X10 (3) UL (ref 1.5–7.7)
NEUTROPHILS # BLD AUTO: 5.66 X10(3) UL (ref 1.5–7.7)
NEUTROPHILS NFR BLD AUTO: 75.9 %
OSMOLALITY SERPL CALC.SUM OF ELEC: 306 MOSM/KG (ref 275–295)
P-R INTERVAL: 256 MS
PLATELET # BLD AUTO: 281 10(3)UL (ref 150–450)
POTASSIUM SERPL-SCNC: 4.6 MMOL/L (ref 3.5–5.1)
PROT SERPL-MCNC: 6.9 G/DL (ref 6.4–8.2)
Q-T INTERVAL: 430 MS
QRS DURATION: 96 MS
QTC CALCULATION (BEZET): 432 MS
R AXIS: 153 DEGREES
RBC # BLD AUTO: 3.72 X10(6)UL
SODIUM SERPL-SCNC: 140 MMOL/L (ref 136–145)
T AXIS: -14 DEGREES
VENTRICULAR RATE: 61 BPM
WBC # BLD AUTO: 7.5 X10(3) UL (ref 4–11)

## 2024-07-09 PROCEDURE — 99233 SBSQ HOSP IP/OBS HIGH 50: CPT | Performed by: HOSPITALIST

## 2024-07-09 RX ORDER — DEXTROSE MONOHYDRATE 25 G/50ML
50 INJECTION, SOLUTION INTRAVENOUS
Status: DISCONTINUED | OUTPATIENT
Start: 2024-07-09 | End: 2024-07-18

## 2024-07-09 RX ORDER — METOPROLOL SUCCINATE 50 MG/1
100 TABLET, EXTENDED RELEASE ORAL
Status: DISCONTINUED | OUTPATIENT
Start: 2024-07-09 | End: 2024-07-09

## 2024-07-09 RX ORDER — ALLOPURINOL 100 MG/1
100 TABLET ORAL DAILY
Status: DISCONTINUED | OUTPATIENT
Start: 2024-07-09 | End: 2024-07-18

## 2024-07-09 RX ORDER — CARVEDILOL 12.5 MG/1
12.5 TABLET ORAL 2 TIMES DAILY WITH MEALS
Status: DISCONTINUED | OUTPATIENT
Start: 2024-07-09 | End: 2024-07-18

## 2024-07-09 RX ORDER — MAGNESIUM OXIDE 400 MG/1
400 TABLET ORAL ONCE
Status: COMPLETED | OUTPATIENT
Start: 2024-07-09 | End: 2024-07-09

## 2024-07-09 RX ORDER — LEVOTHYROXINE SODIUM 0.07 MG/1
75 TABLET ORAL
Status: DISCONTINUED | OUTPATIENT
Start: 2024-07-09 | End: 2024-07-18

## 2024-07-09 RX ORDER — NICOTINE POLACRILEX 4 MG
15 LOZENGE BUCCAL
Status: DISCONTINUED | OUTPATIENT
Start: 2024-07-09 | End: 2024-07-18

## 2024-07-09 RX ORDER — LOSARTAN POTASSIUM 25 MG/1
25 TABLET ORAL DAILY
Status: DISCONTINUED | OUTPATIENT
Start: 2024-07-09 | End: 2024-07-11

## 2024-07-09 RX ORDER — NICOTINE POLACRILEX 4 MG
30 LOZENGE BUCCAL
Status: DISCONTINUED | OUTPATIENT
Start: 2024-07-09 | End: 2024-07-18

## 2024-07-09 RX ORDER — SODIUM CHLORIDE 9 MG/ML
INJECTION, SOLUTION INTRAVENOUS CONTINUOUS
Status: ACTIVE | OUTPATIENT
Start: 2024-07-09 | End: 2024-07-09

## 2024-07-09 RX ORDER — FUROSEMIDE 10 MG/ML
40 INJECTION INTRAMUSCULAR; INTRAVENOUS
Status: DISCONTINUED | OUTPATIENT
Start: 2024-07-09 | End: 2024-07-10

## 2024-07-09 RX ORDER — ESCITALOPRAM OXALATE 5 MG/1
5 TABLET ORAL DAILY
Status: DISCONTINUED | OUTPATIENT
Start: 2024-07-09 | End: 2024-07-18

## 2024-07-09 RX ORDER — DILTIAZEM HYDROCHLORIDE 120 MG/1
120 CAPSULE, EXTENDED RELEASE ORAL DAILY
Status: DISCONTINUED | OUTPATIENT
Start: 2024-07-09 | End: 2024-07-18

## 2024-07-09 RX ORDER — ATORVASTATIN CALCIUM 10 MG/1
10 TABLET, FILM COATED ORAL NIGHTLY
Status: DISCONTINUED | OUTPATIENT
Start: 2024-07-09 | End: 2024-07-18

## 2024-07-09 RX ORDER — AMIODARONE HYDROCHLORIDE 200 MG/1
200 TABLET ORAL DAILY
Status: DISCONTINUED | OUTPATIENT
Start: 2024-07-09 | End: 2024-07-18

## 2024-07-09 RX ORDER — GABAPENTIN 100 MG/1
100 CAPSULE ORAL 3 TIMES DAILY
Status: DISCONTINUED | OUTPATIENT
Start: 2024-07-09 | End: 2024-07-18

## 2024-07-09 NOTE — CONSULTS
Cardiology Consultation      Ciarra Salcido Patient Status:  Inpatient    1940 MRN [id number]   [facility] 3NE-A Attending Cabrera Pineda MD   Hosp Day # 0 PCP JUDY CYR MD     Reason for Consultation:  Hypertensive urgency    History of Present Illness:  Ciarra Salcido is a(n) 83 year old female with chronic medical conditions including diastolic heart failure, mild-moderate regurgitation and tricuspid regurgitation, persistent atrial fibrillation, s/p PPM, hypertension, hyperlipidemia, DM type II, CKD who presents with concern for elevated blood pressure.  She notes that her blood pressures have been elevated for the past 2 weeks.  Overall, complaining of generalized malaise.  Daughter states that she lives in assisted living and therefore they do not check her blood pressure.  But after noting that she was having worsening swelling in her lower extremities and even her face, she checked her blood pressure and found it to be in the 200 systolic range.  This prompted them to come into the emergency room for further evaluation.  Also notes possible increase in peripheral edema.  Otherwise, no other chest pain, palpitations, syncope, lightheadedness, dizziness, nausea, emesis.    History:  Past Medical History:    (HFpEF) heart failure with preserved ejection fraction (HCC)    Acute cystitis without hematuria    Acute deep vein thrombosis (DVT) of popliteal vein of right lower extremity (HCC)    Cataract    CHF exacerbation (HCC)    CKD (chronic kidney disease)    Congestive heart disease (HCC)    COVID-19    Diabetes (HCC)    Disorder of thyroid    DM2 (diabetes mellitus, type 2) (HCC)    Hearing impairment    High blood pressure    HTN (hypertension)    Hyperlipidemia    Hypothyroidism    Pulmonary embolism (HCC)    Shingles    Visual impairment     Past Surgical History:   Procedure Laterality Date    Cataract      Eye surgery      Hysterectomy      [facility]    Pacemaker monitor        Family History   Problem Relation Age of Onset    Other (Other) Mother         Old Age    Other (Other) Father         Old age      reports that she quit smoking about 13 years ago. Her smoking use included cigarettes. She started smoking about 63 years ago. She has a 50 pack-year smoking history. She has never used smokeless tobacco. She reports that she does not drink alcohol and does not use drugs.    Allergies:  No Known Allergies    Medications:    Current Facility-Administered Medications:     furosemide (Lasix) 10 mg/mL injection 40 mg, 40 mg, Intravenous, BID (Diuretic)    glucose (Dex4) 15 GM/59ML oral liquid 15 g, 15 g, Oral, Q15 Min PRN **OR** glucose (Glutose) 40% oral gel 15 g, 15 g, Oral, Q15 Min PRN **OR** glucose-vitamin C (Dex-4) chewable tab 4 tablet, 4 tablet, Oral, Q15 Min PRN **OR** dextrose 50% injection 50 mL, 50 mL, Intravenous, Q15 Min PRN **OR** glucose (Dex4) 15 GM/59ML oral liquid 30 g, 30 g, Oral, Q15 Min PRN **OR** glucose (Glutose) 40% oral gel 30 g, 30 g, Oral, Q15 Min PRN **OR** glucose-vitamin C (Dex-4) chewable tab 8 tablet, 8 tablet, Oral, Q15 Min PRN    insulin aspart (NovoLOG) 100 Units/mL FlexPen 2-10 Units, 2-10 Units, Subcutaneous, TID AC and HS    allopurinol (Zyloprim) tab 100 mg, 100 mg, Oral, Daily    amiodarone (Pacerone) tab 200 mg, 200 mg, Oral, Daily    dilTIAZem ER (Dilacor XR) 24 hr cap 120 mg, 120 mg, Oral, Daily    escitalopram (Lexapro) tab 5 mg, 5 mg, Oral, Daily    gabapentin (Neurontin) cap 100 mg, 100 mg, Oral, TID    levothyroxine (Synthroid) tab 75 mcg, 75 mcg, Oral, Before breakfast    [Held by provider] losartan (Cozaar) tab 25 mg, 25 mg, Oral, Daily    metoprolol succinate ER (Toprol XL) 24 hr tab 100 mg, 100 mg, Oral, 2x Daily(Beta Blocker)    atorvastatin (Lipitor) tab 10 mg, 10 mg, Oral, Nightly    insulin aspart (NovoLOG) 100 Units/mL vial 10 Units, 0.15 Units/kg, Subcutaneous, Once    Review of Systems:  A comprehensive review of systems was  negative if not otherwise mention in above HPI.    BP (!) 181/73 (BP Location: Left arm)   Pulse 60   Temp 98 °F (36.7 °C) (Oral)   Resp 16   Ht 5' 4\" (1.626 m)   Wt 162 lb 0.6 oz (73.5 kg)   SpO2 95%   BMI 27.81 kg/m²   Temp (24hrs), Av.7 °F (36.5 °C), Min:97.5 °F (36.4 °C), Max:98 °F (36.7 °C)       Intake/Output Summary (Last 24 hours) at 2024 1319  Last data filed at 2024 1156  Gross per 24 hour   Intake --   Output 450 ml   Net -450 ml     Wt Readings from Last 3 Encounters:   24 162 lb 0.6 oz (73.5 kg)   24 140 lb (63.5 kg)   24 140 lb (63.5 kg)       Physical Exam:   General: Alert and oriented x 3. No apparent distress. No respiratory or constitutional distress.  HEENT: Normocephalic, anicteric sclera, neck supple.  Neck: No JVD  Cardiac: Regular rate and rhythm. S1, S2 normal. No murmur, pericardial rub, S3.  Lungs: Clear anteriorly b/l.   Abdomen: Soft, non-tender. BS-present.  Extremities: Without clubbing, cyanosis. Mild edema b/l lower extremities.   Neurologic: Alert and oriented, normal affect.  Skin: Warm and dry.     Laboratory Data:  Lab Results   Component Value Date    WBC 7.5 2024    HGB 11.3 2024    HCT 35.1 2024    .0 2024    CREATSERUM 2.29 2024    BUN 46 2024     2024    K 4.6 2024     2024    CO2 22.0 2024     2024    CA 8.9 2024    ALB 2.7 2024    ALKPHO 142 2024    BILT 0.4 2024    TP 6.9 2024    AST 14 2024    ALT 36 2024    PTT 42.3 2024    INR 1.58 2024    PTP 19.0 2024    MG 1.8 2024    PGLU 170 2024       Imaging/results:  EKG-atrial paced with prolonged AV conduction, incomplete RBBB  CXR negative for acute cardiopulmonary disease  Venous ultrasound bilateral lower extremities negative for DVT  High-sensitivity troponin 24  proBNP 12,823      Assessment:  Hypertensive urgency  Acute on  chronic HFpEF  Persistent atrial fibrillation s/p GILLES/DCCV 11/2023  S/p PPM 9/2023  Moderate MR/TR, mild-moderate AI  Hyperlipidemia  DM 2  CKD      Plan:  Continue IV Lasix for now  Slow normalization of blood pressure starting later today  Antihypertensive regimen:  Losartan 25 mg daily-holding currently, improving renal function, will likely resume in the near future  Change metoprolol succinate to carvedilol 12.5 mg twice daily this evening  Continue amiodarone 200 mg daily, diltiazem 120 mg daily        Thank you for allowing me to participate in the care of your patient.      James Thibodeaux DO  Cardiologist  [facility]  7/9/2024 1:19 PM      Note to the patient: The 21st Century Cures Act makes medical notes like these available to patients in the interest of transparency. However, be advised that this is a medical document. It is intended as peer to peer communication. It is written in medical language and may contain abbreviations or verbiage that are unfamiliar. It may appear blunt or direct. Medical documents are intended to carry relevant information, facts as evident, and clinical opinion of the practitioner.     Disclaimer: Components of this note were documented using voice recognition system and are subject to errors not corrected at proofreading. Contact the author of this note for any clarifications.

## 2024-07-09 NOTE — H&P
[facility]     Ciarra Salcido Patient Status:  Emergency    1940 MRN [id number]   Location [facility] EMERGENCY DEPARTMENT Attending Solomon Lezama MD   Hosp Day # 0 PCP JUDY CYR MD     Chief Complaint: HTN, fatigue    Subjective:    History of Present Illness:     Ciarra Salcido is a 83 year old female with PMHx HFpEF/ DVT/ PE/ DM/ hypothyroidism/ HTN/ HLD/ A-fib who presented to the hospital for high blood pressures. She reports both her blood pressure and blood sugars have been high recently for the past 2 weeks. She has been more fatigued without focal complaints. She noticed increased peripheral edema but denied any dyspnea, orthopnea, PND, chest pain, palpitations. She has been taking her medications as prescribed and watches the salt in her diet. She also reports chronic issues with rectal bleeding with blood only on the toilet paper when wiping.    History/Other:    Past Medical History:  Past Medical History:    (HFpEF) heart failure with preserved ejection fraction (HCC)    Acute cystitis without hematuria    Acute deep vein thrombosis (DVT) of popliteal vein of right lower extremity (HCC)    Cataract    CHF exacerbation (HCC)    CKD (chronic kidney disease)    Congestive heart disease (HCC)    COVID-19    Diabetes (HCC)    Disorder of thyroid    DM2 (diabetes mellitus, type 2) (HCC)    Hearing impairment    High blood pressure    HTN (hypertension)    Hyperlipidemia    Hypothyroidism    Pulmonary embolism (HCC)    Shingles    Visual impairment     Past Surgical History:   Past Surgical History:   Procedure Laterality Date    Cataract      Eye surgery      Hysterectomy      [facility]    Pacemaker monitor        Family History:   Family History   Problem Relation Age of Onset    Other (Other) Mother         Old Age    Other (Other) Father         Old age     Social History:    reports that she quit smoking about 13 years ago. Her smoking use included  cigarettes. She started smoking about 63 years ago. She has a 50 pack-year smoking history. She has never used smokeless tobacco. She reports that she does not drink alcohol and does not use drugs.     Allergies: No Known Allergies    Medications:    Current Facility-Administered Medications on File Prior to Encounter   Medication Dose Route Frequency Provider Last Rate Last Admin    [COMPLETED] HYDROcodone-acetaminophen (Norco) 5-325 MG per tab 1 tablet  1 tablet Oral Once Prisca Delcid PA-C   1 tablet at 05/14/24 1203     Current Outpatient Medications on File Prior to Encounter   Medication Sig Dispense Refill    SITagliptin Phosphate (JANUVIA) 50 MG Oral Tab TAKE 1 TABLET EVERY DAY 90 tablet 0    hydrocortisone 2.5 % External Cream One application BID PRN on the external hemorrhoids      FUROSEMIDE 20 MG Oral Tab TAKE 1 TABLET DAILY. MAY TAKE ADDITIONAL TABLET DAILY AS NEEDED FOR EDEMA WHEN DIRECTED BY MD (EXTRA TABLETS INCLUDED) 120 tablet 3    glimepiride 4 MG Oral Tab Take 1 tablet (4 mg total) by mouth in the morning and 1 tablet (4 mg total) before bedtime. 180 tablet 1    simvastatin 20 MG Oral Tab TAKE 1 TABLET EVERY NIGHT 90 tablet 0    escitalopram 5 MG Oral Tab TAKE 1 TABLET EVERY DAY 90 tablet 0    HYDROcodone-acetaminophen 5-325 MG Oral Tab Take 1 tablet by mouth every 8 (eight) hours as needed for Pain. 10 tablet 0    Glucose Blood (ACCU-CHEK GUIDE) In Vitro Strip       Blood Glucose Monitoring Suppl (ACCU-CHEK GUIDE) w/Device Does not apply Kit       Accu-Chek FastClix Lancets Does not apply Misc 1 Lancet by Finger stick route. Use as directed.      apixaban 5 MG Oral Tab take 1 tab (5mg) by mouth twice daily (Patient taking differently: Take 1 tablet (5 mg total) by mouth 2 (two) times daily. take 1 tab (5mg) by mouth twice daily) 60 tablet 0    GABAPENTIN 100 MG Oral Cap TAKE 1 CAPSULE THREE TIMES DAILY 270 capsule 3    amiodarone 200 MG Oral Tab Take 1 tablet (200 mg total) by mouth daily.       levothyroxine 75 MCG Oral Tab Take 1 tablet (75 mcg total) by mouth before breakfast. 90 tablet 3    losartan 25 MG Oral Tab Take 1 tablet (25 mg total) by mouth daily. 30 tablet 3    allopurinol 100 MG Oral Tab Take 1 tablet (100 mg total) by mouth daily. 30 tablet 11    dilTIAZem  MG Oral Capsule SR 24 Hr Take 1 capsule (120 mg total) by mouth daily.      metoprolol succinate  MG Oral Tablet 24 Hr Take 1 tablet (100 mg total) by mouth 2x Daily(Beta Blocker). 60 tablet 3    Cholecalciferol (VITAMIN D3) 25 MCG (1000 UT) Oral Cap Take 1 tablet by mouth daily.      acetaminophen 500 MG Oral Tab Take 2 tablets (1,000 mg total) by mouth every 6 (six) hours as needed for Pain or Fever.         Review of Systems:   A comprehensive review of systems was completed.    Pertinent positives and negatives noted in the HPI.    Objective:   Physical Exam:    BP (!) 169/76   Pulse 59   Temp 97.7 °F (36.5 °C) (Temporal)   Resp 19   Ht 5' 4\" (1.626 m)   Wt 150 lb (68 kg)   SpO2 96%   BMI 25.75 kg/m²   General: No acute distress, Alert  Respiratory: No rhonchi, no wheezes, on room air, normal work of breathing  Cardiovascular: S1, S2. Regular rate, paced  Abdomen: Soft, Non-tender, non-distended, positive bowel sounds  Neuro: No new focal deficits  Extremities: +2 pitting bilateral pre-tibial edema    Results:    Labs:      Labs Last 24 Hours:    Recent Labs   Lab 07/08/24  1857   RBC 3.75*   HGB 11.3*   HCT 35.9   MCV 95.7   MCH 30.1   MCHC 31.5   RDW 15.4   NEPRELIM 4.86   WBC 6.8   .0       Recent Labs   Lab 07/08/24  1849   *   BUN 51*   CREATSERUM 2.70*   EGFRCR 17*   CA 8.6   ALB 3.0*   *   K 5.3*      CO2 19.0*   ALKPHO 159*   AST 26   ALT 48   BILT 0.3   TP 7.6       Lab Results   Component Value Date    INR 1.58 (H) 07/08/2024    INR 1.68 (H) 09/11/2023    INR 1.51 (H) 09/10/2023       Recent Labs   Lab 07/08/24 1849   TROPHS 24       Recent Labs   Lab 07/08/24 1849   PBNP  12,823*       No results for input(s): \"PCT\" in the last 168 hours.    Imaging: Imaging data reviewed in Epic.    Assessment & Plan:      #HTN urgency with suspected acute on chronic HFpEF and right sided failure  -BP as high as 225/91, s/p 10 IV hydralazine in ED  -BNP 12,823 with baseline around 10,000  -EKG showing A-paced rhythm @60 bpm  -venous duplex negative for DVT  -chest xray without acute process  -echo Nov 2023: EF 60-65%, mild-mod aortic regurg, mod mirtal regurg, mod tricuspid regurg  -plan for nitroglycerin paste for HTN urgency with goal reduction of BP by 25% over 24 hrs  -lasix 40 IV BID  -strict I/O, daily weights  -interrogate PPM to assess for A-fib burden which could be contributing  -monitor on telemetry  -cont home losartan, amiodarone, diltiazem, metoprolol  #Atrial fibrillation    #Hyponatremia  -mild at 134  -likely 2/2 volume overload and CHF  -tx as above  -cont to monitor BMP  -can allow to correct to normal over 24 hrs    #Hyperkalemia  -mild at 5.3  -monitor closely    #NAGMA  -CO2 of 19 with AG of 7  -likely 2/2 underlying renal ds  -cont to monitor  -consider addition of bicarb tabs if not improving    #normocytic anemia with heme positive stools  -hgb 11.3 with baseline 11-12  -heme-occult trace positive per ED physician  -sounds likely to be hemorrhoidal based on pt report  -cont to monitor CBC and transfuse for hgb <7  -liquid diet until hgb stable  -if hgb down trending consider GI eval  -hold home apixaban until hgb stable    #hypoalbuminemia  -albumin 3.0  -may be 2/2 volume overloaded state  -cont to monitor    #DM  -SSI, QID checks, hypoglycemia protocol  -hold home sitagliptin, Januvia, glimepiride  -cont home gabapentin    #HLD  -cont home simvastatin    #Depression  -cont home escitalopram    #Hypothyroidism  -cont home levothyroxine    #Gout  -cont home allopurinol    #Asymptomatic bacteriuria  -pt denied any urinary complaints, normal WBC and afebrile  -monitor off  antibiotics      Plan of care discussed with ED physician    Katey Silva DO    Supplementary Documentation:     The 21st Century Cures Act makes medical notes like these available to patients in the interest of transparency. Please be advised this is a medical document. Medical documents are intended to carry relevant information, facts as evident, and the clinical opinion of the practitioner. The medical note is intended as peer to peer communication and may appear blunt or direct. It is written in medical language and may contain abbreviations or verbiage that are unfamiliar.

## 2024-07-09 NOTE — PROGRESS NOTES
NURSING ADMISSION NOTE      Patient admitted via Cart  Oriented to room.  Safety precautions initiated.  Bed in low position.  Call light in reach.    Pt A&Ox4. RA, placed on 2L NC due to O2 sat in 80's. [place], hearing aids at the bedside. CLD with accuchecks. Non-cardiac electrolyte protocol. A paced on tele. Is and Os. Daily wt. Bed in lowest position, call light within reach. Will continue with plan of care.

## 2024-07-09 NOTE — PLAN OF CARE
Patient alert and oriented x4.  On 2L, sating 94%.  NSR on tele.   Apaced on tele.  HR 60s.  Denies pain at this time.  Clear liquid diet.  Resting comfortably in bed.     Bps still elevated.  MD notified.    Cards consulted.

## 2024-07-09 NOTE — CM/SW NOTE
07/09/24 1700   CM/SW Referral Data   Referral Source Social Work (self-referral)   Reason for Referral Discharge planning   Informant Clinical Staff Member;EMR   Patient Info   Patient's Home Environment Assisted Living   Post Acute Care Provider Upon Admission   (\[facility]")   Patient lives with Alone   Discharge Needs   Anticipated D/C needs Assisted/Supported living;To be determined     Pt is a 84 y/o female admitted with hypertension. SW received call from Alesia at [facility] stating pt is current with [facility] for PT services. Chart reviewed and noted pt is from [facility].     Referral sent to [facility] in [place]. PT eval pending. SW will need to reach out to [facility] closer to discharge. SW will continue to follow.     KEO Goldman  Discharge Planner

## 2024-07-09 NOTE — HISTORICAL OFFICE NOTE
Facility Logo [facility]  [address], 4th floor, [place] [address]  [phone]      Ciarra Salcido  Progress Note  Demographics:  Name: Ciarra Salcido YOB: 1940  Age: 83, Female Medical Record No: [id number]  Visited Date/Time: 12/20/2023 10:40 AM    Chief Complaints  hospital follow up  History of Present Illness  Follow-up visit for Ms. Salcido.  Her insurance asked that she see our advocate for electrophysiologists.  Our electrophysiologist in the office you are not covered by her insurance.  Since I last seen her she has gone through another cardioversion with . He told her that if this happens again she will probably need an ablation I suspect a AV node ablation as she already has a pacer.  In our office today she has atrial paced rhythm.  Otherwise unremarkable EKG.  She describes chronic daytime sleepiness but no chest pain or heart failure-like symptoms.  On physical exam she appears euvolemic with a quiet precordium Mao.      And nondistended neck veins.  Cardiac risk factors Hypertension, Dyslipidemia and Former smoker  Past Medical History  1.Atrial fibrillation with RVR  2.History of pulmonary embolus (PE) - Hx  3.Bradycardia  4.Hypertension (HTN), primary  5.Hyperlipidemia (unspecified)  6.Pulmonary hypertension, not otherwise specified or secondary  7.DM Type 2 (diabetes mellitus)  8.CKD (chronic kidney disease) stage 3, GFR 30-59 ml/min  9.Shingles  10.Essential hypertension, benign  11.CKD (chronic kidney disease), stage III  12.Hyperlipidemia  13.Acquired hypothyroidism  14.Osteopenia  15.Symptomatic bradycardia  16.Diabetic polyneuropathy associated with type 2 diabetes mellitus  17.Current moderate episode of major depressive disorder without prior episode  18.CHF exacerbation  19.Acute on chronic congestive heart failure, unspecified heart failure type  20.Acute pulmonary edema  21.Acute respiratory failure with hypoxia  22.Pleural  effusion  23.Multiple subsegmental pulmonary emboli without acute cor pulmonale  24.Acute deep vein thrombosis (DVT) of popliteal vein of right lower extremity  25.Acute on chronic congestive heart failure, unspecified heart failure type (HCC)  Past Surgical History  1.Total abdominal hysterectomy nec  Family History  1. Father - Acute myocardial infarction, unspecified  2. Mother - Acute myocardial infarction, unspecified  Social History  Smoking status Former [id number] (End Date)  Tobacco usage - No (Ex-smoker (finding))  Review of systems  Cardiovascular No history of Chest pain, MARIE, Palpitations, Syncope, PND, Orthopnea, Edema and Claudication  Physical Examination  Vitals Right Arm Sitting  / 62 mmHg, Pulse rate 62 bpm, Height in 5' 6\", BMI: 21.9, Weight in 136 lbs (or) 62 kgs and BSA : 1.7 cc/m²  General Appearance No Acute Distress  Cardiovascular s1, s2 reg, no murmur and  Lower Extremities no edema  EKG/Other abnormalities  HEENT:  EOMI, PERRL  Neck: trachea midline  Lungs: CTAB  Skin: warm and dry  Neuro: moving extremities appropriately  Allergies  No medication allergies noted.  Medications (Info obtained by: Verbal)  1.allopurinoL 100 mg tablet, Take 1 tablet orally once a day.  2.amiodarone 200 mg tablet, Take 1 tablet orally once a day.  3.dilTIAZem 120 mg tablet, Take 1 tablet orally once a day.  4.Eliquis 5 mg tablet, Take 1 tablet orally 2 times a day.  5.ESCITALOPRAM 5 MG Oral Tab, TAKE 1 TABLET EVERY DAY  6.furosemide 40 mg tablet, Take 2 tablets orally once a day.  7.GABAPENTIN 100 MG Oral Cap, TAKE 1 CAPSULE THREE TIMES DAILY  8.glimepiride (AMARYL) 4 MG tablet, Take 4 mg by mouth.  9.levothyroxine 75 MCG Oral Tab, Take 1 tablet (75 mcg total) by mouth before breakfast.  10.losartan 25 mg tablet, Take 1 tablet orally once a day.  11.metFORMIN (GLUCOPHAGE) 850 MG tablet, TAKE 1 TABLET TWICE DAILY WITH MEALS  12.metoprolol succinate  mg tablet,extended release 24 hr, Take 1  tablet orally 2 times a day.  13.simvastatin (ZOCOR) 20 MG tablet, TAKE 1 TABLET EVERY NIGHT  Impression  1.Atrial fibrillation with RVR  2.History of pulmonary embolus (PE) - Hx  3.Bradycardia  4.Hypertension (HTN), primary  5.Hyperlipidemia (unspecified)  6.Pulmonary hypertension, not otherwise specified or secondary  Assessment & Plan  Compensated cardiac status.  Will try to obtain the [facility] lab work.  Will refill her losartan 25 mg daily.  This seems to control her blood pressure better than the lisinopril.  No additional cardiac testing needed.  Follow-up with me in a year.  Medications Ordered  1.losartan 25 mg tablet, Take 1 tablet orally once a day.  Labs and Diagnostics ordered  1.EKG (electrocardiogram) (Today)  Future appointments  1.Follow up visit - Lucas Fong MD (1 Year)  Miscellaneous  1.Reviewed platelets, glucose, sodium, potassium, chloride, co2, anion gap, bun, creatinine, calcium, osmolality calculated, e gfr cr, troponin i high sensitivity and magnesium with the patient.  Nurses documentation  Triage - Nursing Doc: PW  Upcoming surgeries: NONE  Use of assistive devices(s): NONE  Triage & medication list reviewed by: PW  Refills completed: NONE  EKG today   Patient instructions  Follow up appointment unscheduled:   Your provider has requested for you to follow up in 1 year.  We will call you to schedule the appointment.  It is always important to bring all your medications including over the counter medications, vitamins and supplements to your doctor visits. This will assist in providing the best care for your condition. Please bring your insurance cards to your appointment.      Lab Details  PLATELET COUNT  01/31/2023 05:18:17 AM  PLATELETS 208.0 150.0-450.0 10(3)uL  F  PTT, ACTIVATED  01/29/2023 01:58:08 AM  PTT 73.8 23.3-35.6 seconds H F  BASIC METABOLIC PANEL (8)  01/28/2023 01:03:10 PM  GLUCOSE 321 70-99 mg/dL H F  SODIUM 135 136-145 mmol/L L F  POTASSIUM 4.1 3.5-5.1  mmol/L  F  CHLORIDE 99  mmol/L  F  CO2 32.0 21.0-32.0 mmol/L  F  ANION GAP 4 0-18 mmol/L  F  BUN 42 7-18 mg/dL H F  CREATININE 1.42 0.55-1.02 mg/dL H F  CALCIUM 8.9 8.5-10.1 mg/dL  F  OSMOLALITY CALCULATED 303 275-295 mOsm/kg H F  E GFR CR 36 >=60 mL/min/1.73m2 L F  CBC, PLATELET; NO DIFFERENTIAL  01/28/2023 12:50:49 PM  WBC 10.4 4.0-11.0 x10(3) uL  F  RED BLOOD COUNT 4.87 3.80-5.30 x10(6)uL  F  HGB 13.3 12.0-16.0 g/dL  F  HCT 42.1 35.0-48.0 %  F  PLATELETS 203.0 150.0-450.0 10(3)uL  F  MEAN CELL VOLUME 86.4 80.0-100.0 fL  F  MEAN CORPUSCULAR HEMOGLOBIN 27.3 26.0-34.0 pg  F  MEAN CORPUSCULAR HGB CONC 31.6 31.0-37.0 g/dL  F  RED CELL DISTRIBUTION WIDTH CV 14.7 %  F  TROPONIN I HIGH SENSITIVITY  01/28/2023 11:02:30 AM  TROPONIN I HIGH SENSITIVITY 42 <=54 ng/L  F  MAGNESIUM  01/27/2023 07:50:27 AM  MAGNESIUM 1.7 1.6-2.6 mg/dL  F  Diagnostics Details  ACTMonitoring 05/04/2023  1.This is an excellent quality study.    2.Predominant rhythm is normal sinus rhythm.    3.The minimum heart rate recorded was 43 beats / minute. The maximum heart rate is 167 beats / minute. The mean heart rate is 85 beats / minute.    4.Afib burden 22% at times with RVR.    5.This monitor shows a pattern of tachy-olivia with sinus rates in the 50s and AF with RVR.    CPOE Orders carried out by: [facility]  Care Providers: Lucas Fong MD and [facility]  Electronically Authenticated by  Lucas Fong MD  12/20/2023 01:30:52 PM  Disclaimer: Components of this note were documented using voice recognition system and are subject to errors not corrected at proofreading. Contact the author of this note for any clarifications.

## 2024-07-09 NOTE — ED QUICK NOTES
Orders for admission, patient is aware of plan and ready to go upstairs. Any questions, please call ED RN Stacie at extension [phone].     Patient Covid vaccination status: Fully vaccinated     COVID Test Ordered in ED: SARS-CoV-2/Flu A and B/RSV by PCR (GeneXpert)    COVID Suspicion at Admission: N/A    Running Infusions:  None    Mental Status/LOC at time of transport: A&Ox4.     Other pertinent information: Pt has hearing aids in place. Family at bedside  CIWA score: N/A   NIH score:  N/A

## 2024-07-09 NOTE — ED PROVIDER NOTES
Patient Seen in: [facility] Emergency Department      History     Chief Complaint   Patient presents with    Hypertension    Hyperglycemia     Stated Complaint: Weakness, HTN(SBP over 200) . Poss UTI    Subjective:   HPI    Patient is a 83-year-old female with medical history as noted below presents emergency room with a history of multiple complaints.  The patient is currently at assisted living the patient does have a history of pulmonary embolism and does take Eliquis.  The patient has had reportedly elevated blood pressures this afternoon and also has been having some rectal bleeding that has been ongoing recently and she has also been having some mental status changes has been much more sleepy over the last week and a half or so with decreased appetite.  The patient's family is giving history at the bedside.  The patient has had no history of any headache or chest pain.  The patient complains of some discomfort to her right lower extremity.  The patient denies history of any urinary complaints.  The patient has no known history of any fever.  Patient has had no history of any fall or trauma.    Objective:   Past Medical History:    (HFpEF) heart failure with preserved ejection fraction (HCC)    Acute cystitis without hematuria    Acute deep vein thrombosis (DVT) of popliteal vein of right lower extremity (HCC)    Cataract    CHF exacerbation (HCC)    CKD (chronic kidney disease)    Congestive heart disease (HCC)    COVID-19    Diabetes (HCC)    Disorder of thyroid    DM2 (diabetes mellitus, type 2) (HCC)    Hearing impairment    High blood pressure    HTN (hypertension)    Hyperlipidemia    Hypothyroidism    Pulmonary embolism (HCC)    Shingles    Visual impairment              Past Surgical History:   Procedure Laterality Date    Cataract      Eye surgery      Hysterectomy  1980    [facility]    Pacemaker monitor                  Social History     Socioeconomic History    Marital status: Single    Tobacco Use    Smoking status: Former     Current packs/day: 0.00     Average packs/day: 1 pack/day for 50.0 years (50.0 ttl pk-yrs)     Types: Cigarettes     Start date:      Quit date:      Years since quittin.5    Smokeless tobacco: Never   Vaping Use    Vaping status: Never Used   Substance and Sexual Activity    Alcohol use: Never    Drug use: Never   Other Topics Concern    Caffeine Concern Yes     Comment: 2 cups of coffee daily     Stress Concern No    Weight Concern No    Special Diet No    Exercise Yes     Comment: PT 2 X Weekly, OT 2 x weekly    Seat Belt Yes     Social Determinants of Health     Financial Resource Strain: Low Risk  (1/3/2024)    Financial Resource Strain     Difficulty of Paying Living Expenses: Not very hard     Med Affordability: No   Food Insecurity: No Food Insecurity (1/3/2024)    Food Insecurity     Food Insecurity: Never true   Transportation Needs: No Transportation Needs (1/3/2024)    Transportation Needs     Lack of Transportation: No   Physical Activity: Inactive (1/3/2024)    Exercise Vital Sign     Days of Exercise per Week: 0 days     Minutes of Exercise per Session: 0 min   Stress: No Stress Concern Present (1/3/2024)    Stress     Feeling of Stress : No   Social Connections: Socially Isolated (1/3/2024)    Social Connections     Frequency of Socialization with Friends and Family: 0   Housing Stability: Low Risk  (1/3/2024)    Housing Stability     Housing Instability: No              Review of Systems    Positive for stated Chief Complaint: Hypertension and Hyperglycemia    Other systems are as noted in HPI.  Constitutional and vital signs reviewed.      All other systems reviewed and negative except as noted above.    Physical Exam     ED Triage Vitals [24 1826]   BP (!) 211/88   Pulse 58   Resp 15   Temp 97.7 °F (36.5 °C)   Temp src Temporal   SpO2 94 %   O2 Device None (Room air)       Current Vitals:   Vital Signs  BP: (!) 169/76  Pulse: 59  Resp:  19  Temp: 97.7 °F (36.5 °C)  Temp src: Temporal  MAP (mmHg): (!) 101    Oxygen Therapy  SpO2: 96 %  O2 Device: None (Room air)            Physical Exam    GENERAL: Well-developed, well-nourished female sitting up breathing easily in no apparent distress.  Patient is nontoxic in appearance.  HEENT: Head is normocephalic, atraumatic. Pupils are 4 mm equally round and reactive to light. Oropharynx is clear. Mucous membranes are somewhat dry.  NECK: There is no focal tenderness to palpation appreciated.   LUNGS: Clear to auscultation bilaterally with no wheeze. There is good equal air entry bilaterally.  HEART: Regular rate and rhythm. Normal S1, S2 no S3, or S4. No murmur.  ABDOMEN: There is no focal tenderness to palpation appreciated anywhere throughout the abdomen. There is no guarding, no rebound, no mass, and no organomegaly appreciated. There is normoactive bowel sounds.   RECTAL: There is brown stool which is trace Hemoccult positive no gross blood or melena appreciated.  EXTREMITIES: There is no cyanosis, clubbing, or edema appreciated. Pulses are 2+ and equal in all 4 extremities.  NEURO: Patient is awake, alert and oriented to time place and person. Motor strength is 5 over 5 in all 4 extremities. There are no gross motor or sensory deficits appreciated. Cranial nerves II through XII are intact.  Patient is answering all questions appropriately.        ED Course     Labs Reviewed   COMP METABOLIC PANEL (14) - Abnormal; Notable for the following components:       Result Value    Glucose 361 (*)     Sodium 134 (*)     Potassium 5.3 (*)     CO2 19.0 (*)     BUN 51 (*)     Creatinine 2.70 (*)     Calculated Osmolality 306 (*)     eGFR-Cr 17 (*)     Alkaline Phosphatase 159 (*)     Albumin 3.0 (*)     Globulin  4.6 (*)     A/G Ratio 0.7 (*)     All other components within normal limits   PROTHROMBIN TIME (PT) - Abnormal; Notable for the following components:    PT 19.0 (*)     INR 1.58 (*)     All other components  within normal limits   PTT, ACTIVATED - Abnormal; Notable for the following components:    PTT 42.3 (*)     All other components within normal limits   PRO BETA NATRIURETIC PEPTIDE - Abnormal; Notable for the following components:    Pro-Beta Natriuretic Peptide 12,823 (*)     All other components within normal limits   URINALYSIS WITH CULTURE REFLEX - Abnormal; Notable for the following components:    Clarity Urine Turbid (*)     Glucose Urine 150 (*)     Blood Urine 2+ (*)     Protein Urine 100 (*)     Leukocyte Esterase Urine 25 (*)     WBC Urine 11-20 (*)     RBC Urine >10 (*)     Bacteria Urine 1+ (*)     Squamous Epi. Cells Few (*)     All other components within normal limits   CBC W/ DIFFERENTIAL - Abnormal; Notable for the following components:    RBC 3.75 (*)     HGB 11.3 (*)     All other components within normal limits   TROPONIN I HIGH SENSITIVITY - Normal   SARS-COV-2/FLU A AND B/RSV BY PCR (GENEXPERT) - Normal    Narrative:     This test is intended for the qualitative detection and differentiation of SARS-CoV-2, influenza A, influenza B, and respiratory syncytial virus (RSV) viral RNA in nasopharyngeal or nares swabs from individuals suspected of respiratory viral infection consistent with COVID-19 by their healthcare provider. Signs and symptoms of respiratory viral infection due to SARS-CoV-2, influenza, and RSV can be similar.    Test performed using the Xpert Xpress SARS-CoV-2/FLU/RSV (real time RT-PCR)  assay on the GeneXpert instrument, [address], [address] [address].   This test is being used under the Food and Drug Administration's Emergency Use Authorization.    The authorized Fact Sheet for Healthcare Providers for this assay is available upon request from the laboratory.   CBC WITH DIFFERENTIAL WITH PLATELET    Narrative:     The following orders were created for panel order CBC With Differential With Platelet.  Procedure                               Abnormality         Status                      ---------                               -----------         ------                     CBC W/ DIFFERENTIAL[[id number]]          Abnormal            Final result                 Please view results for these tests on the individual orders.   RAINBOW DRAW LAVENDER   RAINBOW DRAW LIGHT GREEN   RAINBOW DRAW BLUE   URINE CULTURE, ROUTINE     EKG    Rate, intervals and axes as noted on EKG Report.  Rate: 61  Rhythm: Sinus Rhythm  Reading: No acute ST elevation appreciated.                 XR CHEST AP PORTABLE  (CPT=71045)    Result Date: 7/8/2024  CONCLUSION:  See above.   LOCATION:  [id number]      Dictated by (CST): Evangelist Layton MD on 7/08/2024 at 10:00 PM     Finalized by (CST): Evangelist Layton MD on 7/08/2024 at 10:00 PM       US VENOUS DOPPLER LEG BILAT - DIAG IMG (CPT=93970)    Result Date: 7/8/2024  CONCLUSION:  No evidence of deep venous thrombosis in the bilateral lower extremities.  LOCATION:  [facility]   Dictated by (CST): Ede Schaffer MD on 7/08/2024 at 9:31 PM     Finalized by (CST): Ede Schaffer MD on 7/08/2024 at 9:31 PM       CT BRAIN OR HEAD (39586)    Result Date: 7/8/2024  CONCLUSION:   1. No acute intracranial abnormality identified.  2. There are scattered small areas of encephalomalacia noted in the bilateral frontal lobes and right parietal lobe.  There are moderate age-indeterminate microvascular ischemic changes in the cerebral white matter. If there is clinical concern for acute  ischemia/infarction, an MRI of the brain would be recommended for further evaluation.  LOCATION:  [facility]   Dictated by (CST): Ede Schaffer MD on 7/08/2024 at 8:32 PM     Finalized by (CST): Ede Schaffer MD on 7/08/2024 at 8:41 PM                  MDM      22:52 patient sitting back and breathing easily in no apparent distress this time.  Patient answering all questions appropriately at this time.  Patient's family updated regarding plan for admission.  The patient will be admitted to the hospital for further care at  this time.    Admission disposition: 7/8/2024 10:51 PM           Patient had an IV line established blood work drawn including a CBC, chemistries, BUN and creatinine, and blood sugar showed evidence of a bicarb of 19 and a BUN/creatinine of 51 and 2.7 which is consistent with the patient's previous BUN and creatinine earlier this year.  Patient blood sugar is 361 which is elevated for which the patient received insulin here in the ER.  Liver function test and troponin found to be negative.  COVID, flu, and RSV are negative.  Patient's urinalysis which the cath urine specimen does show evidence of a urinary tract infection.  Patient is sitting back and breathing easily in no apparent distress on initial exam or repeat examination.  The patient had multiple imaging studies done including CT, ultrasound, and chest x-ray as noted above.  Patient has had multiple complaints she has markedly elevated blood pressure here in the ER for which she was given IV hydralazine with improvement in blood pressure after this was given.  The patient was given IV Rocephin here in the ER.  The patient will need her hemoglobin trended secondary to history of rectal bleeding.  The patient will be admitted to the hospital for further care at this time.  [facility]  Patient admitted with no further complaints.                             Medical Decision Making      Disposition and Plan     Clinical Impression:  1. Hypertension, unspecified type    2. Acute cystitis with hematuria    3. Altered mental status, unspecified altered mental status type    4. Arthralgia of right lower leg    5. Rectal bleeding         Disposition:  Admit  7/8/2024 10:51 pm    Follow-up:  No follow-up provider specified.        Medications Prescribed:  Current Discharge Medication List                            Hospital Problems       Present on Admission  Date Reviewed: 6/21/2024            ICD-10-CM Noted POA    *  (Principal) Hypertension, unspecified type I10 7/8/2024 Unknown

## 2024-07-09 NOTE — PROGRESS NOTES
[facility]   part of [facility]     Hospitalist Progress Note     Ciarra Salcido Patient Status:  Inpatient    1940 MRN [id number]   Location [facility] 3NE-A Attending Cabrera Pineda MD   Hosp Day # 0 PCP JUDY CYR MD     Chief Complaint: HTN, fatigue    Subjective:     Patient still with dyspnea, BP elevated.    Objective:    Review of Systems:   A comprehensive review of systems was completed; pertinent positive and negatives stated in subjective.    Vital signs:  Temp:  [97.5 °F (36.4 °C)-98 °F (36.7 °C)] 98 °F (36.7 °C)  Pulse:  [58-65] 60  Resp:  [15-23] 16  BP: (146-225)/(62-91) 181/73  SpO2:  [91 %-97 %] 95 %    Physical Exam:    General: No acute distress  Respiratory: No wheezes, no rhonchi  Cardiovascular: S1, S2, regular rate and rhythm  Abdomen: Soft, Non-tender, non-distended, positive bowel sounds  Neuro: No new focal deficits.   Extremities: No edema      Diagnostic Data:    Labs:  Recent Labs   Lab 24  18524  0731   WBC  --  6.8 7.5   HGB  --  11.3* 11.3*   MCV  --  95.7 94.4   PLT  --  269.0 281.0   INR 1.58*  --   --        Recent Labs   Lab 24  0730   * 174*   BUN 51* 46*   CREATSERUM 2.70* 2.29*   CA 8.6 8.9   ALB 3.0* 2.7*   * 140   K 5.3* 4.6    113*   CO2 19.0* 22.0   ALKPHO 159* 142   AST 26 14*   ALT 48 36   BILT 0.3 0.4   TP 7.6 6.9       Estimated Creatinine Clearance: 15.5 mL/min (A) (based on SCr of 2.29 mg/dL (H)).    Recent Labs   Lab 24   TROPHS 24       Recent Labs   Lab 24   PTP 19.0*   INR 1.58*                  Microbiology    No results found for this visit on 24.      Imaging: Reviewed in Epic.    Medications:    furosemide  40 mg Intravenous BID (Diuretic)    insulin aspart  2-10 Units Subcutaneous TID AC and HS    allopurinol  100 mg Oral Daily    amiodarone  200 mg Oral Daily    dilTIAZem ER  120 mg Oral Daily    escitalopram  5 mg Oral Daily     gabapentin  100 mg Oral TID    levothyroxine  75 mcg Oral Before breakfast    [Held by provider] losartan  25 mg Oral Daily    metoprolol succinate ER  100 mg Oral 2x Daily(Beta Blocker)    atorvastatin  10 mg Oral Nightly    insulin aspart  0.15 Units/kg Subcutaneous Once       Assessment & Plan:      #HTN urgency with suspected acute on chronic HFpEF and right sided failure  -BP as high as 225/91, s/p 10 IV hydralazine in ED  -BNP 12,823 with baseline around 10,000  -EKG showing A-paced rhythm @60 bpm  -venous duplex negative for DVT  -chest xray without acute process  -echo Nov 2023: EF 60-65%, mild-mod aortic regurg, mod mirtal regurg, mod tricuspid regurg  -plan for nitroglycerin paste for HTN urgency with goal reduction of BP by 25% over 24 hrs  -lasix 40 IV BID  -strict I/O, daily weights  -interrogate PPM to assess for A-fib burden which could be contributing  -monitor on telemetry  -cont home losartan, amiodarone, diltiazem, metoprolol  -IV hydralazine prn  -cardiology eval    #Atrial fibrillation, rate controlled, ?AC     #Hyponatremia  -mild at 134  -likely 2/2 volume overload and CHF  -tx as above  -cont to monitor BMP  -can allow to correct to normal over 24 hrs     #Hyperkalemia  -mild at 5.3  -monitor closely    #PRISCILLA on CKD, possible cardiorenal  Baseline Cr under 2  Cr improving with diuretics    #NAGMA  -CO2 of 19 with AG of 7  -likely 2/2 underlying renal ds  -cont to monitor  -consider addition of bicarb tabs if not improving     #normocytic anemia with heme positive stools  -hgb 11.3 with baseline 11-12  -heme-occult trace positive per ED physician  -sounds likely to be hemorrhoidal based on pt report  -cont to monitor CBC and transfuse for hgb <7  -liquid diet until hgb stable  -if hgb down trending consider GI eval  -hold home apixaban until hgb stable     #hypoalbuminemia  -albumin 3.0  -may be 2/2 volume overloaded state  -cont to monitor     #DM  -SSI, QID checks, hypoglycemia  protocol  -hold home sitagliptin, Januvia, glimepiride  -cont home gabapentin     #HLD  -cont home simvastatin     #Depression  -cont home escitalopram     #Hypothyroidism  -cont home levothyroxine     #Gout  -cont home allopurinol     #Asymptomatic bacteriuria  -pt denied any urinary complaints, normal WBC and afebrile  -monitor off antibiotics     Adonay Yates MD    Supplementary Documentation:     Quality:  DVT Mechanical Prophylaxis:   SCDs,    DVT Pharmacologic Prophylaxis   Medication   None                Code Status: DNAR/Selective Treatment  Hutchinson: No urinary catheter in place  Hutchinson Duration (in days):   Central line:    AMINTA:     Discharge is dependent on: progress  At this point Ms. Salcido is expected to be discharge to: home    The 21st Century Cures Act makes medical notes like these available to patients in the interest of transparency. Please be advised this is a medical document. Medical documents are intended to carry relevant information, facts as evident, and the clinical opinion of the practitioner. The medical note is intended as peer to peer communication and may appear blunt or direct. It is written in medical language and may contain abbreviations or verbiage that are unfamiliar.             **Certification      PHYSICIAN Certification of Need for Inpatient Hospitalization - Initial Certification    Patient will require inpatient services that will reasonably be expected to span two midnight's based on the clinical documentation in H+P.   Based on patients current state of illness, I anticipate that, after discharge, patient will require TBD.

## 2024-07-10 ENCOUNTER — APPOINTMENT (OUTPATIENT)
Dept: GENERAL RADIOLOGY | Facility: HOSPITAL | Age: 84
End: 2024-07-10
Attending: INTERNAL MEDICINE
Payer: MEDICARE

## 2024-07-10 PROBLEM — I16.0 HYPERTENSIVE URGENCY: Status: ACTIVE | Noted: 2024-07-10

## 2024-07-10 PROBLEM — E78.5 HYPERLIPIDEMIA: Status: ACTIVE | Noted: 2024-01-01

## 2024-07-10 PROBLEM — I16.0 HYPERTENSIVE URGENCY: Status: ACTIVE | Noted: 2024-01-01

## 2024-07-10 PROBLEM — E03.9 HYPOTHYROIDISM: Status: ACTIVE | Noted: 2024-07-10

## 2024-07-10 PROBLEM — E03.9 HYPOTHYROIDISM: Status: ACTIVE | Noted: 2024-01-01

## 2024-07-10 PROBLEM — E78.5 HYPERLIPIDEMIA: Status: ACTIVE | Noted: 2024-07-10

## 2024-07-10 LAB
ANION GAP SERPL CALC-SCNC: 9 MMOL/L (ref 0–18)
BUN BLD-MCNC: 53 MG/DL (ref 9–23)
CALCIUM BLD-MCNC: 8.6 MG/DL (ref 8.5–10.1)
CHLORIDE SERPL-SCNC: 108 MMOL/L (ref 98–112)
CO2 SERPL-SCNC: 20 MMOL/L (ref 21–32)
CREAT BLD-MCNC: 2.97 MG/DL
EGFRCR SERPLBLD CKD-EPI 2021: 15 ML/MIN/1.73M2 (ref 60–?)
GLUCOSE BLD-MCNC: 199 MG/DL (ref 70–99)
GLUCOSE BLD-MCNC: 213 MG/DL (ref 70–99)
GLUCOSE BLD-MCNC: 263 MG/DL (ref 70–99)
GLUCOSE BLD-MCNC: 277 MG/DL (ref 70–99)
GLUCOSE BLD-MCNC: 309 MG/DL (ref 70–99)
MAGNESIUM SERPL-MCNC: 2.2 MG/DL (ref 1.6–2.6)
OSMOLALITY SERPL CALC.SUM OF ELEC: 304 MOSM/KG (ref 275–295)
POTASSIUM SERPL-SCNC: 5.1 MMOL/L (ref 3.5–5.1)
SODIUM SERPL-SCNC: 137 MMOL/L (ref 136–145)

## 2024-07-10 PROCEDURE — 73523 X-RAY EXAM HIPS BI 5/> VIEWS: CPT | Performed by: INTERNAL MEDICINE

## 2024-07-10 PROCEDURE — 99232 SBSQ HOSP IP/OBS MODERATE 35: CPT | Performed by: INTERNAL MEDICINE

## 2024-07-10 RX ORDER — HYDRALAZINE HYDROCHLORIDE 25 MG/1
25 TABLET, FILM COATED ORAL EVERY 8 HOURS SCHEDULED
Status: DISCONTINUED | OUTPATIENT
Start: 2024-07-10 | End: 2024-07-11

## 2024-07-10 NOTE — DISCHARGE INSTRUCTIONS
Sometimes managing your health at home requires assistance.  The [facility] team has recognized your preference to use [facility].  They can be reached by phone at [phone].  The fax number for your reference is [phone].. A representative from the home health agency will contact you or your family to schedule your first visit.      Going Home    In this section you will find the tools which will guide you through the first few days after you leave the hospital. Continued use of these tools will help you develop the skills necessary to keep your heart failure under control.     Heart Failure Guidelines - place this worksheet on your refrigerator or somewhere you can refer to it daily to help you decide if your symptoms are under control, and what to do if they are not.     Home Care Instructions Following Heart Failure - the most important things to do every day include:     Weigh yourself  Take your medicines as prescribed  Limit your sodium (salt) and fluid intake  Know when to call your cardiologist, primary doctor, or nurse  Know when to seek emergency care    There is also a handy weight chart on which to record your weight every day, information on measuring fluids and limiting fluid intake, and a section for recording your thoughts or questions.     Things for You to Remember:   1. An appointment has been made for you to see your doctor or healthcare provider within 7 days of hospital discharge. It is important that you attend this appointment to make sure your symptoms are under control.     2. Your recommended sodium intake is 5942-0816 mg daily    3. Limit your fluid intake to no more than 2 liters or 64 ounces per day    4. Some exercise and activity is important to help keep your heart functioning and strong. Unless instructed not to exercise, you may walk at a slow to moderate pace for 10-15 minutes 2-3 days per week to start. Pace your activity to prevent shortness  of breath or fatigue. Stop exercise if you develop chest pain, lightheadedness, or significant shortness of breath.       Call Your Cardiologist If:   You gain 2 pounds overnight or 3-4 pounds in 3-5 days  You have more difficulty breathing  You are getting more tired with normal activity  You are more short of breath lying down, or awaken at night short of breath  You have swelling of your feet or legs  You urinate less often during the day and more often at night  You have cramps in your legs  You have blurred vision or see yellowish-green halos around objects of lights    Go to the Emergency Room If:   You have pain or tightness in your chest  You are extremely short of breath  You are coughing up pink-frothy mucus  You are traveling and develop symptoms of worsening heart failure    Additional Resources:   If you have questions or concerns about heart failure management, call the [facility] Heart Failure Unit at [phone]

## 2024-07-10 NOTE — PHYSICAL THERAPY NOTE
PHYSICAL THERAPY EVALUATION - INPATIENT     Room Number: 3601/3601-A  Evaluation Date: 7/10/2024  Type of Evaluation: Initial  Physician Order: PT Eval and Treat    Presenting Problem: Acute cystitis, HTN, rectal bleeding  Co-Morbidities : A fib w/ rvr, CHF, DVT, PE, DM, HL, CKD, polyneuropathy, depression, pacemaker  Reason for Therapy: Mobility Dysfunction and Discharge Planning    PHYSICAL THERAPY ASSESSMENT   Patient is currently functioning below baseline with bed mobility, transfers, and gait.  Prior to admission, patient's baseline is walking w/ rolling walker, able to dress/use toilet on her own, walks to dining room, does have supervision w/ showering and meds..  Patient is requiring minimal assist as a result of the following impairments: decreased functional strength, pain, impaired standing balance, decreased muscular endurance, and increased O2 needs from baseline.  Physical Therapy will continue to follow for duration of hospitalization.    Patient will benefit from continued skilled PT Services at discharge to promote functional independence and safety with additional support and return home with home health PT.    PLAN  PT Treatment Plan: Bed mobility;Patient education;Family education;Gait training;Strengthening;Transfer training;Balance training  Rehab Potential : Good  Frequency (Obs): 3-5x/week  Number of Visits to Meet Established Goals: 5      CURRENT GOALS    Goal #1 Patient is able to demonstrate supine - sit EOB @ level: supervision     Goal #2 Patient is able to demonstrate transfers EOB to/from C at assistance level: supervision     Goal #3 Patient is able to ambulate 100 feet with assist device: walker - rolling at assistance level: supervision     Goal #4    Goal #5    Goal #6    Goal Comments: Goals established on 7/10/2024      PHYSICAL THERAPY MEDICAL/SOCIAL HISTORY  History related to current admission: Patient is a 83 year old female admitted on 7/8/2024 from assisted living  facility for increasing, sleepiness, R leg pain, increased blood pressures and rectal bleeding.  Pt diagnosed with acute cystitis, htn, rectal bleeding. Pt does report a recent fall from bed just prior to admit.      HOME SITUATION  Type of Home: Assisted living facility   Home Layout: One level  Stairs to Enter : 0     Stairs to Bedroom: 0       Lives With: Staff 24 hours  Drives: No  Patient Owned Equipment: Rolling walker  Patient Regularly Uses: Glasses;Hearing aides    Prior Level of [place]: Patient's baseline is walking w/ rolling walker, able to dress/use toilet on her own, walks to dining room, does have supervision w/ showering and dtr prepares her meds.  Pt takes meds on her own once dtr has them set for her.  Pt reports that she participates in the exercise class at her [facility] as well.    SUBJECTIVE  \"Ooh! It hurts right here.\" - pointing to and holding her L groin w/ movement to eob.      OBJECTIVE  Precautions: Hard of hearing;Bed/chair alarm  Fall Risk: High fall risk    WEIGHT BEARING RESTRICTION  Weight Bearing Restriction: None                PAIN ASSESSMENT  Ratin  Location: L groin > R groin with bed mobility  Management Techniques: Activity promotion;Repositioning (Informed rn of groin pain)    COGNITION  Overall Cognitive Status:  WFL - within functional limits  Mildly forgetful at times, but would clarify items w/ additional questioning.    RANGE OF MOTION AND STRENGTH ASSESSMENT  Upper extremity ROM and strength are within functional limits     Lower extremity ROM is within functional limits     Lower extremity strength is within functional limits except for the following:    Right Hip flexion  3-/5  Left Hip flexion  3/5  Right Knee extension  4-/5  Left Knee extension  4-/5  Right Dorsiflexion  4-/5  Left Dorsiflexion  4-/5      BALANCE  Static Sitting: Fair  Dynamic Sitting: Fair -  Static Standing: Poor +  Dynamic Standing: Poor +    ADDITIONAL TESTS                                     ACTIVITY TOLERANCE  Pulse: 61  Heart Rate Source: Monitor                   O2 WALK  Oxygen Therapy  SPO2% on Oxygen at Rest: 94  At rest oxygen flow (liters per minute): 2    NEUROLOGICAL FINDINGS                        AM-PAC '6-Clicks' INPATIENT SHORT FORM - BASIC MOBILITY  How much difficulty does the patient currently have...  Patient Difficulty: Turning over in bed (including adjusting bedclothes, sheets and blankets)?: A Little   Patient Difficulty: Sitting down on and standing up from a chair with arms (e.g., wheelchair, bedside commode, etc.): A Little   Patient Difficulty: Moving from lying on back to sitting on the side of the bed?: A Lot   How much help from another person does the patient currently need...   Help from Another: Moving to and from a bed to a chair (including a wheelchair)?: A Little   Help from Another: Need to walk in hospital room?: A Little   Help from Another: Climbing 3-5 steps with a railing?: Total       AM-PAC Score:  Raw Score: 15   Approx Degree of Impairment: 57.7%   Standardized Score (AM-PAC Scale): 39.45   CMS Modifier (G-Code): CK    FUNCTIONAL ABILITY STATUS  Gait Assessment   Functional Mobility/Gait Assessment  Gait Assistance: Minimum assistance  Distance (ft): 3  Assistive Device: Rolling walker  Pattern: Shuffle    Skilled Therapy Provided     Bed Mobility:  Rolling: NT  Supine to sit: Min a of 1 for b le's, pt noting L groin pain in particular when moving toward eob.  Denied dizziness upon sitting.   Sit to supine: NT     Transfer Mobility:  Sit to stand: Cues for proper hand placement, min a of 1.   Stand to sit: CGA, cues for safety technique.  Gait = Pt completed gait 3'x1 w/ rw and min a of 1.  [place] pt would be able to walk farther during our session, but pt refusing at this time as she was eager to eat her breakfast that just arrived.    Therapist's Comments: Pt has majority of pain w/ bed mobility.  Pt denied significant increase in pain w/ standing/gait.   Rn aware of new groin pain and pt's reported fall from bed just prior to admit.    Exercise/Education Provided:  Bed mobility  Functional activity tolerated  Gait training  Transfer training    Patient End of Session: Up in chair;Needs met;Call light within reach;RN aware of session/findings;All patient questions and concerns addressed;Alarm set ([place] aware of eval session)      Patient Evaluation Complexity Level:  History Moderate - 1 or 2 personal factors and/or co-morbidities   Examination of body systems Moderate - addressing a total of 3 or more elements   Clinical Presentation Moderate - Evolving   Clinical Decision Making Moderate - Evolving       PT Session Time: 25 minutes  Gait Trainin minutes  Therapeutic Activity: 12 minutes  Neuromuscular Re-education: 0 minutes  Therapeutic Exercise: 0 minutes

## 2024-07-10 NOTE — CM/SW NOTE
SW met with pt at bedside to discuss discharge planning. Noted Anticipated therapy need: Home with Home Healthcare.    Pt stated she resides at [facility] and is typically independent at baseline. Pt states she has resided at the [facility] since January 2024. Pt confirmed she is current with [facility] for PT and is agreeable to continue services with [facility].     MARTIN called [facility] ([phone]) and spoke with NAOIM Odom regarding pt's return. Lei stated he does not need to complete onsite assessment on pt and pt can return to [facility] when she is medically cleared. Lei requested discharge paperwork once available. RN will need to call for report at discharge.     [facility] reserved in [facility]. MARTIN will continue to follow.     KEO Goldman  Discharge Planner

## 2024-07-10 NOTE — PROGRESS NOTES
Progress Note  Ciarra Salcido Patient Status:  Inpatient    1940 MRN [id number]   Location [facility] 3NE-A Attending Jemima Lopez MD   Hosp Day # 1 PCP JUDY CYR MD     Subjective:  Mrs. Salcido feels well. Denies dyspnea on room air or pain. Seated eating her lunch.    Objective:  Physical Exam:   BP (!) 177/69 (BP Location: Left arm)   Pulse 61   Temp 97.8 °F (36.6 °C) (Oral)   Resp 18   Ht 5' 4\" (1.626 m)   Wt 162 lb 0.6 oz (73.5 kg)   SpO2 97%   BMI 27.81 kg/m²   Temp (24hrs), Av.9 °F (36.6 °C), Min:97.7 °F (36.5 °C), Max:98.3 °F (36.8 °C)       Intake/Output Summary (Last 24 hours) at 7/10/2024 1242  Last data filed at 2024 2114  Gross per 24 hour   Intake --   Output 1350 ml   Net -1350 ml     Wt Readings from Last 3 Encounters:   24 162 lb 0.6 oz (73.5 kg)   24 140 lb (63.5 kg)   24 140 lb (63.5 kg)     Telemetry: A-paced  General: Alert and oriented in no apparent distress seated comfortably in her bedside chair on room air.  HEENT: No focal deficits.  Neck: No JVD, carotids 2+ no bruits.  Cardiac: Regular rate and rhythm, S1, S2 normal, 2/6 systolic murmur, rub or gallop.  Lungs: Clear without wheezes, rales, rhonchi or dullness.  Normal excursions and effort.  Abdomen: Soft, non-tender.   Extremities: Without clubbing, cyanosis. 1+ bilateral edema.  Peripheral pulses are 2+.  Neurologic: Alert and oriented, normal affect.  Skin: Warm and dry.        Intake/Output:    Intake/Output Summary (Last 24 hours) at 7/10/2024 1242  Last data filed at 2024 2114  Gross per 24 hour   Intake --   Output 1350 ml   Net -1350 ml       Last 3 Weights   24 0043 162 lb 0.6 oz (73.5 kg)   07/08/24 2315 162 lb 0.6 oz (73.5 kg)   24 1826 150 lb (68 kg)   24 1037 140 lb (63.5 kg)   24 1111 140 lb (63.5 kg)       Labs:  Recent Labs   Lab 24  1849 24  0730 07/10/24  0710   * 174* 199*   BUN 51* 46* 53*   CREATSERUM 2.70* 2.29* 2.97*    EGFRCR 17* 20* 15*   CA 8.6 8.9 8.6   * 140 137   K 5.3* 4.6 5.1    113* 108   CO2 19.0* 22.0 20.0*     Recent Labs   Lab 07/08/24  1857 07/09/24  0731   RBC 3.75* 3.72*   HGB 11.3* 11.3*   HCT 35.9 35.1   MCV 95.7 94.4   MCH 30.1 30.4   MCHC 31.5 32.2   RDW 15.4 15.4   NEPRELIM 4.86 5.66   WBC 6.8 7.5   .0 281.0         Recent Labs   Lab 07/08/24  1849   TROPHS 24       Diagnostics:   ECHOCARDIOGRAM 11/14/2023:  1. Left ventricle: The cavity size was normal. Wall thickness was at the      upper limits of normal. Systolic function was normal. The estimated      ejection fraction was 60-65%. Wall motion is normal; there are no      regional wall motion abnormalities.   2. Aortic valve: The valve was trileaflet. The leaflets were mildly to      moderately thickened. There was mild to moderate regurgitation.   3. Left atrium: The left atrial volume was markedly increased.   4. Right ventricle: The cavity size was normal. Pacer C/W ICD noted in the      right ventricle.   5. Tricuspid valve: There was moderate regurgitation.   6. Pulmonary arteries: Systolic pressure was moderately to markedly      increased, estimated to be 57mm Hg. Estimated pulmonary artery diastolic      pressure was 20mm Hg.   Impressions:  This study is compared with previous dated 9/12/2023: Similar   findings.           Medications:   furosemide  40 mg Intravenous BID (Diuretic)    insulin aspart  2-10 Units Subcutaneous TID AC and HS    allopurinol  100 mg Oral Daily    amiodarone  200 mg Oral Daily    dilTIAZem ER  120 mg Oral Daily    escitalopram  5 mg Oral Daily    gabapentin  100 mg Oral TID    levothyroxine  75 mcg Oral Before breakfast    [Held by provider] losartan  25 mg Oral Daily    atorvastatin  10 mg Oral Nightly    carvedilol  12.5 mg Oral BID with meals    insulin aspart  0.15 Units/kg Subcutaneous Once         Assessment/Plan:    Hypertensive urgency  BP remains above goal after adjusting to coreg  Losartan  held w/ PRISCILLA  HR limit diltiazem and coreg increase. Add hydralazine for now until losartan can be restarted  Acute on chronic HFpEF  Cr worsening with continued diuresis down nearly 2L since admit.  Discontinue IV lasix - restart OP regime  Coreg, restart losartan once baseline renal  Paroxsymal afib s/p GILLES/DCCV 11/2023  Currently Apaced on amiodarone  Heme + on admission  Restart eliquis at 2.5 mg BID once bleeding risk clarified  PPM 9/2023  Moderate MR  Moderate TR  Mild/Moderate AI  DM II  CKD  Worsening today  Consider nephrology consultation      PLAN  Add hydralazine  Continue amiodarone, coreg, and diltiazem  Hold lasix - consider nephrology consultation given worsening Cr with diuresis  Restart losartan once Cr stablizes  Restart Eliquis at 2.5 mg BID (age and renal) once bleeding risk deemed acceptable by primary team given hemoccult + on admit        Wade Toribio PA-C  7/10/2024  12:42 PM     Note reviewed and labs reviewed.  Agree with above assessment and plan.  Permissive hypertension OK. Goal SBP <150 mmHg.    ANTWAN Fong MD

## 2024-07-10 NOTE — PLAN OF CARE
Assumed care at 1930. A&Ox4, forgetful at times. On RA, 2L NC while asleep. [place], hearing aids at the bedside. Glasses at the bedside. Regular diet with accuchecks. Non-cardiac electrolyte protocol, Mg replaced per protocol. A paced on tele. Is and Os. Daily wt. PIV R Wrist saline locked.  at 2117, MD paged. 5 units of Novolog given per MD. Bed in lowest position, call light within reach. Will continue with plan of care.      Problem: Diabetes/Glucose Control  Goal: Glucose maintained within prescribed range  Description: INTERVENTIONS:  - Monitor Blood Glucose as ordered  - Assess for signs and symptoms of hyperglycemia and hypoglycemia  - Administer ordered medications to maintain glucose within target range  - Assess barriers to adequate nutritional intake and initiate nutrition consult as needed  - Instruct patient on self management of diabetes  Outcome: Progressing     Problem: Patient/Family Goals  Goal: Patient/Family Long Term Goal  Description: Patient's Long Term Goal: discharge    Interventions:  - follow plan of care  - See additional Care Plan goals for specific interventions  Outcome: Progressing  Goal: Patient/Family Short Term Goal  Description: Patient's Short Term Goal: pain management    Interventions:   - take PRN medications  - See additional Care Plan goals for specific interventions  Outcome: Progressing

## 2024-07-10 NOTE — PLAN OF CARE
Pt Aox4, A Paced, VSS  Hearing aids, glasses, dentures  Patient was on room air, but started to destat to high 80%'s, so placed on 2L  Pt had groin pain and reported she had fallen at home prior to admission. Xray of hips and pelvis done to r/o fractures. Negative.  Daughter reported patient fractured her L1 approximately 6-7 weeks ago and was wearing a back brace and receiving PT at her assisted living  Nephro consult for elevated Cr.  Patient up to chair  Accucheck QID.  Cardiology following  Discharge planning back to [facility] when cleared.    Problem: Diabetes/Glucose Control  Goal: Glucose maintained within prescribed range  Description: INTERVENTIONS:  - Monitor Blood Glucose as ordered  - Assess for signs and symptoms of hyperglycemia and hypoglycemia  - Administer ordered medications to maintain glucose within target range  - Assess barriers to adequate nutritional intake and initiate nutrition consult as needed  - Instruct patient on self management of diabetes  Outcome: Progressing

## 2024-07-10 NOTE — PROGRESS NOTES
[facility]   part of [facility]     Hospitalist Progress Note     Ciarra Salcido Patient Status:  Inpatient    1940 MRN [id number]   Location [facility] 3NE-A Attending Cabrera Pineda MD   Hosp Day # 1 PCP JUDY CYR MD     Chief Complaint: HTN, fatigue    Subjective:       Patient sitting up in the chair.  Seen with RN at bedside.  Complains of groin pain.  Patient states she fell out of bed at her assisted living facility almost a week back before admission.  RN had discussed with patient's daughter who reported that patient fell at assisted living facility weeks back and has L1 fracture diagnosed approximately 6 to 7 weeks ago and was wearing a back brace at home and receiving physical therapy at her assisted living as reported by patient's daughter with RN.     Objective:    Review of Systems:   A comprehensive review of systems was completed; pertinent positive and negatives stated in subjective.    Vital signs:  Temp:  [97.7 °F (36.5 °C)-98.3 °F (36.8 °C)] 97.8 °F (36.6 °C)  Pulse:  [60-62] 60  Resp:  [16-18] 18  BP: (116-181)/(39-75) 177/69  SpO2:  [91 %-97 %] 97 %    Physical Exam:    /60 (BP Location: Left arm)   Pulse 59   Temp 97.7 °F (36.5 °C) (Oral)   Resp 16   Ht 5' 4\" (1.626 m)   Wt 162 lb 0.6 oz (73.5 kg)   SpO2 93%   BMI 27.81 kg/m²     General: No acute distress  Respiratory: No wheezes, no rhonchi  Cardiovascular: S1, S2, regular rate and rhythm  Abdomen: Soft, Non-tender, non-distended, positive bowel sounds  Neuro: Awake alert, no new focal deficits.   Extremities: No pedal edema, no calf tenderness      Diagnostic Data:    Labs:  Recent Labs   Lab 24  18524  0731   WBC  --  6.8 7.5   HGB  --  11.3* 11.3*   MCV  --  95.7 94.4   PLT  --  269.0 281.0   INR 1.58*  --   --        Recent Labs   Lab 24  18424  0730   * 174*   BUN 51* 46*   CREATSERUM 2.70* 2.29*   CA 8.6 8.9   ALB 3.0* 2.7*   * 140   K 5.3*  4.6    113*   CO2 19.0* 22.0   ALKPHO 159* 142   AST 26 14*   ALT 48 36   BILT 0.3 0.4   TP 7.6 6.9       Estimated Creatinine Clearance: 15.5 mL/min (A) (based on SCr of 2.29 mg/dL (H)).    Recent Labs   Lab 07/08/24  1849   TROPHS 24       Recent Labs   Lab 07/08/24 1849   PTP 19.0*   INR 1.58*                  Microbiology    Hospital Encounter on 07/08/24   1. Urine Culture, Routine     Status: Abnormal (Preliminary result)    Collection Time: 07/08/24 10:23 PM    Specimen: Urine, clean catch   Result Value Ref Range    Urine Culture >100,000 CFU/ML Gram Negative Tarik (A) N/A         Imaging: Reviewed in Epic.    Medications:    furosemide  40 mg Intravenous BID (Diuretic)    insulin aspart  2-10 Units Subcutaneous TID AC and HS    allopurinol  100 mg Oral Daily    amiodarone  200 mg Oral Daily    dilTIAZem ER  120 mg Oral Daily    escitalopram  5 mg Oral Daily    gabapentin  100 mg Oral TID    levothyroxine  75 mcg Oral Before breakfast    [Held by provider] losartan  25 mg Oral Daily    atorvastatin  10 mg Oral Nightly    carvedilol  12.5 mg Oral BID with meals    insulin aspart  0.15 Units/kg Subcutaneous Once       Assessment & Plan:      #HTN urgency with suspected acute on chronic HFpEF and right sided failure  -BP as high as 225/91 on admission, s/p 10 IV hydralazine in ED  -BNP 12,823 with baseline around 10,000  -EKG showing A-paced rhythm @60 bpm  -venous duplex negative for DVT  -chest xray without acute process  -echo Nov 2023: EF 60-65%, mild-mod aortic regurg, mod mirtal regurg, mod tricuspid regurg  -plan for nitroglycerin paste for HTN urgency with goal reduction of BP by 25% over 24 hrs  -lasix 40 IV BID  -strict I/O, daily weights  -interrogate PPM to assess for A-fib burden which could be contributing  -monitor on telemetry  -cont home losartan, amiodarone, diltiazem, metoprolol  -IV hydralazine prn  -cardiology on consult    #Atrial fibrillation, rate controlled, anticoagulation with  low-dose Eliquis 2.5 mg twice daily per cardiology     #Hyponatremia  -mild at 134  -likely 2/2 volume overload and CHF  -tx as above  -cont to monitor BMP  -can allow to correct to normal over 24 hrs     #Hyperkalemia  -mild at 5.3  -monitor closely    #PRISCILLA on CKD, possible cardiorenal  Baseline Cr under 2  Cr improving with diuretics    #NAGMA  -CO2 of 19 with AG of 7  -likely 2/2 underlying renal ds  -cont to monitor  -consider addition of bicarb tabs if not improving     #normocytic anemia with heme positive stools  -hgb 11.3 with baseline 11-12  -heme-occult trace positive per ED physician  -sounds likely to be hemorrhoidal based on pt report  -cont to monitor CBC and transfuse for hgb <7  -liquid diet until hgb stable  -if hgb down trending consider GI eval  -hold home apixaban until hgb stable     #hypoalbuminemia  -albumin 3.0  -may be 2/2 volume overloaded state  -cont to monitor     # Diabetes mellitus type 2  -SSI, QID checks, hypoglycemia protocol  -hold home sitagliptin, Januvia, glimepiride  -cont home gabapentin     # Hyperlipidemia  -cont home simvastatin     #Depression  -cont home escitalopram     #Hypothyroidism  -cont home levothyroxine     #Gout  -cont home allopurinol     #Asymptomatic bacteriuria  -pt denied any urinary complaints, normal WBC and afebrile  -monitor off antibiotics    #Osteoarthritis with groin pain  -X-ray of the pelvis and hip with no fracture, shows osteoarthritis  -PT OT  - pain management         Jemima Lopez MD      Supplementary Documentation:     Quality:  DVT Mechanical Prophylaxis:   SCDs,    DVT Pharmacologic Prophylaxis   Medication   None                Code Status: DNAR/Selective Treatment  Hutchinson: No urinary catheter in place  Hutchinson Duration (in days):   Central line:    AMINTA:     Discharge is dependent on: progress  At this point Ms. Salcido is expected to be discharge to: home    The 21st Century Cures Act makes medical notes like these available to patients in  the interest of transparency. Please be advised this is a medical document. Medical documents are intended to carry relevant information, facts as evident, and the clinical opinion of the practitioner. The medical note is intended as peer to peer communication and may appear blunt or direct. It is written in medical language and may contain abbreviations or verbiage that are unfamiliar.             **Certification      PHYSICIAN Certification of Need for Inpatient Hospitalization - Initial Certification    Patient will require inpatient services that will reasonably be expected to span two midnight's based on the clinical documentation in H+P.   Based on patients current state of illness, I anticipate that, after discharge, patient will require TBD.

## 2024-07-11 PROBLEM — N17.9 AKI (ACUTE KIDNEY INJURY): Status: ACTIVE | Noted: 2021-05-03

## 2024-07-11 PROBLEM — N17.9 AKI (ACUTE KIDNEY INJURY) (HCC): Status: ACTIVE | Noted: 2021-05-03

## 2024-07-11 LAB
ANION GAP SERPL CALC-SCNC: 8 MMOL/L (ref 0–18)
BASOPHILS # BLD AUTO: 0.04 X10(3) UL (ref 0–0.2)
BASOPHILS NFR BLD AUTO: 0.5 %
BUN BLD-MCNC: 62 MG/DL (ref 9–23)
CALCIUM BLD-MCNC: 7.8 MG/DL (ref 8.5–10.1)
CHLORIDE SERPL-SCNC: 109 MMOL/L (ref 98–112)
CO2 SERPL-SCNC: 22 MMOL/L (ref 21–32)
CREAT BLD-MCNC: 3.28 MG/DL
CREAT UR-SCNC: 72.7 MG/DL
EGFRCR SERPLBLD CKD-EPI 2021: 13 ML/MIN/1.73M2 (ref 60–?)
EOSINOPHIL # BLD AUTO: 0.43 X10(3) UL (ref 0–0.7)
EOSINOPHIL NFR BLD AUTO: 5.4 %
ERYTHROCYTE [DISTWIDTH] IN BLOOD BY AUTOMATED COUNT: 15.2 %
GLUCOSE BLD-MCNC: 163 MG/DL (ref 70–99)
GLUCOSE BLD-MCNC: 205 MG/DL (ref 70–99)
GLUCOSE BLD-MCNC: 256 MG/DL (ref 70–99)
GLUCOSE BLD-MCNC: 259 MG/DL (ref 70–99)
GLUCOSE BLD-MCNC: 428 MG/DL (ref 70–99)
HCT VFR BLD AUTO: 34.4 %
HGB BLD-MCNC: 10.8 G/DL
IMM GRANULOCYTES # BLD AUTO: 0.05 X10(3) UL (ref 0–1)
IMM GRANULOCYTES NFR BLD: 0.6 %
LYMPHOCYTES # BLD AUTO: 1.36 X10(3) UL (ref 1–4)
LYMPHOCYTES NFR BLD AUTO: 17.2 %
MCH RBC QN AUTO: 30 PG (ref 26–34)
MCHC RBC AUTO-ENTMCNC: 31.4 G/DL (ref 31–37)
MCV RBC AUTO: 95.6 FL
MONOCYTES # BLD AUTO: 0.62 X10(3) UL (ref 0.1–1)
MONOCYTES NFR BLD AUTO: 7.8 %
NEUTROPHILS # BLD AUTO: 5.41 X10 (3) UL (ref 1.5–7.7)
NEUTROPHILS # BLD AUTO: 5.41 X10(3) UL (ref 1.5–7.7)
NEUTROPHILS NFR BLD AUTO: 68.5 %
OSMOLALITY SERPL CALC.SUM OF ELEC: 314 MOSM/KG (ref 275–295)
PLATELET # BLD AUTO: 255 10(3)UL (ref 150–450)
POTASSIUM SERPL-SCNC: 5.8 MMOL/L (ref 3.5–5.1)
PROT UR-MCNC: 141 MG/DL
RBC # BLD AUTO: 3.6 X10(6)UL
SODIUM SERPL-SCNC: 139 MMOL/L (ref 136–145)
WBC # BLD AUTO: 7.9 X10(3) UL (ref 4–11)

## 2024-07-11 PROCEDURE — 99223 1ST HOSP IP/OBS HIGH 75: CPT | Performed by: INTERNAL MEDICINE

## 2024-07-11 PROCEDURE — 99232 SBSQ HOSP IP/OBS MODERATE 35: CPT | Performed by: INTERNAL MEDICINE

## 2024-07-11 RX ORDER — FUROSEMIDE 10 MG/ML
40 INJECTION INTRAMUSCULAR; INTRAVENOUS
Status: COMPLETED | OUTPATIENT
Start: 2024-07-11 | End: 2024-07-11

## 2024-07-11 RX ORDER — INSULIN DEGLUDEC 100 U/ML
15 INJECTION, SOLUTION SUBCUTANEOUS DAILY
Status: DISCONTINUED | OUTPATIENT
Start: 2024-07-11 | End: 2024-07-18

## 2024-07-11 RX ORDER — HYDRALAZINE HYDROCHLORIDE 50 MG/1
50 TABLET, FILM COATED ORAL EVERY 8 HOURS SCHEDULED
Status: DISCONTINUED | OUTPATIENT
Start: 2024-07-11 | End: 2024-07-17

## 2024-07-11 RX ORDER — HYDRALAZINE HYDROCHLORIDE 50 MG/1
50 TABLET, FILM COATED ORAL EVERY 8 HOURS SCHEDULED
Status: DISCONTINUED | OUTPATIENT
Start: 2024-07-11 | End: 2024-07-11

## 2024-07-11 NOTE — CONSULTS
[facility]  Report of Consultation    Ciarra Salcido Patient Status:  Inpatient    1940 MRN [id number]   Location [facility] 3NE-A Attending Jemima Lopez MD   Hosp Day # 2 PCP JUDY CYR MD       Assessment / Plan:    1) PRISCILLA- rising Cr as expected with diuresis + ARB; obtain renal US with doppler    2) CKD 4- likely due to longstanding DM / HTN; c/w bland urine sediment. Quantify proteinuria    3) Severe HTN- exac by CHF; much improved with coreg / cardizem / MURRAY. Avoid ACE-I / ARB with PRISCILLA / hyperkalemia    4) HFpEF with mod MR / TR / AI- OK to resume diuretics from renal standpoint    5) PAF s/p PPM  + GILLES / DCCV - Apaced on amio / BB / CCB / DOAC per cards    6) DM 2- consider dc januvia permanently with edema / CHF      Reason for Consultation:  PRISCILLA / CKD 4    History of Present Illness:  Ciarra Salcido is a a(n) 83 year old female. Dictation to follow    History:  Past Medical History:    (HFpEF) heart failure with preserved ejection fraction (HCC)    Acute cystitis without hematuria    Acute deep vein thrombosis (DVT) of popliteal vein of right lower extremity (HCC)    Cataract    CHF exacerbation (HCC)    CKD (chronic kidney disease)    Congestive heart disease (HCC)    COVID-19    Diabetes (HCC)    Disorder of thyroid    DM2 (diabetes mellitus, type 2) (HCC)    Hearing impairment    High blood pressure    HTN (hypertension)    Hyperlipidemia    Hypothyroidism    Pulmonary embolism (HCC)    Shingles    Visual impairment     Past Surgical History:   Procedure Laterality Date    Cataract      Eye surgery      Hysterectomy      [facility]    Pacemaker monitor       Family History   Problem Relation Age of Onset    Other (Other) Mother         Old Age    Other (Other) Father         Old age     Denies family history of kidney disease.    reports that she quit smoking about 13 years ago. Her smoking use included cigarettes. She started smoking about 63 years ago. She has a 50  pack-year smoking history. She has never used smokeless tobacco. She reports that she does not drink alcohol and does not use drugs.    Allergies:  No Known Allergies    Medications:    Current Facility-Administered Medications:     hydrALAZINE (Apresoline) tab 25 mg, 25 mg, Oral, Q8H AYANNA    apixaban (Eliquis) tab 2.5 mg, 2.5 mg, Oral, BID    glucose (Dex4) 15 GM/59ML oral liquid 15 g, 15 g, Oral, Q15 Min PRN **OR** glucose (Glutose) 40% oral gel 15 g, 15 g, Oral, Q15 Min PRN **OR** glucose-vitamin C (Dex-4) chewable tab 4 tablet, 4 tablet, Oral, Q15 Min PRN **OR** dextrose 50% injection 50 mL, 50 mL, Intravenous, Q15 Min PRN **OR** glucose (Dex4) 15 GM/59ML oral liquid 30 g, 30 g, Oral, Q15 Min PRN **OR** glucose (Glutose) 40% oral gel 30 g, 30 g, Oral, Q15 Min PRN **OR** glucose-vitamin C (Dex-4) chewable tab 8 tablet, 8 tablet, Oral, Q15 Min PRN    insulin aspart (NovoLOG) 100 Units/mL FlexPen 2-10 Units, 2-10 Units, Subcutaneous, TID AC and HS    allopurinol (Zyloprim) tab 100 mg, 100 mg, Oral, Daily    amiodarone (Pacerone) tab 200 mg, 200 mg, Oral, Daily    dilTIAZem ER (Dilacor XR) 24 hr cap 120 mg, 120 mg, Oral, Daily    escitalopram (Lexapro) tab 5 mg, 5 mg, Oral, Daily    gabapentin (Neurontin) cap 100 mg, 100 mg, Oral, TID    levothyroxine (Synthroid) tab 75 mcg, 75 mcg, Oral, Before breakfast    [Held by provider] losartan (Cozaar) tab 25 mg, 25 mg, Oral, Daily    atorvastatin (Lipitor) tab 10 mg, 10 mg, Oral, Nightly    carvedilol (Coreg) tab 12.5 mg, 12.5 mg, Oral, BID with meals    insulin aspart (NovoLOG) 100 Units/mL vial 10 Units, 0.15 Units/kg, Subcutaneous, Once  No current outpatient medications on file.       Review of Systems:  Please see HPI for pertinent positives. 10 point review of systems otherwise reviewed and negative.     Physical Exam:  /63 (BP Location: Left arm)   Pulse 60   Temp 97.6 °F (36.4 °C) (Oral)   Resp 16   Ht 5' 4\" (1.626 m)   Wt 168 lb 3.2 oz (76.3 kg)   SpO2  95%   BMI 28.87 kg/m²   Temp (24hrs), Av.7 °F (36.5 °C), Min:97.3 °F (36.3 °C), Max:98 °F (36.7 °C)       Intake/Output Summary (Last 24 hours) at 2024 0949  Last data filed at 2024 0900  Gross per 24 hour   Intake 1060 ml   Output 650 ml   Net 410 ml     Wt Readings from Last 3 Encounters:   24 168 lb 3.2 oz (76.3 kg)   24 140 lb (63.5 kg)   24 140 lb (63.5 kg)     General: awake alert  HEENT: No scleral icterus, MMM  Neck: Supple, no CASTRO or thyromegaly  Cardiac: Regular rate and rhythm, S1, S2 normal, no murmur, rub, or gallop  Lungs: Decreased breath sounds at the bases bilaterally.   Abdomen: Soft, non-tender. + bowel sounds, no palpable organomegaly  Extremities: Without clubbing, cyanosis; mild LE edema  Neurologic: Cranial nerves grossly intact, moving all extremities  Skin: Warm and dry, no rashes      Laboratory Data:  Lab Results   Component Value Date    WBC 7.9 2024    HGB 10.8 2024    HCT 34.4 2024    .0 2024    CREATSERUM 3.28 2024    BUN 62 2024     2024    K 5.8 2024     2024    CO2 22.0 2024     2024    CA 7.8 2024    PGLU 259 2024       Imaging:  All imaging studies reviewed.      Thank you for allowing me to participate in the care of your patient.    Mary Lares MD  2024  9:49 AM

## 2024-07-11 NOTE — PLAN OF CARE
Assumed patient care at 1930.  AxOx3, patient can be forgetful  [place], hearing aids at bedside  Upper and lower dentures at bedside  2L nasal cannula  VSS, A paced on tele  Regular diet  QID accuchecks  Daily weights  I&O  Denies pain  Denies nausea/vomiting  Bed alarm on  Bed in lowest position  Call light within reach.  Needs met at this time    Problem: Diabetes/Glucose Control  Goal: Glucose maintained within prescribed range  Description: INTERVENTIONS:  - Monitor Blood Glucose as ordered  - Assess for signs and symptoms of hyperglycemia and hypoglycemia  - Administer ordered medications to maintain glucose within target range  - Assess barriers to adequate nutritional intake and initiate nutrition consult as needed  - Instruct patient on self management of diabetes  Outcome: Progressing

## 2024-07-11 NOTE — PROGRESS NOTES
[facility]   part of [facility]     Hospitalist Progress Note     Ciarra Salcido Patient Status:  Inpatient    1940 MRN [id number]   [facility] 3NE-A Attending Cabrera Pineda MD   Hosp Day # 2 PCP JUDY CYR MD     Chief Complaint: HTN, fatigue    Subjective:       Patient sitting up in the chair.  Blood sugar elevated this morning, given insulin.  Patient states she did have some postmeal with blood sugar this morning.  Denies any chest pain or shortness of breath.  Denies abdominal pain.    Objective:    Review of Systems:   A comprehensive review of systems was completed; pertinent positive and negatives stated in subjective.    Vital signs:  Temp:  [97.3 °F (36.3 °C)-98 °F (36.7 °C)] 97.3 °F (36.3 °C)  Pulse:  [59-64] 60  Resp:  [16-18] 16  BP: (130-176)/(46-70) 170/70  SpO2:  [92 %-97 %] 97 %    Physical Exam:    BP (!) 170/70   Pulse 60   Temp 97.3 °F (36.3 °C) (Oral)   Resp 16   Ht 5' 4\" (1.626 m)   Wt 168 lb 3.2 oz (76.3 kg)   SpO2 97%   BMI 28.87 kg/m²     General: No acute distress  Respiratory: No wheezes, no rhonchi  Cardiovascular: S1, S2, regular rate and rhythm  Abdomen: Soft, Non-tender, non-distended, positive bowel sounds  Neuro: Awake alert, no new focal deficits.   Extremities: No pedal edema, no calf tenderness      Diagnostic Data:    Labs:  Recent Labs   Lab 24  1857 24  0731 24  0628   WBC  --  6.8 7.5 7.9   HGB  --  11.3* 11.3* 10.8*   MCV  --  95.7 94.4 95.6   PLT  --  269.0 281.0 255.0   INR 1.58*  --   --   --        Recent Labs   Lab 24  0730 07/10/24  0710 24  0628   * 174* 199* 256*   BUN 51* 46* 53* 62*   CREATSERUM 2.70* 2.29* 2.97* 3.28*   CA 8.6 8.9 8.6 7.8*   ALB 3.0* 2.7*  --   --    * 140 137 139   K 5.3* 4.6 5.1 5.8*    113* 108 109   CO2 19.0* 22.0 20.0* 22.0   ALKPHO 159* 142  --   --    AST 26 14*  --   --    ALT 48 36  --   --    BILT 0.3 0.4  --   --     TP 7.6 6.9  --   --        Estimated Creatinine Clearance: 10.8 mL/min (A) (based on SCr of 3.28 mg/dL (H)).    Recent Labs   Lab 07/08/24 1849   TROPHS 24       Recent Labs   Lab 07/08/24 1849   PTP 19.0*   INR 1.58*                  Microbiology    Hospital Encounter on 07/08/24   1. Urine Culture, Routine     Status: Abnormal    Collection Time: 07/08/24 10:23 PM    Specimen: Urine, clean catch   Result Value Ref Range    Urine Culture >100,000 CFU/ML Klebsiella pneumoniae (A) N/A    Urine Culture <10,000 CFU/ML Gram negative melina N/A       Susceptibility    Klebsiella pneumoniae -  (no method available)     Ampicillin  Resistant      Ampicillin + Sulbactam 4 Sensitive      Cefazolin <=4 Sensitive      Ciprofloxacin <=0.25 Sensitive      Gentamicin <=1 Sensitive      Meropenem <=0.25 Sensitive      Levofloxacin <=0.12 Sensitive      Nitrofurantoin 64 Intermediate      Piperacillin + Tazobactam <=4 Sensitive      Trimethoprim/Sulfa <=20 Sensitive          Imaging: Reviewed in Epic.    Medications:    sodium zirconium cyclosilicate  5 g Oral Q8H    furosemide  40 mg Intravenous BID (Diuretic)    insulin degludec  15 Units Subcutaneous Daily    hydrALAZINE  50 mg Oral Q8H AYANNA    apixaban  2.5 mg Oral BID    insulin aspart  2-10 Units Subcutaneous TID AC and HS    allopurinol  100 mg Oral Daily    amiodarone  200 mg Oral Daily    dilTIAZem ER  120 mg Oral Daily    escitalopram  5 mg Oral Daily    gabapentin  100 mg Oral TID    levothyroxine  75 mcg Oral Before breakfast    atorvastatin  10 mg Oral Nightly    carvedilol  12.5 mg Oral BID with meals    insulin aspart  0.15 Units/kg Subcutaneous Once       Assessment & Plan:      #HTN urgency with suspected acute on chronic HFpEF and right sided failure  -BP as high as 225/91 on admission, s/p 10 IV hydralazine in ED  -BNP 12,823 with baseline around 10,000  -EKG showing A-paced rhythm @60 bpm  -venous duplex negative for DVT  -chest xray without acute  process  -echo Nov 2023: EF 60-65%, mild-mod aortic regurg, mod mirtal regurg, mod tricuspid regurg  -plan for nitroglycerin paste for HTN urgency with goal reduction of BP by 25% over 24 hrs  -lasix 40 IV BID  -strict I/O, daily weights  -interrogate PPM to assess for A-fib burden which could be contributing  -monitor on telemetry  -cont home losartan, amiodarone, diltiazem, metoprolol  -IV hydralazine prn  -cardiology on consult    #Atrial fibrillation, rate controlled, anticoagulation with low-dose Eliquis 2.5 mg twice daily per cardiology     #Hyponatremia  -mild at 134  -likely 2/2 volume overload and CHF  -tx as above  -cont to monitor BMP  -can allow to correct to normal over 24 hrs     #Hyperkalemia  -mild at 5.3  -monitor closely    #PRISCILLA on CKD, possible cardiorenal  Baseline Cr under 2  Cr improving with diuretics    #NAGMA  -CO2 of 19 with AG of 7  -likely 2/2 underlying renal ds  -cont to monitor  -consider addition of bicarb tabs if not improving     #normocytic anemia with heme positive stools  -hgb 11.3 with baseline 11-12  -heme-occult trace positive per ED physician  -sounds likely to be hemorrhoidal based on pt report  -cont to monitor CBC and transfuse for hgb <7  -liquid diet until hgb stable  -if hgb down trending consider GI eval  -hold home apixaban until hgb stable     #hypoalbuminemia  -albumin 3.0  -may be 2/2 volume overloaded state  -cont to monitor     # Diabetes mellitus type 2 with hyperglycemia  -SSI, QID checks, hypoglycemia protocol  -hold home sitagliptin, Januvia, glimepiride  -cont home gabapentin  -Added Tresiba long-acting insulin     # Hyperlipidemia  -cont home simvastatin     #Depression  -cont home escitalopram     #Hypothyroidism  -cont home levothyroxine     #Gout  -cont home allopurinol     #Asymptomatic bacteriuria  -pt denied any urinary complaints, normal WBC and afebrile  -monitor off antibiotics    #Osteoarthritis with groin pain  -X-ray of the pelvis and hip with  no fracture, shows osteoarthritis  -PT OT  - pain management         Jemima Lopez MD      Supplementary Documentation:     Quality:  DVT Mechanical Prophylaxis:   SCDs,    DVT Pharmacologic Prophylaxis   Medication    apixaban (Eliquis) tab 2.5 mg                Code Status: DNAR/Selective Treatment  Hutchinson: External urinary catheter in place  Hutchinson Duration (in days):   Central line:    AMINTA:     Discharge is dependent on: progress  At this point Ms. Salcido is expected to be discharge to: home    The 21st Century Cures Act makes medical notes like these available to patients in the interest of transparency. Please be advised this is a medical document. Medical documents are intended to carry relevant information, facts as evident, and the clinical opinion of the practitioner. The medical note is intended as peer to peer communication and may appear blunt or direct. It is written in medical language and may contain abbreviations or verbiage that are unfamiliar.             **Certification      PHYSICIAN Certification of Need for Inpatient Hospitalization - Initial Certification    Patient will require inpatient services that will reasonably be expected to span two midnight's based on the clinical documentation in H+P.   Based on patients current state of illness, I anticipate that, after discharge, patient will require TBD.

## 2024-07-11 NOTE — PLAN OF CARE
AOx4 (forgetful at times), 2L nasal cannula, V Paced, VSS, denies pain  Pt has bilateral hearing aids, glasses, and upper and lower dentures  K+ 5.8 Lokelma ordered Q8H.  Lasix x2 does  Ultrasound kindy ordered  Accucheck QID, regular diet with good appetite  Patient up in chair with alarm on.  Purewick in place with good urine output  Plan of care reviewed. Continue to monitor.    Problem: Diabetes/Glucose Control  Goal: Glucose maintained within prescribed range  Description: INTERVENTIONS:  - Monitor Blood Glucose as ordered  - Assess for signs and symptoms of hyperglycemia and hypoglycemia  - Administer ordered medications to maintain glucose within target range  - Assess barriers to adequate nutritional intake and initiate nutrition consult as needed  - Instruct patient on self management of diabetes  Outcome: Progressing

## 2024-07-11 NOTE — PROGRESS NOTES
Progress Note  Ciarra Salcido Patient Status:  Inpatient    1940 MRN [id number]   Location [facility] 3NE-A Attending Jemima Lopez MD   Hosp Day # 2 PCP JUDY CYR MD     Subjective:  Patient state SOB is improving. Still on oxygen with saturations of 90%    Objective:  /63 (BP Location: Left arm)   Pulse 60   Temp 97.6 °F (36.4 °C) (Oral)   Resp 16   Ht 5' 4\" (1.626 m)   Wt 168 lb 3.2 oz (76.3 kg)   SpO2 95%   BMI 28.87 kg/m²     Telemetry: paced      Intake/Output: -1150        Last 3 Weights   24 0400 168 lb 3.2 oz (76.3 kg)   07/10/24 2000 168 lb 1.6 oz (76.2 kg)   24 0043 162 lb 0.6 oz (73.5 kg)   24 2315 162 lb 0.6 oz (73.5 kg)   24 1826 150 lb (68 kg)   24 1037 140 lb (63.5 kg)   24 1111 140 lb (63.5 kg)       Labs:  Recent Labs   Lab 24  1849 24  0730 07/10/24  0710 24  0628   * 174* 199* 256*   BUN 51* 46* 53* 62*   CREATSERUM 2.70* 2.29* 2.97* 3.28*   EGFRCR 17* 20* 15* 13*   CA 8.6 8.9 8.6 7.8*   ALB 3.0* 2.7*  --   --    * 140 137 139   K 5.3* 4.6 5.1 5.8*    113* 108 109   CO2 19.0* 22.0 20.0* 22.0   ALKPHO 159* 142  --   --    AST 26 14*  --   --    ALT 48 36  --   --    BILT 0.3 0.4  --   --    TP 7.6 6.9  --   --      Recent Labs   Lab 24  1857 24  0731 24  0628   RBC 3.75* 3.72* 3.60*   HGB 11.3* 11.3* 10.8*   HCT 35.9 35.1 34.4*   MCV 95.7 94.4 95.6   MCH 30.1 30.4 30.0   MCHC 31.5 32.2 31.4   RDW 15.4 15.4 15.2   NEPRELIM 4.86 5.66 5.41   WBC 6.8 7.5 7.9   .0 281.0 255.0         Recent Labs   Lab 24  1849   [facility] 24     Lab Results   Component Value Date    INR 1.58 (H) 2024    INR 1.68 (H) 2023    INR 1.51 (H) 09/10/2023       Diagnostics:     Review of Systems   Constitutional: Positive for malaise/fatigue.   Cardiovascular:  Positive for leg swelling.   Respiratory: Negative.         Physical Exam:    Gen: Alert, oriented x 3, in no apparent  distress  Heent: Pupils equal, reactive. Mucous membranes moist.   Cardiac: Regular rate and rhythm, normal S1,S2  Lungs:diminished  Abd: Soft, non tender, non distended  Ext: BLE edema  Skin: Warm, dry  Neuro: No focal deficits        Medications:     hydrALAZINE  25 mg Oral Q8H AYANNA    apixaban  2.5 mg Oral BID    insulin aspart  2-10 Units Subcutaneous TID AC and HS    allopurinol  100 mg Oral Daily    amiodarone  200 mg Oral Daily    dilTIAZem ER  120 mg Oral Daily    escitalopram  5 mg Oral Daily    gabapentin  100 mg Oral TID    levothyroxine  75 mcg Oral Before breakfast    [Held by provider] losartan  25 mg Oral Daily    atorvastatin  10 mg Oral Nightly    carvedilol  12.5 mg Oral BID with meals    insulin aspart  0.15 Units/kg Subcutaneous Once             Assessment:  Hypertensive urgency  - 152/63  On coreg, hydralazine, CCB  ARB held with PRISCILLA  Acute on Chronic HFpEF, mild to mod MR, TR  Diuresed with IV lasix, Negative 1.5 liters  On BB, ARB on hold with PRISCILLA  Persistent atrial fibrillation   S/p GILLES/DCCV 11/2023  On BB and eliquis  On amiodarone, CCB  PPM 9/2023  Hyperlipidemia - on statin   Type II DM  Acute on chronic kidney disease - 3.28 today  Ok per renal to resume diuretics  No ACE/ARB with PRISCILLA, hyperkalamia    Plan:  Ok to resume IV diuretics per renal  Continue BB, amiodaonre, CCCB and hydralazine  No ACE/ARB due to Priscilla ,hyperkalemia    Plan of care discussed with patient, RN.    CESAR Gonzalez  7/11/2024  9:34 AM    Patient seen and examined independently.  Note reviewed and labs reviewed.  Agree with above assessment and plan.  Increase oral hydralazine for better BP control.  Discussed with daughter over the phone.  Rate control strategy for afib.  Continue Eliquis.

## 2024-07-12 ENCOUNTER — APPOINTMENT (OUTPATIENT)
Dept: ULTRASOUND IMAGING | Facility: HOSPITAL | Age: 84
End: 2024-07-12
Attending: INTERNAL MEDICINE
Payer: MEDICARE

## 2024-07-12 ENCOUNTER — APPOINTMENT (OUTPATIENT)
Dept: CV DIAGNOSTICS | Facility: HOSPITAL | Age: 84
End: 2024-07-12
Attending: NURSE PRACTITIONER
Payer: MEDICARE

## 2024-07-12 PROBLEM — N18.30 STAGE 3 CHRONIC KIDNEY DISEASE (HCC): Status: ACTIVE | Noted: 2023-10-16

## 2024-07-12 LAB
ANION GAP SERPL CALC-SCNC: 9 MMOL/L (ref 0–18)
BUN BLD-MCNC: 76 MG/DL (ref 9–23)
CALCIUM BLD-MCNC: 8.6 MG/DL (ref 8.5–10.1)
CHLORIDE SERPL-SCNC: 102 MMOL/L (ref 98–112)
CO2 SERPL-SCNC: 21 MMOL/L (ref 21–32)
CREAT BLD-MCNC: 3.14 MG/DL
EGFRCR SERPLBLD CKD-EPI 2021: 14 ML/MIN/1.73M2 (ref 60–?)
GLUCOSE BLD-MCNC: 176 MG/DL (ref 70–99)
GLUCOSE BLD-MCNC: 184 MG/DL (ref 70–99)
GLUCOSE BLD-MCNC: 195 MG/DL (ref 70–99)
GLUCOSE BLD-MCNC: 231 MG/DL (ref 70–99)
GLUCOSE BLD-MCNC: 277 MG/DL (ref 70–99)
OSMOLALITY SERPL CALC.SUM OF ELEC: 301 MOSM/KG (ref 275–295)
POTASSIUM SERPL-SCNC: 5.2 MMOL/L (ref 3.5–5.1)
SODIUM SERPL-SCNC: 132 MMOL/L (ref 136–145)

## 2024-07-12 PROCEDURE — 93306 TTE W/DOPPLER COMPLETE: CPT | Performed by: NURSE PRACTITIONER

## 2024-07-12 PROCEDURE — 99232 SBSQ HOSP IP/OBS MODERATE 35: CPT | Performed by: INTERNAL MEDICINE

## 2024-07-12 PROCEDURE — 99233 SBSQ HOSP IP/OBS HIGH 50: CPT | Performed by: INTERNAL MEDICINE

## 2024-07-12 PROCEDURE — 93975 VASCULAR STUDY: CPT | Performed by: INTERNAL MEDICINE

## 2024-07-12 PROCEDURE — 76775 US EXAM ABDO BACK WALL LIM: CPT | Performed by: INTERNAL MEDICINE

## 2024-07-12 RX ORDER — CARVEDILOL 12.5 MG/1
12.5 TABLET ORAL 2 TIMES DAILY WITH MEALS
Qty: 60 TABLET | Refills: 0 | Status: SHIPPED | OUTPATIENT
Start: 2024-07-12

## 2024-07-12 RX ORDER — HYDRALAZINE HYDROCHLORIDE 50 MG/1
50 TABLET, FILM COATED ORAL EVERY 8 HOURS SCHEDULED
Qty: 90 TABLET | Refills: 0 | Status: SHIPPED | OUTPATIENT
Start: 2024-07-12 | End: 2024-07-18

## 2024-07-12 RX ORDER — FUROSEMIDE 10 MG/ML
40 INJECTION INTRAMUSCULAR; INTRAVENOUS
Status: COMPLETED | OUTPATIENT
Start: 2024-07-12 | End: 2024-07-13

## 2024-07-12 NOTE — PLAN OF CARE
A/O x 3. Glasses, hearing aids at bedside. Dentures  2L per nc.   K high, given 2 doses of Lokelma and will draw AM labs to monitor, had to hold 0200 dose since pt is NPO for kidney US. Neph following  Daily weights  NPO since midnight  Denies pain  External catheter at night  Accucheck QID- glucose mores stable in low 200s, insulin given per sliding scale on MAR  Carb controlled diet  Up with assist/walker to chair for meals  Plan to DC back to [facility] when medically clear  All needs met    Problem: Diabetes/Glucose Control  Goal: Glucose maintained within prescribed range  Description: INTERVENTIONS:  - Monitor Blood Glucose as ordered  - Assess for signs and symptoms of hyperglycemia and hypoglycemia  - Administer ordered medications to maintain glucose within target range  - Assess barriers to adequate nutritional intake and initiate nutrition consult as needed  - Instruct patient on self management of diabetes  7/12/2024 0204 by Mai Bob, RN  Outcome: Progressing  7/12/2024 0204 by Mai Bob, RN  Outcome: Progressing       Problem: METABOLIC/FLUID AND ELECTROLYTES - ADULT  Goal: Glucose maintained within prescribed range  Description: INTERVENTIONS:  - Monitor Blood Glucose as ordered  - Assess for signs and symptoms of hyperglycemia and hypoglycemia  - Administer ordered medications to maintain glucose within target range  - Assess barriers to adequate nutritional intake and initiate nutrition consult as needed  - Instruct patient on self management of diabetes  7/12/2024 0204 by Mai Bob, RN  Outcome: Progressing  7/12/2024 0204 by Mai Bob, RN  Outcome: Progressing  Goal: Electrolytes maintained within normal limits  Description: INTERVENTIONS:  - Monitor labs and rhythm and assess patient for signs and symptoms of electrolyte imbalances  - Administer electrolyte replacement as ordered  - Monitor response to electrolyte replacements, including rhythm and repeat lab results as  appropriate  - Fluid restriction as ordered  - Instruct patient on fluid and nutrition restrictions as appropriate  Outcome: Progressing  Goal: Hemodynamic stability and optimal renal function maintained  Description: INTERVENTIONS:  - Monitor labs and assess for signs and symptoms of volume excess or deficit  - Monitor intake, output and patient weight  - Monitor urine specific gravity, serum osmolarity and serum sodium as indicated or ordered  - Monitor response to interventions for patient's volume status, including labs, urine output, blood pressure (other measures as available)  - Encourage oral intake as appropriate  - Instruct patient on fluid and nutrition restrictions as appropriate  Outcome: Progressing

## 2024-07-12 NOTE — PROGRESS NOTES
[facility]  Nephrology Progress Note    Ciarra Salcido Attending:  Jemima Lopez MD       Assessment and Plan:    1) PRISCILLA- rising Cr as expected with diuresis + ARB; UA c/w UTI; US unremarkable. Focus on HF mgmt     2) CKD 4- likely due to longstanding DM / HTN; c/w bland urine sediment and mild proteinuria. No further w/u     3) Severe HTN- exac by CHF; much improved with coreg / cardizem / MURRAY. Avoid ACE-I / ARB with PRISCILLA / hyperkalemia; renal artery doppler neg     4) HFpEF with mod MR / TR / AI- resume loop diuretics     5) PAF s/p PPM  + GILLES / DCCV - Apaced on amio / BB / CCB / DOAC per cards     6) DM 2- consider dc januvia permanently with edema / CHF    DC planning      Subjective:  Awake alert wants to go home    Physical Exam:   /51 (BP Location: Left arm)   Pulse 60   Temp 98.9 °F (37.2 °C) (Oral)   Resp 16   Ht 5' 4\" (1.626 m)   Wt 168 lb (76.2 kg)   SpO2 94%   BMI 28.84 kg/m²   Temp (24hrs), Av.2 °F (36.8 °C), Min:97.3 °F (36.3 °C), Max:99.1 °F (37.3 °C)       Intake/Output Summary (Last 24 hours) at 2024 0726  Last data filed at 2024 1315  Gross per 24 hour   Intake 480 ml   Output --   Net 480 ml     Wt Readings from Last 3 Encounters:   24 168 lb (76.2 kg)   24 140 lb (63.5 kg)   24 140 lb (63.5 kg)     General: awake alert  HEENT: No scleral icterus, MMM  Neck: Supple, no CASTRO or thyromegaly  Cardiac: Regular rate and rhythm, S1, S2 normal, no murmur or tub  Lungs: Decreased BS at bases bilaterally   Abdomen: Soft, non-tender. + bowel sounds, no palpable organomegaly  Extremities: Without clubbing, cyanosis; mild LE edema  Neurologic: Cranial nerves grossly intact, moving all extremities  Skin: Warm and dry, no rashes       Labs:   Lab Results   Component Value Date    PGLU 195 2024       Imaging:  All imaging studies reviewed.    Meds:   Current Facility-Administered Medications   Medication Dose Route Frequency    Sodium Zirconium  Cyclosilicate (Lokelma) oral packet 5 g  5 g Oral Q8H    insulin degludec (Tresiba) 100 units/mL flextouch 15 Units  15 Units Subcutaneous Daily    hydrALAZINE (Apresoline) tab 50 mg  50 mg Oral Q8H AYANNA    apixaban (Eliquis) tab 2.5 mg  2.5 mg Oral BID    glucose (Dex4) 15 GM/59ML oral liquid 15 g  15 g Oral Q15 Min PRN    Or    glucose (Glutose) 40% oral gel 15 g  15 g Oral Q15 Min PRN    Or    glucose-vitamin C (Dex-4) chewable tab 4 tablet  4 tablet Oral Q15 Min PRN    Or    dextrose 50% injection 50 mL  50 mL Intravenous Q15 Min PRN    Or    glucose (Dex4) 15 GM/59ML oral liquid 30 g  30 g Oral Q15 Min PRN    Or    glucose (Glutose) 40% oral gel 30 g  30 g Oral Q15 Min PRN    Or    glucose-vitamin C (Dex-4) chewable tab 8 tablet  8 tablet Oral Q15 Min PRN    insulin aspart (NovoLOG) 100 Units/mL FlexPen 2-10 Units  2-10 Units Subcutaneous TID AC and HS    allopurinol (Zyloprim) tab 100 mg  100 mg Oral Daily    amiodarone (Pacerone) tab 200 mg  200 mg Oral Daily    dilTIAZem ER (Dilacor XR) 24 hr cap 120 mg  120 mg Oral Daily    escitalopram (Lexapro) tab 5 mg  5 mg Oral Daily    gabapentin (Neurontin) cap 100 mg  100 mg Oral TID    levothyroxine (Synthroid) tab 75 mcg  75 mcg Oral Before breakfast    atorvastatin (Lipitor) tab 10 mg  10 mg Oral Nightly    carvedilol (Coreg) tab 12.5 mg  12.5 mg Oral BID with meals    insulin aspart (NovoLOG) 100 Units/mL vial 10 Units  0.15 Units/kg Subcutaneous Once         Questions/concerns were discussed with patient and/or family by bedside.          Mary Lares MD  7/12/2024  7:26 AM

## 2024-07-12 NOTE — PROGRESS NOTES
Progress Note  Ciarra Salcido Patient Status:  Inpatient    1940 MRN [id number]   Location [facility] 3NE-A Attending Jemima Lopez MD   Hosp Day # 3 PCP JUDY CYR MD     Subjective:  Mrs. Lafleur feels the same today. On 3L by nasal cannula.  No chest pain, palpitations, or dizziness/syncope    Objective:  Physical Exam:   /55 (BP Location: Left arm)   Pulse 62   Temp 98.5 °F (36.9 °C) (Oral)   Resp 16   Ht 5' 4\" (1.626 m)   Wt 168 lb (76.2 kg)   SpO2 93%   BMI 28.84 kg/m²   Temp (24hrs), Av.6 °F (37 °C), Min:98.1 °F (36.7 °C), Max:99.1 °F (37.3 °C)       Intake/Output Summary (Last 24 hours) at 2024 1433  Last data filed at 2024 1310  Gross per 24 hour   Intake 100 ml   Output --   Net 100 ml     Wt Readings from Last 3 Encounters:   24 168 lb (76.2 kg)   24 140 lb (63.5 kg)   24 140 lb (63.5 kg)     Telemetry: Paced  General: Alert and oriented in no apparent distress seated comfortably in her bedside chair on room air.  HEENT: No focal deficits.  Neck: No JVD, carotids 2+ no bruits.  Cardiac: Regular rate and rhythm, S1, S2 normal, 2/6 systolic murmur, rub or gallop.  Lungs: Clear without wheezes, rales, rhonchi or dullness.  Normal excursions and effort.  Abdomen: Soft, non-tender.   Extremities: Without clubbing, cyanosis. 1+ bilateral edema.  Peripheral pulses are 2+.  Neurologic: Alert and oriented, normal affect.  Skin: Warm and dry.        Intake/Output:    Intake/Output Summary (Last 24 hours) at 2024 1433  Last data filed at 2024 1310  Gross per 24 hour   Intake 100 ml   Output --   Net 100 ml       Last 3 Weights   24 0500 168 lb (76.2 kg)   24 0400 168 lb 3.2 oz (76.3 kg)   07/10/24 2000 168 lb 1.6 oz (76.2 kg)   24 0043 162 lb 0.6 oz (73.5 kg)   24 2315 162 lb 0.6 oz (73.5 kg)   24 1826 150 lb (68 kg)   24 1037 140 lb (63.5 kg)   24 1111 140 lb (63.5 kg)       Labs:  Recent Labs   Lab  07/10/24  0710 07/11/24  0628 07/12/24  1126   * 256* 176*   BUN 53* 62* 76*   CREATSERUM 2.97* 3.28* 3.14*   EGFRCR 15* 13* 14*   CA 8.6 7.8* 8.6    139 132*   K 5.1 5.8* 5.2*    109 102   CO2 20.0* 22.0 21.0     Recent Labs   Lab 07/08/24  1857 07/09/24  0731 07/11/24  0628   RBC 3.75* 3.72* 3.60*   HGB 11.3* 11.3* 10.8*   HCT 35.9 35.1 34.4*   MCV 95.7 94.4 95.6   MCH 30.1 30.4 30.0   MCHC 31.5 32.2 31.4   RDW 15.4 15.4 15.2   NEPRELIM 4.86 5.66 5.41   WBC 6.8 7.5 7.9   .0 281.0 255.0         Recent Labs   Lab 07/08/24  1849   TROPHS 24       Diagnostics:   ECHOCARDIOGRAM 11/14/2023:  1. Left ventricle: The cavity size was normal. Wall thickness was at the      upper limits of normal. Systolic function was normal. The estimated      ejection fraction was 60-65%. Wall motion is normal; there are no      regional wall motion abnormalities.   2. Aortic valve: The valve was trileaflet. The leaflets were mildly to      moderately thickened. There was mild to moderate regurgitation.   3. Left atrium: The left atrial volume was markedly increased.   4. Right ventricle: The cavity size was normal. Pacer C/W ICD noted in the      right ventricle.   5. Tricuspid valve: There was moderate regurgitation.   6. Pulmonary arteries: Systolic pressure was moderately to markedly      increased, estimated to be 57mm Hg. Estimated pulmonary artery diastolic      pressure was 20mm Hg.   Impressions:  This study is compared with previous dated 9/12/2023: Similar   findings.           Medications:   insulin degludec  15 Units Subcutaneous Daily    hydrALAZINE  50 mg Oral Q8H AYANNA    apixaban  2.5 mg Oral BID    insulin aspart  2-10 Units Subcutaneous TID AC and HS    allopurinol  100 mg Oral Daily    amiodarone  200 mg Oral Daily    dilTIAZem ER  120 mg Oral Daily    escitalopram  5 mg Oral Daily    gabapentin  100 mg Oral TID    levothyroxine  75 mcg Oral Before breakfast    atorvastatin  10 mg Oral Nightly     carvedilol  12.5 mg Oral BID with meals    insulin aspart  0.15 Units/kg Subcutaneous Once         Assessment/Plan:    Hypertensive urgency  BP is near goal  Losartan held w/ PRISCILLA and hyperkalemia  Hydralazine added this admission  Acute on chronic HFpEF  Cr stable/trending down after lasix 40 IV BID yesterday  Nephrology following  Coreg, losartan has been held  Paroxsymal afib s/p GILLES/DCCV 11/2023  Currently A-V paced on amiodarone  Continue eliquis at 2.5 mg BID  PPM 9/2023  Moderate MR  Moderate TR  Mild/Moderate AI  DM II  CKD  Mild improvement with diuresis yesterday  Renal US today  Continue IV diuretics      PLAN  Continue hydralazine for BP management with modest goal of approximately 150 systolic  Continue amiodarone, coreg, and diltiazem  Continue lasix as needed - appreciate nephrology assistance  Continue to hold home losartan   Continue Eliquis 2.5 mg BID        Wade Toribio PA-C  7/12/2024  14:33 PM     Patient seen and examined independently.  Note reviewed and labs reviewed.  Agree with above assessment and plan.  BP better controlled with higher hydralazine dosing  Appreciate nephrology input in setting of rising serum creatinine  Rhythm control strategy for afib, continue low dose eliquis  Cardiology service will sign off

## 2024-07-12 NOTE — PAYOR COMM NOTE
ASKING FOR RECONSIDERATION INPT  7/9 THRU 7/11  ADMISSION REVIEW     [place] [facility] [place] #:  [id number]  Authorization Number: [id number]    Admit date: 7/9/24  Admit time: 12:09 AM       REVIEW DOCUMENTATION:     ED Provider Notes        ED Provider Notes signed by Solomon Lezama MD at 7/8/2024 10:55 PM       Author: Solomon Lezama MD Service: -- Author Type: Physician    Filed: 7/8/2024 10:55 PM Date of Service: 7/8/2024  9:52 PM Status: Signed    : Solomon Lezama MD (Physician)           Patient Seen in: [facility] Emergency Department      History     Chief Complaint   Patient presents with    Hypertension    Hyperglycemia     Stated Complaint: Weakness, HTN(SBP over 200) . Poss UTI    Subjective:   HPI    Patient is a 83-year-old female with medical history as noted below presents emergency room with a history of multiple complaints.  The patient is currently at assisted living the patient does have a history of pulmonary embolism and does take Eliquis.  The patient has had reportedly elevated blood pressures this afternoon and also has been having some rectal bleeding that has been ongoing recently and she has also been having some mental status changes has been much more sleepy over the last week and a half or so with decreased appetite.  The patient's family is giving history at the bedside.  The patient has had no history of any headache or chest pain.  The patient complains of some discomfort to her right lower extremity.  The patient denies history of any urinary complaints.  The patient has no known history of any fever.  Patient has had no history of any fall or trauma.    Objective:   Past Medical History:    (HFpEF) heart failure with preserved ejection fraction (HCC)    Acute cystitis without hematuria    Acute deep vein thrombosis (DVT) of popliteal vein of right lower extremity (HCC)    Cataract    CHF exacerbation (HCC)    CKD (chronic kidney disease)     Congestive heart disease (HCC)    COVID-19    Diabetes (HCC)    Disorder of thyroid    DM2 (diabetes mellitus, type 2) (HCC)    Hearing impairment    High blood pressure    HTN (hypertension)    Hyperlipidemia    Hypothyroidism    Pulmonary embolism (HCC)    Shingles    Visual impairment              Past Surgical History:   Procedure Laterality Date    Cataract      Eye surgery      Hysterectomy      [facility]    Pacemaker monitor                  Social History     Socioeconomic History    Marital status: Single   Tobacco Use    Smoking status: Former     Current packs/day: 0.00     Average packs/day: 1 pack/day for 50.0 years (50.0 ttl pk-yrs)     Types: Cigarettes     Start date:      Quit date:      Years since quittin.5    Smokeless tobacco: Never   Vaping Use    Vaping status: Never Used   Substance and Sexual Activity    Alcohol use: Never    Drug use: Never   Other Topics Concern    Caffeine Concern Yes     Comment: 2 cups of coffee daily     Stress Concern No    Weight Concern No    Special Diet No    Exercise Yes     Comment: PT 2 X Weekly, OT 2 x weekly    Seat Belt Yes     Social Determinants of Health     Financial Resource Strain: Low Risk  (1/3/2024)    Financial Resource Strain     Difficulty of Paying Living Expenses: Not very hard     Med Affordability: No   Food Insecurity: No Food Insecurity (1/3/2024)    Food Insecurity     Food Insecurity: Never true   Transportation Needs: No Transportation Needs (1/3/2024)    Transportation Needs     Lack of Transportation: No   Physical Activity: Inactive (1/3/2024)    Exercise Vital Sign     Days of Exercise per Week: 0 days     Minutes of Exercise per Session: 0 min   Stress: No Stress Concern Present (1/3/2024)    Stress     Feeling of Stress : No   Social Connections: Socially Isolated (1/3/2024)    Social Connections     Frequency of Socialization with Friends and Family: 0   Housing Stability: Low Risk  (1/3/2024)    Housing  Stability     Housing Instability: No              Review of Systems    Positive for stated Chief Complaint: Hypertension and Hyperglycemia    Other systems are as noted in HPI.  Constitutional and vital signs reviewed.      All other systems reviewed and negative except as noted above.    Physical Exam     ED Triage Vitals [07/08/24 1826]   BP (!) 211/88   Pulse 58   Resp 15   Temp 97.7 °F (36.5 °C)   Temp src Temporal   SpO2 94 %   O2 Device None (Room air)       Current Vitals:   Vital Signs  BP: (!) 169/76  Pulse: 59  Resp: 19  Temp: 97.7 °F (36.5 °C)  Temp src: Temporal  MAP (mmHg): (!) 101    Oxygen Therapy  SpO2: 96 %  O2 Device: None (Room air)            Physical Exam    GENERAL: Well-developed, well-nourished female sitting up breathing easily in no apparent distress.  Patient is nontoxic in appearance.  HEENT: Head is normocephalic, atraumatic. Pupils are 4 mm equally round and reactive to light. Oropharynx is clear. Mucous membranes are somewhat dry.  NECK: There is no focal tenderness to palpation appreciated.   LUNGS: Clear to auscultation bilaterally with no wheeze. There is good equal air entry bilaterally.  HEART: Regular rate and rhythm. Normal S1, S2 no S3, or S4. No murmur.  ABDOMEN: There is no focal tenderness to palpation appreciated anywhere throughout the abdomen. There is no guarding, no rebound, no mass, and no organomegaly appreciated. There is normoactive bowel sounds.   RECTAL: There is brown stool which is trace Hemoccult positive no gross blood or melena appreciated.  EXTREMITIES: There is no cyanosis, clubbing, or edema appreciated. Pulses are 2+ and equal in all 4 extremities.  NEURO: Patient is awake, alert and oriented to time place and person. Motor strength is 5 over 5 in all 4 extremities. There are no gross motor or sensory deficits appreciated. Cranial nerves II through XII are intact.  Patient is answering all questions appropriately.        ED Course     Labs Reviewed    COMP METABOLIC PANEL (14) - Abnormal; Notable for the following components:       Result Value    Glucose 361 (*)     Sodium 134 (*)     Potassium 5.3 (*)     CO2 19.0 (*)     BUN 51 (*)     Creatinine 2.70 (*)     Calculated Osmolality 306 (*)     eGFR-Cr 17 (*)     Alkaline Phosphatase 159 (*)     Albumin 3.0 (*)     Globulin  4.6 (*)     A/G Ratio 0.7 (*)     All other components within normal limits   PROTHROMBIN TIME (PT) - Abnormal; Notable for the following components:    PT 19.0 (*)     INR 1.58 (*)     All other components within normal limits   PTT, ACTIVATED - Abnormal; Notable for the following components:    PTT 42.3 (*)     All other components within normal limits   PRO BETA NATRIURETIC PEPTIDE - Abnormal; Notable for the following components:    Pro-Beta Natriuretic Peptide 12,823 (*)     All other components within normal limits   URINALYSIS WITH CULTURE REFLEX - Abnormal; Notable for the following components:    Clarity Urine Turbid (*)     Glucose Urine 150 (*)     Blood Urine 2+ (*)     Protein Urine 100 (*)     Leukocyte Esterase Urine 25 (*)     WBC Urine 11-20 (*)     RBC Urine >10 (*)     Bacteria Urine 1+ (*)     Squamous Epi. Cells Few (*)     All other components within normal limits   CBC W/ DIFFERENTIAL - Abnormal; Notable for the following components:    RBC 3.75 (*)     HGB 11.3 (*)     All other components within normal limits   TROPONIN I HIGH SENSITIVITY - Normal   SARS-COV-2/FLU A AND B/RSV BY PCR (GENEXPERT) - Normal    Narrative:     This test is intended for the qualitative detection and differentiation of SARS-CoV-2, influenza A, influenza B, and respiratory syncytial virus (RSV) viral RNA in nasopharyngeal or nares swabs from individuals suspected of respiratory viral infection consistent with COVID-19 by their healthcare provider. Signs and symptoms of respiratory viral infection due to SARS-CoV-2, influenza, and RSV can be similar.    Test performed using the Xpert Xpress  SARS-CoV-2/FLU/RSV (real time RT-PCR)  assay on the GeneXpert instrument, [employer], [place] [address].   This test is being used under the Food and Drug Administration's Emergency Use Authorization.    The authorized Fact Sheet for Healthcare Providers for this assay is available upon request from the laboratory.   CBC WITH DIFFERENTIAL WITH PLATELET    Narrative:     The following orders were created for panel order CBC With Differential With Platelet.  Procedure                               Abnormality         Status                     ---------                               -----------         ------                     CBC W/ DIFFERENTIAL[[id number]]          Abnormal            Final result                 Please view results for these tests on the individual orders.   RAINBOW DRAW LAVENDER   RAINBOW DRAW LIGHT GREEN   RAINBOW DRAW BLUE   URINE CULTURE, ROUTINE     EKG    Rate, intervals and axes as noted on EKG Report.  Rate: 61  Rhythm: Sinus Rhythm  Reading: No acute ST elevation appreciated.                 XR CHEST AP PORTABLE  (CPT=71045)    Result Date: 7/8/2024  CONCLUSION:  See above.   LOCATION:  [id number]      Dictated by (CST): Evangelist Layton MD on 7/08/2024 at 10:00 PM     Finalized by (CST): Evangelist Layton MD on 7/08/2024 at 10:00 PM       US VENOUS DOPPLER LEG BILAT - DIAG IMG (CPT=93970)    Result Date: 7/8/2024  CONCLUSION:  No evidence of deep venous thrombosis in the bilateral lower extremities.  LOCATION:  [place]   Dictated by (CST): Ede Schaffer MD on 7/08/2024 at 9:31 PM     Finalized by (CST): Ede Schaffer MD on 7/08/2024 at 9:31 PM       CT BRAIN OR HEAD (40280)    Result Date: 7/8/2024  CONCLUSION:   1. No acute intracranial abnormality identified.  2. There are scattered small areas of encephalomalacia noted in the bilateral frontal lobes and right parietal lobe.  There are moderate age-indeterminate microvascular ischemic changes in the cerebral white matter. If there is clinical  concern for acute  ischemia/infarction, an MRI of the brain would be recommended for further evaluation.  LOCATION:  [place]   Dictated by (CST): Ede Schaffer MD on 7/08/2024 at 8:32 PM     Finalized by (CST): Ede Schaffer MD on 7/08/2024 at 8:41 PM                 MDM      22:52 patient sitting back and breathing easily in no apparent distress this time.  Patient answering all questions appropriately at this time.  Patient's family updated regarding plan for admission.  The patient will be admitted to the hospital for further care at this time.    Admission disposition: 7/8/2024 10:51 PM           Patient had an IV line established blood work drawn including a CBC, chemistries, BUN and creatinine, and blood sugar showed evidence of a bicarb of 19 and a BUN/creatinine of 51 and 2.7 which is consistent with the patient's previous BUN and creatinine earlier this year.  Patient blood sugar is 361 which is elevated for which the patient received insulin here in the ER.  Liver function test and troponin found to be negative.  COVID, flu, and RSV are negative.  Patient's urinalysis which the cath urine specimen does show evidence of a urinary tract infection.  Patient is sitting back and breathing easily in no apparent distress on initial exam or repeat examination.  The patient had multiple imaging studies done including CT, ultrasound, and chest x-ray as noted above.  Patient has had multiple complaints she has markedly elevated blood pressure here in the ER for which she was given IV hydralazine with improvement in blood pressure after this was given.  The patient was given IV Rocephin here in the ER.  The patient will need her hemoglobin trended secondary to history of rectal bleeding.  The patient will be admitted to the hospital for further care at this time.  Patient's case discussed with the [place] hospitalist.  Patient admitted with no further complaints.                             Medical Decision  Making      Disposition and Plan     Clinical Impression:  1. Hypertension, unspecified type    2. Acute cystitis with hematuria    3. Altered mental status, unspecified altered mental status type    4. Arthralgia of right lower leg    5. Rectal bleeding         Disposition:  Admit  7/8/2024 10:51 pm    Follow-up:  No follow-up provider specified.        Medications Prescribed:  Current Discharge Medication List                            Hospital Problems       Present on Admission  Date Reviewed: 6/21/2024            ICD-10-CM Noted POA    * (Principal) Hypertension, unspecified type I10 7/8/2024 Unknown                     Signed by Solomon Lezama MD on 7/8/2024 10:55 PM         MEDICATIONS ADMINISTERED IN LAST 1 DAY:  allopurinol (Zyloprim) tab 100 mg       Date Action Dose Route User    7/12/2024 0922 Given 100 mg Oral Hanna Herrera RN          amiodarone (Pacerone) tab 200 mg       Date Action Dose Route User    7/12/2024 0922 Given 200 mg Oral Hanna Herrera RN          apixaban (Eliquis) tab 2.5 mg       Date Action Dose Route User    7/12/2024 0922 Given 2.5 mg Oral Hanna Herrera RN    7/11/2024 2129 Given 2.5 mg Oral Mai Bob RN          atorvastatin (Lipitor) tab 10 mg       Date Action Dose Route User    7/11/2024 2129 Given 10 mg Oral Mai Bob RN          carvedilol (Coreg) tab 12.5 mg       Date Action Dose Route User    7/12/2024 0922 Given 12.5 mg Oral Hanna Herrera RN    7/11/2024 1825 Given 12.5 mg Oral Rachelle Salcido RN          dilTIAZem ER (Dilacor XR) 24 hr cap 120 mg       Date Action Dose Route User    7/12/2024 0922 Given 120 mg Oral Hanna Herrera RN          escitalopram (Lexapro) tab 5 mg       Date Action Dose Route User    7/12/2024 0922 Given 5 mg Oral Hanna Herrera RN          furosemide (Lasix) 10 mg/mL injection 40 mg       Date Action Dose Route User    7/11/2024 1712 Given 40 mg Intravenous Rachelle Salcido RN           gabapentin (Neurontin) cap 100 mg       Date Action Dose Route User    7/12/2024 0922 Given 100 mg Oral Hanna Herrera RN    7/11/2024 2129 Given 100 mg Oral Mai Bob RN    7/11/2024 1712 Given 100 mg Oral Rachelle Salcido RN          hydrALAZINE (Apresoline) tab 50 mg       Date Action Dose Route User    7/12/2024 0531 Given 50 mg Oral Mai Bob RN    7/11/2024 2129 Given 50 mg Oral Mai Bob RN    7/11/2024 1455 Given 50 mg Oral Rachelle Salcido RN          insulin aspart (NovoLOG) 100 Units/mL FlexPen 2-10 Units       Date Action Dose Route User    7/12/2024 0534 Given 3 Units Subcutaneous (Right Upper Arm) Mai Bob RN    7/11/2024 2131 Given 3 Units Subcutaneous (Left Upper Arm) Mai Bob RN    7/11/2024 1826 Given 2 Units Subcutaneous (Right Upper Arm) Rachelle Salcido RN    7/11/2024 1234 Given 10 Units Subcutaneous (Left Upper Arm) Rachelle Salcido RN          insulin aspart (NovoLOG) 100 Units/mL FlexPen 10 Units       Date Action Dose Route User    7/11/2024 1235 Given 10 Units Subcutaneous (Left Upper Arm) Rachelle Salcido RN          insulin degludec (Tresiba) 100 units/mL flextouch 15 Units       Date Action Dose Route User    7/12/2024 0924 Given 15 Units Subcutaneous (Left Lower Arm) Hanna Herrera RN    7/11/2024 1453 Given 15 Units Subcutaneous (Right Upper Arm) Rachelle Salcido RN          levothyroxine (Synthroid) tab 75 mcg       Date Action Dose Route User    7/12/2024 0531 Given 75 mcg Oral Mai Bob RN          Sodium Zirconium Cyclosilicate (Lokelma) oral packet 5 g       Date Action Dose Route User    7/11/2024 1826 Given 5 g Oral Rachelle Salcido RN    7/11/2024 1114 Given 5 g Oral Makedonski, Rachelle, RN          Date/Time Temp Pulse Resp BP SpO2 Weight O2 Device O2 Flow Rate (L/min) [facility]   07/12/24 0852 -- -- -- -- -- -- Nasal cannula 2 L/min    07/12/24 0810 98.5 °F (36.9 °C) 63 16 151/55 94 % -- Nasal cannula  2 L/min    07/12/24 0500 -- 60 -- -- 94 % 168 lb (76.2 kg) -- --    07/12/24 0400 98.9 °F (37.2 °C) 60 -- 138/51 95 % -- Nasal cannula 2 L/min    07/12/24 0000 99.1 °F (37.3 °C) 60 -- 133/51 96 % -- Nasal cannula 2 L/min    07/11/24 2000 98.4 °F (36.9 °C) 60 -- 145/60 92 % -- Nasal cannula 2 L/min    07/11/24 1735 -- 60 -- -- 95 % -- -- -- JW   07/11/24 1734 -- 60 -- -- 95 % -- -- -- JW   07/11/24 1733 -- 60 -- -- 95 % -- -- -- JW   07/11/24 1732 -- 60 -- -- 95 % -- -- --    07/11/24 1731 98.1 °F (36.7 °C) 59 16 156/56 96 % -- Nasal cannula 3 L/min    07/11/24 1730 -- 60 -- -- 96 % -- -- -- JW   07/11/24 1219 -- 60 -- -- 97 % -- -- -- JW   07/11/24 1218 -- 60 -- -- 94 % -- -- -- JW   07/11/24 1217 -- 59 -- 170/70 Abnormal  95 % -- -- --    07/11/24 1216 97.3 °F (36.3 °C) 64 16 170/70 Abnormal  94 % -- Nasal cannula 3 L/min    07/11/24 1215 -- 60 -- -- 94 % -- -- -- JW   07/11/24 1214 -- 60 -- -- 93 % -- -- -- JW   07/11/24 0750 -- 60 -- -- 95 % -- -- -- JW   07/11/24 0749 -- 61 -- -- 95 % -- -- -- JW   07/11/24 0748 -- 60 -- -- 95 % -- -- -- JW   07/11/24 0747 -- 60 -- -- 95 % -- -- --    07/11/24 0746 -- 60 -- -- 96 % -- -- --    07/11/24 0745 97.6 °F (36.4 °C) 60 16 152/63 94 % -- None (Room air) 3 L/min    07/11/24 0744 -- 60 -- -- 95 % -- -- --    07/11/24 0400 97.9 °F (36.6 °C) 61 18 130/49 94 % -- Nasal cannula 2 L/min    07/11/24 0400 -- -- -- -- -- 168 lb 3.2 oz (76.3 kg) -- -- SD   07/11/24 0000 98 °F (36.7 °C) 60 18 176/46 Abnormal  92 % -- -- --    07/10/24 2000 97.3 °F (36.3 °C) 59 16 167/58 Abnormal  93 % -- None (Room air) --    07/10/24 2000 -- -- -- -- -- 168 lb 1.6 oz (76.2 kg) -- -- SD   07/10/24 1600 97.7 °F (36.5 °C) 59 16 160/60 93 % -- -- -- ND   07/10/24 1600 -- -- -- -- -- -- Nasal cannula 2 L/min NM   07/10/24 0910 -- 61 -- -- -- -- -- -- CVA   07/10/24 0800 97.8 °F (36.6 °C) 60 18 177/69 Abnormal  97 % -- Nasal cannula 1 L/min ND   07/10/24 0400 98 °F (36.7  °C) 60 18 129/56 91 % -- None (Room air) --    07/10/24 0015 98.3 °F (36.8 °C) 60 18 152/57 93 % -- None (Room air) --    07/09/24 2114 -- 62 -- -- 92 % -- -- --    07/09/24 2000 97.7 °F (36.5 °C) 61 18 116/39 91 % -- None (Room air) --    07/09/24 1548 97.8 °F (36.6 °C) 61 18 160/75 96 % -- None (Room air) 0 L/min ND   07/09/24 1156 -- -- -- 181/73 Abnormal  -- -- -- -- AA   07/09/24 1156 98 °F (36.7 °C) 60 16 -- 95 % -- None (Room air) 0 L/min ND   07/09/24 0800 97.7 °F (36.5 °C) 62 16 177/76 Abnormal  93 % -- None (Room air) 0 L/min ND   07/09/24 0603 -- 65 -- -- 95 % -- -- --    07/09/24 0314 97.5 °F (36.4 °C) 60 16 174/72 Abnormal  96 % -- -- -- RS   07/09/24 0043 -- -- -- -- -- 162 lb 0.6 oz (73.5 kg) -- --    07/09/24 0043 97.8 °F (36.6 °C) 60 18 180/65 Abnormal  91 % -- -- -- RS   07/08/24 2345 -- 60 16 159/65 -- -- None (Room air) -- SC   07/08/24 2315 -- -- -- -- -- 162 lb 0.6 oz (73.5 kg) -- -- RS   07/08/24 2315 -- 60 20 146/62 -- -- None (Room air) -- SC   07/08/24 2245 -- 59 19 169/76 Abnormal  96 % -- None (Room air) -- MS   07/08/24 2230 -- 59 19 180/72 Abnormal  97 % -- None (Room air) -- MS   07/08/24 2200 -- 60 22 204/78 Abnormal  -- -- None (Room air) -- MS   07/08/24 2030 -- 60 15 161/78 Abnormal  96 % -- None (Room air) -- MS   07/08/24 1945 -- 59 23 225/91 Abnormal  94 % -- None (Room air) -- MS   07/08/24 1826 97.7 °F (36.5 °C) 58 15 211/88 Abnormal  94 % 150 lb (68 kg) None (Room air) --             7/9 H&P    Subjective:  History of Present Illness:      Ciarra Salcido is a 83 year old female with PMHx HFpEF/ DVT/ PE/ DM/ hypothyroidism/ HTN/ HLD/ A-fib who presented to the hospital for high blood pressures. She reports both her blood pressure and blood sugars have been high recently for the past 2 weeks. She has been more fatigued without focal complaints. She noticed increased peripheral edema but denied any dyspnea, orthopnea, PND, chest pain, palpitations. She has been taking  her medications as prescribed and watches the salt in her diet. She also reports chronic issues with rectal bleeding with blood only on the toilet paper when wiping.         Assessment & Plan:  #HTN urgency with suspected acute on chronic HFpEF and right sided failure  -BP as high as 225/91, s/p 10 IV hydralazine in ED  -BNP 12,823 with baseline around 10,000  -EKG showing A-paced rhythm @60 bpm  -venous duplex negative for DVT  -chest xray without acute process  -echo Nov 2023: EF 60-65%, mild-mod aortic regurg, mod mirtal regurg, mod tricuspid regurg  -plan for nitroglycerin paste for HTN urgency with goal reduction of BP by 25% over 24 hrs  -lasix 40 IV BID  -strict I/O, daily weights  -interrogate PPM to assess for A-fib burden which could be contributing  -monitor on telemetry  -cont home losartan, amiodarone, diltiazem, metoprolol  #Atrial fibrillation     #Hyponatremia  -mild at 134  -likely 2/2 volume overload and CHF  -tx as above  -cont to monitor BMP  -can allow to correct to normal over 24 hrs     #Hyperkalemia  -mild at 5.3  -monitor closely     #NAGMA  -CO2 of 19 with AG of 7  -likely 2/2 underlying renal ds  -cont to monitor  -consider addition of bicarb tabs if not improving     #normocytic anemia with heme positive stools  -hgb 11.3 with baseline 11-12  -heme-occult trace positive per ED physician  -sounds likely to be hemorrhoidal based on pt report  -cont to monitor CBC and transfuse for hgb <7  -liquid diet until hgb stable  -if hgb down trending consider GI eval  -hold home apixaban until hgb stable     #hypoalbuminemia  -albumin 3.0  -may be 2/2 volume overloaded state  -cont to monitor     #DM  -SSI, QID checks, hypoglycemia protocol  -hold home sitagliptin, Januvia, glimepiride  -cont home gabapentin     #HLD  -cont home simvastatin     #Depression  -cont home escitalopram     #Hypothyroidism  -cont home levothyroxine     #Gout  -cont home allopurinol     #Asymptomatic bacteriuria  -pt denied  any urinary complaints, normal WBC and afebrile  -monitor off antibiotics        Plan of care discussed with ED physician    7/9  HOSPITALIST NOTE    Subjective:  Patient still with dyspnea, BP elevated.           Objective:  Review of Systems:   A comprehensive review of systems was completed; pertinent positive and negatives stated in subjective.     Vital signs:  Temp:  [97.5 °F (36.4 °C)-98 °F (36.7 °C)] 98 °F (36.7 °C)  Pulse:  [58-65] 60  Resp:  [15-23] 16  BP: (146-225)/(62-91) 181/73  SpO2:  [91 %-97 %] 95 %     Physical Exam:    General: No acute distress  Respiratory: No wheezes, no rhonchi  Cardiovascular: S1, S2, regular rate and rhythm  Abdomen: Soft, Non-tender, non-distended, positive bowel sounds  Neuro: No new focal deficits.   Extremities: No edema        Diagnostic Data:    Labs:        Recent Labs   Lab 07/08/24 1849 07/08/24 1857 07/09/24  0731   WBC  --  6.8 7.5   HGB  --  11.3* 11.3*   MCV  --  95.7 94.4   PLT  --  269.0 281.0   INR 1.58*  --   --               Recent Labs   Lab 07/08/24 1849 07/09/24  0730   * 174*   BUN 51* 46*   CREATSERUM 2.70* 2.29*   CA 8.6 8.9   ALB 3.0* 2.7*   * 140   K 5.3* 4.6    113*   CO2 19.0* 22.0   ALKPHO 159* 142   AST 26 14*   ALT 48 36   BILT 0.3 0.4   TP 7.6 6.9       Medications:   Scheduled Medications    furosemide  40 mg Intravenous BID (Diuretic)    insulin aspart  2-10 Units Subcutaneous TID AC and HS    allopurinol  100 mg Oral Daily    amiodarone  200 mg Oral Daily    dilTIAZem ER  120 mg Oral Daily    escitalopram  5 mg Oral Daily    gabapentin  100 mg Oral TID    levothyroxine  75 mcg Oral Before breakfast    [Held by provider] losartan  25 mg Oral Daily    metoprolol succinate ER  100 mg Oral 2x Daily(Beta Blocker)    atorvastatin  10 mg Oral Nightly    insulin aspart  0.15 Units/kg Subcutaneous Once                  Assessment & Plan:  #HTN urgency with suspected acute on chronic HFpEF and right sided failure  -BP as high as  225/91, s/p 10 IV hydralazine in ED  -BNP 12,823 with baseline around 10,000  -EKG showing A-paced rhythm @60 bpm  -venous duplex negative for DVT  -chest xray without acute process  -echo Nov 2023: EF 60-65%, mild-mod aortic regurg, mod mirtal regurg, mod tricuspid regurg  -plan for nitroglycerin paste for HTN urgency with goal reduction of BP by 25% over 24 hrs  -lasix 40 IV BID  -strict I/O, daily weights  -interrogate PPM to assess for A-fib burden which could be contributing  -monitor on telemetry  -cont home losartan, amiodarone, diltiazem, metoprolol  -IV hydralazine prn  -cardiology eval     #Atrial fibrillation, rate controlled, ?AC     #Hyponatremia  -mild at 134  -likely 2/2 volume overload and CHF  -tx as above  -cont to monitor BMP  -can allow to correct to normal over 24 hrs     #Hyperkalemia  -mild at 5.3  -monitor closely     #PRISCILLA on CKD, possible cardiorenal  Baseline Cr under 2  Cr improving with diuretics     #NAGMA  -CO2 of 19 with AG of 7  -likely 2/2 underlying renal ds  -cont to monitor  -consider addition of bicarb tabs if not improving     #normocytic anemia with heme positive stools  -hgb 11.3 with baseline 11-12  -heme-occult trace positive per ED physician  -sounds likely to be hemorrhoidal based on pt report  -cont to monitor CBC and transfuse for hgb <7  -liquid diet until hgb stable  -if hgb down trending consider GI eval  -hold home apixaban until hgb stable     #hypoalbuminemia  -albumin 3.0  -may be 2/2 volume overloaded state  -cont to monitor     #DM  -SSI, QID checks, hypoglycemia protocol  -hold home sitagliptin, Januvia, glimepiride  -cont home gabapentin     #HLD  -cont home simvastatin     #Depression  -cont home escitalopram     #Hypothyroidism  -cont home levothyroxine     #Gout  -cont home allopurinol     #Asymptomatic bacteriuria  -pt denied any urinary complaints, normal WBC and afebrile  -monitor off antibiotics    7/9 CARDIOLOGY CONSULT NOTE     Reason for  Consultation:  Hypertensive urgency     History of Present Illness:  Ciarra Salcido is a(n) 83 year old female with chronic medical conditions including diastolic heart failure, mild-moderate regurgitation and tricuspid regurgitation, persistent atrial fibrillation, s/p PPM, hypertension, hyperlipidemia, DM type II, CKD who presents with concern for elevated blood pressure.  She notes that her blood pressures have been elevated for the past 2 weeks.  Overall, complaining of generalized malaise.  Daughter states that she lives in assisted living and therefore they do not check her blood pressure.  But after noting that she was having worsening swelling in her lower extremities and even her face, she checked her blood pressure and found it to be in the 200 systolic range.  This prompted them to come into the emergency room for further evaluation.  Also notes possible increase in peripheral edema.  Otherwise, no other chest pain, palpitations, syncope, lightheadedness, dizziness, nausea, emesis.         Imaging/results:  EKG-atrial paced with prolonged AV conduction, incomplete RBBB  CXR negative for acute cardiopulmonary disease  Venous ultrasound bilateral lower extremities negative for DVT  High-sensitivity troponin 24  proBNP 12,823        Assessment:  Hypertensive urgency  Acute on chronic HFpEF  Persistent atrial fibrillation s/p GILLES/DCCV 11/2023  S/p PPM 9/2023  Moderate MR/TR, mild-moderate AI  Hyperlipidemia  DM 2  CKD        Plan:  Continue IV Lasix for now  Slow normalization of blood pressure starting later today  Antihypertensive regimen:  Losartan 25 mg daily-holding currently, improving renal function, will likely resume in the near future  Change metoprolol succinate to carvedilol 12.5 mg twice daily this evening  Continue amiodarone 200 mg daily, diltiazem 120 mg daily    7/10 HOSPITALIST NOTE    Subjective:  Patient sitting up in the chair.  Seen with RN at bedside.  Complains of groin pain.  Patient  states she fell out of bed at her assisted living facility almost a week back before admission.  RN had discussed with patient's daughter who reported that patient fell at assisted living facility weeks back and has L1 fracture diagnosed approximately 6 to 7 weeks ago and was wearing a back brace at home and receiving physical therapy at her assisted living as reported by patient's daughter with RN.       Vital signs:  Temp:  [97.7 °F (36.5 °C)-98.3 °F (36.8 °C)] 97.8 °F (36.6 °C)  Pulse:  [60-62] 60  Resp:  [16-18] 18  BP: (116-181)/(39-75) 177/69  SpO2:  [91 %-97 %] 97 %       Medications:   Scheduled Medications    furosemide  40 mg Intravenous BID (Diuretic)    insulin aspart  2-10 Units Subcutaneous TID AC and HS    allopurinol  100 mg Oral Daily    amiodarone  200 mg Oral Daily    dilTIAZem ER  120 mg Oral Daily    escitalopram  5 mg Oral Daily    gabapentin  100 mg Oral TID    levothyroxine  75 mcg Oral Before breakfast    [Held by provider] losartan  25 mg Oral Daily    atorvastatin  10 mg Oral Nightly    carvedilol  12.5 mg Oral BID with meals    insulin aspart  0.15 Units/kg Subcutaneous Once                  Assessment & Plan:  #HTN urgency with suspected acute on chronic HFpEF and right sided failure  -BP as high as 225/91 on admission, s/p 10 IV hydralazine in ED  -BNP 12,823 with baseline around 10,000  -EKG showing A-paced rhythm @60 bpm  -venous duplex negative for DVT  -chest xray without acute process  -echo Nov 2023: EF 60-65%, mild-mod aortic regurg, mod mirtal regurg, mod tricuspid regurg  -plan for nitroglycerin paste for HTN urgency with goal reduction of BP by 25% over 24 hrs  -lasix 40 IV BID  -strict I/O, daily weights  -interrogate PPM to assess for A-fib burden which could be contributing  -monitor on telemetry  -cont home losartan, amiodarone, diltiazem, metoprolol  -IV hydralazine prn  -cardiology on consult     #Atrial fibrillation, rate controlled, anticoagulation with low-dose  Eliquis 2.5 mg twice daily per cardiology     #Hyponatremia  -mild at 134  -likely 2/2 volume overload and CHF  -tx as above  -cont to monitor BMP  -can allow to correct to normal over 24 hrs     #Hyperkalemia  -mild at 5.3  -monitor closely     #PRISCILLA on CKD, possible cardiorenal  Baseline Cr under 2  Cr improving with diuretics     #NAGMA  -CO2 of 19 with AG of 7  -likely 2/2 underlying renal ds  -cont to monitor  -consider addition of bicarb tabs if not improving     #normocytic anemia with heme positive stools  -hgb 11.3 with baseline 11-12  -heme-occult trace positive per ED physician  -sounds likely to be hemorrhoidal based on pt report  -cont to monitor CBC and transfuse for hgb <7  -liquid diet until hgb stable  -if hgb down trending consider GI eval  -hold home apixaban until hgb stable     #hypoalbuminemia  -albumin 3.0  -may be 2/2 volume overloaded state  -cont to monitor     # Diabetes mellitus type 2  -SSI, QID checks, hypoglycemia protocol  -hold home sitagliptin, Januvia, glimepiride  -cont home gabapentin     # Hyperlipidemia  -cont home simvastatin     #Depression  -cont home escitalopram     #Hypothyroidism  -cont home levothyroxine     #Gout  -cont home allopurinol     #Asymptomatic bacteriuria  -pt denied any urinary complaints, normal WBC and afebrile  -monitor off antibiotics     #Osteoarthritis with groin pain  -X-ray of the pelvis and hip with no fracture, shows osteoarthritis  -PT OT  - pain management     7/10 CARDIOLOGY NOTE    Subjective:  Mrs. Salcido feels well. Denies dyspnea on room air or pain. Seated eating her lunch.     Objective:  Physical Exam:   BP (!) 177/69 (BP Location: Left arm)   Pulse 61   Temp 97.8 °F (36.6 °C) (Oral)   Resp 18   Ht 5' 4\" (1.626 m)   Wt 162 lb 0.6 oz (73.5 kg)   SpO2 97%   BMI 27.81 kg/m²   Temp (24hrs), Av.9 °F (36.6 °C), Min:97.7 °F (36.5 °C), Max:98.3 °F (36.8 °C)        Intake/Output Summary (Last 24 hours) at 7/10/2024 1242  Last data  filed at 7/9/2024 2114      Gross per 24 hour   Intake --   Output 1350 ml   Net -1350 ml       Medications:  Scheduled Medications    furosemide  40 mg Intravenous BID (Diuretic)    insulin aspart  2-10 Units Subcutaneous TID AC and HS    allopurinol  100 mg Oral Daily    amiodarone  200 mg Oral Daily    dilTIAZem ER  120 mg Oral Daily    escitalopram  5 mg Oral Daily    gabapentin  100 mg Oral TID    levothyroxine  75 mcg Oral Before breakfast    [Held by provider] losartan  25 mg Oral Daily    atorvastatin  10 mg Oral Nightly    carvedilol  12.5 mg Oral BID with meals    insulin aspart  0.15 Units/kg Subcutaneous Once         Medication Infusions            Assessment/Plan:     Hypertensive urgency  BP remains above goal after adjusting to coreg  Losartan held w/ PRISCILLA  HR limit diltiazem and coreg increase. Add hydralazine for now until losartan can be restarted  Acute on chronic HFpEF  Cr worsening with continued diuresis down nearly 2L since admit.  Discontinue IV lasix - restart OP regime  Coreg, restart losartan once baseline renal  Paroxsymal afib s/p GILLES/DCCV 11/2023  Currently Apaced on amiodarone  Heme + on admission  Restart eliquis at 2.5 mg BID once bleeding risk clarified  PPM 9/2023  Moderate MR  Moderate TR  Mild/Moderate AI  DM II  CKD  Worsening today  Consider nephrology consultation        PLAN  Add hydralazine  Continue amiodarone, coreg, and diltiazem  Hold lasix - consider nephrology consultation given worsening Cr with diuresis  Restart losartan once Cr stablizes  Restart Eliquis at 2.5 mg BID (age and renal) once bleeding risk deemed acceptable by primary team given hemoccult + on admit      7/11 CARDIOLOGY NOTE  Subjective:  Patient state SOB is improving. Still on oxygen with saturations of 90%     Objective:  /63 (BP Location: Left arm)   Pulse 60   Temp 97.6 °F (36.4 °C) (Oral)   Resp 16   Ht 5' 4\" (1.626 m)   Wt 168 lb 3.2 oz (76.3 kg)   SpO2 95%   BMI 28.87 kg/m²       Telemetry: paced        Intake/Output: -1150        Labs:         Recent Labs   Lab 07/08/24  1849 07/09/24  0730 07/10/24  0710 07/11/24  0628   * 174* 199* 256*   BUN 51* 46* 53* 62*   CREATSERUM 2.70* 2.29* 2.97* 3.28*   EGFRCR 17* 20* 15* 13*   CA 8.6 8.9 8.6 7.8*   ALB 3.0* 2.7*  --   --    * 140 137 139   K 5.3* 4.6 5.1 5.8*    113* 108 109   CO2 19.0* 22.0 20.0* 22.0   ALKPHO 159* 142  --   --    AST 26 14*  --   --    ALT 48 36  --   --    BILT 0.3 0.4  --   --    TP 7.6 6.9  --   --             Recent Labs   Lab 07/08/24  1857 07/09/24  0731 07/11/24  0628   RBC 3.75* 3.72* 3.60*   HGB 11.3* 11.3* 10.8*   HCT 35.9 35.1 34.4*   MCV 95.7 94.4 95.6   MCH 30.1 30.4 30.0   MCHC 31.5 32.2 31.4   RDW 15.4 15.4 15.2   NEPRELIM 4.86 5.66 5.41   WBC 6.8 7.5 7.9   .0 281.0 255.0             Assessment:  Hypertensive urgency  - 152/63  On coreg, hydralazine, CCB  ARB held with JEFERSON  Acute on Chronic HFpEF, mild to mod MR, TR  Diuresed with IV lasix, Negative 1.5 liters  On BB, ARB on hold with JEFERSON  Persistent atrial fibrillation   S/p GILLES/DCCV 11/2023  On BB and eliquis  On amiodarone, CCB  PPM 9/2023  Hyperlipidemia - on statin   Type II DM  Acute on chronic kidney disease - 3.28 today  Ok per renal to resume diuretics  No ACE/ARB with JEFERSON, hyperkalamia     Plan:  Ok to resume IV diuretics per renal  Continue BB, amiodaonre, CCCB and hydralazine  No ACE/ARB due to Jeferson ,hyperkalemia     Plan of care discussed with patient, RN.     7/11  NEPHROLOGY CONSULT NOTE    Assessment / Plan:     1) JEFERSON- rising Cr as expected with diuresis + ARB; obtain renal US with doppler     2) CKD 4- likely due to longstanding DM / HTN; c/w bland urine sediment. Quantify proteinuria     3) Severe HTN- exac by CHF; much improved with coreg / cardizem / MURRAY. Avoid ACE-I / ARB with JEFERSON / hyperkalemia     4) HFpEF with mod MR / TR / AI- OK to resume diuretics from renal standpoint     5) PAF s/p PPM 9/23 + GILLES / DCCV  11/23- Apaced on amio / BB / CCB / DOAC per cards     6) DM 2- consider dc januvia permanently with edema / CHF        Reason for Consultation:  PRISCILLA / CKD 4     7/11 HOSPITALIST NOTE    Subjective:  Patient sitting up in the chair.  Blood sugar elevated this morning, given insulin.  Patient states she did have some postmeal with blood sugar this morning.  Denies any chest pain or shortness of breath.  Denies abdominal pain.     Vital signs:  Temp:  [97.3 °F (36.3 °C)-98 °F (36.7 °C)] 97.3 °F (36.3 °C)  Pulse:  [59-64] 60  Resp:  [16-18] 16  BP: (130-176)/(46-70) 170/70  SpO2:  [92 %-97 %] 97 %      Assessment & Plan:  #HTN urgency with suspected acute on chronic HFpEF and right sided failure  -BP as high as 225/91 on admission, s/p 10 IV hydralazine in ED  -BNP 12,823 with baseline around 10,000  -EKG showing A-paced rhythm @60 bpm  -venous duplex negative for DVT  -chest xray without acute process  -echo Nov 2023: EF 60-65%, mild-mod aortic regurg, mod mirtal regurg, mod tricuspid regurg  -plan for nitroglycerin paste for HTN urgency with goal reduction of BP by 25% over 24 hrs  -lasix 40 IV BID  -strict I/O, daily weights  -interrogate PPM to assess for A-fib burden which could be contributing  -monitor on telemetry  -cont home losartan, amiodarone, diltiazem, metoprolol  -IV hydralazine prn  -cardiology on consult     #Atrial fibrillation, rate controlled, anticoagulation with low-dose Eliquis 2.5 mg twice daily per cardiology     #Hyponatremia  -mild at 134  -likely 2/2 volume overload and CHF  -tx as above  -cont to monitor BMP  -can allow to correct to normal over 24 hrs     #Hyperkalemia  -mild at 5.3  -monitor closely     #PRISCILLA on CKD, possible cardiorenal  Baseline Cr under 2  Cr improving with diuretics     #NAGMA  -CO2 of 19 with AG of 7  -likely 2/2 underlying renal ds  -cont to monitor  -consider addition of bicarb tabs if not improving     #normocytic anemia with heme positive stools  -hgb 11.3 with  baseline 11-12  -heme-occult trace positive per ED physician  -sounds likely to be hemorrhoidal based on pt report  -cont to monitor CBC and transfuse for hgb <7  -liquid diet until hgb stable  -if hgb down trending consider GI eval  -hold home apixaban until hgb stable     #hypoalbuminemia  -albumin 3.0  -may be 2/2 volume overloaded state  -cont to monitor     # Diabetes mellitus type 2 with hyperglycemia  -SSI, QID checks, hypoglycemia protocol  -hold home sitagliptin, Januvia, glimepiride  -cont home gabapentin  -Added Tresiba long-acting insulin     # Hyperlipidemia  -cont home simvastatin     #Depression  -cont home escitalopram     #Hypothyroidism  -cont home levothyroxine     #Gout  -cont home allopurinol     #Asymptomatic bacteriuria  -pt denied any urinary complaints, normal WBC and afebrile  -monitor off antibiotics     #Osteoarthritis with groin pain  -X-ray of the pelvis and hip with no fracture, shows osteoarthritis  -PT OT  - pain management

## 2024-07-12 NOTE — PROGRESS NOTES
[facility]   part of [facility]     Hospitalist Progress Note     Ciarra Salcido Patient Status:  Inpatient    1940 MRN [id number]   Formerly [facility] 3NE-A Attending Cabrera Pineda MD   Hosp Day # 3 PCP JUDY CYR MD     Chief Complaint: HTN, fatigue    Subjective:       Patient sitting up in the chair.  Blood sugar improving today.  Denies any chest pain or shortness of breath.  Denies abdominal pain.  Pedal edema improving    Objective:    Review of Systems:   A comprehensive review of systems was completed; pertinent positive and negatives stated in subjective.    Vital signs:  Temp:  [98.1 °F (36.7 °C)-99.1 °F (37.3 °C)] 98.5 °F (36.9 °C)  Pulse:  [59-63] 60  Resp:  [16] 16  BP: (133-156)/(51-60) 138/51  SpO2:  [87 %-96 %] 87 %    Physical Exam:    /51   Pulse 60   Temp 98.5 °F (36.9 °C) (Oral)   Resp 16   Ht 5' 4\" (1.626 m)   Wt 168 lb (76.2 kg)   SpO2 (!) 87%   BMI 28.84 kg/m²     General: No acute distress  Respiratory: No wheezes, no rhonchi  Cardiovascular: S1, S2, regular rate and rhythm  Abdomen: Soft, Non-tender, non-distended, positive bowel sounds  Neuro: Awake alert, no new focal deficits.   Extremities: No pedal edema, no calf tenderness      Diagnostic Data:    Labs:  Recent Labs   Lab 24  1857 24  0731 24  0628   WBC  --  6.8 7.5 7.9   HGB  --  11.3* 11.3* 10.8*   MCV  --  95.7 94.4 95.6   PLT  --  269.0 281.0 255.0   INR 1.58*  --   --   --        Recent Labs   Lab 24  18424  0730 07/10/24  0710 24  0628 24  1126   * 174* 199* 256* 176*   BUN 51* 46* 53* 62* 76*   CREATSERUM 2.70* 2.29* 2.97* 3.28* 3.14*   CA 8.6 8.9 8.6 7.8* 8.6   ALB 3.0* 2.7*  --   --   --    * 140 137 139 132*   K 5.3* 4.6 5.1 5.8* 5.2*    113* 108 109 102   CO2 19.0* 22.0 20.0* 22.0 21.0   ALKPHO 159* 142  --   --   --    AST 26 14*  --   --   --    ALT 48 36  --   --   --    BILT 0.3 0.4  --   --   --    TP  7.6 6.9  --   --   --        Estimated Creatinine Clearance: 11.3 mL/min (A) (based on SCr of 3.14 mg/dL (H)).    Recent Labs   Lab 07/08/24 1849   TROPHS 24       Recent Labs   Lab 07/08/24 1849   PTP 19.0*   INR 1.58*                  Microbiology    Hospital Encounter on 07/08/24   1. Urine Culture, Routine     Status: Abnormal    Collection Time: 07/08/24 10:23 PM    Specimen: Urine, clean catch   Result Value Ref Range    Urine Culture >100,000 CFU/ML Klebsiella pneumoniae (A) N/A    Urine Culture <10,000 CFU/ML Gram negative melina N/A       Susceptibility    Klebsiella pneumoniae -  (no method available)     Ampicillin  Resistant      Ampicillin + Sulbactam 4 Sensitive      Cefazolin <=4 Sensitive      Ciprofloxacin <=0.25 Sensitive      Gentamicin <=1 Sensitive      Meropenem <=0.25 Sensitive      Levofloxacin <=0.12 Sensitive      Nitrofurantoin 64 Intermediate      Piperacillin + Tazobactam <=4 Sensitive      Trimethoprim/Sulfa <=20 Sensitive          Imaging: Reviewed in Epic.    Medications:    furosemide  40 mg Intravenous BID (Diuretic)    sodium zirconium cyclosilicate  5 g Oral Q8H    insulin degludec  15 Units Subcutaneous Daily    hydrALAZINE  50 mg Oral Q8H AYANNA    apixaban  2.5 mg Oral BID    insulin aspart  2-10 Units Subcutaneous TID AC and HS    allopurinol  100 mg Oral Daily    amiodarone  200 mg Oral Daily    dilTIAZem ER  120 mg Oral Daily    escitalopram  5 mg Oral Daily    gabapentin  100 mg Oral TID    levothyroxine  75 mcg Oral Before breakfast    atorvastatin  10 mg Oral Nightly    carvedilol  12.5 mg Oral BID with meals    insulin aspart  0.15 Units/kg Subcutaneous Once       Assessment & Plan:      #HTN urgency with suspected acute on chronic HFpEF and right sided failure  -BP as high as 225/91 on admission, s/p 10 IV hydralazine in ED  -BNP 12,823 with baseline around 10,000  -EKG showing A-paced rhythm @60 bpm  -venous duplex negative for DVT  -chest xray without acute  process  -echo Nov 2023: EF 60-65%, mild-mod aortic regurg, mod mirtal regurg, mod tricuspid regurg  -plan for nitroglycerin paste for HTN urgency with goal reduction of BP by 25% over 24 hrs  -lasix 40 IV BID  -strict I/O, daily weights  -interrogate PPM to assess for A-fib burden which could be contributing  -monitor on telemetry  -cont home losartan, amiodarone, diltiazem, metoprolol  -IV hydralazine prn  -cardiology on consult    #Atrial fibrillation, rate controlled, anticoagulation with low-dose Eliquis 2.5 mg twice daily per cardiology     #Hyponatremia  -mild at 134  -likely 2/2 volume overload and CHF  -tx as above  -cont to monitor BMP  -can allow to correct to normal over 24 hrs     #Hyperkalemia  -Nephrology following, on Lokelma and on IV Lasix until tomorrow morning  -monitor closely    #PRISCILLA on CKD, possible cardiorenal  Baseline Cr under 2  Cr improving with diuretics    #NAGMA  -CO2 of 19 with AG of 7  -likely 2/2 underlying renal ds  -cont to monitor  -consider addition of bicarb tabs if not improving     #normocytic anemia with heme positive stools  -hgb 11.3 with baseline 11-12  -heme-occult trace positive per ED physician  -sounds likely to be hemorrhoidal based on pt report  -cont to monitor CBC and transfuse for hgb <7  -liquid diet until hgb stable  -if hgb down trending consider GI eval  -hold home apixaban until hgb stable     #hypoalbuminemia  -albumin 3.0  -may be 2/2 volume overloaded state  -cont to monitor     # Diabetes mellitus type 2 with hyperglycemia  -SSI, QID checks, hypoglycemia protocol  -hold home sitagliptin, Januvia, glimepiride  -cont home gabapentin  -Added Tresiba long-acting insulin     # Hyperlipidemia  -cont home simvastatin     #Depression  -cont home escitalopram     #Hypothyroidism  -cont home levothyroxine     #Gout  -cont home allopurinol     #Asymptomatic bacteriuria  -pt denied any urinary complaints, normal WBC and afebrile  -monitor off  antibiotics    #Osteoarthritis with groin pain  -X-ray of the pelvis and hip with no fracture, shows osteoarthritis  -PT OT  - pain management    D/w daughter TEREZA Stack over pt's phone and updated regarding patient.    On IV Lasix per nephrology until tomorrow morning, follow BMP in AM.  Discharge planning in 1 to 2 days once cleared by nephrology and cardiology.     Jemima Lopez MD      Supplementary Documentation:     Quality:  DVT Mechanical Prophylaxis:   SCDs,    DVT Pharmacologic Prophylaxis   Medication    apixaban (Eliquis) tab 2.5 mg                Code Status: DNAR/Selective Treatment  Hutchinson: External urinary catheter in place  Hutchinson Duration (in days):   Central line:    AMINTA:     Discharge is dependent on: progress  At this point Ms. Salcido is expected to be discharge to: home    The 21st Century Cures Act makes medical notes like these available to patients in the interest of transparency. Please be advised this is a medical document. Medical documents are intended to carry relevant information, facts as evident, and the clinical opinion of the practitioner. The medical note is intended as peer to peer communication and may appear blunt or direct. It is written in medical language and may contain abbreviations or verbiage that are unfamiliar.             **Certification      PHYSICIAN Certification of Need for Inpatient Hospitalization - Initial Certification    Patient will require inpatient services that will reasonably be expected to span two midnight's based on the clinical documentation in H+P.   Based on patients current state of illness, I anticipate that, after discharge, patient will require TBD.

## 2024-07-12 NOTE — DIETARY NOTE
[facility]   part of [facility]   CLINICAL NUTRITION    Ciarra Salcido     Admitting diagnosis:  Rectal bleeding [K62.5]  Hyperglycemia [R73.9]  Arthralgia of right lower leg [M25.561]  Acute cystitis with hematuria [N30.01]  Altered mental status, unspecified altered mental status type [R41.82]  Hypertension, unspecified type [I10]    Ht: 162.6 cm (5' 4\")  Wt: 76.2 kg (168 lb).   Body mass index is 28.84 kg/m².  IBW: 54.5kg    Wt Readings from Last 6 Encounters:   07/12/24 76.2 kg (168 lb)   06/21/24 63.5 kg (140 lb)   05/17/24 63.5 kg (140 lb)   05/14/24 63.5 kg (140 lb)   04/11/24 64.5 kg (142 lb 3.2 oz)   03/19/24 62.5 kg (137 lb 12.8 oz)        Labs/Meds reviewed    Diet:       Procedures    Cardiac diet Calorie Restriction/Carb Controlled: 1800 kcal/60 grams; Sodium Restriction: 2 GM NA; Fluid Restriction: 2000 ml; Is Patient on Accuchecks? Yes     Percent Meals Eaten (last 3 days)       Date/Time Percent Meals Eaten (%)    07/10/24 0900 100 %    07/10/24 1200 100 %    07/10/24 1700 100 %    07/11/24 0900 100 %    07/11/24 1315 100 %          83 year old admitted for rectal bleeding .   POC glucose 163- 205    Pt chart reviewed d/t consult for heart failure diet education. Pt lives in assisted living and meals are provided.   Patient reports good appetite at this time.  Nursing notes reports Percent Meals Eaten (%): 100 % intake for last meal.  Tolerating po diet without diarrhea, emesis, or constipation.   No significant weight changes noted.     Patient is at low nutrition risk at this time.    Please consult if patient status changes or nutrition issues arise.    Donita Ballard RD,LDN  Clinical Dietitian  [phone]

## 2024-07-13 PROBLEM — I50.33 ACUTE ON CHRONIC HEART FAILURE WITH PRESERVED EJECTION FRACTION (HCC): Status: ACTIVE | Noted: 2023-01-25

## 2024-07-13 LAB
ANION GAP SERPL CALC-SCNC: 9 MMOL/L (ref 0–18)
BUN BLD-MCNC: 83 MG/DL (ref 9–23)
CALCIUM BLD-MCNC: 8.1 MG/DL (ref 8.5–10.1)
CHLORIDE SERPL-SCNC: 100 MMOL/L (ref 98–112)
CO2 SERPL-SCNC: 23 MMOL/L (ref 21–32)
CREAT BLD-MCNC: 3.53 MG/DL
EGFRCR SERPLBLD CKD-EPI 2021: 12 ML/MIN/1.73M2 (ref 60–?)
GLUCOSE BLD-MCNC: 189 MG/DL (ref 70–99)
GLUCOSE BLD-MCNC: 194 MG/DL (ref 70–99)
GLUCOSE BLD-MCNC: 225 MG/DL (ref 70–99)
GLUCOSE BLD-MCNC: 330 MG/DL (ref 70–99)
GLUCOSE BLD-MCNC: 351 MG/DL (ref 70–99)
OSMOLALITY SERPL CALC.SUM OF ELEC: 312 MOSM/KG (ref 275–295)
POTASSIUM SERPL-SCNC: 5.5 MMOL/L (ref 3.5–5.1)
SODIUM SERPL-SCNC: 132 MMOL/L (ref 136–145)

## 2024-07-13 PROCEDURE — 99233 SBSQ HOSP IP/OBS HIGH 50: CPT | Performed by: INTERNAL MEDICINE

## 2024-07-13 PROCEDURE — 99232 SBSQ HOSP IP/OBS MODERATE 35: CPT | Performed by: INTERNAL MEDICINE

## 2024-07-13 NOTE — PLAN OF CARE
Heart Failure Nurse  Progress Note    Patient was evaluated by the Heart Failure Nurse  for understanding, verbalization, demonstration, and recall of education related to heart failure, overall adherence to the behaviors necessary to maintain a compensated status, and risk for readmission.     Patient assessment:    Patient is able to verbalize signs/symptoms fluid overload/impending HF exacerbation and who to contact with problems                                          ___ yes  __x_ no      Patient is following a 2000 mg sodium diet                                             ___ yes  _x__ no    If no, barriers to 2000 mg sodium diet:_______________________________________________    Patient informed of 2-Part dietician classes that is free if sign up within 30 days of discharge or $40  _x__ yes  ___ no      Patient is adherent to medication regimen                                              __x_ yes  ___ no    If no, barriers to medication regimen:    Patient has sufficient funds to purchase medication                      _x__ yes  ___ no      Patient has a scale in the home              ___ yes  _x__ no      Patient is adherent to daily weight monitoring                                        ___ yes   _x__ no    If no, barriers to daily weight monitoring:    Symptom Tracker Worksheet reviewed with patient  _x__ yes   ___ no      Patient verbalizes understanding of stoplight/heart failure zones          _x__ yes   ___ no      Patient understands the importance of 7-day follow-up appointment      __x_ yes  ___ no    Appointment Date:          Patient has adequate transportation to attend follow-up appointments    __x_ yes  ___ no    If no, was referral to Social Work made  ___yes  __x_ no      Family/Friend present during education: None- daughter unavailable (at [place] function) at time of call     Additional consultations required: None    Patient not compliant with daily weight as she states she  does not have a working scale. Scale provided to her.  Patient also stated she lives alone and admits to eating mostly canned soups, yogurts and other convenience type foods. Provided extensive education. Will attempt to contact daughter tomorrow to reinforce. Scale at bedside. Nurse updated.     Adela CROWEN, RN, PCCN

## 2024-07-13 NOTE — PROGRESS NOTES
[facility]  Nephrology Progress Note    Ciarra Salcido Attending:  Jemima Lopez MD       Assessment and Plan:    1) PRISCILLA- rising Cr as expected with diuresis + ARB; UA c/w UTI; US unremarkable. Focus on HF mgmt     2) CKD 4- likely due to longstanding DM / HTN; c/w bland urine sediment and mild proteinuria. No further w/u     3) Severe HTN- exac by CHF; much improved with coreg / cardizem / MURRAY. Avoid ACE-I / ARB with PRISCILLA / hyperkalemia; renal artery doppler neg     4) HFpEF with mod MR / TR / AI- hold loop diuretics with rising Cr     5) PAF s/p PPM  + GILLES / DCCV - Apaced on amio / BB / CCB / DOAC per cards     6) DM 2- dc januvia permanently with edema / CHF    7) Hyperkalemia- due to PRISCILLA / CKD / type IV RTA; medical mgmt (patiromer)    DC planning      Subjective:  Awake pleasant no c/o wants to go home    Physical Exam:   /54 (BP Location: Left arm)   Pulse 64   Temp 99.1 °F (37.3 °C) (Oral)   Resp 18   Ht 5' 4\" (1.626 m)   Wt 168 lb (76.2 kg)   SpO2 97%   BMI 28.84 kg/m²   Temp (24hrs), Av.6 °F (37 °C), Min:97.9 °F (36.6 °C), Max:99.1 °F (37.3 °C)       Intake/Output Summary (Last 24 hours) at 2024 0504  Last data filed at 2024 0422  Gross per 24 hour   Intake 340 ml   Output 1700 ml   Net -1360 ml     Wt Readings from Last 3 Encounters:   24 168 lb (76.2 kg)   24 140 lb (63.5 kg)   24 140 lb (63.5 kg)     General: awake alert  HEENT: No scleral icterus, MMM  Neck: Supple, no CASTRO or thyromegaly  Cardiac: Regular rate and rhythm, S1, S2 normal, no murmur or tub  Lungs: Decreased BS at bases bilaterally   Abdomen: Soft, non-tender. + bowel sounds, no palpable organomegaly  Extremities: Without clubbing, cyanosis; mild LE edema  Neurologic: Cranial nerves grossly intact, moving all extremities  Skin: Warm and dry, no rashes       Labs:   Lab Results   Component Value Date    CREATSERUM 3.14 2024    BUN 76 2024     2024    K 5.2  07/12/2024     07/12/2024    CO2 21.0 07/12/2024     07/12/2024    CA 8.6 07/12/2024    PGLU 277 07/12/2024       Imaging:  All imaging studies reviewed.    Meds:   Current Facility-Administered Medications   Medication Dose Route Frequency    furosemide (Lasix) 10 mg/mL injection 40 mg  40 mg Intravenous BID (Diuretic)    Sodium Zirconium Cyclosilicate (Lokelma) oral packet 5 g  5 g Oral Q8H    insulin degludec (Tresiba) 100 units/mL flextouch 15 Units  15 Units Subcutaneous Daily    hydrALAZINE (Apresoline) tab 50 mg  50 mg Oral Q8H AYANNA    apixaban (Eliquis) tab 2.5 mg  2.5 mg Oral BID    glucose (Dex4) 15 GM/59ML oral liquid 15 g  15 g Oral Q15 Min PRN    Or    glucose (Glutose) 40% oral gel 15 g  15 g Oral Q15 Min PRN    Or    glucose-vitamin C (Dex-4) chewable tab 4 tablet  4 tablet Oral Q15 Min PRN    Or    dextrose 50% injection 50 mL  50 mL Intravenous Q15 Min PRN    Or    glucose (Dex4) 15 GM/59ML oral liquid 30 g  30 g Oral Q15 Min PRN    Or    glucose (Glutose) 40% oral gel 30 g  30 g Oral Q15 Min PRN    Or    glucose-vitamin C (Dex-4) chewable tab 8 tablet  8 tablet Oral Q15 Min PRN    insulin aspart (NovoLOG) 100 Units/mL FlexPen 2-10 Units  2-10 Units Subcutaneous TID AC and HS    allopurinol (Zyloprim) tab 100 mg  100 mg Oral Daily    amiodarone (Pacerone) tab 200 mg  200 mg Oral Daily    dilTIAZem ER (Dilacor XR) 24 hr cap 120 mg  120 mg Oral Daily    escitalopram (Lexapro) tab 5 mg  5 mg Oral Daily    gabapentin (Neurontin) cap 100 mg  100 mg Oral TID    levothyroxine (Synthroid) tab 75 mcg  75 mcg Oral Before breakfast    atorvastatin (Lipitor) tab 10 mg  10 mg Oral Nightly    carvedilol (Coreg) tab 12.5 mg  12.5 mg Oral BID with meals    insulin aspart (NovoLOG) 100 Units/mL vial 10 Units  0.15 Units/kg Subcutaneous Once         Questions/concerns were discussed with patient and/or family by bedside.          Mary Lares MD  7/13/2024  7:26 AM

## 2024-07-13 NOTE — PROGRESS NOTES
[facility]   part of [facility]     Hospitalist Progress Note     Ciarra Salcido Patient Status:  Inpatient    1940 MRN [id number]   Location [facility] 3NE-A Attending Cabrera Pineda MD   Hosp Day # 4 PCP JUDY CYR MD     Chief Complaint: HTN, fatigue    Subjective:       Patient sitting up in the chair.  Blood sugar elevated again today.  Denies any chest pain or shortness of breath.  Denies abdominal pain.  Pedal edema improving    Objective:    Review of Systems:   A comprehensive review of systems was completed; pertinent positive and negatives stated in subjective.    Vital signs:  Temp:  [97.9 °F (36.6 °C)-99.1 °F (37.3 °C)] 98.9 °F (37.2 °C)  Pulse:  [56-64] 63  Resp:  [16-18] 16  BP: (107-153)/(49-79) 140/56  SpO2:  [90 %-97 %] 95 %    Physical Exam:    /56 (BP Location: Left arm)   Pulse 63   Temp 98.9 °F (37.2 °C) (Oral)   Resp 16   Ht 5' 4\" (1.626 m)   Wt 168 lb 4.8 oz (76.3 kg)   SpO2 95%   BMI 28.89 kg/m²     General: No acute distress  Respiratory: No wheezes, no rhonchi  Cardiovascular: S1, S2, regular rate and rhythm  Abdomen: Soft, Non-tender, non-distended, positive bowel sounds  Neuro: Awake alert, no new focal deficits.   Extremities: No pedal edema, no calf tenderness      Diagnostic Data:    Labs:  Recent Labs   Lab 24  1857 24  0731 24  0628   WBC  --  6.8 7.5 7.9   HGB  --  11.3* 11.3* 10.8*   MCV  --  95.7 94.4 95.6   PLT  --  269.0 281.0 255.0   INR 1.58*  --   --   --        Recent Labs   Lab 24  18424  0730 07/10/24  0710 24  0628 24  1126 24  1126   * 174*   < > 256* 176* 330*   BUN 51* 46*   < > 62* 76* 83*   CREATSERUM 2.70* 2.29*   < > 3.28* 3.14* 3.53*   CA 8.6 8.9   < > 7.8* 8.6 8.1*   ALB 3.0* 2.7*  --   --   --   --    * 140   < > 139 132* 132*   K 5.3* 4.6   < > 5.8* 5.2* 5.5*    113*   < > 109 102 100   CO2 19.0* 22.0   < > 22.0 21.0 23.0   ALKPHO 159* 142   Reassuring BPP 8/8  See report in imaging tab   --   --   --   --    AST 26 14*  --   --   --   --    ALT 48 36  --   --   --   --    BILT 0.3 0.4  --   --   --   --    TP 7.6 6.9  --   --   --   --     < > = values in this interval not displayed.       Estimated Creatinine Clearance: 10.1 mL/min (A) (based on SCr of 3.53 mg/dL (H)).    Recent Labs   Lab 07/08/24 1849   TROPHS 24       Recent Labs   Lab 07/08/24 1849   PTP 19.0*   INR 1.58*                  Microbiology    Hospital Encounter on 07/08/24   1. Urine Culture, Routine     Status: Abnormal    Collection Time: 07/08/24 10:23 PM    Specimen: Urine, clean catch   Result Value Ref Range    Urine Culture >100,000 CFU/ML Klebsiella pneumoniae (A) N/A    Urine Culture <10,000 CFU/ML Gram negative melina N/A       Susceptibility    Klebsiella pneumoniae -  (no method available)     Ampicillin  Resistant      Ampicillin + Sulbactam 4 Sensitive      Cefazolin <=4 Sensitive      Ciprofloxacin <=0.25 Sensitive      Gentamicin <=1 Sensitive      Meropenem <=0.25 Sensitive      Levofloxacin <=0.12 Sensitive      Nitrofurantoin 64 Intermediate      Piperacillin + Tazobactam <=4 Sensitive      Trimethoprim/Sulfa <=20 Sensitive          Imaging: Reviewed in Epic.    Medications:    insulin aspart  1-10 Units Subcutaneous TID CC and HS    patiromer  8.4 g Oral Once    insulin degludec  15 Units Subcutaneous Daily    hydrALAZINE  50 mg Oral Q8H AYANNA    apixaban  2.5 mg Oral BID    insulin aspart  2-10 Units Subcutaneous TID AC and HS    allopurinol  100 mg Oral Daily    amiodarone  200 mg Oral Daily    dilTIAZem ER  120 mg Oral Daily    escitalopram  5 mg Oral Daily    gabapentin  100 mg Oral TID    levothyroxine  75 mcg Oral Before breakfast    atorvastatin  10 mg Oral Nightly    carvedilol  12.5 mg Oral BID with meals       Assessment & Plan:      #HTN urgency with suspected acute on chronic HFpEF and right sided failure  -BP as high as 225/91 on admission, s/p 10 IV hydralazine in ED  -BNP 12,823 with baseline around  10,000  -EKG showing A-paced rhythm @60 bpm  -venous duplex negative for DVT  -chest xray without acute process  -echo Nov 2023: EF 60-65%, mild-mod aortic regurg, mod mirtal regurg, mod tricuspid regurg  -plan for nitroglycerin paste for HTN urgency with goal reduction of BP by 25% over 24 hrs  -Status post lasix 40 IV BID  -strict I/O, daily weights  -interrogate PPM to assess for A-fib burden which could be contributing  -monitor on telemetry  -cont home losartan, amiodarone, diltiazem, metoprolol  -IV hydralazine prn  -cardiology on consult    #Atrial fibrillation, rate controlled, anticoagulation with low-dose Eliquis 2.5 mg twice daily per cardiology     #Hyponatremia  -mild at 134  -likely 2/2 volume overload and CHF  -tx as above  -cont to monitor BMP  -can allow to correct to normal over 24 hrs     #Hyperkalemia  -Nephrology following, on Lokelma and on IV Lasix until tomorrow morning  -monitor closely    #PRISCILLA on CKD, possible cardiorenal  Baseline Cr under 2  Cr improving with diuretics    # Hyperkalemia  -Mild to side being given by nephrologist on 7/13/2024  -Follow-up BMP in a.m.    #NAGMA  -CO2 of 19 with AG of 7  -likely 2/2 underlying renal ds  -cont to monitor  -consider addition of bicarb tabs if not improving     #normocytic anemia with heme positive stools  -hgb 11.3 with baseline 11-12  -heme-occult trace positive per ED physician  -sounds likely to be hemorrhoidal based on pt report  -cont to monitor CBC and transfuse for hgb <7  -liquid diet until hgb stable  -if hgb down trending consider GI eval  -hold home apixaban until hgb stable     #hypoalbuminemia  -albumin 3.0  -may be 2/2 volume overloaded state  -cont to monitor     # Diabetes mellitus type 2 with hyperglycemia  - home sitagliptin, Januvia, glimepiride on hold since admission, will plan to continue these at the time of discharge, patient states her blood sugars are well-controlled at home with the home regimen  -cont home  gabapentin  -Hypoglycemia protocol  -Follow Accu-Cheks  - Tresiba long-acting insulin  -NovoLog carb counting and correction factor     # Hyperlipidemia  -cont home simvastatin     #Depression  -cont home escitalopram     #Hypothyroidism  -cont home levothyroxine     #Gout  -cont home allopurinol     #Asymptomatic bacteriuria  -pt denied any urinary complaints, normal WBC and afebrile  -monitor off antibiotics    #Osteoarthritis with groin pain  -X-ray of the pelvis and hip with no fracture, shows osteoarthritis  -PT OT  - pain management    D/w daughter TEREZA Stack over pt's phone and updated regarding patient.    Diuresis discontinued per nephrology, creatinine increasing, potassium still high, Veltassa being given, follow BMP in AM.  Discharge planning possibly tomorrow once cleared by nephrology and cardiology.     Jemima Lopez MD      Supplementary Documentation:     Quality:  DVT Mechanical Prophylaxis:   SCDs,    DVT Pharmacologic Prophylaxis   Medication    apixaban (Eliquis) tab 2.5 mg                Code Status: DNAR/Selective Treatment  Hutchinson: External urinary catheter in place  Hutchinson Duration (in days):   Central line:    AMINTA:     Discharge is dependent on: progress  At this point Ms. Salcido is expected to be discharge to: home    The 21st Century Cures Act makes medical notes like these available to patients in the interest of transparency. Please be advised this is a medical document. Medical documents are intended to carry relevant information, facts as evident, and the clinical opinion of the practitioner. The medical note is intended as peer to peer communication and may appear blunt or direct. It is written in medical language and may contain abbreviations or verbiage that are unfamiliar.             **Certification      PHYSICIAN Certification of Need for Inpatient Hospitalization - Initial Certification    Patient will require inpatient services that will reasonably be expected to span two  midnight's based on the clinical documentation in H+P.   Based on patients current state of illness, I anticipate that, after discharge, patient will require TBD.

## 2024-07-13 NOTE — PLAN OF CARE
Assumed pt care at 1930  Aox4, 1L O2 NC, VSS  [id number]  Reported no pain; no n/v/d  Daily weight  Cardiac/carb controlled diet; 2k mL fluid restriction  Qid accu check  Noncardiac electrolyte replacement protocol  Incontinent-pad, purewick and chux applied  Ambulatory-[id number] with walker  Safety precautions in place  Bed in lowest position and call light within reach  Updated with plan of care  All needs met at this time  Will continue plan of care            Problem: Diabetes/Glucose Control  Goal: Glucose maintained within prescribed range  Description: INTERVENTIONS:  - Monitor Blood Glucose as ordered  - Assess for signs and symptoms of hyperglycemia and hypoglycemia  - Administer ordered medications to maintain glucose within target range  - Assess barriers to adequate nutritional intake and initiate nutrition consult as needed  - Instruct patient on self management of diabetes  Outcome: Progressing     Problem: METABOLIC/FLUID AND ELECTROLYTES - ADULT  Goal: Glucose maintained within prescribed range  Description: INTERVENTIONS:  - Monitor Blood Glucose as ordered  - Assess for signs and symptoms of hyperglycemia and hypoglycemia  - Administer ordered medications to maintain glucose within target range  - Assess barriers to adequate nutritional intake and initiate nutrition consult as needed  - Instruct patient on self management of diabetes  Outcome: Progressing  Goal: Electrolytes maintained within normal limits  Description: INTERVENTIONS:  - Monitor labs and rhythm and assess patient for signs and symptoms of electrolyte imbalances  - Administer electrolyte replacement as ordered  - Monitor response to electrolyte replacements, including rhythm and repeat lab results as appropriate  - Fluid restriction as ordered  - Instruct patient on fluid and nutrition restrictions as appropriate  Outcome: Progressing  Goal: Hemodynamic stability and optimal renal function maintained  Description: INTERVENTIONS:  -  Monitor labs and assess for signs and symptoms of volume excess or deficit  - Monitor intake, output and patient weight  - Monitor urine specific gravity, serum osmolarity and serum sodium as indicated or ordered  - Monitor response to interventions for patient's volume status, including labs, urine output, blood pressure (other measures as available)  - Encourage oral intake as appropriate  - Instruct patient on fluid and nutrition restrictions as appropriate  Outcome: Progressing

## 2024-07-13 NOTE — PLAN OF CARE
Assumed care at 0845. No acute distress noted.   Pt A&Ox3-4. Glasses. [place]- bilateral hearing aids.   Room air.   A-paced.   Carb controlled-cardiac diet. 2000mL fluid restriction.  Ambulatory x2 w/ walker.   Meds per MAR. No c/o pain. No n/v/d.   Elevated blood sugars this shift, MD notified and carb coverage insulin added to med list.   Updated pt on POC.  Possible discharge tomorrow.   Nephro following.   Needs met at this time.    Problem: Diabetes/Glucose Control  Goal: Glucose maintained within prescribed range  Description: INTERVENTIONS:  - Monitor Blood Glucose as ordered  - Assess for signs and symptoms of hyperglycemia and hypoglycemia  - Administer ordered medications to maintain glucose within target range  - Assess barriers to adequate nutritional intake and initiate nutrition consult as needed  - Instruct patient on self management of diabetes  Outcome: Progressing    Interventions:   - See additional Care Plan goals for specific interventions  Outcome: Progressing     Problem: METABOLIC/FLUID AND ELECTROLYTES - ADULT  Goal: Glucose maintained within prescribed range  Description: INTERVENTIONS:  - Monitor Blood Glucose as ordered  - Assess for signs and symptoms of hyperglycemia and hypoglycemia  - Administer ordered medications to maintain glucose within target range  - Assess barriers to adequate nutritional intake and initiate nutrition consult as needed  - Instruct patient on self management of diabetes  Outcome: Progressing  Goal: Electrolytes maintained within normal limits  Description: INTERVENTIONS:  - Monitor labs and rhythm and assess patient for signs and symptoms of electrolyte imbalances  - Administer electrolyte replacement as ordered  - Monitor response to electrolyte replacements, including rhythm and repeat lab results as appropriate  - Fluid restriction as ordered  - Instruct patient on fluid and nutrition restrictions as appropriate  Outcome: Progressing  Goal: Hemodynamic  stability and optimal renal function maintained  Description: INTERVENTIONS:  - Monitor labs and assess for signs and symptoms of volume excess or deficit  - Monitor intake, output and patient weight  - Monitor urine specific gravity, serum osmolarity and serum sodium as indicated or ordered  - Monitor response to interventions for patient's volume status, including labs, urine output, blood pressure (other measures as available)  - Encourage oral intake as appropriate  - Instruct patient on fluid and nutrition restrictions as appropriate  Outcome: Progressing

## 2024-07-13 NOTE — PHYSICAL THERAPY NOTE
PHYSICAL THERAPY TREATMENT NOTE - INPATIENT    Room Number: 3601/3601-A     Session: 1   History related to current admission: Patient is a 83 year old female admitted on 7/8/2024 from assisted living facility for increasing, sleepiness, R leg pain, increased blood pressures and rectal bleeding.  Pt diagnosed with acute cystitis, htn, rectal bleeding. Pt does report a recent fall from bed just prior to admit. Also noted acute on chronic HF per MD note 7/13     Number of Visits to Meet Established Goals: 5    Presenting Problem: Acute cystitis, HTN, rectal bleeding  Co-Morbidities : A fib w/ rvr, CHF, DVT, PE, DM, HL, CKD, polyneuropathy, depression, pacemaker    ASSESSMENT   Patient demonstrates limited progress this session, goals  remain in progress.    Patient continues to function below baseline with bed mobility, transfers, gait, maintaining seated position, and standing prolonged periods.  Contributing factors to remaining limitations include decreased functional strength, decreased endurance/aerobic capacity, decreased muscular endurance, and medical status.  Next session anticipate patient to progress bed mobility, transfers, gait, maintaining seated position, and standing prolonged periods.  Physical Therapy will continue to follow patient for duration of hospitalization.    Patient continues to benefit from continued skilled PT services: at discharge to promote prior level of function and safety with additional support and return home with home health PT.    PLAN  PT Treatment Plan: Bed mobility;Endurance;Energy conservation;Patient education;Family education;Gait training;Transfer training;Balance training;Strengthening  Rehab Potential : Good  Frequency (Obs): 3-5x/week    CURRENT GOALS     Goal #1 Patient is able to demonstrate supine - sit EOB @ level: supervision      Goal #2 Patient is able to demonstrate transfers EOB to/from [facility] [place] at assistance level: supervision      Goal #3 Patient is able to  ambulate 100 feet with assist device: walker - rolling at assistance level: supervision      Goal #4     Goal #5     Goal #6     Goal Comments: Goals established on 7/10/2024  2024 all goals ongoing    SUBJECTIVE  \"I have not been out of bed today\"    OBJECTIVE  Precautions: Hard of hearing;Bed/chair alarm    WEIGHT BEARING RESTRICTION  Weight Bearing Restriction: None                PAIN ASSESSMENT   Ratin  Location: denies  Management Techniques: Activity promotion;Repositioning (Informed rn of groin pain)    BALANCE                                                                                                                       Static Sitting: Fair  Dynamic Sitting: Fair -           Static Standing: Poor +  Dynamic Standing: Poor +    ACTIVITY TOLERANCE                         O2 WALK  Oxygen Therapy  SPO2% on Oxygen at Rest: 94  At rest oxygen flow (liters per minute): 1  SPO2% Ambulation on Oxygen: 92  Ambulation oxygen flow (liters per minute): 1      AM-PAC '6-Clicks' INPATIENT SHORT FORM - BASIC MOBILITY  How much difficulty does the patient currently have...  Patient Difficulty: Turning over in bed (including adjusting bedclothes, sheets and blankets)?: A Little   Patient Difficulty: Sitting down on and standing up from a chair with arms (e.g., wheelchair, bedside commode, etc.): A Little   Patient Difficulty: Moving from lying on back to sitting on the side of the bed?: A Little   How much help from another person does the patient currently need...   Help from Another: Moving to and from a bed to a chair (including a wheelchair)?: A Little   Help from Another: Need to walk in hospital room?: A Lot   Help from Another: Climbing 3-5 steps with a railing?: Total       AM-PAC Score:  Raw Score: 15   Approx Degree of Impairment: 57.7%   Standardized Score (AM-PAC Scale): 39.45   CMS Modifier (G-Code): CK    FUNCTIONAL ABILITY STATUS  Gait Assessment   Functional Mobility/Gait Assessment  Gait  Assistance: Minimum assistance  Distance (ft): 5  Assistive Device: Rolling walker  Pattern: Shuffle    Skilled Therapy Provided    Bed Mobility:  Rolling: sup   Supine<>Sit: sup with cues for technique   Sit<>Supine: NT     Transfer Mobility:  Sit<>Stand: min a from bed, cga from chair with arms   Stand<>Sit: cga   Gait: min a    Therapist's Comments: Patient reports fatigue this date.  Unable to ambulate more than distance to bedside chair due to fatigue.  Patient educated in need to be up to rw with assist and to be up to chair.  Patient stood to rw initially x 2 minutes with min a for anterior weight shift, after seated rest, progressed to transfer to bedside chair.  PCT to room to assist with hygiene while patient performed additional sit to stand transfer with min a for standing balance.  Flexed posture noted.      THERAPEUTIC EXERCISES  Lower Extremity Ankle pumps  Hip AB/AD  Heel slides  Quad sets     Position Supine     Repetitions   5-10   Sets   1     Patient End of Session: Up in chair;With  staff;Needs met;Call light within reach;RN aware of session/findings;All patient questions and concerns addressed;Alarm set    PT Session Time: 40 minutes  Gait Training: 10 minutes  Therapeutic Activity: 25 minutes  Therapeutic Exercise: 5 minutes

## 2024-07-14 PROBLEM — N17.0 ATN (ACUTE TUBULAR NECROSIS): Status: ACTIVE | Noted: 2024-01-01

## 2024-07-14 PROBLEM — N17.0 ATN (ACUTE TUBULAR NECROSIS): Status: ACTIVE | Noted: 2024-07-14

## 2024-07-14 PROBLEM — N17.0 ATN (ACUTE TUBULAR NECROSIS) (HCC): Status: ACTIVE | Noted: 2024-07-14

## 2024-07-14 PROBLEM — M10.9 ACUTE GOUT OF MULTIPLE SITES: Status: ACTIVE | Noted: 2024-01-01

## 2024-07-14 PROBLEM — M10.9 ACUTE GOUT OF MULTIPLE SITES: Status: ACTIVE | Noted: 2024-07-14

## 2024-07-14 LAB
ANION GAP SERPL CALC-SCNC: 10 MMOL/L (ref 0–18)
BUN BLD-MCNC: 87 MG/DL (ref 9–23)
CALCIUM BLD-MCNC: 9 MG/DL (ref 8.5–10.1)
CHLORIDE SERPL-SCNC: 101 MMOL/L (ref 98–112)
CO2 SERPL-SCNC: 22 MMOL/L (ref 21–32)
CREAT BLD-MCNC: 3.23 MG/DL
EGFRCR SERPLBLD CKD-EPI 2021: 14 ML/MIN/1.73M2 (ref 60–?)
GLUCOSE BLD-MCNC: 175 MG/DL (ref 70–99)
GLUCOSE BLD-MCNC: 197 MG/DL (ref 70–99)
GLUCOSE BLD-MCNC: 201 MG/DL (ref 70–99)
GLUCOSE BLD-MCNC: 266 MG/DL (ref 70–99)
GLUCOSE BLD-MCNC: 347 MG/DL (ref 70–99)
OSMOLALITY SERPL CALC.SUM OF ELEC: 307 MOSM/KG (ref 275–295)
POTASSIUM SERPL-SCNC: 4.8 MMOL/L (ref 3.5–5.1)
SODIUM SERPL-SCNC: 133 MMOL/L (ref 136–145)
URATE SERPL-MCNC: 7.4 MG/DL

## 2024-07-14 PROCEDURE — 99232 SBSQ HOSP IP/OBS MODERATE 35: CPT | Performed by: INTERNAL MEDICINE

## 2024-07-14 PROCEDURE — 99233 SBSQ HOSP IP/OBS HIGH 50: CPT | Performed by: INTERNAL MEDICINE

## 2024-07-14 RX ORDER — DOCUSATE SODIUM 100 MG/1
100 CAPSULE, LIQUID FILLED ORAL 2 TIMES DAILY
Status: DISCONTINUED | OUTPATIENT
Start: 2024-07-14 | End: 2024-07-18

## 2024-07-14 RX ORDER — COLCHICINE 0.6 MG/1
0.3 TABLET ORAL
Status: DISCONTINUED | OUTPATIENT
Start: 2024-07-14 | End: 2024-07-18

## 2024-07-14 NOTE — PROGRESS NOTES
[facility]   part of [facility]     Hospitalist Progress Note     Ciarra Salcido Patient Status:  Inpatient    1940 MRN [id number]   Location [facility] 3NE-A Attending Cabrera Pineda MD   Hosp Day # 5 PCP UJDY CYR MD     Chief Complaint: HTN, fatigue    Subjective:     Patient seen with patient's son at bedside.  Complains of multiple joint pain, has redness near the first metatarsophalangeal joint on both feet and has pain of both knees and ankles, looks like a gout flareup.  Patient now states she has had history of previous gout also.  Check uric acid level and plan to treat with colchicine if uric acid level elevated as discussed with patient and patient's son at bedside.    Objective:    Review of Systems:   A comprehensive review of systems was completed; pertinent positive and negatives stated in subjective.    Vital signs:  Temp:  [97.4 °F (36.3 °C)-98.3 °F (36.8 °C)] 98.3 °F (36.8 °C)  Pulse:  [59-61] 59  Resp:  [16-18] 18  BP: (113-156)/(49-60) 156/60  SpO2:  [87 %-97 %] 94 %    Physical Exam:    /60   Pulse 59   Temp 98.3 °F (36.8 °C)   Resp 18   Ht 5' 4\" (1.626 m)   Wt 173 lb 9.6 oz (78.7 kg)   SpO2 94%   BMI 29.80 kg/m²     General: No acute distress  Respiratory: No wheezes, no rhonchi  Cardiovascular: S1, S2, regular rate and rhythm  Abdomen: Soft, Non-tender, non-distended, positive bowel sounds  Neuro: Awake alert, no new focal deficits.   Extremities: No pedal edema, no calf tenderness      Diagnostic Data:    Labs:  Recent Labs   Lab 24  1857 24  0731 24  0628   WBC  --  6.8 7.5 7.9   HGB  --  11.3* 11.3* 10.8*   MCV  --  95.7 94.4 95.6   PLT  --  269.0 281.0 255.0   INR 1.58*  --   --   --        Recent Labs   Lab 24  0730 07/10/24  0710 24  1126 24  1126 24  0640   * 174*   < > 176* 330* 175*   BUN 51* 46*   < > 76* 83* 87*   CREATSERUM 2.70* 2.29*   < > 3.14* 3.53* 3.23*   CA  8.6 8.9   < > 8.6 8.1* 9.0   ALB 3.0* 2.7*  --   --   --   --    * 140   < > 132* 132* 133*   K 5.3* 4.6   < > 5.2* 5.5* 4.8    113*   < > 102 100 101   CO2 19.0* 22.0   < > 21.0 23.0 22.0   ALKPHO 159* 142  --   --   --   --    AST 26 14*  --   --   --   --    ALT 48 36  --   --   --   --    BILT 0.3 0.4  --   --   --   --    TP 7.6 6.9  --   --   --   --     < > = values in this interval not displayed.       Estimated Creatinine Clearance: 11 mL/min (A) (based on SCr of 3.23 mg/dL (H)).    Recent Labs   Lab 07/08/24 1849   TROPHS 24       Recent Labs   Lab 07/08/24 1849   PTP 19.0*   INR 1.58*                  Microbiology    Hospital Encounter on 07/08/24   1. Urine Culture, Routine     Status: Abnormal    Collection Time: 07/08/24 10:23 PM    Specimen: Urine, clean catch   Result Value Ref Range    Urine Culture >100,000 CFU/ML Klebsiella pneumoniae (A) N/A    Urine Culture <10,000 CFU/ML Gram negative melina N/A       Susceptibility    Klebsiella pneumoniae -  (no method available)     Ampicillin  Resistant      Ampicillin + Sulbactam 4 Sensitive      Cefazolin <=4 Sensitive      Ciprofloxacin <=0.25 Sensitive      Gentamicin <=1 Sensitive      Meropenem <=0.25 Sensitive      Levofloxacin <=0.12 Sensitive      Nitrofurantoin 64 Intermediate      Piperacillin + Tazobactam <=4 Sensitive      Trimethoprim/Sulfa <=20 Sensitive          Imaging: Reviewed in Epic.    Medications:    colchicine  0.3 mg Oral Q72H    docusate sodium  100 mg Oral BID    insulin aspart  1-10 Units Subcutaneous TID CC and HS    insulin degludec  15 Units Subcutaneous Daily    hydrALAZINE  50 mg Oral Q8H AYANNA    apixaban  2.5 mg Oral BID    insulin aspart  2-10 Units Subcutaneous TID AC and HS    allopurinol  100 mg Oral Daily    amiodarone  200 mg Oral Daily    dilTIAZem ER  120 mg Oral Daily    escitalopram  5 mg Oral Daily    gabapentin  100 mg Oral TID    levothyroxine  75 mcg Oral Before breakfast    atorvastatin  10 mg  Oral Nightly    carvedilol  12.5 mg Oral BID with meals       Assessment & Plan:      #HTN urgency with suspected acute on chronic HFpEF and right sided failure  -BP as high as 225/91 on admission, s/p 10 IV hydralazine in ED  -BNP 12,823 with baseline around 10,000  -EKG showing A-paced rhythm @60 bpm  -venous duplex negative for DVT  -chest xray without acute process  -echo Nov 2023: EF 60-65%, mild-mod aortic regurg, mod mirtal regurg, mod tricuspid regurg  -plan for nitroglycerin paste for HTN urgency with goal reduction of BP by 25% over 24 hrs  -Status post lasix 40 IV BID  -strict I/O, daily weights  -interrogate PPM to assess for A-fib burden which could be contributing  -monitor on telemetry  -cont home losartan, amiodarone, diltiazem, metoprolol  -IV hydralazine prn  -cardiology on consult    #Atrial fibrillation, rate controlled, anticoagulation with low-dose Eliquis 2.5 mg twice daily per cardiology     #Hyponatremia  -mild at 134  -likely 2/2 volume overload and CHF  -tx as above  -cont to monitor BMP  -can allow to correct to normal over 24 hrs     #Hyperkalemia  -Nephrology following, on Lokelma and on IV Lasix until tomorrow morning  -monitor closely    #PRISCILLA on CKD, possible cardiorenal  Baseline Cr under 2  Cr improving with diuretics    # Hyperkalemia  -Mild to side being given by nephrologist on 7/13/2024  -Follow-up BMP in a.m.    #NAGMA  -CO2 of 19 with AG of 7  -likely 2/2 underlying renal ds  -cont to monitor  -consider addition of bicarb tabs if not improving     #normocytic anemia with heme positive stools  -hgb 11.3 with baseline 11-12  -heme-occult trace positive per ED physician  -sounds likely to be hemorrhoidal based on pt report  -cont to monitor CBC and transfuse for hgb <7  -liquid diet until hgb stable  -if hgb down trending consider GI eval  -hold home apixaban until hgb stable     #hypoalbuminemia  -albumin 3.0  -may be 2/2 volume overloaded state  -cont to monitor     # Diabetes  mellitus type 2 with hyperglycemia  - home sitagliptin, Januvia, glimepiride on hold since admission, will plan to continue these at the time of discharge, patient states her blood sugars are well-controlled at home with the home regimen  -cont home gabapentin  -Hypoglycemia protocol  -Follow Accu-Cheks  - Tresiba long-acting insulin  -NovoLog carb counting and correction factor     # Hyperlipidemia  -cont home simvastatin     #Depression  -cont home escitalopram     #Hypothyroidism  -cont home levothyroxine     #Gout  -cont home allopurinol     #Asymptomatic bacteriuria  -pt denied any urinary complaints, normal WBC and afebrile  -monitor off antibiotics    #Osteoarthritis with groin pain  -X-ray of the pelvis and hip with no fracture, shows osteoarthritis  -PT OT  - pain management    # Acute gout flareup with bilateral first metatarsophalangeal joint pain and redness, bilateral ankle pain, bilateral knee pain, bilateral elbow pain  -Checked  uric acid level which came back elevated  -Started on colchicine-discussed with pharmacy, will renal dose based on patient's kidney function test and with patient's multiple other medications    D/w patient, patient's son at bedside    Discharge planning for tomorrow if improved clinically and is cleared by all consultants.     Jemima Lopez MD      Supplementary Documentation:     Quality:  DVT Mechanical Prophylaxis:   SCDs,    DVT Pharmacologic Prophylaxis   Medication    apixaban (Eliquis) tab 2.5 mg                Code Status: DNAR/Selective Treatment  Hutchinson: External urinary catheter in place  Hutchinson Duration (in days):   Central line:    AMINTA:     Discharge is dependent on: progress  At this point Ms. Salcido is expected to be discharge to: home    The 21st Century Cures Act makes medical notes like these available to patients in the interest of transparency. Please be advised this is a medical document. Medical documents are intended to carry relevant information, facts  as evident, and the clinical opinion of the practitioner. The medical note is intended as peer to peer communication and may appear blunt or direct. It is written in medical language and may contain abbreviations or verbiage that are unfamiliar.             **Certification      PHYSICIAN Certification of Need for Inpatient Hospitalization - Initial Certification    Patient will require inpatient services that will reasonably be expected to span two midnight's based on the clinical documentation in H+P.   Based on patients current state of illness, I anticipate that, after discharge, patient will require TBD.

## 2024-07-14 NOTE — PLAN OF CARE
A/O x 3, [place] hearing aids at bedside, dentures  2L nc,   Tele A paced  Accucheck QID- glucose in 200s with sliding scale insulin and carb count coverage added earlier, no snack/food before bed.  K still running a bit high, 1 dose of Valtessa, have to wait for 3 hours after meds given. Trend labwork in AM, neph following  Purewick for urine at night  Up to chair for meals during the day  C/O leg pain from neuropathy, gabapentin scheduled  All needs met at this time    Problem: METABOLIC/FLUID AND ELECTROLYTES - ADULT  Goal: Glucose maintained within prescribed range  Description: INTERVENTIONS:  - Monitor Blood Glucose as ordered  - Assess for signs and symptoms of hyperglycemia and hypoglycemia  - Administer ordered medications to maintain glucose within target range  - Assess barriers to adequate nutritional intake and initiate nutrition consult as needed  - Instruct patient on self management of diabetes  Outcome: Progressing  Goal: Electrolytes maintained within normal limits  Description: INTERVENTIONS:  - Monitor labs and rhythm and assess patient for signs and symptoms of electrolyte imbalances  - Administer electrolyte replacement as ordered  - Monitor response to electrolyte replacements, including rhythm and repeat lab results as appropriate  - Fluid restriction as ordered  - Instruct patient on fluid and nutrition restrictions as appropriate  Outcome: Progressing  Goal: Hemodynamic stability and optimal renal function maintained  Description: INTERVENTIONS:  - Monitor labs and assess for signs and symptoms of volume excess or deficit  - Monitor intake, output and patient weight  - Monitor urine specific gravity, serum osmolarity and serum sodium as indicated or ordered  - Monitor response to interventions for patient's volume status, including labs, urine output, blood pressure (other measures as available)  - Encourage oral intake as appropriate  - Instruct patient on fluid and nutrition restrictions  as appropriate  Outcome: Progressing     Problem: Diabetes/Glucose Control  Goal: Glucose maintained within prescribed range  Description: INTERVENTIONS:  - Monitor Blood Glucose as ordered  - Assess for signs and symptoms of hyperglycemia and hypoglycemia  - Administer ordered medications to maintain glucose within target range  - Assess barriers to adequate nutritional intake and initiate nutrition consult as needed  - Instruct patient on self management of diabetes  Outcome: Progressing

## 2024-07-14 NOTE — PROGRESS NOTES
[facility]  Nephrology Progress Note    Ciarra Salcido Attending:  Jemima Lopez MD       Assessment and Plan:    1) PRISCILLA- rising Cr as expected with diuresis + ARB; UA c/w UTI; US unremarkable. Focus on HF mgmt     2) CKD 4- likely due to longstanding DM / HTN; c/w bland urine sediment and mild proteinuria. No further w/u     3) Severe HTN- exac by CHF; much improved with coreg / cardizem / MURRAY. Avoid ACE-I / ARB with PRISCILLA / hyperkalemia; renal artery doppler neg     4) HFpEF with mod MR / TR / AI- resume PO diuretics in AM      5) PAF s/p PPM  + GILLES / DCCV - Apaced on amio / BB / CCB / DOAC per cards     6) DM 2- dc januvia permanently with edema / CHF    7) Hyperkalemia- due to PRISCILLA / CKD / type IV RTA; medical mgmt (patiromer)    DC planning to [place]      Subjective:  Awake pleasant no c/o wants to go home    Physical Exam:   /50 (BP Location: Left arm)   Pulse 60   Temp 98.3 °F (36.8 °C) (Oral)   Resp 16   Ht 5' 4\" (1.626 m)   Wt 168 lb 4.8 oz (76.3 kg)   SpO2 97%   BMI 28.89 kg/m²   Temp (24hrs), Av.5 °F (36.9 °C), Min:98.1 °F (36.7 °C), Max:98.9 °F (37.2 °C)       Intake/Output Summary (Last 24 hours) at 2024 0723  Last data filed at 2024 0500  Gross per 24 hour   Intake 600 ml   Output 1200 ml   Net -600 ml     Wt Readings from Last 3 Encounters:   24 168 lb 4.8 oz (76.3 kg)   24 140 lb (63.5 kg)   24 140 lb (63.5 kg)     General: awake alert  HEENT: No scleral icterus, MMM  Neck: Supple, no CASTRO or thyromegaly  Cardiac: Regular rate and rhythm, S1, S2 normal, no murmur or tub  Lungs: Decreased BS at bases bilaterally   Abdomen: Soft, non-tender. + bowel sounds, no palpable organomegaly  Extremities: Without clubbing, cyanosis; mild LE edema  Neurologic: Cranial nerves grossly intact, moving all extremities  Skin: Warm and dry, no rashes       Labs:   Lab Results   Component Value Date    CREATSERUM 3.53 2024    BUN 83 2024      07/13/2024    K 5.5 07/13/2024     07/13/2024    CO2 23.0 07/13/2024     07/13/2024    CA 8.1 07/13/2024    PGLU 201 07/14/2024       Imaging:  All imaging studies reviewed.    Meds:   Current Facility-Administered Medications   Medication Dose Route Frequency    insulin aspart (NovoLOG) 100 Units/mL FlexPen 1-10 Units  1-10 Units Subcutaneous TID CC and HS    insulin degludec (Tresiba) 100 units/mL flextouch 15 Units  15 Units Subcutaneous Daily    hydrALAZINE (Apresoline) tab 50 mg  50 mg Oral Q8H AYANNA    apixaban (Eliquis) tab 2.5 mg  2.5 mg Oral BID    glucose (Dex4) 15 GM/59ML oral liquid 15 g  15 g Oral Q15 Min PRN    Or    glucose (Glutose) 40% oral gel 15 g  15 g Oral Q15 Min PRN    Or    glucose-vitamin C (Dex-4) chewable tab 4 tablet  4 tablet Oral Q15 Min PRN    Or    dextrose 50% injection 50 mL  50 mL Intravenous Q15 Min PRN    Or    glucose (Dex4) 15 GM/59ML oral liquid 30 g  30 g Oral Q15 Min PRN    Or    glucose (Glutose) 40% oral gel 30 g  30 g Oral Q15 Min PRN    Or    glucose-vitamin C (Dex-4) chewable tab 8 tablet  8 tablet Oral Q15 Min PRN    insulin aspart (NovoLOG) 100 Units/mL FlexPen 2-10 Units  2-10 Units Subcutaneous TID AC and HS    allopurinol (Zyloprim) tab 100 mg  100 mg Oral Daily    amiodarone (Pacerone) tab 200 mg  200 mg Oral Daily    dilTIAZem ER (Dilacor XR) 24 hr cap 120 mg  120 mg Oral Daily    escitalopram (Lexapro) tab 5 mg  5 mg Oral Daily    gabapentin (Neurontin) cap 100 mg  100 mg Oral TID    levothyroxine (Synthroid) tab 75 mcg  75 mcg Oral Before breakfast    atorvastatin (Lipitor) tab 10 mg  10 mg Oral Nightly    carvedilol (Coreg) tab 12.5 mg  12.5 mg Oral BID with meals         Questions/concerns were discussed with patient and/or family by bedside.          Mary Lares MD  7/14/2024  723 AM

## 2024-07-14 NOTE — PLAN OF CARE
Assumed pt care at 0730. A&Ox3-4, forgetful. VSS. 1L O2 via NC. A-paced on tele. Hard of hearing. Dentures. R wrist PIV SL. Denies pain, denies N/V. Cardiac/carb-controlled diet, QID accuchecks. 2G Na restriction, 2000 mL fluid restriction. Voiding, purewick d/t incontinence at times. Strict I&O. Up x1 to pivot to chair. Eliquis for VTE prevention. Daily weight. Pt updated on POC.     Problem: Diabetes/Glucose Control  Goal: Glucose maintained within prescribed range  Description: INTERVENTIONS:  - Monitor Blood Glucose as ordered  - Assess for signs and symptoms of hyperglycemia and hypoglycemia  - Administer ordered medications to maintain glucose within target range  - Assess barriers to adequate nutritional intake and initiate nutrition consult as needed  - Instruct patient on self management of diabetes  Outcome: Progressing     Problem: Patient/Family Goals  Goal: Patient/Family Long Term Goal  Description: Patient's Long Term Goal: discharge  Interventions:  - MD consults  - improve kidney function  - See additional Care Plan goals for specific interventions  Outcome: Progressing  Goal: Patient/Family Short Term Goal  Description: Patient's Short Term Goal: pain control  Interventions:   - gabapentin scheduled  - See additional Care Plan goals for specific interventions  Outcome: Progressing     Problem: METABOLIC/FLUID AND ELECTROLYTES - ADULT  Goal: Glucose maintained within prescribed range  Description: INTERVENTIONS:  - Monitor Blood Glucose as ordered  - Assess for signs and symptoms of hyperglycemia and hypoglycemia  - Administer ordered medications to maintain glucose within target range  - Assess barriers to adequate nutritional intake and initiate nutrition consult as needed  - Instruct patient on self management of diabetes  Outcome: Progressing  Goal: Electrolytes maintained within normal limits  Description: INTERVENTIONS:  - Monitor labs and rhythm and assess patient for signs and symptoms of  electrolyte imbalances  - Administer electrolyte replacement as ordered  - Monitor response to electrolyte replacements, including rhythm and repeat lab results as appropriate  - Fluid restriction as ordered  - Instruct patient on fluid and nutrition restrictions as appropriate  Outcome: Progressing  Goal: Hemodynamic stability and optimal renal function maintained  Description: INTERVENTIONS:  - Monitor labs and assess for signs and symptoms of volume excess or deficit  - Monitor intake, output and patient weight  - Monitor urine specific gravity, serum osmolarity and serum sodium as indicated or ordered  - Monitor response to interventions for patient's volume status, including labs, urine output, blood pressure (other measures as available)  - Encourage oral intake as appropriate  - Instruct patient on fluid and nutrition restrictions as appropriate  Outcome: Progressing   Strong peripheral pulses

## 2024-07-15 ENCOUNTER — APPOINTMENT (OUTPATIENT)
Dept: CT IMAGING | Facility: HOSPITAL | Age: 84
End: 2024-07-15
Attending: INTERNAL MEDICINE
Payer: MEDICARE

## 2024-07-15 PROBLEM — I13.10 CARDIORENAL SYNDROME: Status: ACTIVE | Noted: 2024-01-01

## 2024-07-15 PROBLEM — I13.10 CARDIORENAL SYNDROME: Status: ACTIVE | Noted: 2024-07-15

## 2024-07-15 LAB
ANION GAP SERPL CALC-SCNC: 8 MMOL/L (ref 0–18)
BUN BLD-MCNC: 99 MG/DL (ref 9–23)
CALCIUM BLD-MCNC: 8.4 MG/DL (ref 8.5–10.1)
CHLORIDE SERPL-SCNC: 105 MMOL/L (ref 98–112)
CO2 SERPL-SCNC: 21 MMOL/L (ref 21–32)
CREAT BLD-MCNC: 3.32 MG/DL
EGFRCR SERPLBLD CKD-EPI 2021: 13 ML/MIN/1.73M2 (ref 60–?)
GLUCOSE BLD-MCNC: 168 MG/DL (ref 70–99)
GLUCOSE BLD-MCNC: 179 MG/DL (ref 70–99)
GLUCOSE BLD-MCNC: 188 MG/DL (ref 70–99)
GLUCOSE BLD-MCNC: 233 MG/DL (ref 70–99)
GLUCOSE BLD-MCNC: 286 MG/DL (ref 70–99)
OSMOLALITY SERPL CALC.SUM OF ELEC: 313 MOSM/KG (ref 275–295)
POTASSIUM SERPL-SCNC: 5.3 MMOL/L (ref 3.5–5.1)
SODIUM SERPL-SCNC: 134 MMOL/L (ref 136–145)

## 2024-07-15 PROCEDURE — 99232 SBSQ HOSP IP/OBS MODERATE 35: CPT | Performed by: INTERNAL MEDICINE

## 2024-07-15 PROCEDURE — 70450 CT HEAD/BRAIN W/O DYE: CPT | Performed by: INTERNAL MEDICINE

## 2024-07-15 PROCEDURE — 99233 SBSQ HOSP IP/OBS HIGH 50: CPT | Performed by: INTERNAL MEDICINE

## 2024-07-15 RX ORDER — POLYETHYLENE GLYCOL 3350 17 G/17G
17 POWDER, FOR SOLUTION ORAL DAILY
Status: DISCONTINUED | OUTPATIENT
Start: 2024-07-15 | End: 2024-07-18

## 2024-07-15 RX ORDER — LACTULOSE 20 G/30ML
20 SOLUTION ORAL EVERY 6 HOURS PRN
Status: DISCONTINUED | OUTPATIENT
Start: 2024-07-15 | End: 2024-07-18

## 2024-07-15 NOTE — PHYSICAL THERAPY NOTE
PHYSICAL THERAPY RE-EVALUATION - INPATIENT     Room Number: 3601/3601-A  Evaluation Date: 7/15/2024  Type of Evaluation: Re-evaluation  Physician Order: PT Eval and Treat    Presenting Problem: Acute cystitis, HTN, rectal bleeding  Co-Morbidities : A fib w/ rvr, CHF, DVT, PE, DM, HL, CKD, polyneuropathy, depression, pacemaker  Reason for Therapy: Mobility Dysfunction and Discharge Planning    US of kidney doppler 7/12/24-unremarkable    PHYSICAL THERAPY ASSESSMENT   Patient is currently functioning below baseline with bed mobility, transfers, gait, standing prolonged periods, and performing household tasks.  Prior to admission, patient's baseline is mod indep c amb using RW, indep c dress/toileting, supervision for showers and dtr assists c meds.  Patient is requiring moderate assist as a result of the following impairments: decreased functional strength, decreased endurance/aerobic capacity, pain, impaired coordination, impaired motor planning, decreased muscular endurance, and increased O2 needs from baseline.  Physical Therapy will continue to follow for duration of hospitalization.    Patient will benefit from continued skilled PT Services to promote return to prior level of function and safety with continuous assistance and gradual rehabilitative therapy .    PLAN  PT Treatment Plan: Bed mobility;Endurance;Energy conservation;Patient education;Family education;Gait training;Transfer training;Balance training;Strengthening  Rehab Potential : Good  Frequency (Obs): 3-5x/week  Number of Visits to Meet Established Goals: 5      CURRENT GOALS    Goal #1 Patient is able to demonstrate supine - sit EOB @ level: minimum assistance     Goal #2 Patient is able to demonstrate transfers EOB to/from Chair/Wheelchair at assistance level: minimum assistance     Goal #3 Patient is able to ambulate 10 feet with assist device: walker - rolling at assistance level: minimum assistance     Goal #4    Goal #5    Goal #6     Goal Comments: Goals established on 7/15/2024      PHYSICAL THERAPY MEDICAL/SOCIAL HISTORY  History related to current admission: Patient is a 83 year old female admitted on 2024 from assisted living facility for increasing, sleepiness, R leg pain, increased blood pressures and rectal bleeding.  Pt diagnosed with acute cystitis, htn, rectal bleeding. Pt does report a recent fall from bed just prior to admit.  *Pt re-evaluated today d/t lack of mobility, incr pn in RUE/LE and elevated uric acid of 7.4. Currently being treated for gout        HOME SITUATION  Type of Home: Assisted living facility   Home Layout: One level  Stairs to Enter : 0  Stairs to Bedroom: 0     Lives With: Staff 24 hours  Drives: No  Patient Owned Equipment: Rolling walker  Patient Regularly Uses: Glasses;Hearing aides     Prior Level of [place]: Patient's baseline is walking w/ rolling walker, able to dress/use toilet on her own, walks to dining room, does have supervision w/ showering and dtr prepares her meds.  Pt takes meds on her own once dtr has them set for her.  Pt reports that she participates in the exercise class at her [facility] as well.  *RN reported that dtr informed her today that while at [facility], pt was wearing a lx brace d/t previous L1 compression fx, however did not mention that to therapy team    SUBJECTIVE  \"My leg just don't do what I want it to\"-reporting that her LE is not responding the way she wants it to, motor planning and coordination deficits (stating it is NOT similar to previous gout flare ups)      OBJECTIVE  Precautions: Hard of hearing;Bed/chair alarm  Fall Risk: High fall risk    WEIGHT BEARING RESTRICTION  Weight Bearing Restriction: None                PAIN ASSESSMENT  Ratin  Location: RLE (knee down) and RUE-elbow down  Management Techniques: Activity promotion;Body mechanics;Repositioning;Other (Comment) (elevation, gentle AROM)    COGNITION  Overall Cognitive Status:  WFL - within functional  limits  Arousal/Alertness:  delayed responses to stimuli  Orientation Level:  oriented to place, oriented to person, and cues to orient to time  Following Commands:  follows one-step commands inconsistently, follows one step commands with increased time, and follows one step commands with repetition  Initiation: cues to initiate tasks  Motor Planning: impaired  Safety Judgement:  decreased awareness of need for assistance and decreased awareness of need for safety    RANGE OF MOTION AND STRENGTH ASSESSMENT  Upper extremity ROM and strength are -see OT eval    Lower extremity ROM is within functional limits-LLE WFL; mod guarding c RLE-able to passively DF to neutral and pt maintain for several sec; knee flex ~85 deg in sitting and full ext in supine    Lower extremity strength is-LLE hip flex 4-/5; quad 4-/5, ankle 4/5; RLE hip flex 3-/5, quad 3/5, ankle 3/5      BALANCE  Static Sitting: Fair  Dynamic Sitting: Fair -  Static Standing: Poor +  Dynamic Standing: Poor +    ADDITIONAL TESTS  Additional Tests: Modified Jessamine              Modified Renae: 4                  ACTIVITY TOLERANCE                         O2 WALK       NEUROLOGICAL FINDINGS  Neurological Findings: Coordination - Heel to Shin;Coordination - Rapid Alternating Movement;Sensation     Coordination - Heel to Shin: Asymmetrical;Right decreased speed;Right decreased accuracy  Coordination - Rapid Alternating Movement: Asymmetrical;Right decreased speed;Right decreased accuracy  Sensation: BLE symmetrical/intact to light touch         AM-PAC '6-Clicks' INPATIENT SHORT FORM - BASIC MOBILITY  How much difficulty does the patient currently have...  Patient Difficulty: Turning over in bed (including adjusting bedclothes, sheets and blankets)?: A Little   Patient Difficulty: Sitting down on and standing up from a chair with arms (e.g., wheelchair, bedside commode, etc.): A Lot   Patient Difficulty: Moving from lying on back to sitting on the side of the  bed?: A Lot   How much help from another person does the patient currently need...   Help from Another: Moving to and from a bed to a chair (including a wheelchair)?: A Lot   Help from Another: Need to walk in hospital room?: A Lot   Help from Another: Climbing 3-5 steps with a railing?: Total       AM-PAC Score:  Raw Score: 12   Approx Degree of Impairment: 68.66%   Standardized Score (AM-PAC Scale): 35.33   CMS Modifier (G-Code): CL    FUNCTIONAL ABILITY STATUS  Gait Assessment   Functional Mobility/Gait Assessment  Gait Assistance: Minimum assistance  Distance (ft): 5  Assistive Device: Rolling walker  Pattern: R Decreased stance time;R Foot drag;R Foot flat;L Foot flat;R Flexed knee;L Flexed knee;Comment (L lateral trunk lean)    Skilled Therapy Provided     Bed Mobility:  Rolling: NT  Supine to sit: mod A for trunk and BLE   Sit to supine: NT     Transfer Mobility:  Sit to stand: heavy mod Ax2 c RW   Stand to sit: mod A-poor eccentric control  Gait = 5' c RW and min A    Therapist's Comments: Pt presents to PT lying in semi supine position in bed. Pt is eager to participate. Noted in bed that pt challenged c R cervical rotation and maintains head in slightly L neck rotated position and leaning to her L. Stating pn as 9/10 grossly for RUE/LE. Pt required frequent hand over hand assist and tactile cues in order to complete tasks in all positions this session. Required mod A to t/f supine/sit and significant verbal and tactile cues to complete for lack of motor planning/initiation. Pt is able scoot and reposition, however c incr cues. Provided c her shoes and she was able to don on L, sliding her foot in, however attempted several times c R and unable to maintain shoe in appropriate position to attempt donning it. Required mod A to complete. Initially attempted sit/stand c RW, however c max Ax2, pt unable to clear pelvis from surface. Ht of bed elevated and then attempted again successfully c mod/max Ax2 and RW.  Pt required cues to  an upright posture and deter further L lat trunk lean. She is able to nearly symmetrically WB through BLE, however stating she could not attempt amb. This writer asked her to attempt and she was able to complete c initially mod A and then transitioned to min A, however step to pattern and cues for sequence and to take each step. Chair was brought to follow and pt fatigued quickly. Sat down c mod A and lack of eccentric control. Pt unaware that she cont to lean to the L, therefore pillow support provided under RUE/LE and then additional under LUE to assist c neutral postural positioning. RN aware of session and concerns for motor planning, initiation, coordination and gait deficits.     Exercise/Education Provided:  Bed mobility  Body mechanics  Functional activity tolerated  Gait training  Posture  Transfer training    Patient End of Session: Up in chair;Call light within reach;Needs met;RN aware of session/findings;All patient questions and concerns addressed;Discussed recommendations with /      Patient Evaluation Complexity Level:  History Moderate - 1 or 2 personal factors and/or co-morbidities   Examination of body systems Moderate - addressing a total of 3 or more elements   Clinical Presentation Moderate - Evolving   Clinical Decision Making Moderate - Evolving       PT Session Time: 35 minutes  Gait Trainin minutes  Therapeutic Activity: 10 minutes

## 2024-07-15 NOTE — PLAN OF CARE
K+ 5.3, Veltassa given.    Pt reports constipation, no bowel movement, Miralax ordered and given  PT evaluation  ordered for weakness.  Patient has limited mobility in right arm and leg due to gout flare up  Accucheck QID with sliding scale and carb coverage  Pt on 2L nasal cannula.  Baseline is room air.  Took patient off nasal cannula to trial without it, but SpO2 went to 85%, so placed back on 2L  Daughter at bedside, updated on plan of care    Problem: METABOLIC/FLUID AND ELECTROLYTES - ADULT  Goal: Glucose maintained within prescribed range  Description: INTERVENTIONS:  - Monitor Blood Glucose as ordered  - Assess for signs and symptoms of hyperglycemia and hypoglycemia  - Administer ordered medications to maintain glucose within target range  - Assess barriers to adequate nutritional intake and initiate nutrition consult as needed  - Instruct patient on self management of diabetes  Outcome: Progressing  Goal: Electrolytes maintained within normal limits  Description: INTERVENTIONS:  - Monitor labs and rhythm and assess patient for signs and symptoms of electrolyte imbalances  - Administer electrolyte replacement as ordered  - Monitor response to electrolyte replacements, including rhythm and repeat lab results as appropriate  - Fluid restriction as ordered  - Instruct patient on fluid and nutrition restrictions as appropriate  Outcome: Progressing  Goal: Hemodynamic stability and optimal renal function maintained  Description: INTERVENTIONS:  - Monitor labs and assess for signs and symptoms of volume excess or deficit  - Monitor intake, output and patient weight  - Monitor urine specific gravity, serum osmolarity and serum sodium as indicated or ordered  - Monitor response to interventions for patient's volume status, including labs, urine output, blood pressure (other measures as available)  - Encourage oral intake as appropriate  - Instruct patient on fluid and nutrition restrictions as appropriate  Outcome:  Progressing

## 2024-07-15 NOTE — PROGRESS NOTES
[facility]  Nephrology Progress Note    Ciarra Salcido Attending:  Jemima Lopez MD       Assessment and Plan:    1) PRISCILLA- rising Cr as expected with diuresis + ARB; UA c/w UTI; US unremarkable. Focus on HF mgmt     2) CKD 4- likely due to longstanding DM / HTN; c/w bland urine sediment and mild proteinuria. No further w/u     3) Severe HTN- exac by CHF; much improved with coreg / cardizem / MURRAY. Avoid ACE-I / ARB with PRISCILLA / hyperkalemia; renal artery doppler neg     4) HFpEF with mod MR / TR / AI- resume PO diuretics in AM      5) PAF s/p PPM  + GILLES / DCCV - Apaced on amio / BB / CCB / DOAC per cards     6) DM 2- dc januvia permanently with edema / CHF    7) Hyperkalemia- due to PRISCILLA / CKD / type IV RTA; medical mgmt (patiromer)    8) Gout (?)- uric acid mildly elevated; on low dose colchicine    DC planning to [place]      Subjective:  Awake pleasant no c/o wants to go home    Physical Exam:   /48 (BP Location: Left arm)   Pulse 63   Temp 98.2 °F (36.8 °C) (Oral)   Resp 18   Ht 5' 4\" (1.626 m)   Wt 159 lb 9.6 oz (72.4 kg)   SpO2 94%   BMI 27.40 kg/m²   Temp (24hrs), Av.4 °F (36.9 °C), Min:98.2 °F (36.8 °C), Max:99 °F (37.2 °C)       Intake/Output Summary (Last 24 hours) at 7/15/2024 0857  Last data filed at 2024 1900  Gross per 24 hour   Intake 600 ml   Output 300 ml   Net 300 ml     Wt Readings from Last 3 Encounters:   07/15/24 159 lb 9.6 oz (72.4 kg)   24 140 lb (63.5 kg)   24 140 lb (63.5 kg)     General: awake alert  HEENT: No scleral icterus, MMM  Neck: Supple, no CASTRO or thyromegaly  Cardiac: Regular rate and rhythm, S1, S2 normal, no murmur or tub  Lungs: Decreased BS at bases bilaterally   Abdomen: Soft, non-tender. + bowel sounds, no palpable organomegaly  Extremities: Without clubbing, cyanosis; mild LE edema  Neurologic: Cranial nerves grossly intact, moving all extremities  Skin: Warm and dry, no rashes       Labs:   Lab Results   Component Value Date     CREATSERUM 3.32 07/15/2024    BUN 99 07/15/2024     07/15/2024    K 5.3 07/15/2024     07/15/2024    CO2 21.0 07/15/2024     07/15/2024    CA 8.4 07/15/2024    PGLU 188 07/15/2024       Imaging:  All imaging studies reviewed.    Meds:   Current Facility-Administered Medications   Medication Dose Route Frequency    patiromer (Veltassa) 16.8 g oral packet 16.8 g  16.8 g Oral Once    colchicine tab 0.3 mg  0.3 mg Oral Q72H    docusate sodium (Colace) cap 100 mg  100 mg Oral BID    insulin aspart (NovoLOG) 100 Units/mL FlexPen 1-10 Units  1-10 Units Subcutaneous TID CC and HS    insulin degludec (Tresiba) 100 units/mL flextouch 15 Units  15 Units Subcutaneous Daily    hydrALAZINE (Apresoline) tab 50 mg  50 mg Oral Q8H AYANNA    apixaban (Eliquis) tab 2.5 mg  2.5 mg Oral BID    glucose (Dex4) 15 GM/59ML oral liquid 15 g  15 g Oral Q15 Min PRN    Or    glucose (Glutose) 40% oral gel 15 g  15 g Oral Q15 Min PRN    Or    glucose-vitamin C (Dex-4) chewable tab 4 tablet  4 tablet Oral Q15 Min PRN    Or    dextrose 50% injection 50 mL  50 mL Intravenous Q15 Min PRN    Or    glucose (Dex4) 15 GM/59ML oral liquid 30 g  30 g Oral Q15 Min PRN    Or    glucose (Glutose) 40% oral gel 30 g  30 g Oral Q15 Min PRN    Or    glucose-vitamin C (Dex-4) chewable tab 8 tablet  8 tablet Oral Q15 Min PRN    insulin aspart (NovoLOG) 100 Units/mL FlexPen 2-10 Units  2-10 Units Subcutaneous TID AC and HS    allopurinol (Zyloprim) tab 100 mg  100 mg Oral Daily    amiodarone (Pacerone) tab 200 mg  200 mg Oral Daily    dilTIAZem ER (Dilacor XR) 24 hr cap 120 mg  120 mg Oral Daily    escitalopram (Lexapro) tab 5 mg  5 mg Oral Daily    gabapentin (Neurontin) cap 100 mg  100 mg Oral TID    levothyroxine (Synthroid) tab 75 mcg  75 mcg Oral Before breakfast    atorvastatin (Lipitor) tab 10 mg  10 mg Oral Nightly    carvedilol (Coreg) tab 12.5 mg  12.5 mg Oral BID with meals         Questions/concerns were discussed with patient and/or  family by bedside.          Mary Lares MD  7/15/2024  857 AM

## 2024-07-15 NOTE — PLAN OF CARE
Assumed pt care at 1930  Aox4, 1L O2 NC, VSS  [id number]  Reported no pain; no n/v/d  Daily weight  Cardiac/carb controlled diet; 2k mL fluid restriction  Qid accu check  Noncardiac electrolyte replacement protocol  Incontinent-pad, purewick and chux applied  [id number] with walker  Safety precautions in place  Bed in lowest position and call light within reach  Updated with plan of care  All needs met at this time  Will continue plan of care          Problem: Diabetes/Glucose Control  Goal: Glucose maintained within prescribed range  Description: INTERVENTIONS:  - Monitor Blood Glucose as ordered  - Assess for signs and symptoms of hyperglycemia and hypoglycemia  - Administer ordered medications to maintain glucose within target range  - Assess barriers to adequate nutritional intake and initiate nutrition consult as needed  - Instruct patient on self management of diabetes  Outcome: Progressing     Problem: Patient/Family Goals  Goal: Patient/Family Long Term Goal  Description: Patient's Long Term Goal: discharge    Interventions:  - MD consults  - improve kidney function  - See additional Care Plan goals for specific interventions  Outcome: Progressing  Goal: Patient/Family Short Term Goal  Description: Patient's Short Term Goal: pain control     Interventions:   - gabapentin scheduled  - See additional Care Plan goals for specific interventions  Outcome: Progressing     Problem: METABOLIC/FLUID AND ELECTROLYTES - ADULT  Goal: Glucose maintained within prescribed range  Description: INTERVENTIONS:  - Monitor Blood Glucose as ordered  - Assess for signs and symptoms of hyperglycemia and hypoglycemia  - Administer ordered medications to maintain glucose within target range  - Assess barriers to adequate nutritional intake and initiate nutrition consult as needed  - Instruct patient on self management of diabetes  Outcome: Progressing  Goal: Electrolytes maintained within normal limits  Description:  INTERVENTIONS:  - Monitor labs and rhythm and assess patient for signs and symptoms of electrolyte imbalances  - Administer electrolyte replacement as ordered  - Monitor response to electrolyte replacements, including rhythm and repeat lab results as appropriate  - Fluid restriction as ordered  - Instruct patient on fluid and nutrition restrictions as appropriate  Outcome: Progressing  Goal: Hemodynamic stability and optimal renal function maintained  Description: INTERVENTIONS:  - Monitor labs and assess for signs and symptoms of volume excess or deficit  - Monitor intake, output and patient weight  - Monitor urine specific gravity, serum osmolarity and serum sodium as indicated or ordered  - Monitor response to interventions for patient's volume status, including labs, urine output, blood pressure (other measures as available)  - Encourage oral intake as appropriate  - Instruct patient on fluid and nutrition restrictions as appropriate  Outcome: Progressing

## 2024-07-15 NOTE — CM/SW NOTE
Notified by PT of anticipated need change to gradual rehabilitative therapy. Request sent to [facility] for review. Referrals sent in [facility].  will continue to follow.     KEO Goldman  Discharge Planner

## 2024-07-15 NOTE — PROGRESS NOTES
[facility]   part of [facility]     Hospitalist Progress Note     Ciarra Salcido Patient Status:  Inpatient    1940 MRN [id number]   Location [facility] 3NE-A Attending Cabrera Pineda MD   Hosp Day # 6 PCP JUDY CYR MD     Chief Complaint: HTN, fatigue    Subjective:     Patient seen with patient's daughter and son-in-law at bedside.  multiple joint pain, has redness near the first metatarsophalangeal joint on both feet and has pain of both knees and ankles, due to gout flareup-pain and swelling better than yesterday, right knee still feels swollen and unable to ambulate due to this according to patient and patient's daughter at bedside.  Awaiting PT OT elicia, patient seen with patient's RN at bedside    Objective:    Review of Systems:   A comprehensive review of systems was completed; pertinent positive and negatives stated in subjective.    Vital signs:  Temp:  [98.2 °F (36.8 °C)-99 °F (37.2 °C)] 98.8 °F (37.1 °C)  Pulse:  [59-64] 64  Resp:  [18-20] 18  BP: (123-156)/() 135/107  SpO2:  [87 %-94 %] 93 %    Physical Exam:    BP (!) 135/107 (BP Location: Left arm)   Pulse 64   Temp 98.8 °F (37.1 °C) (Oral)   Resp 18   Ht 5' 4\" (1.626 m)   Wt 159 lb 9.6 oz (72.4 kg)   SpO2 93%   BMI 27.40 kg/m²     General: No acute distress  Respiratory: No wheezes, no rhonchi  Cardiovascular: S1, S2, regular rate and rhythm  Abdomen: Soft, Non-tender, non-distended, positive bowel sounds  Neuro: Awake alert, no new focal deficits.   Extremities: No pedal edema, no calf tenderness      Diagnostic Data:    Labs:  Recent Labs   Lab 24  1849 24  1857 24  0731 24  0628   WBC  --  6.8 7.5 7.9   HGB  --  11.3* 11.3* 10.8*   MCV  --  95.7 94.4 95.6   PLT  --  269.0 281.0 255.0   INR 1.58*  --   --   --        Recent Labs   Lab 24  1849 24  0730 07/10/24  0710 24  1126 24  0640 07/15/24  0701   * 174*   < > 330* 175* 168*   BUN 51* 46*   < > 83* 87*  99*   CREATSERUM 2.70* 2.29*   < > 3.53* 3.23* 3.32*   CA 8.6 8.9   < > 8.1* 9.0 8.4*   ALB 3.0* 2.7*  --   --   --   --    * 140   < > 132* 133* 134*   K 5.3* 4.6   < > 5.5* 4.8 5.3*    113*   < > 100 101 105   CO2 19.0* 22.0   < > 23.0 22.0 21.0   ALKPHO 159* 142  --   --   --   --    AST 26 14*  --   --   --   --    ALT 48 36  --   --   --   --    BILT 0.3 0.4  --   --   --   --    TP 7.6 6.9  --   --   --   --     < > = values in this interval not displayed.       Estimated Creatinine Clearance: 10.7 mL/min (A) (based on SCr of 3.32 mg/dL (H)).    Recent Labs   Lab 07/08/24 1849   TROPHS 24       Recent Labs   Lab 07/08/24 1849   PTP 19.0*   INR 1.58*                  Microbiology    Hospital Encounter on 07/08/24   1. Urine Culture, Routine     Status: Abnormal    Collection Time: 07/08/24 10:23 PM    Specimen: Urine, clean catch   Result Value Ref Range    Urine Culture >100,000 CFU/ML Klebsiella pneumoniae (A) N/A    Urine Culture <10,000 CFU/ML Gram negative melina N/A       Susceptibility    Klebsiella pneumoniae -  (no method available)     Ampicillin  Resistant      Ampicillin + Sulbactam 4 Sensitive      Cefazolin <=4 Sensitive      Ciprofloxacin <=0.25 Sensitive      Gentamicin <=1 Sensitive      Meropenem <=0.25 Sensitive      Levofloxacin <=0.12 Sensitive      Nitrofurantoin 64 Intermediate      Piperacillin + Tazobactam <=4 Sensitive      Trimethoprim/Sulfa <=20 Sensitive          Imaging: Reviewed in Epic.    Medications:    polyethylene glycol (PEG 3350)  17 g Oral Daily    colchicine  0.3 mg Oral Q72H    docusate sodium  100 mg Oral BID    insulin aspart  1-10 Units Subcutaneous TID CC and HS    insulin degludec  15 Units Subcutaneous Daily    hydrALAZINE  50 mg Oral Q8H AYANNA    apixaban  2.5 mg Oral BID    insulin aspart  2-10 Units Subcutaneous TID AC and HS    allopurinol  100 mg Oral Daily    amiodarone  200 mg Oral Daily    dilTIAZem ER  120 mg Oral Daily    escitalopram  5 mg  Oral Daily    gabapentin  100 mg Oral TID    levothyroxine  75 mcg Oral Before breakfast    atorvastatin  10 mg Oral Nightly    carvedilol  12.5 mg Oral BID with meals       Assessment & Plan:      #HTN urgency with suspected acute on chronic HFpEF and right sided failure  -BP as high as 225/91 on admission, s/p 10 IV hydralazine in ED  -BNP 12,823 with baseline around 10,000  -EKG showing A-paced rhythm @60 bpm  -venous duplex negative for DVT  -chest xray without acute process  -echo Nov 2023: EF 60-65%, mild-mod aortic regurg, mod mirtal regurg, mod tricuspid regurg  -plan for nitroglycerin paste for HTN urgency with goal reduction of BP by 25% over 24 hrs  -Status post lasix 40 IV BID  -strict I/O, daily weights  -interrogate PPM to assess for A-fib burden which could be contributing  -monitor on telemetry  -cont home losartan, amiodarone, diltiazem, metoprolol  -IV hydralazine prn  -cardiology on consult    #Atrial fibrillation, rate controlled, anticoagulation with low-dose Eliquis 2.5 mg twice daily per cardiology     #Hyponatremia  -mild at 134  -likely 2/2 volume overload and CHF  -tx as above  -cont to monitor BMP  -can allow to correct to normal over 24 hrs     #Hyperkalemia  -Nephrology following, on Lokelma and on IV Lasix until tomorrow morning  -monitor closely    #PRISCILLA on CKD, possible cardiorenal  Baseline Cr under 2  Cr improving with diuretics    # Hyperkalemia  -Mild to side being given by nephrologist on 7/13/2024  -Follow-up BMP in a.m.    #NAGMA  -CO2 of 19 with AG of 7  -likely 2/2 underlying renal ds  -cont to monitor  -consider addition of bicarb tabs if not improving     #normocytic anemia with heme positive stools  -hgb 11.3 with baseline 11-12  -heme-occult trace positive per ED physician  -sounds likely to be hemorrhoidal based on pt report  -cont to monitor CBC and transfuse for hgb <7  -liquid diet until hgb stable  -if hgb down trending consider GI eval  -hold home apixaban until hgb  stable     #hypoalbuminemia  -albumin 3.0  -may be 2/2 volume overloaded state  -cont to monitor     # Diabetes mellitus type 2 with hyperglycemia  - home sitagliptin, Januvia, glimepiride on hold since admission, will plan to continue these at the time of discharge, patient states her blood sugars are well-controlled at home with the home regimen  -cont home gabapentin  -Hypoglycemia protocol  -Follow Accu-Cheks  - Tresiba long-acting insulin  -NovoLog carb counting and correction factor     # Hyperlipidemia  -cont home simvastatin     #Depression  -cont home escitalopram     #Hypothyroidism  -cont home levothyroxine     #Gout  -cont home allopurinol     #Asymptomatic bacteriuria  -pt denied any urinary complaints, normal WBC and afebrile  -monitor off antibiotics    #Osteoarthritis with groin pain  -X-ray of the pelvis and hip with no fracture, shows osteoarthritis  -PT OT  - pain management    # Acute gout flareup with bilateral first metatarsophalangeal joint pain and redness, bilateral ankle pain, bilateral knee pain, bilateral elbow pain  - uric acid level which came back elevated  -Started on colchicine-discussed with pharmacy, who renal dosed based on patient's kidney function test and with patient's multiple other medications  -Improving today compared to yesterday according to patient and patient's daughter at bedside    D/w patient, patient's daughter at bedside-patient lives in an assisted living facility at baseline and with the current episode and inability to ambulate, daughter worried that patient may need more help than she can receive at assisted living facility, PT OT eval patient again asked discussed with RN at bedside.    Discharge planning for tomorrow if pain improved clinically and is cleared by all consultants.     Jemima Lopez MD      Supplementary Documentation:     Quality:  DVT Mechanical Prophylaxis:   SCDs,    DVT Pharmacologic Prophylaxis   Medication    apixaban (Eliquis) tab 2.5  mg                Code Status: DNAR/Selective Treatment  Hutchinson: External urinary catheter in place  Hutchinson Duration (in days):   Central line:    AMINTA:     Discharge is dependent on: progress  At this point Ms. Salcido is expected to be discharge to: home    The 21st Century Cures Act makes medical notes like these available to patients in the interest of transparency. Please be advised this is a medical document. Medical documents are intended to carry relevant information, facts as evident, and the clinical opinion of the practitioner. The medical note is intended as peer to peer communication and may appear blunt or direct. It is written in medical language and may contain abbreviations or verbiage that are unfamiliar.             **Certification      PHYSICIAN Certification of Need for Inpatient Hospitalization - Initial Certification    Patient will require inpatient services that will reasonably be expected to span two midnight's based on the clinical documentation in H+P.   Based on patients current state of illness, I anticipate that, after discharge, patient will require TBD.

## 2024-07-15 NOTE — OCCUPATIONAL THERAPY NOTE
OCCUPATIONAL THERAPY EVALUATION - INPATIENT     Room Number: 3601/3601-A  Evaluation Date: 7/15/2024  Type of Evaluation: Initial  Presenting Problem: hypertension    Physician Order: IP Consult to Occupational Therapy  Reason for Therapy: ADL/IADL Dysfunction and Discharge Planning    OCCUPATIONAL THERAPY ASSESSMENT   Patient is currently functioning below baseline with bed mobility, transfers, static sitting balance, dynamic sitting balance, static standing balance, dynamic standing balance, and functional standing tolerance. Prior to admission, patient's baseline is dressing and toileting on her own, and supervision with showers, using RW for mobility.  Patient is requiring moderate assistance as a result of the following impairments: decreased functional strength, decreased functional reach, decreased endurance, pain, impaired standing and sitting balance, impaired coordination, impaired motor planning, and limited R UE elbow ROM. Occupational Therapy will continue to follow for duration of hospitalization.    Patient will benefit from continued skilled OT Services to promote return to prior level of function and safety with continuous assistance and gradual rehabilitative therapy      History Related to Current Admission: Patient is a 83 year old female admitted on 7/8/2024 with Presenting Problem: hypertension. Co-Morbidities : A fib w/ rvr, CHF, DVT, PE, DM, HL, CKD, polyneuropathy, depression, pacemaker    WEIGHT BEARING RESTRICTION  Weight Bearing Restriction: None                Recommendations for nursing staff:   Transfers: one person with RW  Toileting location: bedside commode    EVALUATION SESSION:  Patient Start of Session: semi-supine in bed  FUNCTIONAL TRANSFER ASSESSMENT  Sit to Stand: Edge of Bed  Edge of Bed: Moderate Assist    BED MOBILITY  Supine to Sit : Moderate Assist  Sit to Supine (OT): Not Tested  Scooting: MOD A    BALANCE ASSESSMENT  Static Sitting: Supervision  Sitting Unilateral:  Supervision  Static Standing: Minimal Assist  Standing Unilateral: Not Tested    FUNCTIONAL ADL ASSESSMENT  Eating: Supervision  LB Dressing Seated: Moderate Assist      ACTIVITY TOLERANCE: functional mobility approx 1.5 minutes in room.                          O2 SATURATIONS  Oxygen Therapy  SPO2% on Oxygen at Rest: 92  At rest oxygen flow (liters per minute): 2  SPO2% Ambulation on Oxygen: 93  Ambulation oxygen flow (liters per minute): 2    COGNITION  Overall Cognitive Status:  Impaired  Arousal/Alertness:  inconsistent responses to stimuli and lethargic  Attention Span:  attends with cues to redirect and difficulty attending to directions  Orientation Level:  oriented to place, oriented to time, oriented to person, disoriented to place, and disoriented to time  Following Commands:  follows one step commands with increased time and follows one step commands with repetition  Initiation: cues to initiate tasks  Motor Planning: impaired  Awareness of Errors:  assistance required to identify errors made, assistance required to correct errors made, and decreased awareness of errors   Awareness of Deficits:  decreased awareness of deficits  Problem Solving:  assistance required to identify errors made, assistance required to generate solutions, and assistance required to implement solutions    Upper Extremity   ROM: within functional limits  Strength: within functional limits   Except as where listed below:  Upper Extremity         Right          Left   Movement ROM MMT ROM MMT   Shoulder Flexion 75% 3/5     Shoulder Extension NT      Shoulder Abduction NT      Shoulder External Rotation NT      Shoulder Internal Rotation NT      Elbow Flexion 75% 4/5     Elbow Extension WFL NT     Forearm Supination WFL NT     Forearm Pronation WFL NT     Wrist Flexion WFL NT     Wrist Extension WFL NT     Gross Grasp WFL 4/5           Coordination  Gross motor: impaired R and L UE  Fine motor: symmetrical decreased speed  Sensation:  Light touch:  intact    EDUCATION PROVIDED  Patient: Role of Occupational Therapy; Plan of Care; Functional Transfer Techniques; Posture/Positioning; Proper Body Mechanics  Patient's Response to Education: Verbalized Understanding; Returned Demonstration    Equipment used: RW  Demonstrates functional use, Would benefit from additional trial      Therapist comments: Pt provided inconsistent performance on visual scanning and saccadic testing. Pt required frequent cues and manual assist to stabilize head during visual testing. Pt demonstrates cervical rotation WFL, however prefers L sided head turn. Pt displays slight R facial droop. Pt required moderate verbal cues to correctly identify time on clock face over 3x trials. Pt reported pain in R UE elbow with flexion as limiting her ROM.     Patient End of Session: Up in chair;Needs met;Call light within reach;RN aware of session/findings;All patient questions and concerns addressed    OCCUPATIONAL PROFILE    HOME SITUATION  Type of Home: Assisted living facility  Home Layout: One level  Lives With: Staff 24 hours               Occupation/Status: shopping  Hand Dominance: Right  Drives: No  Patient Regularly Uses: Glasses;Hearing aides    Prior Level of Function: Patient's baseline is walking w/ rolling walker, able to dress/use toilet on her own, walks to dining room, does have supervision w/ showering and dtr prepares her meds.  Pt takes meds on her own once dtr has them set for her.  Pt reports that she participates in the exercise class at her [facility] as well.  *RN reported that dtr informed her today that while at [facility], pt was wearing a lx brace d/t previous L1 compression fx, however did not mention that to therapy team    SUBJECTIVE   \"It feels like my arm won't do what I want it to.\"    PAIN ASSESSMENT  Ratin  Location: R UE elbow, R foot  Management Techniques: Activity promotion;Repositioning    OBJECTIVE  Precautions: Hard of hearing;Bed/chair alarm  Fall  Risk: High fall risk      ASSESSMENTS    AM-PAC ‘6-Clicks’ Inpatient Daily Activity Short Form  -   Putting on and taking off regular lower body clothing?: A Lot  -   Bathing (including washing, rinsing, drying)?: A Lot  -   Toileting, which includes using toilet, bedpan or urinal? : A Lot  -   Putting on and taking off regular upper body clothing?: A Little  -   Taking care of personal grooming such as brushing teeth?: A Little  -   Eating meals?: A Little    AM-PAC Score:  Score: 15  Approx Degree of Impairment: 56.46%  Standardized Score (AM-PAC Scale): 34.69    ADDITIONAL TESTS     NEUROLOGICAL FINDINGS  Coordination - Finger to Nose: Asymmetrical; Right decreased speed; Left decreased accuracy  Coordination - Rapid Alternating Movement: Symmetrical  Coordination - Finger Opposition: Symmetrical     COGNITION ASSESSMENTS       PLAN  OT Treatment Plan: Balance activities;Energy conservation/work simplification techniques;ADL training;IADL training;Functional transfer training;UE strengthening/ROM;Visual perceptual training;Endurance training;Cognitive reorientation;Patient/Family education;Patient/Family training;Equipment eval/education;Compensatory technique education;Continued evaluation;Neuromuscluar reeducation  Rehab Potential : Good  Frequency: 3x/week       ADL Goals   Patient will perform grooming: with supervision  Patient will perform upper body dressing:  with supervision  Patient will perform lower body dressing:  with supervision  Patient will perform toileting: with supervision    Functional Transfer Goals  Patient will transfer from supine to sit:  with supervision  Patient will transfer from sit to stand:  with supervision  Patient will transfer to bedside commode:  with supervision  Patient will transfer to toilet:  with supervision    UE Exercise Program Goal  Patient will be supervision with bilateral AROM HEP (home exercise program).    Additional Goals  Pt will tolerate standing for 4  minutes utilizing AD as needed in preparation to perform grooming ADLs at sink level.      Therapist Goals  PHQ-9  Cognitive assessment    Patient Evaluation Complexity Level:   Occupational Profile/Medical History MODERATE - Expanded review of history including review of medical or therapy record   Specific performance deficits impacting engagement in ADL/IADL MODERATE  3 - 5 performance deficits   Client Assessment/Performance Deficits MODERATE - Comorbidities and min to mod modifications of tasks    Clinical Decision Making MODERATE - Analysis of occupational profile, detailed assessments, several treatment options    Overall Complexity MODERATE     OT Session Time: 23 minutes  Therapeutic Activity: 12  minutes  Therapeutic Exercise: 5 minutes  Cognitive Skills: 6 minutes

## 2024-07-16 ENCOUNTER — APPOINTMENT (OUTPATIENT)
Dept: GENERAL RADIOLOGY | Facility: HOSPITAL | Age: 84
End: 2024-07-16
Attending: INTERNAL MEDICINE
Payer: MEDICARE

## 2024-07-16 PROBLEM — R41.89 COGNITIVE DECLINE: Status: ACTIVE | Noted: 2024-01-01

## 2024-07-16 PROBLEM — R53.1 RIGHT SIDED WEAKNESS: Status: ACTIVE | Noted: 2024-01-01

## 2024-07-16 PROBLEM — R53.1 RIGHT SIDED WEAKNESS: Status: ACTIVE | Noted: 2024-07-16

## 2024-07-16 PROBLEM — R41.89 COGNITIVE DECLINE: Status: ACTIVE | Noted: 2024-07-16

## 2024-07-16 LAB
ANION GAP SERPL CALC-SCNC: 8 MMOL/L (ref 0–18)
BUN BLD-MCNC: 108 MG/DL (ref 9–23)
CALCIUM BLD-MCNC: 8.9 MG/DL (ref 8.5–10.1)
CHLORIDE SERPL-SCNC: 103 MMOL/L (ref 98–112)
CO2 SERPL-SCNC: 22 MMOL/L (ref 21–32)
CREAT BLD-MCNC: 3.2 MG/DL
EGFRCR SERPLBLD CKD-EPI 2021: 14 ML/MIN/1.73M2 (ref 60–?)
GLUCOSE BLD-MCNC: 163 MG/DL (ref 70–99)
GLUCOSE BLD-MCNC: 190 MG/DL (ref 70–99)
GLUCOSE BLD-MCNC: 214 MG/DL (ref 70–99)
GLUCOSE BLD-MCNC: 303 MG/DL (ref 70–99)
GLUCOSE BLD-MCNC: 306 MG/DL (ref 70–99)
OSMOLALITY SERPL CALC.SUM OF ELEC: 315 MOSM/KG (ref 275–295)
POTASSIUM SERPL-SCNC: 5.6 MMOL/L (ref 3.5–5.1)
POTASSIUM SERPL-SCNC: 6 MMOL/L (ref 3.5–5.1)
SODIUM SERPL-SCNC: 133 MMOL/L (ref 136–145)

## 2024-07-16 PROCEDURE — 71046 X-RAY EXAM CHEST 2 VIEWS: CPT | Performed by: INTERNAL MEDICINE

## 2024-07-16 PROCEDURE — 99223 1ST HOSP IP/OBS HIGH 75: CPT | Performed by: OTHER

## 2024-07-16 PROCEDURE — 99232 SBSQ HOSP IP/OBS MODERATE 35: CPT | Performed by: INTERNAL MEDICINE

## 2024-07-16 PROCEDURE — 99233 SBSQ HOSP IP/OBS HIGH 50: CPT | Performed by: INTERNAL MEDICINE

## 2024-07-16 NOTE — PROGRESS NOTES
[facility]  Nephrology Progress Note    Ciarra Salcido Attending:  Jemima Lopez MD       Assessment and Plan:    1) PRISCILLA- presumably due to cardiorenal syndrome; no other insults. Off ARB; meds benign. No obstruction by US. Hemodynamics OK. No contrast studies. PLAN- focus on HF mgmt     2) CKD 4- likely due to longstanding DM / HTN; c/w bland urine sediment and mild proteinuria. No further w/u     3) Severe HTN- exac by CHF; much improved with coreg / cardizem / MURRAY. Avoid ACE-I / ARB with PRISCILLA / hyperkalemia; renal artery doppler neg     4) HFpEF with mod MR / TR / AI. Still edematous and CXR c/w pulm edema -> resume diuretics (bumex gtt)      5) PAF s/p PPM  + GILLES / DCCV - Apaced on amio / BB / CCB / DOAC per cards     6) DM 2- dc januvia permanently with edema / CHF    7) Hyperkalemia- due to PRISCILLA / CKD / type IV RTA; medical mgmt ([facility])    8) Gout- uric acid mildly elevated; on low dose colchicine      Subjective:  Awake pleasant wrist feeling better    Physical Exam:   /51 (BP Location: Left arm)   Pulse 60   Temp 97.3 °F (36.3 °C) (Oral)   Resp 16   Ht 5' 4\" (1.626 m)   Wt 159 lb 9.6 oz (72.4 kg)   SpO2 95%   BMI 27.40 kg/m²   Temp (24hrs), Av.9 °F (36.6 °C), Min:97.3 °F (36.3 °C), Max:98.8 °F (37.1 °C)       Intake/Output Summary (Last 24 hours) at 2024 0918  Last data filed at 2024 0630  Gross per 24 hour   Intake 360 ml   Output 2001 ml   Net -1641 ml     Wt Readings from Last 3 Encounters:   07/15/24 159 lb 9.6 oz (72.4 kg)   24 140 lb (63.5 kg)   24 140 lb (63.5 kg)     General: awake alert  HEENT: No scleral icterus, MMM  Neck: Supple, no CASTRO or thyromegaly  Cardiac: Regular rate and rhythm, S1, S2 normal, no murmur or tub  Lungs: Decreased BS at bases bilaterally   Abdomen: Soft, non-tender. + bowel sounds, no palpable organomegaly  Extremities: Without clubbing, cyanosis; mild LE edema  Neurologic: Cranial nerves grossly intact, moving all  extremities  Skin: Warm and dry, no rashes       Labs:   Lab Results   Component Value Date    CREATSERUM 3.20 07/16/2024     07/16/2024     07/16/2024    K 6.0 07/16/2024     07/16/2024    CO2 22.0 07/16/2024     07/16/2024    CA 8.9 07/16/2024    PGLU 214 07/16/2024       Imaging:  All imaging studies reviewed.    Meds:   Current Facility-Administered Medications   Medication Dose Route Frequency    polyethylene glycol (PEG 3350) (Miralax) 17 g oral packet 17 g  17 g Oral Daily    lactulose (ENULOSE) solution 20 g  20 g Oral Q6H PRN    colchicine tab 0.3 mg  0.3 mg Oral Q72H    docusate sodium (Colace) cap 100 mg  100 mg Oral BID    insulin aspart (NovoLOG) 100 Units/mL FlexPen 1-10 Units  1-10 Units Subcutaneous TID CC and HS    insulin degludec (Tresiba) 100 units/mL flextouch 15 Units  15 Units Subcutaneous Daily    hydrALAZINE (Apresoline) tab 50 mg  50 mg Oral Q8H AYANNA    apixaban (Eliquis) tab 2.5 mg  2.5 mg Oral BID    glucose (Dex4) 15 GM/59ML oral liquid 15 g  15 g Oral Q15 Min PRN    Or    glucose (Glutose) 40% oral gel 15 g  15 g Oral Q15 Min PRN    Or    glucose-vitamin C (Dex-4) chewable tab 4 tablet  4 tablet Oral Q15 Min PRN    Or    dextrose 50% injection 50 mL  50 mL Intravenous Q15 Min PRN    Or    glucose (Dex4) 15 GM/59ML oral liquid 30 g  30 g Oral Q15 Min PRN    Or    glucose (Glutose) 40% oral gel 30 g  30 g Oral Q15 Min PRN    Or    glucose-vitamin C (Dex-4) chewable tab 8 tablet  8 tablet Oral Q15 Min PRN    insulin aspart (NovoLOG) 100 Units/mL FlexPen 2-10 Units  2-10 Units Subcutaneous TID AC and HS    allopurinol (Zyloprim) tab 100 mg  100 mg Oral Daily    amiodarone (Pacerone) tab 200 mg  200 mg Oral Daily    dilTIAZem ER (Dilacor XR) 24 hr cap 120 mg  120 mg Oral Daily    escitalopram (Lexapro) tab 5 mg  5 mg Oral Daily    gabapentin (Neurontin) cap 100 mg  100 mg Oral TID    levothyroxine (Synthroid) tab 75 mcg  75 mcg Oral Before breakfast    atorvastatin  (Lipitor) tab 10 mg  10 mg Oral Nightly    carvedilol (Coreg) tab 12.5 mg  12.5 mg Oral BID with meals         Questions/concerns were discussed with patient and/or family by bedside.          Mary Lares MD  7/16/2024  918 AM

## 2024-07-16 NOTE — PROGRESS NOTES
[facility]   part of [facility]     Hospitalist Progress Note     Ciarra Salcido Patient Status:  Inpatient    1940 MRN [id number]   Location [facility] 3NE-A Attending Cabrera Pineda MD   Hosp Day # 7 PCP JUDY CYR MD     Chief Complaint: HTN, fatigue    Subjective:     patient seen with patient's RN at bedside.  Legs feel swollen.  Gout pain improving.  Denies any chest pain     Objective:    Review of Systems:   A comprehensive review of systems was completed; pertinent positive and negatives stated in subjective.    Vital signs:  Temp:  [97.3 °F (36.3 °C)-98.2 °F (36.8 °C)] 98.2 °F (36.8 °C)  Pulse:  [60-62] 61  Resp:  [16-18] 16  BP: (111-145)/(46-57) 145/53  SpO2:  [90 %-97 %] 95 %    Physical Exam:    /53 (BP Location: Left arm)   Pulse 61   Temp 98.2 °F (36.8 °C) (Oral)   Resp 16   Ht 5' 4\" (1.626 m)   Wt 159 lb 9.6 oz (72.4 kg)   SpO2 95%   BMI 27.40 kg/m²     General: No acute distress  Respiratory: No wheezes, no rhonchi  Cardiovascular: S1, S2, regular rate and rhythm  Abdomen: Soft, Non-tender, non-distended, positive bowel sounds  Neuro: Awake alert, no new focal deficits.   Extremities: Bilateral pedal pedal edema, no calf tenderness  redness near the first metatarsophalangeal joint on both feet and has pain of both knees and ankles, due to gout flareup-pain and swelling better than yesterday, right knee still feels swollen and unable to ambulate due to this according to patient     Diagnostic Data:    Labs:  Recent Labs   Lab 24  0628   WBC 7.9   HGB 10.8*   MCV 95.6   .0       Recent Labs   Lab 24  0640 07/15/24  0701 24  0624   * 168* 190*   BUN 87* 99* 108*   CREATSERUM 3.23* 3.32* 3.20*   CA 9.0 8.4* 8.9   * 134* 133*   K 4.8 5.3* 6.0*    105 103   CO2 22.0 21.0 22.0       Estimated Creatinine Clearance: 10.9 mL/min (A) (based on SCr of 3.2 mg/dL (H)).    No results for input(s): \"TROP\", \"TROPHS\", \"CK\" in the last  168 hours.      No results for input(s): \"PTP\", \"INR\" in the last 168 hours.                 Microbiology    Hospital Encounter on 07/08/24   1. Urine Culture, Routine     Status: Abnormal    Collection Time: 07/08/24 10:23 PM    Specimen: Urine, clean catch   Result Value Ref Range    Urine Culture >100,000 CFU/ML Klebsiella pneumoniae (A) N/A    Urine Culture <10,000 CFU/ML Gram negative melina N/A       Susceptibility    Klebsiella pneumoniae -  (no method available)     Ampicillin  Resistant      Ampicillin + Sulbactam 4 Sensitive      Cefazolin <=4 Sensitive      Ciprofloxacin <=0.25 Sensitive      Gentamicin <=1 Sensitive      Meropenem <=0.25 Sensitive      Levofloxacin <=0.12 Sensitive      Nitrofurantoin 64 Intermediate      Piperacillin + Tazobactam <=4 Sensitive      Trimethoprim/Sulfa <=20 Sensitive          Imaging: Reviewed in Epic.    Medications:    sodium zirconium cyclosilicate  10 g Oral Q8H    polyethylene glycol (PEG 3350)  17 g Oral Daily    colchicine  0.3 mg Oral Q72H    docusate sodium  100 mg Oral BID    insulin aspart  1-10 Units Subcutaneous TID CC and HS    insulin degludec  15 Units Subcutaneous Daily    hydrALAZINE  50 mg Oral Q8H AYANNA    apixaban  2.5 mg Oral BID    insulin aspart  2-10 Units Subcutaneous TID AC and HS    allopurinol  100 mg Oral Daily    amiodarone  200 mg Oral Daily    dilTIAZem ER  120 mg Oral Daily    escitalopram  5 mg Oral Daily    gabapentin  100 mg Oral TID    levothyroxine  75 mcg Oral Before breakfast    atorvastatin  10 mg Oral Nightly    carvedilol  12.5 mg Oral BID with meals       Assessment & Plan:      #HTN urgency   # acute on chronic HFpEF and right sided failure  # Cardiorenal syndrome  -BP as high as 225/91 on admission, s/p 10 IV hydralazine in ED  -BNP 12,823 with baseline around 10,000  -EKG showing A-paced rhythm @60 bpm  -venous duplex negative for DVT  -chest xray without acute process  -echo Nov 2023: EF 60-65%, mild-mod aortic regurg, mod  mirtal regurg, mod tricuspid regurg  -plan for nitroglycerin paste for HTN urgency with goal reduction of BP by 25% over 24 hrs  -Status post lasix 40 IV BID  -strict I/O, daily weights  -interrogate PPM to assess for A-fib burden which could be contributing  -monitor on telemetry  -cont home losartan, amiodarone, diltiazem, metoprolol  -IV hydralazine prn  -cardiology on consult  -Nephrology on consult who starting patient on Bumex drip today    #Atrial fibrillation, rate controlled, anticoagulation with low-dose Eliquis 2.5 mg twice daily per cardiology     #Hyponatremia  -mild at 133  -likely 2/2 volume overload and CHF  -tx as above  -cont to monitor BMP     #Hyperkalemia  -Nephrology following, status post Veltassa, now on Lokelma per nephrology  -Nephrology starting patient on Bumex drip on 7/16/2024  -monitor closely    #PRISCILLA on CKD, possible cardiorenal  Baseline Cr under 2  Cr improving with diuretics    # Hyperkalemia  -Mild to side being given by nephrologist on 7/13/2024  -Follow-up BMP in a.m.    #NAGMA  -CO2 of 19 with AG of 7  -likely 2/2 underlying renal ds  -cont to monitor  -consider addition of bicarb tabs if not improving     #normocytic anemia with heme positive stools  -hgb 11.3 with baseline 11-12  -heme-occult trace positive per ED physician  -sounds likely to be hemorrhoidal based on pt report  -cont to monitor CBC and transfuse for hgb <7  -liquid diet until hgb stable  -if hgb down trending consider GI eval  -hold home apixaban until hgb stable     #hypoalbuminemia  -albumin 3.0  -may be 2/2 volume overloaded state  -cont to monitor     # Diabetes mellitus type 2 with hyperglycemia  - home sitagliptin, Januvia, glimepiride on hold since admission, will plan to continue these at the time of discharge, patient states her blood sugars are well-controlled at home with the home regimen  -cont home gabapentin  -Hypoglycemia protocol  -Follow Accu-Cheks  - Tresiba long-acting insulin  -NovoLog  carb counting and correction factor     # Hyperlipidemia  -cont home simvastatin     #Depression  -cont home escitalopram     #Hypothyroidism  -cont home levothyroxine     #Gout  -cont home allopurinol     #Asymptomatic bacteriuria  -pt denied any urinary complaints, normal WBC and afebrile  -monitor off antibiotics    #Osteoarthritis with groin pain  -X-ray of the pelvis and hip with no fracture, shows osteoarthritis  -PT OT  - pain management    # Acute gout flareup with bilateral first metatarsophalangeal joint pain and redness, bilateral ankle pain, bilateral knee pain, bilateral elbow pain  - uric acid level which came back elevated  -on colchicine-discussed with pharmacy, who renal dosed based on patient's kidney function test and with patient's multiple other medications  -Pain and redness and swelling improving today compared to yesterday according to patient       Seen by PT OT who recommended subacute rehab    Dr. Yates will follow from morning tomorrow     Jemima Lopez MD      Supplementary Documentation:     Quality:  DVT Mechanical Prophylaxis:   SCDs,    DVT Pharmacologic Prophylaxis   Medication    apixaban (Eliquis) tab 2.5 mg                Code Status: DNAR/Selective Treatment  Hutchinson: External urinary catheter in place  Hutchinson Duration (in days):   Central line:    AMINTA:     Discharge is dependent on: progress  At this point Ms. Salcido is expected to be discharge to: home    The 21st Century Cures Act makes medical notes like these available to patients in the interest of transparency. Please be advised this is a medical document. Medical documents are intended to carry relevant information, facts as evident, and the clinical opinion of the practitioner. The medical note is intended as peer to peer communication and may appear blunt or direct. It is written in medical language and may contain abbreviations or verbiage that are unfamiliar.             **Certification      PHYSICIAN Certification of  Need for Inpatient Hospitalization - Initial Certification    Patient will require inpatient services that will reasonably be expected to span two midnight's based on the clinical documentation in H+P.   Based on patients current state of illness, I anticipate that, after discharge, patient will require TBD.

## 2024-07-16 NOTE — CM/SW NOTE
Noted [facility] approved pt for [facility]ACHERY. MARTIN met with pt who is alert and oriented at bedside to discuss discharge planning.     MARTIN informed pt of anticipated need for gradual rehabilitative therapy and discussed ZACHERY. Pt in agreement with ZACHERY and stated she has had a good experience at [facility]. MARTIN provided pt with [facility]ERY choice list and pt selected [facility]. Pt stated this writer is able to update her dtr on discharge plan.    MARTIN spoke with pt's dtr Elsie via phone. Pt's dtr in agreement with [facility] for ZACHERY. MARTIN informed pt's dtr insurance authorization request will be submitted. Pt's dtr denied any further needs at this time.     MARTIN messaged DSC coordinator to submit for insurance authorization for [facility].     [facility] reserved in AIDIN. PASRR needed. MARTIN will continue to follow.     Addendum (3:15pm) - PASRR level I screening submitted and queued for review. Insurance authorization pending. MARTIN will continue to follow.     KEO Goldman  Discharge Planner

## 2024-07-16 NOTE — CONGREGATE LIVING REVIEW
[facility] Living Authorization    The [facility] Review Committee has reviewed this case and the patient IS APPROVED for discharge to a facility for Short Term Skilled once the following procedure is followed:     - The physician discharge instructions (contained within the MARIA EUGENIA note for SNF) must inlcude the below appropriate and approved COVID instructions to the facility    For questions regarding CLRC approval process, please contact the CM assigned to the case.  For questions regarding RN discharge workflow, please contact the unit Clinical Leader.

## 2024-07-16 NOTE — CM/SW NOTE
Submitted clinical via [employer] portal.  [employer] Case/Auth ID is [id number].  Final insurance authorization is pending at this time.      SW/CM assigned to the case will continue to follow auth status.      Malena Bender, DSC

## 2024-07-16 NOTE — PLAN OF CARE
Assumed pt care at 1930  Aox4, 1L O2 NC, VSS  [id number]  Reported no pain; no n/v/d  Daily weight  Cardiac/carb controlled diet; 2k mL fluid restriction  Qid accu check  Noncardiac electrolyte replacement protocol  Incontinent-pad, purewick and chux applied  Ambulatory-fwsscu8h with walker  Safety precautions in place  Bed in lowest position and call light within reach  Updated with plan of care  All needs met at this time  Will continue plan of care    **0630: bladder scanned 1182mL  Straight cath output 1500mL      Problem: Diabetes/Glucose Control  Goal: Glucose maintained within prescribed range  Description: INTERVENTIONS:  - Monitor Blood Glucose as ordered  - Assess for signs and symptoms of hyperglycemia and hypoglycemia  - Administer ordered medications to maintain glucose within target range  - Assess barriers to adequate nutritional intake and initiate nutrition consult as needed  - Instruct patient on self management of diabetes  Outcome: Progressing     Problem: METABOLIC/FLUID AND ELECTROLYTES - ADULT  Goal: Glucose maintained within prescribed range  Description: INTERVENTIONS:  - Monitor Blood Glucose as ordered  - Assess for signs and symptoms of hyperglycemia and hypoglycemia  - Administer ordered medications to maintain glucose within target range  - Assess barriers to adequate nutritional intake and initiate nutrition consult as needed  - Instruct patient on self management of diabetes  Outcome: Progressing  Goal: Electrolytes maintained within normal limits  Description: INTERVENTIONS:  - Monitor labs and rhythm and assess patient for signs and symptoms of electrolyte imbalances  - Administer electrolyte replacement as ordered  - Monitor response to electrolyte replacements, including rhythm and repeat lab results as appropriate  - Fluid restriction as ordered  - Instruct patient on fluid and nutrition restrictions as appropriate  Outcome: Progressing  Goal: Hemodynamic stability and optimal  renal function maintained  Description: INTERVENTIONS:  - Monitor labs and assess for signs and symptoms of volume excess or deficit  - Monitor intake, output and patient weight  - Monitor urine specific gravity, serum osmolarity and serum sodium as indicated or ordered  - Monitor response to interventions for patient's volume status, including labs, urine output, blood pressure (other measures as available)  - Encourage oral intake as appropriate  - Instruct patient on fluid and nutrition restrictions as appropriate  Outcome: Progressing

## 2024-07-16 NOTE — CONSULTS
[facility]   NEUROLOGY   CONSULT NOTE    Admission date: 2024  Reason for Consult: Weakness, unsteady.  Chief Complaint:   Chief Complaint   Patient presents with    Hypertension    Hyperglycemia   ________________________________________________________________    History     History of Presenting Illness  84 year old female with multiple medical problems as reported below, admitted on , for management of hypertension, cystitis arthralgia and rectal bleeding, was consulted for neurological evaluation today after she was noted to have decreased movement on the right and dragging the right side.  Patient herself does not feel that she is weak.  Denies any diplopia, dysarthria, dysphagia, confusion, disorientation.  Denies headache, nausea, vomiting.  No seizure-like activity or loss of consciousness.    History obtained from patient, nursing staff and chart review.    Past Medical History:    (HFpEF) heart failure with preserved ejection fraction (HCC)    Acute cystitis without hematuria    Acute deep vein thrombosis (DVT) of popliteal vein of right lower extremity (HCC)    Cataract    CHF exacerbation (HCC)    CKD (chronic kidney disease)    Congestive heart disease (HCC)    COVID-19    Diabetes (HCC)    Disorder of thyroid    DM2 (diabetes mellitus, type 2) (HCC)    Hearing impairment    High blood pressure    HTN (hypertension)    Hyperlipidemia    Hypothyroidism    Pulmonary embolism (HCC)    Shingles    Visual impairment     Past Surgical History:   Procedure Laterality Date    Cataract      Eye surgery      Hysterectomy      [facility]    Pacemaker monitor       Social History     Socioeconomic History    Marital status: Single   Tobacco Use    Smoking status: Former     Current packs/day: 0.00     Average packs/day: 1 pack/day for 50.0 years (50.0 ttl pk-yrs)     Types: Cigarettes     Start date:      Quit date:      Years since quittin.5    Smokeless tobacco:  Never   Vaping Use    Vaping status: Never Used   Substance and Sexual Activity    Alcohol use: Never    Drug use: Never   Other Topics Concern    Caffeine Concern Yes     Comment: 2 cups of coffee daily     Stress Concern No    Weight Concern No    Special Diet No    Exercise Yes     Comment: PT 2 X Weekly, OT 2 x weekly    Seat Belt Yes     Social Determinants of Health     Financial Resource Strain: Low Risk  (1/3/2024)    Financial Resource Strain     Difficulty of Paying Living Expenses: Not very hard     Med Affordability: No   Food Insecurity: No Food Insecurity (7/9/2024)    Food Insecurity     Food Insecurity: Never true   Transportation Needs: No Transportation Needs (7/9/2024)    Transportation Needs     Lack of Transportation: No   Physical Activity: Inactive (1/3/2024)    Exercise Vital Sign     Days of Exercise per Week: 0 days     Minutes of Exercise per Session: 0 min   Stress: No Stress Concern Present (1/3/2024)    Stress     Feeling of Stress : No   Social Connections: Socially Isolated (1/3/2024)    Social Connections     Frequency of Socialization with Friends and Family: 0   Housing Stability: Low Risk  (7/9/2024)    Housing Stability     Housing Instability: No     Family History   Problem Relation Age of Onset    Other (Other) Mother         Old Age    Other (Other) Father         Old age     Allergies No Known Allergies    Home Meds  Current Outpatient Medications   Medication Instructions    Accu-Chek FastClix Lancets Does not apply Misc 1 Lancet, Finger stick, Use as directed.    acetaminophen (TYLENOL EXTRA STRENGTH) 1,000 mg, Oral, Every 6 hours PRN    allopurinol (ZYLOPRIM) 100 mg, Oral, Daily    amiodarone (PACERONE) 200 mg, Oral, Daily    apixaban (ELIQUIS) 2.5 mg, Oral, 2 times daily    apixaban 5 MG Oral Tab take 1 tab (5mg) by mouth twice daily    Blood Glucose Monitoring Suppl (ACCU-CHEK GUIDE) w/Device Does not apply Kit Does not apply    carvedilol (COREG) 12.5 mg, Oral, 2 times  daily with meals    Cholecalciferol (VITAMIN D3) 25 MCG (1000 UT) Oral Cap 1 tablet, Oral, Daily    dilTIAZem ER (CARDIZEM CD) 120 mg, Oral, Daily    escitalopram (LEXAPRO) 5 mg, Oral, Daily    FUROSEMIDE 20 MG Oral Tab TAKE 1 TABLET DAILY. MAY TAKE ADDITIONAL TABLET DAILY AS NEEDED FOR EDEMA WHEN DIRECTED BY MD (EXTRA TABLETS INCLUDED)    gabapentin (NEURONTIN) 100 mg, Oral, 3 times daily    glimepiride (AMARYL) 4 mg, Oral, 2 times daily    Glucose Blood (ACCU-CHEK GUIDE) In Vitro Strip No dose, route, or frequency recorded.    hydrALAZINE (APRESOLINE) 50 mg, Oral, Every 8 hours scheduled    HYDROcodone-acetaminophen 5-325 MG Oral Tab 1 tablet, Oral, Every 8 hours PRN    hydrocortisone 2.5 % External Cream One application BID PRN on the external hemorrhoids    Januvia 50 mg, Oral, Daily    levothyroxine (SYNTHROID) 75 mcg, Oral, Before breakfast    losartan (COZAAR) 25 mg, Oral, Daily    metoprolol succinate ER (TOPROL XL) 100 mg, Oral, 2 times daily(Beta Blocker)    simvastatin 20 MG Oral Tab TAKE 1 TABLET EVERY NIGHT     Scheduled Meds:   sodium zirconium cyclosilicate  10 g Oral Q8H    polyethylene glycol (PEG 3350)  17 g Oral Daily    colchicine  0.3 mg Oral Q72H    docusate sodium  100 mg Oral BID    insulin aspart  1-10 Units Subcutaneous TID CC and HS    insulin degludec  15 Units Subcutaneous Daily    [Held by provider] hydrALAZINE  50 mg Oral Q8H AYANNA    apixaban  2.5 mg Oral BID    insulin aspart  2-10 Units Subcutaneous TID AC and HS    allopurinol  100 mg Oral Daily    amiodarone  200 mg Oral Daily    dilTIAZem ER  120 mg Oral Daily    escitalopram  5 mg Oral Daily    gabapentin  100 mg Oral TID    levothyroxine  75 mcg Oral Before breakfast    atorvastatin  10 mg Oral Nightly    carvedilol  12.5 mg Oral BID with meals     Continuous Infusions:   bumetanide (Bumex) 12.5 mg in 50 mL infusion       PRN Meds:  lactulose    glucose **OR** glucose **OR** glucose-vitamin C **OR** dextrose **OR** glucose **OR**  glucose **OR** glucose-vitamin C    OBJECTIVE   VITAL SIGNS:   Temp:  [97.3 °F (36.3 °C)-98.2 °F (36.8 °C)] 98.2 °F (36.8 °C)  Pulse:  [60-62] 61  Resp:  [16-18] 16  BP: (111-145)/(46-57) 145/53  SpO2:  [90 %-97 %] 95 %    PHYSICAL EXAM:    NEUROLOGIC:    Mental Status:  A&O x 4, Follows simple commands, no obvious aphasia or dysarthria.  Attention, calculation, short-term memory impaired.  Cranial nerves: PERRL.  Visual fields full.  EOMI. mild right facial weakness.   Hearing grossly decreased. Tongue midline with normal movements.   Motor: Right pronator drift noted.  Motor exam is 5 out of 5 in all extremities bilaterally  Sensation: Intact to light touch bilaterally  Cerebellar: Normal Finger-To-Nose test      LABORATORY DATA:  Last 24 hour labs were reviewed in detail.  Recent Labs   Lab 07/14/24  0640 07/15/24  0701 07/16/24  0624   * 134* 133*   K 4.8 5.3* 6.0*    105 103   CO2 22.0 21.0 22.0   * 168* 190*   BUN 87* 99* 108*   CREATSERUM 3.23* 3.32* 3.20*     Recent Labs   Lab 07/11/24  0628   WBC 7.9   HGB 10.8*   .0       Recent Labs   Lab 07/10/24  0710   MG 2.2       Radiology:    XR CHEST PA + LAT CHEST (CPT=71046)    Result Date: 7/16/2024  CONCLUSION:   Stable cardiac and mediastinal contours.  Stable positioning of left chest wall ICD.  Findings of congestive heart failure with mild interstitial pulmonary edema.  No associated pleural effusion or pneumothorax.   LOCATION:  [facility]   Dictated by (CST): Gurjit Sotomayor MD on 7/16/2024 at 12:06 PM     Finalized by (CST): Gurjit Sotomayor MD on 7/16/2024 at 12:07 PM       CT BRAIN OR HEAD (62500)    Result Date: 7/15/2024  CONCLUSION: 1. No acute intracranial findings or significant interval changes compared to 7/8/2024.  Consider MRI evaluation if there is continued clinical concern. 2. Cerebral atrophy with chronic microvascular ischemic changes.    LOCATION:  [facility]   Dictated by (CST): Carlos Eduardo Bowman MD on 7/15/2024 at 7:44 PM      Finalized by (CST): Carlos Eduardo Bowman MD on 7/15/2024 at 7:46 PM      ASSESSMENT/PLAN   84 year old female with:    Findings suggestive of stroke.  Advise MRI of the brain..  Clinical findings could be chronic, complicated by underlying gout/arthritis.  Patient currently on Eliquis and statin.  Will review MRI.  Mild cognitive decline.  Further workup as outpatient.  Atrial fibrillation, rate controlled.  Currently on Eliquis for stroke prophylaxis.  Mild hyponatremia.  Advise correction.  Mild hyperkalemia.  Advise correction.      Principal Problem:    Hypertension, unspecified type  Active Problems:    CKD (chronic kidney disease) stage 4, GFR 15-29 ml/min (HCC)    PRISCILLA (acute kidney injury) ([facility])    Hyperkalemia    Acute on chronic heart failure with preserved ejection fraction (HCC)    Stage 3 chronic kidney disease (HCC)    Acute cystitis with hematuria    Altered mental status, unspecified altered mental status type    Arthralgia of right lower leg    Rectal bleeding    Hyperglycemia    Hypertensive urgency    Hyperlipidemia    Hypothyroidism    ATN (acute tubular necrosis) (HCC)    Acute gout of multiple sites    Cardiorenal syndrome       Doug Hartman MD  Neurohospitalist  [facility]    Disclaimer: This record was dictated using Dragon software. There may be errors due to voice recognition problems that were not realized and corrected during the completion of the note.

## 2024-07-17 ENCOUNTER — TELEPHONE (OUTPATIENT)
Dept: FAMILY MEDICINE CLINIC | Facility: CLINIC | Age: 84
End: 2024-07-17

## 2024-07-17 LAB
ANION GAP SERPL CALC-SCNC: 5 MMOL/L (ref 0–18)
BUN BLD-MCNC: 92 MG/DL (ref 9–23)
CALCIUM BLD-MCNC: 8.8 MG/DL (ref 8.7–10.4)
CHLORIDE SERPL-SCNC: 102 MMOL/L (ref 98–112)
CHOLEST SERPL-MCNC: 124 MG/DL (ref ?–200)
CO2 SERPL-SCNC: 25 MMOL/L (ref 21–32)
CREAT BLD-MCNC: 2.5 MG/DL
EGFRCR SERPLBLD CKD-EPI 2021: 18 ML/MIN/1.73M2 (ref 60–?)
ERYTHROCYTE [DISTWIDTH] IN BLOOD BY AUTOMATED COUNT: 14.4 %
EST. AVERAGE GLUCOSE BLD GHB EST-MCNC: 214 MG/DL (ref 68–126)
GLUCOSE BLD-MCNC: 142 MG/DL (ref 70–99)
GLUCOSE BLD-MCNC: 169 MG/DL (ref 70–99)
GLUCOSE BLD-MCNC: 206 MG/DL (ref 70–99)
GLUCOSE BLD-MCNC: 219 MG/DL (ref 70–99)
GLUCOSE BLD-MCNC: 304 MG/DL (ref 70–99)
HBA1C MFR BLD: 9.1 % (ref ?–5.7)
HCT VFR BLD AUTO: 32.5 %
HDLC SERPL-MCNC: 37 MG/DL (ref 40–59)
HGB BLD-MCNC: 10.8 G/DL
LDLC SERPL CALC-MCNC: 72 MG/DL (ref ?–100)
MCH RBC QN AUTO: 30.2 PG (ref 26–34)
MCHC RBC AUTO-ENTMCNC: 33.2 G/DL (ref 31–37)
MCV RBC AUTO: 90.8 FL
NONHDLC SERPL-MCNC: 87 MG/DL (ref ?–130)
OSMOLALITY SERPL CALC.SUM OF ELEC: 308 MOSM/KG (ref 275–295)
PLATELET # BLD AUTO: 300 10(3)UL (ref 150–450)
POTASSIUM SERPL-SCNC: 4.9 MMOL/L (ref 3.5–5.1)
RBC # BLD AUTO: 3.58 X10(6)UL
SODIUM SERPL-SCNC: 132 MMOL/L (ref 136–145)
TRIGL SERPL-MCNC: 76 MG/DL (ref 30–149)
VLDLC SERPL CALC-MCNC: 12 MG/DL (ref 0–30)
WBC # BLD AUTO: 6.2 X10(3) UL (ref 4–11)

## 2024-07-17 PROCEDURE — 99233 SBSQ HOSP IP/OBS HIGH 50: CPT | Performed by: HOSPITALIST

## 2024-07-17 PROCEDURE — 99233 SBSQ HOSP IP/OBS HIGH 50: CPT | Performed by: INTERNAL MEDICINE

## 2024-07-17 PROCEDURE — 99233 SBSQ HOSP IP/OBS HIGH 50: CPT | Performed by: OTHER

## 2024-07-17 NOTE — PROGRESS NOTES
Progress Note  Ciarra Salcido Patient Status:  Inpatient    1940 MRN [id number]   Location [facility] 3NE-A Attending Adonay Yatse MD   Hosp Day # 8 PCP JUDY CYR MD     Subjective:  No acute events overnight.  Resting in bed this morning, on O2 at 2L, denies any cardiac symptoms currently.  Reports lower extremities pain and feet pain d/t arthritis. RUE movement back to normal.    Objective:  /50 (BP Location: Right arm)   Pulse 60   Temp 98.3 °F (36.8 °C) (Oral)   Resp 17   Ht 5' 4\" (1.626 m)   Wt 159 lb 9.6 oz (72.4 kg)   SpO2 93%   BMI 27.40 kg/m²     Telemetry: AV paced, HR 60s      Intake/Output:    Intake/Output Summary (Last 24 hours) at 2024 0825  Last data filed at 2024 0600  Gross per 24 hour   Intake 749.3 ml   Output 2100 ml   Net -1350.7 ml       Last 3 Weights   07/15/24 0500 159 lb 9.6 oz (72.4 kg)   24 0830 173 lb 9.6 oz (78.7 kg)   24 0614 168 lb 4.8 oz (76.3 kg)   24 0500 168 lb (76.2 kg)   24 0400 168 lb 3.2 oz (76.3 kg)   07/10/24 2000 168 lb 1.6 oz (76.2 kg)   24 0043 162 lb 0.6 oz (73.5 kg)   24 2315 162 lb 0.6 oz (73.5 kg)   24 1826 150 lb (68 kg)   24 1037 140 lb (63.5 kg)   24 1111 140 lb (63.5 kg)       Labs:  Recent Labs   Lab 24  0640 07/15/24  0701 24  0624 24  1939   * 168* 190*  --    BUN 87* 99* 108*  --    CREATSERUM 3.23* 3.32* 3.20*  --    EGFRCR 14* 13* 14*  --    CA 9.0 8.4* 8.9  --    * 134* 133*  --    K 4.8 5.3* 6.0* 5.6*    105 103  --    CO2 22.0 21.0 22.0  --      Recent Labs   Lab 24  0628   RBC 3.60*   HGB 10.8*   HCT 34.4*   MCV 95.6   MCH 30.0   MCHC 31.4   RDW 15.2   NEPRELIM 5.41   WBC 7.9   .0         No results for input(s): \"TROP\", \"TROPHS\", \"CK\" in the last 168 hours.    Review of Systems   Constitutional: Negative.   Cardiovascular: Negative.    Respiratory: Negative.     Musculoskeletal:  Positive for arthritis and  joint pain.       Physical Exam:    Gen: alert, oriented x 3, NAD, [place]  Heent: pupils equal, reactive. Mucous membranes moist.   Neck: no jvd  Cardiac: regular rate and rhythm, normal S1,S2, 2/6 systolic murmur, no gallop or rub   Lungs: CTA, diminished at the basis   Abd: soft, NT/ND +bs  Ext: trace of lower extremities edema  Skin: Warm, dry  Neuro: No focal deficits        Medications:     sodium zirconium cyclosilicate  10 g Oral Q8H    polyethylene glycol (PEG 3350)  17 g Oral Daily    colchicine  0.3 mg Oral Q72H    docusate sodium  100 mg Oral BID    insulin aspart  1-10 Units Subcutaneous TID CC and HS    insulin degludec  15 Units Subcutaneous Daily    [Held by provider] hydrALAZINE  50 mg Oral Q8H AYANNA    apixaban  2.5 mg Oral BID    insulin aspart  2-10 Units Subcutaneous TID AC and HS    allopurinol  100 mg Oral Daily    amiodarone  200 mg Oral Daily    dilTIAZem ER  120 mg Oral Daily    escitalopram  5 mg Oral Daily    gabapentin  100 mg Oral TID    levothyroxine  75 mcg Oral Before breakfast    atorvastatin  10 mg Oral Nightly    carvedilol  12.5 mg Oral BID with meals      bumetanide (Bumex) 12.5 mg in 50 mL infusion 0.5 mg/hr (07/16/24 1532)       Assessment:  Acute onset of decrease movement of the right arm, now improved, suspect acute gout/arthritis flare up vs acute CVA  CT brain showed no acute intracranial findings  MRI brain pending  Neurology following      Hypertensive urgency, now with well controlled Bps  On carvedilol, diltiazem, hydralazine   Acute on chronic diastolic/valvular HF  Echo 7/12/24 LVEF 65-70%, no rwma, mild-mod MR, mild AI, mild TR, PASP 50-55 mmHg   proBNP 12,823  Diuresis with IV bumex gtt, net neg ~5L-nephrology following   Mild-mod MR, mild TR, mild AI  Pulmonary HTN, PASP 50-55 mmHg  PAF  Hx of GILLES/DCCV on 11/2023  On amiodarone, diltiazem, carvedilol  On renal dose Eliquis for stroke prevention   Hx of DC PPM 9/2023  DM2  CKD4 crt 3.2, stable-nephrology following    Renal us showed no stenosis   Hyperkalemia, K+5.6-nephrology following   Chronic anemia, hgb stable   Arthritis-pain management per primary  Gout flare up with elevated uric acid-on colchicine, allopurinol-per primary.        Plan:  Appears compensated cardiac status.  Volume management per nephrology.  Monitor I/Os and daily weights.  Monitor electrolytes and renal function.  Right upper arm with normal movement and strength-CT neg, awaiting MRI brain-management per neurology.  Continue current cardiac medications: carvedilol, amiodarone, diltiazem.  Holding hydralazine with permissive HTN.  Continue eliquis for stroke prevention.  PT/OT increased activity as able.    Pain  management per primary.    Plan of care discussed with patient, RN.    Kenia Hook, MEME  7/17/2024  8:25 AM  [phone]

## 2024-07-17 NOTE — PROGRESS NOTES
[facility]   part of [facility]     Hospitalist Progress Note     Ciarra Salcido Patient Status:  Inpatient    1940 MRN [id number]   Location [facility] 3NE-A Attending Cabrera Pineda MD   Hosp Day # 8 PCP JUDY CYR MD     Chief Complaint: HTN, fatigue    Subjective:     No acute events, pt feels better, still with right 1st toe pain, no erythema.    Objective:    Review of Systems:   A comprehensive review of systems was completed; pertinent positive and negatives stated in subjective.    Vital signs:  Temp:  [97.3 °F (36.3 °C)-98.3 °F (36.8 °C)] 98.3 °F (36.8 °C)  Pulse:  [59-63] 60  Resp:  [17-18] 17  BP: (124-163)/(48-88) 128/50  SpO2:  [92 %-96 %] 93 %    Physical Exam:    /50 (BP Location: Right arm)   Pulse 60   Temp 98.3 °F (36.8 °C) (Oral)   Resp 17   Ht 5' 4\" (1.626 m)   Wt 159 lb 9.6 oz (72.4 kg)   SpO2 93%   BMI 27.40 kg/m²     General: No acute distress  Respiratory: No wheezes, no rhonchi  Cardiovascular: S1, S2, regular rate and rhythm  Abdomen: Soft, Non-tender, non-distended, positive bowel sounds  Neuro: Awake alert, no new focal deficits.   Extremities: Bilateral pedal pedal edema, no calf tenderness  redness near the first metatarsophalangeal joint on both feet and has pain of both knees and ankles, due to gout flareup-pain and swelling better than yesterday, right knee still feels swollen and unable to ambulate due to this according to patient     Diagnostic Data:    Labs:  Recent Labs   Lab 24  0628 24  0800   WBC 7.9 6.2   HGB 10.8* 10.8*   MCV 95.6 90.8   .0 300.0       Recent Labs   Lab 07/15/24  0701 24  0624 24  1939 24  0800   * 190*  --  206*   BUN 99* 108*  --  92*   CREATSERUM 3.32* 3.20*  --  2.50*   CA 8.4* 8.9  --  8.8   * 133*  --  132*   K 5.3* 6.0* 5.6* 4.9    103  --  102   CO2 21.0 22.0  --  25.0       Estimated Creatinine Clearance: 13.9 mL/min (A) (based on SCr of 2.5 mg/dL  (H)).    No results for input(s): \"TROP\", \"TROPHS\", \"CK\" in the last 168 hours.      No results for input(s): \"PTP\", \"INR\" in the last 168 hours.                 Microbiology    Hospital Encounter on 07/08/24   1. Urine Culture, Routine     Status: Abnormal    Collection Time: 07/08/24 10:23 PM    Specimen: Urine, clean catch   Result Value Ref Range    Urine Culture >100,000 CFU/ML Klebsiella pneumoniae (A) N/A    Urine Culture <10,000 CFU/ML Gram negative melina N/A       Susceptibility    Klebsiella pneumoniae -  (no method available)     Ampicillin  Resistant      Ampicillin + Sulbactam 4 Sensitive      Cefazolin <=4 Sensitive      Ciprofloxacin <=0.25 Sensitive      Gentamicin <=1 Sensitive      Meropenem <=0.25 Sensitive      Levofloxacin <=0.12 Sensitive      Nitrofurantoin 64 Intermediate      Piperacillin + Tazobactam <=4 Sensitive      Trimethoprim/Sulfa <=20 Sensitive          Imaging: Reviewed in Epic.    Medications:    polyethylene glycol (PEG 3350)  17 g Oral Daily    colchicine  0.3 mg Oral Q72H    docusate sodium  100 mg Oral BID    insulin aspart  1-10 Units Subcutaneous TID CC and HS    insulin degludec  15 Units Subcutaneous Daily    apixaban  2.5 mg Oral BID    insulin aspart  2-10 Units Subcutaneous TID AC and HS    allopurinol  100 mg Oral Daily    amiodarone  200 mg Oral Daily    dilTIAZem ER  120 mg Oral Daily    escitalopram  5 mg Oral Daily    gabapentin  100 mg Oral TID    levothyroxine  75 mcg Oral Before breakfast    atorvastatin  10 mg Oral Nightly    carvedilol  12.5 mg Oral BID with meals       Assessment & Plan:      #HTN urgency   # acute on chronic HFpEF and right sided failure  # Cardiorenal syndrome  -BP as high as 225/91 on admission, s/p 10 IV hydralazine in ED  -BNP 12,823 with baseline around 10,000  -EKG showing A-paced rhythm @60 bpm  -venous duplex negative for DVT  -chest xray without acute process  -echo Nov 2023: EF 60-65%, mild-mod aortic regurg, mod mirtal regurg, mod  tricuspid regurg  -plan for nitroglycerin paste for HTN urgency with goal reduction of BP by 25% over 24 hrs  -Status post lasix 40 IV BID  -strict I/O, daily weights  -interrogate PPM to assess for A-fib burden which could be contributing  -monitor on telemetry  -cont home losartan, amiodarone, diltiazem, metoprolol  -IV hydralazine prn  -cardiology on consult  -Nephrology on consult who starting patient on Bumex drip today    #Atrial fibrillation, rate controlled, anticoagulation with low-dose Eliquis 2.5 mg twice daily per cardiology     #Hyponatremia  -mild at 133  -likely 2/2 volume overload and CHF  -tx as above  -cont to monitor BMP     #Hyperkalemia  -Nephrology following, status post Veltassa, now on Lokelma per nephrology  -Nephrology starting patient on Bumex drip on 7/16/2024  -monitor closely    #PRISCILLA on CKD IV, possible cardiorenal  Baseline Cr under 2  Cr improving with diuretics    # Hyperkalemia  -Mild to side being given by nephrologist on 7/13/2024  -Follow-up BMP in a.m.    #NAGMA  -CO2 of 19 with AG of 7  -likely 2/2 underlying renal ds  -cont to monitor  -consider addition of bicarb tabs if not improving     #normocytic anemia with heme positive stools  -hgb 11.3 with baseline 11-12  -heme-occult trace positive per ED physician  -sounds likely to be hemorrhoidal based on pt report  -cont to monitor CBC and transfuse for hgb <7  -liquid diet until hgb stable  -if hgb down trending consider GI eval  -hold home apixaban until hgb stable     #hypoalbuminemia  -albumin 3.0  -may be 2/2 volume overloaded state  -cont to monitor     # Diabetes mellitus type 2 with hyperglycemia  - home sitagliptin, Januvia, glimepiride on hold since admission, will plan to continue these at the time of discharge, patient states her blood sugars are well-controlled at home with the home regimen, however dc Januvia permanently with edema  -cont home gabapentin  -Hypoglycemia protocol  -Follow Kristanu-Daron  - Tresiba  long-acting insulin  -NovoLog carb counting and correction factor     # Hyperlipidemia  -cont home simvastatin     #Depression  -cont home escitalopram     #Hypothyroidism  -cont home levothyroxine     #Gout  -cont home allopurinol, on low dose Colchicine     #Asymptomatic bacteriuria  -pt denied any urinary complaints, normal WBC and afebrile  -monitor off antibiotics    #Osteoarthritis with groin pain  -X-ray of the pelvis and hip with no fracture, shows osteoarthritis  -PT OT  - pain management    # Acute gout flareup with bilateral first metatarsophalangeal joint pain and redness, bilateral ankle pain, bilateral knee pain, bilateral elbow pain  - uric acid level which came back elevated  -on colchicine-discussed with pharmacy, who renal dosed based on patient's kidney function test and with patient's multiple other medications    #Right sided weakness, MRI brain pending, neurology following    Seen by PT OT who recommended subacute rehab    Adonay Yates MD        Supplementary Documentation:     Quality:  DVT Mechanical Prophylaxis:   SCDs,    DVT Pharmacologic Prophylaxis   Medication    apixaban (Eliquis) tab 2.5 mg                Code Status: DNAR/Selective Treatment  Hutchinson: External urinary catheter in place  Hutchinson Duration (in days):   Central line:    AMINTA:     Discharge is dependent on: progress  At this point Ms. Salcido is expected to be discharge to: home    The 21st Century Cures Act makes medical notes like these available to patients in the interest of transparency. Please be advised this is a medical document. Medical documents are intended to carry relevant information, facts as evident, and the clinical opinion of the practitioner. The medical note is intended as peer to peer communication and may appear blunt or direct. It is written in medical language and may contain abbreviations or verbiage that are unfamiliar.             **Certification      PHYSICIAN Certification of Need for Inpatient  Hospitalization - Initial Certification    Patient will require inpatient services that will reasonably be expected to span two midnight's based on the clinical documentation in H+P.   Based on patients current state of illness, I anticipate that, after discharge, patient will require TBD.

## 2024-07-17 NOTE — PLAN OF CARE
PtAOx4, forgetful at times, Apaced, 2L nasal cannula, VSS,   K+ 6.0, Lokelma given  Pt had no urine output. Straight cath 700 ml output  Pt had small bowel movements.  Up in chair with sera steady  Bumex started at 2 ml/hr continuous  CXR.  MRI ordered, screening done  Pt reports feeling better from the gout flare up, more movement in right arm able to raise today.  Good appetite.    Continue to monitor.      Problem: METABOLIC/FLUID AND ELECTROLYTES - ADULT  Goal: Glucose maintained within prescribed range  Description: INTERVENTIONS:  - Monitor Blood Glucose as ordered  - Assess for signs and symptoms of hyperglycemia and hypoglycemia  - Administer ordered medications to maintain glucose within target range  - Assess barriers to adequate nutritional intake and initiate nutrition consult as needed  - Instruct patient on self management of diabetes  Outcome: Progressing  Goal: Electrolytes maintained within normal limits  Description: INTERVENTIONS:  - Monitor labs and rhythm and assess patient for signs and symptoms of electrolyte imbalances  - Administer electrolyte replacement as ordered  - Monitor response to electrolyte replacements, including rhythm and repeat lab results as appropriate  - Fluid restriction as ordered  - Instruct patient on fluid and nutrition restrictions as appropriate  Outcome: Progressing  Goal: Hemodynamic stability and optimal renal function maintained  Description: INTERVENTIONS:  - Monitor labs and assess for signs and symptoms of volume excess or deficit  - Monitor intake, output and patient weight  - Monitor urine specific gravity, serum osmolarity and serum sodium as indicated or ordered  - Monitor response to interventions for patient's volume status, including labs, urine output, blood pressure (other measures as available)  - Encourage oral intake as appropriate  - Instruct patient on fluid and nutrition restrictions as appropriate  Outcome: Progressing

## 2024-07-17 NOTE — PHYSICAL THERAPY NOTE
PHYSICAL THERAPY TREATMENT NOTE - INPATIENT    Room Number: 3601/3601-A     Session: 1     Number of Visits to Meet Established Goals: 5    Presenting Problem: Acute cystitis, HTN, rectal bleeding  Co-Morbidities : A fib w/ rvr, CHF, DVT, PE, DM, HL, CKD, polyneuropathy, depression, pacemaker    PHYSICAL THERAPY MEDICAL/SOCIAL HISTORY  History related to current admission: Patient is a 83 year old female admitted on 7/8/2024 from assisted living facility for increasing, sleepiness, R leg pain, increased blood pressures and rectal bleeding.  Pt diagnosed with acute cystitis, htn, rectal bleeding. Pt does report a recent fall from bed just prior to admit.  *Pt re-evaluated today d/t lack of mobility, incr pn in RUE/LE and elevated uric acid of 7.4. Currently being treated for gout        HOME SITUATION  Type of Home: Assisted living facility   Home Layout: One level  Stairs to Enter : 0  Stairs to Bedroom: 0     Lives With: Staff 24 hours  Drives: No  Patient Owned Equipment: Rolling walker  Patient Regularly Uses: Glasses;Hearing aides     Prior Level of [place]: Patient's baseline is walking w/ rolling walker, able to dress/use toilet on her own, walks to dining room, does have supervision w/ showering and dtr prepares her meds.  Pt takes meds on her own once dtr has them set for her.  Pt reports that she participates in the exercise class at her [facility] as well.  *RN reported that dtr informed her today that while at [facility], pt was wearing a lx brace d/t previous L1 compression fx, however did not mention that to therapy team    ASSESSMENT   Patient demonstrates limited progress this session, goals remain in progress.    Patient continues to function below baseline with bed mobility, transfers, and gait. Contributing factors to remaining limitations include impaired standing balance, impaired coordination, impaired motor planning, decreased muscular endurance, and medical status.  Next session anticipate patient to  progress bed mobility and transfers.  Physical Therapy will continue to follow patient for duration of hospitalization.    Patient continues to benefit from continued skilled PT services: to promote return to prior level of function and safety with continuous assistance and gradual rehabilitative therapy .    PLAN  PT Treatment Plan: Bed mobility;Endurance;Energy conservation;Patient education;Family education;Gait training;Transfer training;Balance training;Strengthening  Rehab Potential : Good  Frequency (Obs): 3-5x/week    CURRENT GOALS     Goal #1 Patient is able to demonstrate supine - sit EOB @ level: minimum assistance      Goal #2 Patient is able to demonstrate transfers EOB to/from Chair/Wheelchair at assistance level: minimum assistance      Goal #3 Patient is able to ambulate 10 feet with assist device: walker - rolling at assistance level: minimum assistance      Goal #4     Goal #5     Goal #6     Goal Comments: Goals established on 7/15/2024    2024 all goals ongoing    SUBJECTIVE  \"I am doing fine, just tired hunny\"    OBJECTIVE  Precautions: Hard of hearing;Bed/chair alarm    WEIGHT BEARING RESTRICTION  Weight Bearing Restriction: None                PAIN ASSESSMENT   Ratin  Location: RLE (knee down) and RUE-elbow down  Management Techniques: Activity promotion;Body mechanics;Repositioning;Other (Comment) (elevation, gentle AROM)    BALANCE                                                                                                                       Static Sitting: Fair  Dynamic Sitting: Fair -           Static Standing: Poor +  Dynamic Standing: Poor +    ACTIVITY TOLERANCE                         O2 WALK         AM-PAC '6-Clicks' INPATIENT SHORT FORM - BASIC MOBILITY  How much difficulty does the patient currently have...  Patient Difficulty: Turning over in bed (including adjusting bedclothes, sheets and blankets)?: A Lot   Patient Difficulty: Sitting down on and standing up from a  chair with arms (e.g., wheelchair, bedside commode, etc.): A Lot   Patient Difficulty: Moving from lying on back to sitting on the side of the bed?: A Lot   How much help from another person does the patient currently need...   Help from Another: Moving to and from a bed to a chair (including a wheelchair)?: A Lot   Help from Another: Need to walk in hospital room?: Total   Help from Another: Climbing 3-5 steps with a railing?: Total       AM-PAC Score:  Raw Score: 10   Approx Degree of Impairment: 76.75%   Standardized Score (AM-PAC Scale): 32.29   CMS Modifier (G-Code): CL    FUNCTIONAL ABILITY STATUS  Gait Assessment   Functional Mobility/Gait Assessment  Gait Assistance: Maximum assistance  Distance (ft): 2  Assistive Device: Rolling walker  Pattern: R Decreased stance time;R Foot drag;R Foot flat;L Foot flat;R Flexed knee;L Flexed knee;Comment (L lateral trunk lean)    Skilled Therapy Provided    Bed Mobility:  Rolling: NT   Supine<>Sit: Max A   Sit<>Supine: NT     Transfer Mobility:  Sit<>Stand: Max A   Stand<>Sit: Max A   Gait: a few steps, but turned into dependent transfer    Therapist's Comments: NA      THERAPEUTIC EXERCISES  Lower Extremity Alternating marching  Ankle pumps     Upper Extremity Elbow flex/ext and  - open/close     Position Sitting     Repetitions   10   Sets   1     Patient End of Session: Up in chair;Needs met;Call light within reach;RN aware of session/findings;All patient questions and concerns addressed;Alarm set;Family present    PT Session Time: 25 minutes  Gait Trainin minutes  Therapeutic Activity: 8 minutes  Therapeutic Exercise: 0 minutes   Neuromuscular Re-education: 15 minutes

## 2024-07-17 NOTE — PROGRESS NOTES
[facility]  Nephrology Progress Note    Ciarra Salcido Attending:  Jemima Lopez MD       Assessment and Plan:    1) PRISCILLA- due to cardiorenal syndrome / decompensated HF; no other insults. Off ARB; meds benign. No obstruction by US. Hemodynamics OK. No contrast studies. PLAN- improving with diuresis     2) CKD 4- likely due to longstanding DM / HTN; c/w bland urine sediment and mild proteinuria. No further w/u     3) Severe HTN- exac by fluid overload; improving with coreg / cardizem/ diuresis. Avoid ACE-I / ARB with PRISCILLA / hyperkalemia; renal artery doppler neg     4) HFpEF with mod MR / TR / AI. Edematous and CXR c/w pulm edema -> diuresing well. Continue bumex gtt     5) PAF s/p PPM  + GILLES / DCCV - Apaced on amio / BB / CCB / DOAC per cards     6) DM 2- dc januvia permanently with edema / CHF    7) Hyperkalemia- due to PRISCILLA / CKD / type IV RTA; improving    8) Gout- uric acid mildly elevated; on low dose colchicine      Subjective:  Awake pleasant wrist feeling better    Physical Exam:   /50 (BP Location: Right arm)   Pulse 60   Temp 98.3 °F (36.8 °C) (Oral)   Resp 17   Ht 5' 4\" (1.626 m)   Wt 159 lb 9.6 oz (72.4 kg)   SpO2 93%   BMI 27.40 kg/m²   Temp (24hrs), Av °F (36.7 °C), Min:97.3 °F (36.3 °C), Max:98.3 °F (36.8 °C)       Intake/Output Summary (Last 24 hours) at 2024 1012  Last data filed at 2024 0900  Gross per 24 hour   Intake 749.3 ml   Output 2100 ml   Net -1350.7 ml     Wt Readings from Last 3 Encounters:   07/15/24 159 lb 9.6 oz (72.4 kg)   24 140 lb (63.5 kg)   24 140 lb (63.5 kg)     General: awake alert  HEENT: No scleral icterus, MMM  Neck: Supple, no CASTRO or thyromegaly  Cardiac: Regular rate and rhythm, S1, S2 normal, no murmur or tub  Lungs: Decreased BS at bases bilaterally   Abdomen: Soft, non-tender. + bowel sounds, no palpable organomegaly  Extremities: Without clubbing, cyanosis; mild LE edema  Neurologic: Cranial nerves grossly intact, moving  all extremities  Skin: Warm and dry, no rashes       Labs:   Lab Results   Component Value Date    WBC 6.2 07/17/2024    HGB 10.8 07/17/2024    HCT 32.5 07/17/2024    .0 07/17/2024    CREATSERUM 2.50 07/17/2024    BUN 92 07/17/2024     07/17/2024    K 4.9 07/17/2024     07/17/2024    CO2 25.0 07/17/2024     07/17/2024    CA 8.8 07/17/2024    PGLU 219 07/17/2024       Imaging:  All imaging studies reviewed.    Meds:   Current Facility-Administered Medications   Medication Dose Route Frequency    sodium zirconium cyclosilicate (Lokelma) oral packet 10 g  10 g Oral Q8H    bumetanide (Bumex) 12.5 mg in 50 mL infusion  0.5 mg/hr Intravenous Continuous    polyethylene glycol (PEG 3350) (Miralax) 17 g oral packet 17 g  17 g Oral Daily    lactulose (ENULOSE) solution 20 g  20 g Oral Q6H PRN    colchicine tab 0.3 mg  0.3 mg Oral Q72H    docusate sodium (Colace) cap 100 mg  100 mg Oral BID    insulin aspart (NovoLOG) 100 Units/mL FlexPen 1-10 Units  1-10 Units Subcutaneous TID CC and HS    insulin degludec (Tresiba) 100 units/mL flextouch 15 Units  15 Units Subcutaneous Daily    [Held by provider] hydrALAZINE (Apresoline) tab 50 mg  50 mg Oral Q8H AYANNA    apixaban (Eliquis) tab 2.5 mg  2.5 mg Oral BID    glucose (Dex4) 15 GM/59ML oral liquid 15 g  15 g Oral Q15 Min PRN    Or    glucose (Glutose) 40% oral gel 15 g  15 g Oral Q15 Min PRN    Or    glucose-vitamin C (Dex-4) chewable tab 4 tablet  4 tablet Oral Q15 Min PRN    Or    dextrose 50% injection 50 mL  50 mL Intravenous Q15 Min PRN    Or    glucose (Dex4) 15 GM/59ML oral liquid 30 g  30 g Oral Q15 Min PRN    Or    glucose (Glutose) 40% oral gel 30 g  30 g Oral Q15 Min PRN    Or    glucose-vitamin C (Dex-4) chewable tab 8 tablet  8 tablet Oral Q15 Min PRN    insulin aspart (NovoLOG) 100 Units/mL FlexPen 2-10 Units  2-10 Units Subcutaneous TID AC and HS    allopurinol (Zyloprim) tab 100 mg  100 mg Oral Daily    amiodarone (Pacerone) tab 200 mg  200  mg Oral Daily    dilTIAZem ER (Dilacor XR) 24 hr cap 120 mg  120 mg Oral Daily    escitalopram (Lexapro) tab 5 mg  5 mg Oral Daily    gabapentin (Neurontin) cap 100 mg  100 mg Oral TID    levothyroxine (Synthroid) tab 75 mcg  75 mcg Oral Before breakfast    atorvastatin (Lipitor) tab 10 mg  10 mg Oral Nightly    carvedilol (Coreg) tab 12.5 mg  12.5 mg Oral BID with meals         Questions/concerns were discussed with patient and/or family by bedside.          Mary Lares MD  7/17/2024  1012 AM

## 2024-07-17 NOTE — PLAN OF CARE
Assumed care at 1930  Recd pt on chair.On O2 2 L/NC. Sats >92%. Weaned O2. Desats 86% on RA. O2 2L/NC administered.  No arms drift noted, strength on Upper extremities 5/5. C/o pain on LE with movement.  Inability to urinate, bladder scan done - 580ml  Straight cath done - 1400ml urine output  Need for MRI brain, will call daughter regarding pt's pacemaker information.  Bumex drip infusing.  Labs in am.  Problem: Diabetes/Glucose Control  Goal: Glucose maintained within prescribed range  Description: INTERVENTIONS:  - Monitor Blood Glucose as ordered  - Assess for signs and symptoms of hyperglycemia and hypoglycemia  - Administer ordered medications to maintain glucose within target range  - Assess barriers to adequate nutritional intake and initiate nutrition consult as needed  - Instruct patient on self management of diabetes  Outcome: Progressing     Problem: Patient/Family Goals  Goal: Patient/Family Long Term Goal  Description: Patient's Long Term Goal: discharge    Interventions:  - MD consults  - improve kidney function  - See additional Care Plan goals for specific interventions  Outcome: Progressing  Goal: Patient/Family Short Term Goal  Description: Patient's Short Term Goal: pain control     Interventions:   - gabapentin scheduled  - See additional Care Plan goals for specific interventions  Outcome: Progressing     Problem: METABOLIC/FLUID AND ELECTROLYTES - ADULT  Goal: Glucose maintained within prescribed range  Description: INTERVENTIONS:  - Monitor Blood Glucose as ordered  - Assess for signs and symptoms of hyperglycemia and hypoglycemia  - Administer ordered medications to maintain glucose within target range  - Assess barriers to adequate nutritional intake and initiate nutrition consult as needed  - Instruct patient on self management of diabetes  Outcome: Progressing  Goal: Electrolytes maintained within normal limits  Description: INTERVENTIONS:  - Monitor labs and rhythm and assess patient for  signs and symptoms of electrolyte imbalances  - Administer electrolyte replacement as ordered  - Monitor response to electrolyte replacements, including rhythm and repeat lab results as appropriate  - Fluid restriction as ordered  - Instruct patient on fluid and nutrition restrictions as appropriate  Outcome: Progressing  Goal: Hemodynamic stability and optimal renal function maintained  Description: INTERVENTIONS:  - Monitor labs and assess for signs and symptoms of volume excess or deficit  - Monitor intake, output and patient weight  - Monitor urine specific gravity, serum osmolarity and serum sodium as indicated or ordered  - Monitor response to interventions for patient's volume status, including labs, urine output, blood pressure (other measures as available)  - Encourage oral intake as appropriate  - Instruct patient on fluid and nutrition restrictions as appropriate  Outcome: Progressing

## 2024-07-17 NOTE — PROGRESS NOTES
[facility]  M[facility] Neurology Progress Note    Ciarra Salcido Patient Status:  Inpatient    1940 MRN [id number]   Location [facility] 3NE-A Attending Adonay Yates MD   Hosp Day # 8 PCP JUDY CYR MD     CC: Unsteady gait, weakness    Subjective:  Ciarra Salcido is an 84 year old female with multiple medical problems including  HFpEF, DVT, PE, CKD, DM2, HTN, HLD, and shingles, who was admitted on , for management of hypertension, cystitis, arthralgia and rectal bleeding. Neurology was consulted for evaluation  after she was noted to have decreased movement on the right and dragging the right leg. There was no other neurological issues such as diplopia, dysarthria, dysphagia, confusion, disorientation. CT head was negative for acute bleed, MRI brain was ordered for further investigations.     Seen for a follow up visit today. In the chair, awake, alert and oriented, eating.  Denies any diplopia, dysarthria, dysphagia, confusion, disorientation. Denies headache, nausea, vomiting. No seizure-like activity or loss of consciousness. Very hard of hearing, no hearing aids at this time per daughter.         MEDICATIONS:  Prior to Admission Medications   Medication Sig    apixaban 2.5 MG Oral Tab Take 1 tablet (2.5 mg total) by mouth 2 (two) times daily.    carvedilol 12.5 MG Oral Tab Take 1 tablet (12.5 mg total) by mouth 2 (two) times daily with meals.    hydrALAZINE 50 MG Oral Tab Take 1 tablet (50 mg total) by mouth every 8 (eight) hours.     Current Facility-Administered Medications   Medication Dose Route Frequency    sodium zirconium cyclosilicate (Lokelma) oral packet 10 g  10 g Oral Q8H    bumetanide (Bumex) 12.5 mg in 50 mL infusion  0.5 mg/hr Intravenous Continuous    polyethylene glycol (PEG 3350) (Miralax) 17 g oral packet 17 g  17 g Oral Daily    lactulose (ENULOSE) solution 20 g  20 g Oral Q6H PRN    colchicine tab 0.3 mg  0.3 mg Oral Q72H    docusate sodium (Colace) cap 100 mg  100  mg Oral BID    insulin aspart (NovoLOG) 100 Units/mL FlexPen 1-10 Units  1-10 Units Subcutaneous TID CC and HS    insulin degludec (Tresiba) 100 units/mL flextouch 15 Units  15 Units Subcutaneous Daily    [Held by provider] hydrALAZINE (Apresoline) tab 50 mg  50 mg Oral Q8H AYANNA    apixaban (Eliquis) tab 2.5 mg  2.5 mg Oral BID    glucose (Dex4) 15 GM/59ML oral liquid 15 g  15 g Oral Q15 Min PRN    Or    glucose (Glutose) 40% oral gel 15 g  15 g Oral Q15 Min PRN    Or    glucose-vitamin C (Dex-4) chewable tab 4 tablet  4 tablet Oral Q15 Min PRN    Or    dextrose 50% injection 50 mL  50 mL Intravenous Q15 Min PRN    Or    glucose (Dex4) 15 GM/59ML oral liquid 30 g  30 g Oral Q15 Min PRN    Or    glucose (Glutose) 40% oral gel 30 g  30 g Oral Q15 Min PRN    Or    glucose-vitamin C (Dex-4) chewable tab 8 tablet  8 tablet Oral Q15 Min PRN    insulin aspart (NovoLOG) 100 Units/mL FlexPen 2-10 Units  2-10 Units Subcutaneous TID AC and HS    allopurinol (Zyloprim) tab 100 mg  100 mg Oral Daily    amiodarone (Pacerone) tab 200 mg  200 mg Oral Daily    dilTIAZem ER (Dilacor XR) 24 hr cap 120 mg  120 mg Oral Daily    escitalopram (Lexapro) tab 5 mg  5 mg Oral Daily    gabapentin (Neurontin) cap 100 mg  100 mg Oral TID    levothyroxine (Synthroid) tab 75 mcg  75 mcg Oral Before breakfast    atorvastatin (Lipitor) tab 10 mg  10 mg Oral Nightly    carvedilol (Coreg) tab 12.5 mg  12.5 mg Oral BID with meals       REVIEW OF SYSTEMS:  A 10-point system was reviewed.  Pertinent positives and negatives are noted in HPI.      PHYSICAL EXAMINATION:  VITAL SIGNS: /50 (BP Location: Right arm)   Pulse 60   Temp 98.3 °F (36.8 °C) (Oral)   Resp 17   Ht 64\"   Wt 159 lb 9.6 oz (72.4 kg)   SpO2 93%   BMI 27.40 kg/m²   GENERAL:  Patient is a 84 year old female in no acute distress.  HEENT:  Normocephalic, atraumatic  ABD: Soft, non tender  SKIN: Warm, dry, no rashes    NEUROLOGICAL:   Mental status: Oriented to person, place,  situation and time . [place]  Speech: Fluent, no obvious aphasia or dysarthria  Memory and comprehension: Intact   Cranial Nerves: VFF, PERRL 3mm brisk, EOMI, no nystagmus, facial sensation intact, mild right facial droop, tongue midline, shoulder shrug equal, [place], remainder CN grossly intact  Motor: Right pronator drift noted.  Motor exam is 5 out of 5 in all extremities bilaterally   Sensory: Intact to light touch bilaterally  Coordination: FTN intact  Gait: Deferred      Imaging/Diagnostics:  XR CHEST PA + LAT CHEST (CPT=71046)    Result Date: 7/16/2024  CONCLUSION:   Stable cardiac and mediastinal contours.  Stable positioning of left chest wall ICD.  Findings of congestive heart failure with mild interstitial pulmonary edema.  No associated pleural effusion or pneumothorax.   LOCATION:  [facility]   Dictated by (CST): Gurjit Sotomayor MD on 7/16/2024 at 12:06 PM     Finalized by (CST): Gurjit Sotomayor MD on 7/16/2024 at 12:07 PM       CT BRAIN OR HEAD (59739)    Result Date: 7/15/2024  CONCLUSION: 1. No acute intracranial findings or significant interval changes compared to 7/8/2024.  Consider MRI evaluation if there is continued clinical concern. 2. Cerebral atrophy with chronic microvascular ischemic changes.    LOCATION:  [facility]   Dictated by (CST): Carlos Eduardo Bowman MD on 7/15/2024 at 7:44 PM     Finalized by (CST): Carlos Eduardo Bowman MD on 7/15/2024 at 7:46 PM       US KIDNEY W DOPPLER (QCN=[id number])    Result Date: 7/12/2024  CONCLUSION:  No signs of renal artery stenosis.   LOCATION:  [facility]     Dictated by (CST): Mahamed Duran MD on 7/12/2024 at 10:50 AM     Finalized by (CST): Mahamed Duran MD on 7/12/2024 at 10:51 AM       XR HIP W OR WO PELVIS(MIN 5 VIEWS),BILAT(CPT=73523)    Result Date: 7/10/2024  CONCLUSION:  No acute fracture.  Degenerative changes as described above.   LOCATION:  [id number]   Dictated by (CST): Matthew Sahni MD on 7/10/2024 at 12:19 PM     Finalized by (CST): Matthew Sahni MD on 7/10/2024  at 12:21 PM       XR CHEST AP PORTABLE  (CPT=71045)    Result Date: 7/8/2024  CONCLUSION:  See above.   LOCATION:  [id number]      Dictated by (CST): Evangelist Layton MD on 7/08/2024 at 10:00 PM     Finalized by (CST): Evangelist Layton MD on 7/08/2024 at 10:00 PM       US VENOUS DOPPLER LEG BILAT - DIAG IMG (CPT=93970)    Result Date: 7/8/2024  CONCLUSION:  No evidence of deep venous thrombosis in the bilateral lower extremities.  LOCATION:  [facility]   Dictated by (CST): Ede Schaffer MD on 7/08/2024 at 9:31 PM     Finalized by (CST): Ede Schaffer MD on 7/08/2024 at 9:31 PM       CT BRAIN OR HEAD ([id number])    Result Date: 7/8/2024  CONCLUSION:   1. No acute intracranial abnormality identified.  2. There are scattered small areas of encephalomalacia noted in the bilateral frontal lobes and right parietal lobe.  There are moderate age-indeterminate microvascular ischemic changes in the cerebral white matter. If there is clinical concern for acute  ischemia/infarction, an MRI of the brain would be recommended for further evaluation.  LOCATION:  [facility]   Dictated by (CST): Ede Schaffer MD on 7/08/2024 at 8:32 PM     Finalized by (CST): Ede Schaffer MD on 7/08/2024 at 8:41 PM          Labs:  Recent Labs   Lab 07/11/24  0628 07/17/24  0800   RBC 3.60* 3.58*   HGB 10.8* 10.8*   HCT 34.4* 32.5*   MCV 95.6 90.8   MCH 30.0 30.2   MCHC 31.4 33.2   RDW 15.2 14.4   NEPRELIM 5.41  --    WBC 7.9 6.2   .0 300.0         Recent Labs   Lab 07/15/24  0701 07/16/24  0624 07/16/24  1939 07/17/24  0800   * 190*  --  206*   BUN 99* 108*  --  92*   CREATSERUM 3.32* 3.20*  --  2.50*   EGFRCR 13* 14*  --  18*   CA 8.4* 8.9  --  8.8   * 133*  --  132*   K 5.3* 6.0* 5.6* 4.9    103  --  102   CO2 21.0 22.0  --  25.0       Assessment & Plan:    An 84 year old female with:  Right sided facial weakness and righted sided arm/leg weakness. Suspicious for stroke. Also possible to be chronic changes, complicated by underlying  gout/arthritis.   7/15 CT head - no acute findings  MRI brain - pending PPM clearance  Currently on Eliquis 5 mg BID and atorvastatin 10 mg daily, continue. Will check A1c and lipid panel as well.   PT/OT/ST  Mild cognitive decline - can have further work up as outpatient if needed  Atrial fibrillation - rate is currently controlled, continue Eliquis.   Mild hyponatremia, initial Na was 134, today is 132, advised correction.   Hyperkalemia - initial K was 5.9. Down to 4.9 today.  Discussed with patient and daughter. Discussed with dr. Hartman. To follow up with further recommendations if indicated, pending MRI testing.   Is this a shared or split note between Advanced Practice Provider and Physician? Yes       Charissa Mari [facility]  7/17/2024, 9:31 AM   [place] # [id number]    Impression/plan/MDM:  Patient seen and examined personally.  Investigations reviewed.    Findings suggestive of stroke.  Advise MRI of the brain..  Clinical findings could be chronic, complicated by underlying gout/arthritis.  Patient currently on Eliquis and statin.  Will review MRI.  Mild cognitive decline.  Further workup as outpatient.  Atrial fibrillation, rate controlled.  Currently on Eliquis for stroke prophylaxis.  Mild hyponatremia.  Advise correction.  Mild hyperkalemia.  Improved

## 2024-07-17 NOTE — PLAN OF CARE
Assumed patient care @ 07:30.  Alert and oriented x 3-4.  Hearing aid, dentures at bedside.  On 2 liters of oxygen via NC  Urinary retention, straight cath PRN.  Bumex infusing at 2ml./hr.  QID accucheck.  Apaced on telemetry.  Scott assist with gait belt or johnny steady.  Safety fall precautions maintained.  Needs attended, call light within her reach.  Sitting in the chair, daughter at bedside.  Problem: Diabetes/Glucose Control  Goal: Glucose maintained within prescribed range  Description: INTERVENTIONS:  - Monitor Blood Glucose as ordered  - Assess for signs and symptoms of hyperglycemia and hypoglycemia  - Administer ordered medications to maintain glucose within target range  - Assess barriers to adequate nutritional intake and initiate nutrition consult as needed  - Instruct patient on self management of diabetes  Outcome: Progressing

## 2024-07-17 NOTE — CM/SW NOTE
Department  notified care team of Luis approval, see below. .    Assigned SW/CM to follow up with patient/family on discharge plan.       7/17 -- [facility] ID [id number], approved 7/17 to 7/19    Malena Bender DSC

## 2024-07-17 NOTE — TELEPHONE ENCOUNTER
Called patient to confirm appointment scheduled for tomorrow 7/18/2024, patient daughter states patient has been in the hospital since 7/8/2024 with various problems, would like to update  on patient condition. Patient is at [facility]

## 2024-07-17 NOTE — TELEPHONE ENCOUNTER
Spoke to patient's daughter, Elsie and patient has kidney failure, gout to right side of body and all other kinds of things going on.    TEDDY

## 2024-07-17 NOTE — CM/SW NOTE
MARTIN received message from DSC coordinator stating insurance authorization has been approved 7/17/24-7/19/24. PASRR letter attached to [facility] referral. Await medical clearance for further discharge planning. MARTIN will continue to follow.     KEO Goldman  Discharge Planner

## 2024-07-18 ENCOUNTER — APPOINTMENT (OUTPATIENT)
Dept: MRI IMAGING | Facility: HOSPITAL | Age: 84
End: 2024-07-18
Payer: MEDICARE

## 2024-07-18 VITALS
OXYGEN SATURATION: 93 % | DIASTOLIC BLOOD PRESSURE: 58 MMHG | BODY MASS INDEX: 25.93 KG/M2 | RESPIRATION RATE: 16 BRPM | TEMPERATURE: 98 F | WEIGHT: 151.88 LBS | HEIGHT: 64 IN | HEART RATE: 60 BPM | SYSTOLIC BLOOD PRESSURE: 139 MMHG

## 2024-07-18 PROBLEM — R33.9 URINARY RETENTION: Status: ACTIVE | Noted: 2024-07-18

## 2024-07-18 PROBLEM — R33.9 URINARY RETENTION: Status: ACTIVE | Noted: 2024-01-01

## 2024-07-18 LAB
ANION GAP SERPL CALC-SCNC: 7 MMOL/L (ref 0–18)
BUN BLD-MCNC: 106 MG/DL (ref 9–23)
CALCIUM BLD-MCNC: 9 MG/DL (ref 8.7–10.4)
CHLORIDE SERPL-SCNC: 98 MMOL/L (ref 98–112)
CO2 SERPL-SCNC: 28 MMOL/L (ref 21–32)
CREAT BLD-MCNC: 2.41 MG/DL
EGFRCR SERPLBLD CKD-EPI 2021: 19 ML/MIN/1.73M2 (ref 60–?)
GLUCOSE BLD-MCNC: 140 MG/DL (ref 70–99)
GLUCOSE BLD-MCNC: 143 MG/DL (ref 70–99)
GLUCOSE BLD-MCNC: 257 MG/DL (ref 70–99)
GLUCOSE BLD-MCNC: 280 MG/DL (ref 70–99)
NT-PROBNP SERPL-MCNC: 2151 PG/ML (ref ?–450)
OSMOLALITY SERPL CALC.SUM OF ELEC: 312 MOSM/KG (ref 275–295)
POTASSIUM SERPL-SCNC: 4.6 MMOL/L (ref 3.5–5.1)
SODIUM SERPL-SCNC: 133 MMOL/L (ref 136–145)

## 2024-07-18 PROCEDURE — 99239 HOSP IP/OBS DSCHRG MGMT >30: CPT | Performed by: HOSPITALIST

## 2024-07-18 PROCEDURE — 70551 MRI BRAIN STEM W/O DYE: CPT

## 2024-07-18 PROCEDURE — 99233 SBSQ HOSP IP/OBS HIGH 50: CPT | Performed by: OTHER

## 2024-07-18 PROCEDURE — 99233 SBSQ HOSP IP/OBS HIGH 50: CPT | Performed by: INTERNAL MEDICINE

## 2024-07-18 PROCEDURE — 99221 1ST HOSP IP/OBS SF/LOW 40: CPT | Performed by: PHYSICIAN ASSISTANT

## 2024-07-18 RX ORDER — TORSEMIDE 20 MG/1
20 TABLET ORAL DAILY
Qty: 30 TABLET | Refills: 11 | Status: SHIPPED | OUTPATIENT
Start: 2024-07-18

## 2024-07-18 NOTE — PROGRESS NOTES
Progress Note  Ciarra Salcido Patient Status:  Inpatient    1940 MRN [id number]   Location [facility] 3NE-A Attending Adonay Yates MD   Hosp Day # 9 PCP JUDY CYR MD     Subjective:  Developed acute urinary retension overnight, urinary catheter was inserted with good output.  Resting in bed this morning, on O2 at 2L, denies any cardiac symptoms currently.  Right upper arm movement and strength returned back to normal.        Objective:  /48 (BP Location: Left arm)   Pulse 60   Temp 97.7 °F (36.5 °C) (Oral)   Resp 18   Ht 5' 4\" (1.626 m)   Wt 151 lb 14.4 oz (68.9 kg)   SpO2 94%   BMI 26.07 kg/m²     Telemetry: AV paced, HR 60s      Intake/Output:    Intake/Output Summary (Last 24 hours) at 2024 0727  Last data filed at 2024 0500  Gross per 24 hour   Intake 480 ml   Output 4750 ml   Net -4270 ml       Last 3 Weights   24 0500 151 lb 14.4 oz (68.9 kg)   07/15/24 0500 159 lb 9.6 oz (72.4 kg)   24 0830 173 lb 9.6 oz (78.7 kg)   24 0614 168 lb 4.8 oz (76.3 kg)   24 0500 168 lb (76.2 kg)   24 0400 168 lb 3.2 oz (76.3 kg)   07/10/24 2000 168 lb 1.6 oz (76.2 kg)   24 0043 162 lb 0.6 oz (73.5 kg)   24 2315 162 lb 0.6 oz (73.5 kg)   24 1826 150 lb (68 kg)   24 1037 140 lb (63.5 kg)   24 1111 140 lb (63.5 kg)       Labs:  Recent Labs   Lab 07/15/24  0701 24  0624 24  1939 24  0800   * 190*  --  206*   BUN 99* 108*  --  92*   CREATSERUM 3.32* 3.20*  --  2.50*   EGFRCR 13* 14*  --  18*   CA 8.4* 8.9  --  8.8   * 133*  --  132*   K 5.3* 6.0* 5.6* 4.9    103  --  102   CO2 21.0 22.0  --  25.0     Recent Labs   Lab 24  0800   RBC 3.58*   HGB 10.8*   HCT 32.5*   MCV 90.8   MCH 30.2   MCHC 33.2   RDW 14.4   WBC 6.2   .0         No results for input(s): \"TROP\", \"TROPHS\", \"CK\" in the last 168 hours.    Review of Systems   Constitutional: Negative.   Cardiovascular:  Positive for leg  swelling.   Respiratory: Negative.         Physical Exam:    Gen: alert, oriented x 3, NAD, [place]  Heent: pupils equal, reactive. Mucous membranes moist.   Neck: no jvd  Cardiac: regular rate and rhythm, normal S1,S2, 2/6 systolic murmur, no gallop or rub   Lungs: CTA, diminished at the basis   Abd: soft, NT/ND +bs  Ext: trace of lower extremities edema  Skin: Warm, dry  Neuro: No focal deficits        Medications:     polyethylene glycol (PEG 3350)  17 g Oral Daily    colchicine  0.3 mg Oral Q72H    docusate sodium  100 mg Oral BID    insulin aspart  1-10 Units Subcutaneous TID CC and HS    insulin degludec  15 Units Subcutaneous Daily    apixaban  2.5 mg Oral BID    insulin aspart  2-10 Units Subcutaneous TID AC and HS    allopurinol  100 mg Oral Daily    amiodarone  200 mg Oral Daily    dilTIAZem ER  120 mg Oral Daily    escitalopram  5 mg Oral Daily    gabapentin  100 mg Oral TID    levothyroxine  75 mcg Oral Before breakfast    atorvastatin  10 mg Oral Nightly    carvedilol  12.5 mg Oral BID with meals      bumetanide (Bumex) 12.5 mg in 50 mL infusion 0.5 mg/hr (07/17/24 1245)     Assessment:  Acute onset of decrease movement of the right arm, now resolved, suspect acute gout/arthritis flare up vs acute CVA  CT brain showed no acute intracranial findings  MRI brain pending  Neurology following      Hypertensive urgency, now with well controlled Bps  On carvedilol, diltiazem   Acute on chronic diastolic/valvular HF  Echo 7/12/24 LVEF 65-70%, no rwma, mild-mod MR, mild AI, mild TR, PASP 50-55 mmHg   proBNP 12,823  Diuresis with IV bumex gtt, net neg ~5L-nephrology following   Mild-mod MR, mild TR, mild AI  Pulmonary HTN, PASP 50-55 mmHg  PAF  Hx of GILLES/DCCV on 11/2023  On amiodarone, diltiazem, carvedilol  On renal dose Eliquis for stroke prevention   Hx of DC PPM 9/2023  DM2  CKD4 crt 3.2, stable-nephrology following   Renal us showed no stenosis   Hyperkalemia, K+5.6-nephrology following   Chronic anemia, hgb  stable   Arthritis-pain management per primary  Gout flare up with elevated uric acid-on colchicine, allopurinol-per primary.      Plan:  Denies cardiac symptoms.  Volume management per nephrology.  Monitor I/Os, daily weights, electrolytes and renal function.  Right upper arm weakness resolved, CT brain neg, awaiting MRI brain pending PMM clearance by [place] -neurology following.  Continue current cardiac medications: carvedilol, amiodarone, diltiazem.  Continue eliquis for stroke prevention.  PT/OT, pain management per primary.     Plan of care discussed with patient, RN.    Kenia Hook, APRN  7/18/2024  7:27 AM  [phone]

## 2024-07-18 NOTE — PLAN OF CARE
Assumed patient care @ 07:30.  Alert and oriented x 3-4.  On oxygen of 1-2 liters.  Apaced on telemetry.  QID accucheck.  Carb controlled diet.  Scott assist for transfer.  [place] catheter intact draining well.  Safety fall precautions maintained.  Needs attended, call light within her reach.  Bed in lowest position, bed alarm is on    Problem: Diabetes/Glucose Control  Goal: Glucose maintained within prescribed range  Description: INTERVENTIONS:  - Monitor Blood Glucose as ordered  - Assess for signs and symptoms of hyperglycemia and hypoglycemia  - Administer ordered medications to maintain glucose within target range  - Assess barriers to adequate nutritional intake and initiate nutrition consult as needed  - Instruct patient on self management of diabetes  Outcome: Progressing

## 2024-07-18 NOTE — PROGRESS NOTES
[facility]  [facility] Neurology Progress Note    Ciarra Salcido Patient Status:  Inpatient    1940 MRN [id number]   [facility] 3NE-A Attending Adonay Yates MD   Hosp Day # 9 PCP JUDY CYR MD     CC: Unsteady gait, weakness     Subjective:  Patient seen for a follow up visit today. No new complaints. Denies headache, no changes in vision , no new focal weakness or paresthesias. Awaits MRI. No seizure-like activity or loss of consciousness.         MEDICATIONS:  Prior to Admission Medications   Medication Sig    torsemide 20 MG Oral Tab Take 1 tablet (20 mg total) by mouth daily.    apixaban 2.5 MG Oral Tab Take 1 tablet (2.5 mg total) by mouth 2 (two) times daily.    carvedilol 12.5 MG Oral Tab Take 1 tablet (12.5 mg total) by mouth 2 (two) times daily with meals.     Current Facility-Administered Medications   Medication Dose Route Frequency    polyethylene glycol (PEG 3350) (Miralax) 17 g oral packet 17 g  17 g Oral Daily    lactulose (ENULOSE) solution 20 g  20 g Oral Q6H PRN    colchicine tab 0.3 mg  0.3 mg Oral Q72H    docusate sodium (Colace) cap 100 mg  100 mg Oral BID    insulin aspart (NovoLOG) 100 Units/mL FlexPen 1-10 Units  1-10 Units Subcutaneous TID CC and HS    insulin degludec (Tresiba) 100 units/mL flextouch 15 Units  15 Units Subcutaneous Daily    apixaban (Eliquis) tab 2.5 mg  2.5 mg Oral BID    glucose (Dex4) 15 GM/59ML oral liquid 15 g  15 g Oral Q15 Min PRN    Or    glucose (Glutose) 40% oral gel 15 g  15 g Oral Q15 Min PRN    Or    glucose-vitamin C (Dex-4) chewable tab 4 tablet  4 tablet Oral Q15 Min PRN    Or    dextrose 50% injection 50 mL  50 mL Intravenous Q15 Min PRN    Or    glucose (Dex4) 15 GM/59ML oral liquid 30 g  30 g Oral Q15 Min PRN    Or    glucose (Glutose) 40% oral gel 30 g  30 g Oral Q15 Min PRN    Or    glucose-vitamin C (Dex-4) chewable tab 8 tablet  8 tablet Oral Q15 Min PRN    insulin aspart (NovoLOG) 100 Units/mL FlexPen 2-10 Units  2-10 Units  Subcutaneous TID AC and HS    allopurinol (Zyloprim) tab 100 mg  100 mg Oral Daily    amiodarone (Pacerone) tab 200 mg  200 mg Oral Daily    dilTIAZem ER (Dilacor XR) 24 hr cap 120 mg  120 mg Oral Daily    escitalopram (Lexapro) tab 5 mg  5 mg Oral Daily    gabapentin (Neurontin) cap 100 mg  100 mg Oral TID    levothyroxine (Synthroid) tab 75 mcg  75 mcg Oral Before breakfast    atorvastatin (Lipitor) tab 10 mg  10 mg Oral Nightly    carvedilol (Coreg) tab 12.5 mg  12.5 mg Oral BID with meals       REVIEW OF SYSTEMS:  A 10-point system was reviewed.  Pertinent positives and negatives are noted in HPI.      PHYSICAL EXAMINATION:  VITAL SIGNS: /44 (BP Location: Left arm)   Pulse 61   Temp 97.5 °F (36.4 °C) (Oral)   Resp 18   Ht 64\"   Wt 151 lb 14.4 oz (68.9 kg)   SpO2 90%   BMI 26.07 kg/m²   GENERAL:  Patient is a 84 year old female in no acute distress.  HEENT:  Normocephalic, atraumatic  ABD: Soft, non tender  SKIN: Warm, dry, no rashes    NEUROLOGICAL:   Mental status: Oriented to person, place, situation and time . [place]  Speech: Fluent, no obvious aphasia or dysarthria  Memory and comprehension: Intact   Cranial Nerves: VFF, PERRL 3mm brisk, EOMI, no nystagmus, facial sensation intact, mild right facial droop, tongue midline, shoulder shrug equal, [place], remainder CN grossly intact  Motor: Right pronator drift noted.  Motor exam is 5 out of 5 in all extremities bilaterally   Sensory: Intact to light touch bilaterally  Coordination: FTN intact  Gait: Deferred    Imaging/Diagnostics:  XR CHEST PA + LAT CHEST (CPT=71046)    Result Date: 7/16/2024  CONCLUSION:   Stable cardiac and mediastinal contours.  Stable positioning of left chest wall ICD.  Findings of congestive heart failure with mild interstitial pulmonary edema.  No associated pleural effusion or pneumothorax.   LOCATION:  [facility]   Dictated by (CST): Gurjit Sotomayor MD on 7/16/2024 at 12:06 PM     Finalized by (CST): Gurjit Sotomayor MD on 7/16/2024 at  12:07 PM       CT BRAIN OR HEAD (20172)    Result Date: 7/15/2024  CONCLUSION: 1. No acute intracranial findings or significant interval changes compared to 7/8/2024.  Consider MRI evaluation if there is continued clinical concern. 2. Cerebral atrophy with chronic microvascular ischemic changes.    LOCATION:  [facility]   Dictated by (CST): Carlos Eduardo Bowman MD on 7/15/2024 at 7:44 PM     Finalized by (CST): Carlos Eduardo Bowman MD on 7/15/2024 at 7:46 PM       US KIDNEY W DOPPLER (YXE=14821/22773)    Result Date: 7/12/2024  CONCLUSION:  No signs of renal artery stenosis.   LOCATION:  [facility]     Dictated by (CST): Mahamed Duran MD on 7/12/2024 at 10:50 AM     Finalized by (CST): Mahamed Duran MD on 7/12/2024 at 10:51 AM       XR HIP W OR WO PELVIS(MIN 5 VIEWS),BILAT(CPT=73523)    Result Date: 7/10/2024  CONCLUSION:  No acute fracture.  Degenerative changes as described above.   LOCATION:  [id number]   Dictated by (CST): Matthew Sahni MD on 7/10/2024 at 12:19 PM     Finalized by (CST): Matthew Sahni MD on 7/10/2024 at 12:21 PM       XR CHEST AP PORTABLE  (CPT=71045)    Result Date: 7/8/2024  CONCLUSION:  See above.   LOCATION:  [id number]      Dictated by (CST): Evangelist Layton MD on 7/08/2024 at 10:00 PM     Finalized by (CST): Evangelist Layton MD on 7/08/2024 at 10:00 PM       US VENOUS DOPPLER LEG BILAT - DIAG IMG (CPT=93970)    Result Date: 7/8/2024  CONCLUSION:  No evidence of deep venous thrombosis in the bilateral lower extremities.  LOCATION:  [facility]   Dictated by (CST): Ede Schaffer MD on 7/08/2024 at 9:31 PM     Finalized by (CST): Ede Schaffer MD on 7/08/2024 at 9:31 PM       CT BRAIN OR HEAD (65080)    Result Date: 7/8/2024  CONCLUSION:   1. No acute intracranial abnormality identified.  2. There are scattered small areas of encephalomalacia noted in the bilateral frontal lobes and right parietal lobe.  There are moderate age-indeterminate microvascular ischemic changes in the cerebral white matter. If there is  clinical concern for acute  ischemia/infarction, an MRI of the brain would be recommended for further evaluation.  LOCATION:  [facility]   Dictated by (CST): Ede Schaffer MD on 7/08/2024 at 8:32 PM     Finalized by (CST): Ede Schaffer MD on 7/08/2024 at 8:41 PM          Labs:  Recent Labs   Lab 07/17/24  0800   RBC 3.58*   HGB 10.8*   HCT 32.5*   MCV 90.8   MCH 30.2   MCHC 33.2   RDW 14.4   WBC 6.2   .0         Recent Labs   Lab 07/16/24  0624 07/16/24  1939 07/17/24  0800 07/18/24  0721   *  --  206* 143*   *  --  92* 106*   CREATSERUM 3.20*  --  2.50* 2.41*   EGFRCR 14*  --  18* 19*   CA 8.9  --  8.8 9.0   *  --  132* 133*   K 6.0* 5.6* 4.9 4.6     --  102 98   CO2 22.0  --  25.0 28.0        Assessment & Plan:     An 84 year old female with:  Right sided facial weakness and righted sided arm/leg weakness. Suspicious for stroke. Also possible to be chronic changes, complicated by underlying gout/arthritis.   7/15 CT head - no acute findings  MRI brain - pending PPM clearance - pending  Currently on Eliquis 5 mg BID and atorvastatin 10 mg daily, continue. Will check A1c and lipid panel as well.   PT/OT/ST  Mild cognitive decline - can have further work up as outpatient if needed  Atrial fibrillation - rate is currently controlled, continue Eliquis.   PRISCILLA on CKD4 with mild hyponatremia, initial Na was 134, today is 133, advised correction. Nephrology following.   Urinary retention - has a johnson now, Urology on consult  Hyperkalemia - resolved now, initial K was 5.9. Down to 4.6 today.  Discussed with patient and daughter. Discussed with dr. Hartman. To follow up with further recommendations if indicated, pending MRI testing.       Is this a shared or split note between Advanced Practice Provider and Physician? Yes       Charissa VALENTINE  [facility] [place]  7/18/2024, 9:04 AM   [facility] # [id number]    Impression/plan/MDM:  Patient seen and examined personally.   Investigations reviewed.      Findings suggestive of stroke.  However, MRI of the brain did not show any acute infarct.  Clinical findings could be chronic, complicated by underlying gout/arthritis.  Patient currently on Eliquis and statin.    Mild cognitive decline.  Further workup as outpatient.  Atrial fibrillation, rate controlled.  Currently on Eliquis for stroke prophylaxis.  Mild hyponatremia.  Advise correction.  Mild hyperkalemia.  Improved.  Advised to follow-up with her PCP and cardiology.

## 2024-07-18 NOTE — CM/SW NOTE
Pt discussed in rounds and per RN, pt is anticipated to discharge today pending MRI results. RN stated pt will have MRI this AM.     MARTIN messaged [facility] in [facility] to inquire on bed availability.     [facility] placed on Will Call. PCS form completed and available for RN to print.    MARTIN will continue to follow.     [facility]  [phone]    KEO Goldman  Discharge Planner

## 2024-07-18 NOTE — CONSULTS
[facility]   part of [facility]    Report of Consultation    Ciarra Salcido Patient Status:  Inpatient    1940 MRN [id number]   Location [facility] 3NE-A Attending Adonay Yates MD   Hosp Day # 9 PCP JUDY CYR MD     Date of Admission:  2024  Date of Consult:  2024    Reason for Consultation:  Urinary retention    History of Present Illness:  Ciarra Salcido is a a(n) 84 year old female with history of HFpEF/ DVT/ PE/ DM/ hypothyroidism/ HTN/ HLD/ A-fib who presented to the hospital 24 for high blood pressures over the two weeks prior with associated fatigue and elevated sugars. She received IV hydralazine in the ED for /91 and admitted for further evaluation. She has been evaluated by nephrology for PRISCILLA on CKD, cardiology for hypertensive urgency and HF exacerbation, and treated for UTI during this admission, discharge planning in place. Urology consulted today due to noted urinary retention overnight. Hutchinson was placed early this morning with 950mL drained.     No prior episodes of urinary retention. Has been constipated. Has had multiple UTIs. No  evaluation. Ambulation minimal, max assist for transfers.    History:  Past Medical History:    (HFpEF) heart failure with preserved ejection fraction (HCC)    Acute cystitis without hematuria    Acute deep vein thrombosis (DVT) of popliteal vein of right lower extremity (HCC)    Cataract    CHF exacerbation (HCC)    CKD (chronic kidney disease)    Congestive heart disease (HCC)    COVID-19    Diabetes (HCC)    Disorder of thyroid    DM2 (diabetes mellitus, type 2) (HCC)    Hearing impairment    High blood pressure    HTN (hypertension)    Hyperlipidemia    Hypothyroidism    Pulmonary embolism (HCC)    Shingles    Visual impairment     Past Surgical History:   Procedure Laterality Date    Cataract      Eye surgery      Hysterectomy      [facility]    Pacemaker monitor       Family History   Problem Relation Age of Onset     Other (Other) Mother         Old Age    Other (Other) Father         Old age      reports that she quit smoking about 13 years ago. Her smoking use included cigarettes. She started smoking about 63 years ago. She has a 50 pack-year smoking history. She has never used smokeless tobacco. She reports that she does not drink alcohol and does not use drugs.    Allergies:  No Known Allergies    Medications:    Current Facility-Administered Medications:     polyethylene glycol (PEG 3350) (Miralax) 17 g oral packet 17 g, 17 g, Oral, Daily    lactulose (ENULOSE) solution 20 g, 20 g, Oral, Q6H PRN    colchicine tab 0.3 mg, 0.3 mg, Oral, Q72H    docusate sodium (Colace) cap 100 mg, 100 mg, Oral, BID    insulin aspart (NovoLOG) 100 Units/mL FlexPen 1-10 Units, 1-10 Units, Subcutaneous, TID CC and HS    insulin degludec (Tresiba) 100 units/mL flextouch 15 Units, 15 Units, Subcutaneous, Daily    apixaban (Eliquis) tab 2.5 mg, 2.5 mg, Oral, BID    glucose (Dex4) 15 GM/59ML oral liquid 15 g, 15 g, Oral, Q15 Min PRN **OR** glucose (Glutose) 40% oral gel 15 g, 15 g, Oral, Q15 Min PRN **OR** glucose-vitamin C (Dex-4) chewable tab 4 tablet, 4 tablet, Oral, Q15 Min PRN **OR** dextrose 50% injection 50 mL, 50 mL, Intravenous, Q15 Min PRN **OR** glucose (Dex4) 15 GM/59ML oral liquid 30 g, 30 g, Oral, Q15 Min PRN **OR** glucose (Glutose) 40% oral gel 30 g, 30 g, Oral, Q15 Min PRN **OR** glucose-vitamin C (Dex-4) chewable tab 8 tablet, 8 tablet, Oral, Q15 Min PRN    insulin aspart (NovoLOG) 100 Units/mL FlexPen 2-10 Units, 2-10 Units, Subcutaneous, TID AC and HS    allopurinol (Zyloprim) tab 100 mg, 100 mg, Oral, Daily    amiodarone (Pacerone) tab 200 mg, 200 mg, Oral, Daily    dilTIAZem ER (Dilacor XR) 24 hr cap 120 mg, 120 mg, Oral, Daily    escitalopram (Lexapro) tab 5 mg, 5 mg, Oral, Daily    gabapentin (Neurontin) cap 100 mg, 100 mg, Oral, TID    levothyroxine (Synthroid) tab 75 mcg, 75 mcg, Oral, Before breakfast    atorvastatin  (Lipitor) tab 10 mg, 10 mg, Oral, Nightly    carvedilol (Coreg) tab 12.5 mg, 12.5 mg, Oral, BID with meals    Review of Systems:  Pertinent items are noted in HPI.    Physical Exam:  /58 (BP Location: Left arm)   Pulse 60   Temp 97.6 °F (36.4 °C) (Oral)   Resp 16   Ht 5' 4\" (1.626 m)   Wt 151 lb 14.4 oz (68.9 kg)   SpO2 93%   BMI 26.07 kg/m²   General appearance: alert, appears stated age, and cooperative  Head: Normocephalic, without obvious abnormality, atraumatic  Back: no CVAT  Lungs: non labored respirations  Abdomen: soft, NTND  Hutchinson clear yellow    Intake/Output Summary (Last 24 hours) at 7/18/2024 1240  Last data filed at 7/18/2024 0500  Gross per 24 hour   Intake 240 ml   Output 2650 ml   Net -2410 ml         Laboratory Data:  Lab Results   Component Value Date    CREATSERUM 2.41 07/18/2024     07/18/2024     07/18/2024    K 4.6 07/18/2024    CL 98 07/18/2024    CO2 28.0 07/18/2024     07/18/2024    CA 9.0 07/18/2024     Recent Labs   Lab 07/16/24  0624 07/16/24  1939 07/17/24  0800 07/18/24  0721   *  --  206* 143*   *  --  92* 106*   CREATSERUM 3.20*  --  2.50* 2.41*   EGFRCR 14*  --  18* 19*   CA 8.9  --  8.8 9.0   *  --  132* 133*   K 6.0* 5.6* 4.9 4.6     --  102 98   CO2 22.0  --  25.0 28.0         Urinalysis Results (last three years):  Recent Labs     01/26/23  2348 09/10/23  1711 11/14/23  0001 07/08/24  2223   COLORUR Yellow Light-Yellow Light-Yellow Light-Yellow   CLARITY Clear Clear Turbid* Turbid*   SPECGRAVITY 1.015 1.008 1.012 1.008   PHURINE 6.5 6.5 6.0 6.0   PROUR 100 mg/dL* 70* 200* 100*   GLUUR Negative Normal Normal 150*   KETUR Negative Negative Negative Negative   BILUR Negative Negative Negative Negative   BLOODURINE Negative Negative 1+* 2+*   NITRITE Positive* Negative Negative Negative   UROBILINOGEN 0.2 Normal Normal Normal   LEUUR Negative Negative 75* 25*   WBCUR 1-5  1-5 1-5 21-50* 11-20*   RBCUR 0-2  0-2 0-2 >10*  >10*   BACUR Rare*  Rare* None Seen 1+* 1+*       Urine Culture Results (last three years):  Lab Results   Component Value Date    URINECUL >100,000 CFU/ML Klebsiella pneumoniae (A) 07/08/2024    URINECUL <10,000 CFU/ML Gram negative melina 07/08/2024    URINECUL >100,000 CFU/ML Klebsiella pneumoniae (A) 11/14/2023    URINECUL >100,000 CFU/ML Klebsiella pneumoniae (A) 01/26/2023       Imaging  MRI BRAIN (CPT=70551)    Result Date: 7/18/2024  PROCEDURE:  MRI BRAIN (CPT=70551)  COMPARISON:  [facility] , CT, CT BRAIN OR HEAD ([id number]), 7/15/2024, 7:09 PM.  INDICATIONS:  right sided weaknes, r/o stroke  TECHNIQUE:  MRI of the brain was performed with multi-planar T1, T2-weighted images with FLAIR sequences and diffusion weighted images without infusion.  PATIENT STATED HISTORY: (As transcribed by Technologist)  Generalized weakness ( sarah. right side )  Evaluate for stroke.    FINDINGS:   Prominence of the sulci is noted.   There is no midline shift or mass effect.   The basal cisterns are patent.   The craniocervical junction is unremarkable.   Midline structures including corpus callosum, optic chiasm and cerebellar tonsils are overall unremarkable.  There is no acute intracranial hemorrhage or extra-axial fluid collection identified.   Marked FLAIR abnormalities in the white matter.   No significant abnormal parenchymal gradient susceptibility.   There is no restricted diffusion to suggest acute ischemia/infarction.  The visualized paranasal sinuses and mastoid air cells are unremarkable.   The expected major intracranial flow voids are present.            CONCLUSION:   1. No evidence of an acute infarct.  2. Global parenchymal volume loss.  3. Marked FLAIR abnormalities the white matter are statistically likely sequelae of chronic small vessel ischemic disease.   LOCATION:  [facility]   Dictated by (CST): Christiano Connors MD on 7/18/2024 at 10:57 AM     Finalized by (CST): Christiano Connors MD on 7/18/2024 at 10:58 AM        XR CHEST PA + LAT CHEST (CPT=71046)    Result Date: 7/16/2024  PROCEDURE:  XR CHEST PA + LAT CHEST (CPT=71046)  INDICATIONS:  Shortness of breath  COMPARISON:  [facility] , XR, XR CHEST AP PORTABLE  (CPT=71045), 7/08/2024, 9:36 PM.  TECHNIQUE:  PA and lateral chest radiographs were obtained.  PATIENT STATED HISTORY: (As transcribed by Technologist)  Patient offered no additional history at this time.               CONCLUSION:   Stable cardiac and mediastinal contours.  Stable positioning of left chest wall ICD.  Findings of congestive heart failure with mild interstitial pulmonary edema.  No associated pleural effusion or pneumothorax.   LOCATION:  [facility]   Dictated by (CST): Gurjit Sotomayor MD on 7/16/2024 at 12:06 PM     Finalized by (CST): Gurjit Sotomayor MD on 7/16/2024 at 12:07 PM       CT BRAIN OR HEAD (12020)    Result Date: 7/15/2024  PROCEDURE:  CT BRAIN OR HEAD (07375)  COMPARISON:  [facility], CT, CT BRAIN OR HEAD ([id number]), 7/08/2024, 8:19 PM.  INDICATIONS:  Deficits in coordination and motor planning noted by PT OT today  TECHNIQUE:  Noncontrast CT scanning is performed through the brain. Dose reduction techniques were used. Dose information is transmitted to the ACR (American College of Radiology) NRDR (National Radiology Data Registry) which includes the Dose Index Registry.  PATIENT STATED HISTORY: (As transcribed by Technologist)  Patient having coordination issues    FINDINGS:  There is cerebral atrophy with symmetric prominence of the ventricles. There are patchy areas of low attenuation in the periventricular and deep white matter which are nonspecific but most consistent with small vessel ischemic changes.  Multifocal areas of encephalomalacia, similar prior.  There is no evidence of hemorrhage, mass, midline shift, or extra-axial fluid collection.  The visualized paranasal sinuses show no significant findings. No evidence of depressed skull fracture.            CONCLUSION: 1. No acute intracranial findings  or significant interval changes compared to 2024.  Consider MRI evaluation if there is continued clinical concern. 2. Cerebral atrophy with chronic microvascular ischemic changes.    LOCATION:  [facility]   Dictated by (CST): Carlos Eduardo Bowman MD on 7/15/2024 at 7:44 PM     Finalized by (CST): Carlos Eduardo Bowman MD on 7/15/2024 at 7:46 PM       CARD ECHO 2D DOPPLER (CPT=93306)    Result Date: 2024  Transthoracic Echocardiogram Name:Ciarra Salcido Date: 2024 :  1940 Ht:  (64in)  BP: 131 / 57 MRN:  [id number]    Age:  83years    Wt:  (168lb) HR: 60bpm Loc:  [facility]       Gndr: F          BSA: 1.82m^2 Sonographer: Martina CASTANEDA Ordering:    Rachel Hanna Consulting:  Mary Lares ---------------------------------------------------------------------------- History/Indications:  Pacemaker.  Atrial fibrillation.  Heart Failure. Shortness of breath. Chronic kidney disease stage IV.  Heart Failure with preserved ejection fraction.  Risk factors:  Hypertension. Diabetes mellitus. Dyslipidemia. ---------------------------------------------------------------------------- Procedure information:  A transthoracic complete 2D study was performed. Additional evaluation included M-mode, complete spectral Doppler, and color Doppler.  Patient status:  Inpatient.  Location:  [facility]. Comparison was made to the study of 2023.    This was a routine study. Transthoracic echocardiography for ventricular function evaluation and assessment of valvular function. Image quality was adequate. ECG rhythm:   Paced rhythm ---------------------------------------------------------------------------- Conclusions: 1. Left ventricle: The cavity size was normal. Wall thickness was mildly    increased. Systolic function was hyperdynamic. The estimated ejection    fraction was 65-70%, by visual assessment. No diagnostic evidence for    regional wall motion abnormalities. Left ventricular diastolic function    parameters were normal  for the patient's age. 2. Right ventricle: The cavity size was mildly increased. Pacer wire noted    in the right ventricle. Systolic function was normal. 3. Left atrium: The left atrial volume was markedly increased. 4. Right atrium: The atrium was moderately dilated. Pacer wire noted in    right atrium. 5. Aortic valve: There was thickening, consistent with sclerosis. There was    mild regurgitation. 6. Mitral valve: There was mild to moderate regurgitation. 7. Pulmonary arteries: Systolic pressure was moderately increased, in the    range of 50mm Hg to 55mm Hg. Estimated pulmonary artery diastolic    pressure was 17mm Hg. 8. Pericardium, extracardiac: There was no pericardial effusion. 9. Inferior vena cava: The IVC was normal-sized. Impressions:  This study is compared with previous dated 11/14/2023: No significant change since prior study. * ---------------------------------------------------------------------------- * Findings: Left ventricle:  The cavity size was normal. Wall thickness was mildly increased. Systolic function was hyperdynamic. The estimated ejection fraction was 65-70%, by visual assessment. No diagnostic evidence for regional wall motion abnormalities. Left ventricular diastolic function parameters were normal for the patient's age. Left atrium:  The atrium was moderately to markedly dilated. The left atrial volume was markedly increased. Right ventricle:  The cavity size was mildly increased. Pacer wire noted in the right ventricle. Systolic function was normal. Right atrium:  The atrium was moderately dilated. There was a prominent Chiari network. Pacer wire noted in right atrium. Mitral valve:  The annulus was mildly calcified. Leaflet separation was normal.  Doppler:  Transvalvular velocity was within the normal range. There was no evidence for stenosis. There was mild to moderate regurgitation. Aortic valve:   The valve was trileaflet. The leaflets were mildly thickened and mildly  calcified. There was thickening, consistent with sclerosis. Cusp separation was normal.  Doppler:  Transvalvular velocity was within the normal range. There was no evidence for stenosis. There was mild regurgitation. Tricuspid valve:  The valve is structurally normal. Leaflet separation was normal.  Doppler:  Transvalvular velocity was within the normal range. There was no evidence for stenosis. There was mild regurgitation. Pulmonic valve:    There was no significant valve disease.    Doppler: Transvalvular velocity was within the normal range. There was no evidence for stenosis. There was moderate regurgitation. Pericardium:   There was no pericardial effusion. Aorta: Aortic root: The aortic root was 3.5cm diameter. Pulmonary arteries: The main pulmonary artery was mildly dilated. Systolic pressure was moderately increased, in the range of 50mm Hg to 55mm Hg. Estimated pulmonary artery diastolic pressure was 17mm Hg. Systemic veins:  Central venous respirophasic diameter changes are in the normal range (>50%). Inferior vena cava: The IVC was normal-sized. ---------------------------------------------------------------------------- Measurements  Left ventricle         Value        Ref       11/14/2023  IVS thickness, ED, (H) 1.1   cm     0.6 - 0.9 1.1  PLAX  LV ID, ED, PLAX        4.7   cm     3.8 - 5.2 4.0  LV ID, ES, PLAX        2.9   cm     2.2 - 3.5 2.7  LV PW thickness,   (H) 1.3   cm     0.6 - 0.9 1.0  ED, PLAX  IVS/LV PW ratio,       0.90         --------- 1.04  ED, PLAX  LV PW/LV ID ratio,     0.27         --------- 0.26  ED, PLAX  LV ejection            70    %      54 - 74   62  fraction  LV e', lateral     (L) 5.1   cm/sec >=10.0    4.6  LV E/e', lateral   (H) 19           <=13      29  LV e', medial      (L) 5.3   cm/sec >=7.0     4.3  LV E/e', medial        19           --------- 31  LV e', average         5.2   cm/sec --------- 4.5  LV E/e', average   (H) 19           <=14      30  Aortic valve            Value        Ref       11/14/2023  Vena contracta         0.3   cm     --------- ----------  width  Left atrium            Value        Ref       11/14/2023  LA ID, A-P, ES     (H) 4.0   cm     2.7 - 3.8 4.4  LA volume, S       (H) 127   ml     22 - 52   109  LA volume/bsa, S   (H) 70    ml/m^2 16 - 34   62  LA volume, ES, 1-p (H) 127   ml     22 - 52   121  A4C  LA volume, ES, 1-p (H) 121   ml     22 - 52   92  A2C  LA volume, ES, A/L     135   ml     --------- 116  LA volume/bsa, ES, (H) 74    ml/m^2 16 - 34   66  A/L  Mitral valve           Value        Ref       11/14/2023  Mitral E-wave peak     0.98  m/sec  --------- 1.32  velocity  Mitral A-wave peak     1.27  m/sec  --------- 0.73  velocity  Mitral peak            4     mm Hg  --------- 8  gradient, D  Mitral E/A ratio,      0.8          --------- 1.8  peak  Mitral regurg vena     0.5   cm     --------- ----------  contracta width  Pulmonary artery       Value        Ref       11/14/2023  PA pressure, S, DP     53    mm Hg  --------- ----------  PA pressure, ED,       17    mm Hg  --------- ----------  DP  Tricuspid valve        Value        Ref       11/14/2023  Tricuspid regurg   (H) 3.53  m/sec  <=2.8     2.98  peak velocity  Tricuspid peak         50    mm Hg  --------- 47  RV-RA gradient  Systemic veins         Value        Ref       11/14/2023  Estimated CVP          3     mm Hg  --------- 10  Inferior vena cava     Value        Ref       11/14/2023  ID                 (H) 2.2   cm     <=2.1     ----------  Right ventricle        Value        Ref       11/14/2023  TAPSE, 2D              2.13  cm     >=1.70    ----------  RV pressure, S, DP     53    mm Hg  --------- 57  RV s', lateral         11.2  cm/sec >=9.5     ----------  Pulmonic valve         Value        Ref       11/14/2023  Pulmonic               85    ms     --------- ----------  acceleration time  Pulmonic regurg        1.84  m/sec  --------- 1.55  velocity, ED  Pulmonic regurg        13     mm Hg  --------- 10  gradient, ED Legend: (L)  and  (H)  rafael values outside specified reference range. ---------------------------------------------------------------------------- Prepared and electronically signed by James Thibodeaux 07/12/2024 16:21     US KIDNEY W DOPPLER (HXQ=[id number])    Result Date: 7/12/2024  PROCEDURE:  US KIDNEY W DOPPLER (YTK=[id number])  COMPARISON:  None.  INDICATIONS:  severe HTN, PRISCILLA, CKD 4  TECHNIQUE:  Ultrasound of the bilateral kidneys were performed with Doppler evaluation of the renal arteries and veins. The renal vessels were evaluated with B-mode ultrasound, Doppler color flow, and spectral analysis.  PATIENT STATED HISTORY: (As transcribed by Technologist)     FINDINGS:  RIGHT KIDNEY:  Normal for age.  8.8 cm LEFT KIDNEY:  Normal for age.  8.9 cm  Doppler velocity measurements and renal/aortic ratios are as follows: AORTA:     88.0 cm/s  RIGHT RENAL ARTERY ORIGIN:    57 cm/s,  ratio = 0.65 PROXIMAL:  57 cm/s,  ratio = 0.65 MID:       50 cm/s,  ratio = 0.57 DISTAL:    50 cm/s,  ratio = 0.57  MAXIMUM ACCELERATION TIME:  0.03 seconds  LEFT RENAL ARTERY ORIGIN:    67 cm/s,  ratio = 0.75 PROXIMAL:  107 cm/s,  ratio = 1.22 MID:       110 cm/s,  ratio = 1.25 DISTAL:    118 cm/s,  ratio = 1.34  MAXIMUM ACCELERATION TIME:  0.03 seconds RENAL VEINS:  There is normal Doppler wave form.            CONCLUSION:  No signs of renal artery stenosis.   LOCATION:  [facility]     Dictated by (CST): Mahamed Duran MD on 7/12/2024 at 10:50 AM     Finalized by (CST): Mahamed Duran MD on 7/12/2024 at 10:51 AM       XR HIP W OR WO PELVIS(MIN 5 VIEWS),BILAT(CPT=73523)    Result Date: 7/10/2024  PROCEDURE:  XR HIP W OR WO PELVIS (MIN 5 VIEWS), BILAT (CPT=73523)  TECHNIQUE:  Bilateral min 5 views of the hip and pelvis if performed.  COMPARISON:  None.  INDICATIONS:  pt has groin pain and reports she rolled out of bed prior to hospital admission  PATIENT STATED HISTORY: (As transcribed by Technologist)   Patient stated she fell out of bed 2-3 days ago. Has bilateral pain of both hips, but left hip is worse.    FINDINGS:  BONES:  There is diffuse osteopenia.  There is mild enthesopathy.  There is mild narrowing of the hip joints bilaterally.  There is no evidence of fracture.  No bone destruction.  Small bone island involves the left femoral head. SOFT TISSUES:  Minimal vascular calcification. EFFUSION:  None visible. OTHER:  Negative.            CONCLUSION:  No acute fracture.  Degenerative changes as described above.   LOCATION:  [id number]   Dictated by (CST): Matthew Sahni MD on 7/10/2024 at 12:19 PM     Finalized by (CST): Matthew Sahni MD on 7/10/2024 at 12:21 PM       XR CHEST AP PORTABLE  (CPT=71045)    Result Date: 7/8/2024  PROCEDURE:  XR CHEST AP PORTABLE  (CPT=71045)  TECHNIQUE:  AP chest radiograph was obtained.  COMPARISON:  [facility], XR, XR CHEST PA + LAT CHEST (CPT=71046), 5/14/2024, 12:38 PM.  INDICATIONS:  Weakness, HTN(SBP over 200) . Poss UTI  PATIENT STATED HISTORY: (As transcribed by Technologist)  Pt. with weakness, high blood pressure.    FINDINGS:  Heart size is within normal limits.  Pleural spaces appear clear.  Mediastinal and hilar contours are normal.  No focal consolidation.  Postsurgical changes of the chest are noted.  Mild prominence of the pulmonary vasculature may reflect mild  vascular congestion and volume overload.            CONCLUSION:  See above.   LOCATION:  [id number]      Dictated by (CST): Evangelist Layton MD on 7/08/2024 at 10:00 PM     Finalized by (CST): Evangelist Layton MD on 7/08/2024 at 10:00 PM       US VENOUS DOPPLER LEG BILAT - DIAG IMG (CPT=93970)    Result Date: 7/8/2024  PROCEDURE:  US VENOUS DOPPLER LEG BILAT - DIAG IMG (CPT=93970)  COMPARISON:  None.  INDICATIONS:  Lower extremity swelling.  TECHNIQUE:  Real time, grey scale, and duplex ultrasound was used to evaluate the lower extremity venous system. B-mode two-dimensional images of the vascular  structures, Doppler spectral analysis, and color flow.  Doppler imaging were performed.  The following veins were imaged bilaterally:  Common, deep, and superficial femoral, popliteal, sapheno-femoral junction, and posterior tibial veins.  FINDINGS:  SAPHENOFEMORAL JUNCTION:  Unremarkable without evidence of thrombus THROMBI:  None visible. COMPRESSION:  Normal compressibility, phasicity, and augmentation. OTHER:  Negative.            CONCLUSION:  No evidence of deep venous thrombosis in the bilateral lower extremities.  LOCATION:  [facility]   Dictated by (CST): Ede Schaffer MD on 7/08/2024 at 9:31 PM     Finalized by (CST): Ede Schaffer MD on 7/08/2024 at 9:31 PM       CT BRAIN OR HEAD (18431)    Result Date: 7/8/2024  PROCEDURE:  CT BRAIN OR HEAD (32812)  COMPARISON:  None.  INDICATIONS:  Weakness, HTN(SBP over 200) . Poss UTI  TECHNIQUE:  Noncontrast CT scanning is performed through the brain. Dose reduction techniques were used. Dose information is transmitted to the ACR (American College of Radiology) NRDR (National Radiology Data Registry) which includes the Dose Index Registry.  PATIENT STATED HISTORY: (As transcribed by Technologist)  Weakness and htn   FINDINGS: Mild global brain parenchymal volume loss without overt hydrocephalus.  There is no midline shift or mass-effect.  The basal cisterns are patent.  The gray-white matter differentiation is intact.  There is no acute intracranial hemorrhage or extra-axial fluid collection.  There is no evidence of acute territorial infarction.  There are scattered small areas of encephalomalacia noted in the bilateral frontal lobes and right parietal lobe.  There are moderate age-indeterminate microvascular ischemic changes in the cerebral white matter.  There is no evident fracture.  Trace ethmoid mucosal thickening.  Small amount of secretions in the right sphenoid sinus.  Minimal fluid in the left mastoid air cells.  Bilateral intra-ocular lens implants.              CONCLUSION:   1. No acute intracranial abnormality identified.  2. There are scattered small areas of encephalomalacia noted in the bilateral frontal lobes and right parietal lobe.  There are moderate age-indeterminate microvascular ischemic changes in the cerebral white matter. If there is clinical concern for acute  ischemia/infarction, an MRI of the brain would be recommended for further evaluation.  LOCATION:  [facility]   Dictated by ([facility]): Ede Schaffer MD on 7/08/2024 at 8:32 PM     Finalized by ([facility]): Ede Schaffer MD on 7/08/2024 at 8:41 PM       XR LUMBAR SPINE (MIN 2 VIEWS) (CPT=72100)    Result Date: 6/21/2024  PROCEDURE:  XR LUMBAR SPINE (MIN 2 VIEWS) (CPT=72100)  TECHNIQUE:  AP, lateral, and coned down L5-S1 views were obtained.  COMPARISON:  [facility], XR, XR LUMBAR SPINE (MIN 4 VIEWS) (CPT=72110), 5/14/2024, 12:48 PM.  INDICATIONS:  S32.010A Closed compression fracture of L1 vertebra, initial encounter (Formerly [facility])  PATIENT STATED HISTORY: (As transcribed by Technologist)  Patient is having an orthopedic follow up.  Patient stated she was diagnosed with a fracture in her Lumbar spine 4-5 weeks ago. She denies spine surgery.    FINDINGS:  Worsening appearance, now with severe compression deformity involving the L1 vertebra, which showed a much more modest degree of deformity on the prior examination from about 5 weeks ago.  There is now more severe anterior wedging.  Additional lumbar vertebra show no signs of fracture.  The bones are markedly demineralized.  Mild scoliosis concave to the right, and degenerative changes the spine as demonstrated on the prior appears stable.            CONCLUSION:  Worsening appearance, now with severe compression deformity with worsening anterior wedging of the L1 vertebra.   LOCATION:  [facility]   Dictated by ([facility]): Mahamed Duran MD on 6/21/2024 at 10:44 AM     Finalized by ([facility]): Mahamed Duran MD on 6/21/2024 at 10:45 AM             Impression:  Patient Active  Problem List   Diagnosis    CKD (chronic kidney disease) stage 4, GFR 15-29 ml/min (HCC)    Diabetes mellitus, type 2 (HCC)    Primary hypertension    Mixed hyperlipidemia    Acquired hypothyroidism    Osteopenia    PRISCILLA (acute kidney injury) (HCC)    Hyperkalemia    Diabetic polyneuropathy associated with type 2 diabetes mellitus (HCC)    Current moderate episode of major depressive disorder without prior episode (HCC)    Acute on chronic heart failure with preserved ejection fraction (HCC)    Multiple subsegmental pulmonary emboli without acute cor pulmonale (HCC)    Smokers' cough (HCC)    Chronic deep vein thrombosis (DVT) of proximal vein of lower extremity (HCC)    Atrial fibrillation with rapid ventricular response (HCC)    Chronic heart failure with preserved ejection fraction (HCC)    Stage 3 chronic kidney disease (HCC)    Hypertension, unspecified type    Acute cystitis with hematuria    Altered mental status, unspecified altered mental status type    Arthralgia of right lower leg    Rectal bleeding    Hyperglycemia    Hypertensive urgency    Hyperlipidemia    Hypothyroidism    ATN (acute tubular necrosis) (HCC)    Acute gout of multiple sites    Cardiorenal syndrome    Right sided weakness    Cognitive decline       84 year old female with history of HFpEF/ DVT/ PE/ DM/ hypothyroidism/ HTN/ HLD/ A-fib who presented to the hospital 7/8/24 for high blood pressures over the two weeks prior with associated fatigue and elevated sugars. She received IV hydralazine in the ED for /91 and admitted for further evaluation. She has been evaluated by nephrology for PRISCILLA on CKD, cardiology for hypertensive urgency and HF exacerbation, and treated for UTI during this admission, discharge planning in place. Urology consulted today due to noted urinary retention overnight. Johnson was placed early this morning with 950mL drained.     Reviewed with patient, recommendation for discharge with johnson catheter and void trial  in 1-2 weeks. Urinary retention may be related to current deconditioning, but there may also be some chronic incomplete emptying given history of diabetes and evidence of recurrent Klebsiella UTI/bacteriuria.     Recommendations:  Continue johnson catheter for additional 7-10 days. May have voiding trial completed at facility or with  services as outpatient.    We will sign off at this time. Please let us know if we can be of any further assistance during admission.     Thank you for allowing me to participate in the care of your patient.    Miya Grier PA-C  [facility]  7/18/2024  12:26 PM

## 2024-07-18 NOTE — PROGRESS NOTES
[facility]   part of [facility]     Hospitalist Progress Note     Ciarra Salcido Patient Status:  Inpatient    1940 MRN [id number]   Location [facility] 3NE-A Attending Cabrera Pineda MD   Hosp Day # 9 PCP JUDY CYR MD     Chief Complaint: HTN, fatigue    Subjective:     No acute events, pt with urinary retention.    Objective:    Review of Systems:   A comprehensive review of systems was completed; pertinent positive and negatives stated in subjective.    Vital signs:  Temp:  [97.5 °F (36.4 °C)-98.7 °F (37.1 °C)] 97.5 °F (36.4 °C)  Pulse:  [59-65] 61  Resp:  [16-18] 18  BP: (105-174)/(44-61) 135/44  SpO2:  [89 %-96 %] 90 %    Physical Exam:    /44 (BP Location: Left arm)   Pulse 61   Temp 97.5 °F (36.4 °C) (Oral)   Resp 18   Ht 5' 4\" (1.626 m)   Wt 151 lb 14.4 oz (68.9 kg)   SpO2 90%   BMI 26.07 kg/m²     General: No acute distress  Respiratory: No wheezes, no rhonchi  Cardiovascular: S1, S2, regular rate and rhythm  Abdomen: Soft, Non-tender, non-distended, positive bowel sounds  Neuro: Awake alert, no new focal deficits.   Extremities: Bilateral pedal pedal edema, no calf tenderness  redness near the first metatarsophalangeal joint on both feet and has pain of both knees and ankles, due to gout flareup-pain and swelling better than yesterday, right knee still feels swollen and unable to ambulate due to this according to patient     Diagnostic Data:    Labs:  Recent Labs   Lab 24  0800   WBC 6.2   HGB 10.8*   MCV 90.8   .0       Recent Labs   Lab 24  0624 24  1941 24  0800 24  0721   *  --  206* 143*   *  --  92* 106*   CREATSERUM 3.20*  --  2.50* 2.41*   CA 8.9  --  8.8 9.0   *  --  132* 133*   K 6.0* 5.6* 4.9 4.6     --  102 98   CO2 22.0  --  25.0 28.0       Estimated Creatinine Clearance: 14.5 mL/min (A) (based on SCr of 2.41 mg/dL (H)).    No results for input(s): \"TROP\", \"TROPHS\", \"CK\" in the last 168  hours.      No results for input(s): \"PTP\", \"INR\" in the last 168 hours.                 Microbiology    Hospital Encounter on 07/08/24   1. Urine Culture, Routine     Status: Abnormal    Collection Time: 07/08/24 10:23 PM    Specimen: Urine, clean catch   Result Value Ref Range    Urine Culture >100,000 CFU/ML Klebsiella pneumoniae (A) N/A    Urine Culture <10,000 CFU/ML Gram negative melina N/A       Susceptibility    Klebsiella pneumoniae -  (no method available)     Ampicillin  Resistant      Ampicillin + Sulbactam 4 Sensitive      Cefazolin <=4 Sensitive      Ciprofloxacin <=0.25 Sensitive      Gentamicin <=1 Sensitive      Meropenem <=0.25 Sensitive      Levofloxacin <=0.12 Sensitive      Nitrofurantoin 64 Intermediate      Piperacillin + Tazobactam <=4 Sensitive      Trimethoprim/Sulfa <=20 Sensitive          Imaging: Reviewed in Epic.    Medications:    polyethylene glycol (PEG 3350)  17 g Oral Daily    colchicine  0.3 mg Oral Q72H    docusate sodium  100 mg Oral BID    insulin aspart  1-10 Units Subcutaneous TID CC and HS    insulin degludec  15 Units Subcutaneous Daily    apixaban  2.5 mg Oral BID    insulin aspart  2-10 Units Subcutaneous TID AC and HS    allopurinol  100 mg Oral Daily    amiodarone  200 mg Oral Daily    dilTIAZem ER  120 mg Oral Daily    escitalopram  5 mg Oral Daily    gabapentin  100 mg Oral TID    levothyroxine  75 mcg Oral Before breakfast    atorvastatin  10 mg Oral Nightly    carvedilol  12.5 mg Oral BID with meals       Assessment & Plan:      #HTN urgency   # acute on chronic HFpEF and right sided failure  # Cardiorenal syndrome  -BP as high as 225/91 on admission, s/p 10 IV hydralazine in ED  -BNP 12,823 with baseline around 10,000  -EKG showing A-paced rhythm @60 bpm  -venous duplex negative for DVT  -chest xray without acute process  -echo Nov 2023: EF 60-65%, mild-mod aortic regurg, mod mirtal regurg, mod tricuspid regurg  -plan for nitroglycerin paste for HTN urgency with  goal reduction of BP by 25% over 24 hrs  -Status post lasix 40 IV BID  -strict I/O, daily weights  -interrogate PPM to assess for A-fib burden which could be contributing  -monitor on telemetry  -cont home losartan, amiodarone, diltiazem, metoprolol  -IV hydralazine prn  -cardiology on consult    #Atrial fibrillation, rate controlled, anticoagulation with low-dose Eliquis 2.5 mg twice daily per cardiology     #Hyponatremia  -mild at 133  -likely 2/2 volume overload and CHF  -tx as above  -cont to monitor BMP     #Hyperkalemia  -Nephrology following, status post Veltassa, now on Lokelma per nephrology  -Nephrology starting patient on Bumex drip on 7/16/2024  -monitor closely    #PRISCILLA on CKD IV, possible cardiorenal  Baseline Cr under 2  Cr improving with diuretics    # Hyperkalemia  -Mild to side being given by nephrologist on 7/13/2024  -Follow-up BMP in a.m.    #NAGMA  -CO2 of 19 with AG of 7  -likely 2/2 underlying renal ds  -cont to monitor  -consider addition of bicarb tabs if not improving     #normocytic anemia with heme positive stools  -hgb 11.3 with baseline 11-12  -heme-occult trace positive per ED physician  -sounds likely to be hemorrhoidal based on pt report  -cont to monitor CBC and transfuse for hgb <7  -liquid diet until hgb stable  -if hgb down trending consider GI eval  -hold home apixaban until hgb stable     #hypoalbuminemia  -albumin 3.0  -may be 2/2 volume overloaded state  -cont to monitor     # Diabetes mellitus type 2 with hyperglycemia  - home sitagliptin, Januvia, glimepiride on hold since admission, will plan to continue these at the time of discharge, patient states her blood sugars are well-controlled at home with the home regimen, however dc Januvia permanently with edema  - Tresiba long-acting insulin  -NovoLog carb counting and correction factor     # Hyperlipidemia  -cont home simvastatin     #Depression  -cont home escitalopram     #Hypothyroidism  -cont home levothyroxine      #Gout  -cont home allopurinol, on low dose Colchicine     #Asymptomatic bacteriuria  -pt denied any urinary complaints, normal WBC and afebrile  -monitor off antibiotics    #Osteoarthritis with groin pain  -X-ray of the pelvis and hip with no fracture, shows osteoarthritis  -PT OT  - pain management    # Acute gout flareup with bilateral first metatarsophalangeal joint pain and redness, bilateral ankle pain, bilateral knee pain, bilateral elbow pain  - uric acid level which came back elevated  -on colchicine-discussed with pharmacy, who renal dosed based on patient's kidney function test and with patient's multiple other medications    #Right sided weakness, MRI brain pending, neurology following    #Urinary retention, johnson inserted overnight, urology consulted, recc cont johnson for 7-10 days, voiding trial to be completed at [facility]    Seen by PT OT who recommended subacute rehab    Adonay Yates MD        Supplementary Documentation:     Quality:  DVT Mechanical Prophylaxis:   SCDs,    DVT Pharmacologic Prophylaxis   Medication    apixaban (Eliquis) tab 2.5 mg                Code Status: DNAR/Selective Treatment  Johnson: Johnson catheter in place  Johnson Duration (in days):   Central line:    AMINTA:     Discharge is dependent on: progress  At this point Ms. Salcido is expected to be discharge to: home    The 21st Century Cures Act makes medical notes like these available to patients in the interest of transparency. Please be advised this is a medical document. Medical documents are intended to carry relevant information, facts as evident, and the clinical opinion of the practitioner. The medical note is intended as peer to peer communication and may appear blunt or direct. It is written in medical language and may contain abbreviations or verbiage that are unfamiliar.             **Certification      PHYSICIAN Certification of Need for Inpatient Hospitalization - Initial Certification    Patient will require inpatient  services that will reasonably be expected to span two midnight's based on the clinical documentation in H+P.   Based on patients current state of illness, I anticipate that, after discharge, patient will require TBD.

## 2024-07-18 NOTE — PLAN OF CARE
Problem: Diabetes/Glucose Control  Goal: Glucose maintained within prescribed range  Description: INTERVENTIONS:  - Monitor Blood Glucose as ordered  - Assess for signs and symptoms of hyperglycemia and hypoglycemia  - Administer ordered medications to maintain glucose within target range  - Assess barriers to adequate nutritional intake and initiate nutrition consult as needed  - Instruct patient on self management of diabetes  7/18/2024 1834 by Carol Prescott, RN  Outcome: Completed  7/18/2024 1142 by Carol Prescott, RN  Outcome: Progressing      No

## 2024-07-18 NOTE — DIETARY NOTE
[facility]   part of [facility]   CLINICAL NUTRITION    Ciarra Salcido     Admitting diagnosis:  Rectal bleeding [K62.5]  Hyperglycemia [R73.9]  Arthralgia of right lower leg [M25.561]  Acute cystitis with hematuria [N30.01]  Altered mental status, unspecified altered mental status type [R41.82]  Hypertension, unspecified type [I10]    Ht: 162.6 cm (5' 4\")  Wt: 68.9 kg (151 lb 14.4 oz).   Body mass index is 26.07 kg/m².  IBW: 54.5kg    Wt Readings from Last 6 Encounters:   07/18/24 68.9 kg (151 lb 14.4 oz)   06/21/24 63.5 kg (140 lb)   05/17/24 63.5 kg (140 lb)   05/14/24 63.5 kg (140 lb)   04/11/24 64.5 kg (142 lb 3.2 oz)   03/19/24 62.5 kg (137 lb 12.8 oz)        Labs/Meds reviewed    Diet:       Procedures    Cardiac diet Calorie Restriction/Carb Controlled: 1800 kcal/60 grams; Sodium Restriction: 2 GM NA; Fluid Restriction: 2000 ml; Is Patient on Accuchecks? Yes     Percent Meals Eaten (last 3 days)       Date/Time Percent Meals Eaten (%)    07/15/24 0900 100 %    07/15/24 1230 80 %    07/16/24 0900 100 %    07/16/24 1230 100 %    07/16/24 1900 100 %    07/17/24 0900 100 %    07/17/24 1249 95 %          7/18 - Pt rescreened. Pt tolerating % of 3 meals/day.  No n/v/d reported. Last B< 7/17. Wt down with diuresis. Net negative 9L per I&Os.    83 year old admitted for rectal bleeding .   POC glucose 163- 205    Pt chart reviewed d/t consult for heart failure diet education. Pt lives in assisted living and meals are provided.   Patient reports good appetite at this time.  Nursing notes reports Percent Meals Eaten (%): 95 % intake for last meal.  Tolerating po diet without diarrhea, emesis, or constipation.   No significant weight changes noted.     Patient is at low nutrition risk at this time.    Please consult if patient status changes or nutrition issues arise.    Cindy Herbert RD, LDN, CNSC  Clinical Dietitian  Phone [phone]

## 2024-07-18 NOTE — PLAN OF CARE
Assumed patient care at 1930  Pt oriented x 3   A-paced on tele  Vitals stable, on O2 2lnc , denies pain   Bumex gtt ongoing   Safety precautions in place   Continue poc     Problem: Diabetes/Glucose Control  Goal: Glucose maintained within prescribed range  Description: INTERVENTIONS:  - Monitor Blood Glucose as ordered  - Assess for signs and symptoms of hyperglycemia and hypoglycemia  - Administer ordered medications to maintain glucose within target range  - Assess barriers to adequate nutritional intake and initiate nutrition consult as needed  - Instruct patient on self management of diabetes  Outcome: Progressing

## 2024-07-18 NOTE — PLAN OF CARE
Assumed patient care at 1930  Pt oriented x 3   A-paced on tele  Vitals stable, on O2 2lnc , denies pain   Bumex gtt ongoing   Strict I&O, daily weight   Safety precautions in place   Continue poc       0100; bladder scan 830, Dr Husain updated pt with urinary retention. Orders to place johnson, pt updated and johnson inserted.     Problem: Diabetes/Glucose Control  Goal: Glucose maintained within prescribed range  Description: INTERVENTIONS:  - Monitor Blood Glucose as ordered  - Assess for signs and symptoms of hyperglycemia and hypoglycemia  - Administer ordered medications to maintain glucose within target range  - Assess barriers to adequate nutritional intake and initiate nutrition consult as needed  - Instruct patient on self management of diabetes  Outcome: Progressing

## 2024-07-18 NOTE — PROGRESS NOTES
NURSING DISCHARGE NOTE    Discharged Rehab facility via Ambulance.  Accompanied by Support staff  Belongings Taken by patient/family.    Discharged instructions discussed with MOIRA Butler of the facility.   Patient discharged with johnson catheter and mentioned to RN the urology order.  Evening medications administer before leaving.   Family are aware of the discharge.  Telemetry/IV removed.

## 2024-07-18 NOTE — PROGRESS NOTES
[facility]  Nephrology Progress Note    Ciarra Salcido Attending:  Jemima Lopez MD       Assessment and Plan:    1) PRISCILLA- due to cardiorenal syndrome / decompensated HF; no other insults. Off ARB; meds benign. No obstruction by US. Hemodynamics OK. No contrast studies. PLAN- improving with diuresis     2) CKD 4- likely due to longstanding DM / HTN; c/w bland urine sediment and mild proteinuria. No further w/u     3) Severe HTN- exac by fluid overload; much improved with diuresis. Continue coreg / cardizem      4) HFpEF with mod MR / TR / AI. Edematous and CXR c/w pulm edema -> diuresing well, appears compensated. Transition to PO diuretics (torsemide 20 mg daily)     5) PAF s/p PPM  + GILLES / DCCV - Apaced on amio / BB / CCB / DOAC per cards     6) DM 2- dc januvia permanently with edema / CHF    7) Hyperkalemia- due to PRISCILLA / CKD / type IV RTA; resolved    8) Gout- uric acid mildly elevated; on low dose colchicine    DC planning      Subjective:  Awake pleasant wrist feeling better    Physical Exam:   /44 (BP Location: Left arm)   Pulse 61   Temp 97.5 °F (36.4 °C) (Oral)   Resp 18   Ht 5' 4\" (1.626 m)   Wt 151 lb 14.4 oz (68.9 kg)   SpO2 90%   BMI 26.07 kg/m²   Temp (24hrs), Av.1 °F (36.7 °C), Min:97.5 °F (36.4 °C), Max:98.7 °F (37.1 °C)       Intake/Output Summary (Last 24 hours) at 2024 0853  Last data filed at 2024 0500  Gross per 24 hour   Intake 480 ml   Output 4750 ml   Net -4270 ml     Wt Readings from Last 3 Encounters:   24 151 lb 14.4 oz (68.9 kg)   24 140 lb (63.5 kg)   24 140 lb (63.5 kg)     General: awake alert  HEENT: No scleral icterus, MMM  Neck: Supple, no CASTRO or thyromegaly  Cardiac: Regular rate and rhythm, S1, S2 normal, no murmur or tub  Lungs: Decreased BS at bases bilaterally   Abdomen: Soft, non-tender. + bowel sounds, no palpable organomegaly  Extremities: Without clubbing, cyanosis; mild LE edema  Neurologic: Cranial nerves grossly  intact, moving all extremities  Skin: Warm and dry, no rashes       Labs:   Lab Results   Component Value Date    CREATSERUM 2.41 07/18/2024     07/18/2024     07/18/2024    K 4.6 07/18/2024    CL 98 07/18/2024    CO2 28.0 07/18/2024     07/18/2024    CA 9.0 07/18/2024    PGLU 140 07/18/2024       Imaging:  All imaging studies reviewed.    Meds:   Current Facility-Administered Medications   Medication Dose Route Frequency    bumetanide (Bumex) 12.5 mg in 50 mL infusion  0.5 mg/hr Intravenous Continuous    polyethylene glycol (PEG 3350) (Miralax) 17 g oral packet 17 g  17 g Oral Daily    lactulose (ENULOSE) solution 20 g  20 g Oral Q6H PRN    colchicine tab 0.3 mg  0.3 mg Oral Q72H    docusate sodium (Colace) cap 100 mg  100 mg Oral BID    insulin aspart (NovoLOG) 100 Units/mL FlexPen 1-10 Units  1-10 Units Subcutaneous TID CC and HS    insulin degludec (Tresiba) 100 units/mL flextouch 15 Units  15 Units Subcutaneous Daily    apixaban (Eliquis) tab 2.5 mg  2.5 mg Oral BID    glucose (Dex4) 15 GM/59ML oral liquid 15 g  15 g Oral Q15 Min PRN    Or    glucose (Glutose) 40% oral gel 15 g  15 g Oral Q15 Min PRN    Or    glucose-vitamin C (Dex-4) chewable tab 4 tablet  4 tablet Oral Q15 Min PRN    Or    dextrose 50% injection 50 mL  50 mL Intravenous Q15 Min PRN    Or    glucose (Dex4) 15 GM/59ML oral liquid 30 g  30 g Oral Q15 Min PRN    Or    glucose (Glutose) 40% oral gel 30 g  30 g Oral Q15 Min PRN    Or    glucose-vitamin C (Dex-4) chewable tab 8 tablet  8 tablet Oral Q15 Min PRN    insulin aspart (NovoLOG) 100 Units/mL FlexPen 2-10 Units  2-10 Units Subcutaneous TID AC and HS    allopurinol (Zyloprim) tab 100 mg  100 mg Oral Daily    amiodarone (Pacerone) tab 200 mg  200 mg Oral Daily    dilTIAZem ER (Dilacor XR) 24 hr cap 120 mg  120 mg Oral Daily    escitalopram (Lexapro) tab 5 mg  5 mg Oral Daily    gabapentin (Neurontin) cap 100 mg  100 mg Oral TID    levothyroxine (Synthroid) tab 75 mcg  75  mcg Oral Before breakfast    atorvastatin (Lipitor) tab 10 mg  10 mg Oral Nightly    carvedilol (Coreg) tab 12.5 mg  12.5 mg Oral BID with meals         Questions/concerns were discussed with patient and/or family by bedside.          Mary Lares MD  7/18/2024  854 AM

## 2024-07-18 NOTE — CM/SW NOTE
07/18/24 1500   Discharge disposition   Expected discharge disposition subacute   Post Acute Care Provider [facility]   Discharge transportation [facility]     SW spoke with RN who stated MRI is completed and pt is anticipated to discharge today. RN requested transport this afternoon. MARTIN received message from Alesia at [facility] stating the facility has a bed for pt today.    [facility] arranged for 5:30pm. Updated RN and Alesia at [facility]. RN to update pt/family.     [facility] [phone]     [facility]  [phone]    KEO Goldman  Discharge Planner

## 2024-07-19 NOTE — PAYOR COMM NOTE
--------------  DISCHARGE REVIEW    Payor: [employer] [facility] [place]  Subscriber #:  [id number]  Authorization Number: [id number]    Admit date: 7/9/24  Admit time:  12:09 AM  Discharge Date: 7/18/2024  6:34 PM     Admitting Physician: Cabrera Pineda MD  Attending Physician:  No att. providers found  Primary Care Physician: Estevan Bah MD       Discharge Summary Notes    No notes of this type exist for this encounter.         REVIEWER COMMENTS

## 2024-07-23 ENCOUNTER — TELEPHONE (OUTPATIENT)
Dept: CARDIOLOGY | Age: 84
End: 2024-07-23

## 2024-07-23 ENCOUNTER — TELEPHONE (OUTPATIENT)
Dept: FAMILY MEDICINE CLINIC | Facility: CLINIC | Age: 84
End: 2024-07-23

## 2024-07-23 NOTE — TELEPHONE ENCOUNTER
Patients daughter calling and would like to speak to nurse regarding patients mediations, being in the hospital and therapy. Would not go into further detail  Please advise

## 2024-07-23 NOTE — TELEPHONE ENCOUNTER
Spoke w// daughter. Patient is at rehab, [facility]. Took her off Januvia, furosemide, losartan, metoprolol. She was put on torsemide, carvedilol. Eliquis  decreased to 2.5mg.  They are having difficulty getting her blood sugar under control so she might have to go on insulin. Will follow up w/ you upon discharge from SNF.

## 2024-07-28 DIAGNOSIS — E78.2 MIXED HYPERLIPIDEMIA: ICD-10-CM

## 2024-07-29 RX ORDER — GLIMEPIRIDE 4 MG/1
4 TABLET ORAL DAILY
Qty: 90 TABLET | Refills: 0 | Status: SHIPPED | OUTPATIENT
Start: 2024-07-29

## 2024-07-29 RX ORDER — ESCITALOPRAM OXALATE 5 MG/1
5 TABLET ORAL DAILY
Qty: 90 TABLET | Refills: 0 | Status: SHIPPED | OUTPATIENT
Start: 2024-07-29

## 2024-07-29 RX ORDER — SIMVASTATIN 20 MG
TABLET ORAL
Qty: 90 TABLET | Refills: 0 | Status: SHIPPED | OUTPATIENT
Start: 2024-07-29

## 2024-07-31 ENCOUNTER — OFFICE VISIT (OUTPATIENT)
Dept: SURGERY | Facility: CLINIC | Age: 84
End: 2024-07-31

## 2024-07-31 DIAGNOSIS — R82.90 URINE FINDING: ICD-10-CM

## 2024-07-31 DIAGNOSIS — R32 URINARY INCONTINENCE, UNSPECIFIED TYPE: ICD-10-CM

## 2024-07-31 DIAGNOSIS — E11.69 TYPE 2 DIABETES MELLITUS WITH OTHER SPECIFIED COMPLICATION, UNSPECIFIED WHETHER LONG TERM INSULIN USE (HCC): ICD-10-CM

## 2024-07-31 DIAGNOSIS — R82.71 BACTERIURIA: ICD-10-CM

## 2024-07-31 DIAGNOSIS — R33.9 URINARY RETENTION: Primary | ICD-10-CM

## 2024-07-31 LAB
APPEARANCE: CLEAR
BILIRUBIN: NEGATIVE
GLUCOSE (URINE DIPSTICK): NEGATIVE MG/DL
KETONES (URINE DIPSTICK): NEGATIVE MG/DL
MULTISTIX LOT#: ABNORMAL NUMERIC
NITRITE, URINE: NEGATIVE
PH, URINE: 6 (ref 4.5–8)
PROTEIN (URINE DIPSTICK): 100 MG/DL
SPECIFIC GRAVITY: 1.01 (ref 1–1.03)
URINE-COLOR: YELLOW
UROBILINOGEN,SEMI-QN: 0.2 MG/DL (ref 0–1.9)

## 2024-07-31 PROCEDURE — 1159F MED LIST DOCD IN RCRD: CPT | Performed by: PHYSICIAN ASSISTANT

## 2024-07-31 PROCEDURE — 1111F DSCHRG MED/CURRENT MED MERGE: CPT | Performed by: PHYSICIAN ASSISTANT

## 2024-07-31 PROCEDURE — 99214 OFFICE O/P EST MOD 30 MIN: CPT | Performed by: PHYSICIAN ASSISTANT

## 2024-07-31 PROCEDURE — 51798 US URINE CAPACITY MEASURE: CPT | Performed by: PHYSICIAN ASSISTANT

## 2024-07-31 PROCEDURE — 81003 URINALYSIS AUTO W/O SCOPE: CPT | Performed by: PHYSICIAN ASSISTANT

## 2024-07-31 PROCEDURE — 1160F RVW MEDS BY RX/DR IN RCRD: CPT | Performed by: PHYSICIAN ASSISTANT

## 2024-07-31 RX ORDER — INSULIN LISPRO 100 [IU]/ML
INJECTION, SOLUTION INTRAVENOUS; SUBCUTANEOUS
COMMUNITY
Start: 2024-07-26

## 2024-07-31 RX ORDER — ESTRADIOL 0.1 MG/G
CREAM VAGINAL
Qty: 42.5 G | Refills: 5 | Status: SHIPPED | OUTPATIENT
Start: 2024-07-31

## 2024-07-31 RX ORDER — INSULIN GLARGINE 100 [IU]/ML
INJECTION, SOLUTION SUBCUTANEOUS
COMMUNITY
Start: 2024-07-26

## 2024-08-03 NOTE — PROGRESS NOTES
Subjective:   Ciarra Salcido is a 84 year old female with hx of HFpEF, DVT, PE, DM, hypothyroidism, HTN, HL, Afib, who was seen by urology for urinary retention during recent hospital admission for hypertensive urgency. She was also treated for UTI during admission. Johnson was placed 7/17/24 with volume 950mL. Her johnson was removed at facility approximately 3-4 days ago and per family/patient post void residuals were normal. PVR today 86mL. She does currently have increased urinary frequency from her baseline.     Baseline she voids every 2-3 hours but also has chronic urinary incontinence that she wears depends for. Denies any dysuria, gross hematuria. Has been treated for multiple UTIs although family notes that there was no overt signs of infection. Overall, patient feeling much better at present. Bowels unsure, not daily. Drinking water.    She had ultrasound of kidneys completed during admission that was negative.  Records brought to visit state that patient was to be started on Levaquin for preliminary abnormal UA - no culture resulted. Scr 2.49    History/Other:    Chief Complaint Reviewed and Verified  Nursing Notes Reviewed and   Verified  Allergies Reviewed  Medications Reviewed         Current Outpatient Medications   Medication Sig Dispense Refill    insulin glargine (LANTUS SOLOSTAR) 100 UNIT/ML Subcutaneous Solution Pen-injector Lantus Solostar U-100 Insulin 100 unit/mL (3 mL) subcutaneous pen, [RxNorm: 272886]      Insulin Lispro, 1 Unit Dial, (HUMALOG KWIKPEN) 100 UNIT/ML Subcutaneous Solution Pen-injector HumaLOG KwikPen (U-100) Insulin 100 unit/mL subcutaneous, [RxNorm: 1627961]      estradiol (ESTRACE) 0.1 MG/GM Vaginal Cream Apply a small (pea-sized) amount to periurethral region daily for 2 weeks, three times weekly thereafter. 42.5 g 5    glimepiride 4 MG Oral Tab TAKE 1 TABLET EVERY DAY 90 tablet 0    escitalopram 5 MG Oral Tab TAKE 1 TABLET EVERY DAY 90 tablet 0    simvastatin 20 MG Oral Tab  TAKE 1 TABLET EVERY NIGHT 90 tablet 0    torsemide 20 MG Oral Tab Take 1 tablet (20 mg total) by mouth daily. 30 tablet 11    apixaban 2.5 MG Oral Tab Take 1 tablet (2.5 mg total) by mouth 2 (two) times daily. 60 tablet 0    carvedilol 12.5 MG Oral Tab Take 1 tablet (12.5 mg total) by mouth 2 (two) times daily with meals. 60 tablet 0    hydrocortisone 2.5 % External Cream One application BID PRN on the external hemorrhoids      HYDROcodone-acetaminophen 5-325 MG Oral Tab Take 1 tablet by mouth every 8 (eight) hours as needed for Pain. 10 tablet 0    Glucose Blood (ACCU-CHEK GUIDE) In Vitro Strip       Blood Glucose Monitoring Suppl (ACCU-CHEK GUIDE) w/Device Does not apply Kit       Accu-Chek FastClix Lancets Does not apply Misc 1 Lancet by Finger stick route. Use as directed.      GABAPENTIN 100 MG Oral Cap TAKE 1 CAPSULE THREE TIMES DAILY 270 capsule 3    amiodarone 200 MG Oral Tab Take 1 tablet (200 mg total) by mouth daily.      levothyroxine 75 MCG Oral Tab Take 1 tablet (75 mcg total) by mouth before breakfast. 90 tablet 3    allopurinol 100 MG Oral Tab Take 1 tablet (100 mg total) by mouth daily. 30 tablet 11    dilTIAZem  MG Oral Capsule SR 24 Hr Take 1 capsule (120 mg total) by mouth daily.      Cholecalciferol (VITAMIN D3) 25 MCG (1000 UT) Oral Cap Take 1 tablet by mouth daily.      acetaminophen 500 MG Oral Tab Take 2 tablets (1,000 mg total) by mouth every 6 (six) hours as needed for Pain or Fever.         Review of Systems:  Pertinent items are noted in HPI.      Objective:   There were no vitals taken for this visit. Estimated body mass index is 26.07 kg/m² as calculated from the following:    Height as of 7/8/24: 5' 4\" (1.626 m).    Weight as of 7/18/24: 151 lb 14.4 oz (68.9 kg).    No distress  Non labored respirations  LE edema much improved    Laboratory Data:  Lab Results   Component Value Date    WBC 6.9 07/26/2024    HGB 10.1 (L) 07/26/2024    .0 07/26/2024     Lab Results    Component Value Date     (L) 07/26/2024    K 4.6 07/26/2024     07/26/2024    CO2 30.0 07/26/2024    BUN 87 (H) 07/26/2024     (H) 07/26/2024    GFRAA 47 (L) 05/12/2022    AST 20 07/26/2024    ALT 27 07/26/2024    TP 6.0 07/26/2024    ALB 3.4 07/26/2024    PHOS 3.8 11/15/2023    CA 8.9 07/26/2024    MG 2.2 07/10/2024       Urinalysis Results (last three years):  Recent Labs     01/26/23  2348 09/10/23  1711 11/14/23  0001 07/08/24  2223 07/31/24  1058   COLORUR Yellow Light-Yellow Light-Yellow Light-Yellow  --    CLARITY Clear Clear Turbid* Turbid*  --    SPECGRAVITY 1.015 1.008 1.012 1.008 1.010   PHURINE 6.5 6.5 6.0 6.0 6.0   PROUR 100 mg/dL* 70* 200* 100*  --    GLUUR Negative Normal Normal 150*  --    KETUR Negative Negative Negative Negative  --    BILUR Negative Negative Negative Negative  --    BLOODURINE Negative Negative 1+* 2+*  --    NITRITE Positive* Negative Negative Negative Negative   UROBILINOGEN 0.2 Normal Normal Normal  --    LEUUR Negative Negative 75* 25*  --    WBCUR 1-5  1-5 1-5 21-50* 11-20*  --    RBCUR 0-2  0-2 0-2 >10* >10*  --    BACUR Rare*  Rare* None Seen 1+* 1+*  --        Urine Culture Results (last three years):  Lab Results   Component Value Date    URINECUL >100,000 CFU/ML Klebsiella pneumoniae (A) 07/08/2024    URINECUL <10,000 CFU/ML Gram negative melina 07/08/2024    URINECUL >100,000 CFU/ML Klebsiella pneumoniae (A) 11/14/2023    URINECUL >100,000 CFU/ML Klebsiella pneumoniae (A) 01/26/2023       Imaging  MRI BRAIN (CPT=70551)    Result Date: 7/18/2024  PROCEDURE:  MRI BRAIN (CPT=70551)  COMPARISON:  [facility] , CT, CT BRAIN OR HEAD ([id number]), 7/15/2024, 7:09 PM.  INDICATIONS:  right sided weaknes, r/o stroke  TECHNIQUE:  MRI of the brain was performed with multi-planar T1, T2-weighted images with FLAIR sequences and diffusion weighted images without infusion.  PATIENT STATED HISTORY: (As transcribed by Technologist)  Generalized weakness ( sarah. right side )   Evaluate for stroke.    FINDINGS:   Prominence of the sulci is noted.   There is no midline shift or mass effect.   The basal cisterns are patent.   The craniocervical junction is unremarkable.   Midline structures including corpus callosum, optic chiasm and cerebellar tonsils are overall unremarkable.  There is no acute intracranial hemorrhage or extra-axial fluid collection identified.   Marked FLAIR abnormalities in the white matter.   No significant abnormal parenchymal gradient susceptibility.   There is no restricted diffusion to suggest acute ischemia/infarction.  The visualized paranasal sinuses and mastoid air cells are unremarkable.   The expected major intracranial flow voids are present.            CONCLUSION:   1. No evidence of an acute infarct.  2. Global parenchymal volume loss.  3. Marked FLAIR abnormalities the white matter are statistically likely sequelae of chronic small vessel ischemic disease.   LOCATION:  [facility]   Dictated by (CST): Christiano Connors MD on 7/18/2024 at 10:57 AM     Finalized by (CST): Christiano Connors MD on 7/18/2024 at 10:58 AM       XR CHEST PA + LAT CHEST (CPT=71046)    Result Date: 7/16/2024  PROCEDURE:  XR CHEST PA + LAT CHEST (CPT=71046)  INDICATIONS:  Shortness of breath  COMPARISON:  [facility] , XR, XR CHEST AP PORTABLE  (CPT=71045), 7/08/2024, 9:36 PM.  TECHNIQUE:  PA and lateral chest radiographs were obtained.  PATIENT STATED HISTORY: (As transcribed by Technologist)  Patient offered no additional history at this time.               CONCLUSION:   Stable cardiac and mediastinal contours.  Stable positioning of left chest wall ICD.  Findings of congestive heart failure with mild interstitial pulmonary edema.  No associated pleural effusion or pneumothorax.   LOCATION:  [facility]   Dictated by (CST): Gurjit Sotomayor MD on 7/16/2024 at 12:06 PM     Finalized by (CST): Gurjit Sotomayor MD on 7/16/2024 at 12:07 PM       CT BRAIN OR HEAD ([id number])    Result Date: 7/15/2024  PROCEDURE:   CT BRAIN OR HEAD ([id number])  COMPARISON:  [facility], CT, CT BRAIN OR HEAD ([id number]), 2024, 8:19 PM.  INDICATIONS:  Deficits in coordination and motor planning noted by PT OT today  TECHNIQUE:  Noncontrast CT scanning is performed through the brain. Dose reduction techniques were used. Dose information is transmitted to the ACR (American College of Radiology) NRDR (National Radiology Data Registry) which includes the Dose Index Registry.  PATIENT STATED HISTORY: (As transcribed by Technologist)  Patient having coordination issues    FINDINGS:  There is cerebral atrophy with symmetric prominence of the ventricles. There are patchy areas of low attenuation in the periventricular and deep white matter which are nonspecific but most consistent with small vessel ischemic changes.  Multifocal areas of encephalomalacia, similar prior.  There is no evidence of hemorrhage, mass, midline shift, or extra-axial fluid collection.  The visualized paranasal sinuses show no significant findings. No evidence of depressed skull fracture.            CONCLUSION: 1. No acute intracranial findings or significant interval changes compared to 2024.  Consider MRI evaluation if there is continued clinical concern. 2. Cerebral atrophy with chronic microvascular ischemic changes.    LOCATION:  [facility]   Dictated by (CST): Carlos Eduardo Bowman MD on 7/15/2024 at 7:44 PM     Finalized by (CST): Carlos Eduardo Bowman MD on 7/15/2024 at 7:46 PM       CARD ECHO 2D DOPPLER (CPT=93306)    Result Date: 2024  Transthoracic Echocardiogram Name:Ciarra Salcido Date: 2024 :  1940 Ht:  (64in)  BP: 131 / 57 MRN:  [id number]    Age:  83years    Wt:  (168lb) HR: 60bpm Loc:  [facility]       Gndr: F          BSA: 1.82m^2 Sonographer: Martina CASTANEDA Ordering:    Rachel Hanna Consulting:  Mary Lares ---------------------------------------------------------------------------- History/Indications:  Pacemaker.  Atrial fibrillation.  Heart Failure. Shortness of  breath. Chronic kidney disease stage IV.  Heart Failure with preserved ejection fraction.  Risk factors:  Hypertension. Diabetes mellitus. Dyslipidemia. ---------------------------------------------------------------------------- Procedure information:  A transthoracic complete 2D study was performed. Additional evaluation included M-mode, complete spectral Doppler, and color Doppler.  Patient status:  Inpatient.  Location:  [facility]. Comparison was made to the study of 11/14/2023.    This was a routine study. Transthoracic echocardiography for ventricular function evaluation and assessment of valvular function. Image quality was adequate. ECG rhythm:   Paced rhythm ---------------------------------------------------------------------------- Conclusions: 1. Left ventricle: The cavity size was normal. Wall thickness was mildly    increased. Systolic function was hyperdynamic. The estimated ejection    fraction was 65-70%, by visual assessment. No diagnostic evidence for    regional wall motion abnormalities. Left ventricular diastolic function    parameters were normal for the patient's age. 2. Right ventricle: The cavity size was mildly increased. Pacer wire noted    in the right ventricle. Systolic function was normal. 3. Left atrium: The left atrial volume was markedly increased. 4. Right atrium: The atrium was moderately dilated. Pacer wire noted in    right atrium. 5. Aortic valve: There was thickening, consistent with sclerosis. There was    mild regurgitation. 6. Mitral valve: There was mild to moderate regurgitation. 7. Pulmonary arteries: Systolic pressure was moderately increased, in the    range of 50mm Hg to 55mm Hg. Estimated pulmonary artery diastolic    pressure was 17mm Hg. 8. Pericardium, extracardiac: There was no pericardial effusion. 9. Inferior vena cava: The IVC was normal-sized. Impressions:  This study is compared with previous dated 11/14/2023: No significant change since prior study.  * ---------------------------------------------------------------------------- * Findings: Left ventricle:  The cavity size was normal. Wall thickness was mildly increased. Systolic function was hyperdynamic. The estimated ejection fraction was 65-70%, by visual assessment. No diagnostic evidence for regional wall motion abnormalities. Left ventricular diastolic function parameters were normal for the patient's age. Left atrium:  The atrium was moderately to markedly dilated. The left atrial volume was markedly increased. Right ventricle:  The cavity size was mildly increased. Pacer wire noted in the right ventricle. Systolic function was normal. Right atrium:  The atrium was moderately dilated. There was a prominent Chiari network. Pacer wire noted in right atrium. Mitral valve:  The annulus was mildly calcified. Leaflet separation was normal.  Doppler:  Transvalvular velocity was within the normal range. There was no evidence for stenosis. There was mild to moderate regurgitation. Aortic valve:   The valve was trileaflet. The leaflets were mildly thickened and mildly calcified. There was thickening, consistent with sclerosis. Cusp separation was normal.  Doppler:  Transvalvular velocity was within the normal range. There was no evidence for stenosis. There was mild regurgitation. Tricuspid valve:  The valve is structurally normal. Leaflet separation was normal.  Doppler:  Transvalvular velocity was within the normal range. There was no evidence for stenosis. There was mild regurgitation. Pulmonic valve:    There was no significant valve disease.    Doppler: Transvalvular velocity was within the normal range. There was no evidence for stenosis. There was moderate regurgitation. Pericardium:   There was no pericardial effusion. Aorta: Aortic root: The aortic root was 3.5cm diameter. Pulmonary arteries: The main pulmonary artery was mildly dilated. Systolic pressure was moderately increased, in the range of 50mm Hg  to 55mm Hg. Estimated pulmonary artery diastolic pressure was 17mm Hg. Systemic veins:  Central venous respirophasic diameter changes are in the normal range (>50%). Inferior vena cava: The IVC was normal-sized. ---------------------------------------------------------------------------- Measurements  Left ventricle         Value        Ref       11/14/2023  IVS thickness, ED, (H) 1.1   cm     0.6 - 0.9 1.1  PLAX  LV ID, ED, PLAX        4.7   cm     3.8 - 5.2 4.0  LV ID, ES, PLAX        2.9   cm     2.2 - 3.5 2.7  LV PW thickness,   (H) 1.3   cm     0.6 - 0.9 1.0  ED, PLAX  IVS/LV PW ratio,       0.90         --------- 1.04  ED, PLAX  LV PW/LV ID ratio,     0.27         --------- 0.26  ED, PLAX  LV ejection            70    %      54 - 74   62  fraction  LV e', lateral     (L) 5.1   cm/sec >=10.0    4.6  LV E/e', lateral   (H) 19           <=13      29  LV e', medial      (L) 5.3   cm/sec >=7.0     4.3  LV E/e', medial        19           --------- 31  LV e', average         5.2   cm/sec --------- 4.5  LV E/e', average   (H) 19           <=14      30  Aortic valve           Value        Ref       11/14/2023  Vena contracta         0.3   cm     --------- ----------  width  Left atrium            Value        Ref       11/14/2023  LA ID, A-P, ES     (H) 4.0   cm     2.7 - 3.8 4.4  LA volume, S       (H) 127   ml     22 - 52   109  LA volume/bsa, S   (H) 70    ml/m^2 16 - 34   62  LA volume, ES, 1-p (H) 127   ml     22 - 52   121  A4C  LA volume, ES, 1-p (H) 121   ml     22 - 52   92  A2C  LA volume, ES, A/L     135   ml     --------- 116  LA volume/bsa, ES, (H) 74    ml/m^2 16 - 34   66  A/L  Mitral valve           Value        Ref       11/14/2023  Mitral E-wave peak     0.98  m/sec  --------- 1.32  velocity  Mitral A-wave peak     1.27  m/sec  --------- 0.73  velocity  Mitral peak            4     mm Hg  --------- 8  gradient, D  Mitral E/A ratio,      0.8          --------- 1.8  peak  Mitral regurg vena     0.5    cm     --------- ----------  contracta width  Pulmonary artery       Value        Ref       11/14/2023  PA pressure, S, DP     53    mm Hg  --------- ----------  PA pressure, ED,       17    mm Hg  --------- ----------  DP  Tricuspid valve        Value        Ref       11/14/2023  Tricuspid regurg   (H) 3.53  m/sec  <=2.8     2.98  peak velocity  Tricuspid peak         50    mm Hg  --------- 47  RV-RA gradient  Systemic veins         Value        Ref       11/14/2023  Estimated CVP          3     mm Hg  --------- 10  Inferior vena cava     Value        Ref       11/14/2023  ID                 (H) 2.2   cm     <=2.1     ----------  Right ventricle        Value        Ref       11/14/2023  TAPSE, 2D              2.13  cm     >=1.70    ----------  RV pressure, S, DP     53    mm Hg  --------- 57  RV s', lateral         11.2  cm/sec >=9.5     ----------  Pulmonic valve         Value        Ref       11/14/2023  Pulmonic               85    ms     --------- ----------  acceleration time  Pulmonic regurg        1.84  m/sec  --------- 1.55  velocity, ED  Pulmonic regurg        13    mm Hg  --------- 10  gradient, ED Legend: (L)  and  (H)  rafael values outside specified reference range. ---------------------------------------------------------------------------- Prepared and electronically signed by James Thibodeaux 07/12/2024 16:21     US KIDNEY W DOPPLER (XDU=[id number])    Result Date: 7/12/2024  PROCEDURE:  US KIDNEY W DOPPLER (GOA=[id number])  COMPARISON:  None.  INDICATIONS:  severe HTN, PRISCILLA, CKD 4  TECHNIQUE:  Ultrasound of the bilateral kidneys were performed with Doppler evaluation of the renal arteries and veins. The renal vessels were evaluated with B-mode ultrasound, Doppler color flow, and spectral analysis.  PATIENT STATED HISTORY: (As transcribed by Technologist)     FINDINGS:  RIGHT KIDNEY:  Normal for age.  8.8 cm LEFT KIDNEY:  Normal for age.  8.9 cm  Doppler velocity measurements and renal/aortic ratios are  as follows: AORTA:     88.0 cm/s  RIGHT RENAL ARTERY ORIGIN:    57 cm/s,  ratio = 0.65 PROXIMAL:  57 cm/s,  ratio = 0.65 MID:       50 cm/s,  ratio = 0.57 DISTAL:    50 cm/s,  ratio = 0.57  MAXIMUM ACCELERATION TIME:  0.03 seconds  LEFT RENAL ARTERY ORIGIN:    67 cm/s,  ratio = 0.75 PROXIMAL:  107 cm/s,  ratio = 1.22 MID:       110 cm/s,  ratio = 1.25 DISTAL:    118 cm/s,  ratio = 1.34  MAXIMUM ACCELERATION TIME:  0.03 seconds RENAL VEINS:  There is normal Doppler wave form.            CONCLUSION:  No signs of renal artery stenosis.   LOCATION:  [facility]     Dictated by ([facility]): Mahamed Duran MD on 7/12/2024 at 10:50 AM     Finalized by (CST): Mahamed Duran MD on 7/12/2024 at 10:51 AM       XR HIP W OR WO PELVIS(MIN 5 VIEWS),BILAT(CPT=73523)    Result Date: 7/10/2024  PROCEDURE:  XR HIP W OR WO PELVIS (MIN 5 VIEWS), BILAT (CPT=73523)  TECHNIQUE:  Bilateral min 5 views of the hip and pelvis if performed.  COMPARISON:  None.  INDICATIONS:  pt has groin pain and reports she rolled out of bed prior to hospital admission  PATIENT STATED HISTORY: (As transcribed by Technologist)  Patient stated she fell out of bed 2-3 days ago. Has bilateral pain of both hips, but left hip is worse.    FINDINGS:  BONES:  There is diffuse osteopenia.  There is mild enthesopathy.  There is mild narrowing of the hip joints bilaterally.  There is no evidence of fracture.  No bone destruction.  Small bone island involves the left femoral head. SOFT TISSUES:  Minimal vascular calcification. EFFUSION:  None visible. OTHER:  Negative.            CONCLUSION:  No acute fracture.  Degenerative changes as described above.   LOCATION:  [id number]   Dictated by ([facility]): Matthew Sahni MD on 7/10/2024 at 12:19 PM     Finalized by (CST): Matthew Sahni MD on 7/10/2024 at 12:21 PM       XR CHEST AP PORTABLE  (CPT=71045)    Result Date: 7/8/2024  PROCEDURE:  XR CHEST AP PORTABLE  (CPT=71045)  TECHNIQUE:  AP chest radiograph was obtained.  COMPARISON:   [facility], XR, XR CHEST PA + LAT CHEST (CPT=71046), 5/14/2024, 12:38 PM.  INDICATIONS:  Weakness, HTN(SBP over 200) . Poss UTI  PATIENT STATED HISTORY: (As transcribed by Technologist)  Pt. with weakness, high blood pressure.    FINDINGS:  Heart size is within normal limits.  Pleural spaces appear clear.  Mediastinal and hilar contours are normal.  No focal consolidation.  Postsurgical changes of the chest are noted.  Mild prominence of the pulmonary vasculature may reflect mild  vascular congestion and volume overload.            CONCLUSION:  See above.   LOCATION:  [id number]      Dictated by (CST): Evangelist Layton MD on 7/08/2024 at 10:00 PM     Finalized by (CST): Evangelist Layton MD on 7/08/2024 at 10:00 PM       US VENOUS DOPPLER LEG BILAT - DIAG IMG (CPT=93970)    Result Date: 7/8/2024  PROCEDURE:  US VENOUS DOPPLER LEG BILAT - DIAG IMG (CPT=93970)  COMPARISON:  None.  INDICATIONS:  Lower extremity swelling.  TECHNIQUE:  Real time, grey scale, and duplex ultrasound was used to evaluate the lower extremity venous system. B-mode two-dimensional images of the vascular structures, Doppler spectral analysis, and color flow.  Doppler imaging were performed.  The following veins were imaged bilaterally:  Common, deep, and superficial femoral, popliteal, sapheno-femoral junction, and posterior tibial veins.  FINDINGS:  SAPHENOFEMORAL JUNCTION:  Unremarkable without evidence of thrombus THROMBI:  None visible. COMPRESSION:  Normal compressibility, phasicity, and augmentation. OTHER:  Negative.            CONCLUSION:  No evidence of deep venous thrombosis in the bilateral lower extremities.  LOCATION:  [facility]   Dictated by (CST): Ede Schaffer MD on 7/08/2024 at 9:31 PM     Finalized by (CST): Ede Schaffer MD on 7/08/2024 at 9:31 PM       CT BRAIN OR HEAD ([id number])    Result Date: 7/8/2024  PROCEDURE:  CT BRAIN OR HEAD (32163)  COMPARISON:  None.  INDICATIONS:  Weakness, HTN(SBP over 200) . Poss UTI  TECHNIQUE:   Noncontrast CT scanning is performed through the brain. Dose reduction techniques were used. Dose information is transmitted to the ACR (American College of Radiology) NRDR (National Radiology Data Registry) which includes the Dose Index Registry.  PATIENT STATED HISTORY: (As transcribed by Technologist)  Weakness and htn   FINDINGS: Mild global brain parenchymal volume loss without overt hydrocephalus.  There is no midline shift or mass-effect.  The basal cisterns are patent.  The gray-white matter differentiation is intact.  There is no acute intracranial hemorrhage or extra-axial fluid collection.  There is no evidence of acute territorial infarction.  There are scattered small areas of encephalomalacia noted in the bilateral frontal lobes and right parietal lobe.  There are moderate age-indeterminate microvascular ischemic changes in the cerebral white matter.  There is no evident fracture.  Trace ethmoid mucosal thickening.  Small amount of secretions in the right sphenoid sinus.  Minimal fluid in the left mastoid air cells.  Bilateral intra-ocular lens implants.             CONCLUSION:   1. No acute intracranial abnormality identified.  2. There are scattered small areas of encephalomalacia noted in the bilateral frontal lobes and right parietal lobe.  There are moderate age-indeterminate microvascular ischemic changes in the cerebral white matter. If there is clinical concern for acute  ischemia/infarction, an MRI of the brain would be recommended for further evaluation.  LOCATION:  [facility]   Dictated by (CST): Ede Schaffer MD on 7/08/2024 at 8:32 PM     Finalized by (CST): Ede Schaffer MD on 7/08/2024 at 8:41 PM         Assessment & Plan:   Urinary retention  Recurrent bacteriuria  Chronic urinary incontinence  Type II DM    Reviewed with patient and family that recurrent bacteriuria likely secondary to incomplete emptying of bladder although I could not rule out other etiologies. Residuals have been  appropriate that overflow incontinence thought less likely although also possible.     Reviewed consideration for cystoscopy, declined at this time. UTI prevention reviewed including hydration, wiping front to back, supplements, and consideration for estrace cream use. Patient willing to start estrace cream. Importance of sugar control also reviewed. Unable to consider methenamine d/t renal function. Recommend holding antibiotic treatment unless symptoms are present and should be based on urine culture sensitivities, rather than empiric.    Follow up in 3-6 mos, prn.    The above impression and plan were discussed in detail with the patient who verbalized understanding and all questions were answered.  A total of 30 minutes were spent on the visit including chart review in preparation for the visit, time with the patient, placing orders and communication with other providers where appropriate.        Miya Grier PA-C

## 2024-08-12 ENCOUNTER — TELEPHONE (OUTPATIENT)
Dept: FAMILY MEDICINE CLINIC | Facility: CLINIC | Age: 84
End: 2024-08-12

## 2024-08-12 NOTE — TELEPHONE ENCOUNTER
Patients daughter calling and in need of a hospital f/up with Dr. Estevan Bah. She was in [facility] for several issues - elevated BP, Uncontrolled diabetes and now she has a spot on her foot that is concerning.   They are available anytime this week     Please advise.

## 2024-08-12 NOTE — TELEPHONE ENCOUNTER
Patients daughter conf. Appointment   Future Appointments   Date Time Provider Department Center   8/13/2024  8:30 AM Estevan Bah MD [facility] [place]

## 2024-08-13 ENCOUNTER — OFFICE VISIT (OUTPATIENT)
Dept: FAMILY MEDICINE CLINIC | Facility: CLINIC | Age: 84
End: 2024-08-13
Payer: MEDICARE

## 2024-08-13 ENCOUNTER — TELEPHONE (OUTPATIENT)
Dept: FAMILY MEDICINE CLINIC | Facility: CLINIC | Age: 84
End: 2024-08-13

## 2024-08-13 VITALS
SYSTOLIC BLOOD PRESSURE: 148 MMHG | DIASTOLIC BLOOD PRESSURE: 82 MMHG | OXYGEN SATURATION: 96 % | HEART RATE: 61 BPM | RESPIRATION RATE: 18 BRPM

## 2024-08-13 DIAGNOSIS — R29.898 WEAKNESS OF BOTH LOWER EXTREMITIES: ICD-10-CM

## 2024-08-13 DIAGNOSIS — L89.610 PRESSURE SORE ON HEEL, RIGHT, UNSTAGEABLE (HCC): ICD-10-CM

## 2024-08-13 DIAGNOSIS — R53.1 WEAKNESS: ICD-10-CM

## 2024-08-13 DIAGNOSIS — I48.91 ATRIAL FIBRILLATION WITH RAPID VENTRICULAR RESPONSE (HCC): ICD-10-CM

## 2024-08-13 DIAGNOSIS — R29.898 UPPER EXTREMITY WEAKNESS: ICD-10-CM

## 2024-08-13 DIAGNOSIS — N18.31 TYPE 2 DIABETES MELLITUS WITH STAGE 3A CHRONIC KIDNEY DISEASE, WITH LONG-TERM CURRENT USE OF INSULIN (HCC): Primary | ICD-10-CM

## 2024-08-13 DIAGNOSIS — N18.4 CHRONIC RENAL DISEASE, STAGE IV (HCC): ICD-10-CM

## 2024-08-13 DIAGNOSIS — R29.898 MUSCULAR DECONDITIONING: ICD-10-CM

## 2024-08-13 DIAGNOSIS — I50.33 ACUTE ON CHRONIC DIASTOLIC CONGESTIVE HEART FAILURE (HCC): ICD-10-CM

## 2024-08-13 DIAGNOSIS — E11.22 TYPE 2 DIABETES MELLITUS WITH STAGE 3A CHRONIC KIDNEY DISEASE, WITH LONG-TERM CURRENT USE OF INSULIN (HCC): Primary | ICD-10-CM

## 2024-08-13 DIAGNOSIS — Z79.4 TYPE 2 DIABETES MELLITUS WITH STAGE 3A CHRONIC KIDNEY DISEASE, WITH LONG-TERM CURRENT USE OF INSULIN (HCC): Primary | ICD-10-CM

## 2024-08-13 LAB — HEMOGLOBIN A1C: 9.3 % (ref 4.3–5.6)

## 2024-08-13 PROCEDURE — 1160F RVW MEDS BY RX/DR IN RCRD: CPT | Performed by: FAMILY MEDICINE

## 2024-08-13 PROCEDURE — 3008F BODY MASS INDEX DOCD: CPT | Performed by: FAMILY MEDICINE

## 2024-08-13 PROCEDURE — 1111F DSCHRG MED/CURRENT MED MERGE: CPT | Performed by: FAMILY MEDICINE

## 2024-08-13 PROCEDURE — 83036 HEMOGLOBIN GLYCOSYLATED A1C: CPT | Performed by: FAMILY MEDICINE

## 2024-08-13 PROCEDURE — 99496 TRANSJ CARE MGMT HIGH F2F 7D: CPT | Performed by: FAMILY MEDICINE

## 2024-08-13 PROCEDURE — 3077F SYST BP >= 140 MM HG: CPT | Performed by: FAMILY MEDICINE

## 2024-08-13 PROCEDURE — 3079F DIAST BP 80-89 MM HG: CPT | Performed by: FAMILY MEDICINE

## 2024-08-13 PROCEDURE — 1159F MED LIST DOCD IN RCRD: CPT | Performed by: FAMILY MEDICINE

## 2024-08-13 RX ORDER — INSULIN GLARGINE 100 [IU]/ML
20 INJECTION, SOLUTION SUBCUTANEOUS NIGHTLY
Qty: 18 ML | Refills: 0 | Status: SHIPPED | OUTPATIENT
Start: 2024-08-13

## 2024-08-13 RX ORDER — ACYCLOVIR 400 MG/1
1 TABLET ORAL
Qty: 9 EACH | Refills: 3 | Status: SHIPPED | OUTPATIENT
Start: 2024-08-13

## 2024-08-13 NOTE — PATIENT INSTRUCTIONS
Increase lantus to 20 units daily    Continue sliding scale    Start monitoring sugars using DEXCOM  We will track sugars using that from now on    Continue miralax as needed    Restart physical therapy for strengthening    Continue all other meds    Update me in 2 wks with sugar readings    Followup in 4 wks

## 2024-08-15 ENCOUNTER — MED REC SCAN ONLY (OUTPATIENT)
Dept: FAMILY MEDICINE CLINIC | Facility: CLINIC | Age: 84
End: 2024-08-15

## 2024-08-16 NOTE — PROGRESS NOTES
Subjective:   Ciarra Salcido is a 84 year old female who presents for hospital follow up.   She was discharged from [facility]  to Home   Admit Date: 7/31/24  Discharge Date: 8/12/24  Hospital Discharge Diagnosis: per below    Interactive contact within 2 business days post discharge first initiated on Date: n/a, seen w/in 48h    During the visit, the following was completed:  Obtained and reviewed discharge summary, continuity of care documents, and Hospitalist notes  Reviewed Labs (CBC, CMP)    HPI:     1. Type 2 diabetes mellitus with stage 3a chronic kidney disease, with long-term current use of insulin ([facility])  -sugars elevated  -recently switched to insulin in hospital    2. Acute on chronic diastolic congestive heart failure (HCC)  3. Atrial fibrillation with rapid ventricular response ([facility])  4. Chronic renal disease, stage IV ([facility])  -was found to be in acute HF in hospital, complicated by urinary retention  -did well with monitoring and diuresis  -sent to SNF with johnson, followed with urology who was able to remove johnson  -no new issues with urinary retention since  -with siginficant weakness still    5. Weakness of both lower extremities  6. Weakness  7. Muscular deconditioning  8. Upper extremity weakness  -needs PT    9. Pressure sore on heel, right, unstageable (HCC)  -needs wound care to help with pressure sore on heel of right foot      History/Other:   Current Medications:  Medication Reconciliation:  I am aware of an inpatient discharge within the last 30 days.  The discharge medication list has been reconciled with the patient's current medication list and reviewed by me.  See medication list for additions of new medication, and changes to current doses of medications and discontinued medications.  Outpatient Medications Marked as Taking for the 8/13/24 encounter (Office Visit) with Estevan Bah MD   Medication Sig    insulin glargine (LANTUS SOLOSTAR) 100 UNIT/ML Subcutaneous Solution  Pen-injector Inject 20 Units into the skin nightly.    Continuous Glucose Sensor (DEXCOM G7 SENSOR) Does not apply Misc 1 each Every 10 days. Dx: E11.22, N18.31, Z79.4 (patient is using insulin 4x/day)    Insulin Lispro, 1 Unit Dial, (HUMALOG KWIKPEN) 100 UNIT/ML Subcutaneous Solution Pen-injector HumaLOG KwikPen (U-100) Insulin 100 unit/mL subcutaneous, [RxNorm: 5716292]    glimepiride 4 MG Oral Tab TAKE 1 TABLET EVERY DAY    escitalopram 5 MG Oral Tab TAKE 1 TABLET EVERY DAY    simvastatin 20 MG Oral Tab TAKE 1 TABLET EVERY NIGHT    torsemide 20 MG Oral Tab Take 1 tablet (20 mg total) by mouth daily.    apixaban 2.5 MG Oral Tab Take 1 tablet (2.5 mg total) by mouth 2 (two) times daily.    carvedilol 12.5 MG Oral Tab Take 1 tablet (12.5 mg total) by mouth 2 (two) times daily with meals.    hydrocortisone 2.5 % External Cream One application BID PRN on the external hemorrhoids    Glucose Blood (ACCU-CHEK GUIDE) In Vitro Strip     Blood Glucose Monitoring Suppl (ACCU-CHEK GUIDE) w/Device Does not apply Kit     Accu-Chek FastClix Lancets Does not apply Misc 1 Lancet by Finger stick route. Use as directed.    GABAPENTIN 100 MG Oral Cap TAKE 1 CAPSULE THREE TIMES DAILY    amiodarone 200 MG Oral Tab Take 1 tablet (200 mg total) by mouth daily.    levothyroxine 75 MCG Oral Tab Take 1 tablet (75 mcg total) by mouth before breakfast.    allopurinol 100 MG Oral Tab Take 1 tablet (100 mg total) by mouth daily.    dilTIAZem  MG Oral Capsule SR 24 Hr Take 1 capsule (120 mg total) by mouth daily.    acetaminophen 500 MG Oral Tab Take 2 tablets (1,000 mg total) by mouth every 6 (six) hours as needed for Pain or Fever.       Review of Systems:  GENERAL: weight stable, energy stable, no sweating  SKIN: denies any unusual skin lesions  EYES: denies blurred vision or double vision  HEENT: denies nasal congestion, sinus pain or ST  LUNGS: denies shortness of breath with exertion  CARDIOVASCULAR: denies chest pain on exertion or  palpitations  GI: denies abdominal pain, denies heartburn, denies diarrhea  MUSCULOSKELETAL: denies pain, normal range of motion of extremities  NEURO: denies headaches, denies dizziness, denies weakness  PSYCHE: denies depression or anxiety  HEMATOLOGIC: denies hx of anemia, or bruising, denies bleeding  ENDOCRINE: denies thyroid history  ALL/ASTHMA: denies hx of allergy or asthma    Objective:   No LMP recorded. Patient is postmenopausal.  Estimated body mass index is 26.07 kg/m² as calculated from the following:    Height as of 7/8/24: 5' 4\" (1.626 m).    Weight as of 7/18/24: 151 lb 14.4 oz (68.9 kg).   /82 (BP Location: Left arm, Patient Position: Sitting, Cuff Size: adult)   Pulse 61   Resp 18   SpO2 96%    GENERAL: well developed, well nourished, in no apparent distress  SKIN: no rashes, no suspicious lesions  HEENT: atraumatic, normocephalic, ears and throat are clear  EYES: PERRLA, EOMI, conjunctiva are clear  NECK: supple, no adenopathy, no bruits  CHEST: no chest tenderness  LUNGS: clear to auscultation  CARDIO: RRR without murmur  GI: good BS's, no masses, HSM or tenderness  MUSCULOSKELETAL: back is not tender, FROM of the extremities  EXTREMITIES: no cyanosis, clubbing or edema  NEURO: Oriented times three, cranial nerves are intact, motor and sensory are grossly intact    Assessment & Plan:     1. Type 2 diabetes mellitus with stage 3a chronic kidney disease, with long-term current use of insulin (HCC)  -sugars elevated  -A1c >9  -increase lantus to 20 units daily  -c/w sliding scale  -start dexcom  -update me in 2 wks with readings    - POC Hgb A1C  - Continuous Glucose Sensor (DEXCOM G7 SENSOR) Does not apply Misc; 1 each Every 10 days. Dx: E11.22, N18.31, Z79.4 (patient is using insulin 4x/day)  Dispense: 9 each; Refill: 3  - Home Health Referral - External    2. Acute on chronic diastolic congestive heart failure (HCC)  3. Atrial fibrillation with rapid ventricular response (HCC)  4.  Chronic renal disease, stage IV (HCC)  -doing better slowly  -stable on meds  -c/w cardiology followup  -continue to closely monitor weights at home  -she remains high risk for readmission given co-morbidities    5. Weakness of both lower extremities  - Home Health Referral - External    6. Weakness  - Home Health Referral - External    7. Muscular deconditioning  - Home Health Referral - External    8. Upper extremity weakness  - Home Health Referral - External    9. Pressure sore on heel, right, unstageable (HCC)  - Home Health Referral - External        Return in about 4 weeks (around 9/10/2024), or 60 min appt if available.

## 2024-08-20 ENCOUNTER — MED REC SCAN ONLY (OUTPATIENT)
Dept: ORTHOPEDICS CLINIC | Facility: CLINIC | Age: 84
End: 2024-08-20

## 2024-08-22 DIAGNOSIS — N18.30 STAGE 3 CHRONIC KIDNEY DISEASE, UNSPECIFIED WHETHER STAGE 3A OR 3B CKD (HCC): Primary | ICD-10-CM

## 2024-08-22 RX ORDER — TORSEMIDE 20 MG/1
20 TABLET ORAL DAILY
Qty: 90 TABLET | Refills: 1 | Status: SHIPPED | OUTPATIENT
Start: 2024-08-22

## 2024-08-29 ENCOUNTER — TELEPHONE (OUTPATIENT)
Dept: FAMILY MEDICINE CLINIC | Facility: CLINIC | Age: 84
End: 2024-08-29

## 2024-08-29 NOTE — TELEPHONE ENCOUNTER
Patient's daughter calling requesting an order for a new walker. Patient was seen by physical therapist who is recommending the new walker. The walker the patient is using is to tall for patient.    Please review and advise.

## 2024-08-29 NOTE — TELEPHONE ENCOUNTER
Spoke with Elsie patient's daughter. She is concerned with Ciarra's blood sugars. Per the dexcom results patient's blood sugars are good all day and during the night the patient will get an alert that it is critically low, 50. This has been occurring more often recently. Patient does have orange juice and hand and does try and have pb and crackers before bed. Patient is currently on 20 units of lantus at night. Per daughter the assisted living facility has recently contacted her stating something needed to be done. Patient has office visit 9/13. Daughter would like to know if any changes to be made to dose of insulin prior to appointment? Please review.

## 2024-08-29 NOTE — TELEPHONE ENCOUNTER
Spoke with daughter regarding walker order. She will follow up with her mother's physical therapist and let us know next steps.

## 2024-08-30 RX ORDER — INSULIN GLARGINE 100 [IU]/ML
15 INJECTION, SOLUTION SUBCUTANEOUS NIGHTLY
Qty: 18 ML | Refills: 0 | Status: SHIPPED | OUTPATIENT
Start: 2024-08-30

## 2024-08-30 NOTE — TELEPHONE ENCOUNTER
Spoke with patient's daughter. Informed her that new orders were sent to [facility] living for lantus dose change and to keep Dr Bah updated on blood sugars. Verbally understands.

## 2024-08-30 NOTE — TELEPHONE ENCOUNTER
Called [facility] to obtain fax number and inform them on new orders. Will fax new orders to [facility].

## 2024-08-30 NOTE — TELEPHONE ENCOUNTER
For now, she can decrease lantus to 15 units and see if this helps.  Keep me updated on sugar readings.

## 2024-09-05 ENCOUNTER — NURSE TRIAGE (OUTPATIENT)
Dept: FAMILY MEDICINE CLINIC | Facility: CLINIC | Age: 84
End: 2024-09-05

## 2024-09-05 ENCOUNTER — MED REC SCAN ONLY (OUTPATIENT)
Dept: ORTHOPEDICS CLINIC | Facility: CLINIC | Age: 84
End: 2024-09-05

## 2024-09-05 NOTE — TELEPHONE ENCOUNTER
Called and spoke with Trudy at [facility]. Gave her Dr Bah's recommendations to keep closely monitoring patient's blood sugars and that it is ok for patient's blood sugars to be elevated for now. Have the nurse call if any further issues. Er precautions discussed. Trudy voiced understanding and is agreeable.

## 2024-09-05 NOTE — TELEPHONE ENCOUNTER
Spoke with Thu from [facility].  At 0600 patient . Medications administered per orders. 0730 patient BS in the 40s and lethargic. Nurse gave orange juice and patient responded well and went to breakfast. After breakfast patient . Patient went to lunch and BS check after lunch 340 so insulin given per sliding scale. 1 hour after last dose patient . Nurse rechecked now and patient at 320. Sliding scale is due at 1530. Nurse will recheck BS but is concerned and would like recommendation. Please review and advise.

## 2024-09-09 ENCOUNTER — APPOINTMENT (OUTPATIENT)
Dept: GENERAL RADIOLOGY | Age: 84
End: 2024-09-09
Attending: EMERGENCY MEDICINE
Payer: MEDICARE

## 2024-09-09 ENCOUNTER — HOSPITAL ENCOUNTER (INPATIENT)
Facility: HOSPITAL | Age: 84
LOS: 3 days | Discharge: NURSING, REHAB OR LONG-TERM CARE | End: 2024-09-12
Attending: EMERGENCY MEDICINE | Admitting: HOSPITALIST
Payer: MEDICARE

## 2024-09-09 DIAGNOSIS — I50.9 ACUTE ON CHRONIC CONGESTIVE HEART FAILURE, UNSPECIFIED HEART FAILURE TYPE (HCC): ICD-10-CM

## 2024-09-09 DIAGNOSIS — U07.1 COVID-19: Primary | ICD-10-CM

## 2024-09-09 DIAGNOSIS — E87.5 HYPERKALEMIA: ICD-10-CM

## 2024-09-09 DIAGNOSIS — R09.02 HYPOXIA: ICD-10-CM

## 2024-09-09 LAB
ALBUMIN SERPL-MCNC: 2.8 G/DL (ref 3.4–5)
ALBUMIN/GLOB SERPL: 0.7 {RATIO} (ref 1–2)
ALP LIVER SERPL-CCNC: 100 U/L
ALT SERPL-CCNC: 54 U/L
ANION GAP SERPL CALC-SCNC: 5 MMOL/L (ref 0–18)
APTT PPP: 38.6 SECONDS (ref 23–36)
AST SERPL-CCNC: 24 U/L (ref 15–37)
BASOPHILS # BLD AUTO: 0.04 X10(3) UL (ref 0–0.2)
BASOPHILS NFR BLD AUTO: 0.6 %
BILIRUB SERPL-MCNC: 0.2 MG/DL (ref 0.1–2)
BUN BLD-MCNC: 55 MG/DL (ref 9–23)
CALCIUM BLD-MCNC: 8.4 MG/DL (ref 8.5–10.1)
CHLORIDE SERPL-SCNC: 106 MMOL/L (ref 98–112)
CO2 SERPL-SCNC: 22 MMOL/L (ref 21–32)
CREAT BLD-MCNC: 2.6 MG/DL
EGFRCR SERPLBLD CKD-EPI 2021: 17 ML/MIN/1.73M2 (ref 60–?)
EOSINOPHIL # BLD AUTO: 0.26 X10(3) UL (ref 0–0.7)
EOSINOPHIL NFR BLD AUTO: 3.7 %
ERYTHROCYTE [DISTWIDTH] IN BLOOD BY AUTOMATED COUNT: 15.2 %
GLOBULIN PLAS-MCNC: 4.3 G/DL (ref 2.8–4.4)
GLUCOSE BLD-MCNC: 175 MG/DL (ref 70–99)
GLUCOSE BLD-MCNC: 99 MG/DL (ref 70–99)
HCT VFR BLD AUTO: 31.3 %
HGB BLD-MCNC: 10 G/DL
IMM GRANULOCYTES # BLD AUTO: 0.04 X10(3) UL (ref 0–1)
IMM GRANULOCYTES NFR BLD: 0.6 %
INR BLD: 1.38 (ref 0.8–1.2)
LYMPHOCYTES # BLD AUTO: 1.24 X10(3) UL (ref 1–4)
LYMPHOCYTES NFR BLD AUTO: 17.8 %
MCH RBC QN AUTO: 29.2 PG (ref 26–34)
MCHC RBC AUTO-ENTMCNC: 31.9 G/DL (ref 31–37)
MCV RBC AUTO: 91.5 FL
MONOCYTES # BLD AUTO: 0.56 X10(3) UL (ref 0.1–1)
MONOCYTES NFR BLD AUTO: 8 %
NEUTROPHILS # BLD AUTO: 4.82 X10 (3) UL (ref 1.5–7.7)
NEUTROPHILS # BLD AUTO: 4.82 X10(3) UL (ref 1.5–7.7)
NEUTROPHILS NFR BLD AUTO: 69.3 %
NT-PROBNP SERPL-MCNC: ABNORMAL PG/ML (ref ?–450)
OSMOLALITY SERPL CALC.SUM OF ELEC: 295 MOSM/KG (ref 275–295)
PLATELET # BLD AUTO: 227 10(3)UL (ref 150–450)
POTASSIUM SERPL-SCNC: 5.7 MMOL/L (ref 3.5–5.1)
PROT SERPL-MCNC: 7.1 G/DL (ref 6.4–8.2)
PROTHROMBIN TIME: 17.1 SECONDS (ref 11.6–14.8)
RBC # BLD AUTO: 3.42 X10(6)UL
SARS-COV-2 RNA RESP QL NAA+PROBE: DETECTED
SODIUM SERPL-SCNC: 133 MMOL/L (ref 136–145)
TROPONIN I SERPL HS-MCNC: 24 NG/L
WBC # BLD AUTO: 7 X10(3) UL (ref 4–11)

## 2024-09-09 PROCEDURE — 71045 X-RAY EXAM CHEST 1 VIEW: CPT | Performed by: EMERGENCY MEDICINE

## 2024-09-09 PROCEDURE — 99223 1ST HOSP IP/OBS HIGH 75: CPT | Performed by: HOSPITALIST

## 2024-09-09 PROCEDURE — XW033E5 INTRODUCTION OF REMDESIVIR ANTI-INFECTIVE INTO PERIPHERAL VEIN, PERCUTANEOUS APPROACH, NEW TECHNOLOGY GROUP 5: ICD-10-PCS | Performed by: INTERNAL MEDICINE

## 2024-09-09 RX ORDER — DEXTROSE MONOHYDRATE 25 G/50ML
50 INJECTION, SOLUTION INTRAVENOUS
Status: DISCONTINUED | OUTPATIENT
Start: 2024-09-09 | End: 2024-09-12

## 2024-09-09 RX ORDER — FUROSEMIDE 10 MG/ML
40 INJECTION INTRAMUSCULAR; INTRAVENOUS ONCE
Status: COMPLETED | OUTPATIENT
Start: 2024-09-09 | End: 2024-09-09

## 2024-09-09 RX ORDER — NICOTINE POLACRILEX 4 MG
30 LOZENGE BUCCAL
Status: DISCONTINUED | OUTPATIENT
Start: 2024-09-09 | End: 2024-09-12

## 2024-09-09 RX ORDER — FUROSEMIDE 10 MG/ML
40 INJECTION INTRAMUSCULAR; INTRAVENOUS
Status: DISCONTINUED | OUTPATIENT
Start: 2024-09-10 | End: 2024-09-12

## 2024-09-09 RX ORDER — SENNOSIDES 8.6 MG
17.2 TABLET ORAL NIGHTLY PRN
Status: DISCONTINUED | OUTPATIENT
Start: 2024-09-09 | End: 2024-09-12

## 2024-09-09 RX ORDER — SODIUM POLYSTYRENE SULFONATE 4.1 MEQ/G
15 POWDER, FOR SUSPENSION ORAL; RECTAL ONCE
Status: COMPLETED | OUTPATIENT
Start: 2024-09-09 | End: 2024-09-09

## 2024-09-09 RX ORDER — ONDANSETRON 2 MG/ML
4 INJECTION INTRAMUSCULAR; INTRAVENOUS EVERY 6 HOURS PRN
Status: DISCONTINUED | OUTPATIENT
Start: 2024-09-09 | End: 2024-09-12

## 2024-09-09 RX ORDER — POLYETHYLENE GLYCOL 3350 17 G/17G
17 POWDER, FOR SOLUTION ORAL DAILY PRN
Status: DISCONTINUED | OUTPATIENT
Start: 2024-09-09 | End: 2024-09-12

## 2024-09-09 RX ORDER — MELATONIN
3 NIGHTLY PRN
Status: DISCONTINUED | OUTPATIENT
Start: 2024-09-09 | End: 2024-09-12

## 2024-09-09 RX ORDER — LABETALOL HYDROCHLORIDE 5 MG/ML
10 INJECTION, SOLUTION INTRAVENOUS EVERY 4 HOURS PRN
Status: DISCONTINUED | OUTPATIENT
Start: 2024-09-09 | End: 2024-09-12

## 2024-09-09 RX ORDER — BISACODYL 10 MG
10 SUPPOSITORY, RECTAL RECTAL
Status: DISCONTINUED | OUTPATIENT
Start: 2024-09-09 | End: 2024-09-12

## 2024-09-09 RX ORDER — METOCLOPRAMIDE HYDROCHLORIDE 5 MG/ML
5 INJECTION INTRAMUSCULAR; INTRAVENOUS EVERY 8 HOURS PRN
Status: DISCONTINUED | OUTPATIENT
Start: 2024-09-09 | End: 2024-09-12

## 2024-09-09 RX ORDER — ACETAMINOPHEN 500 MG
500 TABLET ORAL EVERY 4 HOURS PRN
Status: DISCONTINUED | OUTPATIENT
Start: 2024-09-09 | End: 2024-09-12

## 2024-09-09 RX ORDER — NICOTINE POLACRILEX 4 MG
15 LOZENGE BUCCAL
Status: DISCONTINUED | OUTPATIENT
Start: 2024-09-09 | End: 2024-09-12

## 2024-09-09 NOTE — ED PROVIDER NOTES
Patient Seen in: [facility] In [place]      History     Chief Complaint   Patient presents with    Swelling    Difficulty Breathing     Stated Complaint: swelling in both feet and lower legs, low O2 per family in 80's. Hx chf negativ*    Subjective:   HPI    Patient is a 84-year-old female with a known history of CHF who is admitted to the hospital for similar symptoms back in July of this year presents the emergency room with a history of increasing shortness of breath at home increasing leg swelling which has been ongoing over the last week as per patient's daughter who is giving independent history at the bedside.  The patient is not on oxygen at home was found of ox saturations in the high 80s upon arrival to the ER.  The patient has had no history of any chest pain.  The patient denies abdominal pain.  The patient has had a mild cough recently.  The patient denies vomiting or diarrhea.  The patient has had no other recent complaints at home.    Objective:   Past Medical History:    (HFpEF) heart failure with preserved ejection fraction (HCC)    Acute cystitis without hematuria    Acute deep vein thrombosis (DVT) of popliteal vein of right lower extremity (HCC)    Cataract    CHF exacerbation (HCC)    CKD (chronic kidney disease)    Congestive heart disease (HCC)    COVID-19    Diabetes (HCC)    Disorder of thyroid    DM2 (diabetes mellitus, type 2) (HCC)    Hearing impairment    High blood pressure    HTN (hypertension)    Hyperlipidemia    Hypothyroidism    Pulmonary embolism (HCC)    Shingles    Visual impairment              Past Surgical History:   Procedure Laterality Date    Cataract      Eye surgery      Hysterectomy  1980    [facility]    Pacemaker monitor                  Social History     Socioeconomic History    Marital status: Single   Tobacco Use    Smoking status: Former     Current packs/day: 0.00     Average packs/day: 1 pack/day for 50.0 years (50.0 ttl pk-yrs)      Types: Cigarettes     Start date:      Quit date:      Years since quittin.6    Smokeless tobacco: Never   Vaping Use    Vaping status: Never Used   Substance and Sexual Activity    Alcohol use: Never    Drug use: Never   Other Topics Concern    Caffeine Concern Yes     Comment: 2 cups of coffee daily     Stress Concern No    Weight Concern No    Special Diet No    Exercise Yes     Comment: PT 2 X Weekly, OT 2 x weekly    Seat Belt Yes     Social Determinants of Health     Financial Resource Strain: Low Risk  (1/3/2024)    Financial Resource Strain     Difficulty of Paying Living Expenses: Not very hard     Med Affordability: No   Food Insecurity: No Food Insecurity (2024)    Food Insecurity     Food Insecurity: Never true   Transportation Needs: No Transportation Needs (2024)    Transportation Needs     Lack of Transportation: No   Physical Activity: Inactive (1/3/2024)    Exercise Vital Sign     Days of Exercise per Week: 0 days     Minutes of Exercise per Session: 0 min   Stress: No Stress Concern Present (1/3/2024)    Stress     Feeling of Stress : No   Social Connections: Socially Isolated (1/3/2024)    Social Connections     Frequency of Socialization with Friends and Family: 0   Housing Stability: Low Risk  (2024)    Housing Stability     Housing Instability: No              Review of Systems    Positive for stated Chief Complaint: Swelling and Difficulty Breathing    Other systems are as noted in HPI.  Constitutional and vital signs reviewed.      All other systems reviewed and negative except as noted above.    Physical Exam     ED Triage Vitals [24 1535]   BP (!) 178/74   Pulse 61   Resp 18   Temp 98.5 °F (36.9 °C)   Temp src Temporal   SpO2 92 %   O2 Device None (Room air)       Current Vitals:   Vital Signs  BP: (!) 162/72  Pulse: 60  Resp: 16  Temp: 98.5 °F (36.9 °C)  Temp src: Temporal    Oxygen Therapy  SpO2: 100 %  O2 Device: None (Room air)  O2 Flow Rate (L/min): 2  L/min            Physical Exam    GENERAL: Well-developed, well-nourished female   Sitting up breathing easily in no apparent distress.  Patient is nontoxic in appearance.  HEENT: Head is normocephalic, atraumatic. Pupils are 4 mm equally round and reactive to light. Oropharynx is clear. Mucous membranes are moist.  NECK: No stridor.  LUNGS: Clear at the apices with diminished breath sounds at both bases and crackles at both bases appreciated.  HEART: Regular rate and rhythm. Normal S1, S2 no S3, or S4. No murmur.  ABDOMEN: There is no focal tenderness to palpation appreciated anywhere throughout the abdomen. There is no guarding, no rebound, no mass, and no organomegaly appreciated. There is normoactive bowel sounds. There is no hernia.  EXTREMITIES: There is no cyanosis, clubbing, however there is edema appreciated in both lower extremities. Pulses are 2+ and equal in all 4 extremities.  NEURO: Patient is awake, alert and oriented to time place and person. Motor strength is 5 over 5 in all 4 extremities. There are no gross motor or sensory deficits appreciated.  Patient answering all questions appropriately.      ED Course     Labs Reviewed   CBC WITH DIFFERENTIAL WITH PLATELET - Abnormal; Notable for the following components:       Result Value    RBC 3.42 (*)     HGB 10.0 (*)     HCT 31.3 (*)     All other components within normal limits   COMP METABOLIC PANEL (14) - Abnormal; Notable for the following components:    Glucose 175 (*)     Sodium 133 (*)     Potassium 5.7 (*)     BUN 55 (*)     Creatinine 2.60 (*)     Calcium, Total 8.4 (*)     eGFR-Cr 17 (*)     Albumin 2.8 (*)     A/G Ratio 0.7 (*)     All other components within normal limits   PRO BETA NATRIURETIC PEPTIDE - Abnormal; Notable for the following components:    Pro-Beta Natriuretic Peptide 11,637 (*)     All other components within normal limits   PROTHROMBIN TIME (PT) - Abnormal; Notable for the following components:    PT 17.1 (*)     INR 1.38  (*)     All other components within normal limits   PTT, ACTIVATED - Abnormal; Notable for the following components:    PTT 38.6 (*)     All other components within normal limits   RAPID SARS-COV-2 BY PCR - Abnormal; Notable for the following components:    Rapid SARS-CoV-2 by PCR Detected (*)     All other components within normal limits   TROPONIN I HIGH SENSITIVITY - Normal   RAINBOW DRAW BLUE     EKG    Rate, intervals and axes as noted on EKG Report.  Rate: 60  Rhythm: Paced rhythm  Reading: Nonspecific ST change evidence of T wave inversions inferiorly and anteriorly.  Inferior T wave inversions are unchanged compared to previous EKG from July 2024 and T wave inversions anteriorly are new compared to EKG compared to July 2024.         I personally viewed the patient's chest x-ray images and my individual interpretation shows evidence of vascular congestion.  I also reviewed the official radiology report which showed results as noted below.        XR CHEST AP PORTABLE  (CPT=71045)    Result Date: 9/9/2024  CONCLUSION:  Mild vascular congestion appears improved from the prior exam with chronic small left pleural effusion.   LOCATION:  [facility]      Dictated by (CST): Carlos Eduardo Wilson MD on 9/09/2024 at 4:30 PM     Finalized by (CST): Carlos Eduardo Wilson MD on 9/09/2024 at 4:31 PM               MDM      Patient an IV line established blood work drawn including CBC, chemistries, BUN/creatinine, and blood sugar showed evidence of stable anemia showed stable BUN/creatinine of 55 and 2.6 unchanged from previous labs from July of this year as per my review of medical records.  The patient's potassium was elevated at 5.7 for which the patient was given Kayexalate here in the emergency room.  Liver function test are negative.  Troponin found to be negative.  COVID test found to be positive.  BNP is up over 11,000 at this time suggestive of heart failure.  Patient was found to be hypoxic upon arrival to the ER she has multiple  reasons to be hypoxic this time she has COVID and also has evidence of some heart failure at this time.  Patient given Kayexalate for her elevated potassium given Lasix for her CHF and will be admitted to the hospital for further care at this time.  Patient denies chest pain.  Patient otherwise feeling well on oxygen at this time.  Patient's case discussed with Dr. Yates and the patient will be admitted for further care at this time.  Admission disposition: 9/9/2024  4:38 PM                                        Medical Decision Making      Disposition and Plan     Clinical Impression:  1. COVID-19    2. Hypoxia    3. Acute on chronic congestive heart failure, unspecified heart failure type (HCC)    4. Hyperkalemia         Disposition:  Admit  9/9/2024  4:38 pm    Follow-up:  No follow-up provider specified.        Medications Prescribed:  Current Discharge Medication List                            Hospital Problems       Present on Admission  Date Reviewed: 8/15/2024            ICD-10-CM Noted POA    * (Principal) COVID-19 U07.1 9/9/2024 Unknown

## 2024-09-09 NOTE — ED INITIAL ASSESSMENT (HPI)
Pt recently discharged for CHF exacerbation. Pt to ED today for low SPO2 at home and BLE swelling. Pt denies CP/SOB.

## 2024-09-10 ENCOUNTER — APPOINTMENT (OUTPATIENT)
Dept: CARDIOLOGY | Age: 84
End: 2024-09-10

## 2024-09-10 PROBLEM — R06.89 RESPIRATORY INSUFFICIENCY: Status: ACTIVE | Noted: 2024-09-10

## 2024-09-10 PROBLEM — D63.8 ANEMIA, CHRONIC DISEASE: Status: ACTIVE | Noted: 2024-09-10

## 2024-09-10 PROBLEM — R06.89 RESPIRATORY INSUFFICIENCY: Status: ACTIVE | Noted: 2024-01-01

## 2024-09-10 PROBLEM — D63.8 ANEMIA, CHRONIC DISEASE: Status: ACTIVE | Noted: 2024-01-01

## 2024-09-10 LAB
ALBUMIN SERPL-MCNC: 3.5 G/DL (ref 3.2–4.8)
ALBUMIN/GLOB SERPL: 1.2 {RATIO} (ref 1–2)
ALP LIVER SERPL-CCNC: 92 U/L
ALT SERPL-CCNC: 38 U/L
ANION GAP SERPL CALC-SCNC: 3 MMOL/L (ref 0–18)
AST SERPL-CCNC: 14 U/L (ref ?–34)
ATRIAL RATE: 60 BPM
BASOPHILS # BLD AUTO: 0.04 X10(3) UL (ref 0–0.2)
BASOPHILS NFR BLD AUTO: 0.7 %
BILIRUB SERPL-MCNC: 0.2 MG/DL (ref 0.2–1.1)
BUN BLD-MCNC: 52 MG/DL (ref 9–23)
CALCIUM BLD-MCNC: 8.9 MG/DL (ref 8.7–10.4)
CHLORIDE SERPL-SCNC: 108 MMOL/L (ref 98–112)
CO2 SERPL-SCNC: 26 MMOL/L (ref 21–32)
CREAT BLD-MCNC: 2.36 MG/DL
DEPRECATED HBV CORE AB SER IA-ACNC: 26.4 NG/ML
EGFRCR SERPLBLD CKD-EPI 2021: 20 ML/MIN/1.73M2 (ref 60–?)
EOSINOPHIL # BLD AUTO: 0.29 X10(3) UL (ref 0–0.7)
EOSINOPHIL NFR BLD AUTO: 4.9 %
ERYTHROCYTE [DISTWIDTH] IN BLOOD BY AUTOMATED COUNT: 14.8 %
GLOBULIN PLAS-MCNC: 2.9 G/DL (ref 2–3.5)
GLUCOSE BLD-MCNC: 134 MG/DL (ref 70–99)
GLUCOSE BLD-MCNC: 134 MG/DL (ref 70–99)
GLUCOSE BLD-MCNC: 165 MG/DL (ref 70–99)
GLUCOSE BLD-MCNC: 186 MG/DL (ref 70–99)
GLUCOSE BLD-MCNC: 188 MG/DL (ref 70–99)
HCT VFR BLD AUTO: 30.4 %
HGB BLD-MCNC: 9.8 G/DL
IMM GRANULOCYTES # BLD AUTO: 0.04 X10(3) UL (ref 0–1)
IMM GRANULOCYTES NFR BLD: 0.7 %
IRON SATN MFR SERPL: 6 %
IRON SERPL-MCNC: 18 UG/DL
LYMPHOCYTES # BLD AUTO: 1.07 X10(3) UL (ref 1–4)
LYMPHOCYTES NFR BLD AUTO: 18.1 %
MAGNESIUM SERPL-MCNC: 1.9 MG/DL (ref 1.6–2.6)
MCH RBC QN AUTO: 28.7 PG (ref 26–34)
MCHC RBC AUTO-ENTMCNC: 32.2 G/DL (ref 31–37)
MCV RBC AUTO: 89.1 FL
MONOCYTES # BLD AUTO: 0.57 X10(3) UL (ref 0.1–1)
MONOCYTES NFR BLD AUTO: 9.6 %
NEUTROPHILS # BLD AUTO: 3.9 X10 (3) UL (ref 1.5–7.7)
NEUTROPHILS # BLD AUTO: 3.9 X10(3) UL (ref 1.5–7.7)
NEUTROPHILS NFR BLD AUTO: 66 %
OSMOLALITY SERPL CALC.SUM OF ELEC: 300 MOSM/KG (ref 275–295)
P-R INTERVAL: 242 MS
PLATELET # BLD AUTO: 243 10(3)UL (ref 150–450)
POTASSIUM SERPL-SCNC: 4.9 MMOL/L (ref 3.5–5.1)
PROT SERPL-MCNC: 6.4 G/DL (ref 5.7–8.2)
Q-T INTERVAL: 428 MS
QRS DURATION: 92 MS
QTC CALCULATION (BEZET): 428 MS
R AXIS: 269 DEGREES
RBC # BLD AUTO: 3.41 X10(6)UL
SODIUM SERPL-SCNC: 137 MMOL/L (ref 136–145)
T AXIS: -39 DEGREES
TOTAL IRON BINDING CAPACITY: 292 UG/DL (ref 250–425)
TRANSFERRIN SERPL-MCNC: 222 MG/DL (ref 250–380)
VENTRICULAR RATE: 60 BPM
WBC # BLD AUTO: 5.9 X10(3) UL (ref 4–11)

## 2024-09-10 PROCEDURE — 99232 SBSQ HOSP IP/OBS MODERATE 35: CPT | Performed by: INTERNAL MEDICINE

## 2024-09-10 PROCEDURE — 99223 1ST HOSP IP/OBS HIGH 75: CPT | Performed by: INTERNAL MEDICINE

## 2024-09-10 RX ORDER — CARVEDILOL 12.5 MG/1
12.5 TABLET ORAL 2 TIMES DAILY WITH MEALS
Status: DISCONTINUED | OUTPATIENT
Start: 2024-09-10 | End: 2024-09-11

## 2024-09-10 RX ORDER — ATORVASTATIN CALCIUM 10 MG/1
10 TABLET, FILM COATED ORAL NIGHTLY
Status: DISCONTINUED | OUTPATIENT
Start: 2024-09-10 | End: 2024-09-12

## 2024-09-10 RX ORDER — GABAPENTIN 100 MG/1
100 CAPSULE ORAL 3 TIMES DAILY
Status: DISCONTINUED | OUTPATIENT
Start: 2024-09-10 | End: 2024-09-12

## 2024-09-10 RX ORDER — DILTIAZEM HYDROCHLORIDE 120 MG/1
120 CAPSULE, EXTENDED RELEASE ORAL DAILY
Status: DISCONTINUED | OUTPATIENT
Start: 2024-09-10 | End: 2024-09-12

## 2024-09-10 RX ORDER — AMIODARONE HYDROCHLORIDE 200 MG/1
200 TABLET ORAL DAILY
Status: DISCONTINUED | OUTPATIENT
Start: 2024-09-10 | End: 2024-09-12

## 2024-09-10 RX ORDER — INSULIN LISPRO 100 [IU]/ML
INJECTION, SOLUTION INTRAVENOUS; SUBCUTANEOUS
Qty: 9 ML | Refills: 1 | Status: SHIPPED | OUTPATIENT
Start: 2024-09-10

## 2024-09-10 RX ORDER — ALLOPURINOL 100 MG/1
100 TABLET ORAL DAILY
Status: DISCONTINUED | OUTPATIENT
Start: 2024-09-10 | End: 2024-09-12

## 2024-09-10 RX ORDER — LEVOTHYROXINE SODIUM 75 UG/1
75 TABLET ORAL
Status: DISCONTINUED | OUTPATIENT
Start: 2024-09-10 | End: 2024-09-12

## 2024-09-10 RX ORDER — ESCITALOPRAM OXALATE 5 MG/1
5 TABLET ORAL DAILY
Status: DISCONTINUED | OUTPATIENT
Start: 2024-09-10 | End: 2024-09-12

## 2024-09-10 NOTE — CONSULTS
INFECTIOUS DISEASE CONSULTATION    Ciarra Salcido Patient Status:  Inpatient    1940 MRN [id number]   Location [facility] 8NE-A Attending Katherin Choudhary, DO   Hosp Day # 1 PCP JUDY CYR MD       Requested by Dr. Johns    Reason for Consultation:  COVID    History of Present Illness:  Ciarra Salcido is a a(n) 84 year old female last recorded COVID vaccine, 2021, almost 3 years ago.  She had documented COVID infection 1 year ago 2023.  She resides at [facility] [place].  She reports having had cold symptoms about 2 weeks ago, not tested at that time.    She was hospitalized 2 months ago for CHF.      She was sent to ED yesterday due with complaints of leg swelling and sob. 02 st was 92% on arrival, and went down to 82% was placed on 02 NC 6L , now on 1L.  She is being treated for CHF.  No fever.    SARS-CoV-2 detected on rapid PCR on .      History:  Past Medical History:    (HFpEF) heart failure with preserved ejection fraction (HCC)    Acute cystitis without hematuria    Acute deep vein thrombosis (DVT) of popliteal vein of right lower extremity (HCC)    Cataract    CHF exacerbation (HCC)    CKD (chronic kidney disease)    Congestive heart disease (HCC)    COVID-19    Diabetes (HCC)    Disorder of thyroid    DM2 (diabetes mellitus, type 2) (HCC)    Hearing impairment    High blood pressure    HTN (hypertension)    Hyperlipidemia    Hypothyroidism    Pulmonary embolism (HCC)    Shingles    Visual impairment     Past Surgical History:   Procedure Laterality Date    Cataract      Eye surgery      Hysterectomy      [facility]    Pacemaker monitor       Family History   Problem Relation Age of Onset    Other (Other) Mother         Old Age    Other (Other) Father         Old age      reports that she quit smoking about 13 years ago. Her smoking use included cigarettes. She started smoking about 63 years ago. She has a 50 pack-year  smoking history. She has never used smokeless tobacco. She reports that she does not drink alcohol and does not use drugs.      Allergies:  No Known Allergies    Medications:    Current Facility-Administered Medications:     allopurinol (Zyloprim) tab 100 mg, 100 mg, Oral, Daily    amiodarone (Pacerone) tab 200 mg, 200 mg, Oral, Daily    apixaban (Eliquis) tab 2.5 mg, 2.5 mg, Oral, BID    carvedilol (Coreg) tab 12.5 mg, 12.5 mg, Oral, BID with meals    dilTIAZem ER (Dilacor XR) 24 hr cap 120 mg, 120 mg, Oral, Daily    escitalopram (Lexapro) tab 5 mg, 5 mg, Oral, Daily    gabapentin (Neurontin) cap 100 mg, 100 mg, Oral, TID    levothyroxine (Synthroid) tab 75 mcg, 75 mcg, Oral, Before breakfast    atorvastatin (Lipitor) tab 10 mg, 10 mg, Oral, Nightly    furosemide (Lasix) 10 mg/mL injection 40 mg, 40 mg, Intravenous, BID (Diuretic)    glucose (Dex4) 15 GM/59ML oral liquid 15 g, 15 g, Oral, Q15 Min PRN **OR** glucose (Glutose) 40% oral gel 15 g, 15 g, Oral, Q15 Min PRN **OR** glucose-vitamin C (Dex-4) chewable tab 4 tablet, 4 tablet, Oral, Q15 Min PRN **OR** dextrose 50% injection 50 mL, 50 mL, Intravenous, Q15 Min PRN **OR** glucose (Dex4) 15 GM/59ML oral liquid 30 g, 30 g, Oral, Q15 Min PRN **OR** glucose (Glutose) 40% oral gel 30 g, 30 g, Oral, Q15 Min PRN **OR** glucose-vitamin C (Dex-4) chewable tab 8 tablet, 8 tablet, Oral, Q15 Min PRN    insulin aspart (NovoLOG) 100 Units/mL FlexPen 1-68 Units, 1-68 Units, Subcutaneous, TID CC    insulin aspart (NovoLOG) 100 Units/mL FlexPen 1-10 Units, 1-10 Units, Subcutaneous, TID AC and HS    acetaminophen (Tylenol Extra Strength) tab 500 mg, 500 mg, Oral, Q4H PRN    melatonin tab 3 mg, 3 mg, Oral, Nightly PRN    polyethylene glycol (PEG 3350) (Miralax) 17 g oral packet 17 g, 17 g, Oral, Daily PRN    sennosides (Senokot) tab 17.2 mg, 17.2 mg, Oral, Nightly PRN    bisacodyl (Dulcolax) 10 MG rectal suppository 10 mg, 10 mg, Rectal, Daily PRN    ondansetron (Zofran) 4 MG/2ML  injection 4 mg, 4 mg, Intravenous, Q6H PRN    metoclopramide (Reglan) 5 mg/mL injection 5 mg, 5 mg, Intravenous, Q8H PRN    labetalol (Trandate) 5 mg/mL injection 10 mg, 10 mg, Intravenous, Q4H PRN  Current Facility-Administered Medications on File Prior to Encounter   Medication Dose Route Frequency Provider Last Rate Last Admin    [COMPLETED] patiromer (Veltassa) 16.8 g oral packet 16.8 g  16.8 g Oral Once Mary Lares MD   16.8 g at 07/15/24 1216    [COMPLETED] insulin aspart (NovoLOG) 100 Units/mL FlexPen 12 Units  12 Units Subcutaneous Once Jemima Lopez MD   12 Units at 24 1242    [COMPLETED] patiromer (Veltassa) 8.4 g oral packet 8.4 g  8.4 g Oral Once Mary Lares MD   8.4 g at 24 0036    [COMPLETED] furosemide (Lasix) 10 mg/mL injection 40 mg  40 mg Intravenous BID (Diuretic) Mary Lares MD   40 mg at 24 0806    [COMPLETED] Sodium Zirconium Cyclosilicate (Lokelma) oral packet 5 g  5 g Oral Q8H Mary Lares MD   5 g at 24 0808    [] Sodium Zirconium Cyclosilicate (Lokelma) oral packet 5 g  5 g Oral Q8H Mary Lares MD   5 g at 24 1826    [COMPLETED] furosemide (Lasix) 10 mg/mL injection 40 mg  40 mg Intravenous BID (Diuretic) Mary Lares MD   40 mg at 24 1712    [COMPLETED] insulin aspart (NovoLOG) 100 Units/mL FlexPen 10 Units  10 Units Subcutaneous Once Jemima Lopez MD   10 Units at 24 1235    [] sodium chloride 0.9% infusion   Intravenous Continuous Solomon Lezama MD        [COMPLETED] nitroGLYCERIN (Nitrobid) 2 % ointment 1 inch  1 inch Topical Once Katey Silva DO   1 inch at 24 0144    [COMPLETED] magnesium oxide (Mag-Ox) tab 400 mg  400 mg Oral Once Cabrera Pineda MD   400 mg at 24    [COMPLETED] hydrALAzine (Apresoline) 20 mg/mL injection 5 mg  5 mg Intravenous Once Solomon Lezama MD   5 mg at 24    [COMPLETED] hydrALAzine (Apresoline) 20 mg/mL injection 5 mg  5 mg Intravenous Once  Solomon Lezama MD   5 mg at 07/08/24 9827    [COMPLETED] cefTRIAXone (Rocephin) 1 g in sodium chloride 0.9% 100 mL IVPB-ADDV  1 g Intravenous Once Solomon Lezama MD   Stopped at 07/08/24 5224     Current Outpatient Medications on File Prior to Encounter   Medication Sig Dispense Refill    insulin glargine (LANTUS SOLOSTAR) 100 UNIT/ML Subcutaneous Solution Pen-injector Inject 15 Units into the skin nightly. 18 mL 0    Continuous Glucose Sensor (DEXCOM G7 SENSOR) Does not apply Misc 1 each Every 10 days. Dx: E11.22, N18.31, Z79.4 (patient is using insulin 4x/day) 9 each 3    Insulin Lispro, 1 Unit Dial, (HUMALOG KWIKPEN) 100 UNIT/ML Subcutaneous Solution Pen-injector Per sliding scale:   150-200 =1  201-250 =2  251-300=3  301-350=4  351-400=5  Call DM if >400      glimepiride 4 MG Oral Tab TAKE 1 TABLET EVERY DAY 90 tablet 0    escitalopram 5 MG Oral Tab TAKE 1 TABLET EVERY DAY 90 tablet 0    simvastatin 20 MG Oral Tab TAKE 1 TABLET EVERY NIGHT 90 tablet 0    apixaban 2.5 MG Oral Tab Take 1 tablet (2.5 mg total) by mouth 2 (two) times daily. 60 tablet 0    carvedilol 12.5 MG Oral Tab Take 1 tablet (12.5 mg total) by mouth 2 (two) times daily with meals. 60 tablet 0    Glucose Blood (ACCU-CHEK GUIDE) In Vitro Strip       Blood Glucose Monitoring Suppl (ACCU-CHEK GUIDE) w/Device Does not apply Kit       Accu-Chek FastClix Lancets Does not apply Misc 1 Lancet by Finger stick route. Use as directed.      GABAPENTIN 100 MG Oral Cap TAKE 1 CAPSULE THREE TIMES DAILY 270 capsule 3    amiodarone 200 MG Oral Tab Take 1 tablet (200 mg total) by mouth daily.      levothyroxine 75 MCG Oral Tab Take 1 tablet (75 mcg total) by mouth before breakfast. 90 tablet 3    allopurinol 100 MG Oral Tab Take 1 tablet (100 mg total) by mouth daily. 30 tablet 11    dilTIAZem  MG Oral Capsule SR 24 Hr Take 1 capsule (120 mg total) by mouth daily.      Cholecalciferol (VITAMIN D3) 25 MCG (1000 UT) Oral Cap Take 1 tablet by mouth  daily.      acetaminophen 500 MG Oral Tab Take 2 tablets (1,000 mg total) by mouth every 6 (six) hours as needed for Pain or Fever.         Review of Systems:    A comprehensive 10 point review of systems was completed.  Pertinent positives and negatives noted in the the HPI.      Physical Exam:    General: No acute distress. Alert and oriented x 3.  Vital signs: Temp:  [98.2 °F (36.8 °C)-99.3 °F (37.4 °C)] 99.2 °F (37.3 °C)  Pulse:  [60-68] 63  Resp:  [15-21] 15  BP: (155-203)/(61-79) 169/61  SpO2:  [82 %-100 %] 99 %  Body mass index is 27.7 kg/m².  HEENT: Moist mucous membranes. Extraocular muscles are intact.  Neck: No swelling, no masses  Respiratory: Non labored, symmetric exursion  Abdomen: Soft, nontender, nondistended.    Musculoskeletal: Full range of motion of all extremities.  No swelling noted.  Joints: no effusions  Skin: No lesions. No erythema, no open wounds    Laboratory Data:  Laboratory data reviewed      Recent Labs   Lab 09/09/24  1604   RBC 3.42*   HGB 10.0*   HCT 31.3*   MCV 91.5   MCH 29.2   MCHC 31.9   RDW 15.2   NEPRELIM 4.82   WBC 7.0   .0       Recent Labs   Lab 09/09/24  1604   *   BUN 55*   CREATSERUM 2.60*   CA 8.4*   ALB 2.8*   *   K 5.7*      CO2 22.0   ALKPHO 100   AST 24   ALT 54   BILT 0.2   TP 7.1         Lab Results   Component Value Date    PTT 38.6 09/09/2024    INR 1.38 09/09/2024    PTP 17.1 09/09/2024    PGLU 165 09/10/2024         Microbiologic Data:   No results found for this visit on 09/09/24.      Imaging:  CXR 9/9 mild vascular congestion improved  Established Problem list:  Patient Active Problem List   Diagnosis    CKD (chronic kidney disease) stage 4, GFR 15-29 ml/min ([facility])    Diabetes mellitus, type 2 (HCC)    Primary hypertension    Mixed hyperlipidemia    Acquired hypothyroidism    Osteopenia    PRISCILLA (acute kidney injury) ([facility])    Hyperkalemia    Diabetic polyneuropathy associated with type 2 diabetes mellitus (HCC)    Current moderate  episode of major depressive disorder without prior episode (HCC)    Acute on chronic heart failure with preserved ejection fraction (HCC)    Multiple subsegmental pulmonary emboli without acute cor pulmonale (HCC)    Smokers' cough (HCC)    Chronic deep vein thrombosis (DVT) of proximal vein of lower extremity (HCC)    Atrial fibrillation with rapid ventricular response (HCC)    Chronic heart failure with preserved ejection fraction (HCC)    Stage 3 chronic kidney disease (HCC)    Hypertension, unspecified type    Acute cystitis with hematuria    Altered mental status, unspecified altered mental status type    Arthralgia of right lower leg    Rectal bleeding    Hyperglycemia    Hypertensive urgency    Hyperlipidemia    Hypothyroidism    ATN (acute tubular necrosis) (HCC)    Acute gout of multiple sites    Cardiorenal syndrome    Right sided weakness    Cognitive decline    Urinary retention    COVID-19    Hypoxia    Acute on chronic congestive heart failure, unspecified heart failure type (HCC)       ASSESSMENT/PLAN:  1. COVID  -last vaccinated 3 years ago, previous infection tested positive 1 year ago  Admitted from NH 9/9 for billateral leg edema due to right sided CHF  -reports cold symptoms 2 weeks ago  Currently no fever or active symptoms    Tested positive on 9/9  ---unknown onset of infection    Given lack of fever or other active symptoms, unknown timing of onset of infection, and history of previous URI symptoms, may reflect recent rather than active infection  Doubt benefit to antivrials at this stage    AVOID steroids (mild hypoxia in the setting of CHF, CXR actually improved compared to 2 months ago)      2. Cardiomyopathy, right sided heart failure  Underlying CRI    3. SP pacemaker    4. Hypoxia, mild congestion on CXR, 02 weaning on 1L  Per hospitalist          Jesús Brewer MD, MD [facility]  [phone]

## 2024-09-10 NOTE — PROGRESS NOTES
Ciarra Salcido Patient Status:  Inpatient    1940 MRN [id number]   Location [facility] 8NE-A Attending Cabrera Pineda MD   Hosp Day # 1 PCP JUDY CYR MD     Cardiology Nocturnal APN Note    Briefly: (Documentation from chart review)     Ciarra Salcido is a 85 y/o female who presented with HTN, HFpEF and COVID and has a PMH/PSH of:       Past Medical History:    (HFpEF) heart failure with preserved ejection fraction (HCC)    Acute cystitis without hematuria    Acute deep vein thrombosis (DVT) of popliteal vein of right lower extremity (HCC)    Cataract    CHF exacerbation (HCC)    CKD (chronic kidney disease)    Congestive heart disease (HCC)    COVID-19    Diabetes (HCC)    Disorder of thyroid    DM2 (diabetes mellitus, type 2) (HCC)    Hearing impairment    High blood pressure    HTN (hypertension)    Hyperlipidemia    Hypothyroidism    Pulmonary embolism (HCC)    Shingles    Visual impairment       Primary Cardiologist Ajit    Vital Signs:       2024    10:39 PM 2024    11:42 PM   Vitals History   /62 175/65   BP Location Left arm Left arm   Pulse 61 61   Resp 15 15   Temp  98.2 °F (36.8 °C)   SpO2 96 % 93 %        Labs:   Lab Results   Component Value Date    WBC 7.0 2024    HGB 10.0 2024    HCT 31.3 2024    .0 2024    CREATSERUM 2.60 2024    BUN 55 2024     2024    K 5.7 2024     2024    CO2 22.0 2024     2024    CA 8.4 2024    ALB 2.8 2024    ALKPHO 100 2024    BILT 0.2 2024    TP 7.1 2024    AST 24 2024    ALT 54 2024    PTT 38.6 2024    INR 1.38 2024    TROPHS 24 2024       Diagnostics:   XR CHEST AP PORTABLE  (CPT=71045)    Result Date: 2024  PROCEDURE:  XR CHEST AP PORTABLE  (CPT=71045)  TECHNIQUE:  AP chest radiograph was obtained.  COMPARISON:  [facility] , XR, XR CHEST PA + LAT CHEST (CPT=71046), 2024, 11:12 AM.   INDICATIONS:  swelling in both feet and lower legs, low O2 per family in 80's. Hx chf negative covid. From assisted living.  PATIENT STATED HISTORY: (As transcribed by Technologist)  Patient has swelling in both feet and legs. Patient has low O2 stated by her family.    FINDINGS:  Cardiac silhouette is enlarged, stable.  Left pacer is noted.  Mild vascular congestion appears improved from the prior exam.  Chronic interstitial changes are noted in the lungs.  Mild blunting of left costophrenic angle likely represents a chronic small left pleural effusion.  There is no focal consolidation or pneumothorax.            CONCLUSION:  Mild vascular congestion appears improved from the prior exam with chronic small left pleural effusion.   LOCATION:  [facility]      Dictated by (CST): Carlos Eduardo Wilson MD on 9/09/2024 at 4:30 PM     Finalized by (CST): Carlos Eduardo Wilson MD on 9/09/2024 at 4:31 PM         Allergies:  No Known Allergies    Medications:    allopurinol    amiodarone    apixaban    carvedilol    dilTIAZem ER    escitalopram    gabapentin    levothyroxine    atorvastatin    furosemide    glucose **OR** glucose **OR** glucose-vitamin C **OR** dextrose **OR** glucose **OR** glucose **OR** glucose-vitamin C    insulin aspart    insulin aspart    acetaminophen    melatonin    polyethylene glycol (PEG 3350)    sennosides    bisacodyl    ondansetron    metoclopramide    labetalol    Assessment:   HFpEF  - BNP 11K  - Mild vascular congestion on x-ray  - Got IV lasix in ED  - Strict I/O    HTN  - Continue home medications  - Not well controlled  - Unclear if she took pm home medications, attempted to clarify with RN. No response from RN      Plan:    - Continue to monitor overnight  - Formal Cardiology consult to follow in AM.       MEME Holm  [facility]  9/10/2024  2:44 AM

## 2024-09-10 NOTE — PLAN OF CARE
NURSING ADMISSION NOTE      Patient admitted via Ambulance  Oriented to room.  Safety precautions initiated.  Bed in low position.  Call light in reach.      Patient from [place] ED via ambulance. Patient alert and oriented x 4. Hard of hearing, hearing aids back in [facility]. Maintaining O2 saturation WNL on 4L/min. Patient hypertensive on admission PRN labetalol given, per MAR. No complains of chest discomfort, shortness of breath. Patient NSR/Vpaced on tele monitor. PTA med list received from [facility]. Safety precautions in place, call light within reach, bed alarm on. All needs met at this time.

## 2024-09-10 NOTE — ED QUICK NOTES
Orders for admission, patient is aware of plan and ready to go upstairs. Any questions, please call ED RN Sherry at extension [phone].     Patient Covid vaccination status: Fully vaccinated     COVID Test Ordered in ED: Rapid SARS-CoV-2 by PCR- COVID positive    COVID Suspicion at Admission: N/A    Running Infusions:  None    Mental Status/LOC at time of transport: AOx3    Other pertinent information:   CIWA score: N/A   NIH score:  N/A

## 2024-09-10 NOTE — CM/SW NOTE
Pt admitted from [facility]. SW attempted to call facility at [phone] to inform of admission and to speak with DON, but SW had to leave a voicemail. Noted that pt is COVID+ upon hospital admit.     Await return call from [facility].    Addendum: 1:55pm: MARTIN called [facility] again at the above # but SW was disconnected from call by the facility.      &  to remain available and supportive for discharge planning needs.    Angelika Peter, MSW, LSW  Discharge Planner

## 2024-09-10 NOTE — PLAN OF CARE
Assumed care of patient at 0730.  Alert and oriented x4.  On 1L O2 with adequate O2 saturations. Denies shortness of breath.  Apaced/NSR on tele. No complaints of chest pain.  Incontinent of bladder, purewick in place.  Up x1 and walker. Pt and daughter updated on plan of care, verbalized understanding.    Problem: CARDIOVASCULAR - ADULT  Goal: Maintains optimal cardiac output and hemodynamic stability  Description: INTERVENTIONS:  - Monitor vital signs, rhythm, and trends  - Monitor for bleeding, hypotension and signs of decreased cardiac output  - Evaluate effectiveness of vasoactive medications to optimize hemodynamic stability  - Monitor arterial and/or venous puncture sites for bleeding and/or hematoma  - Assess quality of pulses, skin color and temperature  - Assess for signs of decreased coronary artery perfusion - ex. Angina  - Evaluate fluid balance, assess for edema, trend weights  Outcome: Progressing  Goal: Absence of cardiac arrhythmias or at baseline  Description: INTERVENTIONS:  - Continuous cardiac monitoring, monitor vital signs, obtain 12 lead EKG if indicated  - Evaluate effectiveness of antiarrhythmic and heart rate control medications as ordered  - Initiate emergency measures for life threatening arrhythmias  - Monitor electrolytes and administer replacement therapy as ordered  Outcome: Progressing

## 2024-09-10 NOTE — H&P
[facility]  History and Physical     Ciarra Salcido Patient Status:  Emergency    1940 MRN [id number]   Location [facility] IN [place] Attending Solomon Lezama MD   Hosp Day # 0 PCP JUDY CYR MD     Chief Complaint: \"Bilateral leg swelling and shortness of breath\"    Subjective:    History of Present Illness:     Ciarra Salcido is a 84 year old female with past medical history HFpEF, diabetes mellitus type 2, paroxysmal atrial fibrillation, essential hypertension, hyperlipidemia, CKD 4, hypothyroidism, gout, normocytic anemia who presents to hospital today with complaints of bilateral leg swelling and shortness of breath.  Patient symptoms really started worsening over the last 24 hours and family noted that SpO2 at home was low.  Patient has a slight cough.  No fevers or chills no nausea vomiting.    -S/p IV Lasix, Kayexalate in the emergency room        History/Other:    Past Medical History:  Past Medical History:    (HFpEF) heart failure with preserved ejection fraction (HCC)    Acute cystitis without hematuria    Acute deep vein thrombosis (DVT) of popliteal vein of right lower extremity (HCC)    Cataract    CHF exacerbation (HCC)    CKD (chronic kidney disease)    Congestive heart disease (HCC)    COVID-19    Diabetes (HCC)    Disorder of thyroid    DM2 (diabetes mellitus, type 2) (HCC)    Hearing impairment    High blood pressure    HTN (hypertension)    Hyperlipidemia    Hypothyroidism    Pulmonary embolism (HCC)    Shingles    Visual impairment     Past Surgical History:   Past Surgical History:   Procedure Laterality Date    Cataract      Eye surgery      Hysterectomy      [facility]    Pacemaker monitor        Family History:   Family History   Problem Relation Age of Onset    Other (Other) Mother         Old Age    Other (Other) Father         Old age     Social History:    reports that she quit smoking about 13 years ago. Her smoking use included cigarettes.  She started smoking about 63 years ago. She has a 50 pack-year smoking history. She has never used smokeless tobacco. She reports that she does not drink alcohol and does not use drugs.     Allergies: No Known Allergies    Medications:    Current Facility-Administered Medications on File Prior to Encounter   Medication Dose Route Frequency Provider Last Rate Last Admin    [COMPLETED] patiromer (Veltassa) 16.8 g oral packet 16.8 g  16.8 g Oral Once Mary Lares MD   16.8 g at 07/15/24 1216    [COMPLETED] insulin aspart (NovoLOG) 100 Units/mL FlexPen 12 Units  12 Units Subcutaneous Once Jemima Lopez MD   12 Units at 24 1242    [COMPLETED] patiromer (Veltassa) 8.4 g oral packet 8.4 g  8.4 g Oral Once Mary Lares MD   8.4 g at 24 0036    [COMPLETED] furosemide (Lasix) 10 mg/mL injection 40 mg  40 mg Intravenous BID (Diuretic) Mary Lares MD   40 mg at 24 0806    [COMPLETED] Sodium Zirconium Cyclosilicate (Lokelma) oral packet 5 g  5 g Oral Q8H Mary Lares MD   5 g at 24 0808    [] Sodium Zirconium Cyclosilicate (Lokelma) oral packet 5 g  5 g Oral Q8H Mary Lares MD   5 g at 24 1826    [COMPLETED] furosemide (Lasix) 10 mg/mL injection 40 mg  40 mg Intravenous BID (Diuretic) Mary Lares MD   40 mg at 24 1712    [COMPLETED] insulin aspart (NovoLOG) 100 Units/mL FlexPen 10 Units  10 Units Subcutaneous Once Jemima Lopez MD   10 Units at 24 1235    [] sodium chloride 0.9% infusion   Intravenous Continuous Solomon Lezama MD        [COMPLETED] nitroGLYCERIN (Nitrobid) 2 % ointment 1 inch  1 inch Topical Once Katey Silva DO   1 inch at 24 0144    [COMPLETED] magnesium oxide (Mag-Ox) tab 400 mg  400 mg Oral Once Cabrera Pineda MD   400 mg at 24 2121    [COMPLETED] hydrALAzine (Apresoline) 20 mg/mL injection 5 mg  5 mg Intravenous Once Solomon Lezama MD   5 mg at 24    [COMPLETED] hydrALAzine (Apresoline) 20 mg/mL  injection 5 mg  5 mg Intravenous Once Solomon Lezama MD   5 mg at 07/08/24 9224    [COMPLETED] cefTRIAXone (Rocephin) 1 g in sodium chloride 0.9% 100 mL IVPB-ADDV  1 g Intravenous Once Solomon Lezama MD   Stopped at 07/08/24 3744     Current Outpatient Medications on File Prior to Encounter   Medication Sig Dispense Refill    insulin glargine (LANTUS SOLOSTAR) 100 UNIT/ML Subcutaneous Solution Pen-injector Inject 15 Units into the skin nightly. 18 mL 0    torsemide 20 MG Oral Tab Take 1 tablet (20 mg total) by mouth daily. 90 tablet 1    Continuous Glucose Sensor (DEXCOM G7 SENSOR) Does not apply Misc 1 each Every 10 days. Dx: E11.22, N18.31, Z79.4 (patient is using insulin 4x/day) 9 each 3    Insulin Lispro, 1 Unit Dial, (HUMALOG KWIKPEN) 100 UNIT/ML Subcutaneous Solution Pen-injector HumaLOG KwikPen (U-100) Insulin 100 unit/mL subcutaneous, [RxNorm: 8366641]      glimepiride 4 MG Oral Tab TAKE 1 TABLET EVERY DAY 90 tablet 0    escitalopram 5 MG Oral Tab TAKE 1 TABLET EVERY DAY 90 tablet 0    simvastatin 20 MG Oral Tab TAKE 1 TABLET EVERY NIGHT 90 tablet 0    apixaban 2.5 MG Oral Tab Take 1 tablet (2.5 mg total) by mouth 2 (two) times daily. 60 tablet 0    carvedilol 12.5 MG Oral Tab Take 1 tablet (12.5 mg total) by mouth 2 (two) times daily with meals. 60 tablet 0    hydrocortisone 2.5 % External Cream One application BID PRN on the external hemorrhoids      Glucose Blood (ACCU-CHEK GUIDE) In Vitro Strip       Blood Glucose Monitoring Suppl (ACCU-CHEK GUIDE) w/Device Does not apply Kit       Accu-Chek FastClix Lancets Does not apply Misc 1 Lancet by Finger stick route. Use as directed.      GABAPENTIN 100 MG Oral Cap TAKE 1 CAPSULE THREE TIMES DAILY 270 capsule 3    amiodarone 200 MG Oral Tab Take 1 tablet (200 mg total) by mouth daily.      levothyroxine 75 MCG Oral Tab Take 1 tablet (75 mcg total) by mouth before breakfast. 90 tablet 3    allopurinol 100 MG Oral Tab Take 1 tablet (100 mg total) by mouth  daily. 30 tablet 11    dilTIAZem  MG Oral Capsule SR 24 Hr Take 1 capsule (120 mg total) by mouth daily.      acetaminophen 500 MG Oral Tab Take 2 tablets (1,000 mg total) by mouth every 6 (six) hours as needed for Pain or Fever.      estradiol (ESTRACE) 0.1 MG/GM Vaginal Cream Apply a small (pea-sized) amount to periurethral region daily for 2 weeks, three times weekly thereafter. (Patient not taking: Reported on 8/13/2024) 42.5 g 5    HYDROcodone-acetaminophen 5-325 MG Oral Tab Take 1 tablet by mouth every 8 (eight) hours as needed for Pain. (Patient not taking: Reported on 9/9/2024) 10 tablet 0    Cholecalciferol (VITAMIN D3) 25 MCG (1000 UT) Oral Cap Take 1 tablet by mouth daily. (Patient not taking: Reported on 8/13/2024)         Review of Systems:   A comprehensive review of systems was completed.    Pertinent positives and negatives noted in the HPI.    Objective:   Physical Exam:    /72   Pulse 60   Temp 98.5 °F (36.9 °C)   Resp 18   Ht 5' 4\" (1.626 m)   Wt 150 lb (68 kg)   SpO2 96%   BMI 25.75 kg/m²   General: No acute distress, Alert  Respiratory: No rhonchi, no wheezes  Cardiovascular: S1, S2. Regular rate and rhythm  Abdomen: Soft, Non-tender, non-distended, positive bowel sounds  Neuro: No new focal deficits  Extremities: +LE edema    Results:    Labs:      Labs Last 24 Hours:    Recent Labs   Lab 09/09/24  1604   RBC 3.42*   HGB 10.0*   HCT 31.3*   MCV 91.5   MCH 29.2   MCHC 31.9   RDW 15.2   NEPRELIM 4.82   WBC 7.0   .0       Recent Labs   Lab 09/09/24  1604   *   BUN 55*   CREATSERUM 2.60*   EGFRCR 17*   CA 8.4*   ALB 2.8*   *   K 5.7*      CO2 22.0   ALKPHO 100   AST 24   ALT 54   BILT 0.2   TP 7.1       Lab Results   Component Value Date    INR 1.38 (H) 09/09/2024    INR 1.58 (H) 07/08/2024    INR 1.68 (H) 09/11/2023       Recent Labs   Lab 09/09/24  1604   TROPHS 24       Recent Labs   Lab 09/09/24  1604   PBNP 11,376*       No results for input(s):  \"PCT\" in the last 168 hours.    Imaging: Imaging data reviewed in Epic.    Assessment & Plan:      #HFpEF exacerbation  -s/p iv lasix in ER, cont iv diureis  -cards/renal to see  -echo (July 2024) shows LVEF 65-70%  -CHF order set    #Acute hypoxemic respiratory failure  -6 L on admission, now down to 4 L, room air at baseline    #COVID-positive  -Chest x-ray shows vascular congestion  -ID to see, consider RDV?    #Uncontrolled diabetes mellitus type 2  -A1c 9.3 as of 8/13/2024  -Insulin sliding scale plus carb coverage for now    #Permanent afib /p PPM  -PTA: eliquis?    #Essential hypertension  #Hyperlipidemia    #CKD 4  #Hyperkalemia  -S/p Kayexalate in the emergency room  -Nephrology to see  -Trend labs    #Hypothyroidism    #Gout    #Chronic normocytic anemia    #Recent hospitalization  -7/8-7/18/24 where patient was admitted for hypertensive urgency and HFpEF exacerbation          Plan of care discussed with ER physician and patient    Eitan Johns DO    Supplementary Documentation:     The 21st Century Cures Act makes medical notes like these available to patients in the interest of transparency. Please be advised this is a medical document. Medical documents are intended to carry relevant information, facts as evident, and the clinical opinion of the practitioner. The medical note is intended as peer to peer communication and may appear blunt or direct. It is written in medical language and may contain abbreviations or verbiage that are unfamiliar.

## 2024-09-10 NOTE — PHYSICAL THERAPY NOTE
PHYSICAL THERAPY EVALUATION - INPATIENT     Room Number: 8620/8620-A  Evaluation Date: 9/10/2024  Type of Evaluation: Initial  Physician Order: PT Eval and Treat    Presenting Problem: covid  Co-Morbidities : CHF, DM, DVT, PE, covid, CKD, pacemaker  Reason for Therapy: Mobility Dysfunction and Discharge Planning    PHYSICAL THERAPY ASSESSMENT   Patient is a 84 year old female admitted 2024 for covid.   Patient is currently functioning near baseline with bed mobility, transfers, and gait. Prior to admission, patient's baseline is mod ind to CGA for stand pivot transfers to w/c. Patient ambulates with RW during PT sessions at [facility]..     Patient will benefit from continued skilled PT Services at discharge to promote prior level of function and safety with additional support and return home with home health PT.    PLAN  Patient has been evaluated and presents with no skilled Physical Therapy needs at this time.  Patient discharged from Physical Therapy services.  Please re-order if a new functional limitation presents during this admission.    GOALS  Patient was able to achieve the following goals ...    Patient was able to transfer Safely with CGA    Patient able to ambulate on level surfaces Safely with CGA          HOME SITUATION  Type of Home: Assisted living facility   Home Layout: One level                Lives With: Staff 24 hours     Patient Owned Equipment: Rolling walker;Wheelchair       Prior Level of [place]: Pt reports she is typically independent with dressing and toileting but gets assist with showers. Pt reports she transfers to a w/c by herself or with a little assistance. Pt states she only ambulates with RW during PT sessions.     SUBJECTIVE  Pt pleasant and cooperative      OBJECTIVE  Precautions: Bed/chair alarm  Fall Risk: High fall risk    WEIGHT BEARING RESTRICTION  Weight Bearing Restriction: None                PAIN ASSESSMENT  Ratin          COGNITION  Overall Cognitive Status:   WFL - within functional limits    RANGE OF MOTION AND STRENGTH ASSESSMENT  Upper extremity ROM and strength are within functional limits     Lower extremity ROM is within functional limits     Lower extremity strength is within functional limits, mild deconditioniong      BALANCE  Static Sitting: Good  Dynamic Sitting: Good  Static Standing: Fair -  Dynamic Standing: Poor +    ADDITIONAL TESTS                                    ACTIVITY TOLERANCE                         O2 WALK       NEUROLOGICAL FINDINGS                        AM-PAC '6-Clicks' INPATIENT SHORT FORM - BASIC MOBILITY  How much difficulty does the patient currently have...  Patient Difficulty: Turning over in bed (including adjusting bedclothes, sheets and blankets)?: None   Patient Difficulty: Sitting down on and standing up from a chair with arms (e.g., wheelchair, bedside commode, etc.): A Little   Patient Difficulty: Moving from lying on back to sitting on the side of the bed?: None   How much help from another person does the patient currently need...   Help from Another: Moving to and from a bed to a chair (including a wheelchair)?: A Little   Help from Another: Need to walk in hospital room?: A Little   Help from Another: Climbing 3-5 steps with a railing?: A Little       AM-PAC Score:  Raw Score: 20   Approx Degree of Impairment: 35.83%   Standardized Score (AM-PAC Scale): 47.67   CMS Modifier (G-Code): CJ    FUNCTIONAL ABILITY STATUS  Gait Assessment   Functional Mobility/Gait Assessment  Gait Assistance: Contact guard assist  Distance (ft): 8  Assistive Device: Rolling walker  Pattern: Shuffle    Skilled Therapy Provided: Per RN okay to work with pt. Pt received in supine and was agreeable to PT session.     Bed Mobility:  Rolling: NT  Supine to sit: supervision   Sit to supine: NT     Transfer Mobility:  Sit to stand: CGA    Stand to sit: CGA   Gait = pt ambulated with RW to door and back with CGA    Therapist's comments:Pt educated on role  of therapy, goals for session, safety, fall prevention, and activity recommendations.     Exercise/Education Provided:  Bed mobility  Energy conservation  Functional activity tolerated  Gait training  Posture  Strengthening  Transfer training    Patient End of Session: Up in chair;Needs met;Call light within reach;RN aware of session/findings;All patient questions and concerns addressed;Alarm set    Patient Evaluation Complexity Level:  History Moderate - 1 or 2 personal factors and/or co-morbidities   Examination of body systems Low -  addressing 1-2 elements   Clinical Presentation Low- Stable   Clinical Decision Making Low Complexity       PT Session Time: 25 minutes  Therapeutic Activity: 8 minutes

## 2024-09-10 NOTE — DIETARY NOTE
[facility]   part of [facility]   CLINICAL NUTRITION    Ciarra Salcido     Admitting diagnosis:  Hyperkalemia [E87.5]  Hypoxia [R09.02]  Acute on chronic congestive heart failure, unspecified heart failure type (HCC) [I50.9]  COVID-19 [U07.1]    Ht: 162.6 cm (5' 4\")  Wt: 73.2 kg (161 lb 6 oz).   Body mass index is 27.7 kg/m².  IBW: 54.5 kg    Labs: Glu 134  Meds: Lasix, Novolog 6 units, synthroid    Diet:       Procedures    Cardiac diet Calorie Restriction/Carb Controlled: 1800 kcal/60 grams; Sodium Restriction: 2 GM NA; Fluid Restriction: 2000 ml; Is Patient on Accuchecks? No       Pt chart reviewed d/t nutrition consult for CHF diet education. Will defer diet education d/t pt's advanced age, COVID +, and from assisted living where meals are provided.    Nursing notes reports   intake for last meal.  Tolerating po diet without diarrhea, emesis, or constipation.   No significant weight changes noted.     Patient is at low nutrition risk at this time.    Please consult if patient status changes or nutrition issues arise.    [place]  Dietetic Intern  [phone]

## 2024-09-10 NOTE — CONSULTS
[facility]  Cardiology Consultation    Ciarra Salcido Patient Status:  Inpatient    1940 MRN [id number]   Location [facility] 8NE-A Attending Katherin Choudhary, DO   Hosp Day # 1 PCP JUDY CYR MD     Reason for Consultation:  HFpEF    History of Present Illness:  Ciarra Salcido is a a(n) 84 year old female who presents with hypoxia and increased lower extremity edema.  She has followed in the past with my colleague Dr. Fong and prior office note will be copied into current EMR chart for reference.   She denies chest pain, dyspnea, orthopnea or PND.  She denies palpitations, lightheadedness, dizziness, presyncope or syncope.  She denies fever, chills, nausea/vomiting, diarrhea or urinary complaints.      History:  Past Medical History:    (HFpEF) heart failure with preserved ejection fraction (HCC)    Acute cystitis without hematuria    Acute deep vein thrombosis (DVT) of popliteal vein of right lower extremity (HCC)    Cataract    CHF exacerbation (HCC)    CKD (chronic kidney disease)    Congestive heart disease (HCC)    COVID-19    Diabetes (HCC)    Disorder of thyroid    DM2 (diabetes mellitus, type 2) (HCC)    Hearing impairment    High blood pressure    HTN (hypertension)    Hyperlipidemia    Hypothyroidism    Pulmonary embolism (HCC)    Shingles    Visual impairment     Past Surgical History:   Procedure Laterality Date    Cataract      Eye surgery      Hysterectomy      [facility]    Pacemaker monitor       Family History   Problem Relation Age of Onset    Other (Other) Mother         Old Age    Other (Other) Father         Old age      reports that she quit smoking about 13 years ago. Her smoking use included cigarettes. She started smoking about 63 years ago. She has a 50 pack-year smoking history. She has never used smokeless tobacco. She reports that she does not drink alcohol and does not use drugs.    Allergies:  No Known Allergies    Medications:    Current  Facility-Administered Medications:     allopurinol (Zyloprim) tab 100 mg, 100 mg, Oral, Daily    amiodarone (Pacerone) tab 200 mg, 200 mg, Oral, Daily    apixaban (Eliquis) tab 2.5 mg, 2.5 mg, Oral, BID    carvedilol (Coreg) tab 12.5 mg, 12.5 mg, Oral, BID with meals    dilTIAZem ER (Dilacor XR) 24 hr cap 120 mg, 120 mg, Oral, Daily    escitalopram (Lexapro) tab 5 mg, 5 mg, Oral, Daily    gabapentin (Neurontin) cap 100 mg, 100 mg, Oral, TID    levothyroxine (Synthroid) tab 75 mcg, 75 mcg, Oral, Before breakfast    atorvastatin (Lipitor) tab 10 mg, 10 mg, Oral, Nightly    furosemide (Lasix) 10 mg/mL injection 40 mg, 40 mg, Intravenous, BID (Diuretic)    glucose (Dex4) 15 GM/59ML oral liquid 15 g, 15 g, Oral, Q15 Min PRN **OR** glucose (Glutose) 40% oral gel 15 g, 15 g, Oral, Q15 Min PRN **OR** glucose-vitamin C (Dex-4) chewable tab 4 tablet, 4 tablet, Oral, Q15 Min PRN **OR** dextrose 50% injection 50 mL, 50 mL, Intravenous, Q15 Min PRN **OR** glucose (Dex4) 15 GM/59ML oral liquid 30 g, 30 g, Oral, Q15 Min PRN **OR** glucose (Glutose) 40% oral gel 30 g, 30 g, Oral, Q15 Min PRN **OR** glucose-vitamin C (Dex-4) chewable tab 8 tablet, 8 tablet, Oral, Q15 Min PRN    insulin aspart (NovoLOG) 100 Units/mL FlexPen 1-68 Units, 1-68 Units, Subcutaneous, TID CC    insulin aspart (NovoLOG) 100 Units/mL FlexPen 1-10 Units, 1-10 Units, Subcutaneous, TID AC and HS    acetaminophen (Tylenol Extra Strength) tab 500 mg, 500 mg, Oral, Q4H PRN    melatonin tab 3 mg, 3 mg, Oral, Nightly PRN    polyethylene glycol (PEG 3350) (Miralax) 17 g oral packet 17 g, 17 g, Oral, Daily PRN    sennosides (Senokot) tab 17.2 mg, 17.2 mg, Oral, Nightly PRN    bisacodyl (Dulcolax) 10 MG rectal suppository 10 mg, 10 mg, Rectal, Daily PRN    ondansetron (Zofran) 4 MG/2ML injection 4 mg, 4 mg, Intravenous, Q6H PRN    metoclopramide (Reglan) 5 mg/mL injection 5 mg, 5 mg, Intravenous, Q8H PRN    labetalol (Trandate) 5 mg/mL injection 10 mg, 10 mg,  Intravenous, Q4H PRN    Review of Systems:  A comprehensive review of systems was negative if not otherwise mention in above HPI.    /62 (BP Location: Left arm)   Pulse 68   Temp 100.1 °F (37.8 °C) (Oral)   Resp 18   Ht 5' 4\" (1.626 m)   Wt 161 lb 6 oz (73.2 kg)   SpO2 92%   BMI 27.70 kg/m²   Temp (24hrs), Av °F (37.2 °C), Min:98.2 °F (36.8 °C), Max:100.1 °F (37.8 °C)       Intake/Output Summary (Last 24 hours) at 9/10/2024 1135  Last data filed at 9/10/2024 0837  Gross per 24 hour   Intake 0 ml   Output 1550 ml   Net -1550 ml     Wt Readings from Last 3 Encounters:   24 161 lb 6 oz (73.2 kg)   24 151 lb 14.4 oz (68.9 kg)   24 140 lb (63.5 kg)       Physical Exam:   General: Alert and oriented x 3. No apparent distress. No respiratory or constitutional distress.  HEENT: Normocephalic, anicteric sclera, neck supple.  Neck: No JVD, carotids 2+, no bruits.  Cardiac: Regular rate and rhythm. S1, S2 normal. No murmur, pericardial rub, S3.  Lungs: faint crackles at left base  Abdomen: Soft, non-tender.   Extremities: + b/l pedal edema  Neurologic: Alert and oriented, normal affect.  Skin: Warm and dry.     Laboratory Data:  Lab Results   Component Value Date    WBC 5.9 09/10/2024    HGB 9.8 09/10/2024    HCT 30.4 09/10/2024    .0 09/10/2024    CREATSERUM 2.36 09/10/2024    BUN 52 09/10/2024     09/10/2024    K 4.9 09/10/2024     09/10/2024    CO2 26.0 09/10/2024     09/10/2024    CA 8.9 09/10/2024    ALB 3.5 09/10/2024    ALKPHO 92 09/10/2024    BILT 0.2 09/10/2024    TP 6.4 09/10/2024    AST 14 09/10/2024    ALT 38 09/10/2024    PTT 38.6 2024    INR 1.38 2024    PTP 17.1 2024    MG 1.9 09/10/2024    PGLU 165 09/10/2024     Recent Labs   Lab 24  1604   TROPHS 24         Imaging:  EKG 2024: paced    Echo Conclusions:   1. Left ventricle: The cavity size was normal. Wall thickness was mildly increased. Systolic function was  hyperdynamic. The estimated ejection fraction was 65-70%, by visual assessment. No diagnostic evidence for regional wall motion abnormalities. Left ventricular diastolic function parameters were normal for the patient's age.   2. Right ventricle: The cavity size was mildly increased. Pacer wire noted in the right ventricle. Systolic function was normal.   3. Left atrium: The left atrial volume was markedly increased.   4. Right atrium: The atrium was moderately dilated. Pacer wire noted in right atrium.   5. Aortic valve: There was thickening, consistent with sclerosis. There was mild regurgitation.   6. Mitral valve: There was mild to moderate regurgitation.   7. Pulmonary arteries: Systolic pressure was moderately increased, in the range of 50mm Hg to 55mm Hg. Estimated pulmonary artery diastolic pressure was 17mm Hg.   8. Pericardium, extracardiac: There was no pericardial effusion.   9. Inferior vena cava: The IVC was normal-sized.   Impressions:  This study is compared with previous dated 11/14/2023: No significant change since prior study.     Impression:  COVID-19  Hypoxia  Acute on chronic HFpEF  HTN  PAF - Hx GILLES/DCCV 11/2023, on amio, BB, CCB, Eliquis  Hx of DC PPM 9/2023  DM2  CKD4   HL  Chronic anemia, hgb stable   Gout flare up with elevated uric acid  Arthritis    Recommendations:  -tele monitoring  -no need for repeat echo  -IV diuretics per renal  -ID consulted for further eval/recs for COVID-19 infection  -continue amio, BB, CCB, Eliquis  -on statin    D/w patient at bedside    Thank you for allowing me to participate in the care of your patient.    Mika Sotomayor MD  9/10/2024  11:35 AM

## 2024-09-10 NOTE — CONSULTS
[facility]  Report of Consultation    Ciarra Salcido Patient Status:  Inpatient    1940 MRN [id number]   Location [facility] 8NE-A Attending Katherin Choudhary, DO   Hosp Day # 1 PCP JUDY CYR MD     Reason for Consultation:  CKD 4    History of Present Illness:  Ciarra Salcido is a a(n) 84 year old female with CKD stg 4 related to DM/HTN, HTN, HFpEF, pAfib, who presented to the hospital for evaluation of sob and LE edema. Patient is a poor historian  She was diuresed overnight and states presently she feels better  Still on supplemental O2  Denies any cp/sob  No dysuria; no abd pain  Denies f/c      History:  Past Medical History:    (HFpEF) heart failure with preserved ejection fraction (HCC)    Acute cystitis without hematuria    Acute deep vein thrombosis (DVT) of popliteal vein of right lower extremity (HCC)    Cataract    CHF exacerbation (HCC)    CKD (chronic kidney disease)    Congestive heart disease (HCC)    COVID-19    Diabetes (HCC)    Disorder of thyroid    DM2 (diabetes mellitus, type 2) (HCC)    Hearing impairment    High blood pressure    HTN (hypertension)    Hyperlipidemia    Hypothyroidism    Pulmonary embolism (HCC)    Shingles    Visual impairment     Past Surgical History:   Procedure Laterality Date    Cataract      Eye surgery      Hysterectomy      [facility]    Pacemaker monitor       Family History   Problem Relation Age of Onset    Other (Other) Mother         Old Age    Other (Other) Father         Old age      reports that she quit smoking about 13 years ago. Her smoking use included cigarettes. She started smoking about 63 years ago. She has a 50 pack-year smoking history. She has never used smokeless tobacco. She reports that she does not drink alcohol and does not use drugs.    Allergies:  No Known Allergies    Current Medications:    Current Facility-Administered Medications:     allopurinol (Zyloprim) tab 100 mg, 100 mg, Oral, Daily    amiodarone (Pacerone)  tab 200 mg, 200 mg, Oral, Daily    apixaban (Eliquis) tab 2.5 mg, 2.5 mg, Oral, BID    carvedilol (Coreg) tab 12.5 mg, 12.5 mg, Oral, BID with meals    dilTIAZem ER (Dilacor XR) 24 hr cap 120 mg, 120 mg, Oral, Daily    escitalopram (Lexapro) tab 5 mg, 5 mg, Oral, Daily    gabapentin (Neurontin) cap 100 mg, 100 mg, Oral, TID    levothyroxine (Synthroid) tab 75 mcg, 75 mcg, Oral, Before breakfast    atorvastatin (Lipitor) tab 10 mg, 10 mg, Oral, Nightly    furosemide (Lasix) 10 mg/mL injection 40 mg, 40 mg, Intravenous, BID (Diuretic)    glucose (Dex4) 15 GM/59ML oral liquid 15 g, 15 g, Oral, Q15 Min PRN **OR** glucose (Glutose) 40% oral gel 15 g, 15 g, Oral, Q15 Min PRN **OR** glucose-vitamin C (Dex-4) chewable tab 4 tablet, 4 tablet, Oral, Q15 Min PRN **OR** dextrose 50% injection 50 mL, 50 mL, Intravenous, Q15 Min PRN **OR** glucose (Dex4) 15 GM/59ML oral liquid 30 g, 30 g, Oral, Q15 Min PRN **OR** glucose (Glutose) 40% oral gel 30 g, 30 g, Oral, Q15 Min PRN **OR** glucose-vitamin C (Dex-4) chewable tab 8 tablet, 8 tablet, Oral, Q15 Min PRN    insulin aspart (NovoLOG) 100 Units/mL FlexPen 1-68 Units, 1-68 Units, Subcutaneous, TID CC    insulin aspart (NovoLOG) 100 Units/mL FlexPen 1-10 Units, 1-10 Units, Subcutaneous, TID AC and HS    acetaminophen (Tylenol Extra Strength) tab 500 mg, 500 mg, Oral, Q4H PRN    melatonin tab 3 mg, 3 mg, Oral, Nightly PRN    polyethylene glycol (PEG 3350) (Miralax) 17 g oral packet 17 g, 17 g, Oral, Daily PRN    sennosides (Senokot) tab 17.2 mg, 17.2 mg, Oral, Nightly PRN    bisacodyl (Dulcolax) 10 MG rectal suppository 10 mg, 10 mg, Rectal, Daily PRN    ondansetron (Zofran) 4 MG/2ML injection 4 mg, 4 mg, Intravenous, Q6H PRN    metoclopramide (Reglan) 5 mg/mL injection 5 mg, 5 mg, Intravenous, Q8H PRN    labetalol (Trandate) 5 mg/mL injection 10 mg, 10 mg, Intravenous, Q4H PRN  Home Medications:  Prior to Admission Medications   Medication Sig    Insulin Lispro, 1 Unit Dial, (HUMALOG  KWIKPEN) 100 UNIT/ML Subcutaneous Solution Pen-injector Per sliding scale: 150-200 =1 201-250 =2 251-300=3 301-350=4 351-400=5 Call DM if >400       Review of Systems:  See HPI; A total of 12 systems reviewed and otherwise unremarkable.    Physical Exam:  Vital signs: Blood pressure 147/62, pulse 68, temperature 100.1 °F (37.8 °C), temperature source Oral, resp. rate 18, height 5' 4\" (1.626 m), weight 161 lb 6 oz (73.2 kg), SpO2 92%.  General: No acute distress. Alert and oriented x 3.  HEENT: Moist mucous membranes. EOM-I. PERRL  Neck: No lymphadenopathy.  No JVD. No carotid bruits.  Respiratory: Clear to auscultation bilaterally.  No wheezes. No rhonchi.  Cardiovascular: S1, S2.  Irreggular   Abdomen: Soft, nontender, nondistended.  Positive bowel sounds. No rebound tenderness   Neurologic: No focal neurological deficits.   Ext + edema   Integument: No lesions. No erythema.  Psychiatric: Appropriate mood and affect.    Laboratory:  Lab Results   Component Value Date    WBC 5.9 09/10/2024    HGB 9.8 09/10/2024    HCT 30.4 09/10/2024    .0 09/10/2024    CREATSERUM 2.36 09/10/2024    BUN 52 09/10/2024     09/10/2024    K 4.9 09/10/2024     09/10/2024    CO2 26.0 09/10/2024     09/10/2024    CA 8.9 09/10/2024    ALB 3.5 09/10/2024    ALKPHO 92 09/10/2024    BILT 0.2 09/10/2024    TP 6.4 09/10/2024    AST 14 09/10/2024    ALT 38 09/10/2024    PTT 38.6 09/09/2024    INR 1.38 09/09/2024    PTP 17.1 09/09/2024    MG 1.9 09/10/2024    PGLU 165 09/10/2024          BUN   Date Value   09/10/2024 52 mg/dL (H)   09/09/2024 55 mg/dL (H)   07/26/2024 87 mg/dL (H)   05/07/2021 27     UREA NITROGEN (BUN) (mg/dL)   Date Value   05/12/2022 34 (H)   03/21/2022 44 (H)   12/14/2021 30 (H)     CREATININE (mg/dL)   Date Value   05/12/2022 1.22 (H)   03/21/2022 1.44 (H)   12/14/2021 1.20 (H)     Creatinine (mg/dL)   Date Value   09/10/2024 2.36 (H)   09/09/2024 2.60 (H)   07/26/2024 2.34 (H)          Imaging:  Reviewed     Impression:  CKD stg IV; related to longstanding DM/HTN; stable Cr @ baseline  Hyperkalemia- improved w/ med management   - continue diuretics; monitor labs; no additional w/u  CHF- appreciate cards eval; on diruetics; echo planned  S/p PPM  Respiratory insuff due to fluid overload/COVID+  - on supplemental O2- wean as tolerated (down to 2 L presently)  - ID consulted  HTN; stable  DM  Anemia due to CKD/chronic disease; monitor     Thank you for allowing me to participate in this patient's care.  Please feel free to call me with any questions or concerns.    Chidi Sanches MD  9/10/2024

## 2024-09-10 NOTE — OCCUPATIONAL THERAPY NOTE
OCCUPATIONAL THERAPY EVALUATION - INPATIENT    Room Number: 8620/8620-A  Evaluation Date: 9/10/2024     Type of Evaluation: Initial  Presenting Problem: COVID    Physician Order: IP Consult to Occupational Therapy  Reason for Therapy:  ADL/IADL Dysfunction and Discharge Planning      OCCUPATIONAL THERAPY ASSESSMENT   Patient is a 84 year old female admitted on 9/9/2024 with Presenting Problem: COVID. Co-Morbidities : CHF, DM, DVT, PE, covid, CKD, pacemaker  Patient is currently functioning at baseline with toileting, upper body dressing, lower body dressing, bed mobility, transfers, static sitting balance, dynamic sitting balance, static standing balance, dynamic standing balance, maintaining seated position, and functional standing tolerance.  Prior to admission, patient's baseline is Sup for ADLs and A for showers at [facility].  Patient met all OT goals at Sup level.  Patient reports no further questions/concerns at this time.     Patient will benefit from continued skilled OT Services at discharge to promote prior level of function and safety with additional support and return home with home health OT      WEIGHT BEARING RESTRICTION  Weight Bearing Restriction: None                Recommendations for nursing staff:   Transfers: sup  Toileting location: Toilet    EVALUATION SESSION:  Patient at start of session: Supine in bed for session  FUNCTIONAL TRANSFER ASSESSMENT  Sit to Stand: Edge of Bed  Edge of Bed: Supervision  Toilet Transfer: Supervision    BED MOBILITY  Rolling: Supervision  Supine to Sit : Supervision  Scooting: Sup to EOB    BALANCE ASSESSMENT  Static Sitting: Supervision  Sitting Bilateral: Supervision  Static Standing: Supervision  Standing Bilateral: Supervision    FUNCTIONAL ADL ASSESSMENT  LB Dressing Seated: Supervision (for socks)  Toileting Seated: Supervision (at toilet with slight riser as at home simulated)      ACTIVITY TOLERANCE: vitals stable                         O2 SATURATIONS        COGNITION  Overall Cognitive Status:  WFL - within functional limits  COGNITION ASSESSMENTS       Upper Extremity:   ROM: within functional limits   Strength: is within functional limits   Coordination:  Gross motor: WNL  Fine motor: WNL  Sensation: Light touch:  intact    EDUCATION PROVIDED  Patient: Role of Occupational Therapy; Plan of Care  Patient's Response to Education: Verbalized Understanding; Returned Demonstration    Equipment used: RW  Demonstrates functional use    Therapist comments: Pt educated on pursed lip breathing and therex for pulm function to assist with deep breaths and opening lungs.     Patient End of Session: Up in chair;Needs met;Call light within reach;All patient questions and concerns addressed;SCDs in place;Alarm set    OCCUPATIONAL PROFILE    HOME SITUATION  Type of Home: Assisted living facility  Home Layout: One level  Lives With: Staff 24 hours    Toilet and Equipment: Comfort height toilet  Shower/Tub and Equipment: Walk-in shower  Other Equipment: None                  Prior Level of Function: Pt reports she is typically Mod I to Sup with dressing and toileting but gets assist with showers. Pt reports she transfers to a w/c by herself or with a little assistance. Pt states she only ambulates with RW during PT sessions.      SUBJECTIVE  Pt stated, \"I can call whenever.\"    PAIN ASSESSMENT  Ratin  Location: no pain at this time       OBJECTIVE  Precautions: Bed/chair alarm  Fall Risk: High fall risk    WEIGHT BEARING RESTRICTION  Weight Bearing Restriction: None                AM-PAC ‘6-Clicks’ Inpatient Daily Activity Short Form  -   Putting on and taking off regular lower body clothing?: A Little  -   Bathing (including washing, rinsing, drying)?: A Little  -   Toileting, which includes using toilet, bedpan or urinal? : A Little  -   Putting on and taking off regular upper body clothing?: A Little  -   Taking care of personal grooming such as brushing teeth?: A Little  -    Eating meals?: A Little    AM-PAC Score:  Score: 18  Approx Degree of Impairment: 46.65%  Standardized Score (AM-PAC Scale): 38.66      ADDITIONAL TESTS     NEUROLOGICAL FINDINGS        PLAN   Patient has been evaluated and presents with no skilled Occupational Therapy needs at this time.  Patient discharged from Occupational Therapy services.  Please re-order if a new functional limitation presents during this admission.      Patient Evaluation Complexity Level:   Occupational Profile/Medical History LOW - Brief history including review of medical or therapy records    Specific performance deficits impacting engagement in ADL/IADL LOW  1 - 3 performance deficits    Client Assessment/Performance Deficits LOW - No comorbidities nor modifications of tasks    Clinical Decision Making LOW - Analysis of occupational profile, problem-focused assessments, limited treatment options    Overall Complexity LOW     OT Session Time: 20 minutes  Self-Care Home Management: 10 minutes  Therapeutic Activity: 0 minutes  Neuromuscular Re-education: 0 minutes  Therapeutic Exercise: 0 minutes  Cognitive Skills: 0 minutes  Sensory Integrative: 0 minutes  Orthotic Management and Trainin minutes  Can add/delete any of these

## 2024-09-10 NOTE — PROGRESS NOTES
[facility]   part of [facility]     Hospitalist Progress Note     Ciarra Salcido Patient Status:  Inpatient    1940 MRN [id number]   Location [facility] 8NE-A Attending Katherin Choudhary, DO   Hosp Day # 1 PCP JUDY CYR MD     Chief Complaint: SOB    Subjective:     Patient feeling better, urinating quite a bit, O2 requirements improving. Only occasional cough    Objective:    Review of Systems:   A comprehensive review of systems was completed; pertinent positive and negatives stated in subjective.    Vital signs:  Temp:  [98.2 °F (36.8 °C)-99.3 °F (37.4 °C)] 99.2 °F (37.3 °C)  Pulse:  [60-68] 63  Resp:  [15-21] 15  BP: (155-203)/(61-79) 169/61  SpO2:  [82 %-100 %] 99 %    Physical Exam:    General: No acute distress  Respiratory: No wheezes, no rhonchi  Cardiovascular: S1, S2, regular rate and rhythm  Abdomen: Soft, Non-tender, non-distended, positive bowel sounds  Neuro: No new focal deficits.   Extremities: BLLE edema      Diagnostic Data:    Labs:  Recent Labs   Lab 24  1604 09/10/24  0646   WBC 7.0 5.9   HGB 10.0* 9.8*   MCV 91.5 89.1   .0 243.0   INR 1.38*  --        Recent Labs   Lab 24  1604 09/10/24  0646   * 134*   BUN 55* 52*   CREATSERUM 2.60* 2.36*   CA 8.4* 8.9   ALB 2.8* 3.5   * 137   K 5.7* 4.9    108   CO2 22.0 26.0   ALKPHO 100 92   AST 24 14   ALT 54 38   BILT 0.2 0.2   TP 7.1 6.4       Estimated Creatinine Clearance: 14.8 mL/min (A) (based on SCr of 2.36 mg/dL (H)).    Recent Labs   Lab 24  1604   TROPHS 24       Recent Labs   Lab 24  1604   PTP 17.1*   INR 1.38*        Microbiology    No results found for this visit on 24.      Imaging: Reviewed in Epic.    Medications:    allopurinol  100 mg Oral Daily    amiodarone  200 mg Oral Daily    apixaban  2.5 mg Oral BID    carvedilol  12.5 mg Oral BID with meals    dilTIAZem ER  120 mg Oral Daily    escitalopram  5 mg Oral Daily    gabapentin  100 mg Oral TID     levothyroxine  75 mcg Oral Before breakfast    atorvastatin  10 mg Oral Nightly    furosemide  40 mg Intravenous BID (Diuretic)    insulin aspart  1-68 Units Subcutaneous TID CC    insulin aspart  1-10 Units Subcutaneous TID AC and HS       Assessment & Plan:      #HFpEF exacerbation  - diuresis with IV lasix BID  - HF protocol  - strict I/O's daily weights  - recent echo reviewed  - cardiology following     #Acute hypoxemic respiratory failure d/t above  - diuresis as above  - wean supplemental O2 as tolerated     #CKD 4  #Hyperkalemia  - K+ improved with kayexalate   - follow BMP with diuresis  - nephrology following      #COVID+  - suspect respiratory symptoms d/t HF   - hold on anti-virals  - ID following     #Uncontrolled DM2  - A1c 9.3 as of 8/13/2024  - ICF 1:20, carb ratio 1;10    #Permanent afib s/p PPM  - amiodarone, carvedilol, diltiazem, Eliquis     #Essential hypertension  #Hyperlipidemia       #Hypothyroidism  - levothyroxine     #Gout  - allopurinol     #Chronic normocytic anemia      Katherin Choudhary, DO    Supplementary Documentation:     Quality:  DVT Mechanical Prophylaxis:   SCDs,    DVT Pharmacologic Prophylaxis   Medication    apixaban (Eliquis) tab 2.5 mg                Code Status: DNAR/Selective Treatment  Hutchinson: External urinary catheter in place  Hutchinson Duration (in days):   Central line:    AMINTA:     Discharge is dependent on: clinical state  At this point Ms. Salcido is expected to be discharge to: home    The 21st Century Cures Act makes medical notes like these available to patients in the interest of transparency. Please be advised this is a medical document. Medical documents are intended to carry relevant information, facts as evident, and the clinical opinion of the practitioner. The medical note is intended as peer to peer communication and may appear blunt or direct. It is written in medical language and may contain abbreviations or verbiage that are unfamiliar.               **Certification      PHYSICIAN Certification of Need for Inpatient Hospitalization - Initial Certification    Patient will require inpatient services that will reasonably be expected to span two midnight's based on the clinical documentation in H+P.   Based on patients current state of illness, I anticipate that, after discharge, patient will require TBD.

## 2024-09-10 NOTE — TELEPHONE ENCOUNTER
Requested Prescriptions     Pending Prescriptions Disp Refills    Insulin Lispro, 1 Unit Dial, (HUMALOG KWIKPEN) 100 UNIT/ML Subcutaneous Solution Pen-injector  0       Last Refill: 7/26    Last OV: 8/13    Next OV: 9/19

## 2024-09-10 NOTE — HISTORICAL OFFICE NOTE
[facility]  Outside Information  Continuity of Care Document  7/26/2024  Ciarra Salcido - 84 y.o. Female; born [birth year]Summary of episode note, generated on Jul. 31, 2024July 31, 2024   CHIEF COMPLAINT    CHIEF COMPLAINT  Reason for Visit/Chief Complaint   hospital follow up-HTN   Hospital follow-up visit for Ms. Salcido. Danielle-year-old with history of diabetes that is not poorly controlled, hypertension, sick sinus syndrome/A-fib status post pacemaker, moderate mitral regurg and hyperlipidemia. She has chronic kidney disease and was in the hospital for uncontrolled hypertension and heart failure. She is now a week status post discharge and is still in acute rehab. She is got gout flareup on the right foot which is still painful and her sugars are poorly controlled. With that her blood pressure slightly elevated at 150/60 mmHg. Denies chest pain. Using chronic oxygen for COPD.     PROBLEMS  Reconcile with Patient's ChartPROBLEMS  Problem Effective Dates Date resolved Problem Status   Atrial fibrillation with RVR, [SNOMED-CT: 601126870043619] 5/25/2023 - Active   History of pulmonary embolus (PE) - Hx, [SNOMED-CT: 755254729] 2/15/2023 - Active   Bradycardia, [SNOMED-CT: 18077781] 7/9/2021 - Active   Hypertension (HTN), primary, [SNOMED-CT: 33857394] 7/9/2021 - Active   Hyperlipidemia (unspecified), [SNOMED-CT: 82313263] 7/9/2021 - Active   Pulmonary hypertension, not otherwise specified or secondary, [SNOMED-CT: 43890032] 7/9/2021 - Active   Total abdominal hysterectomy nec, [SNOMED-CT: 928261960] 7/9/2021 7/9/2021 Resolved   DM Type 2 (diabetes mellitus) , [SNOMED-CT: 74485391] 7/9/2021 - Active   CKD (chronic kidney disease) stage 3, GFR 30-59 ml/min, [SNOMED-CT: 579801730] 7/9/2021 - Active   Shingles, [SNOMED-CT: 2854525] 7/9/2021 - Active   Essential hypertension, benign, [SNOMED-CT: 2900022] 5/28/2021 - Active   CKD (chronic kidney disease), stage III, [SNOMED-CT: 895423656]  5/28/2021 - Active   Hyperlipidemia, [SNOMED-CT: 18588261] 5/28/2021 - Active   Acquired hypothyroidism, [SNOMED-CT: 075344696] 3/24/2021 - Active   Osteopenia, [SNOMED-CT: 348864630] 3/24/2021 - Active   Symptomatic bradycardia, [SNOMED-CT: 72766460] 5/3/2021 - Active   Diabetic polyneuropathy associated with type 2 diabetes mellitus, [SNOMED-CT: 361264854] 2/22/2022 - Active   Current moderate episode of major depressive disorder without prior episode, [SNOMED-CT: 34639397] 1/19/2023 - Active   CHF exacerbation, [SNOMED-CT: 574965016] 1/31/2023 - Active   Acute on chronic congestive heart failure, unspecified heart failure type, [SNOMED-CT: 862421420] 1/31/2023 - Active   Acute pulmonary edema, [SNOMED-CT: 25167782] 1/31/2023 - Active   Acute respiratory failure with hypoxia, [SNOMED-CT: 88685797] 1/31/2023 - Active   Pleural effusion, [SNOMED-CT: 04103484] 1/31/2023 - Active   Multiple subsegmental pulmonary emboli without acute cor pulmonale, [SNOMED-CT: 99205521] 1/31/2023 - Active   Acute deep vein thrombosis (DVT) of popliteal vein of right lower extremity, [SNOMED-CT: 769755884244135] 1/31/2023 - Active   Acute on chronic congestive heart failure, unspecified heart failure type (HCC), [SNOMED-CT: 118840879] 2/15/2023 - Active     ENCOUNTER DIAGNOSIS    ENCOUNTER DIAGNOSIS  Problem Effective Dates Date resolved Problem Status   Atrial fibrillation with RVR, [SNOMED-CT: 572725572916949] 5/25/2023 - Active   History of pulmonary embolus (PE) - Hx, [SNOMED-CT: 563887583] 2/15/2023 - Active   Bradycardia, [SNOMED-CT: 50497017] 7/9/2021 - Active   Hypertension (HTN), primary, [SNOMED-CT: 27317929] 7/9/2021 - Active   Hyperlipidemia (unspecified), [SNOMED-CT: 81097911] 7/9/2021 - Active   Pulmonary hypertension, not otherwise specified or secondary, [SNOMED-CT: 48058268] 7/9/2021 - Active     VITAL SIGNS    VITAL SIGNS  Date / Time: 7/26/2024   BP Systolic 150 mmHg   BP Diastolic 60 mmHg   Height 66 inches   Weight  136 lbs   Pulse Rate 63 bpm   BSA (Body Surface Area) 1.7 cc/m2   BMI (Body Mass Index) 21.9 cc/m2   Blood Pressure 150 / 60 mmHg     PHYSICAL EXAMINATION    PHYSICAL EXAMINATION  Header Details   Vitals Right Arm Sitting  / 60 mmHg, Pulse rate 63 bpm, Height in 5' 6\", BMI: 21.9, Weight in 136 lbs (or) 61.69 kgs, BSA : 1.7 cc/m²   General Appearance No Acute Distress   Cardiovascular s1, s2 reg, systolic murmur,     ALLERGIES, ADVERSE REACTIONS, ALERTS    No data available    MEDICATIONS ADMINISTERED DURING VISIT    No data available    MEDICATIONS  Reconcile with Patient's ChartMEDICATIONS  Medication Start Date Route/Frequency Status   acetaminophen 500 mg capsule, [RxNorm: 095100] 7/26/2024 Take 2 capsules orally every 6 hours. Active   allopurinoL 100 mg tablet, [RxNorm: 263432] 12/20/2023 Take 1 tablet orally once a day. Active   amiodarone 200 mg tablet, [RxNorm: 821262] 12/20/2023 Take 1 tablet orally once a day. Active   carvediloL 12.5 mg tablet, [RxNorm: 862307] 7/26/2024 Take 1 tablet orally 2 times a day. Active   dilTIAZem  mg tablet,extended release 24 hr, [RxNorm: 416315] 2/15/2024 Take 1 tablet orally once a day. Active   Eliquis 2.5 mg tablet, [RxNorm: 1089314] 7/26/2024 Take 1 tablet orally 2 times a day. Active   ESCITALOPRAM 5 MG Oral Tab, [RxNorm: 285611] 2/15/2023 TAKE 1 TABLET EVERY DAY Active   GABAPENTIN 100 MG Oral Cap, [RxNorm: 498332] 2/15/2023 TAKE 1 CAPSULE THREE TIMES DAILY Active   HumaLOG KwikPen (U-100) Insulin 100 unit/mL subcutaneous, [RxNorm: 5890206] 7/26/2024 Inject (subcutaneous) once a day. Active   HYDROcodone 5 mg-acetaminophen 300 mg tablet, [RxNorm: 752839] 7/26/2024 Take 1 tablet orally every 8 hours as needed. Active   Lantus Solostar U-100 Insulin 100 unit/mL (3 mL) subcutaneous pen, [RxNorm: 030260] 7/26/2024 Inject (subcutaneous) once a day. Active   levothyroxine 75 MCG Oral Tab, [RxNorm: 407927] 2/15/2023 Take 1 tablet (75 mcg total) by mouth before  breakfast. Active   simvastatin (ZOCOR) 20 MG tablet, [RxNorm: 003941] 11/4/2021 TAKE 1 TABLET EVERY NIGHT Active   torsemide 20 mg tablet, [RxNorm: 176542] 7/26/2024 Take 1 tablet orally once a day. Active   tuberculin , [RxNorm: 0] 7/26/2024 5 units once daily Active     ASSESSMENT    At this time I like to start the patient in our [facility] hypertension clinic. Continue current blood pressure meds I will allow for permissive hypertension here. Blood pressures under 150 mmHg for the systolic blood pressure are acceptable especially while she has a flareup from her gout. Follow-up with me in 6 months. No additional cardiac testing needed at present.     FAMILY HISTORY    FAMILY HISTORY  Relationship Age Diagnosis   Father 0 Acute myocardial infarction, unspecified   Mother 0 Acute myocardial infarction, unspecified     GENERAL STATUS    No data available    PAST MEDICAL HISTORY    PAST MEDICAL HISTORY  Problem Date diagonsed Date resolved Status   Atrial fibrillation with RVR, [SNOMED-CT: 763599516133637] 5/25/2023 - Active   History of pulmonary embolus (PE) - Hx, [SNOMED-CT: 640933909] 2/15/2023 - Active   Bradycardia, [SNOMED-CT: 12743483] 7/9/2021 - Active   Hypertension (HTN), primary, [SNOMED-CT: 94415092] 7/9/2021 - Active   Hyperlipidemia (unspecified), [SNOMED-CT: 16795378] 7/9/2021 - Active   Pulmonary hypertension, not otherwise specified or secondary, [SNOMED-CT: 02076705] 7/9/2021 - Active   DM Type 2 (diabetes mellitus) , [SNOMED-CT: 06435712] 7/9/2021 - Active   CKD (chronic kidney disease) stage 3, GFR 30-59 ml/min, [SNOMED-CT: 700007948] 7/9/2021 - Active   Shingles, [SNOMED-CT: 5760859] 7/9/2021 - Active   Essential hypertension, benign, [SNOMED-CT: 1209374] 5/28/2021 - Active   CKD (chronic kidney disease), stage III, [SNOMED-CT: 853244968] 5/28/2021 - Active   Hyperlipidemia, [SNOMED-CT: 20812496] 5/28/2021 - Active   [facility] hypothyroidism, [facility]] 3/24/2021 - Active   Osteopenia,  [SNOMED-CT: 928092637] 3/24/2021 - Active   Symptomatic bradycardia, [SNOMED-CT: 59070097] 5/3/2021 - Active   Diabetic polyneuropathy associated with type 2 diabetes mellitus, [SNOMED-CT: 705585719] 2/22/2022 - Active   Current moderate episode of major depressive disorder without prior episode, [SNOMED-CT: 31530432] 1/19/2023 - Active   CHF exacerbation, [SNOMED-CT: 630257233] 1/31/2023 - Active   Acute on chronic congestive heart failure, unspecified heart failure type, [SNOMED-CT: 120301747] 1/31/2023 - Active   Acute pulmonary edema, [SNOMED-CT: 97781852] 1/31/2023 - Active   Acute respiratory failure with hypoxia, [SNOMED-CT: 05499912] 1/31/2023 - Active   Pleural effusion, [SNOMED-CT: 75598161] 1/31/2023 - Active   Multiple subsegmental pulmonary emboli without acute cor pulmonale, [SNOMED-CT: 11000013] 1/31/2023 - Active   Acute deep vein thrombosis (DVT) of popliteal vein of right lower extremity, [SNOMED-CT: 178496779723481] 1/31/2023 - Active   Acute on chronic congestive heart failure, unspecified heart failure type (HCC), [SNOMED-CT: 814922451] 2/15/2023 - Active     HISTORY OF PRESENT ILLNESS    Hospital follow-up visit for Ms. Salcido. 84-year-old with history of diabetes that is not poorly controlled, hypertension, sick sinus syndrome/A-fib status post pacemaker, moderate mitral regurg and hyperlipidemia. She has chronic kidney disease and was in the hospital for uncontrolled hypertension and heart failure. She is now a week status post discharge and is still in acute rehab. She is got gout flareup on the right foot which is still painful and her sugars are poorly controlled. With that her blood pressure slightly elevated at 150/60 mmHg. Denies chest pain. Using chronic oxygen for COPD.     IMMUNIZATIONS    No data available    PLAN OF CARE    PLAN OF CARE  Planned Care Date   Referral Visit - Estevan Bah ([email or web address]) : 1/1/1900   Follow up visit - Lucas Fong MD 1/1/1900      PROCEDURES    No data available    RESULTS    RESULTS  Name Result Date Location - Ordered By   PLATELETS [LOINC: 777-3] 208.0 10(3)uL 01/31/2023 04:34:00 AM [facility] ([facility])  Address: [address] [place] [address]  [address]  tel:   PTT [LOINC: 68889-3] 73.8 seconds [High] 01/29/2023 01:37:00 AM [facility] ([facility])  Address: [address] [place] [address]  [address]  tel:   GLUCOSE [LOINC: 2339-0] 321 mg/dL [High] 01/28/2023 12:35:00 PM [facility] ([facility])  Address: [address] [place] [address]  [address]  tel:   SODIUM [LOINC: 2951-2] 135 mmol/L [Low] 01/28/2023 12:35:00 PM [facility] ([facility])  Address: [address] [place] [address]  [address]  tel:   POTASSIUM [LOINC: 2823-3] 4.1 mmol/L 01/28/2023 12:35:00 PM [facility] ([facility])  Address: [address] [place] [address]  [address]  tel:   CHLORIDE [LOINC: 2075-0] 99 mmol/L 01/28/2023 12:35:00 PM [facility] ([facility])  Address: [address] [place] [address]  [address]  tel:   CO2 [LOINC: 2028-9] 32.0 mmol/L 01/28/2023 12:35:00 PM [facility] ([facility])  Address: [address] [place] [address]  [address]  tel:   ANION GAP [LOINC: 33037-3] 4 mmol/L 01/28/2023 12:35:00 PM [facility] ([facility])  Address: [address] [place] [address]  [address]  tel:   BUN [LOINC: 6299-2] 42 mg/dL [High] 01/28/2023 12:35:00 PM [facility] ([facility])  Address: [address] [place] [address]  [address]  tel:   CREATININE [LOINC: 69216-0] 1.42 mg/dL [High] 01/28/2023 12:35:00 PM [facility] ([facility])  Address: [address] [place] [address]  [address]  tel:   CALCIUM [LOINC: 58284-9] 8.9 mg/dL 01/28/2023 12:35:00 PM [facility] ([facility])  Address: [address] [place] [address]  [place] [address]  tel:   OSMOLALITY CALCULATED [LOINC: 96936-9] 303 mOsm/kg [High] 01/28/2023 12:35:00 PM [facility] ([facility])  Address: [address] [place] [address]  tel:   E GFR CR [LOINC: 72118-8] 36 mL/min/1.73m2 [Low] 01/28/2023 12:35:00 PM [facility] ([facility])  Address: [address] [place] [address]  tel:   Ambulatory Telemetry Monitor 1.This is an excellent quality study. 2.Predominant rhythm is normal sinus rhythm. 3.The minimum heart rate recorded was 43 beats / minute. The maximum heart rate is 167 beats / minute. The mean heart rate is 85 beats / minute. 4.Afib burden 22% at times with RVR. 5.This monitor shows a pattern of tachy-olivia with sinus rates in the 50s and AF with RVR. 5/4/2023 10:00:00 AM Becky Carrillo MD     REVIEW OF SYSTEMS    REVIEW OF SYSTEMS  Header Details   Cardiovascular No history of Chest pain, MARIE, Palpitations, Syncope, PND, Orthopnea, Edema, Claudication     SOCIAL HISTORY    SOCIAL HISTORY  Social History Element Description Effective Dates   Smoking status Former smoker -     FUNCTIONAL STATUS    No data available    MEDICAL EQUIPMENT    No data available    Goals Sections    No data available    REASON FOR REFERRAL               Health Concerns Section    No data available    COGNITIVE/MENTAL STATUS    No data available    Patient Demographics    Patient Demographics  Patient Address Patient Name Communication   [address] [place] [address] Ciarra Salcido [phone] (Mobile)     Patient Demographics  Language Race / Ethnicity Marital Status   English - Spoken (Preferred) White / Not  or       Document Information    Primary Care Provider Other Service Providers Document Coverage Dates   Lucas Fong  NPI: [id number]  [phone] (Work)  [address] [place] [address], 4th floor  [address] [address]  Interpreting  Physicians  [facility]  [phone] (Work)  [address] [place] [address] Nayeli Stephens  NPI: [id number]  [phone] (Work)  [address] [place] [address]  [place] [address]  Nurses Jul. 26, 2024July 26, 2024      Organization   [facility]  [phone] (Work)  [address] [place] [address]  [place] [address]     Encounter Providers Encounter Date    Jul. 26, 2024July 26, 2024     Legal Authenticator    Lucas Fong  NPI: [id number]  [phone] (Work)  [address] [place] [address]  [place] [address]

## 2024-09-11 LAB
ANION GAP SERPL CALC-SCNC: 7 MMOL/L (ref 0–18)
BUN BLD-MCNC: 55 MG/DL (ref 9–23)
CALCIUM BLD-MCNC: 8.9 MG/DL (ref 8.7–10.4)
CHLORIDE SERPL-SCNC: 104 MMOL/L (ref 98–112)
CO2 SERPL-SCNC: 27 MMOL/L (ref 21–32)
CREAT BLD-MCNC: 2.46 MG/DL
EGFRCR SERPLBLD CKD-EPI 2021: 19 ML/MIN/1.73M2 (ref 60–?)
GLUCOSE BLD-MCNC: 113 MG/DL (ref 70–99)
GLUCOSE BLD-MCNC: 114 MG/DL (ref 70–99)
GLUCOSE BLD-MCNC: 124 MG/DL (ref 70–99)
GLUCOSE BLD-MCNC: 158 MG/DL (ref 70–99)
GLUCOSE BLD-MCNC: 206 MG/DL (ref 70–99)
MAGNESIUM SERPL-MCNC: 1.9 MG/DL (ref 1.6–2.6)
OSMOLALITY SERPL CALC.SUM OF ELEC: 302 MOSM/KG (ref 275–295)
POTASSIUM SERPL-SCNC: 4.6 MMOL/L (ref 3.5–5.1)
SODIUM SERPL-SCNC: 138 MMOL/L (ref 136–145)

## 2024-09-11 PROCEDURE — 99232 SBSQ HOSP IP/OBS MODERATE 35: CPT | Performed by: INTERNAL MEDICINE

## 2024-09-11 PROCEDURE — 99233 SBSQ HOSP IP/OBS HIGH 50: CPT | Performed by: INTERNAL MEDICINE

## 2024-09-11 RX ORDER — CARVEDILOL 12.5 MG/1
25 TABLET ORAL 2 TIMES DAILY WITH MEALS
Status: DISCONTINUED | OUTPATIENT
Start: 2024-09-11 | End: 2024-09-12

## 2024-09-11 RX ORDER — CARVEDILOL 12.5 MG/1
12.5 TABLET ORAL ONCE
Status: COMPLETED | OUTPATIENT
Start: 2024-09-11 | End: 2024-09-11

## 2024-09-11 RX ORDER — AMLODIPINE BESYLATE 5 MG/1
5 TABLET ORAL DAILY
Status: DISCONTINUED | OUTPATIENT
Start: 2024-09-11 | End: 2024-09-12

## 2024-09-11 NOTE — DISCHARGE INSTRUCTIONS
Going Home Instructions  In this section you will find the tools which will guide you through the first few days after you leave the hospital. Continued use of these tools will help you develop the skills necessary to keep your heart failure under control.     Home Care Instructions Following Heart Failure - the most important things to do every day include:   Weigh yourself and review the “Self-Check Plan” sheet every morning.   Call your cardiologist office if you are in the “Pay Attention-Use Caution” (yellow zone) or “Medical Alert-Warning!” (red zone) as outlined in the Self-Check Plan sheet.  Take your medicines as prescribed.  Limit your sodium (salt) intake.  Know when to call your cardiologist, primary doctor, or nurse.  Know when to seek emergency care.      Things for You to Remember:   1. See your doctor or healthcare provider as written on your discharge instructions.  It is important that you attend this appointment to make sure your symptoms are under control.     2. Your recommended sodium intake is 6121-5217 mg daily.    3.  Weigh yourself every day.    4. Some exercise and activity is important to help keep your heart functioning and strong. Unless instructed not to exercise, you may walk at a slow to moderate pace for 10-15 minutes 2-3 days per week to start. Pace your activity to prevent shortness of breath or fatigue. Stop exercising if you develop chest pain, lightheadedness, or significant shortness of breath.       Call Your Cardiologist If:   You gain 2-3 pounds in one day or 5 pounds in one week.  You have more difficulty breathing.  You are getting more tired with normal activity.  You are more short of breath lying down, or awaken at night short of breath.  You have swelling of your feet or legs.  You urinate less often during the day and more often at night.  You have cramps in your legs.  You have blurred vision or see yellowish-green halos around objects of lights.    Go to the  Emergency Room If:   You have pain or tightness in your chest  You are extremely short of breath  You are coughing up pink-frothy mucus  You are traveling and develop symptoms of worsening heart failure      ** Please follow up with your cardiologist or Advanced Practice Provider as written on your discharge instructions. If you are not provided with an appointment, let your nurse know so you can get an appointment**    Your home oxygen has been ordered through [facility].     [facility]  P:[phone]  F:[phone]      Resume home health with [facility] at discharge.     [facility]  Phone # [phone]  Fax # [phone]

## 2024-09-11 NOTE — PROGRESS NOTES
[facility]  Nephrology Progress Note    Ciarra Salcido Attending:  Katherin Choudhary DO       Assessment and Plan:     1) CKD 4- likely due to longstanding DM / HTN; c/w bland urine sediment and mild proteinuria. No further w/u     2) Severe HTN- exac by fluid overload; add amlodipine     3) HFpEF with mod MR / TR / AI- diuresing well, near- euvolemic     4) PAF s/p PPM  + GILLES / DCCV - amio / BB / cardizem / DOAC per cards     5) DM 2- dc januvia permanently with edema / CHF     6) h/o hyperkalemia- due to CKD / type IV RTA; avoid ACE-I / ARB / MRA    DC planning.       Subjective:  Awake alert    Physical Exam:   /39 (BP Location: Left arm)   Pulse 62   Temp 97.7 °F (36.5 °C) (Oral)   Resp 19   Ht 5' 4\" (1.626 m)   Wt 158 lb 8.2 oz (71.9 kg)   SpO2 95%   BMI 27.21 kg/m²   Temp (24hrs), Av.9 °F (37.2 °C), Min:97.7 °F (36.5 °C), Max:99.9 °F (37.7 °C)       Intake/Output Summary (Last 24 hours) at 2024 1021  Last data filed at 2024 0958  Gross per 24 hour   Intake 780 ml   Output 2050 ml   Net -1270 ml     Wt Readings from Last 3 Encounters:   24 158 lb 8.2 oz (71.9 kg)   24 151 lb 14.4 oz (68.9 kg)   24 140 lb (63.5 kg)     General: awake alert  HEENT: No scleral icterus, MMM  Neck: Supple, no CASTRO or thyromegaly  Cardiac: Regular rate and rhythm, S1, S2 normal, no murmur or tub  Lungs: Decreased BS at bases bilaterally   Abdomen: Soft, non-tender. + bowel sounds, no palpable organomegaly  Extremities: Without clubbing, cyanosis; minimal edema  Neurologic: Cranial nerves grossly intact, moving all extremities  Skin: Warm and dry, no rashes       Labs:   Lab Results   Component Value Date    CREATSERUM 2.46 2024    BUN 55 2024     2024    K 4.6 2024     2024    CO2 27.0 2024     2024    CA 8.9 2024    MG 1.9 2024    PGLU 124 2024       Imaging:  All imaging studies  reviewed.    Meds:   Current Facility-Administered Medications   Medication Dose Route Frequency    carvedilol (Coreg) tab 25 mg  25 mg Oral BID with meals    allopurinol (Zyloprim) tab 100 mg  100 mg Oral Daily    amiodarone (Pacerone) tab 200 mg  200 mg Oral Daily    apixaban (Eliquis) tab 2.5 mg  2.5 mg Oral BID    dilTIAZem ER (Dilacor XR) 24 hr cap 120 mg  120 mg Oral Daily    escitalopram (Lexapro) tab 5 mg  5 mg Oral Daily    gabapentin (Neurontin) cap 100 mg  100 mg Oral TID    levothyroxine (Synthroid) tab 75 mcg  75 mcg Oral Before breakfast    atorvastatin (Lipitor) tab 10 mg  10 mg Oral Nightly    furosemide (Lasix) 10 mg/mL injection 40 mg  40 mg Intravenous BID (Diuretic)    glucose (Dex4) 15 GM/59ML oral liquid 15 g  15 g Oral Q15 Min PRN    Or    glucose (Glutose) 40% oral gel 15 g  15 g Oral Q15 Min PRN    Or    glucose-vitamin C (Dex-4) chewable tab 4 tablet  4 tablet Oral Q15 Min PRN    Or    dextrose 50% injection 50 mL  50 mL Intravenous Q15 Min PRN    Or    glucose (Dex4) 15 GM/59ML oral liquid 30 g  30 g Oral Q15 Min PRN    Or    glucose (Glutose) 40% oral gel 30 g  30 g Oral Q15 Min PRN    Or    glucose-vitamin C (Dex-4) chewable tab 8 tablet  8 tablet Oral Q15 Min PRN    insulin aspart (NovoLOG) 100 Units/mL FlexPen 1-68 Units  1-68 Units Subcutaneous TID CC    insulin aspart (NovoLOG) 100 Units/mL FlexPen 1-10 Units  1-10 Units Subcutaneous TID AC and HS    acetaminophen (Tylenol Extra Strength) tab 500 mg  500 mg Oral Q4H PRN    melatonin tab 3 mg  3 mg Oral Nightly PRN    polyethylene glycol (PEG 3350) (Miralax) 17 g oral packet 17 g  17 g Oral Daily PRN    sennosides (Senokot) tab 17.2 mg  17.2 mg Oral Nightly PRN    bisacodyl (Dulcolax) 10 MG rectal suppository 10 mg  10 mg Rectal Daily PRN    ondansetron (Zofran) 4 MG/2ML injection 4 mg  4 mg Intravenous Q6H PRN    metoclopramide (Reglan) 5 mg/mL injection 5 mg  5 mg Intravenous Q8H PRN    labetalol (Trandate) 5 mg/mL injection 10 mg   10 mg Intravenous Q4H PRN         Questions/concerns were discussed with patient and/or family by bedside.          Mary Lares MD  9/11/2024  10:21 AM

## 2024-09-11 NOTE — PROGRESS NOTES
Heart Failure Nurse  Progress Note    Patient received heart failure coaching on 7/13/24. Reviewed heart failure information. Patient was given a scale on 7/13, however, she forgot to take it home with her. Patient was given a new scale and asked nurse to make sure patient takes the scale with her at discharge. Reviewed heart failure stoplight, low sodium diet and taking medications as prescribed. Patient informed she will have a follow up appointment after discharge with cardiology, per patient her daughter takes her to her appointments. Discussed Heart Failure Call Back Team, and per patient the Heart Failure Call Back Team may talk with her daughter, Elsie.      Nithya Hernandez RN (signature)

## 2024-09-11 NOTE — PAYOR COMM NOTE
--------------  ADMISSION REVIEW     Payor: MANDO GREGORY [facility]  Subscriber #:  [id number]  Authorization Number: [id number]    Admit date: 9/9/24  Admit time:  8:43 PM       REVIEW DOCUMENTATION:    Patient Seen in: [facility] [place]      History     Chief Complaint   Patient presents with    Swelling    Difficulty Breathing     Stated Complaint: swelling in both feet and lower legs, low O2 per family in 80's. Hx chf negativ*    Subjective:   HPI    Patient is a 84-year-old female with a known history of CHF who is admitted to the hospital for similar symptoms back in July of this year presents the emergency room with a history of increasing shortness of breath at home increasing leg swelling which has been ongoing over the last week as per patient's daughter who is giving independent history at the bedside.  The patient is not on oxygen at home was found of ox saturations in the high 80s upon arrival to the ER.  The patient has had no history of any chest pain.  The patient denies abdominal pain.  The patient has had a mild cough recently.  The patient denies vomiting or diarrhea.  The patient has had no other recent complaints at home.    Objective:   Past Medical History:    (HFpEF) heart failure with preserved ejection fraction (HCC)    Acute cystitis without hematuria    Acute deep vein thrombosis (DVT) of popliteal vein of right lower extremity (HCC)    Cataract    CHF exacerbation (HCC)    CKD (chronic kidney disease)    Congestive heart disease (HCC)    COVID-19    Diabetes (HCC)    Disorder of thyroid    DM2 (diabetes mellitus, type 2) (HCC)    Hearing impairment    High blood pressure    HTN (hypertension)    Hyperlipidemia    Hypothyroidism    Pulmonary embolism (HCC)    Shingles    Visual impairment     Past Surgical History:   Procedure Laterality Date    Cataract      Eye surgery      Hysterectomy  1980    [facility]    Pacemaker monitor         Physical Exam     ED Triage Vitals  [09/09/24 1535]   BP (!) 178/74   Pulse 61   Resp 18   Temp 98.5 °F (36.9 °C)   Temp src Temporal   SpO2 92 %   O2 Device None (Room air)       Current Vitals:   Vital Signs  BP: (!) 162/72  Pulse: 60  Resp: 16  Temp: 98.5 °F (36.9 °C)  Temp src: Temporal    Oxygen Therapy  SpO2: 100 %  O2 Device: None (Room air)  O2 Flow Rate (L/min): 2 L/min      Physical Exam    GENERAL: Well-developed, well-nourished female   Sitting up breathing easily in no apparent distress.  Patient is nontoxic in appearance.  HEENT: Head is normocephalic, atraumatic. Pupils are 4 mm equally round and reactive to light. Oropharynx is clear. Mucous membranes are moist.  NECK: No stridor.  LUNGS: Clear at the apices with diminished breath sounds at both bases and crackles at both bases appreciated.  HEART: Regular rate and rhythm. Normal S1, S2 no S3, or S4. No murmur.  ABDOMEN: There is no focal tenderness to palpation appreciated anywhere throughout the abdomen. There is no guarding, no rebound, no mass, and no organomegaly appreciated. There is normoactive bowel sounds. There is no hernia.  EXTREMITIES: There is no cyanosis, clubbing, however there is edema appreciated in both lower extremities. Pulses are 2+ and equal in all 4 extremities.  NEURO: Patient is awake, alert and oriented to time place and person. Motor strength is 5 over 5 in all 4 extremities. There are no gross motor or sensory deficits appreciated.  Patient answering all questions appropriately.      ED Course     Labs Reviewed   CBC WITH DIFFERENTIAL WITH PLATELET - Abnormal; Notable for the following components:       Result Value    RBC 3.42 (*)     HGB 10.0 (*)     HCT 31.3 (*)     All other components within normal limits   COMP METABOLIC PANEL (14) - Abnormal; Notable for the following components:    Glucose 175 (*)     Sodium 133 (*)     Potassium 5.7 (*)     BUN 55 (*)     Creatinine 2.60 (*)     Calcium, Total 8.4 (*)     eGFR-Cr 17 (*)     Albumin 2.8 (*)     A/G  Ratio 0.7 (*)     All other components within normal limits   PRO BETA NATRIURETIC PEPTIDE - Abnormal; Notable for the following components:    Pro-Beta Natriuretic Peptide 11,637 (*)     All other components within normal limits   PROTHROMBIN TIME (PT) - Abnormal; Notable for the following components:    PT 17.1 (*)     INR 1.38 (*)     All other components within normal limits   PTT, ACTIVATED - Abnormal; Notable for the following components:    PTT 38.6 (*)     All other components within normal limits   RAPID SARS-COV-2 BY PCR - Abnormal; Notable for the following components:    Rapid SARS-CoV-2 by PCR Detected (*)     All other components within normal limits   TROPONIN I HIGH SENSITIVITY - Normal   RAINBOW DRAW BLUE     EKG    Rate, intervals and axes as noted on EKG Report.  Rate: 60  Rhythm: Paced rhythm  Reading: Nonspecific ST change evidence of T wave inversions inferiorly and anteriorly.  Inferior T wave inversions are unchanged compared to previous EKG from July 2024 and T wave inversions anteriorly are new compared to EKG compared to July 2024.     XR CHEST AP PORTABLE  (CPT=71045)    Result Date: 9/9/2024  CONCLUSION:  Mild vascular congestion appears improved from the prior exam with chronic small left pleural effusion.      MDM      Patient an IV line established blood work drawn including CBC, chemistries, BUN/creatinine, and blood sugar showed evidence of stable anemia showed stable BUN/creatinine of 55 and 2.6 unchanged from previous labs from July of this year as per my review of medical records.  The patient's potassium was elevated at 5.7 for which the patient was given Kayexalate here in the emergency room.  Liver function test are negative.  Troponin found to be negative.  COVID test found to be positive.  BNP is up over 11,000 at this time suggestive of heart failure.  Patient was found to be hypoxic upon arrival to the ER she has multiple reasons to be hypoxic this time she has COVID and  also has evidence of some heart failure at this time.  Patient given Kayexalate for her elevated potassium given Lasix for her CHF and will be admitted to the hospital for further care at this time.  Patient denies chest pain.  Patient otherwise feeling well on oxygen at this time.  Patient's case discussed with Dr. Yates and the patient will be admitted for further care at this time.    Disposition and Plan     Clinical Impression:  1. COVID-19    2. Hypoxia    3. Acute on chronic congestive heart failure, unspecified heart failure type (HCC)    4. Hyperkalemia         Disposition:  Admit  9/9/2024  4:38 pm    Signed by Solomon Lezama MD on 9/9/2024  5:13 PM       furosemide (Lasix) 10 mg/mL injection 40 mg  Dose: 40 mg  Freq: Once Route: IV  Start: 09/09/24 1712 End: 09/09/24 1724    remdesivir (Veklury) 200 mg in sodium chloride 0.9% 100 mL IVPB  Dose: 200 mg  Freq: Once Route: IV  Last Dose: Stopped (09/09/24 2344)  Start: 09/09/24 2315 End: 09/09/24 2344    sodium polystyrene (Kayexalate) oral powder 15 g  Dose: 15 g  Freq: Once Route: OR  Start: 09/09/24 1633 End: 09/09/24 1723    09/09/24 1607 -- -- -- -- -- -- Nasal cannula 6 L/min HG   09/09/24 1555 -- -- -- -- 99 % -- Nasal cannula 6 L/min HG   09/09/24 1554 -- -- -- -- 82 % Abnormal  -- None (Room air) -- HG   09/09/24 1535 98.5 °F (36.9 °C) 61 18 178/74 Abnormal  92 % 150 lb (68 kg) None (Room air) --            HISTORY AND PHYSICAL     Assessment & Plan:  #HFpEF exacerbation  -s/p iv lasix in ER, cont iv diureis  -cards/renal to see  -echo (July 2024) shows LVEF 65-70%  -CHF order set     #Acute hypoxemic respiratory failure  -6 L on admission, now down to 4 L, room air at baseline     #COVID-positive  -Chest x-ray shows vascular congestion  -ID to see, consider RDV?     #Uncontrolled diabetes mellitus type 2  -A1c 9.3 as of 8/13/2024  -Insulin sliding scale plus carb coverage for now    #Permanent afib /p PPM  -PTA: eliquis?     #Essential  hypertension  #Hyperlipidemia     #CKD 4  #Hyperkalemia  -S/p Kayexalate in the emergency room  -Nephrology to see  -Trend labs     #Hypothyroidism     #Gout     #Chronic normocytic anemia       9-10-24  NEPHROLOGY CONSULT      Physical Exam:  Vital signs: Blood pressure 147/62, pulse 68, temperature 100.1 °F (37.8 °C), temperature source Oral, resp. rate 18, height 5' 4\" (1.626 m), weight 161 lb 6 oz (73.2 kg), SpO2 92%.  General: No acute distress. Alert and oriented x 3.  HEENT: Moist mucous membranes. EOM-I. PERRL  Neck: No lymphadenopathy.  No JVD. No carotid bruits.  Respiratory: Clear to auscultation bilaterally.  No wheezes. No rhonchi.  Cardiovascular: S1, S2.  Irreggular   Abdomen: Soft, nontender, nondistended.  Positive bowel sounds. No rebound tenderness   Neurologic: No focal neurological deficits.   Ext + edema   Integument: No lesions. No erythema.  Psychiatric: Appropriate mood and affect.     Laboratory:        Lab Results   Component Value Date     WBC 5.9 09/10/2024     HGB 9.8 09/10/2024     HCT 30.4 09/10/2024     .0 09/10/2024     CREATSERUM 2.36 09/10/2024     BUN 52 09/10/2024      09/10/2024     K 4.9 09/10/2024      09/10/2024     CO2 26.0 09/10/2024      09/10/2024     CA 8.9 09/10/2024     ALB 3.5 09/10/2024     ALKPHO 92 09/10/2024     BILT 0.2 09/10/2024     TP 6.4 09/10/2024     AST 14 09/10/2024     ALT 38 09/10/2024     PTT 38.6 09/09/2024     INR 1.38 09/09/2024     PTP 17.1 09/09/2024     MG 1.9 09/10/2024     PGLU 165 09/10/2024     mpression:  CKD stg IV; related to longstanding DM/HTN; stable Cr @ baseline  Hyperkalemia- improved w/ med management   - continue diuretics; monitor labs; no additional w/u  CHF- appreciate cards eval; on diruetics; echo planned  S/p PPM  Respiratory insuff due to fluid overload/COVID+  - on supplemental O2- wean as tolerated (down to 2 L presently)  - ID consulted  HTN; stable  DM  Anemia due to CKD/chronic disease;  monitor       PULMONOLOGY CONSULT  9-10-24     ASSESSMENT/PLAN:  1. COVID  -last vaccinated 3 years ago, previous infection tested positive 1 year ago  Admitted from NH 9/9 for billateral leg edema due to right sided CHF  -reports cold symptoms 2 weeks ago  Currently no fever or active symptoms     Tested positive on 9/9  ---unknown onset of infection     Given lack of fever or other active symptoms, unknown timing of onset of infection, and history of previous URI symptoms, may reflect recent rather than active infection  Doubt benefit to antivrials at this stage     AVOID steroids (mild hypoxia in the setting of CHF, CXR actually improved compared to 2 months ago)        2. Cardiomyopathy, right sided heart failure  Underlying CRI     3. SP pacemaker     4. Hypoxia, mild congestion on CXR, 02 weaning on 1L  Per hospitalist     CARDIOLOGY CONSULT  9-10-24    Recommendations:  -tele monitoring  -no need for repeat echo  -IV diuretics per renal  -ID consulted for further eval/recs for COVID-19 infection  -continue amio, BB, CCB, Eliquis  -on statin    ID CONSULT  9-10-24    ASSESSMENT/PLAN:  1. COVID  -last vaccinated 3 years ago, previous infection tested positive 1 year ago  Admitted from NH 9/9 for billateral leg edema due to right sided CHF  -reports cold symptoms 2 weeks ago  Currently no fever or active symptoms     Tested positive on 9/9  ---unknown onset of infection     Given lack of fever or other active symptoms, unknown timing of onset of infection, and history of previous URI symptoms, may reflect recent rather than active infection  Doubt benefit to antivrials at this stage     AVOID steroids (mild hypoxia in the setting of CHF, CXR actually improved compared to 2 months ago)        2. Cardiomyopathy, right sided heart failure  Underlying CRI     3. SP pacemaker     4. Hypoxia, mild congestion on CXR, 02 weaning on 1L  Per hospitalist      MEDICATIONS ADMINISTERED IN LAST 1 DAY:  allopurinol (Zyloprim)  tab 100 mg       Date Action Dose Route User    9/11/2024 0826 Given 100 mg Oral Devora Vieira RN    9/10/2024 0911 Given 100 mg Oral Kelly Meraz RN          amiodarone (Pacerone) tab 200 mg       Date Action Dose Route User    9/11/2024 0826 Given 200 mg Oral Devora Vieira RN    9/10/2024 0911 Given 200 mg Oral Kelly Meraz RN          apixaban (Eliquis) tab 2.5 mg       Date Action Dose Route User    9/11/2024 0826 Given 2.5 mg Oral Devora Vieira RN    9/10/2024 2104 Given 2.5 mg Oral Marizol Rodriguez RN    9/10/2024 0911 Given 2.5 mg Oral Kelly Meraz RN          atorvastatin (Lipitor) tab 10 mg       Date Action Dose Route User    9/10/2024 2104 Given 10 mg Oral Marizol Rodriguez RN          carvedilol (Coreg) tab 12.5 mg       Date Action Dose Route User    9/11/2024 0826 Given 12.5 mg Oral Devora Vieira RN    9/10/2024 1827 Given 12.5 mg Oral Kelly Meraz RN    9/10/2024 0911 Given 12.5 mg Oral Kelly Meraz RN          dilTIAZem ER (Dilacor XR) 24 hr cap 120 mg       Date Action Dose Route User    9/11/2024 0826 Given 120 mg Oral Devora Vieira RN    9/10/2024 0911 Given 120 mg Oral Kelly Meraz RN          escitalopram (Lexapro) tab 5 mg       Date Action Dose Route User    9/11/2024 0826 Given 5 mg Oral Devora Vieira RN    9/10/2024 0911 Given 5 mg Oral Kelly Meraz RN          furosemide (Lasix) 10 mg/mL injection 40 mg       Date Action Dose Route User    9/11/2024 0826 Given 40 mg Intravenous Devora Vieira RN    9/10/2024 1629 Given 40 mg Intravenous Kelly Meraz RN    9/10/2024 0911 Given 40 mg Intravenous Kelly Meraz RN          gabapentin (Neurontin) cap 100 mg       Date Action Dose Route User    9/11/2024 0826 Given 100 mg Oral Devora Vieira RN    9/10/2024 2104 Given 100 mg Oral Marizol Rodriguez RN    9/10/2024 1629 Given 100 mg Oral Kelly Meraz, RN    9/10/2024 0911 Given 100 mg Oral Kelly Meraz, RN          insulin aspart  (NovoLOG) 100 Units/mL FlexPen 1-68 Units       Date Action Dose Route User    9/10/2024 1329 Given 5 Units Subcutaneous (Left Upper Arm) Kelly Meraz RN    9/10/2024 0923 Given 3 Units Subcutaneous (Left Upper Arm) Kelly Meraz RN          insulin aspart (NovoLOG) 100 Units/mL FlexPen 1-10 Units       Date Action Dose Route User    9/10/2024 1632 Given 2 Units Subcutaneous (Right Upper Arm) Kelly Meraz RN    9/10/2024 1219 Given 2 Units Subcutaneous (Right Upper Arm) Kelly Meraz RN          labetalol (Trandate) 5 mg/mL injection 10 mg       Date Action Dose Route User    9/10/2024 2138 Given 10 mg Intravenous Marizol Rodriguez RN          levothyroxine (Synthroid) tab 75 mcg       Date Action Dose Route User    9/11/2024 0644 Given 75 mcg Oral Marizol Rodriguez RN            Vitals (last day)       Date/Time Temp Pulse Resp BP SpO2 Weight O2 Device O2 Flow Rate (L/min) [facility]    09/11/24 0821 97.7 °F (36.5 °C) 61 14 173/65 96 % -- -- -- VM    09/11/24 0504 -- -- -- -- -- 158 lb 8.2 oz (71.9 kg) -- -- EK    09/11/24 0504 99.9 °F (37.7 °C) 60 18 150/59 95 % -- Nasal cannula 3 L/min BR    09/10/24 2349 98.4 °F (36.9 °C) 63 17 140/55 97 % -- Nasal cannula 1 L/min BR    09/10/24 2201 -- 61 15 -- 93 % -- -- -- BR    09/10/24 2100 98.9 °F (37.2 °C) 61 14 173/64 94 % -- Nasal cannula 1 L/min EK    09/10/24 2030 98.6 °F (37 °C) 66 14 178/66 94 % -- -- -- BR    09/10/24 1604 99.6 °F (37.6 °C) 60 18 176/67 95 % -- Nasal cannula 1 L/min FD    09/10/24 1119 99.2 °F (37.3 °C) 61 15 154/62 92 % -- Nasal cannula 1 L/min WANDER    09/10/24 0927 -- 68 18 -- 92 % -- Nasal cannula 1 L/min JK    09/10/24 0837 100.1 °F (37.8 °C) 64 13 147/62 96 % -- Nasal cannula 2 L/min FD    09/10/24 0507 99.2 °F (37.3 °C) 63 15 169/61 99 % -- Nasal cannula 5 L/min BR

## 2024-09-11 NOTE — PROGRESS NOTES
INFECTIOUS DISEASE  PROGRESS NOTE            [facility]    Ciarra Salcido Patient Status:  Inpatient    1940 MRN [id number]   [facility] 8NE-A Attending Katherin Choudhary,    Hosp Day # 2 PCP JUDY CYR MD       Subjective:  : resting on 02 1 L overnight  No fever last day  Was 100.1 yesterday morning at 8a, no fever since    Objective:  Temp:  [98.4 °F (36.9 °C)-100.1 °F (37.8 °C)] 99.9 °F (37.7 °C)  Pulse:  [60-68] 60  Resp:  [13-18] 18  BP: (140-178)/(55-67) 150/59  SpO2:  [92 %-97 %] 95 %    General: resting in no distress  Vital signs:Temp:  [98.4 °F (36.9 °C)-100.1 °F (37.8 °C)] 99.9 °F (37.7 °C)  Pulse:  [60-68] 60  Resp:  [13-18] 18  BP: (140-178)/(55-67) 150/59  SpO2:  [92 %-97 %] 95 %  HEENT: Moist mucous membranes. Extraocular muscles are intact.  Neck: supple no masses  Respiratory: Non labored, symmetric excursion, normal respirations  Cardiovascular: no irregularities in rhythm  Abdomen: Soft, nontender, nondistended.   Musculoskeletal: joints: no swelling   Integument: No lesions. No erythema. No open wounds.  Labs:     COVID-19 Lab Results    COVID-19  Lab Results   Component Value Date    COVID19 Detected (A) 2024    COVID19 Not Detected 2024    COVID19 Detected (A) 09/10/2023       Pro-Calcitonin  No results for input(s): \"PCT\" in the last 168 hours.    Cardiac  Recent Labs   Lab 24  1604   PBNP 11,637*       Creatinine Kinase  No results for input(s): \"CK\" in the last 168 hours.    Inflammatory Markers  Recent Labs   Lab 09/10/24  0646   MATTY 26.4*       Recent Labs   Lab 09/10/24  0646   RBC 3.41*   HGB 9.8*   HCT 30.4*   MCV 89.1   MCH 28.7   MCHC 32.2   RDW 14.8   NEPRELIM 3.90   WBC 5.9   .0         Recent Labs   Lab 24  1604 09/10/24  0646 24  0538   * 134* 114*   BUN 55* 52* 55*   CREATSERUM 2.60* 2.36* 2.46*   CA 8.4* 8.9 8.9   ALB 2.8* 3.5  --    * 137 138   K 5.7* 4.9 4.6    108 104   CO2 22.0  26.0 27.0   ALKPHO 100 92  --    AST 24 14  --    ALT 54 38  --    BILT 0.2 0.2  --    TP 7.1 6.4  --        No results found for: \"VANCT\"  Microbiology    No results found for this visit on 09/09/24.        Problem list reviewed:  Patient Active Problem List   Diagnosis    CKD (chronic kidney disease) stage 4, GFR 15-29 ml/min (HCC)    Diabetes mellitus, type 2 (HCC)    Primary hypertension    Mixed hyperlipidemia    Acquired hypothyroidism    Osteopenia    PRISCILLA (acute kidney injury) (HCC)    Hyperkalemia    Diabetic polyneuropathy associated with type 2 diabetes mellitus (HCC)    Current moderate episode of major depressive disorder without prior episode (HCC)    Acute on chronic heart failure with preserved ejection fraction (HCC)    Multiple subsegmental pulmonary emboli without acute cor pulmonale (HCC)    Smokers' cough (HCC)    Chronic deep vein thrombosis (DVT) of proximal vein of lower extremity (HCC)    Atrial fibrillation with rapid ventricular response (HCC)    Chronic heart failure with preserved ejection fraction (HCC)    Stage 3 chronic kidney disease (HCC)    Hypertension, unspecified type    Acute cystitis with hematuria    Altered mental status, unspecified altered mental status type    Arthralgia of right lower leg    Rectal bleeding    Hyperglycemia    Hypertensive urgency    Hyperlipidemia    Hypothyroidism    ATN (acute tubular necrosis) (HCC)    Acute gout of multiple sites    Cardiorenal syndrome    Right sided weakness    Cognitive decline    Urinary retention    COVID-19    Hypoxia    Acute on chronic congestive heart failure, unspecified heart failure type (HCC)    Respiratory insufficiency    Anemia, chronic disease             ASSESSMENT/PLAN:  1. COVID  -last vaccinated 3 years ago, previous infection tested positive 1 year ago  Admitted from NH 9/9 for billateral leg edema due to right sided CHF  -reports cold symptoms 2 weeks ago  Currently no fever or active symptoms     Tested  positive on 9/9  ---unknown onset of infection  No fever last 24h    Doubt benefit to antivrials at this stage     AVOID steroids (mild hypoxia in the setting of CHF, CXR actually improved compared to 2 months ago)        2. Cardiomyopathy, right sided heart failure  Underlying CRI     3. SP pacemaker     4. Hypoxia, mild congestion on CXR, 02 weaning on 1L  Per hospitalist      Jesús Brewer MD, MD [facility]  [phone]

## 2024-09-11 NOTE — CM/SW NOTE
Department  notified of request for HHC, aidin referrals started. Assigned CM/SW to follow up with pt/family on further discharge planning.     Malena Bender, DSC

## 2024-09-11 NOTE — PROGRESS NOTES
[facility]   part of [facility]     Hospitalist Progress Note     Ciarra Salcido Patient Status:  Inpatient    1940 MRN [id number]   Location [facility] 8NE-A Attending Katherin Choudhary, DO   Hosp Day # 2 PCP JUDY CYR MD     Chief Complaint: SOB    Subjective:     Feeling better, swelling improving    Objective:    Review of Systems:   A comprehensive review of systems was completed; pertinent positive and negatives stated in subjective.    Vital signs:  Temp:  [97.7 °F (36.5 °C)-99.9 °F (37.7 °C)] 98.8 °F (37.1 °C)  Pulse:  [60-66] 60  Resp:  [14-19] 19  BP: (105-178)/(39-67) 115/64  SpO2:  [93 %-97 %] 96 %    Physical Exam:    General: No acute distress  Respiratory: No wheezes, no rhonchi  Cardiovascular: S1, S2, regular rate and rhythm  Abdomen: Soft, Non-tender, non-distended, positive bowel sounds  Neuro: No new focal deficits.   Extremities: BLLE edema improving      Diagnostic Data:    Labs:  Recent Labs   Lab 24  1604 09/10/24  0646   WBC 7.0 5.9   HGB 10.0* 9.8*   MCV 91.5 89.1   .0 243.0   INR 1.38*  --        Recent Labs   Lab 24  1604 09/10/24  0646 24  0538   * 134* 114*   BUN 55* 52* 55*   CREATSERUM 2.60* 2.36* 2.46*   CA 8.4* 8.9 8.9   ALB 2.8* 3.5  --    * 137 138   K 5.7* 4.9 4.6    108 104   CO2 22.0 26.0 27.0   ALKPHO 100 92  --    AST 24 14  --    ALT 54 38  --    BILT 0.2 0.2  --    TP 7.1 6.4  --        Estimated Creatinine Clearance: 14.2 mL/min (A) (based on SCr of 2.46 mg/dL (H)).    Recent Labs   Lab 24  1604   TROPHS 24       Recent Labs   Lab 24  1604   PTP 17.1*   INR 1.38*        Microbiology    No results found for this visit on 24.      Imaging: Reviewed in Epic.    Medications:    carvedilol  25 mg Oral BID with meals    amLODIPine  5 mg Oral Daily    allopurinol  100 mg Oral Daily    amiodarone  200 mg Oral Daily    apixaban  2.5 mg Oral BID    dilTIAZem ER  120 mg Oral Daily     escitalopram  5 mg Oral Daily    gabapentin  100 mg Oral TID    levothyroxine  75 mcg Oral Before breakfast    atorvastatin  10 mg Oral Nightly    furosemide  40 mg Intravenous BID (Diuretic)    insulin aspart  1-68 Units Subcutaneous TID CC    insulin aspart  1-10 Units Subcutaneous TID AC and HS       Assessment & Plan:      #HFpEF exacerbation  - diuresis with IV lasix BID  - HF protocol  - strict I/O's daily weights  - recent echo reviewed  - cardiology following     #Acute hypoxemic respiratory failure d/t above  - diuresis as above  - wean supplemental O2 as tolerated     #CKD 4  #Hyperkalemia  - K+ improved with kayexalate   - follow BMP with diuresis  - nephrology following      #COVID+  - suspect respiratory symptoms d/t HF   - hold on anti-virals  - ID following     #Uncontrolled DM2  - A1c 9.3 as of 8/13/2024  - ICF 1:20, carb ratio 1;10    #Permanent afib s/p PPM  - amiodarone, carvedilol, diltiazem, Eliquis     #Essential hypertension  #Hyperlipidemia       #Hypothyroidism  - levothyroxine     #Gout  - allopurinol     #Chronic normocytic anemia      Katherin Choudhary, DO    Supplementary Documentation:     Quality:  DVT Mechanical Prophylaxis:   SCDs,    DVT Pharmacologic Prophylaxis   Medication    apixaban (Eliquis) tab 2.5 mg                Code Status: DNAR/Selective Treatment  Hutchinson: External urinary catheter in place  Hutchinson Duration (in days):   Central line:    AMINTA: 9/12/2024    Discharge is dependent on: clinical state  At this point Ms. Salcido is expected to be discharge to: home    The 21st Century Cures Act makes medical notes like these available to patients in the interest of transparency. Please be advised this is a medical document. Medical documents are intended to carry relevant information, facts as evident, and the clinical opinion of the practitioner. The medical note is intended as peer to peer communication and may appear blunt or direct. It is written in medical language and  may contain abbreviations or verbiage that are unfamiliar.              **Certification      PHYSICIAN Certification of Need for Inpatient Hospitalization - Initial Certification    Patient will require inpatient services that will reasonably be expected to span two midnight's based on the clinical documentation in H+P.   Based on patients current state of illness, I anticipate that, after discharge, patient will require TBD.

## 2024-09-11 NOTE — PLAN OF CARE
Assumed patient care at 1930. Patient alert and oriented x 4. Maintaining O2 saturation WNL on 1L/min via NC. A-paced on tele monitor. No complains of pain or shortness of breath. Purewick in place for incontinence. Ambulating with 1 x and walker. Patient aware of plan of care. Safety precautions in place, call light within reach, bed alarm on.       Problem: CARDIOVASCULAR - ADULT  Goal: Maintains optimal cardiac output and hemodynamic stability  Description: INTERVENTIONS:  - Monitor vital signs, rhythm, and trends  - Monitor for bleeding, hypotension and signs of decreased cardiac output  - Evaluate effectiveness of vasoactive medications to optimize hemodynamic stability  - Monitor arterial and/or venous puncture sites for bleeding and/or hematoma  - Assess quality of pulses, skin color and temperature  - Assess for signs of decreased coronary artery perfusion - ex. Angina  - Evaluate fluid balance, assess for edema, trend weights  Outcome: Progressing  Goal: Absence of cardiac arrhythmias or at baseline  Description: INTERVENTIONS:  - Continuous cardiac monitoring, monitor vital signs, obtain 12 lead EKG if indicated  - Evaluate effectiveness of antiarrhythmic and heart rate control medications as ordered  - Initiate emergency measures for life threatening arrhythmias  - Monitor electrolytes and administer replacement therapy as ordered  Outcome: Progressing     Problem: Impaired Functional Mobility  Goal: Achieve highest/safest level of mobility/gait  Description: Interventions:  - Assess patient's functional ability and stability  - Promote increasing activity/tolerance for mobility and gait  - Educate and engage patient/family in tolerated activity level and precautions    Outcome: Progressing

## 2024-09-11 NOTE — CM/SW NOTE
09/11/24 1000   CM/SW Referral Data   Referral Source Social Work (self-referral)   Reason for Referral Discharge planning   Informant Daughter   Patient Info   Patient's Home Environment Assisted Living   Post Acute Care Provider Upon Admission   ([facility])   Patient Status Prior to Admission   Services in place prior to admission Other (comment)  (PT at [facility])   Discharge Needs   Anticipated D/C needs Home health care     SW spoke with daughter, Elsie, over the phone regarding discharge planning. Pt resides at [facility]. Confirmed with daughter that the plan is to return there at discharge. Daughter will transport pt. MARTIN spoke with Trudy PIZARRO at [facility] to make aware of anticipated discharge on Thursday. RN will have to call report. Pt able to return and [facility] aware that pt is COVID+.    MARTIN discussed PT services at [facility]. Daughter stated that she arranges for a PT person to come in and work with pt.     [facility] report # [phone]     &  to remain available and supportive for discharge planning needs.    Angelika Peter, MSW, LSW  Discharge Planner

## 2024-09-11 NOTE — PLAN OF CARE
Assumed care of patient at 0730.  Alert and oriented x4.  On 1L O2 with adequate o2 saturations.  Atrial paced on tele. No complaints of chest pain or shortness of breath.  Incontinent, purewick in place.  Up x1 and walker. Pt updated on plan of care, verbalized understanding.     Pt's heels have remained elevated on pillows r/t pressure ulcer on rt heel, bunny boots ordered and placed.    Addendum:  Weaned to room air, pt had to be placed back on 1L of O2 as she was desating in the upper 80s.     Problem: CARDIOVASCULAR - ADULT  Goal: Maintains optimal cardiac output and hemodynamic stability  Description: INTERVENTIONS:  - Monitor vital signs, rhythm, and trends  - Monitor for bleeding, hypotension and signs of decreased cardiac output  - Evaluate effectiveness of vasoactive medications to optimize hemodynamic stability  - Monitor arterial and/or venous puncture sites for bleeding and/or hematoma  - Assess quality of pulses, skin color and temperature  - Assess for signs of decreased coronary artery perfusion - ex. Angina  - Evaluate fluid balance, assess for edema, trend weights  Outcome: Progressing  Goal: Absence of cardiac arrhythmias or at baseline  Description: INTERVENTIONS:  - Continuous cardiac monitoring, monitor vital signs, obtain 12 lead EKG if indicated  - Evaluate effectiveness of antiarrhythmic and heart rate control medications as ordered  - Initiate emergency measures for life threatening arrhythmias  - Monitor electrolytes and administer replacement therapy as ordered  Outcome: Progressing     Problem: Impaired Functional Mobility  Goal: Achieve highest/safest level of mobility/gait  Description: Interventions:  - Assess patient's functional ability and stability  - Promote increasing activity/tolerance for mobility and gait  - Educate and engage patient/family in tolerated activity level and precautions  - Recommend use of total lift for transfers  Outcome: Progressing

## 2024-09-11 NOTE — PROGRESS NOTES
Progress Note  Ciarra Salcido Patient Status:  Inpatient    1940 MRN [id number]   Location [facility] 8NE-A Attending RomeltKatherin, DO   Hosp Day # 2 PCP JUDY CYR MD     Subjective:  She denies shortness of breath at rest. Still on 2 L NC without baseline oxygen requirements. Continues to have LE edema which is her primary concern.     Objective:  BP (!) 173/65 (BP Location: Right arm)   Pulse 61   Temp 97.7 °F (36.5 °C) (Oral)   Resp 14   Ht 5' 4\" (1.626 m)   Wt 158 lb 8.2 oz (71.9 kg)   SpO2 96%   BMI 27.21 kg/m²     Intake/Output:    Intake/Output Summary (Last 24 hours) at 2024 0926  Last data filed at 2024 0504  Gross per 24 hour   Intake 480 ml   Output 2050 ml   Net -1570 ml       Last 3 Weights   24 0504 158 lb 8.2 oz (71.9 kg)   24 2053 161 lb 6 oz (73.2 kg)   24 1535 150 lb (68 kg)   24 0500 151 lb 14.4 oz (68.9 kg)   07/15/24 0500 159 lb 9.6 oz (72.4 kg)   24 0830 173 lb 9.6 oz (78.7 kg)   24 0614 168 lb 4.8 oz (76.3 kg)   24 0500 168 lb (76.2 kg)   24 0400 168 lb 3.2 oz (76.3 kg)   07/10/24 2000 168 lb 1.6 oz (76.2 kg)   24 0043 162 lb 0.6 oz (73.5 kg)   24 2315 162 lb 0.6 oz (73.5 kg)   24 1826 150 lb (68 kg)   24 1037 140 lb (63.5 kg)       Labs:  Recent Labs   Lab 24  1604 09/10/24  0646 24  0538   * 134* 114*   BUN 55* 52* 55*   CREATSERUM 2.60* 2.36* 2.46*   EGFRCR 17* 20* 19*   CA 8.4* 8.9 8.9   * 137 138   K 5.7* 4.9 4.6    108 104   CO2 22.0 26.0 27.0     Recent Labs   Lab 24  1604 09/10/24  0646   RBC 3.42* 3.41*   HGB 10.0* 9.8*   HCT 31.3* 30.4*   MCV 91.5 89.1   MCH 29.2 28.7   MCHC 31.9 32.2   RDW 15.2 14.8   NEPRELIM 4.82 3.90   WBC 7.0 5.9   .0 243.0         Recent Labs   Lab 24  1604   TROPHS 24       Diagnostics:   Telemetry: A paced, v sensed    TTE 2024  1. Left ventricle: The cavity size was normal. Wall thickness was  mildly      increased. Systolic function was hyperdynamic. The estimated ejection      fraction was 65-70%, by visual assessment. No diagnostic evidence for      regional wall motion abnormalities. Left ventricular diastolic function      parameters were normal for the patient's age.   2. Right ventricle: The cavity size was mildly increased. Pacer wire noted      in the right ventricle. Systolic function was normal.   3. Left atrium: The left atrial volume was markedly increased.   4. Right atrium: The atrium was moderately dilated. Pacer wire noted in      right atrium.   5. Aortic valve: There was thickening, consistent with sclerosis. There was      mild regurgitation.   6. Mitral valve: There was mild to moderate regurgitation.   7. Pulmonary arteries: Systolic pressure was moderately increased, in the      range of 50mm Hg to 55mm Hg. Estimated pulmonary artery diastolic      pressure was 17mm Hg.   8. Pericardium, extracardiac: There was no pericardial effusion.   9. Inferior vena cava: The IVC was normal-sized.   Impressions:  This study is compared with previous dated 11/14/2023: No   significant change since prior study.     Review of Systems   Cardiovascular:  Negative for chest pain.   Respiratory:  Negative for shortness of breath.        Physical Exam:  General: Alert and oriented in no apparent distress.  HEENT: Pupils equal. Mucous membranes moist.   Neck: No JVD  Cardiac:  Normal S1 S2, Regular. No murmur  Lungs: Clear without wheezes or crackles. On 2 L NC    Abdomen: Soft, non-tender, ND  Extremities: 1+ LE edema bilaterally    Neurologic: No focal deficits. Normal affect.  Skin: Warm and dry,    Medications:   allopurinol  100 mg Oral Daily    amiodarone  200 mg Oral Daily    apixaban  2.5 mg Oral BID    carvedilol  12.5 mg Oral BID with meals    dilTIAZem ER  120 mg Oral Daily    escitalopram  5 mg Oral Daily    gabapentin  100 mg Oral TID    levothyroxine  75 mcg Oral Before breakfast     atorvastatin  10 mg Oral Nightly    furosemide  40 mg Intravenous BID (Diuretic)    insulin aspart  1-68 Units Subcutaneous TID CC    insulin aspart  1-10 Units Subcutaneous TID AC and HS         Assessment:    COVID-19+ - ID following  Acute hypoxic respiratory failure  Wean as tolerated. Secondary to COVID and volume retention  Volume overload, acute on chronic HFpEF with renal insuffiency   Hypervolemic on arrival  Recent TTE 7/2024 with preserved LVEF 65-70%, mod pulmonary hypertension  GDMT: Not on MRA/ARNI/ARB/SGLT2 due to CKD IV  Diuresis per nephrology, agree with IV lasix 40 mg BID  CKD IV- nephrology following  Paroxysmal atrial fibrillation  A-paced on amiodarone with history of GILLES/DCCV 11/2023  Increase coreg 12.5 mg BID to 25 mg BID for hypertension. Continue diltiazem 120 mg daily  Anticoagulated on Eliquis 2.5 mg BID (age, renal function)  SND s/p PPM  Hypertension  Elevated BP. Coreg and diltiazem. Increase coreg.  DM2  HLD - statin  Chronic anemia  Gout flare    Plan:    Hypervolemic on exam. Diuresed down -2 L net total yesterday on IV lasix 40 mg BID. Volume status management per nephrology with CKD IV. Wean oxygen requirements as tolerated  Elevated blood pressure. Increase carvedilol from 12.5 mg BID to 25 mg BID. Continue diltiazem. Continue diuresis. Can consider hydralazine/isordil if still elevated with BB titration      Plan of care discussed with patient, RN.    SEBASTIAN Vasques  9/11/2024  9:26 AM      =======================================================  Patient seen and examined independently.  Note reviewed and labs reviewed.  Agree with above assessment and plan.    Physical exam:  GEN: Alert and orient, no acute distress  CV: rrr, S1S2, no m/g/r  LUNGS: CTA b/l with no obvious wheezing, rales or rhonchi  EXT: 1+ b/l LE edema  ABD: soft, NTND    I have personally performed the medical decision making in its entirety. My additions include:  IV diuretics and anti-hypertensive  medications adjusted by Dr. Lares.  ID on consult for COVID-19 infection.  Stable from cardiac standpoint and will sign off but not hesitate to call with any further questions or concerns.    D/w SEBASTIAN Chaves and patient.    Mika Sotomayor MD

## 2024-09-12 VITALS
DIASTOLIC BLOOD PRESSURE: 72 MMHG | WEIGHT: 157.88 LBS | SYSTOLIC BLOOD PRESSURE: 147 MMHG | RESPIRATION RATE: 18 BRPM | TEMPERATURE: 98 F | OXYGEN SATURATION: 95 % | HEIGHT: 64 IN | HEART RATE: 60 BPM | BODY MASS INDEX: 26.95 KG/M2

## 2024-09-12 LAB
ANION GAP SERPL CALC-SCNC: 8 MMOL/L (ref 0–18)
BUN BLD-MCNC: 69 MG/DL (ref 9–23)
CALCIUM BLD-MCNC: 9.1 MG/DL (ref 8.7–10.4)
CHLORIDE SERPL-SCNC: 101 MMOL/L (ref 98–112)
CO2 SERPL-SCNC: 28 MMOL/L (ref 21–32)
CREAT BLD-MCNC: 2.71 MG/DL
EGFRCR SERPLBLD CKD-EPI 2021: 17 ML/MIN/1.73M2 (ref 60–?)
GLUCOSE BLD-MCNC: 112 MG/DL (ref 70–99)
GLUCOSE BLD-MCNC: 118 MG/DL (ref 70–99)
GLUCOSE BLD-MCNC: 212 MG/DL (ref 70–99)
NT-PROBNP SERPL-MCNC: 4889 PG/ML (ref ?–450)
OSMOLALITY SERPL CALC.SUM OF ELEC: 305 MOSM/KG (ref 275–295)
POTASSIUM SERPL-SCNC: 4.8 MMOL/L (ref 3.5–5.1)
SODIUM SERPL-SCNC: 137 MMOL/L (ref 136–145)

## 2024-09-12 PROCEDURE — 99233 SBSQ HOSP IP/OBS HIGH 50: CPT | Performed by: INTERNAL MEDICINE

## 2024-09-12 PROCEDURE — 99239 HOSP IP/OBS DSCHRG MGMT >30: CPT | Performed by: INTERNAL MEDICINE

## 2024-09-12 RX ORDER — TORSEMIDE 20 MG/1
20 TABLET ORAL DAILY
Qty: 30 TABLET | Refills: 11 | Status: SHIPPED | OUTPATIENT
Start: 2024-09-13

## 2024-09-12 RX ORDER — AMLODIPINE BESYLATE 5 MG/1
5 TABLET ORAL DAILY
Qty: 30 TABLET | Refills: 11 | Status: SHIPPED | OUTPATIENT
Start: 2024-09-12

## 2024-09-12 RX ORDER — CARVEDILOL 25 MG/1
25 TABLET ORAL 2 TIMES DAILY WITH MEALS
Qty: 60 TABLET | Refills: 11 | Status: SHIPPED | OUTPATIENT
Start: 2024-09-12

## 2024-09-12 RX ORDER — TORSEMIDE 20 MG/1
20 TABLET ORAL DAILY
Status: DISCONTINUED | OUTPATIENT
Start: 2024-09-13 | End: 2024-09-12

## 2024-09-12 NOTE — CM/SW NOTE
Received notice from RN that pt is going to require oxygen at discharge. Orders and verbiage entered by MD. Sent to [facility] in [place] and spoke to Aparna to make aware. Informed Aparna that pt is cleared for discharge. MARTIN spoke with Trudy PIZARRO at [facility] to also make aware of the new order for oxygen. Trudy inquired how long pt is going to be on oxygen before. MARTIN did not state an end date, as this will be determined by the doctors, but anticipate for the foreseeable future.     MARTIN will await for a window of delivery for oxygen to [facility] and then coordinate discharge based on that. Will make sure that pt discharges with a tank prior to discharge.     Angelika Peter, MSW, LSW  Discharge Planner

## 2024-09-12 NOTE — PROGRESS NOTES
[facility]  Nephrology Progress Note    Ciarra Loly Attending:  Katherin Choudhary DO       Assessment and Plan:     1) CKD 4- likely due to longstanding DM / HTN; c/w bland urine sediment and mild proteinuria. No further w/u     2) HTN- exac by fluid overload; on usual coreg / cardizem; added amlodipnie     3) HFpEF with mod MR / TR / AI- diuresing well, near- euvolemic     4) PAF s/p PPM  + GILLES / DCCV - amio / BB / cardizem / DOAC per cards     5) DM 2- dc januvia permanently with edema / CHF     6) h/o hyperkalemia- due to CKD / type IV RTA; avoid ACE-I / ARB / MRA    DC home on torsemide 20 mg daily (was not admitted on diuretics oddly)      Subjective:  Awake alert feels well ate entire breakfast    Physical Exam:   /54   Pulse 60   Temp 98.5 °F (36.9 °C) (Oral)   Resp 15   Ht 5' 4\" (1.626 m)   Wt 157 lb 14.4 oz (71.6 kg)   SpO2 95%   BMI 27.10 kg/m²   Temp (24hrs), Av.4 °F (36.9 °C), Min:98 °F (36.7 °C), Max:98.8 °F (37.1 °C)       Intake/Output Summary (Last 24 hours) at 2024 0822  Last data filed at 2024 0726  Gross per 24 hour   Intake 1020 ml   Output 2100 ml   Net -1080 ml     Wt Readings from Last 3 Encounters:   24 157 lb 14.4 oz (71.6 kg)   24 151 lb 14.4 oz (68.9 kg)   24 140 lb (63.5 kg)     General: awake alert  HEENT: No scleral icterus, MMM  Neck: Supple, no CASTRO or thyromegaly  Cardiac: Regular rate and rhythm, S1, S2 normal, no murmur or tub  Lungs: Decreased BS at bases bilaterally   Abdomen: Soft, non-tender. + bowel sounds, no palpable organomegaly  Extremities: Without clubbing, cyanosis; minimal edema  Neurologic: Cranial nerves grossly intact, moving all extremities  Skin: Warm and dry, no rashes       Labs:   Lab Results   Component Value Date    CREATSERUM 2.71 2024    BUN 69 2024     2024    K 4.8 2024     2024    CO2 28.0 2024     2024    CA 9.1 2024    PGLU  118 09/12/2024       Imaging:  All imaging studies reviewed.    Meds:   Current Facility-Administered Medications   Medication Dose Route Frequency    carvedilol (Coreg) tab 25 mg  25 mg Oral BID with meals    amLODIPine (Norvasc) tab 5 mg  5 mg Oral Daily    allopurinol (Zyloprim) tab 100 mg  100 mg Oral Daily    amiodarone (Pacerone) tab 200 mg  200 mg Oral Daily    apixaban (Eliquis) tab 2.5 mg  2.5 mg Oral BID    dilTIAZem ER (Dilacor XR) 24 hr cap 120 mg  120 mg Oral Daily    escitalopram (Lexapro) tab 5 mg  5 mg Oral Daily    gabapentin (Neurontin) cap 100 mg  100 mg Oral TID    levothyroxine (Synthroid) tab 75 mcg  75 mcg Oral Before breakfast    atorvastatin (Lipitor) tab 10 mg  10 mg Oral Nightly    furosemide (Lasix) 10 mg/mL injection 40 mg  40 mg Intravenous BID (Diuretic)    glucose (Dex4) 15 GM/59ML oral liquid 15 g  15 g Oral Q15 Min PRN    Or    glucose (Glutose) 40% oral gel 15 g  15 g Oral Q15 Min PRN    Or    glucose-vitamin C (Dex-4) chewable tab 4 tablet  4 tablet Oral Q15 Min PRN    Or    dextrose 50% injection 50 mL  50 mL Intravenous Q15 Min PRN    Or    glucose (Dex4) 15 GM/59ML oral liquid 30 g  30 g Oral Q15 Min PRN    Or    glucose (Glutose) 40% oral gel 30 g  30 g Oral Q15 Min PRN    Or    glucose-vitamin C (Dex-4) chewable tab 8 tablet  8 tablet Oral Q15 Min PRN    insulin aspart (NovoLOG) 100 Units/mL FlexPen 1-68 Units  1-68 Units Subcutaneous TID CC    insulin aspart (NovoLOG) 100 Units/mL FlexPen 1-10 Units  1-10 Units Subcutaneous TID AC and HS    acetaminophen (Tylenol Extra Strength) tab 500 mg  500 mg Oral Q4H PRN    melatonin tab 3 mg  3 mg Oral Nightly PRN    polyethylene glycol (PEG 3350) (Miralax) 17 g oral packet 17 g  17 g Oral Daily PRN    sennosides (Senokot) tab 17.2 mg  17.2 mg Oral Nightly PRN    bisacodyl (Dulcolax) 10 MG rectal suppository 10 mg  10 mg Rectal Daily PRN    ondansetron (Zofran) 4 MG/2ML injection 4 mg  4 mg Intravenous Q6H PRN    metoclopramide  (Reglan) 5 mg/mL injection 5 mg  5 mg Intravenous Q8H PRN    labetalol (Trandate) 5 mg/mL injection 10 mg  10 mg Intravenous Q4H PRN         Questions/concerns were discussed with patient and/or family by bedside.          Mary Lares MD  9/12/2024  8222 AM

## 2024-09-12 NOTE — PROGRESS NOTES
INFECTIOUS DISEASE  PROGRESS NOTE            [facility]    Ciarra Salcido Patient Status:  Inpatient    1940 MRN [id number]   [facility] 8NE-A Attending Katherin Choudhary,    Hosp Day # 3 PCP JUDY CYR MD       Subjective:  : resting on 02 1 L overnight  No fever last day  Was 100.1 yesterday morning at 8a, no fever since    Objective:  Temp:  [98.3 °F (36.8 °C)-98.7 °F (37.1 °C)] 98.6 °F (37 °C)  Pulse:  [60-64] 62  Resp:  [15-23] 18  BP: (109-159)/(49-63) 136/63  SpO2:  [86 %-96 %] 93 %    General: resting in no distress  Vital signs:Temp:  [98.3 °F (36.8 °C)-98.7 °F (37.1 °C)] 98.6 °F (37 °C)  Pulse:  [60-64] 62  Resp:  [15-23] 18  BP: (109-159)/(49-63) 136/63  SpO2:  [86 %-96 %] 93 %  HEENT: Moist mucous membranes. Extraocular muscles are intact.  Neck: supple no masses  Respiratory: Non labored, symmetric excursion, normal respirations  Cardiovascular: no irregularities in rhythm  Abdomen: Soft, nontender, nondistended.   Musculoskeletal: joints: no swelling   Integument: No lesions. No erythema. No open wounds.  Labs:     COVID-19 Lab Results    COVID-19  Lab Results   Component Value Date    COVID19 Detected (A) 2024    COVID19 Not Detected 2024    COVID19 Detected (A) 09/10/2023       Pro-Calcitonin  No results for input(s): \"PCT\" in the last 168 hours.    Cardiac  Recent Labs   Lab 24  0518   PBNP 4,889*       Creatinine Kinase  No results for input(s): \"CK\" in the last 168 hours.    Inflammatory Markers  Recent Labs   Lab 09/10/24  0646   MATTY 26.4*       Recent Labs   Lab 09/10/24  0646   RBC 3.41*   HGB 9.8*   HCT 30.4*   MCV 89.1   MCH 28.7   MCHC 32.2   RDW 14.8   NEPRELIM 3.90   WBC 5.9   .0         Recent Labs   Lab 24  1604 09/10/24  0646 24  0538 24  0518   * 134* 114* 112*   BUN 55* 52* 55* 69*   CREATSERUM 2.60* 2.36* 2.46* 2.71*   CA 8.4* 8.9 8.9 9.1   ALB 2.8* 3.5  --   --    * 137 138 137   K 5.7*  4.9 4.6 4.8    108 104 101   CO2 22.0 26.0 27.0 28.0   ALKPHO 100 92  --   --    AST 24 14  --   --    ALT 54 38  --   --    BILT 0.2 0.2  --   --    TP 7.1 6.4  --   --        No results found for: \"VANCT\"  Microbiology    No results found for this visit on 09/09/24.        Problem list reviewed:  Patient Active Problem List   Diagnosis    CKD (chronic kidney disease) stage 4, GFR 15-29 ml/min (HCC)    Diabetes mellitus, type 2 (HCC)    Primary hypertension    Mixed hyperlipidemia    Acquired hypothyroidism    Osteopenia    PRISCILLA (acute kidney injury) (HCC)    Hyperkalemia    Diabetic polyneuropathy associated with type 2 diabetes mellitus (HCC)    Current moderate episode of major depressive disorder without prior episode (HCC)    Acute on chronic heart failure with preserved ejection fraction (HCC)    Multiple subsegmental pulmonary emboli without acute cor pulmonale (HCC)    Smokers' cough (HCC)    Chronic deep vein thrombosis (DVT) of proximal vein of lower extremity (HCC)    Atrial fibrillation with rapid ventricular response (HCC)    Chronic heart failure with preserved ejection fraction (HCC)    Stage 3 chronic kidney disease (HCC)    Hypertension, unspecified type    Acute cystitis with hematuria    Altered mental status, unspecified altered mental status type    Arthralgia of right lower leg    Rectal bleeding    Hyperglycemia    Hypertensive urgency    Hyperlipidemia    Hypothyroidism    ATN (acute tubular necrosis) (HCC)    Acute gout of multiple sites    Cardiorenal syndrome    Right sided weakness    Cognitive decline    Urinary retention    COVID-19    Hypoxia    Acute on chronic congestive heart failure, unspecified heart failure type (HCC)    Respiratory insufficiency    Anemia, chronic disease             ASSESSMENT/PLAN:  1. COVID  -last vaccinated 3 years ago, previous infection tested positive 1 year ago  Admitted from NH 9/9 for billateral leg edema due to right sided CHF  -reports cold  symptoms 2 weeks ago  Currently no fever or active symptoms     Tested positive on 9/9  ---unknown onset of infection  No fever last 24h    Doubt benefit to antivrials at this stage     AVOID steroids (mild hypoxia in the setting of CHF, CXR actually improved compared to 2 months ago)        2. Cardiomyopathy, right sided heart failure  Underlying CRI     3. SP pacemaker     4. Hypoxia, mild congestion on CXR, 02 weaning on 1L  Per hospitalist      Jesús Brewer MD, MD  [facility]  [phone]

## 2024-09-12 NOTE — PLAN OF CARE
Pt cleared by attending and consults. Tele monitor and IV line removed. Discharge papers, education sheets and belongings sent home with patient. Weight scale, boots, hearing aids and glasses all returned with patient. Handoff given to receiving RN (Trudy).    Problem: CARDIOVASCULAR - ADULT  Goal: Maintains optimal cardiac output and hemodynamic stability  Description: INTERVENTIONS:  - Monitor vital signs, rhythm, and trends  - Monitor for bleeding, hypotension and signs of decreased cardiac output  - Evaluate effectiveness of vasoactive medications to optimize hemodynamic stability  - Monitor arterial and/or venous puncture sites for bleeding and/or hematoma  - Assess quality of pulses, skin color and temperature  - Assess for signs of decreased coronary artery perfusion - ex. Angina  - Evaluate fluid balance, assess for edema, trend weights  9/12/2024 1744 by Marcelo Reynoso RN  Outcome: Completed  9/12/2024 1222 by Marcelo Reynoso RN  Outcome: Progressing  Goal: Absence of cardiac arrhythmias or at baseline  Description: INTERVENTIONS:  - Continuous cardiac monitoring, monitor vital signs, obtain 12 lead EKG if indicated  - Evaluate effectiveness of antiarrhythmic and heart rate control medications as ordered  - Initiate emergency measures for life threatening arrhythmias  - Monitor electrolytes and administer replacement therapy as ordered  9/12/2024 1744 by Marcelo Reynoso RN  Outcome: Completed  9/12/2024 1222 by Marcelo Reynoso RN  Outcome: Progressing     Problem: Impaired Functional Mobility  Goal: Achieve highest/safest level of mobility/gait  Description: Interventions:  - Assess patient's functional ability and stability  - Promote increasing activity/tolerance for mobility and gait  - Educate and engage patient/family in tolerated activity level and precautions  9/12/2024 1744 by Marcelo Reynoso RN  Outcome: Completed  9/12/2024 1222 by Marcelo Reynoso RN  Outcome: Progressing      Signed, thanks.

## 2024-09-12 NOTE — PROGRESS NOTES
09/12/24 1324   Mobility   O2 walk? Yes   SPO2% on Room Air at Rest 87   SPO2% on Oxygen at Rest 93   At rest oxygen flow (liters per minute) 2   SPO2% Ambulation on Room Air 86   SPO2% Ambulation on Oxygen 94   Ambulation oxygen flow (liters per minute) 3

## 2024-09-12 NOTE — CM/SW NOTE
09/12/24 1400   Discharge disposition   Expected discharge disposition [facility]   Post Acute Care Provider   (celso fine)   Additional Home Care/Hospice Provider [facility]   DME/Infusion Providers [facility]   Discharge transportation Private car     Pt's oxygen approved w/ [facility].  SW spoke with daughter to make aware of pt needing oxygen. Explained that the O2 tanks will be portable tanks and that pt will be evaluated for smaller tanks at a later time. Oxygen approved w/ [facility]. SW requested RT to dispense 2 tanks for pt to be safe so that pt does not run out of oxygen at discharge. Spoke with Aparna at [facility] - she stated that the oxygen supplies will be delivered on the evening delivery at around 6:00pm. SW requested daughter with taking her time getting to hospital and coordinating discharge.     RN will have to call report at dc. # in their sticky note.     AVS updated w/ contact info. Notified [facility] that pt will discharge today.     Angelika Peter, MSW, LSW  Discharge Planner

## 2024-09-12 NOTE — PLAN OF CARE
Assumed care for pt at 19:30 on 9/11    A&Ox4. Denies pain. VSS on 1L. A paced on tele. Eliquis given. No crackles. Denies cough. Purewick in place. Up with walker and 1 assist. Accucheck qid.     Plan: IV lasix bid, AM labs, daily weight, potential dc back to AL facility    Bed alarm on, call light in reach, able to make needs known.

## 2024-09-12 NOTE — DISCHARGE SUMMARY
[place] HOSPITALIST  DISCHARGE SUMMARY     Ciarra Salcido Patient Status:  Inpatient    1940 MRN [id number]   Location [facility] 8NE-A Attending No att. providers found   Hosp Day # 3 PCP JUDY CYR MD     Date of Admission: 2024  Date of Discharge:  2024   Discharge Disposition: Assisted Living    Discharge Diagnosis:  #HFpEF exacerbation  #Acute hypoxemic respiratory failure d/t above  #ESRD  #Hyperkalemia  #COVID+  #Uncontrolled DM2  #Permanent afib s/p PPM  #Essential hypertension  #Hyperlipidemia  #Hypothyroidism  #Gout  #Chronic normocytic anemia    History of Present Illness: (per Dr. Johns), Ciarra Salcido is a 84 year old female with past medical history HFpEF, diabetes mellitus type 2, paroxysmal atrial fibrillation, essential hypertension, hyperlipidemia, CKD 4, hypothyroidism, gout, normocytic anemia who presents to hospital today with complaints of bilateral leg swelling and shortness of breath.  Patient symptoms really started worsening over the last 24 hours and family noted that SpO2 at home was low.  Patient has a slight cough.  No fevers or chills no nausea vomiting.-S/p IV Lasix, Kayexalate in the emergency room    Brief Synopsis: admitted with HFpEF exacerbation and acute hypoxic respiratory failure. She tested positive for COVID. Cardiology, nephrology, and ID were consulted. She was diuresed. Her clinical condition improved, she was transitioned to PO diuretics, her cardiac medication regimen was optimized, and she discharged to home with recommendation to f/u closely with cardiology, nephrology, and PCP in outpt setting.     Lace+ Score: 80  59-90 High Risk  29-58 Medium Risk  0-28   Low Risk       TCM Follow-Up Recommendation:  LACE > 58: High Risk of readmission after discharge from the hospital.      Procedures during hospitalization:   none    Incidental or significant findings and recommendations (brief descriptions):  As above    Lab/Test results pending at Discharge:    none    Consultants:  Cardiology  Nephrology  ID    Discharge Medication List:     Discharge Medications        START taking these medications        Instructions Prescription details   amLODIPine 5 MG Tabs  Commonly known as: Norvasc      Take 1 tablet (5 mg total) by mouth daily.   Quantity: 30 tablet  Refills: 11     torsemide 20 MG Tabs  Commonly known as: Demadex      Take 1 tablet (20 mg total) by mouth daily.   Quantity: 30 tablet  Refills: 11            CHANGE how you take these medications        Instructions Prescription details   carvedilol 25 MG Tabs  Commonly known as: Coreg  What changed:   medication strength  how much to take      Take 1 tablet (25 mg total) by mouth 2 (two) times daily with meals.   Quantity: 60 tablet  Refills: 11     Insulin Lispro (1 Unit Dial) 100 UNIT/ML Sopn  Commonly known as: HumaLOG KwikPen  What changed: See the new instructions.      Per sliding scale: 150-200 =1 201-250 =2 251-300=3 301-350=4 351-400=5 Call DM if >400   Quantity: 9 mL  Refills: 1            CONTINUE taking these medications        Instructions Prescription details   Accu-Chek FastClix Lancets Misc      1 Lancet by Finger stick route. Use as directed.   Refills: 0     Accu-Chek Guide Strp       Refills: 0     Accu-Chek Guide w/Device Kit       Refills: 0     acetaminophen 500 MG Tabs  Commonly known as: Tylenol Extra Strength      Take 2 tablets (1,000 mg total) by mouth every 6 (six) hours as needed for Pain or Fever.   Refills: 0     allopurinol 100 MG Tabs  Commonly known as: Zyloprim      Take 1 tablet (100 mg total) by mouth daily.   Quantity: 30 tablet  Refills: 11     amiodarone 200 MG Tabs  Commonly known as: Pacerone      Take 1 tablet (200 mg total) by mouth daily.   Refills: 0     apixaban 2.5 MG Tabs  Commonly known as: Eliquis      Take 1 tablet (2.5 mg total) by mouth 2 (two) times daily.   Quantity: 60 tablet  Refills: 0     Dexcom G7 Sensor Misc      1 each Every 10 days. Dx: E11.22,  N18.31, Z79.4 (patient is using insulin 4x/day)   Quantity: 9 each  Refills: 3     dilTIAZem  MG Cp24  Commonly known as: Cardizem CD      Take 1 capsule (120 mg total) by mouth daily.   Refills: 0     escitalopram 5 MG Tabs  Commonly known as: Lexapro  Notes to patient: Take by mouth      TAKE 1 TABLET EVERY DAY   Quantity: 90 tablet  Refills: 0     gabapentin 100 MG Caps  Commonly known as: Neurontin  Notes to patient: Take by mouth      TAKE 1 CAPSULE THREE TIMES DAILY   Quantity: 270 capsule  Refills: 3     glimepiride 4 MG Tabs  Commonly known as: Amaryl  Notes to patient: Take by mouth      TAKE 1 TABLET EVERY DAY   Quantity: 90 tablet  Refills: 0     Lantus SoloStar 100 UNIT/ML Sopn  Generic drug: insulin glargine      Inject 15 Units into the skin nightly.   Quantity: 18 mL  Refills: 0     levothyroxine 75 MCG Tabs  Commonly known as: Synthroid      Take 1 tablet (75 mcg total) by mouth before breakfast.   Quantity: 90 tablet  Refills: 3     simvastatin 20 MG Tabs  Commonly known as: Zocor  Notes to patient: Take by mouth      TAKE 1 TABLET EVERY NIGHT   Quantity: 90 tablet  Refills: 0     Vitamin D3 25 MCG (1000 UT) Caps      Take 1 tablet by mouth daily.   Refills: 0               Where to Get Your Medications        These medications were sent to [facility] - [place], [place] - [address] [phone], [phone] [address] [facility] [address]      Phone: [phone]   Insulin Lispro (1 Unit Dial) 100 UNIT/ML Sopn       These medications were sent to [id number] - [place], [place] - [address] AT [facility] [address], [phone], [phone] [address] [facility] [address]      Phone: [phone]   amLODIPine 5 MG Tabs  carvedilol 25 MG Tabs  torsemide 20 MG Tabs         [place]    Follow-up appointment:   Lucas Fong MD  [address] [place] [id number] [phone]    Go on  2024  1:00pm    Appointments for Next 30 Days 2024 - 10/13/2024        Date Arrival Time Visit Type Length Department Provider     2024 10:00 AM  [facility] FOLLOW UP [6010] 60 min [facility] Estevan Bah MD    Patient Instructions:         Location Instructions:     Masks are optional for all patients and visitors, unless otherwise indicated.                      Vital signs:   /72 (BP Location: Right arm)   Pulse 60   Temp 97.7 °F (36.5 °C) (Oral)   Resp 18   Ht 5' 4\" (1.626 m)   Wt 157 lb 14.4 oz (71.6 kg)   SpO2 95%   BMI 27.10 kg/m²       Physical Exam:    General: No acute distress   Lungs: clear to auscultation  Cardiovascular: S1, S2  Abdomen: Soft    -----------------------------------------------------------------------------------------------  PATIENT DISCHARGE INSTRUCTIONS: See electronic chart    Katherin Choudhary DO    Total time spent on discharge plannin minutes     The  Century Cures Act makes medical notes like these available to patients in the interest of transparency. Please be advised this is a medical document. Medical documents are intended to carry relevant information, facts as evident, and the clinical opinion of the practitioner. The medical note is intended as peer to peer communication and may appear blunt or direct. It is written in medical language and may contain abbreviations or verbiage that are unfamiliar.

## 2024-09-12 NOTE — PLAN OF CARE
Assumed care at 0730; Pt A/o x 4, stable VS and no complaints of pain. Saturating well on 2L; Plan of care discussed with patient and family member. Educated on fall risk and use of call light. Belongings and call light within reach. Bed and chair alarm activated; Bed at lowest position and locked.     Problem: CARDIOVASCULAR - ADULT  Goal: Maintains optimal cardiac output and hemodynamic stability  Description: INTERVENTIONS:  - Monitor vital signs, rhythm, and trends  - Monitor for bleeding, hypotension and signs of decreased cardiac output  - Evaluate effectiveness of vasoactive medications to optimize hemodynamic stability  - Monitor arterial and/or venous puncture sites for bleeding and/or hematoma  - Assess quality of pulses, skin color and temperature  - Assess for signs of decreased coronary artery perfusion - ex. Angina  - Evaluate fluid balance, assess for edema, trend weights  Outcome: Progressing  Goal: Absence of cardiac arrhythmias or at baseline  Description: INTERVENTIONS:  - Continuous cardiac monitoring, monitor vital signs, obtain 12 lead EKG if indicated  - Evaluate effectiveness of antiarrhythmic and heart rate control medications as ordered  - Initiate emergency measures for life threatening arrhythmias  - Monitor electrolytes and administer replacement therapy as ordered  Outcome: Progressing     Problem: Impaired Functional Mobility  Goal: Achieve highest/safest level of mobility/gait  Description: Interventions:  - Assess patient's functional ability and stability  - Promote increasing activity/tolerance for mobility and gait  - Educate and engage patient/family in tolerated activity level and precautions  - Proper use of mobility equipment/tool   Outcome: Progressing

## 2024-09-12 NOTE — PROGRESS NOTES
[facility]   part of [facility]     Hospitalist Progress Note     Ciarra Salcido Patient Status:  Inpatient    1940 MRN [id number]   Location [facility] 8NE-A Attending Katherin Choudhary, DO   Hosp Day # 3 PCP JUDY CYR MD     Chief Complaint: SOB    Subjective:     Resting comfortably in recliner, no complaints.     Objective:    Review of Systems:   A comprehensive review of systems was completed; pertinent positive and negatives stated in subjective.    Vital signs:  Temp:  [98 °F (36.7 °C)-98.7 °F (37.1 °C)] 98.6 °F (37 °C)  Pulse:  [60-64] 62  Resp:  [15-23] 18  BP: (109-159)/(49-63) 136/63  SpO2:  [86 %-96 %] 93 %    Physical Exam:    General: No acute distress  Respiratory: No wheezes, no rhonchi  Cardiovascular: S1, S2, regular rate and rhythm  Abdomen: Soft, Non-tender, non-distended, positive bowel sounds  Neuro: No new focal deficits.   Extremities: BLLE edema improving      Diagnostic Data:    Labs:  Recent Labs   Lab 24  1604 09/10/24  0646   WBC 7.0 5.9   HGB 10.0* 9.8*   MCV 91.5 89.1   .0 243.0   INR 1.38*  --        Recent Labs   Lab 24  1604 09/10/24  0646 24  0538 24  0518   * 134* 114* 112*   BUN 55* 52* 55* 69*   CREATSERUM 2.60* 2.36* 2.46* 2.71*   CA 8.4* 8.9 8.9 9.1   ALB 2.8* 3.5  --   --    * 137 138 137   K 5.7* 4.9 4.6 4.8    108 104 101   CO2 22.0 26.0 27.0 28.0   ALKPHO 100 92  --   --    AST 24 14  --   --    ALT 54 38  --   --    BILT 0.2 0.2  --   --    TP 7.1 6.4  --   --        Estimated Creatinine Clearance: 12.9 mL/min (A) (based on SCr of 2.71 mg/dL (H)).    Recent Labs   Lab 24  1604   TROPHS 24       Recent Labs   Lab 24  1604   PTP 17.1*   INR 1.38*        Microbiology    No results found for this visit on 24.      Imaging: Reviewed in Epic.    Medications:    [START ON 2024] torsemide  20 mg Oral Daily    carvedilol  25 mg Oral BID with meals    amLODIPine  5 mg Oral Daily     allopurinol  100 mg Oral Daily    amiodarone  200 mg Oral Daily    apixaban  2.5 mg Oral BID    dilTIAZem ER  120 mg Oral Daily    escitalopram  5 mg Oral Daily    gabapentin  100 mg Oral TID    levothyroxine  75 mcg Oral Before breakfast    atorvastatin  10 mg Oral Nightly    furosemide  40 mg Intravenous BID (Diuretic)    insulin aspart  1-68 Units Subcutaneous TID CC    insulin aspart  1-10 Units Subcutaneous TID AC and HS       Assessment & Plan:      #HFpEF exacerbation  - diuresis with IV lasix BID  - HF protocol  - strict I/O's daily weights  - recent echo reviewed  - cardiology signed off     #Acute hypoxemic respiratory failure d/t above  - diuresis as above  - wean supplemental O2 as tolerated   - I had a face to face visit with this patient and I evaluated the patient's O2 saturations.  The patient is mobile in their home and is in need of a portable oxygen tank.  The home oxygen will improve the patient's condition.     #ESRD  #Hyperkalemia  - K+ improved with kayexalate   - tolerating HD  - nephrology following > stable for discharge to [facility]     #COVID+  - suspect respiratory symptoms d/t HF   - hold on anti-virals  - ID following     #Uncontrolled DM2  - A1c 9.3 as of 8/13/2024  - ICF 1:20, carb ratio 1;10    #Permanent afib s/p PPM  - amiodarone, carvedilol, diltiazem, Eliquis     #Essential hypertension  #Hyperlipidemia       #Hypothyroidism  - levothyroxine     #Gout  - allopurinol     #Chronic normocytic anemia      Katherin Choudhary, DO    Supplementary Documentation:     Quality:  DVT Mechanical Prophylaxis:   SCDs,    DVT Pharmacologic Prophylaxis   Medication    apixaban (Eliquis) tab 2.5 mg                Code Status: DNAR/Selective Treatment  Hutchinson: External urinary catheter in place  Hutchinson Duration (in days):   Central line:    AMINTA: 9/12/2024    Discharge is dependent on: clinical state  At this point Ms. Salcido is expected to be discharge to: home    The 21st Century Cures Act makes  medical notes like these available to patients in the interest of transparency. Please be advised this is a medical document. Medical documents are intended to carry relevant information, facts as evident, and the clinical opinion of the practitioner. The medical note is intended as peer to peer communication and may appear blunt or direct. It is written in medical language and may contain abbreviations or verbiage that are unfamiliar.              **Certification      PHYSICIAN Certification of Need for Inpatient Hospitalization - Initial Certification    Patient will require inpatient services that will reasonably be expected to span two midnight's based on the clinical documentation in H+P.   Based on patients current state of illness, I anticipate that, after discharge, patient will require TBD.

## 2024-09-13 ENCOUNTER — PATIENT OUTREACH (OUTPATIENT)
Dept: CASE MANAGEMENT | Age: 84
End: 2024-09-13

## 2024-09-13 NOTE — PROGRESS NOTES
HFpEF exacerbation  Acute hypoxemic respiratory failure d/t above  ESRD  COVID+  On home oxygen - [facility]   [facility]     Contacted pt for transitions of care, s/w pt briefly. Pt stated to call back later and then ended the call. NCM to try again at a later time as requested.

## 2024-09-16 ENCOUNTER — MED REC SCAN ONLY (OUTPATIENT)
Dept: ORTHOPEDICS CLINIC | Facility: CLINIC | Age: 84
End: 2024-09-16

## 2024-09-19 ENCOUNTER — OFFICE VISIT (OUTPATIENT)
Dept: FAMILY MEDICINE CLINIC | Facility: CLINIC | Age: 84
End: 2024-09-19
Payer: MEDICARE

## 2024-09-19 ENCOUNTER — TELEPHONE (OUTPATIENT)
Dept: FAMILY MEDICINE CLINIC | Facility: CLINIC | Age: 84
End: 2024-09-19

## 2024-09-19 ENCOUNTER — PATIENT MESSAGE (OUTPATIENT)
Dept: FAMILY MEDICINE CLINIC | Facility: CLINIC | Age: 84
End: 2024-09-19

## 2024-09-19 VITALS
SYSTOLIC BLOOD PRESSURE: 142 MMHG | HEART RATE: 60 BPM | OXYGEN SATURATION: 97 % | TEMPERATURE: 97 F | RESPIRATION RATE: 15 BRPM | BODY MASS INDEX: 27 KG/M2 | WEIGHT: 157 LBS | DIASTOLIC BLOOD PRESSURE: 66 MMHG

## 2024-09-19 DIAGNOSIS — Z79.4 TYPE 2 DIABETES MELLITUS WITH STAGE 3A CHRONIC KIDNEY DISEASE, WITH LONG-TERM CURRENT USE OF INSULIN (HCC): ICD-10-CM

## 2024-09-19 DIAGNOSIS — N18.4 CHRONIC RENAL DISEASE, STAGE IV (HCC): ICD-10-CM

## 2024-09-19 DIAGNOSIS — I48.91 ATRIAL FIBRILLATION WITH RAPID VENTRICULAR RESPONSE (HCC): ICD-10-CM

## 2024-09-19 DIAGNOSIS — I50.33 ACUTE ON CHRONIC DIASTOLIC CONGESTIVE HEART FAILURE (HCC): ICD-10-CM

## 2024-09-19 DIAGNOSIS — U07.1 COVID-19: Primary | ICD-10-CM

## 2024-09-19 DIAGNOSIS — Z45.018 ADJUSTMENT AND MANAGEMENT OF CARDIAC PACEMAKER: Primary | ICD-10-CM

## 2024-09-19 DIAGNOSIS — Z95.0 PRESENCE OF CARDIAC PACEMAKER: ICD-10-CM

## 2024-09-19 DIAGNOSIS — E11.22 TYPE 2 DIABETES MELLITUS WITH STAGE 3A CHRONIC KIDNEY DISEASE, WITH LONG-TERM CURRENT USE OF INSULIN (HCC): ICD-10-CM

## 2024-09-19 DIAGNOSIS — E03.9 ACQUIRED HYPOTHYROIDISM: ICD-10-CM

## 2024-09-19 DIAGNOSIS — I48.19 PERSISTENT ATRIAL FIBRILLATION  (CMD): ICD-10-CM

## 2024-09-19 DIAGNOSIS — N18.31 TYPE 2 DIABETES MELLITUS WITH STAGE 3A CHRONIC KIDNEY DISEASE, WITH LONG-TERM CURRENT USE OF INSULIN (HCC): ICD-10-CM

## 2024-09-19 PROCEDURE — 3078F DIAST BP <80 MM HG: CPT | Performed by: FAMILY MEDICINE

## 2024-09-19 PROCEDURE — 3077F SYST BP >= 140 MM HG: CPT | Performed by: FAMILY MEDICINE

## 2024-09-19 PROCEDURE — 1159F MED LIST DOCD IN RCRD: CPT | Performed by: FAMILY MEDICINE

## 2024-09-19 PROCEDURE — 1160F RVW MEDS BY RX/DR IN RCRD: CPT | Performed by: FAMILY MEDICINE

## 2024-09-19 PROCEDURE — 1111F DSCHRG MED/CURRENT MED MERGE: CPT | Performed by: FAMILY MEDICINE

## 2024-09-19 PROCEDURE — 99215 OFFICE O/P EST HI 40 MIN: CPT | Performed by: FAMILY MEDICINE

## 2024-09-19 PROCEDURE — G2211 COMPLEX E/M VISIT ADD ON: HCPCS | Performed by: FAMILY MEDICINE

## 2024-09-19 RX ORDER — LEVOTHYROXINE SODIUM 75 UG/1
75 TABLET ORAL
Qty: 90 TABLET | Refills: 3 | OUTPATIENT
Start: 2024-09-19

## 2024-09-19 RX ORDER — ALLOPURINOL 100 MG/1
100 TABLET ORAL DAILY
Qty: 90 TABLET | Refills: 3 | OUTPATIENT
Start: 2024-09-19

## 2024-09-19 NOTE — PROGRESS NOTES
Ciarra Salcido is a 84 year old female here for   Chief Complaint   Patient presents with    Follow - Up    Hospital F/U     9/9    Hypertension       HPI:       1. COVID-19  -here to followup recent hospitalization  -despite going home on O2 - hasn't mp needing it  -PT at assistant living has been checking it and has been normal even during PT    2. Type 2 diabetes mellitus with stage 3a chronic kidney disease, with long-term current use of insulin ([facility])  --Last A1c value was 9.3% done 8/13/2024.    -last eye exam: Last Diabetic Eye Exam:  No data recorded  No data recorded    -notes some lows down to the 40s and 70s in AM  -assisted living was giving her glimepiride 4mg bid (instead of once daily)  -still using lantus 15 units nightly  -dexcom working well    3. Acute on chronic diastolic congestive heart failure (HCC)  4. Atrial fibrillation with rapid ventricular response (HCC)  5. Chronic renal disease, stage IV ([facility])  -fluid status better after diuresis in hospital  -kidney function remains stable - GFR 15        HISTORY:  Past Medical History:    (HFpEF) heart failure with preserved ejection fraction (HCC)    Acute cystitis without hematuria    Acute deep vein thrombosis (DVT) of popliteal vein of right lower extremity (HCC)    Cataract    CHF exacerbation (HCC)    CKD (chronic kidney disease)    Congestive heart disease (HCC)    COVID-19    Diabetes (HCC)    Disorder of thyroid    DM2 (diabetes mellitus, type 2) (HCC)    Hearing impairment    High blood pressure    HTN (hypertension)    Hyperlipidemia    Hypothyroidism    Pulmonary embolism (HCC)    Shingles    Visual impairment      Past Surgical History:   Procedure Laterality Date    Cataract      Eye surgery      Hysterectomy  1980    [facility]    Pacemaker monitor        Family History   Problem Relation Age of Onset    Other (Other) Mother         Old Age    Other (Other) Father         Old age      Social History:   Social History     Socioeconomic  History    Marital status: Single   Tobacco Use    Smoking status: Former     Current packs/day: 0.00     Average packs/day: 1 pack/day for 50.0 years (50.0 ttl pk-yrs)     Types: Cigarettes     Start date:      Quit date:      Years since quittin.7    Smokeless tobacco: Never   Vaping Use    Vaping status: Never Used   Substance and Sexual Activity    Alcohol use: Never    Drug use: Never   Other Topics Concern    Caffeine Concern Yes     Comment: 2 cups of coffee daily     Stress Concern No    Weight Concern No    Special Diet No    Exercise Yes     Comment: PT 2 X Weekly, OT 2 x weekly    Seat Belt Yes     Social Determinants of Health     Financial Resource Strain: Low Risk  (1/3/2024)    Financial Resource Strain     Difficulty of Paying Living Expenses: Not very hard     Med Affordability: No   Food Insecurity: No Food Insecurity (2024)    Food Insecurity     Food Insecurity: Never true   Transportation Needs: No Transportation Needs (2024)    Transportation Needs     Lack of Transportation: No   Physical Activity: Inactive (1/3/2024)    Exercise Vital Sign     Days of Exercise per Week: 0 days     Minutes of Exercise per Session: 0 min   Stress: No Stress Concern Present (1/3/2024)    Stress     Feeling of Stress : No   Social Connections: Socially Isolated (1/3/2024)    Social Connections     Frequency of Socialization with Friends and Family: 0   Housing Stability: Low Risk  (2024)    Housing Stability     Housing Instability: No        Medications (Active prior to today's visit):  Current Outpatient Medications   Medication Sig Dispense Refill    torsemide 20 MG Oral Tab Take 1 tablet (20 mg total) by mouth daily. 30 tablet 11    amLODIPine 5 MG Oral Tab Take 1 tablet (5 mg total) by mouth daily. 30 tablet 11    carvedilol 25 MG Oral Tab Take 1 tablet (25 mg total) by mouth 2 (two) times daily with meals. 60 tablet 11    Insulin Lispro, 1 Unit Dial, (HUMALOG KWIKPEN) 100 UNIT/ML  Subcutaneous Solution Pen-injector Per sliding scale: 150-200 =1 201-250 =2 251-300=3 301-350=4 351-400=5 Call DM if >400 9 mL 1    insulin glargine (LANTUS SOLOSTAR) 100 UNIT/ML Subcutaneous Solution Pen-injector Inject 15 Units into the skin nightly. 18 mL 0    Continuous Glucose Sensor (DEXCOM G7 SENSOR) Does not apply Misc 1 each Every 10 days. Dx: E11.22, N18.31, Z79.4 (patient is using insulin 4x/day) 9 each 3    escitalopram 5 MG Oral Tab TAKE 1 TABLET EVERY DAY 90 tablet 0    simvastatin 20 MG Oral Tab TAKE 1 TABLET EVERY NIGHT 90 tablet 0    apixaban 2.5 MG Oral Tab Take 1 tablet (2.5 mg total) by mouth 2 (two) times daily. 60 tablet 0    Glucose Blood (ACCU-CHEK GUIDE) In Vitro Strip       Blood Glucose Monitoring Suppl (ACCU-CHEK GUIDE) w/Device Does not apply Kit       Accu-Chek FastClix Lancets Does not apply Misc 1 Lancet by Finger stick route. Use as directed.      GABAPENTIN 100 MG Oral Cap TAKE 1 CAPSULE THREE TIMES DAILY 270 capsule 3    amiodarone 200 MG Oral Tab Take 1 tablet (200 mg total) by mouth daily.      levothyroxine 75 MCG Oral Tab Take 1 tablet (75 mcg total) by mouth before breakfast. 90 tablet 3    allopurinol 100 MG Oral Tab Take 1 tablet (100 mg total) by mouth daily. 30 tablet 11    dilTIAZem  MG Oral Capsule SR 24 Hr Take 1 capsule (120 mg total) by mouth daily.      Cholecalciferol (VITAMIN D3) 25 MCG (1000 UT) Oral Cap Take 1 tablet by mouth daily.      acetaminophen 500 MG Oral Tab Take 2 tablets (1,000 mg total) by mouth every 6 (six) hours as needed for Pain or Fever.         Allergies:  No Known Allergies      ROS:   See HPI for relevant ROS    --GEN: No other complaints  --HEENT: No other complaints  --RESP: No other complaints  --CV: No other complaints  --GI: No other complaints  --MSK: No other complaints    All other systems reviewed are negative    PHYSICAL EXAM:   /66   Pulse 60   Temp 96.7 °F (35.9 °C) (Temporal)   Resp 15   Wt 157 lb (71.2 kg)    SpO2 97%   BMI 26.95 kg/m²     Gen: NAD  HEENT: NCAT, pupils equal and round  Pulm: CTAB, no wheezing  CV: RRR  Ext: full ROM  Psych: normal affect     ASSESSMENT/PLAN:     1. COVID-19  -resolving  -O2 needs resolved  -f/u prn    2. Type 2 diabetes mellitus with stage 3a chronic kidney disease, with long-term current use of insulin (HCC)  -stop glimepiride especially given GFR - likely contributing to lows  -c/w lantus 15 untis for now  -call us in 1 wk with ssugars  -would plan to increase at that point  -will continue to manage with lantus and sliding scale to better titrate her sugars down and reduce risk of lows  -continue to monitor with dexcom    - MICROALB/CREAT RATIO, RANDOM URINE [6517] [Q]; Future    3. Acute on chronic diastolic congestive heart failure (HCC)  4. Atrial fibrillation with rapid ventricular response (HCC)  5. Chronic renal disease, stage IV ([facility])  -stable  -c/w cardiology and renal followup  -call us if weight up by 10 lbs  -check weights weekly        Chronic Conditions:    No problem-specific Assessment & Plan notes found for this encounter.       Health Maintenance:  Health Maintenance   Topic Date Due    Diabetes Care: Microalb/Creat Ratio  04/25/2024    Diabetes Care Dilated Eye Exam  08/22/2024    Influenza Vaccine (1) 10/01/2024    HTN: BP Follow-Up  10/19/2024    Diabetes Care A1C  11/13/2024    Diabetes Care: GFR  09/12/2025    Diabetes Care Foot Exam  09/19/2025    MA Annual Health Assessment  Completed    Annual Depression Screening  Completed    Fall Risk Screening (Annual)  Completed    Pneumococcal Vaccine: 65+ Years  Completed    Zoster Vaccines  Completed               The patient is asked to return in 6 wks, sooner prn.    Orders This Visit:  Orders Placed This Encounter   Procedures    MICROALB/CREAT RATIO, RANDOM URINE [6517] [Q]       Meds This Visit:  Requested Prescriptions      No prescriptions requested or ordered in this encounter       Imaging & Referrals:  None      JUDY CYR MD    I spent a total of 40 minutes, more than half of which was spent counseling/coordinating care regarding covid, dm, chf, ckd

## 2024-09-19 NOTE — TELEPHONE ENCOUNTER
Spoke with Thu at the facility that Ciarra resides at. Patient had gone out to lunch after appointment with Dr Bah and had a coke with her meal . Nurse checked blood sugar was 510. Per orders nurse is to call for over 400. Nurse gave 5 units of Humalog at 1510. Rechecked blood sugar while on phone with office. Patient's machine is reading 510, facility machine reading 552. Will notify Dr Bah.

## 2024-09-19 NOTE — TELEPHONE ENCOUNTER
Spoke with Dr Bah orders to give 5 additional units of Humalog now. Recheck blood sugar in 2 hrs. Resume sliding scale at dinner time. Will call nurse back at patient's residence.

## 2024-09-19 NOTE — PATIENT INSTRUCTIONS
Call with what kind of needles you need refilled    Stop glimepiride completely    Continue lantus 15 units for now    Track sugars and update me in 1 week with sugar readings  Will likely go up to around 20 units    Start recording weekly weight to prevent slow fluid overload  Call us when weight is up over 10 lbs    Followup in 6 wks

## 2024-09-19 NOTE — TELEPHONE ENCOUNTER
Spoke with Thu,nurse at [facility]. Relayed orders to give 5 additional units of Humalog now. Recheck blood sugar in 2 hrs. Resume sliding scale at dinner time. Thu read back and verified orders.

## 2024-09-20 ENCOUNTER — TELEPHONE (OUTPATIENT)
Dept: FAMILY MEDICINE CLINIC | Facility: CLINIC | Age: 84
End: 2024-09-20

## 2024-09-20 DIAGNOSIS — N18.31 TYPE 2 DIABETES MELLITUS WITH STAGE 3A CHRONIC KIDNEY DISEASE, WITH LONG-TERM CURRENT USE OF INSULIN (HCC): ICD-10-CM

## 2024-09-20 DIAGNOSIS — E11.22 TYPE 2 DIABETES MELLITUS WITH STAGE 3A CHRONIC KIDNEY DISEASE, WITH LONG-TERM CURRENT USE OF INSULIN (HCC): ICD-10-CM

## 2024-09-20 DIAGNOSIS — Z79.4 TYPE 2 DIABETES MELLITUS WITH STAGE 3A CHRONIC KIDNEY DISEASE, WITH LONG-TERM CURRENT USE OF INSULIN (HCC): ICD-10-CM

## 2024-09-20 RX ORDER — AMIODARONE HYDROCHLORIDE 200 MG/1
200 TABLET ORAL DAILY
Qty: 90 TABLET | Refills: 3 | Status: SHIPPED | OUTPATIENT
Start: 2024-09-20

## 2024-09-20 RX ORDER — ACYCLOVIR 400 MG/1
1 TABLET ORAL
Qty: 9 EACH | Refills: 3 | Status: SHIPPED | OUTPATIENT
Start: 2024-09-20

## 2024-09-20 NOTE — TELEPHONE ENCOUNTER
Called [facility] to speak with nurse, Jacklyn. Patient blood sugar this am was 240. At 1030 patient's dexcom alerted and said high so Jacklyn did a finger stick and patient's blood sugar at 407. Nurse administered 5 units. The nurse will recheck blood sugar and call me back.

## 2024-09-20 NOTE — TELEPHONE ENCOUNTER
Requested Prescriptions     Signed Prescriptions Disp Refills    Continuous Glucose Sensor (DEXCOM G7 SENSOR) Does not apply Misc 9 each 3     Si each Every 10 days. Dx: E11.22, N18.31, Z79.4 (patient is using insulin 4x/day)     Authorizing Provider: JUDY CYR     Ordering User: JUSTIN CRUZ      Refilled per protocol/OV notes

## 2024-09-20 NOTE — TELEPHONE ENCOUNTER
From: Ciarra Salcido  To: JUDY CYR  Sent: 9/19/2024 8:12 PM CDT  Subject: Ciarra Salcido    Needle BD Joanie fine pen needles  Universal 4mm y 326

## 2024-09-20 NOTE — TELEPHONE ENCOUNTER
Spoke with patient's nurse Jacklyn and gave orders. Read back and verified. Jacklyn stated that patient's blood sugar was 358 around 1530, she gave 4 units and recheck now 265. She will recheck her blood sugar closer to patient's dinner time and reevaluate if sliding scale is needed. Advise nurse to call if any questions or concerns. Voiced understanding.

## 2024-09-20 NOTE — TELEPHONE ENCOUNTER
Discussed with Dr Garza regarding administering sliding scale again for Ciarra's blood sugar of 311. Per Dr Garza continue sliding scale until blood sugars are under 200.     Called nurse, Jacklyn back and gave orders. Orders read back and verified.   Advised to call back with any concerns. Jacklyn voiced understanding.

## 2024-09-20 NOTE — TELEPHONE ENCOUNTER
Seda from [facility] calling to update Dr. Estevan Bah about patient representative spoke to patient daughter patient sugar reading were at 40 this morning.

## 2024-09-20 NOTE — TELEPHONE ENCOUNTER
Jacklyn nurse called back. Patient's blood sugar 311. Nurse concerned if she should admin sliding scale.     Will review with doctor

## 2024-09-20 NOTE — TELEPHONE ENCOUNTER
Over the weekend, ok to target sugars in 200-300s, can continue sliding scale to get to that range    Because we are stopping glimepiride - sugars may be higher than usual temporarily    But she is still at risk for lows as glimierpide may last in her system for up to 1 wk or longer  (I am more worried about lows than highs right now)    With updated sugars next week, we may increase lantus and add mealtime insulin.    I do think it may be helpful if there is an endocrinologist or a diabetic specialist to help with her sugars at the facility - may be worth for her daughter to look into this

## 2024-09-20 NOTE — TELEPHONE ENCOUNTER
Called and spoke to daughter Elsie. At 0500 daughter got alerted with dexcom reading for Ciarra and it was 64. Later in the am 240 was what was recording. Daughter asking if a insulin pump would be a good idea.     Please review and advise

## 2024-09-23 ENCOUNTER — TELEPHONE (OUTPATIENT)
Dept: FAMILY MEDICINE CLINIC | Facility: CLINIC | Age: 84
End: 2024-09-23

## 2024-09-23 DIAGNOSIS — N18.31 TYPE 2 DIABETES MELLITUS WITH STAGE 3A CHRONIC KIDNEY DISEASE, WITH LONG-TERM CURRENT USE OF INSULIN (HCC): Primary | ICD-10-CM

## 2024-09-23 DIAGNOSIS — Z79.4 TYPE 2 DIABETES MELLITUS WITH STAGE 3A CHRONIC KIDNEY DISEASE, WITH LONG-TERM CURRENT USE OF INSULIN (HCC): Primary | ICD-10-CM

## 2024-09-23 DIAGNOSIS — E11.22 TYPE 2 DIABETES MELLITUS WITH STAGE 3A CHRONIC KIDNEY DISEASE, WITH LONG-TERM CURRENT USE OF INSULIN (HCC): Primary | ICD-10-CM

## 2024-09-23 DIAGNOSIS — E03.9 ACQUIRED HYPOTHYROIDISM: ICD-10-CM

## 2024-09-23 RX ORDER — INSULIN GLARGINE 100 [IU]/ML
20 INJECTION, SOLUTION SUBCUTANEOUS NIGHTLY
Qty: 18 ML | Refills: 0 | Status: SHIPPED | OUTPATIENT
Start: 2024-09-23 | End: 2024-11-18

## 2024-09-23 RX ORDER — PEN NEEDLE, DIABETIC 32GX 5/32"
1 NEEDLE, DISPOSABLE MISCELLANEOUS AS DIRECTED
Qty: 100 EACH | Refills: 3 | Status: SHIPPED | OUTPATIENT
Start: 2024-09-23 | End: 2025-09-23

## 2024-09-23 RX ORDER — LEVOTHYROXINE SODIUM 75 UG/1
75 TABLET ORAL
Qty: 90 TABLET | Refills: 3 | Status: SHIPPED | OUTPATIENT
Start: 2024-09-23

## 2024-09-23 NOTE — TELEPHONE ENCOUNTER
Referral placed for diabetic center    Also, we will increase her lantus to 20 units  I printed script - please fax to [facility] her assisted living facility    Thanks

## 2024-09-23 NOTE — TELEPHONE ENCOUNTER
[facility] wants to know if Dr. Estevan Bah wanted to increase the insulin lantus glargine to 20 units or not. They also want to know when in the day to give it. They ask if we can refax order.     Fax- [phone]  Phone- [phone]

## 2024-09-23 NOTE — TELEPHONE ENCOUNTER
[facility] called because she wants a referral to a endocrinologist from Dr. Estevan Bah. She has uncontrollable blood sugar.

## 2024-09-23 NOTE — TELEPHONE ENCOUNTER
Can you please clarify with [facility] what exactly they need.  Yes, we are increasing lantus to 20 units.  And ok to have them give it at same time of day they have been.    Thanks

## 2024-09-23 NOTE — TELEPHONE ENCOUNTER
Spoke with daughter Elsie. Informed her of the referral. She is scheduling that as we spoke. She is requesting refill of the levothyroxine to be ordered. Stated she received a message that unable to fill. Also Elsie is requesting Needle BD estelita fine pen needles universal 4mm y 326 to see if insurance will cover them.     Please review.

## 2024-09-23 NOTE — TELEPHONE ENCOUNTER
Patient's daughter requesting a referral for endocrinologist for her mother for uncontrollable blood sugars.      Referral pended.    Please review

## 2024-09-24 DIAGNOSIS — I10 PRIMARY HYPERTENSION: Primary | ICD-10-CM

## 2024-09-24 RX ORDER — CARVEDILOL 25 MG/1
25 TABLET ORAL 2 TIMES DAILY WITH MEALS
Qty: 60 TABLET | Refills: 5 | Status: SHIPPED | OUTPATIENT
Start: 2024-09-24

## 2024-09-30 ENCOUNTER — MED REC SCAN ONLY (OUTPATIENT)
Dept: FAMILY MEDICINE CLINIC | Facility: CLINIC | Age: 84
End: 2024-09-30

## 2024-09-30 ENCOUNTER — TELEPHONE (OUTPATIENT)
Dept: FAMILY MEDICINE CLINIC | Facility: CLINIC | Age: 84
End: 2024-09-30

## 2024-09-30 RX ORDER — INSULIN LISPRO 100 [IU]/ML
INJECTION, SOLUTION INTRAVENOUS; SUBCUTANEOUS
Qty: 9 ML | Refills: 1 | Status: SHIPPED | OUTPATIENT
Start: 2024-09-30

## 2024-09-30 NOTE — TELEPHONE ENCOUNTER
Will update her lispro to 2 units TID with meals  Will fax to facility  Will stop sliding scale  Call if sugars consistently > 500

## 2024-09-30 NOTE — TELEPHONE ENCOUNTER
Patients daughter calling and is sharing patients blood sugar levels for the past 5 days starting Tuesday 9/24/24 taken 2 hours after eating :    Day 1 wake up 126. After breakfast 186. After lunch 169, after dinner 190  Day 2 wake 191, after breakfast 290, after lunch 236, after dinner 231  Day 3 wake up 196, after breakfast 242, after lunch 243, after dinner 355  Day 4 wake up 108 after breakfast 237. After lunch 230 after dinner 184  Day 5 wake up 128  - machine wasn't working at breakfast, after lunch 225, after dinner 336    FYI

## 2024-10-04 ENCOUNTER — MED REC SCAN ONLY (OUTPATIENT)
Dept: ORTHOPEDICS CLINIC | Facility: CLINIC | Age: 84
End: 2024-10-04

## 2024-10-08 ENCOUNTER — MED REC SCAN ONLY (OUTPATIENT)
Dept: ORTHOPEDICS CLINIC | Facility: CLINIC | Age: 84
End: 2024-10-08

## 2024-10-08 ENCOUNTER — TELEPHONE (OUTPATIENT)
Dept: FAMILY MEDICINE CLINIC | Facility: CLINIC | Age: 84
End: 2024-10-08

## 2024-10-08 DIAGNOSIS — E78.2 MIXED HYPERLIPIDEMIA: ICD-10-CM

## 2024-10-08 RX ORDER — ALLOPURINOL 100 MG/1
100 TABLET ORAL DAILY
Qty: 30 TABLET | Refills: 0 | Status: SHIPPED | OUTPATIENT
Start: 2024-10-08 | End: 2024-11-11

## 2024-10-08 NOTE — TELEPHONE ENCOUNTER
Verbal orders received from Dr Bah.       Spoke with Anastasiya from [facility]. Orders relayed to her. She will be seeing Ciarra again Friday. If there is no change she will call the office back.    Called and spoke with Thu gave orders to increase patient's torsemide 40 mg for 4 days. If patient has no improvement and becomes sob or marilee send to ED. Thu read back and verified orders.

## 2024-10-08 NOTE — TELEPHONE ENCOUNTER
Spoke with Anastasiya, home health nurse. She went to visit today and Ciarra was in bathroom without her O2 on sats in low 80s. Anastasiya placed O2 back on and had to increase patient's O2 to 3 L and she is now 89-90%. Anastasiya stated patient has 3 plus pitting edema bilateral lower extreme ties and lungs are diminished at  lower bases. Requesting recommendation or orders from Dr Bah.

## 2024-10-08 NOTE — TELEPHONE ENCOUNTER
Called and left voicemail for Elsie to call back.    PSR please notify triage w/ return call from patient.

## 2024-10-08 NOTE — TELEPHONE ENCOUNTER
Anastasiya from [facility] called in regards to patient Ciarra Loly.

## 2024-10-09 ENCOUNTER — ANCILLARY PROCEDURE (OUTPATIENT)
Dept: CARDIOLOGY | Age: 84
End: 2024-10-09
Attending: INTERNAL MEDICINE

## 2024-10-09 DIAGNOSIS — E11.22 TYPE 2 DIABETES MELLITUS WITH STAGE 3A CHRONIC KIDNEY DISEASE, WITH LONG-TERM CURRENT USE OF INSULIN (HCC): Primary | ICD-10-CM

## 2024-10-09 DIAGNOSIS — N18.31 TYPE 2 DIABETES MELLITUS WITH STAGE 3A CHRONIC KIDNEY DISEASE, WITH LONG-TERM CURRENT USE OF INSULIN (HCC): Primary | ICD-10-CM

## 2024-10-09 DIAGNOSIS — Z79.4 TYPE 2 DIABETES MELLITUS WITH STAGE 3A CHRONIC KIDNEY DISEASE, WITH LONG-TERM CURRENT USE OF INSULIN (HCC): Primary | ICD-10-CM

## 2024-10-09 DIAGNOSIS — Z95.0 CARDIAC PACEMAKER IN SITU: ICD-10-CM

## 2024-10-09 RX ORDER — SIMVASTATIN 20 MG
TABLET ORAL
Qty: 90 TABLET | Refills: 3 | Status: SHIPPED | OUTPATIENT
Start: 2024-10-09

## 2024-10-09 RX ORDER — ESCITALOPRAM OXALATE 5 MG/1
5 TABLET ORAL DAILY
Qty: 90 TABLET | Refills: 3 | Status: SHIPPED | OUTPATIENT
Start: 2024-10-09

## 2024-10-10 RX ORDER — PEN NEEDLE, DIABETIC, SAFETY 30 GX3/16"
1 NEEDLE, DISPOSABLE MISCELLANEOUS 4 TIMES DAILY
Qty: 360 EACH | Refills: 3 | Status: SHIPPED | OUTPATIENT
Start: 2024-10-10 | End: 2025-10-10

## 2024-10-10 NOTE — TELEPHONE ENCOUNTER
Daughter Elsie calling asking for refill apixaban 2.5 MG Oral Tab . Elsie also wanted to let  know Ciarra is now in assisted living [facility] in [place]. Thank you Daisy

## 2024-10-10 NOTE — TELEPHONE ENCOUNTER
Called Elsie and left message to return call.    PSR please notify triage w/ return call from patient.  
Called and left a message for Trudy to return call.   PSR please notify triage w/ return call from patient.  
Called and spoke with Ciarra's nurse, Nikki. She was able to transfer to [facility]. Per the policy, residents that need insulin, need to have the dial pens with appropriate safety needles. They are not allowed vials in which the nurse has to draw up insulin with needle. They need Ciarra to have dial pans ordered. They will continue to administer her insulin as ordered but encourage to have correct supplies ordered.     Please review and advise.  
Called and spoke with Fiorella bar with [facility] regarding Insulin orders. Pen injectors were ordered by Dr Bah. Fiorella verified that pen injectors were sent 9/18 delivered 9/19. Explained that there is no way to mix up the pens and vials. They are two different size boxes as well as the quantity of the pen is 15 ml and vial is 10ml. [facility]'s insurance claim also states pen injectors. Fiorella is also checked with their clinical dept. They did suggest verifying the medications with the DON in person and check the date, name, prescription number.   Franck tesfaye #[id number]  Insulin Lispro (Humalog) #[id number].  Unfortunately insurance will not allow a refill even in this case. Her next send out date is 11/11.   Will inform daughter and Dr Bah.  
Elsie called and went over medications with the facility. They have the pens just not the correct needles. Need Auto Shield duo single use needles.     Please review.   
Needles sent  
Patient daughter requesting call back from nurse, states assisted living will no longer administer medication to patient. Would like to be advised as she think insurance will not cover a new medication prescribed.     Please advise   
Spoke with Elsie and gave her all the information to go to [facility]'s  facility and check the prescriptions in person. Elsie agreeable and will go to the facility later today and will let us know the outcome. She did state that when she was visiting her mom last night that a nurse told her that another nurse had a needle stick when he was administering insulin to [facility].   
Spoke with Elsie and relayed what patient needs for her assisted living to be able to administer insulin. Elsie thought that Ciarra was getting prefilled pens. She does not check medications once delivered. Explained that the facility will still admin the insulin that she has for now, but they need Ciarra to have all her insulins ordered as prefilled with dial and the pen needle caps. Voiced understanding. She is unsure if insurance has covered in the past. Ok for Dr Bah to order all insulin meds as preferred by facility.     Please review and advise.   
Spoke with Elsie. She received a call from [facility] that they will no longer be giving her the insulin because there are no safety needle and they had to throw away her quickpen. Elsie is very concerned. I Will call and speak with Trudy to clarify the situation. Elsie agreeable.    
Upon further review - the scripts were both for pen injector  Do you mind calling [facility] to see what happened with scripts  
Patient/Caregiver provided printed discharge information.

## 2024-10-14 ENCOUNTER — TELEPHONE (OUTPATIENT)
Dept: CARDIOLOGY | Age: 84
End: 2024-10-14

## 2024-10-21 ENCOUNTER — APPOINTMENT (OUTPATIENT)
Dept: GENERAL RADIOLOGY | Facility: HOSPITAL | Age: 84
End: 2024-10-21
Attending: EMERGENCY MEDICINE
Payer: MEDICARE

## 2024-10-21 ENCOUNTER — TELEPHONE (OUTPATIENT)
Dept: FAMILY MEDICINE CLINIC | Facility: CLINIC | Age: 84
End: 2024-10-21

## 2024-10-21 ENCOUNTER — HOSPITAL ENCOUNTER (INPATIENT)
Facility: HOSPITAL | Age: 84
LOS: 2 days | Discharge: HOME HEALTH | End: 2024-10-23
Attending: EMERGENCY MEDICINE | Admitting: HOSPITALIST
Payer: MEDICARE

## 2024-10-21 DIAGNOSIS — I50.9 ACUTE CONGESTIVE HEART FAILURE, UNSPECIFIED HEART FAILURE TYPE (HCC): Primary | ICD-10-CM

## 2024-10-21 DIAGNOSIS — N17.9 ACUTE RENAL FAILURE SUPERIMPOSED ON CHRONIC KIDNEY DISEASE, UNSPECIFIED ACUTE RENAL FAILURE TYPE, UNSPECIFIED CKD STAGE (HCC): ICD-10-CM

## 2024-10-21 DIAGNOSIS — E87.5 HYPERKALEMIA: ICD-10-CM

## 2024-10-21 DIAGNOSIS — N18.9 ACUTE RENAL FAILURE SUPERIMPOSED ON CHRONIC KIDNEY DISEASE, UNSPECIFIED ACUTE RENAL FAILURE TYPE, UNSPECIFIED CKD STAGE (HCC): ICD-10-CM

## 2024-10-21 PROBLEM — E87.1 HYPONATREMIA: Status: ACTIVE | Noted: 2024-01-01

## 2024-10-21 PROBLEM — E87.1 HYPONATREMIA: Status: ACTIVE | Noted: 2024-10-21

## 2024-10-21 PROBLEM — D64.9 ANEMIA: Status: ACTIVE | Noted: 2024-10-21

## 2024-10-21 PROBLEM — R74.01 TRANSAMINITIS: Status: ACTIVE | Noted: 2024-10-21

## 2024-10-21 PROBLEM — R74.01 TRANSAMINITIS: Status: ACTIVE | Noted: 2024-01-01

## 2024-10-21 PROBLEM — D64.9 ANEMIA: Status: ACTIVE | Noted: 2024-01-01

## 2024-10-21 LAB
ALBUMIN SERPL-MCNC: 4.4 G/DL (ref 3.2–4.8)
ALBUMIN/GLOB SERPL: 1.3 {RATIO} (ref 1–2)
ALP LIVER SERPL-CCNC: 111 U/L
ALT SERPL-CCNC: 64 U/L
ANION GAP SERPL CALC-SCNC: 7 MMOL/L (ref 0–18)
ANION GAP SERPL CALC-SCNC: 7 MMOL/L (ref 0–18)
AST SERPL-CCNC: 41 U/L (ref ?–34)
ATRIAL RATE: 60 BPM
BASOPHILS # BLD AUTO: 0.04 X10(3) UL (ref 0–0.2)
BASOPHILS NFR BLD AUTO: 0.5 %
BILIRUB SERPL-MCNC: 0.3 MG/DL (ref 0.2–1.1)
BILIRUB UR QL STRIP.AUTO: NEGATIVE
BUN BLD-MCNC: 72 MG/DL (ref 9–23)
BUN BLD-MCNC: 79 MG/DL (ref 9–23)
CALCIUM BLD-MCNC: 9.2 MG/DL (ref 8.7–10.4)
CALCIUM BLD-MCNC: 9.4 MG/DL (ref 8.7–10.4)
CHLORIDE SERPL-SCNC: 103 MMOL/L (ref 98–112)
CHLORIDE SERPL-SCNC: 104 MMOL/L (ref 98–112)
CLARITY UR REFRACT.AUTO: CLEAR
CO2 SERPL-SCNC: 22 MMOL/L (ref 21–32)
CO2 SERPL-SCNC: 22 MMOL/L (ref 21–32)
CREAT BLD-MCNC: 3.65 MG/DL
CREAT BLD-MCNC: 3.8 MG/DL
CREAT UR-SCNC: 17.3 MG/DL
EGFRCR SERPLBLD CKD-EPI 2021: 11 ML/MIN/1.73M2 (ref 60–?)
EGFRCR SERPLBLD CKD-EPI 2021: 12 ML/MIN/1.73M2 (ref 60–?)
EOSINOPHIL # BLD AUTO: 0.24 X10(3) UL (ref 0–0.7)
EOSINOPHIL NFR BLD AUTO: 2.9 %
ERYTHROCYTE [DISTWIDTH] IN BLOOD BY AUTOMATED COUNT: 15.7 %
GLOBULIN PLAS-MCNC: 3.4 G/DL (ref 2–3.5)
GLUCOSE BLD-MCNC: 134 MG/DL (ref 70–99)
GLUCOSE BLD-MCNC: 194 MG/DL (ref 70–99)
GLUCOSE BLD-MCNC: 241 MG/DL (ref 70–99)
GLUCOSE BLD-MCNC: 281 MG/DL (ref 70–99)
GLUCOSE UR STRIP.AUTO-MCNC: NORMAL MG/DL
HCT VFR BLD AUTO: 31.6 %
HGB BLD-MCNC: 9.9 G/DL
IMM GRANULOCYTES # BLD AUTO: 0.08 X10(3) UL (ref 0–1)
IMM GRANULOCYTES NFR BLD: 1 %
KETONES UR STRIP.AUTO-MCNC: NEGATIVE MG/DL
LEUKOCYTE ESTERASE UR QL STRIP.AUTO: NEGATIVE
LYMPHOCYTES # BLD AUTO: 0.89 X10(3) UL (ref 1–4)
LYMPHOCYTES NFR BLD AUTO: 10.8 %
MAGNESIUM SERPL-MCNC: 2.4 MG/DL (ref 1.6–2.6)
MCH RBC QN AUTO: 28 PG (ref 26–34)
MCHC RBC AUTO-ENTMCNC: 31.3 G/DL (ref 31–37)
MCV RBC AUTO: 89.3 FL
MONOCYTES # BLD AUTO: 0.56 X10(3) UL (ref 0.1–1)
MONOCYTES NFR BLD AUTO: 6.8 %
NEUTROPHILS # BLD AUTO: 6.41 X10 (3) UL (ref 1.5–7.7)
NEUTROPHILS # BLD AUTO: 6.41 X10(3) UL (ref 1.5–7.7)
NEUTROPHILS NFR BLD AUTO: 78 %
NT-PROBNP SERPL-MCNC: 7780 PG/ML (ref ?–450)
OSMOLALITY SERPL CALC.SUM OF ELEC: 303 MOSM/KG (ref 275–295)
OSMOLALITY SERPL CALC.SUM OF ELEC: 305 MOSM/KG (ref 275–295)
P-R INTERVAL: 378 MS
PH UR STRIP.AUTO: 5.5 [PH] (ref 5–8)
PLATELET # BLD AUTO: 284 10(3)UL (ref 150–450)
POTASSIUM SERPL-SCNC: 5.5 MMOL/L (ref 3.5–5.1)
POTASSIUM SERPL-SCNC: 6 MMOL/L (ref 3.5–5.1)
PROT SERPL-MCNC: 7.8 G/DL (ref 5.7–8.2)
PROT UR STRIP.AUTO-MCNC: 30 MG/DL
PROT UR-MCNC: 27.8 MG/DL (ref ?–14)
PROT/CREAT UR-RTO: 1.61
Q-T INTERVAL: 484 MS
QRS DURATION: 172 MS
QTC CALCULATION (BEZET): 484 MS
R AXIS: -62 DEGREES
RBC # BLD AUTO: 3.54 X10(6)UL
RBC #/AREA URNS AUTO: >10 /HPF
SODIUM SERPL-SCNC: 109 MMOL/L
SODIUM SERPL-SCNC: 132 MMOL/L (ref 136–145)
SODIUM SERPL-SCNC: 133 MMOL/L (ref 136–145)
SP GR UR STRIP.AUTO: 1.01 (ref 1–1.03)
T AXIS: 97 DEGREES
TROPONIN I SERPL HS-MCNC: 16 NG/L
UROBILINOGEN UR STRIP.AUTO-MCNC: NORMAL MG/DL
VENTRICULAR RATE: 60 BPM
WBC # BLD AUTO: 8.2 X10(3) UL (ref 4–11)

## 2024-10-21 PROCEDURE — 99223 1ST HOSP IP/OBS HIGH 75: CPT | Performed by: HOSPITALIST

## 2024-10-21 PROCEDURE — 71045 X-RAY EXAM CHEST 1 VIEW: CPT | Performed by: EMERGENCY MEDICINE

## 2024-10-21 RX ORDER — DILTIAZEM HYDROCHLORIDE 120 MG/1
120 CAPSULE, EXTENDED RELEASE ORAL DAILY
Status: DISCONTINUED | OUTPATIENT
Start: 2024-10-21 | End: 2024-10-23

## 2024-10-21 RX ORDER — METOCLOPRAMIDE HYDROCHLORIDE 5 MG/ML
10 INJECTION INTRAMUSCULAR; INTRAVENOUS EVERY 8 HOURS PRN
Status: DISCONTINUED | OUTPATIENT
Start: 2024-10-21 | End: 2024-10-23

## 2024-10-21 RX ORDER — ACETAMINOPHEN 500 MG
1000 TABLET ORAL EVERY 8 HOURS PRN
Status: ON HOLD | COMMUNITY
End: 2024-10-23

## 2024-10-21 RX ORDER — GABAPENTIN 100 MG/1
100 CAPSULE ORAL 3 TIMES DAILY
Status: DISCONTINUED | OUTPATIENT
Start: 2024-10-21 | End: 2024-10-23

## 2024-10-21 RX ORDER — NICOTINE POLACRILEX 4 MG
30 LOZENGE BUCCAL
Status: DISCONTINUED | OUTPATIENT
Start: 2024-10-21 | End: 2024-10-23

## 2024-10-21 RX ORDER — CALCIUM GLUCONATE 10 MG/ML
1 INJECTION, SOLUTION INTRAVENOUS ONCE
Status: COMPLETED | OUTPATIENT
Start: 2024-10-21 | End: 2024-10-21

## 2024-10-21 RX ORDER — FUROSEMIDE 10 MG/ML
40 INJECTION INTRAMUSCULAR; INTRAVENOUS ONCE
Status: COMPLETED | OUTPATIENT
Start: 2024-10-21 | End: 2024-10-21

## 2024-10-21 RX ORDER — MELATONIN
3 NIGHTLY PRN
Status: DISCONTINUED | OUTPATIENT
Start: 2024-10-21 | End: 2024-10-23

## 2024-10-21 RX ORDER — SENNOSIDES 8.6 MG
17.2 TABLET ORAL NIGHTLY PRN
Status: DISCONTINUED | OUTPATIENT
Start: 2024-10-21 | End: 2024-10-23

## 2024-10-21 RX ORDER — CALCIUM GLUCONATE 10 MG/ML
1 INJECTION, SOLUTION INTRAVENOUS ONCE
Status: DISCONTINUED | OUTPATIENT
Start: 2024-10-21 | End: 2024-10-21

## 2024-10-21 RX ORDER — HYDROCODONE BITARTRATE AND ACETAMINOPHEN 5; 325 MG/1; MG/1
1 TABLET ORAL EVERY 6 HOURS PRN
COMMUNITY
End: 2024-10-21 | Stop reason: CLARIF

## 2024-10-21 RX ORDER — CHOLECALCIFEROL (VITAMIN D3) 25 MCG
1000 TABLET ORAL DAILY
COMMUNITY

## 2024-10-21 RX ORDER — BISACODYL 10 MG
10 SUPPOSITORY, RECTAL RECTAL
Status: DISCONTINUED | OUTPATIENT
Start: 2024-10-21 | End: 2024-10-23

## 2024-10-21 RX ORDER — FUROSEMIDE 10 MG/ML
40 INJECTION INTRAMUSCULAR; INTRAVENOUS
Status: DISCONTINUED | OUTPATIENT
Start: 2024-10-21 | End: 2024-10-22

## 2024-10-21 RX ORDER — INSULIN DEGLUDEC 100 U/ML
15 INJECTION, SOLUTION SUBCUTANEOUS NIGHTLY
Status: DISCONTINUED | OUTPATIENT
Start: 2024-10-21 | End: 2024-10-23

## 2024-10-21 RX ORDER — CARVEDILOL 12.5 MG/1
25 TABLET ORAL 2 TIMES DAILY WITH MEALS
Status: DISCONTINUED | OUTPATIENT
Start: 2024-10-21 | End: 2024-10-23

## 2024-10-21 RX ORDER — NICOTINE POLACRILEX 4 MG
15 LOZENGE BUCCAL
Status: DISCONTINUED | OUTPATIENT
Start: 2024-10-21 | End: 2024-10-23

## 2024-10-21 RX ORDER — ESCITALOPRAM OXALATE 5 MG/1
5 TABLET ORAL DAILY
Status: DISCONTINUED | OUTPATIENT
Start: 2024-10-22 | End: 2024-10-23

## 2024-10-21 RX ORDER — ACETAMINOPHEN 500 MG
500 TABLET ORAL EVERY 4 HOURS PRN
Status: DISCONTINUED | OUTPATIENT
Start: 2024-10-21 | End: 2024-10-23

## 2024-10-21 RX ORDER — AMLODIPINE BESYLATE 5 MG/1
5 TABLET ORAL DAILY
Status: DISCONTINUED | OUTPATIENT
Start: 2024-10-22 | End: 2024-10-23

## 2024-10-21 RX ORDER — DEXTROSE MONOHYDRATE 25 G/50ML
50 INJECTION, SOLUTION INTRAVENOUS
Status: DISCONTINUED | OUTPATIENT
Start: 2024-10-21 | End: 2024-10-23

## 2024-10-21 RX ORDER — ATORVASTATIN CALCIUM 10 MG/1
10 TABLET, FILM COATED ORAL NIGHTLY
Status: DISCONTINUED | OUTPATIENT
Start: 2024-10-21 | End: 2024-10-23

## 2024-10-21 RX ORDER — AMIODARONE HYDROCHLORIDE 200 MG/1
200 TABLET ORAL DAILY
Status: DISCONTINUED | OUTPATIENT
Start: 2024-10-22 | End: 2024-10-23

## 2024-10-21 RX ORDER — POLYETHYLENE GLYCOL 3350 17 G/17G
17 POWDER, FOR SOLUTION ORAL DAILY PRN
Status: DISCONTINUED | OUTPATIENT
Start: 2024-10-21 | End: 2024-10-23

## 2024-10-21 RX ORDER — LEVOTHYROXINE SODIUM 75 UG/1
75 TABLET ORAL
Status: DISCONTINUED | OUTPATIENT
Start: 2024-10-22 | End: 2024-10-23

## 2024-10-21 NOTE — PLAN OF CARE
Patient arrived to unit around approx 1650 this PM. Oriented to unit. Family member at bedside. Admission navigator completed. PTA medication list reviewed with medication list from facility and completed. Patient alert and oriented to self, place, and situation, disoriented to time. Spo2 maintained on 3L of oxygen per nasal cannula. AV paced on tele. Patient incontinent of bowel and bladder. Denies pain at this time. Patient is up with maximum assist in the room. 2 person skin check completed with MOIRA Zabala. Right heel wound noted, bruising on left buttock noted. Wound care to see patient. Updated on POC. Needs met.           Problem: Diabetes/Glucose Control  Goal: Glucose maintained within prescribed range  Description: INTERVENTIONS:  - Monitor Blood Glucose as ordered  - Assess for signs and symptoms of hyperglycemia and hypoglycemia  - Administer ordered medications to maintain glucose within target range  - Assess barriers to adequate nutritional intake and initiate nutrition consult as needed  - Instruct patient on self management of diabetes  10/21/2024 1823 by Zaynab Shin, RN  Outcome: Progressing  10/21/2024 1823 by Zaynab Shin, RN  Outcome: Progressing     Problem: Patient/Family Goals  Goal: Patient/Family Long Term Goal  Description: Patient's Long Term Goal: Stay out of the hospital when dishcharged    Interventions:  - Attend follow up appointments as needed  - Follow medication regimen at home  - See additional Care Plan goals for specific interventions  10/21/2024 1823 by Zaynab Shin, RN  Outcome: Progressing  10/21/2024 1823 by Zaynab Shin, RN  Outcome: Progressing  Goal: Patient/Family Short Term Goal  Description: Patient's Short Term Goal: Go home    Interventions:   - Tele monitoring  - Monitor labs  - IV lasix BID  - Cardiology to see  - Daily weights   - Stool sample to be sent   - Wound care to see  - See additional Care Plan goals for specific interventions  10/21/2024 1823 by  Shin, Zaynab, RN  Outcome: Progressing  10/21/2024 1823 by Zaynab Shin RN  Outcome: Progressing     Problem: CARDIOVASCULAR - ADULT  Goal: Maintains optimal cardiac output and hemodynamic stability  Description: INTERVENTIONS:  - Monitor vital signs, rhythm, and trends  - Monitor for bleeding, hypotension and signs of decreased cardiac output  - Evaluate effectiveness of vasoactive medications to optimize hemodynamic stability  - Monitor arterial and/or venous puncture sites for bleeding and/or hematoma  - Assess quality of pulses, skin color and temperature  - Assess for signs of decreased coronary artery perfusion - ex. Angina  - Evaluate fluid balance, assess for edema, trend weights  10/21/2024 1823 by Zaynab Shin RN  Outcome: Progressing  10/21/2024 1823 by Zaynab Shin RN  Outcome: Progressing  Goal: Absence of cardiac arrhythmias or at baseline  Description: INTERVENTIONS:  - Continuous cardiac monitoring, monitor vital signs, obtain 12 lead EKG if indicated  - Evaluate effectiveness of antiarrhythmic and heart rate control medications as ordered  - Initiate emergency measures for life threatening arrhythmias  - Monitor electrolytes and administer replacement therapy as ordered  10/21/2024 1823 by Zaynab Shin RN  Outcome: Progressing  10/21/2024 1823 by Zaynab Shin RN  Outcome: Progressing     Problem: RESPIRATORY - ADULT  Goal: Achieves optimal ventilation and oxygenation  Description: INTERVENTIONS:  - Assess for changes in respiratory status  - Assess for changes in mentation and behavior  - Position to facilitate oxygenation and minimize respiratory effort  - Oxygen supplementation based on oxygen saturation or ABGs  - Provide Smoking Cessation handout, if applicable  - Encourage broncho-pulmonary hygiene including cough, deep breathe, Incentive Spirometry  - Assess the need for suctioning and perform as needed  - Assess and instruct to report SOB or any respiratory difficulty  -  Respiratory Therapy support as indicated  - Manage/alleviate anxiety  - Monitor for signs/symptoms of CO2 retention  10/21/2024 1823 by Zaynab Shin, RN  Outcome: Progressing  10/21/2024 1823 by Zaynab Shin, RN  Outcome: Progressing     Problem: SKIN/TISSUE INTEGRITY - ADULT  Goal: Skin integrity remains intact  Description: INTERVENTIONS  - Assess and document risk factors for pressure ulcer development  - Assess and document skin integrity  - Monitor for areas of redness and/or skin breakdown  - Initiate interventions, skin care algorithm/standards of care as needed  Outcome: Progressing

## 2024-10-21 NOTE — H&P
[facility]     Ciarra Salcido Patient Status:  Emergency    1940 MRN [id number]   Location [facility] EMERGENCY DEPARTMENT Attending Meghna Horton MD   Hosp Day # 0 PCP JUDY CYR MD     Chief Complaint: Leg swelling    Subjective:    History of Present Illness:     Ciarra Salcido is a 84 year old female with a PMH of diastolic heart failure, afib, CKDIV, hypothyroidism, and DMII presented to ED due to increased leg swelling over the past 7 days. Denies trauma or injury. Both legs equally swollen. Denies CP/SOB/palpitations. Denies N/V/D. Patient called PCP who advised ED evaluation.     History/Other:    Past Medical History:  Past Medical History:    (HFpEF) heart failure with preserved ejection fraction (HCC)    Acute cystitis without hematuria    Acute deep vein thrombosis (DVT) of popliteal vein of right lower extremity (HCC)    Cataract    CHF exacerbation (HCC)    CKD (chronic kidney disease)    Congestive heart disease (HCC)    COVID-19    Diabetes (HCC)    Disorder of thyroid    DM2 (diabetes mellitus, type 2) (HCC)    Hearing impairment    High blood pressure    HTN (hypertension)    Hyperlipidemia    Hypothyroidism    Pulmonary embolism (HCC)    Shingles    Visual impairment     Past Surgical History:   Past Surgical History:   Procedure Laterality Date    Cataract      Eye surgery      Hysterectomy      [facility]    Pacemaker monitor        Family History:   Family History   Problem Relation Age of Onset    Other (Other) Mother         Old Age    Other (Other) Father         Old age     Social History:    reports that she quit smoking about 13 years ago. Her smoking use included cigarettes. She started smoking about 63 years ago. She has a 50 pack-year smoking history. She has never used smokeless tobacco. She reports that she does not drink alcohol and does not use drugs.     Allergies: Allergies[1]    Medications:  Medications Ordered Prior to  Encounter[2]    Review of Systems:   A comprehensive review of systems was completed.    Pertinent positives and negatives noted in the HPI.    Objective:   Physical Exam:    /65   Pulse 60   Temp 98.1 °F (36.7 °C) (Oral)   Resp 14   Wt 165 lb (74.8 kg)   SpO2 92%   BMI 28.32 kg/m²   General: No acute distress, Alert  Respiratory: No rhonchi, no wheezes  Cardiovascular: S1, S2. Regular rate.  Abdomen: Soft, Non-tender, non-distended, positive bowel sounds  Neuro: No new focal deficits  Extremities: 1-2+ B/L JARAD. Ulceration of right heel - no stigmata of infection at this time.     Results:    Labs:      Labs Last 24 Hours:    Recent Labs   Lab 10/21/24  1229   RBC 3.54*   HGB 9.9*   HCT 31.6*   MCV 89.3   MCH 28.0   MCHC 31.3   RDW 15.7   NEPRELIM 6.41   WBC 8.2   .0       Recent Labs   Lab 10/21/24  1229   *   BUN 79*   CREATSERUM 3.80*   EGFRCR 11*   CA 9.2   ALB 4.4   *   K 6.0*      CO2 22.0   ALKPHO 111   AST 41*   ALT 64*   BILT 0.3   TP 7.8       Lab Results   Component Value Date    INR 1.38 (H) 09/09/2024    INR 1.58 (H) 07/08/2024    INR 1.68 (H) 09/11/2023       Recent Labs   Lab 10/21/24  1229   TROPHS 16       Recent Labs   Lab 10/21/24  1229   PBNP 7,780*       No results for input(s): \"PCT\" in the last 168 hours.    Imaging: Imaging data reviewed in Epic.    Assessment & Plan:      #Acute on chronic diastolic heart failure  -IV diuresis  -Monitor daily weights and I/Os  -Echo 7/24 reviewed   -Cardiology consulted    #PRISCILLA on CKDIV  #Hyperkalemia  #Possible cardiorenal syndrome   -PRISCILLA may improve with diuresis  -Hyperkalemia treated with sodium bicarb/valtessa/calcium gluconate in ED  -Repeat labs this evening  -Nephrology consulted    #Transaminitis  -? Due to passive liver congestion from #1  -Repeat in AM  -Consider imaging if worsening    #Chronic hypoxic respiratory failure secondary to diastolic heart failure  -O2 needs at baseline     #Right heel  ulcer  -Wound care  -Advised OP podiatry f/u   -No evidence of infection at this time    #DMII  -Tresiba/SSI    #Normocytic anemia  -At baseline     #Hypothyroidism  -Synthroid    #PAF with prior DCCV  #S/p PPM  -Amio/BB/CCB/DOAC    #Gout  -Hold allopurinol given PRISCILLA    #DL  -Statin     Plan of care discussed with patient and ED physician.     Bryan Wood DO    Supplementary Documentation:     The 21st Century Cures Act makes medical notes like these available to patients in the interest of transparency. Please be advised this is a medical document. Medical documents are intended to carry relevant information, facts as evident, and the clinical opinion of the practitioner. The medical note is intended as peer to peer communication and may appear blunt or direct. It is written in medical language and may contain abbreviations or verbiage that are unfamiliar.                                       [1] No Known Allergies  [2]   Current Facility-Administered Medications on File Prior to Encounter   Medication Dose Route Frequency Provider Last Rate Last Admin    [COMPLETED] carvedilol (Coreg) tab 12.5 mg  12.5 mg Oral Once Yecenia Chaves PA   12.5 mg at 09/11/24 0956    [COMPLETED] sodium polystyrene (Kayexalate) oral powder 15 g  15 g Oral Once Solomon Lezama MD   15 g at 09/09/24 1723    [COMPLETED] furosemide (Lasix) 10 mg/mL injection 40 mg  40 mg Intravenous Once Solomon Lezama MD   40 mg at 09/09/24 1724    [COMPLETED] remdesivir (Veklury) 200 mg in sodium chloride 0.9% 100 mL IVPB  200 mg Intravenous Once Jaimee Palomares MD   Stopped at 09/09/24 2344     Current Outpatient Medications on File Prior to Encounter   Medication Sig Dispense Refill    HYDROcodone-acetaminophen 5-325 MG Oral Tab Take 1 tablet by mouth every 6 (six) hours as needed for Pain.      apixaban 2.5 MG Oral Tab Take 1 tablet (2.5 mg total) by mouth 2 (two) times daily. 60 tablet 0    Insulin Pen Needle (BD AUTOSHIELD DUO) 30G X  5 MM Does not apply Misc Inject 1 Pen into the skin in the morning, at noon, in the evening, and at bedtime. Dx: E11.65 360 each 3    SIMVASTATIN 20 MG Oral Tab TAKE 1 TABLET EVERY NIGHT 90 tablet 3    ESCITALOPRAM 5 MG Oral Tab TAKE 1 TABLET EVERY DAY 90 tablet 3    allopurinol 100 MG Oral Tab Take 1 tablet (100 mg total) by mouth daily. 30 tablet 0    Insulin Lispro, 1 Unit Dial, (HUMALOG KWIKPEN) 100 UNIT/ML Subcutaneous Solution Pen-injector Start 2 units subcutaneous TID with each meal.  Do not give if patient skipping that meal. 9 mL 1    carvedilol 25 MG Oral Tab Take 1 tablet (25 mg total) by mouth 2 (two) times daily with meals. 60 tablet 5    insulin glargine (LANTUS SOLOSTAR) 100 UNIT/ML Subcutaneous Solution Pen-injector Inject 20 Units into the skin nightly. 18 mL 0    levothyroxine 75 MCG Oral Tab Take 1 tablet (75 mcg total) by mouth before breakfast. 90 tablet 3    Insulin Pen Needle (BD PEN NEEDLE LALI U/F) 32G X 4 MM Does not apply Misc Inject 1 Pen into the skin As Directed. Dx: E11.65 100 each 3    Continuous Glucose Sensor (DEXCOM G7 SENSOR) Does not apply Misc 1 each Every 10 days. Dx: E11.22, N18.31, Z79.4 (patient is using insulin 4x/day) 9 each 3    Insulin Pen Needle 32G X 4 MM Does not apply Misc Use as directed to inject insulin once daily 100 each 0    torsemide 20 MG Oral Tab Take 1 tablet (20 mg total) by mouth daily. 30 tablet 11    amLODIPine 5 MG Oral Tab Take 1 tablet (5 mg total) by mouth daily. 30 tablet 11    Glucose Blood (ACCU-CHEK GUIDE) In Vitro Strip       Blood Glucose Monitoring Suppl (ACCU-CHEK GUIDE) w/Device Does not apply Kit       Accu-Chek FastClix Lancets Does not apply Misc 1 Lancet by Finger stick route. Use as directed.      GABAPENTIN 100 MG Oral Cap TAKE 1 CAPSULE THREE TIMES DAILY 270 capsule 3    amiodarone 200 MG Oral Tab Take 1 tablet (200 mg total) by mouth daily.      dilTIAZem  MG Oral Capsule SR 24 Hr Take 1 capsule (120 mg total) by mouth daily.       Cholecalciferol (VITAMIN D3) 25 MCG (1000 UT) Oral Cap Take 1 tablet by mouth daily.      acetaminophen 500 MG Oral Tab Take 2 tablets (1,000 mg total) by mouth every 6 (six) hours as needed for Pain or Fever.

## 2024-10-21 NOTE — TELEPHONE ENCOUNTER
Patients daughter calling and is very concerned with patients bilateral leg swelling, abdomen swelling, she is very tired this has been since last Wednesday and getting worse.    Please advise.

## 2024-10-21 NOTE — ED QUICK NOTES
Orders for admission, patient is aware of plan and ready to go upstairs. Any questions, please call ED RN Edenilson at extension [phone].     Patient Covid vaccination status: Fully vaccinated     COVID Test Ordered in ED: None    COVID Suspicion at Admission: N/A    Running Infusions:  None    Mental Status/LOC at time of transport: A&Ox4    Other pertinent information: Pt on 3L of O2. Pt on purwick.   CIWA score: N/A   NIH score:  N/A

## 2024-10-21 NOTE — ED INITIAL ASSESSMENT (HPI)
84YF c/c of edema Pt family state that she been having increased generalized swelling for the last 2 weeks Pt family state she was put on increased water pills for 4 days but when the treatment end she started swelling again

## 2024-10-21 NOTE — ED PROVIDER NOTES
Patient Seen in: [facility] Emergency Department      History     Chief Complaint   Patient presents with    Edema     Stated Complaint: weight gain and edema Hx of CHF    Subjective:   HPI      Patient presents with swelling, weight gain and dyspnea.  The patient's daughter gives much of the history.  She states that they have been in contact with her daughter as her weight keeps going up and she was told to increase her oral diuretic for several days.  It seemed to help temporarily but then the swelling starts back up again.  She does not feel like she is putting out as much urine as she should.  The patient denies any chest pain.  She denies shortness of breath but her daughter states that she always does.  She does admit to feeling better with nasal cannula oxygen.  She has not had a fever.    Objective:     Past Medical History:    (HFpEF) heart failure with preserved ejection fraction (HCC)    Acute cystitis without hematuria    Acute deep vein thrombosis (DVT) of popliteal vein of right lower extremity (HCC)    Cataract    CHF exacerbation (HCC)    CKD (chronic kidney disease)    Congestive heart disease (HCC)    COVID-19    Diabetes (HCC)    Disorder of thyroid    DM2 (diabetes mellitus, type 2) (HCC)    Hearing impairment    High blood pressure    HTN (hypertension)    Hyperlipidemia    Hypothyroidism    Pulmonary embolism (HCC)    Shingles    Visual impairment              Past Surgical History:   Procedure Laterality Date    Cataract      Eye surgery      Hysterectomy  1980    [facility]    Pacemaker monitor                  No pertinent social history.                Physical Exam     ED Triage Vitals [10/21/24 1130]   BP (!) 167/67   Pulse 60   Resp 20   Temp 98.1 °F (36.7 °C)   Temp src Oral   SpO2 94 %   O2 Device None (Room air)       Current Vitals:   Vital Signs  BP: (!) 164/72  Pulse: 60  Resp: 19  Temp: 98.1 °F (36.7 °C)  Temp src: Oral  MAP (mmHg): 99    Oxygen Therapy  SpO2: 91 %  O2  Device: Nasal cannula  O2 Flow Rate (L/min): 3 L/min        Physical Exam  General: Alert and oriented x3 in no acute distress.  HEENT: Normocephalic, atraumatic, pupils equal round and reactive to light.  Neck: Supple.  Cardiovascular: Regular rate and rhythm, no murmurs.  Respiratory: Lungs with scattered crackles throughout.  Extremities: Moderate lower extremity edema with intact pedal pulses bilaterally.  Skin: Warm and dry.    ED Course     Labs Reviewed   COMP METABOLIC PANEL (14) - Abnormal; Notable for the following components:       Result Value    Glucose 194 (*)     Sodium 132 (*)     Potassium 6.0 (*)     BUN 79 (*)     Creatinine 3.80 (*)     Calculated Osmolality 303 (*)     eGFR-Cr 11 (*)     AST 41 (*)     ALT 64 (*)     All other components within normal limits   PRO BETA NATRIURETIC PEPTIDE - Abnormal; Notable for the following components:    Pro-Beta Natriuretic Peptide 7,780 (*)     All other components within normal limits   CBC WITH DIFFERENTIAL WITH PLATELET - Abnormal; Notable for the following components:    RBC 3.54 (*)     HGB 9.9 (*)     HCT 31.6 (*)     Lymphocyte Absolute 0.89 (*)     All other components within normal limits   URINALYSIS, ROUTINE - Abnormal; Notable for the following components:    Blood Urine 1+ (*)     Protein Urine 30 (*)     Nitrite Urine 1+ (*)     WBC Urine 6-10 (*)     RBC Urine >10 (*)     Bacteria Urine Rare (*)     Squamous Epi. Cells Few (*)     All other components within normal limits   TROPONIN I HIGH SENSITIVITY - Normal   MAGNESIUM - Normal   RAINBOW DRAW LAVENDER   RAINBOW DRAW LIGHT GREEN   RAINBOW DRAW BLUE   RAINBOW DRAW GOLD   URINE CULTURE, ROUTINE     EKG    Rate, intervals and axes as noted on EKG Report.  Rate: 60  Rhythm: AV dual paced rhythm with prolonged AV conduction  Reading: Left bundle branch block pattern         I personally reviewed the chest films and mild pulmonary edema noted.       XR CHEST AP PORTABLE  (CPT=71045)    Result  Date: 10/21/2024  PROCEDURE:  XR CHEST AP PORTABLE  (CPT=71045)  TECHNIQUE:  AP chest radiograph was obtained.  COMPARISON:  [facility], XR, XR CHEST AP PORTABLE  (CPT=71045), 9/09/2024, 4:15 PM.  INDICATIONS:  weight gain and edema Hx of CHF  PATIENT STATED HISTORY: (As transcribed by Technologist)  Patient offered no additional history at this time.    FINDINGS:  Heart size is within normal limits.  Pleural spaces appear clear.  Mediastinal and hilar contours are normal.  No focal consolidation.  Mild prominence of the pulmonary vasculature may reflect mild vascular congestion and volume overload.  Support devices appear unchanged.            CONCLUSION:  See above.   LOCATION:  [facility]      Dictated by (CST): Evangelist Layton MD on 10/21/2024 at 1:09 PM     Finalized by (CST): Evangelist Layton MD on 10/21/2024 at 1:09 PM        Medications   furosemide (Lasix) 10 mg/mL injection 40 mg (40 mg Intravenous Given 10/21/24 1257)   sodium bicarbonate injection 50 mEq (50 mEq Intravenous Given 10/21/24 1410)   patiromer (Veltassa) 8.4 g oral packet 8.4 g (8.4 g Oral Given 10/21/24 1432)   calcium gluconate 1g in 100mL iso-NaCl IVPB premix (0 g Intravenous Stopped 10/21/24 1433)     Patient was given Lasix on arrival.  She was given supplemental nasal cannula oxygen as she was mildly hypoxic.  When her potassium came back elevated she was given sodium bicarbonate, calcium gluconate and oral Veltassa additionally.     MDM      Patient presents with edema, weight gain and dyspnea.  Differential diagnosis includes but is not limited to CHF exacerbation, acute coronary syndrome and acute renal failure.  The patient's EKG is paced and she does not have complaints of chest pain.  Her cardiac enzymes are negative.  She does have evidence of pulmonary edema on chest x-ray as well as an elevated BNP.  She also has evidence of worsened renal failure and has hyperkalemia.  The patient's hyperkalemia is being treated.  She will be admitted for  further treatment to Dr. Wood from the hospitalist service who is here to see her and we have discussed the case.    Admission disposition: 10/21/2024  2:31 PM           MDM    Disposition and Plan     Clinical Impression:  1. Acute congestive heart failure, unspecified heart failure type (HCC)    2. Acute renal failure superimposed on chronic kidney disease, unspecified acute renal failure type, unspecified CKD stage (HCC)    3. Hyperkalemia         Disposition:  Admit  10/21/2024  2:31 pm    Follow-up:  No follow-up provider specified.        Medications Prescribed:  Current Discharge Medication List          A total of 32 minutes of critical care time (exclusive of billable procedures) was administered to manage the patient's critical lab values due to her hyperkalemia.  This involved direct patient intervention, complex decision making, and/or extensive discussions with the patient, family, and clinical staff.     Supplementary Documentation:         Hospital Problems       Present on Admission  Date Reviewed: 9/23/2024            ICD-10-CM Noted POA    * (Principal) Acute congestive heart failure, unspecified heart failure type (HCC) I50.9 10/21/2024 Unknown    Anemia D64.9 10/21/2024 Yes    Hyponatremia E87.1 10/21/2024 Yes    Stage 4 chronic kidney disease (HCC) N18.4 Unknown Yes    Transaminitis R74.01 10/21/2024 Unknown

## 2024-10-21 NOTE — TELEPHONE ENCOUNTER
Daughter calling to inform Dr. Estevan Bah that patient is being admitted to [facility] for evaluation

## 2024-10-21 NOTE — TELEPHONE ENCOUNTER
Spoke with patient's daughter, Nishi. She stated that Ciarra has her bilateral lower extremity swelling back and now her abdomen is swollen. Patient is lethargic and weak. Blood pressure is good, oxygen is low, daughter had nurse increase O2. Spoke with Dr Bah he recommends emergency room. Nishi voiced understanding and agreeable.

## 2024-10-22 LAB
ALBUMIN SERPL-MCNC: 3.4 G/DL (ref 3.2–4.8)
ALP LIVER SERPL-CCNC: 87 U/L
ALT SERPL-CCNC: 42 U/L
ANION GAP SERPL CALC-SCNC: 4 MMOL/L (ref 0–18)
AST SERPL-CCNC: 23 U/L (ref ?–34)
BILIRUB DIRECT SERPL-MCNC: 0.1 MG/DL (ref ?–0.3)
BILIRUB SERPL-MCNC: 0.3 MG/DL (ref 0.2–1.1)
BUN BLD-MCNC: 80 MG/DL (ref 9–23)
CALCIUM BLD-MCNC: 9.4 MG/DL (ref 8.7–10.4)
CHLORIDE SERPL-SCNC: 107 MMOL/L (ref 98–112)
CO2 SERPL-SCNC: 26 MMOL/L (ref 21–32)
CREAT BLD-MCNC: 3.46 MG/DL
EGFRCR SERPLBLD CKD-EPI 2021: 12 ML/MIN/1.73M2 (ref 60–?)
GLUCOSE BLD-MCNC: 209 MG/DL (ref 70–99)
GLUCOSE BLD-MCNC: 247 MG/DL (ref 70–99)
GLUCOSE BLD-MCNC: 285 MG/DL (ref 70–99)
GLUCOSE BLD-MCNC: 76 MG/DL (ref 70–99)
GLUCOSE BLD-MCNC: 89 MG/DL (ref 70–99)
GLUCOSE BLD-MCNC: 90 MG/DL (ref 70–99)
OSMOLALITY SERPL CALC.SUM OF ELEC: 307 MOSM/KG (ref 275–295)
POTASSIUM SERPL-SCNC: 5.1 MMOL/L (ref 3.5–5.1)
PROT SERPL-MCNC: 6.3 G/DL (ref 5.7–8.2)
SODIUM SERPL-SCNC: 137 MMOL/L (ref 136–145)

## 2024-10-22 PROCEDURE — 99232 SBSQ HOSP IP/OBS MODERATE 35: CPT | Performed by: INTERNAL MEDICINE

## 2024-10-22 PROCEDURE — 99223 1ST HOSP IP/OBS HIGH 75: CPT | Performed by: INTERNAL MEDICINE

## 2024-10-22 RX ORDER — TORSEMIDE 20 MG/1
40 TABLET ORAL DAILY
Status: DISCONTINUED | OUTPATIENT
Start: 2024-10-22 | End: 2024-10-23

## 2024-10-22 NOTE — PROGRESS NOTES
Heart Failure Nurse  Progress Note    Patient seen by Heart Failure  7/13/24 and 9/11/24. Per patient, she has not been weighing herself daily as she needs to ask for assistance-patient said she will ask for help daily after discharge. Reviewed the Self-Check Plan for HF Management, per patient she reviews it daily, reiterated the need to call her cardiologist when she has any heart failure symptoms right away. Per patient, she is following a low sodium diet. Per patient, she does take her medications as prescribed. Patient said she is tired of coming to the hospital, explained the need to call cardiology right away when she has any symptoms of heart failure. Encouraged patient to let her nurse know if she has any questions or needs reeducation on heart failure, and the nurse will contact Heart Failure coaches to see her again.     Nithya Hernandez RN (signature)  [phone]

## 2024-10-22 NOTE — PLAN OF CARE
Pt alert able to make needs known .     Denies any pain. Started on torsemide.  To be seen by cardiology.    Skin tear to right lower and upper arm dressing changed, per pt she  got both skin tears from hitting the door frame at the facility she lives at.   Plan of care discussed with pt , verbalized understanding.   Call light within reach.     Problem: Diabetes/Glucose Control  Goal: Glucose maintained within prescribed range  Description: INTERVENTIONS:  - Monitor Blood Glucose as ordered  - Assess for signs and symptoms of hyperglycemia and hypoglycemia  - Administer ordered medications to maintain glucose within target range  - Assess barriers to adequate nutritional intake and initiate nutrition consult as needed  - Instruct patient on self management of diabetes  Outcome: Progressing     Problem: CARDIOVASCULAR - ADULT  Goal: Maintains optimal cardiac output and hemodynamic stability  Description: INTERVENTIONS:  - Monitor vital signs, rhythm, and trends  - Monitor for bleeding, hypotension and signs of decreased cardiac output  - Evaluate effectiveness of vasoactive medications to optimize hemodynamic stability  - Monitor arterial and/or venous puncture sites for bleeding and/or hematoma  - Assess quality of pulses, skin color and temperature  - Assess for signs of decreased coronary artery perfusion - ex. Angina  - Evaluate fluid balance, assess for edema, trend weights  Outcome: Progressing  Goal: Absence of cardiac arrhythmias or at baseline  Description: INTERVENTIONS:  - Continuous cardiac monitoring, monitor vital signs, obtain 12 lead EKG if indicated  - Evaluate effectiveness of antiarrhythmic and heart rate control medications as ordered  - Initiate emergency measures for life threatening arrhythmias  - Monitor electrolytes and administer replacement therapy as ordered  Outcome: Progressing     Problem: RESPIRATORY - ADULT  Goal: Achieves optimal ventilation and oxygenation  Description:  INTERVENTIONS:  - Assess for changes in respiratory status  - Assess for changes in mentation and behavior  - Position to facilitate oxygenation and minimize respiratory effort  - Oxygen supplementation based on oxygen saturation or ABGs  - Provide Smoking Cessation handout, if applicable  - Encourage broncho-pulmonary hygiene including cough, deep breathe, Incentive Spirometry  - Assess the need for suctioning and perform as needed  - Assess and instruct to report SOB or any respiratory difficulty  - Respiratory Therapy support as indicated  - Manage/alleviate anxiety  - Monitor for signs/symptoms of CO2 retention  Outcome: Progressing     Problem: SKIN/TISSUE INTEGRITY - ADULT  Goal: Skin integrity remains intact  Description: INTERVENTIONS  - Assess and document risk factors for pressure ulcer development  - Assess and document skin integrity  - Monitor for areas of redness and/or skin breakdown  - Initiate interventions, skin care algorithm/standards of care as needed  Outcome: Progressing

## 2024-10-22 NOTE — PROGRESS NOTES
[facility]   part of [facility]     Hospitalist Progress Note     Ciarra Salcido Patient Status:  Inpatient    1940 MRN [id number]   Location [facility] 8NE-A Attending Shanika Farias MD   Hosp Day # 1 PCP JUDY CRY MD     Chief Complaint::LE edema   Subjective:     Patient no CP/SOB/N/V.    Objective:    Review of Systems:   A comprehensive review of systems was completed; pertinent positive and negatives stated in subjective.    Vital signs:  Temp:  [96.9 °F (36.1 °C)-98.5 °F (36.9 °C)] 97.6 °F (36.4 °C)  Pulse:  [59-65] 62  Resp:  [14-21] 17  BP: (126-169)/(52-72) 126/52  SpO2:  [78 %-98 %] 97 %    Physical Exam:    General: No acute distress  Respiratory: No wheezes, no rhonchi  Cardiovascular: S1, S2, regular rate and rhythm  Abdomen: Soft, Non-tender, non-distended, positive bowel sounds  Neuro: No new focal deficits.   Extremities: No edema      Diagnostic Data:    Labs:  Recent Labs   Lab 10/21/24  1229   WBC 8.2   HGB 9.9*   MCV 89.3   .0       Recent Labs   Lab 10/21/24  1229 10/21/24  1954 10/22/24  0524   * 241* 76   BUN 79* 72* 80*   CREATSERUM 3.80* 3.65* 3.46*   CA 9.2 9.4 9.4   ALB 4.4  --  3.4   * 133* 137   K 6.0* 5.5* 5.1    104 107   CO2 22.0 22.0 26.0   ALKPHO 111  --  87   AST 41*  --  23   ALT 64*  --  42   BILT 0.3  --  0.3   TP 7.8  --  6.3       Estimated Creatinine Clearance: 10.1 mL/min (A) (based on SCr of 3.46 mg/dL (H)).    Recent Labs   Lab 10/21/24  1229   TROPHS 16       No results for input(s): \"PTP\", \"INR\" in the last 168 hours.               Microbiology    Hospital Encounter on 10/21/24   1. Urine Culture, Routine     Status: Abnormal (Preliminary result)    Collection Time: 10/21/24 12:42 PM    Specimen: Urine, clean catch   Result Value Ref Range    Urine Culture >100,000 CFU/ML Escherichia coli (A) N/A         Imaging: Reviewed in Epic.    Medications:    amiodarone  200 mg Oral Daily    amLODIPine  5 mg Oral Daily     apixaban  2.5 mg Oral BID    carvedilol  25 mg Oral BID with meals    dilTIAZem ER  120 mg Oral Daily    escitalopram  5 mg Oral Daily    gabapentin  100 mg Oral TID    insulin degludec  15 Units Subcutaneous Nightly    levothyroxine  75 mcg Oral Before breakfast    atorvastatin  10 mg Oral Nightly    furosemide  40 mg Intravenous BID (Diuretic)    insulin aspart  1-10 Units Subcutaneous TID AC and HS       Assessment & Plan:      #Acute on chronic diastolic heart failure  -IV diuresis per renal assistance   - daily weights and I/Os  -Echo 7/24 reviewed   -Cardiology consulted     #PRISCILLA on CKDIV  - renal consulted     #Hyperkalemia  - cont diuresis     #Transaminitis  -? Due to passive liver congestion from #1  -Repeat in AM  -Consider imaging if worsening     #Chronic hypoxic respiratory failure secondary to diastolic heart failure  -O2 needs at baseline      #Right heel ulcer  -Wound care  -Advised OP podiatry f/u   -No evidence of infection at this time     #DMII  -Tresiba/SSI     #Normocytic anemia  -At baseline      #Hypothyroidism  -Synthroid     #PAF with prior DCCV  #S/p PPM  -Amio/BB/CCB/DOAC     #Gout     #DL  -Statin       Shanika Farias MD    Supplementary Documentation:     Quality:  DVT Mechanical Prophylaxis:     Early ambuation  DVT Pharmacologic Prophylaxis   Medication    apixaban (Eliquis) tab 2.5 mg                Code Status: DNAR/Selective Treatment  Hutchinson: External urinary catheter in place  Hutchinson Duration (in days):   Central line:    AMINTA:     Discharge is dependent on: curse  At this point Ms. Salcido is expected to be discharge to: TBD    The 21st Century Cures Act makes medical notes like these available to patients in the interest of transparency. Please be advised this is a medical document. Medical documents are intended to carry relevant information, facts as evident, and the clinical opinion of the practitioner. The medical note is intended as peer to peer communication and may appear  blunt or direct. It is written in medical language and may contain abbreviations or verbiage that are unfamiliar.              **Certification      PHYSICIAN Certification of Need for Inpatient Hospitalization - Initial Certification    Patient will require inpatient services that will reasonably be expected to span two midnight's based on the clinical documentation in H+P.   Based on patients current state of illness, I anticipate that, after discharge, patient will require TBD.

## 2024-10-22 NOTE — CM/SW NOTE
10/22/24 1600   CM/SW Referral Data   Referral Source Social Work (self-referral)   Reason for Referral Discharge planning   Informant EMR;Clinical Staff Member     Chart reviewed for discharge planning. Pt admitted from [facility]. Received call from [facility] that pt is current w/ them for [facility] services. CHARMAINE/Updates sent in Aidin. Noted that past admit, pt was discharged w/ 3L O2 through HME. PT evaluated pt and stated [facility].  to update [facility] on plan of care.       [facility]  Phone # [phone]  Fax # [phone]     &  to remain available and supportive for discharge planning needs.    Angelika CONTRERAS, LSW  Discharge Planner

## 2024-10-22 NOTE — CONSULTS
[facility]  Report of Inpatient Wound Care Consultation    Ciarra Salcido Patient Status:  Inpatient    1940 MRN [id number]   Location [facility] 8NE-A Attending Shanika Farias MD   Hosp Day # 1 PCP JUDY CYR MD     Reason for Consultation:  Right heel ulcer    History of Present Illness:  Ciarra Salcido is a a(n) 84 year old female. Patient presents with an eschar covered wound on the right heel.Upon assessment, the eschar came off and the wound bed noted with granulation tissue as described below.Denies pain at this time. Patient with multiple comorbidities.      History:  Past Medical History:    (HFpEF) heart failure with preserved ejection fraction (HCC)    Acute cystitis without hematuria    Acute deep vein thrombosis (DVT) of popliteal vein of right lower extremity (HCC)    Cataract    CHF exacerbation (HCC)    CKD (chronic kidney disease)    Congestive heart disease (HCC)    COVID-19    Diabetes (HCC)    Disorder of thyroid    DM2 (diabetes mellitus, type 2) (HCC)    Hearing impairment    High blood pressure    HTN (hypertension)    Hyperlipidemia    Hypothyroidism    Pulmonary embolism (HCC)    Shingles    Visual impairment     Past Surgical History:   Procedure Laterality Date    Cardiac pacemaker placement      Cataract      Eye surgery      Hysterectomy      [facility]    Pacemaker monitor        reports that she quit smoking about 13 years ago. Her smoking use included cigarettes. She started smoking about 63 years ago. She has a 50 pack-year smoking history. She has never used smokeless tobacco. She reports that she does not drink alcohol and does not use drugs.      Allergies:  @ALLERGY    Laboratory Data:    Recent Labs   Lab 10/21/24  1229 10/21/24  1728 10/21/24  1954 10/21/24  2100 10/22/24  0506 10/22/24  0524 10/22/24  0730 10/22/24  1155   WBC 8.2  --   --   --   --   --   --   --    HGB 9.9*  --   --   --   --   --   --   --    HCT 31.6*  --   --   --   --   --   --   --     .0  --   --   --   --   --   --   --    CREATSERUM 3.80*  --  3.65*  --   --  3.46*  --   --    BUN 79*  --  72*  --   --  80*  --   --    *  --  241*  --   --  76  --   --    CA 9.2  --  9.4  --   --  9.4  --   --    ALB 4.4  --   --   --   --  3.4  --   --    TP 7.8  --   --   --   --  6.3  --   --    PGLU  --    < >  --    < > 89  --  90 209*    < > = values in this interval not displayed.         ASSESSMENT:  Wound 09/10/24 Heel Right (Active)   Date First Assessed/Time First Assessed: 09/10/24 0633   Location: Heel  Wound Location Orientation: Right      Assessments 10/22/2024  3:26 PM   Wound Image      Drainage Amount Small   Drainage Description Sanguineous   Wound Length (cm) 0.4 cm   Wound Width (cm) 0.5 cm   Wound Surface Area (cm^2) 0.2 cm^2   Wound Depth (cm) 0.2 cm   Wound Volume (cm^3) 0.04 cm^3   Margins Well-defined edges   Francine-wound Assessment Edema (bruising)   Wound Granulation Tissue Pink;Red;Spongy   Wound Bed Granulation (%) 100 %   Wound Odor None   Pressure Injury Stage Stage 3   Local pulse : palpable dorsalis pedis  Capillary refill < 3 seconds  Temperature : warm       Wound Cleaning and Dressings:  Wound cleansing:  Cleanse with saline  Wound product: Silver foam  Dressing change frequency:  Change dressing every other day and/or as needed    Compression Therapy:   Elevate leg(s) as much as possible      Miscellaneous/Additional Orders:  Offloading: Turn Q 2 hours and as needed, off load heels/off loading heel boots    Miscellaneous orders: Increase dietary protein intake.Dietitian to evaluate amount of protein intake/supplements depending on kidney functions     Care Summary:  Care Summary: Discussed Plan of Care at beside with patient. Patient verbally acknowledges understanding of all instructions and all questions were answered.      Additional Notes:  Follow up with home health care if discharged to home      Thank you for this consultation and for allowing me to  participate in the care of your patient.      Time Spent 30 Minutes.    Felicia Millan RN BSN [facility]  Wound Care Clinician  [facility] [facility]  10/22/2024  4:02 PM

## 2024-10-22 NOTE — PROGRESS NOTES
Ciarra Salcido Patient Status:  Inpatient    1940 MRN [id number]   Location [facility] 8NE-A Attending Bryan Wood,    Hosp Day # 0 PCP JUDY CYR MD     Cardiology Nocturnal APN Note    Briefly: (Documentation from chart review)     Ciarra Salcido is a 83 y/o female who presented with CHF and has a PMH/PSH of:       Past Medical History:    (HFpEF) heart failure with preserved ejection fraction (HCC)    Acute cystitis without hematuria    Acute deep vein thrombosis (DVT) of popliteal vein of right lower extremity (HCC)    Cataract    CHF exacerbation (HCC)    CKD (chronic kidney disease)    Congestive heart disease (HCC)    COVID-19    Diabetes (HCC)    Disorder of thyroid    DM2 (diabetes mellitus, type 2) (HCC)    Hearing impairment    High blood pressure    HTN (hypertension)    Hyperlipidemia    Hypothyroidism    Pulmonary embolism (HCC)    Shingles    Visual impairment       Primary Cardiologist Ajit    Vital Signs:       10/21/2024     7:03 PM 10/21/2024     7:36 PM   Vitals History   /63    BP Location Left arm    Pulse 61 60   Temp  98.1 °F (36.7 °C)   SpO2 92 % 93 %        Labs:   Lab Results   Component Value Date    WBC 8.2 10/21/2024    HGB 9.9 10/21/2024    HCT 31.6 10/21/2024    .0 10/21/2024    CREATSERUM 3.65 10/21/2024    BUN 72 10/21/2024     10/21/2024    K 5.5 10/21/2024     10/21/2024    CO2 22.0 10/21/2024     10/21/2024    CA 9.4 10/21/2024    ALB 4.4 10/21/2024    ALKPHO 111 10/21/2024    BILT 0.3 10/21/2024    TP 7.8 10/21/2024    AST 41 10/21/2024    ALT 64 10/21/2024    MG 2.4 10/21/2024    TROPHS 16 10/21/2024       Diagnostics:   XR CHEST AP PORTABLE  (CPT=71045)    Result Date: 10/21/2024  PROCEDURE:  XR CHEST AP PORTABLE  (CPT=71045)  TECHNIQUE:  AP chest radiograph was obtained.  COMPARISON:  [facility], XR, XR CHEST AP PORTABLE  (CPT=71045), 2024, 4:15 PM.  INDICATIONS:  weight gain and edema Hx of CHF  PATIENT STATED  HISTORY: (As transcribed by Technologist)  Patient offered no additional history at this time.    FINDINGS:  Heart size is within normal limits.  Pleural spaces appear clear.  Mediastinal and hilar contours are normal.  No focal consolidation.  Mild prominence of the pulmonary vasculature may reflect mild vascular congestion and volume overload.  Support devices appear unchanged.            CONCLUSION:  See above.   LOCATION:  [facility]      Dictated by (CST): Evangelist Layton MD on 10/21/2024 at 1:09 PM     Finalized by (CST): Evangelist Layton MD on 10/21/2024 at 1:09 PM         Allergies:  Allergies[1]    Medications:    [START ON 10/22/2024] amiodarone    [START ON 10/22/2024] amLODIPine    apixaban    carvedilol    dilTIAZem ER    [START ON 10/22/2024] escitalopram    gabapentin    insulin degludec    [START ON 10/22/2024] levothyroxine    atorvastatin    furosemide    melatonin    acetaminophen    polyethylene glycol (PEG 3350)    sennosides    bisacodyl    metoclopramide    glucose **OR** glucose **OR** glucose-vitamin C **OR** dextrose **OR** glucose **OR** glucose **OR** glucose-vitamin C    insulin aspart    influenza virus vaccine PF    Assessment:   Acute on chronic HFpEF  - LVEF 65-70% on echo 7/2024  - Chest x-ray with vascular congestion  - BNP 7780  - Got 40mg IVP lasix in ED  - Lasix 40mg IVP lasix BID started by hospitalist  - Strict I/O      Plan:    - Continue to monitor overnight  - Formal Cardiology consult to follow in AM.       MEME Holm  [facility]  10/21/2024  11:05 PM         [1] No Known Allergies

## 2024-10-22 NOTE — CONSULTS
[facility]  Report of Consultation    Ciarra Salcido Patient Status:  Inpatient    1940 MRN [id number]   Location [facility] 8NE-A Attending Shanika Farias MD   Hosp Day # 1 PCP JUDY CYR MD       Assessment / Plan:    1) CKD 4- likely due to longstanding DM / HTN; c/w bland urine sediment and mild proteinuria. 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 Should improve with higher dose loop diuretics    7) Anemia- primarily due to CKD; will likely need EPO as outpt     Consider dc to SNF.       Reason for Consultation:  PRISCILLA / CKD 4    History of Present Illness:  Ciarra Salcido is a a(n) 84 year old female.  Very pleasant 84-year-old female well-known to our service with a history of chronic kidney disease hypertension A-fib diastolic heart failure type 2 diabetes who presents for evaluation of increasing leg swelling and found to be hyperkalemic.  Kidney function is relatively stable with a creatinine 3.5 mg/dL.  She is a poor historian and has some dementia and cannot provide any further history.    History:  Past Medical History:    (HFpEF) heart failure with preserved ejection fraction (HCC)    Acute cystitis without hematuria    Acute deep vein thrombosis (DVT) of popliteal vein of right lower extremity (HCC)    Cataract    CHF exacerbation (HCC)    CKD (chronic kidney disease)    Congestive heart disease (HCC)    COVID-19    Diabetes (HCC)    Disorder of thyroid    DM2 (diabetes mellitus, type 2) (HCC)    Hearing impairment    High blood pressure    HTN (hypertension)    Hyperlipidemia    Hypothyroidism    Pulmonary embolism  (HCC)    Shingles    Visual impairment     Past Surgical History:   Procedure Laterality Date    Cardiac pacemaker placement      Cataract      Eye surgery      Hysterectomy  1980    [facility]    Pacemaker monitor       Family History   Problem Relation Age of Onset    Other (Other) Mother         Old Age    Other (Other) Father         Old age     Denies family history of kidney disease.    reports that she quit smoking about 13 years ago. Her smoking use included cigarettes. She started smoking about 63 years ago. She has a 50 pack-year smoking history. She has never used smokeless tobacco. She reports that she does not drink alcohol and does not use drugs.    Allergies:  Allergies[1]    Medications:    Current Facility-Administered Medications:     amiodarone (Pacerone) tab 200 mg, 200 mg, Oral, Daily    amLODIPine (Norvasc) tab 5 mg, 5 mg, Oral, Daily    apixaban (Eliquis) tab 2.5 mg, 2.5 mg, Oral, BID    carvedilol (Coreg) tab 25 mg, 25 mg, Oral, BID with meals    dilTIAZem ER (Dilacor XR) 24 hr cap 120 mg, 120 mg, Oral, Daily    escitalopram (Lexapro) tab 5 mg, 5 mg, Oral, Daily    gabapentin (Neurontin) cap 100 mg, 100 mg, Oral, TID    insulin degludec (Tresiba) 100 units/mL flextouch 15 Units, 15 Units, Subcutaneous, Nightly    levothyroxine (Synthroid) tab 75 mcg, 75 mcg, Oral, Before breakfast    atorvastatin (Lipitor) tab 10 mg, 10 mg, Oral, Nightly    furosemide (Lasix) 10 mg/mL injection 40 mg, 40 mg, Intravenous, BID (Diuretic)    melatonin tab 3 mg, 3 mg, Oral, Nightly PRN    acetaminophen (Tylenol Extra Strength) tab 500 mg, 500 mg, Oral, Q4H PRN    polyethylene glycol (PEG 3350) (Miralax) 17 g oral packet 17 g, 17 g, Oral, Daily PRN    sennosides (Senokot) tab 17.2 mg, 17.2 mg, Oral, Nightly PRN    bisacodyl (Dulcolax) 10 MG rectal suppository 10 mg, 10 mg, Rectal, Daily PRN    metoclopramide (Reglan) 5 mg/mL injection 10 mg, 10 mg, Intravenous, Q8H PRN    glucose (Dex4) 15 GM/59ML oral liquid 15  g, 15 g, Oral, Q15 Min PRN **OR** glucose (Glutose) 40% oral gel 15 g, 15 g, Oral, Q15 Min PRN **OR** glucose-vitamin C (Dex-4) chewable tab 4 tablet, 4 tablet, Oral, Q15 Min PRN **OR** dextrose 50% injection 50 mL, 50 mL, Intravenous, Q15 Min PRN **OR** glucose (Dex4) 15 GM/59ML oral liquid 30 g, 30 g, Oral, Q15 Min PRN **OR** glucose (Glutose) 40% oral gel 30 g, 30 g, Oral, Q15 Min PRN **OR** glucose-vitamin C (Dex-4) chewable tab 8 tablet, 8 tablet, Oral, Q15 Min PRN    insulin aspart (NovoLOG) 100 Units/mL FlexPen 1-10 Units, 1-10 Units, Subcutaneous, TID AC and HS    influenza virus trivalent high dose PF (Fluzone HD) 0.5 mL injection (ages >/= 65 years) 0.5 mL, 0.5 mL, Intramuscular, Prior to discharge  No current outpatient medications on file.       Review of Systems:  Please see HPI for pertinent positives. 10 point review of systems otherwise reviewed and negative.     Physical Exam:  /52 (BP Location: Left arm)   Pulse 62   Temp 97.6 °F (36.4 °C) (Oral)   Resp 17   Wt 180 lb 12.4 oz (82 kg)   SpO2 97%   BMI 31.03 kg/m²   Temp (24hrs), Av.9 °F (36.6 °C), Min:96.9 °F (36.1 °C), Max:98.5 °F (36.9 °C)       Intake/Output Summary (Last 24 hours) at 10/22/2024 1003  Last data filed at 10/22/2024 0812  Gross per 24 hour   Intake 360 ml   Output 1100 ml   Net -740 ml     Wt Readings from Last 3 Encounters:   10/22/24 180 lb 12.4 oz (82 kg)   24 157 lb (71.2 kg)   24 157 lb 14.4 oz (71.6 kg)     General: awka elaert  HEENT: No scleral icterus, MMM  Neck: Supple, no CASTRO or thyromegaly  Cardiac: Regular rate and rhythm, S1, S2 normal, no murmur, rub, or gallop  Lungs: Decreased breath sounds at the bases bilaterally.   Abdomen: Soft, non-tender. + bowel sounds, no palpable organomegaly  Extremities: Without clubbing, cyanosis; mild LE edema  Neurologic: Cranial nerves grossly intact, moving all extremities  Skin: Warm and dry, no rashes      Laboratory Data:  Lab Results   Component Value  Date    WBC 8.2 10/21/2024    HGB 9.9 10/21/2024    HCT 31.6 10/21/2024    .0 10/21/2024    CREATSERUM 3.46 10/22/2024    BUN 80 10/22/2024     10/22/2024    K 5.1 10/22/2024     10/22/2024    CO2 26.0 10/22/2024    GLU 76 10/22/2024    CA 9.4 10/22/2024    ALB 3.4 10/22/2024    ALKPHO 87 10/22/2024    BILT 0.3 10/22/2024    TP 6.3 10/22/2024    AST 23 10/22/2024    ALT 42 10/22/2024    MG 2.4 10/21/2024    PGLU 90 10/22/2024       Imaging:  All imaging studies reviewed.      Thank you for allowing me to participate in the care of your patient.    Mary Lares MD  10/22/2024  10:03 AM         [1] No Known Allergies

## 2024-10-22 NOTE — PLAN OF CARE
Pt alert and oriented x3. 4L NC. AV Paced, NSR on tele. Purewick in place. Denies pain. See MAR and flowsheets for additional information.

## 2024-10-22 NOTE — DISCHARGE INSTRUCTIONS
Going Home Instructions  In this section you will find the tools which will guide you through the first few days after you leave the hospital. Continued use of these tools will help you develop the skills necessary to keep your heart failure under control.     Home Care Instructions Following Heart Failure - the most important things to do every day include:   Weigh yourself and review the “Self-Check Plan” sheet every morning.   Call your cardiologist office if you are in the “Pay Attention-Use Caution” (yellow zone) or “Medical Alert-Warning!” (red zone) as outlined in the Self-Check Plan sheet.  Take your medicines as prescribed.  Limit your sodium (salt) intake.  Know when to call your cardiologist, primary doctor, or nurse.  Know when to seek emergency care.      Things for You to Remember:   1. See your doctor or healthcare provider as written on your discharge instructions.  It is important that you attend this appointment to make sure your symptoms are under control.     2. Your recommended sodium intake is 5392-9483 mg daily.    3.  Weigh yourself every day.    4. Some exercise and activity is important to help keep your heart functioning and strong. Unless instructed not to exercise, you may walk at a slow to moderate pace for 10-15 minutes 2-3 days per week to start. Pace your activity to prevent shortness of breath or fatigue. Stop exercising if you develop chest pain, lightheadedness, or significant shortness of breath.       Call Your Cardiologist If:   You gain 2-3 pounds in one day or 5 pounds in one week.  You have more difficulty breathing.  You are getting more tired with normal activity.  You are more short of breath lying down, or awaken at night short of breath.  You have swelling of your feet or legs.  You urinate less often during the day and more often at night.  You have cramps in your legs.  You have blurred vision or see yellowish-green halos around objects of lights.    Go to the  Emergency Room If:   You have pain or tightness in your chest  You are extremely short of breath  You are coughing up pink-frothy mucus  You are traveling and develop symptoms of worsening heart failure      ** Please follow up with your cardiologist or Advanced Practice Provider as written on your discharge instructions. If you are not provided with an appointment, let your nurse know so you can get an appointment**    Right heel wound care:   Wound Cleaning and Dressings:  Wound cleansing:  Cleanse with saline  Wound product: Silver foam  Dressing change frequency:  Change dressing every other day and/or as needed

## 2024-10-22 NOTE — DIETARY NOTE
[facility]   part of [facility]   CLINICAL NUTRITION    Ciarra Salcido     Admitting diagnosis:  Hyperkalemia [E87.5]  Acute congestive heart failure, unspecified heart failure type (HCC) [I50.9]  Acute renal failure superimposed on chronic kidney disease, unspecified acute renal failure type, unspecified CKD stage (HCC) [N17.9, N18.9]    Ht:  5'4\"  Wt: 82 kg (180 lb 12.4 oz).   Body mass index is 31.03 kg/m².  IBW: 54.5kg    Wt Readings from Last 6 Encounters:   10/22/24 82 kg (180 lb 12.4 oz)   09/19/24 71.2 kg (157 lb)   09/12/24 71.6 kg (157 lb 14.4 oz)   07/18/24 68.9 kg (151 lb 14.4 oz)   06/21/24 63.5 kg (140 lb)   05/17/24 63.5 kg (140 lb)        Labs/Meds reviewed    Diet:       Procedures    Cardiac diet No Caffeine, Treadmill/Cardiac Stress Test     Percent Meals Eaten (last 3 days)       Date/Time Percent Meals Eaten (%)    10/21/24 1936 100 %          Pt chart reviewed d/t HF. Pt lives in assisted living and meals are provided, education deferred.   Patient reports good appetite at this time.  Nursing notes reports Percent Meals Eaten (%): 100 % intake for last meal.  Tolerating po diet without diarrhea, emesis, or constipation.   No significant weight changes noted.     Patient is at low nutrition risk at this time.    Please consult if patient status changes or nutrition issues arise.    Sagrario Salcido RD, LDN  Clinical Nutrition  [id number]

## 2024-10-22 NOTE — CONSULTS
[facility]  Cardiology Consultation    Ciarra Salcido Patient Status:  Inpatient    1940 MRN [id number]   Location [facility] 8NE-A Attending Shanika Farias MD   Hosp Day # 1 PCP JUDY CYR MD     Reason for Consultation:  CHF    History of Present Illness:  Ciarra Salcido is a a(n) 84 year old diabetic female with hx of afib with PPM, diastolic CHF, HTN and CKD4 who presents with increasing dyspnea on exertion and LE swelling.  Did not respond to outpatient diuretic dose increase.  Had similar admission in September.  Denies CP or palpitations.    CXR with mild vascular congestion, normal heart size  ECG yesterday shows AV dual paced rhythm, on tele no alarms  BUN/Cr 80/3.46  proBNP elevated to 7,780 - as high as 16,000 in the past    History:  Past Medical History:    (HFpEF) heart failure with preserved ejection fraction (HCC)    Acute cystitis without hematuria    Acute deep vein thrombosis (DVT) of popliteal vein of right lower extremity (HCC)    Cataract    CHF exacerbation (HCC)    CKD (chronic kidney disease)    Congestive heart disease (HCC)    COVID-19    Diabetes (HCC)    Disorder of thyroid    DM2 (diabetes mellitus, type 2) (HCC)    Hearing impairment    High blood pressure    HTN (hypertension)    Hyperlipidemia    Hypothyroidism    Pulmonary embolism (HCC)    Shingles    Visual impairment     Past Surgical History:   Procedure Laterality Date    Cardiac pacemaker placement      Cataract      Eye surgery      Hysterectomy      [facility]    Pacemaker monitor       Family History   Problem Relation Age of Onset    Other (Other) Mother         Old Age    Other (Other) Father         Old age      reports that she quit smoking about 13 years ago. Her smoking use included cigarettes. She started smoking about 63 years ago. She has a 50 pack-year smoking history. She has never used smokeless tobacco. She reports that she does not drink alcohol and does not use  drugs.    Allergies:  Allergies[1]    Medications:    Current Facility-Administered Medications:     torsemide (Demadex) tab 40 mg, 40 mg, Oral, Daily    amiodarone (Pacerone) tab 200 mg, 200 mg, Oral, Daily    amLODIPine (Norvasc) tab 5 mg, 5 mg, Oral, Daily    apixaban (Eliquis) tab 2.5 mg, 2.5 mg, Oral, BID    carvedilol (Coreg) tab 25 mg, 25 mg, Oral, BID with meals    dilTIAZem ER (Dilacor XR) 24 hr cap 120 mg, 120 mg, Oral, Daily    escitalopram (Lexapro) tab 5 mg, 5 mg, Oral, Daily    gabapentin (Neurontin) cap 100 mg, 100 mg, Oral, TID    insulin degludec (Tresiba) 100 units/mL flextouch 15 Units, 15 Units, Subcutaneous, Nightly    levothyroxine (Synthroid) tab 75 mcg, 75 mcg, Oral, Before breakfast    atorvastatin (Lipitor) tab 10 mg, 10 mg, Oral, Nightly    melatonin tab 3 mg, 3 mg, Oral, Nightly PRN    acetaminophen (Tylenol Extra Strength) tab 500 mg, 500 mg, Oral, Q4H PRN    polyethylene glycol (PEG 3350) (Miralax) 17 g oral packet 17 g, 17 g, Oral, Daily PRN    sennosides (Senokot) tab 17.2 mg, 17.2 mg, Oral, Nightly PRN    bisacodyl (Dulcolax) 10 MG rectal suppository 10 mg, 10 mg, Rectal, Daily PRN    metoclopramide (Reglan) 5 mg/mL injection 10 mg, 10 mg, Intravenous, Q8H PRN    glucose (Dex4) 15 GM/59ML oral liquid 15 g, 15 g, Oral, Q15 Min PRN **OR** glucose (Glutose) 40% oral gel 15 g, 15 g, Oral, Q15 Min PRN **OR** glucose-vitamin C (Dex-4) chewable tab 4 tablet, 4 tablet, Oral, Q15 Min PRN **OR** dextrose 50% injection 50 mL, 50 mL, Intravenous, Q15 Min PRN **OR** glucose (Dex4) 15 GM/59ML oral liquid 30 g, 30 g, Oral, Q15 Min PRN **OR** glucose (Glutose) 40% oral gel 30 g, 30 g, Oral, Q15 Min PRN **OR** glucose-vitamin C (Dex-4) chewable tab 8 tablet, 8 tablet, Oral, Q15 Min PRN    insulin aspart (NovoLOG) 100 Units/mL FlexPen 1-10 Units, 1-10 Units, Subcutaneous, TID AC and HS    influenza virus trivalent high dose PF (Fluzone HD) 0.5 mL injection (ages >/= 65 years) 0.5 mL, 0.5 mL,  Intramuscular, Prior to discharge    Review of Systems:  A comprehensive review of systems was negative if not otherwise mention in above HPI.    /52 (BP Location: Left arm)   Pulse 62   Temp 97.6 °F (36.4 °C) (Oral)   Resp 17   Wt 180 lb 12.4 oz   SpO2 97%   BMI 31.03 kg/m²   Temp (24hrs), Av.9 °F (36.6 °C), Min:96.9 °F (36.1 °C), Max:98.5 °F (36.9 °C)       Intake/Output Summary (Last 24 hours) at 10/22/2024 1055  Last data filed at 10/22/2024 0812  Gross per 24 hour   Intake 360 ml   Output 1100 ml   Net -740 ml     Wt Readings from Last 3 Encounters:   10/22/24 180 lb 12.4 oz   24 157 lb   24 157 lb 14.4 oz       Physical Exam:   General: Alert and oriented x 3. No apparent distress. No respiratory or constitutional distress.  HEENT: Normocephalic, anicteric sclera, neck supple.  Neck: No JVD, carotids 2+, no bruits.  Cardiac: Regular rate and rhythm. S1, S2 normal. Systolic murmur  Lungs: Clear anteriorly  Extremities: mild b/l LE edema to midshin  Neurologic: Alert and oriented, normal affect.  Skin: Warm and dry.     Laboratory Data:  Lab Results   Component Value Date    WBC 8.2 10/21/2024    HGB 9.9 10/21/2024    HCT 31.6 10/21/2024    .0 10/21/2024    CREATSERUM 3.46 10/22/2024    BUN 80 10/22/2024     10/22/2024    K 5.1 10/22/2024     10/22/2024    CO2 26.0 10/22/2024    GLU 76 10/22/2024    CA 9.4 10/22/2024    ALB 3.4 10/22/2024    ALKPHO 87 10/22/2024    BILT 0.3 10/22/2024    TP 6.3 10/22/2024    AST 23 10/22/2024    ALT 42 10/22/2024    MG 2.4 10/21/2024    PGLU 90 10/22/2024       Imaging:  Echo 2024:  1. Left ventricle: The cavity size was normal. Wall thickness was mildly      increased. Systolic function was hyperdynamic. The estimated ejection      fraction was 65-70%, by visual assessment. No diagnostic evidence for      regional wall motion abnormalities. Left ventricular diastolic function      parameters were normal for the patient's age.    2. Right ventricle: The cavity size was mildly increased. Pacer wire noted      in the right ventricle. Systolic function was normal.   3. Left atrium: The left atrial volume was markedly increased.   4. Right atrium: The atrium was moderately dilated. Pacer wire noted in      right atrium.   5. Aortic valve: There was thickening, consistent with sclerosis. There was      mild regurgitation.   6. Mitral valve: There was mild to moderate regurgitation.   7. Pulmonary arteries: Systolic pressure was moderately increased, in the      range of 50mm Hg to 55mm Hg. Estimated pulmonary artery diastolic      pressure was 17mm Hg.   8. Pericardium, extracardiac: There was no pericardial effusion.   9. Inferior vena cava: The IVC was normal-sized.   Impressions:  This study is compared with previous dated 11/14/2023: No   significant change since prior study.     Impression:  Principal Problem:    Acute congestive heart failure, unspecified heart failure type (HCC)  Active Problems:    Stage 4 chronic kidney disease ([facility])    PRISCILLA (acute kidney injury) ([facility])    Hyperkalemia    Hyponatremia    Anemia    Transaminitis    Acute renal failure superimposed on chronic kidney disease, unspecified acute renal failure type, unspecified CKD stage (HCC)  Paroxysmal afib with PPM  HTN  Diastolic CHF - chronic with acute exacerbation  PHTN - secondary  DM2  Chronic anemia    Recommendations:  - diuretics per renal  - resume home eliquis, coreg, amlodipine, amiodarone, diltiazem and atorvastatin  - not on ACE-I/ARB/Entresto due to elevated serum creat/hyperkalemia  - will plan for Lexiscan nuc tomorrow; she has now returned with recurrent CHF admissions and we need to know whether ischemia is  of these admissions  - called daughter Elsie, but only got to voicemail    Thank you for allowing me to participate in the care of your patient.    Lucas Fong MD  10/22/2024  10:55 AM         [1] No Known Allergies

## 2024-10-22 NOTE — PHYSICAL THERAPY NOTE
PHYSICAL THERAPY EVALUATION - INPATIENT     Room Number: 8612/8612-A  Evaluation Date: 10/22/2024  Type of Evaluation: Initial  Physician Order: PT Eval and Treat    Presenting Problem: acute on chronic CHF  Co-Morbidities : CHF, CKD, DM, afib, pacemaker, DVT, PE, covid  Reason for Therapy: Mobility Dysfunction and Discharge Planning    PHYSICAL THERAPY ASSESSMENT   Patient is a 84 year old female admitted 10/21/2024 for acute on chronic CHF.   Patient is currently functioning at baseline with bed mobility and transfers. Prior to admission, patient's baseline is mod ind to CGA for SPT to/from w/c.     Patient will benefit from continued skilled PT Services at discharge to promote prior level of function.  Anticipate patient will return home with home health PT.    PLAN  Patient has been evaluated and presents with no skilled Physical Therapy needs at this time.  Patient discharged from Physical Therapy services.  Please re-order if a new functional limitation presents during this admission.    PT Device Recommendation: Wheelchair;Gait belt    GOALS  Patient was able to achieve the following goals ...    Patient was able to transfer Safely with supervision   Patient able to ambulate on level surfaces Safely with CGA     HOME SITUATION  Type of Home: Assisted living facility  Home Layout: One level                     Lives With: Staff 24 hours        Patient Regularly Uses: Home O2;Wheelchair;Rolling walker     Prior Level of Gregory: Pt reports she is typically independent with dressing and toileting but gets assist with showers. Pt reports she transfers to a w/c by herself or with a little assistance. Pt states she only ambulates with RW during PT sessions.     SUBJECTIVE  \"Felt the same as usual\" - referring to her transfer to the chair    OBJECTIVE  Precautions: Bed/chair alarm  Fall Risk: High fall risk    WEIGHT BEARING RESTRICTION     PAIN ASSESSMENT  Ratin          COGNITION  Overall Cognitive  Status:  WFL - within functional limits    RANGE OF MOTION AND STRENGTH ASSESSMENT  Upper extremity ROM and strength are within functional limits     Lower extremity ROM is within functional limits     Lower extremity strength is within functional limits     BALANCE  Static Sitting: Good  Dynamic Sitting: Good  Static Standing: Fair -  Dynamic Standing: Fair -    ADDITIONAL TESTS                                    ACTIVITY TOLERANCE                         O2 WALK       NEUROLOGICAL FINDINGS                        AM-PAC '6-Clicks' INPATIENT SHORT FORM - BASIC MOBILITY  How much difficulty does the patient currently have...  Patient Difficulty: Turning over in bed (including adjusting bedclothes, sheets and blankets)?: None   Patient Difficulty: Sitting down on and standing up from a chair with arms (e.g., wheelchair, bedside commode, etc.): A Little   Patient Difficulty: Moving from lying on back to sitting on the side of the bed?: A Little   How much help from another person does the patient currently need...   Help from Another: Moving to and from a bed to a chair (including a wheelchair)?: A Little   Help from Another: Need to walk in hospital room?: A Little   Help from Another: Climbing 3-5 steps with a railing?: A Little       AM-PAC Score:  Raw Score: 19   Approx Degree of Impairment: 41.77%   Standardized Score (AM-PAC Scale): 45.44   CMS Modifier (G-Code): CK    FUNCTIONAL ABILITY STATUS  Gait Assessment   Functional Mobility/Gait Assessment  Gait Assistance: Contact guard assist  Distance (ft): 5  Assistive Device: Rolling walker  Pattern: Shuffle    Skilled Therapy Provided: Per RN jose to work with pt. Pt received in supine and was agreeable to PT session.     Bed Mobility:  Rolling: NT  Supine to sit: supervision   Sit to supine: NT     Transfer Mobility:  Sit to stand: supervision   Stand to sit: supervision  Gait = pt ambulated 5 feet with RW and CGA    Pt transferred with supervision via SPT from  bed to chair.     Therapist's comments:Pt educated on role of therapy, goals for session, safety, fall prevention, and activity recommendations.     Exercise/Education Provided:  Bed mobility  Energy conservation  Functional activity tolerated  Gait training  Posture  Strengthening  Transfer training    Patient End of Session: Up in chair;Needs met;Call light within reach;RN aware of session/findings;All patient questions and concerns addressed;Hospital anti-slip socks;Alarm set    Patient Evaluation Complexity Level:  History Moderate - 1 or 2 personal factors and/or co-morbidities   Examination of body systems Low -  addressing 1-2 elements   Clinical Presentation Low- Stable   Clinical Decision Making Low Complexity       PT Session Time: 30 minutes  Therapeutic Activity: 10 minutes

## 2024-10-22 NOTE — HISTORICAL OFFICE NOTE
[facility]  Outside Information  Continuity of Care Document  7/26/2024  Ciarra Salcido - 84 y.o. Female; born [birth year]Summary of episode note, generated on Jul. 31, 2024July 31, 2024   CHIEF COMPLAINT    CHIEF COMPLAINT  Reason for Visit/Chief Complaint   hospital follow up-HTN   Hospital follow-up visit for Ms. Salcido. Danielle-year-old with history of diabetes that is not poorly controlled, hypertension, sick sinus syndrome/A-fib status post pacemaker, moderate mitral regurg and hyperlipidemia. She has chronic kidney disease and was in the hospital for uncontrolled hypertension and heart failure. She is now a week status post discharge and is still in acute rehab. She is got gout flareup on the right foot which is still painful and her sugars are poorly controlled. With that her blood pressure slightly elevated at 150/60 mmHg. Denies chest pain. Using chronic oxygen for COPD.     PROBLEMS  Reconcile with Patient's ChartPROBLEMS  Problem Effective Dates Date resolved Problem Status   Atrial fibrillation with RVR, [SNOMED-CT: 706381600854850] 5/25/2023 - Active   History of pulmonary embolus (PE) - Hx, [SNOMED-CT: 401938292] 2/15/2023 - Active   Bradycardia, [SNOMED-CT: 41103996] 7/9/2021 - Active   Hypertension (HTN), primary, [SNOMED-CT: 82015153] 7/9/2021 - Active   Hyperlipidemia (unspecified), [SNOMED-CT: 53751287] 7/9/2021 - Active   Pulmonary hypertension, not otherwise specified or secondary, [SNOMED-CT: 43306643] 7/9/2021 - Active   Total abdominal hysterectomy nec, [SNOMED-CT: 108083055] 7/9/2021 7/9/2021 Resolved   DM Type 2 (diabetes mellitus) , [SNOMED-CT: 57774753] 7/9/2021 - Active   CKD (chronic kidney disease) stage 3, GFR 30-59 ml/min, [SNOMED-CT: 566995112] 7/9/2021 - Active   Shingles, [SNOMED-CT: 3095044] 7/9/2021 - Active   Essential hypertension, benign, [SNOMED-CT: 8109272] 5/28/2021 - Active   CKD (chronic kidney disease), stage III, [SNOMED-CT: 424974265]  5/28/2021 - Active   Hyperlipidemia, [SNOMED-CT: 28049981] 5/28/2021 - Active   Acquired hypothyroidism, [SNOMED-CT: 582025654] 3/24/2021 - Active   Osteopenia, [SNOMED-CT: 587047578] 3/24/2021 - Active   Symptomatic bradycardia, [SNOMED-CT: 64449139] 5/3/2021 - Active   Diabetic polyneuropathy associated with type 2 diabetes mellitus, [SNOMED-CT: 943292535] 2/22/2022 - Active   Current moderate episode of major depressive disorder without prior episode, [SNOMED-CT: 06294915] 1/19/2023 - Active   CHF exacerbation, [SNOMED-CT: 940975504] 1/31/2023 - Active   Acute on chronic congestive heart failure, unspecified heart failure type, [SNOMED-CT: 137709316] 1/31/2023 - Active   Acute pulmonary edema, [SNOMED-CT: 64185672] 1/31/2023 - Active   Acute respiratory failure with hypoxia, [SNOMED-CT: 24956957] 1/31/2023 - Active   Pleural effusion, [SNOMED-CT: 09878719] 1/31/2023 - Active   Multiple subsegmental pulmonary emboli without acute cor pulmonale, [SNOMED-CT: 44481935] 1/31/2023 - Active   Acute deep vein thrombosis (DVT) of popliteal vein of right lower extremity, [SNOMED-CT: 659334607909631] 1/31/2023 - Active   Acute on chronic congestive heart failure, unspecified heart failure type (HCC), [SNOMED-CT: 755550034] 2/15/2023 - Active     ENCOUNTER DIAGNOSIS    ENCOUNTER DIAGNOSIS  Problem Effective Dates Date resolved Problem Status   Atrial fibrillation with RVR, [SNOMED-CT: 283104593788603] 5/25/2023 - Active   History of pulmonary embolus (PE) - Hx, [SNOMED-CT: 017630922] 2/15/2023 - Active   Bradycardia, [SNOMED-CT: 03157678] 7/9/2021 - Active   Hypertension (HTN), primary, [SNOMED-CT: 58160887] 7/9/2021 - Active   Hyperlipidemia (unspecified), [SNOMED-CT: 40023502] 7/9/2021 - Active   Pulmonary hypertension, not otherwise specified or secondary, [SNOMED-CT: 90081270] 7/9/2021 - Active     VITAL SIGNS    VITAL SIGNS  Date / Time: 7/26/2024   BP Systolic 150 mmHg   BP Diastolic 60 mmHg   Height 66 inches   Weight  136 lbs   Pulse Rate 63 bpm   BSA (Body Surface Area) 1.7 cc/m2   BMI (Body Mass Index) 21.9 cc/m2   Blood Pressure 150 / 60 mmHg     PHYSICAL EXAMINATION    PHYSICAL EXAMINATION  Header Details   Vitals Right Arm Sitting  / 60 mmHg, Pulse rate 63 bpm, Height in 5' 6\", BMI: 21.9, Weight in 136 lbs (or) 61.69 kgs, BSA : 1.7 cc/m²   General Appearance No Acute Distress   Cardiovascular s1, s2 reg, systolic murmur,     ALLERGIES, ADVERSE REACTIONS, ALERTS    No data available    MEDICATIONS ADMINISTERED DURING VISIT    No data available    MEDICATIONS    MEDICATIONS  Medication Start Date Route/Frequency Status   acetaminophen 500 mg capsule, [RxNorm: 405397] 7/26/2024 Take 2 capsules orally every 6 hours. Active   allopurinoL 100 mg tablet, [RxNorm: 468009] 12/20/2023 Take 1 tablet orally once a day. Active   amiodarone 200 mg tablet, [RxNorm: 990113] 12/20/2023 Take 1 tablet orally once a day. Active   carvediloL 12.5 mg tablet, [RxNorm: 499241] 7/26/2024 Take 1 tablet orally 2 times a day. Active   dilTIAZem  mg tablet,extended release 24 hr, [RxNorm: 416937] 2/15/2024 Take 1 tablet orally once a day. Active   Eliquis 2.5 mg tablet, [RxNorm: 6973437] 7/26/2024 Take 1 tablet orally 2 times a day. Active   ESCITALOPRAM 5 MG Oral Tab, [RxNorm: 136112] 2/15/2023 TAKE 1 TABLET EVERY DAY Active   GABAPENTIN 100 MG Oral Cap, [RxNorm: 174342] 2/15/2023 TAKE 1 CAPSULE THREE TIMES DAILY Active   HumaLOG KwikPen (U-100) Insulin 100 unit/mL subcutaneous, [RxNorm: 4682623] 7/26/2024 Inject (subcutaneous) once a day. Active   HYDROcodone 5 mg-acetaminophen 300 mg tablet, [RxNorm: 984370] 7/26/2024 Take 1 tablet orally every 8 hours as needed. Active   Lantus Solostar U-100 Insulin 100 unit/mL (3 mL) subcutaneous pen, [RxNorm: 405920] 7/26/2024 Inject (subcutaneous) once a day. Active   levothyroxine 75 MCG Oral Tab, [RxNorm: 949234] 2/15/2023 Take 1 tablet (75 mcg total) by mouth before breakfast. Active   simvastatin  (ZOCOR) 20 MG tablet, [RxNorm: 625342] 11/4/2021 TAKE 1 TABLET EVERY NIGHT Active   torsemide 20 mg tablet, [RxNorm: 403417] 7/26/2024 Take 1 tablet orally once a day. Active   tuberculin , [RxNorm: 0] 7/26/2024 5 units once daily Active     ASSESSMENT    At this time I like to start the patient in our [facility] hypertension clinic. Continue current blood pressure meds I will allow for permissive hypertension here. Blood pressures under 150 mmHg for the systolic blood pressure are acceptable especially while she has a flareup from her gout. Follow-up with me in 6 months. No additional cardiac testing needed at present.     FAMILY HISTORY    FAMILY HISTORY  Relationship Age Diagnosis   Father 0 Acute myocardial infarction, unspecified   Mother 0 Acute myocardial infarction, unspecified     GENERAL STATUS    No data available    PAST MEDICAL HISTORY    PAST MEDICAL HISTORY  Problem Date diagonsed Date resolved Status   Atrial fibrillation with RVR, [SNOMED-CT: 174100863550472] 5/25/2023 - Active   History of pulmonary embolus (PE) - Hx, [SNOMED-CT: 866145269] 2/15/2023 - Active   Bradycardia, [SNOMED-CT: 57093947] 7/9/2021 - Active   Hypertension (HTN), primary, [SNOMED-CT: 91905396] 7/9/2021 - Active   Hyperlipidemia (unspecified), [SNOMED-CT: 23072091] 7/9/2021 - Active   Pulmonary hypertension, not otherwise specified or secondary, [SNOMED-CT: 52903102] 7/9/2021 - Active   DM Type 2 (diabetes mellitus) , [SNOMED-CT: 28418875] 7/9/2021 - Active   CKD (chronic kidney disease) stage 3, GFR 30-59 ml/min, [SNOMED-CT: 792810470] 7/9/2021 - Active   Shingles, [SNOMED-CT: 8737879] 7/9/2021 - Active   Essential hypertension, benign, [SNOMED-CT: 1980524] 5/28/2021 - Active   CKD (chronic kidney disease), stage III, [SNOMED-CT: 952412280] 5/28/2021 - Active   Hyperlipidemia, [SNOMED-CT: 21593623] 5/28/2021 - Active   Acquired hypothyroidism, [SNOMED-CT: 836955345] 3/24/2021 - Active   Osteopenia, [OMED-CT: 238283386] 3/24/2021 -  Active   Symptomatic bradycardia, [SNOMED-CT: 66215983] 5/3/2021 - Active   Diabetic polyneuropathy associated with type 2 diabetes mellitus, [SNOMED-CT: 752318907] 2/22/2022 - Active   Current moderate episode of major depressive disorder without prior episode, [SNOMED-CT: 51900857] 1/19/2023 - Active   CHF exacerbation, [SNOMED-CT: 477294438] 1/31/2023 - Active   Acute on chronic congestive heart failure, unspecified heart failure type, [SNOMED-CT: 829257801] 1/31/2023 - Active   Acute pulmonary edema, [SNOMED-CT: 35888053] 1/31/2023 - Active   Acute respiratory failure with hypoxia, [SNOMED-CT: 52464441] 1/31/2023 - Active   Pleural effusion, [SNOMED-CT: 93471263] 1/31/2023 - Active   Multiple subsegmental pulmonary emboli without acute cor pulmonale, [SNOMED-CT: 67613460] 1/31/2023 - Active   Acute deep vein thrombosis (DVT) of popliteal vein of right lower extremity, [SNOMED-CT: 253014490875042] 1/31/2023 - Active   Acute on chronic congestive heart failure, unspecified heart failure type (HCC), [SNOMED-CT: 683294868] 2/15/2023 - Active     HISTORY OF PRESENT ILLNESS    Hospital follow-up visit for Ms. Salcido. 84-year-old with history of diabetes that is not poorly controlled, hypertension, sick sinus syndrome/A-fib status post pacemaker, moderate mitral regurg and hyperlipidemia. She has chronic kidney disease and was in the hospital for uncontrolled hypertension and heart failure. She is now a week status post discharge and is still in acute rehab. She is got gout flareup on the right foot which is still painful and her sugars are poorly controlled. With that her blood pressure slightly elevated at 150/60 mmHg. Denies chest pain. Using chronic oxygen for COPD.     IMMUNIZATIONS    No data available    PLAN OF CARE    PLAN OF CARE  Planned Care Date   Referral Visit - Estevan Bah ([email or web address]) : 1/1/1900   Follow up visit - Lucas Fong MD 1/1/1900     PROCEDURES    No data available     RESULTS    RESULTS  Name Result Date Location - Ordered By   PLATELETS [LOINC: 777-3] 208.0 10(3)uL 01/31/2023 04:34:00 AM [facility] ([facility])  Address: [address] [place] [address]  [address]  tel:   PTT [LOINC: 67254-5] 73.8 seconds [High] 01/29/2023 01:37:00 AM [facility] ([facility])  Address: [address] [place] [address]  [address]  tel:   GLUCOSE [LOINC: 2339-0] 321 mg/dL [High] 01/28/2023 12:35:00 PM [facility] ([facility])  Address: [address] [place] [address]  [address]  tel:   SODIUM [LOINC: 2951-2] 135 mmol/L [Low] 01/28/2023 12:35:00 PM [facility] ([facility])  Address: [address] [place] [address]  [address]  tel:   POTASSIUM [LOINC: 2823-3] 4.1 mmol/L 01/28/2023 12:35:00 PM [facility] ([facility])  Address: [address] [place] [address]  [address]  tel:   CHLORIDE [LOINC: 2075-0] 99 mmol/L 01/28/2023 12:35:00 PM [facility] ([facility])  Address: [address] [place] [address]  [address]  tel:   CO2 [LOINC: 2028-9] 32.0 mmol/L 01/28/2023 12:35:00 PM [facility] ([facility])  Address: [address] [place] [address]  [address]  tel:   ANION GAP [LOINC: 33037-3] 4 mmol/L 01/28/2023 12:35:00 PM [facility] ([facility])  Address: [address] [place] [address]  [address]  tel:   BUN [LOINC: 6299-2] 42 mg/dL [High] 01/28/2023 12:35:00 PM [facility] ([facility])  Address: [address] [place] [address]  [address]  tel:   CREATININE [LOINC: 60496-5] 1.42 mg/dL [High] 01/28/2023 12:35:00 PM [facility] ([facility])  Address: [address] [place] [address]  [address]  tel:   CALCIUM [LOINC: 37163-1] 8.9 mg/dL 01/28/2023 12:35:00 PM [facility] ([facility])  Address: [address] [place] [address]  [address]  tel:   OSMOLALITY CALCULATED  [LOINC: 40709-4] 303 mOsm/kg [High] 01/28/2023 12:35:00 PM [facility] ([facility])  Address: [address] [place] [address]  tel:   E GFR CR [LOINC: 56771-9] 36 mL/min/1.73m2 [Low] 01/28/2023 12:35:00 PM [facility] ([facility])  Address: [address] [place] [address]  tel:   Ambulatory Telemetry Monitor 1.This is an excellent quality study. 2.Predominant rhythm is normal sinus rhythm. 3.The minimum heart rate recorded was 43 beats / minute. The maximum heart rate is 167 beats / minute. The mean heart rate is 85 beats / minute. 4.Afib burden 22% at times with RVR. 5.This monitor shows a pattern of tachy-olivia with sinus rates in the 50s and AF with RVR. 5/4/2023 10:00:00 AM Becky Carrillo MD     REVIEW OF SYSTEMS    REVIEW OF SYSTEMS  Header Details   Cardiovascular No history of Chest pain, MARIE, Palpitations, Syncope, PND, Orthopnea, Edema, Claudication     SOCIAL HISTORY    SOCIAL HISTORY  Social History Element Description Effective Dates   Smoking status Former smoker -     FUNCTIONAL STATUS    No data available    MEDICAL EQUIPMENT    No data available    Goals Sections    No data available    REASON FOR REFERRAL               Health Concerns Section    No data available    COGNITIVE/MENTAL STATUS    No data available    Patient Demographics    Patient Demographics  Patient Address Patient Name Communication   [address] [place] [address] Ciarrasalome Salcido [phone] (Mobile)     Patient Demographics  Language Race / Ethnicity Marital Status   English - Spoken (Preferred) White / Not  or       Document Information    Primary Care Provider Other Service Providers Document Coverage Dates   Lucas Andersonsckat  NPI: [id number]  [phone] (Work)  [address] [place] [address], [address] [place] [address]  [place] [address]  Interpreting Physicians  [facility]  [phone]  (Work)  [address] [place] [address] Nayeli Stephens  NPI: [id number]  [phone] (Work)  [address] [place] [address]  [place] [address]  Nurses Jul. 26, 2024July 26, 2024      Organization   [facility]  [phone] (Work)  [address] [place] [address] [id number] [place] [address]     Encounter Providers Encounter Date    Jul. 26, 2024July 26, 2024     Legal Authenticator    Lucas Fong  NPI: [id number]  [phone] (Work)  [address] [place] [address]  [place] [address]

## 2024-10-23 ENCOUNTER — APPOINTMENT (OUTPATIENT)
Dept: CV DIAGNOSTICS | Facility: HOSPITAL | Age: 84
End: 2024-10-23
Attending: NURSE PRACTITIONER
Payer: MEDICARE

## 2024-10-23 VITALS
HEART RATE: 61 BPM | DIASTOLIC BLOOD PRESSURE: 55 MMHG | RESPIRATION RATE: 20 BRPM | TEMPERATURE: 98 F | SYSTOLIC BLOOD PRESSURE: 134 MMHG | WEIGHT: 182.13 LBS | OXYGEN SATURATION: 93 % | BODY MASS INDEX: 31 KG/M2

## 2024-10-23 LAB
ANION GAP SERPL CALC-SCNC: 6 MMOL/L (ref 0–18)
BUN BLD-MCNC: 87 MG/DL (ref 9–23)
CALCIUM BLD-MCNC: 9.5 MG/DL (ref 8.7–10.4)
CHLORIDE SERPL-SCNC: 103 MMOL/L (ref 98–112)
CO2 SERPL-SCNC: 25 MMOL/L (ref 21–32)
CREAT BLD-MCNC: 3.38 MG/DL
EGFRCR SERPLBLD CKD-EPI 2021: 13 ML/MIN/1.73M2 (ref 60–?)
GLUCOSE BLD-MCNC: 115 MG/DL (ref 70–99)
GLUCOSE BLD-MCNC: 119 MG/DL (ref 70–99)
GLUCOSE BLD-MCNC: 287 MG/DL (ref 70–99)
GLUCOSE BLD-MCNC: 80 MG/DL (ref 70–99)
NT-PROBNP SERPL-MCNC: 4421 PG/ML (ref ?–450)
OSMOLALITY SERPL CALC.SUM OF ELEC: 306 MOSM/KG (ref 275–295)
POTASSIUM SERPL-SCNC: 5.2 MMOL/L (ref 3.5–5.1)
SODIUM SERPL-SCNC: 134 MMOL/L (ref 136–145)

## 2024-10-23 PROCEDURE — 93017 CV STRESS TEST TRACING ONLY: CPT | Performed by: NURSE PRACTITIONER

## 2024-10-23 PROCEDURE — 99233 SBSQ HOSP IP/OBS HIGH 50: CPT | Performed by: INTERNAL MEDICINE

## 2024-10-23 PROCEDURE — 99239 HOSP IP/OBS DSCHRG MGMT >30: CPT | Performed by: HOSPITALIST

## 2024-10-23 PROCEDURE — 78452 HT MUSCLE IMAGE SPECT MULT: CPT | Performed by: NURSE PRACTITIONER

## 2024-10-23 PROCEDURE — 93018 CV STRESS TEST I&R ONLY: CPT | Performed by: NURSE PRACTITIONER

## 2024-10-23 RX ORDER — REGADENOSON 0.08 MG/ML
INJECTION, SOLUTION INTRAVENOUS
Status: COMPLETED
Start: 2024-10-23 | End: 2024-10-23

## 2024-10-23 RX ORDER — METOCLOPRAMIDE HYDROCHLORIDE 5 MG/ML
5 INJECTION INTRAMUSCULAR; INTRAVENOUS EVERY 8 HOURS PRN
Status: DISCONTINUED | OUTPATIENT
Start: 2024-10-23 | End: 2024-10-23

## 2024-10-23 NOTE — PROGRESS NOTES
[facility]  Nephrology Progress Note    Ciarra Salcido Attending:  Ciera Sawyer MD       Assessment and Plan:    1) CKD 4- likely due to longstanding DM / HTN; c/w bland urine sediment and mild proteinuria. 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 Should improve with higher dose loop diuretics     7) Anemia- primarily due to CKD; will likely need EPO as outpt    Lexiscan today per cards. DC planning      Subjective:  Awake alert no c/o overnight comfortable on 2 L NC O2 (baseline)    Physical Exam:   /51 (BP Location: Left arm)   Pulse 60   Temp 98.8 °F (37.1 °C) (Oral)   Resp 18   Wt 182 lb 1.6 oz (82.6 kg)   SpO2 97%   BMI 31.26 kg/m²   Temp (24hrs), Av.7 °F (36.5 °C), Min:97.3 °F (36.3 °C), Max:98.8 °F (37.1 °C)       Intake/Output Summary (Last 24 hours) at 10/23/2024 1018  Last data filed at 10/23/2024 0800  Gross per 24 hour   Intake 660 ml   Output 1460 ml   Net -800 ml     Wt Readings from Last 3 Encounters:   10/23/24 182 lb 1.6 oz (82.6 kg)   24 157 lb (71.2 kg)   24 157 lb 14.4 oz (71.6 kg)     General: awake alert  HEENT: No scleral icterus, MMM  Neck: Supple, no CASTRO or thyromegaly  Cardiac: Regular rate and rhythm, S1, S2 normal, no murmur or tub  Lungs: Decreased BS at bases bilaterally   Abdomen: Soft, non-tender. + bowel sounds, no palpable organomegaly  Extremities: Without clubbing, cyanosis; minimal LE edema  Neurologic: Cranial nerves grossly intact, moving all extremities  Skin: Warm and dry, no rashes       Labs:   Lab Results   Component Value Date    PGLU 80 10/23/2024       Imaging:  All  imaging studies reviewed.    Meds:   Current Facility-Administered Medications   Medication Dose Route Frequency    cephalexin (Keflex) cap 250 mg  250 mg Oral BID    metoclopramide (Reglan) 5 mg/mL injection 5 mg  5 mg Intravenous Q8H PRN    regadenoson (Lexiscan) 0.4 mg/5mL injection        torsemide (Demadex) tab 40 mg  40 mg Oral Daily    amiodarone (Pacerone) tab 200 mg  200 mg Oral Daily    amLODIPine (Norvasc) tab 5 mg  5 mg Oral Daily    apixaban (Eliquis) tab 2.5 mg  2.5 mg Oral BID    carvedilol (Coreg) tab 25 mg  25 mg Oral BID with meals    dilTIAZem ER (Dilacor XR) 24 hr cap 120 mg  120 mg Oral Daily    escitalopram (Lexapro) tab 5 mg  5 mg Oral Daily    gabapentin (Neurontin) cap 100 mg  100 mg Oral TID    insulin degludec (Tresiba) 100 units/mL flextouch 15 Units  15 Units Subcutaneous Nightly    levothyroxine (Synthroid) tab 75 mcg  75 mcg Oral Before breakfast    atorvastatin (Lipitor) tab 10 mg  10 mg Oral Nightly    melatonin tab 3 mg  3 mg Oral Nightly PRN    acetaminophen (Tylenol Extra Strength) tab 500 mg  500 mg Oral Q4H PRN    polyethylene glycol (PEG 3350) (Miralax) 17 g oral packet 17 g  17 g Oral Daily PRN    sennosides (Senokot) tab 17.2 mg  17.2 mg Oral Nightly PRN    bisacodyl (Dulcolax) 10 MG rectal suppository 10 mg  10 mg Rectal Daily PRN    glucose (Dex4) 15 GM/59ML oral liquid 15 g  15 g Oral Q15 Min PRN    Or    glucose (Glutose) 40% oral gel 15 g  15 g Oral Q15 Min PRN    Or    glucose-vitamin C (Dex-4) chewable tab 4 tablet  4 tablet Oral Q15 Min PRN    Or    dextrose 50% injection 50 mL  50 mL Intravenous Q15 Min PRN    Or    glucose (Dex4) 15 GM/59ML oral liquid 30 g  30 g Oral Q15 Min PRN    Or    glucose (Glutose) 40% oral gel 30 g  30 g Oral Q15 Min PRN    Or    glucose-vitamin C (Dex-4) chewable tab 8 tablet  8 tablet Oral Q15 Min PRN    insulin aspart (NovoLOG) 100 Units/mL FlexPen 1-10 Units  1-10 Units Subcutaneous TID AC and HS    influenza virus trivalent high dose  PF (Fluzone HD) 0.5 mL injection (ages >/= 65 years) 0.5 mL  0.5 mL Intramuscular Prior to discharge         Questions/concerns were discussed with patient and/or family by bedside.          Mary Lares MD  10/23/2024  10:18 AM

## 2024-10-23 NOTE — PLAN OF CARE
Patient is admitted for edema. Pt is AOX4.  VSS, afebrile and denies any pain. Right heel pain with touching. SpO2 maintained on RA. . Denies any SOB, cough. Tele-A paced. PO Eliquis. Reg diet. Denies any n/v/d. Voids. Up with 2/walker.  PO ABX. Stress test done . PO Torsemide. DW. Strict I/O. Left upper arm CGM.  Will continue to monitor. Pt is updated with plan of care.        Problem: Diabetes/Glucose Control  Goal: Glucose maintained within prescribed range  Description: INTERVENTIONS:  - Monitor Blood Glucose as ordered  - Assess for signs and symptoms of hyperglycemia and hypoglycemia  - Administer ordered medications to maintain glucose within target range  - Assess barriers to adequate nutritional intake and initiate nutrition consult as needed  - Instruct patient on self management of diabetes  Outcome: Progressing     Problem: Patient/Family Goals  Goal: Patient/Family Long Term Goal  Description: Patient's Long Term Goal: Stay out of the hospital when dishcharged    Interventions:  - Attend follow up appointments as needed  - Follow medication regimen at home  - See additional Care Plan goals for specific interventions  Outcome: Progressing  Goal: Patient/Family Short Term Goal  Description: Patient's Short Term Goal: Go home    Interventions:   - Tele monitoring  - Monitor labs  - IV lasix BID  - Cardiology to see  - Daily weights   - Stool sample to be sent   - Wound care to see  - See additional Care Plan goals for specific interventions  Outcome: Progressing     Problem: CARDIOVASCULAR - ADULT  Goal: Maintains optimal cardiac output and hemodynamic stability  Description: INTERVENTIONS:  - Monitor vital signs, rhythm, and trends  - Monitor for bleeding, hypotension and signs of decreased cardiac output  - Evaluate effectiveness of vasoactive medications to optimize hemodynamic stability  - Monitor arterial and/or venous puncture sites for bleeding and/or hematoma  - Assess quality of pulses, skin  color and temperature  - Assess for signs of decreased coronary artery perfusion - ex. Angina  - Evaluate fluid balance, assess for edema, trend weights  Outcome: Progressing  Goal: Absence of cardiac arrhythmias or at baseline  Description: INTERVENTIONS:  - Continuous cardiac monitoring, monitor vital signs, obtain 12 lead EKG if indicated  - Evaluate effectiveness of antiarrhythmic and heart rate control medications as ordered  - Initiate emergency measures for life threatening arrhythmias  - Monitor electrolytes and administer replacement therapy as ordered  Outcome: Progressing     Problem: RESPIRATORY - ADULT  Goal: Achieves optimal ventilation and oxygenation  Description: INTERVENTIONS:  - Assess for changes in respiratory status  - Assess for changes in mentation and behavior  - Position to facilitate oxygenation and minimize respiratory effort  - Oxygen supplementation based on oxygen saturation or ABGs  - Provide Smoking Cessation handout, if applicable  - Encourage broncho-pulmonary hygiene including cough, deep breathe, Incentive Spirometry  - Assess the need for suctioning and perform as needed  - Assess and instruct to report SOB or any respiratory difficulty  - Respiratory Therapy support as indicated  - Manage/alleviate anxiety  - Monitor for signs/symptoms of CO2 retention  Outcome: Progressing     Problem: SKIN/TISSUE INTEGRITY - ADULT  Goal: Skin integrity remains intact  Description: INTERVENTIONS  - Assess and document risk factors for pressure ulcer development  - Assess and document skin integrity  - Monitor for areas of redness and/or skin breakdown  - Initiate interventions, skin care algorithm/standards of care as needed  Outcome: Progressing

## 2024-10-23 NOTE — CM/SW NOTE
AVS sent to [facility] notifying of discharge today.     [facility]  Phone # [phone]  Fax # [phone]    Angelika CONTRERAS, LSW  Discharge Planner

## 2024-10-23 NOTE — PLAN OF CARE
Lexiscan myoview reassuring with normal perfusion with EF 67%    Stable for discharge from cardiac standpoint. Cardiology will sign off. Please call with further questions.    Yecenia Chaves PA-C

## 2024-10-23 NOTE — PLAN OF CARE
Assumed care of patient 1930.    Patient alert and oriented x4. 3L O2 via n.c. to keep sats above 90%. A paced on tele. Incontinent, purewick in place. No pain reported by patient at this time. Up x1 stand/pivot. Call light within reach. Fall precautions in place.    Plan  Nuc stress test  Torsemide    Problem: Patient/Family Goals  Goal: Patient/Family Long Term Goal  Description: Patient's Long Term Goal: Stay out of the hospital when dishcharged    Interventions:  - Attend follow up appointments as needed  - Follow medication regimen at home  - See additional Care Plan goals for specific interventions  Outcome: Progressing  Goal: Patient/Family Short Term Goal  Description: Patient's Short Term Goal: Go home    Interventions:   - Tele monitoring  - Monitor labs  - IV lasix BID  - Cardiology to see  - Daily weights   - Stool sample to be sent   - Wound care to see  - See additional Care Plan goals for specific interventions  Outcome: Progressing     Problem: CARDIOVASCULAR - ADULT  Goal: Maintains optimal cardiac output and hemodynamic stability  Description: INTERVENTIONS:  - Monitor vital signs, rhythm, and trends  - Monitor for bleeding, hypotension and signs of decreased cardiac output  - Evaluate effectiveness of vasoactive medications to optimize hemodynamic stability  - Monitor arterial and/or venous puncture sites for bleeding and/or hematoma  - Assess quality of pulses, skin color and temperature  - Assess for signs of decreased coronary artery perfusion - ex. Angina  - Evaluate fluid balance, assess for edema, trend weights  Outcome: Progressing  Goal: Absence of cardiac arrhythmias or at baseline  Description: INTERVENTIONS:  - Continuous cardiac monitoring, monitor vital signs, obtain 12 lead EKG if indicated  - Evaluate effectiveness of antiarrhythmic and heart rate control medications as ordered  - Initiate emergency measures for life threatening arrhythmias  - Monitor electrolytes and administer  replacement therapy as ordered  Outcome: Progressing     Problem: SKIN/TISSUE INTEGRITY - ADULT  Goal: Skin integrity remains intact  Description: INTERVENTIONS  - Assess and document risk factors for pressure ulcer development  - Assess and document skin integrity  - Monitor for areas of redness and/or skin breakdown  - Initiate interventions, skin care algorithm/standards of care as needed  Outcome: Progressing

## 2024-10-23 NOTE — OCCUPATIONAL THERAPY NOTE
OCCUPATIONAL THERAPY EVALUATION - INPATIENT     Room Number: 8612/8612-A  Evaluation Date: 10/23/2024  Type of Evaluation: Initial  Presenting Problem: Acute congestive heart failure    Physician Order: IP Consult to Occupational Therapy  Reason for Therapy: ADL/IADL Dysfunction and Discharge Planning    OCCUPATIONAL THERAPY ASSESSMENT   Patient is currently functioning near baseline with toileting and lower body dressing. Prior to admission, patient's baseline is MIN A for bathing & MOD I for toileting & dressing.  Patient is requiring CGA for LB dressing & sit to stand toilet transfer as a result of the following impairments: decreased endurance and impaired motor planning. Occupational Therapy will continue to follow for duration of hospitalization.    Patient will benefit from continued skilled OT Services at discharge to promote prior level of function.  Anticipate patient will return home with home health OT    History Related to Current Admission: Patient is a 84 year old female admitted on 10/21/2024 with Presenting Problem: Acute congestive heart failure. Co-Morbidities : CHF, CKD, DM, afib, pacemaker, DVT, PE, covid    WEIGHT BEARING RESTRICTION       Recommendations for nursing staff:   Transfers: CGA w/ RW & Assist x1 to w/c   Toileting location: toilet or bedside commode    EVALUATION SESSION:  Patient Start of Session: Seated in chair getting blood drawn.     FUNCTIONAL TRANSFER ASSESSMENT  Sit to Stand: Chair  Chair: Supervision    BED MOBILITY  Scooting: Scooted to edge of chair.    BALANCE ASSESSMENT  Static Sitting: Supervision  Static Standing: Stand-by Assist    FUNCTIONAL ADL ASSESSMENT  LB Dressing Seated: Contact Guard Assist (Pt. demonstarted LB dressing by flexing trunk toward feet and graspinf sock to pull them up on BLE.)  Toileting Standing: Contact Guard Assist (Pt. simulated toilet transfer by completing sit to stand/ stand to sit transfer from chair.)    ACTIVITY TOLERANCE: Pt. Started  session w/ 2L O2. Pt. Participate in standing tolerance activity for about 3 minutes due to being tired to ambulate. Toward end of the standing tolerance activity O2 dropped to high 80's. Pt. Sat back down and took deep breaths. Pt. O2 was increased to 3L and levels increased back to 98. Pt. Performed 10 resp of shoulder rolls & shoulder shrugs at end of session to increase lung capacity.                          O2 SATURATIONS       COGNITION  Overall Cognitive Status:  WFL - within functional limits  Requires extra time to answer questions.    Upper Extremity   ROM: within functional limits   Strength: within functional limits   Coordination  Gross motor: NT  Fine motor: WNF  Sensation: Light touch:  intact    EDUCATION PROVIDED  Patient Education : Role of Occupational Therapy; Plan of Care; Discharge Recommendations  Patient's Response to Education: Verbalized Understanding    Equipment used: RW  Demonstrates functional use,      Therapist comments: Pt. Just arrived from stress test & did not want to ambulate to bathroom. Pt. Participated in pulmonary exercises to increase air     Patient End of Session: Up in chair;With  staff;Needs met;Call light within reach;All patient questions and concerns addressed;Hospital anti-slip socks;Alarm set    OCCUPATIONAL PROFILE    HOME SITUATION  Type of Home: Assisted living facility  Home Layout: One level  Lives With: Staff 24 hours    Toilet and Equipment: Comfort height toilet;Grab bar  Shower/Tub and Equipment: Walk-in shower;Shower chair                Patient Regularly Uses: Home O2;Rolling walker;Wheelchair    Prior Level of Function: Pt. Lives in a intermediate. Pt. Stated that she receives assist during showering; specifically washing her hair but gets dressed & toilets MOD I. Pt. Transfers to w/c by herself or w/ MIN A. Pt. States that she only ambulates w/ RW during PT.      SUBJECTIVE   Pt. Stated that she felt tired after standing for several minutes.     PAIN  ASSESSMENT  Rating: Unable to rate  Location: Did not state pain at this time       OBJECTIVE  Precautions: Bed/chair alarm  Fall Risk: High fall risk    ASSESSMENTS    AM-PAC ‘6-Clicks’ Inpatient Daily Activity Short Form  -   Putting on and taking off regular lower body clothing?: A Little  -   Bathing (including washing, rinsing, drying)?: A Lot  -   Toileting, which includes using toilet, bedpan or urinal? : A Little  -   Putting on and taking off regular upper body clothing?: A Little  -   Taking care of personal grooming such as brushing teeth?: A Little  -   Eating meals?: A Little    AM-PAC Score:  Score: 17  Approx Degree of Impairment: 50.11%  Standardized Score (AM-PAC Scale): 37.26    ADDITIONAL TESTS     NEUROLOGICAL FINDINGS      COGNITION ASSESSMENTS     PLAN  OT Device Recommendations: TBD  OT Treatment Plan: Balance activities;Energy conservation/work simplification techniques;Endurance training;ADL training;UE strengthening/ROM  Rehab Potential : Good  Frequency: 3-5x/week  Number of Visits to Meet Established Goals: 5    ADL Goals   Patient will perform lower body dressing:  with supervision and while in chair  Patient will perform toileting: with supervision and with grab bars    Functional Transfer Goals  Patient will transfer to toilet:  with supervision    UE Exercise Program Goal  Patient will be supervision with bilateral AROM HEP (home exercise program).      Patient Evaluation Complexity Level:   Occupational Profile/Medical History LOW - Brief history including review of medical or therapy records    Specific performance deficits impacting engagement in ADL/IADL LOW  1 - 3 performance deficits    Client Assessment/Performance Deficits LOW - No comorbidities nor modifications of tasks    Clinical Decision Making LOW - Analysis of occupational profile, problem-focused assessments, limited treatment options    Overall Complexity LOW     OT Session Time: 15 minutes  Self-Care Home Management:  5 minutes  Therapeutic Activity: 8 minutes  Neuromuscular Re-education: 0 minutes  Therapeutic Exercise: 0 minutes  Cognitive Skills: 0 minutes  Sensory Integrative: 0 minutes  Orthotic Management and Trainin minutes  Can add/delete any of these

## 2024-10-23 NOTE — CM/SW NOTE
MARTIN placed call to [facility] again to inform that pt is being discharged. NAOMI never called this SW back. Spoke with NAOMI Yates - informed pt is medically cleared to return and that the RN will call report. MARTIN to send AVS to [facility].     Report # [phone]    Angelika CONTRERAS, LSW  Discharge Planner

## 2024-10-23 NOTE — PAYOR COMM NOTE
--------------  ADMISSION REVIEW     Payor: MANDO GREGORY O  Subscriber #:  [id number]  Authorization Number: [id number]    Admit date: 10/21/24  Admit time:  4:57 PM       History   HPI  Patient presents with swelling, weight gain and dyspnea.  The patient's daughter gives much of the history.  She states that they have been in contact with her daughter as her weight keeps going up and she was told to increase her oral diuretic for several days.  It seemed to help temporarily but then the swelling starts back up again.  She does not feel like she is putting out as much urine as she should.  The patient denies any chest pain.  She denies shortness of breath but her daughter states that she always does.  She does admit to feeling better with nasal cannula oxygen.  She has not had a fever.    ED Triage Vitals [10/21/24 1130]   BP (!) 167/67   Pulse 60   Resp 20   Temp 98.1 °F (36.7 °C)   Temp src Oral   SpO2 94 %   O2 Device None (Room air)   Oxygen Therapy  SpO2: 91 %  O2 Device: Nasal cannula  O2 Flow Rate (L/min): 3 L/min      Physical Exam  General: Alert and oriented x3   HEENT: Normocephalic, atraumatic, pupils equal round and reactive to light.  Neck: Supple.  Cardiovascular: Regular rate and rhythm, no murmurs.  Respiratory: Lungs with scattered crackles throughout.  Extremities: Moderate lower extremity edema with intact pedal pulses bilaterally.  Skin: Warm and dry.  Labs Reviewed   COMP METABOLIC PANEL (14) - Abnormal; Notable for the following components:       Result Value    Glucose 194 (*)     Sodium 132 (*)     Potassium 6.0 (*)     BUN 79 (*)     Creatinine 3.80 (*)     Calculated Osmolality 303 (*)     eGFR-Cr 11 (*)     AST 41 (*)     ALT 64 (*)     All other components within normal limits   PRO BETA NATRIURETIC PEPTIDE - Abnormal; Notable for the following components:    Pro-Beta Natriuretic Peptide 7,780 (*)     All other components within normal limits   CBC WITH DIFFERENTIAL WITH PLATELET -  Abnormal; Notable for the following components:    RBC 3.54 (*)     HGB 9.9 (*)     HCT 31.6 (*)     Lymphocyte Absolute 0.89 (*)     All other components within normal limits   URINALYSIS, ROUTINE - Abnormal; Notable for the following components:    Blood Urine 1+ (*)     Protein Urine 30 (*)     Nitrite Urine 1+ (*)     WBC Urine 6-10 (*)     RBC Urine >10 (*)     Bacteria Urine Rare (*)     Squamous Epi. Cells Few (*)    EKG    Rate, intervals and axes as noted on EKG Report.  Rate: 60  Rhythm: AV dual paced rhythm with prolonged AV conduction  Reading: Left bundle branch block pattern    XR CHEST AP PORTABLE  (CPT=71045)  Result Date: 10/21/2024  PROCEDURE:  XR CHEST AP PORTABLE  (CPT=71045)  TECHNIQUE:  AP chest radiograph was obtained.  COMPARISON:  [facility], XR, XR CHEST AP PORTABLE  (CPT=71045), 9/09/2024, 4:15 PM.  INDICATIONS:  weight gain and edema Hx of CHF  PATIENT STATED HISTORY: (As transcribed by Technologist)  Patient offered no additional history at this time.    FINDINGS:  Heart size is within normal limits.  Pleural spaces appear clear.  Mediastinal and hilar contours are normal.  No focal consolidation.  Mild prominence of the pulmonary vasculature may reflect mild vascular congestion and volume overload.  Support devices appear unchanged.          CONCLUSION:  See above.   LOCATION:  [place]      Dictated by (CST): Evangelist Layton MD on 10/21/2024 at 1:09 PM     Finalized by (CST): Evangelist Layton MD on 10/21/2024 at 1:09 PM        Medications   furosemide (Lasix) 10 mg/mL injection 40 mg (40 mg Intravenous Given 10/21/24 1257)   sodium bicarbonate injection 50 mEq (50 mEq Intravenous Given 10/21/24 1410)   patiromer (Veltassa) 8.4 g oral packet 8.4 g (8.4 g Oral Given 10/21/24 1432)   calcium gluconate 1g in 100mL iso-NaCl IVPB premix (0 g Intravenous Stopped 10/21/24 1433)     Patient was given Lasix on arrival.  She was given supplemental nasal cannula oxygen as she was mildly hypoxic.  When her  potassium came back elevated she was given sodium bicarbonate, calcium gluconate and oral Veltassa additionally.  Admission disposition: 10/21/2024  2:31 PM    Disposition and Plan     Clinical Impression:  1. Acute congestive heart failure, unspecified heart failure type (HCC)    2. Acute renal failure superimposed on chronic kidney disease, unspecified acute renal failure type, unspecified CKD stage (HCC)    3. Hyperkalemia       Disposition:  Admit  10/21/2024  2:31 pm      History and Physical   History of Present Illness:    Ciarra Salcido is a 84 year old female with a PMH of diastolic heart failure, afib, CKDIV, hypothyroidism, and DMII presented to ED due to increased leg swelling over the past 7 days. Denies trauma or injury. Both legs equally swollen.     Lab 10/21/24  1229   RBC 3.54*   HGB 9.9*   HCT 31.6*   MCV 89.3   MCH 28.0   MCHC 31.3   RDW 15.7   NEPRELIM 6.41   WBC 8.2   .0      Lab 10/21/24  1229   *   BUN 79*   CREATSERUM 3.80*   EGFRCR 11*   CA 9.2   ALB 4.4   *   K 6.0*      CO2 22.0   ALKPHO 111   AST 41*   ALT 64*   BILT 0.3   TP 7.8      Lab 10/21/24  1229   TROPHS 16      Lab 10/21/24  1229   PBNP 7,780*       Assessment & Plan:    #Acute on chronic diastolic heart failure  -IV diuresis  -Monitor daily weights and I/Os  -Echo 7/24 reviewed   -Cardiology consulted     #PRISCILLA on CKDIV  #Hyperkalemia  #Possible cardiorenal syndrome   -PRISCILLA may improve with diuresis  -Hyperkalemia treated with sodium bicarb/valtessa/calcium gluconate in ED  -Repeat labs this evening  -Nephrology consulted     #Transaminitis  -? Due to passive liver congestion from #1  -Repeat in AM  -Consider imaging if worsening     #Chronic hypoxic respiratory failure secondary to diastolic heart failure  -O2 needs at baseline      #Right heel ulcer  -Wound care  -Advised OP podiatry f/u   -No evidence of infection at this time     #DMII  -Tresiba/SSI     #Normocytic anemia  -At baseline       #Hypothyroidism  -Synthroid     #PAF with prior DCCV  #S/p PPM  -Amio/BB/CCB/DOAC     #Gout  -Hold allopurinol given PRISCILLA     #DL  -Statin         10/22/24  Lab 10/21/24  1229 10/21/24  1954 10/22/24  0524   * 241* 76   BUN 79* 72* 80*   CREATSERUM 3.80* 3.65* 3.46*   CA 9.2 9.4 9.4   ALB 4.4  --  3.4   * 133* 137   K 6.0* 5.5* 5.1    104 107   CO2 22.0 22.0 26.0   ALKPHO 111  --  87   AST 41*  --  23   ALT 64*  --  42   BILT 0.3  --  0.3   TP 7.8  --  6.3    Microbiology  Hospital Encounter on 10/21/24   1. Urine Culture, Routine     Status: Abnormal (Preliminary result)     Collection Time: 10/21/24 12:42 PM     Specimen: Urine, clean catch   Result Value Ref Range     Urine Culture >100,000 CFU/ML Escherichia coli (A) N/A    Medications:    amiodarone  200 mg Oral Daily    amLODIPine  5 mg Oral Daily    apixaban  2.5 mg Oral BID    carvedilol  25 mg Oral BID with meals    dilTIAZem ER  120 mg Oral Daily    escitalopram  5 mg Oral Daily    gabapentin  100 mg Oral TID    insulin degludec  15 Units Subcutaneous Nightly    levothyroxine  75 mcg Oral Before breakfast    atorvastatin  10 mg Oral Nightly    furosemide  40 mg Intravenous BID (Diuretic)    insulin aspart  1-10 Units Subcutaneous TID AC and HS       Assessment & Plan:    #Acute on chronic diastolic heart failure  -IV diuresis per renal assistance   - daily weights and I/Os  -Echo 7/24 reviewed   -Cardiology consulted     #PRISCILLA on CKDIV  - renal consulted      #Hyperkalemia  - cont diuresis      #Transaminitis  -? Due to passive liver congestion from #1  -Repeat in AM  -Consider imaging if worsening     #Chronic hypoxic respiratory failure secondary to diastolic heart failure  -O2 needs at baseline      #Right heel ulcer  -Wound care  -Advised OP podiatry f/u   -No evidence of infection at this time     #DMII  -Tresiba/SSI     #Normocytic anemia  -At baseline      #Hypothyroidism  -Synthroid     #PAF with prior DCCV  #S/p  PPM  -Amio/BB/CCB/DOAC     #Gout     #DL  -Statin     Medications 10/21 10/22 10/23   amiodarone (Pacerone) tab 200 mg  Dose: 200 mg  Freq: Daily Route: OR  Start: 10/22/24 0930     [id number]      28135        amLODIPine (Norvasc) tab 5 mg  Dose: 5 mg  Freq: Daily Route: OR  Start: 10/22/24 0930     59335      0930        apixaban (Eliquis) tab 2.5 mg  Dose: 2.5 mg  Freq: 2 times daily Route: OR  Start: 10/21/24 2100    87631      77943     27883      26487     2100        atorvastatin (Lipitor) tab 10 mg  Dose: 10 mg  Freq: Nightly Route: OR  Start: 10/21/24 2100    95052      [id number] 2100        carvedilol (Coreg) tab 25 mg  Dose: 25 mg  Freq: 2 times daily with meals Route: OR  Start: 10/21/24 1800    840240      354620     748851      0800 [C]13     1800        dilTIAZem ER (Dilacor XR) 24 hr cap 120 mg  Dose: 120 mg  Freq: Daily Route: OR  Start: 10/21/24 1700   Admin Instructions:   Swallow whole.  Do not chew, break or crush.    (1700)14      900690      0930        escitalopram (Lexapro) tab 5 mg  Dose: 5 mg  Freq: Daily Route: OR  Start: 10/22/24 0930     272162      103952        gabapentin (Neurontin) cap 100 mg  Dose: 100 mg  Freq: 3 times daily Route: OR  Start: 10/21/24 1700    939954     787169      633048     858985     464240      072173     1600     2100        insulin aspart (NovoLOG) 100 Units/mL FlexPen 1-10 Units  Dose: 1-10 Units  Freq: 3 times daily before meals and nightly Route: SC  Start: 10/21/24 1700   Admin Instructions:   Correction Insulin - calculate insulin requirement  Give 1 unit of Novolog (aspart) insulin for every 20 points blood glucose is greater than 140 mg/dL  **DO NOT HOLD OR ALTER INSULIN DOSE WITHOUT A PHYSICIAN ORDER**  Give Novolog/aspart insulin subcutaneously within 30 minutes of scheduled finger-stick time  Notify physician for blood glucose >351mg/dL with time and last dose of correction insulin given.    (1700)24     649729      (0700)26     167234     737698      484425      (0700)30     1100     1600     2100        insulin degludec (Tresiba) 100 units/mL flextouch 15 Units  Dose: 15 Units  Freq: Nightly Route: SC  Start: 10/21/24 2100   Admin Instructions:   This is LONG ACTING insulin.  Continue to give basal insulin (insulin degludec - Tresiba) even if NPO.  DO NOT hold or alter insulin dose without a physician order.    760510      [id number] 2100        levothyroxine (Synthroid) tab 75 mcg  Dose: 75 mcg  Freq: Before breakfast Route: OR  Start: 10/22/24 0700   Admin Instructions:   Give on an empty stomach. Hold tube feedings 1 hour before AND after.     918139      678712        torsemide (Demadex) tab 40 mg  Dose: 40 mg  Freq: Daily Route: OR  Start: 10/22/24 1015     911737      703658           Medications 10/21 10/22 10/23   calcium gluconate 1g in 100mL iso-NaCl IVPB premix  Dose: 1 g  Freq: Once Route: IV  Start: 10/21/24 1412 End: 10/21/24 1433    573502     514305          furosemide (Lasix) 10 mg/mL injection 40 mg  Dose: 40 mg  Freq: Once Route: IV  Start: 10/21/24 1251 End: 10/21/24 1257    556463          patiromer (Veltassa) 8.4 g oral packet 8.4 g  Dose: 8.4 g  Freq: Once Route: OR  Start: 10/21/24 2100 End: 10/21/24 2130   Admin Instructions:   Add to 1/3 cup of water.  Stir thoroughly and drink immediately.  Separate from all other medications by at least 3 hours.    583605          patiromer (Veltassa) 8.4 g oral packet 8.4 g  Dose: 8.4 g  Freq: Once Route: OR  Start: 10/21/24 1359 End: 10/21/24 1432   Admin Instructions:   Add to 1/3 cup of water.  Stir thoroughly and drink immediately.  Separate from all other medications by at least 3 hours.    209314          sodium bicarbonate injection 50 mEq  Dose: 50 mEq  Freq: Once Route: IV  Start: 10/21/24 1357 End: 10/21/24 1410    282911             Medications 10/21 10/22 10/23   furosemide (Lasix) 10 mg/mL injection 40 mg  Dose: 40 mg  Freq: 2 times daily (diuretic) Route: IV  Start: 10/21/24 1700  End: 10/22/24 1007    250239      (0900)45           Vitals (last day)       Date/Time Temp Pulse Resp BP SpO2 Weight O2 Device O2 Flow Rate (L/min) [facility]    10/23/24 0815 98.8 °F (37.1 °C) 60 18 120/51 97 % -- Nasal cannula 3 L/min KT    10/23/24 0546 -- 62 -- -- 94 % 182 lb 1.6 oz (82.6 kg) -- --     10/23/24 0409 97.4 °F (36.3 °C) 61 18 127/53 96 % -- -- --     10/22/24 2355 97.4 °F (36.3 °C) 60 20 104/46 93 % -- -- --     10/22/24 2010 97.3 °F (36.3 °C) 60 18 127/56 94 % -- Nasal cannula 2 L/min [place]    10/22/24 1551 97.5 °F (36.4 °C) 60 20 122/50 90 % -- Nasal cannula 3 L/min PT    10/22/24 1151 97.6 °F (36.4 °C) 60 18 136/59 93 % -- Nasal cannula 3 L/min PT    10/22/24 0812 97.6 °F (36.4 °C) 62 17 126/52 97 % -- Nasal cannula 3 L/min PT    10/22/24 0534 -- 64 -- -- 93 % 180 lb 12.4 oz (82 kg) -- --     10/22/24 0429 98.4 °F (36.9 °C) 63 15 131/57 94 % -- Nasal cannula --

## 2024-10-23 NOTE — PROGRESS NOTES
Progress Note  Ciarra Salcido Patient Status:  Inpatient    1940 MRN [id number]   Location [facility] 8NE-A Attending Ciera Sawyer MD   Hosp Day # 2 PCP JUDY CYR MD     Subjective:  Plan for lexiscan myoview this morning. Edema improved. Denies chest pain. Shortness of breath is at chronic baseline on 2 L NC.     Objective:  /51 (BP Location: Left arm)   Pulse 60   Temp 98.8 °F (37.1 °C) (Oral)   Resp 18   Wt 182 lb 1.6 oz (82.6 kg)   SpO2 97%   BMI 31.26 kg/m²     Intake/Output:    Intake/Output Summary (Last 24 hours) at 10/23/2024 0906  Last data filed at 10/23/2024 0800  Gross per 24 hour   Intake 420 ml   Output 1460 ml   Net -1040 ml       Last 3 Weights   10/23/24 0546 182 lb 1.6 oz (82.6 kg)   10/22/24 0534 180 lb 12.4 oz (82 kg)   10/21/24 1723 180 lb 3.2 oz (81.7 kg)   10/21/24 1130 165 lb (74.8 kg)   24 1021 157 lb (71.2 kg)   24 0503 157 lb 14.4 oz (71.6 kg)   24 0504 158 lb 8.2 oz (71.9 kg)   24 2053 161 lb 6 oz (73.2 kg)   24 1535 150 lb (68 kg)       Labs:  Recent Labs   Lab 10/21/24  1229 10/21/24  1954 10/22/24  0524   * 241* 76   BUN 79* 72* 80*   CREATSERUM 3.80* 3.65* 3.46*   EGFRCR 11* 12* 12*   CA 9.2 9.4 9.4   * 133* 137   K 6.0* 5.5* 5.1    104 107   CO2 22.0 22.0 26.0     Recent Labs   Lab 10/21/24  1229   RBC 3.54*   HGB 9.9*   HCT 31.6*   MCV 89.3   MCH 28.0   MCHC 31.3   RDW 15.7   NEPRELIM 6.41   WBC 8.2   .0         Recent Labs   Lab 10/21/24  1229   TROPHS 16       Diagnostics:   Telemetry: A paced, V sensed    Echo 2024:  1. Left ventricle: The cavity size was normal. Wall thickness was mildly      increased. Systolic function was hyperdynamic. The estimated ejection      fraction was 65-70%, by visual assessment. No diagnostic evidence for      regional wall motion abnormalities. Left ventricular diastolic function      parameters were normal for the patient's age.   2. Right ventricle: The  cavity size was mildly increased. Pacer wire noted      in the right ventricle. Systolic function was normal.   3. Left atrium: The left atrial volume was markedly increased.   4. Right atrium: The atrium was moderately dilated. Pacer wire noted in      right atrium.   5. Aortic valve: There was thickening, consistent with sclerosis. There was      mild regurgitation.   6. Mitral valve: There was mild to moderate regurgitation.   7. Pulmonary arteries: Systolic pressure was moderately increased, in the      range of 50mm Hg to 55mm Hg. Estimated pulmonary artery diastolic      pressure was 17mm Hg.   8. Pericardium, extracardiac: There was no pericardial effusion.   9. Inferior vena cava: The IVC was normal-sized.   Impressions:  This study is compared with previous dated 11/14/2023: No   significant change since prior study.     Review of Systems   Cardiovascular:  Negative for chest pain.   Respiratory:  Negative for shortness of breath.        Physical Exam:  General: Alert and oriented in no apparent distress.  HEENT: Pupils equal. Mucous membranes moist.   Neck: No JVD  Cardiac:  Normal S1 S2, Regular. No murmur  Lungs: Clear without wheezes or crackles    Abdomen: Soft, non-tender, ND  Extremities: mild LE edema  Neurologic: No focal deficits. Normal affect.  Skin: Warm and dry,    Medications:   cephalexin  250 mg Oral BID    torsemide  40 mg Oral Daily    amiodarone  200 mg Oral Daily    amLODIPine  5 mg Oral Daily    apixaban  2.5 mg Oral BID    carvedilol  25 mg Oral BID with meals    dilTIAZem ER  120 mg Oral Daily    escitalopram  5 mg Oral Daily    gabapentin  100 mg Oral TID    insulin degludec  15 Units Subcutaneous Nightly    levothyroxine  75 mcg Oral Before breakfast    atorvastatin  10 mg Oral Nightly    insulin aspart  1-10 Units Subcutaneous TID AC and HS         Assessment:    Volume overload, acute on chronic HFpEF and CKD IV  Mildly decompensated on arrival  Recent TTE 7/2024 with LVEF  65-70%  GDMT: Not on MRA/ARB/ARNI due to CKD IV.   Diuresis per nephrology, now on PO torsemide 40 mg daily  Lexiscan nuc stress test today to evaluate for underlying ischemia as a  of recurrent decompensation/hospital admissions  Paroxysmal atrial fibrillation  Currently A paced on amiodarone. Continue diltiazem and coreg  Anticoagulated on Eliquis  SND s/p DC-PPM  Mild to moderate MR  Moderate pulmonary hypertension - likely WHO III, normal RV function  HTN - controlled on amlodipine, diltiazem, coreg  DM2  Chronic hypoxic respiratory failure, COPD  Anemia of chronic kidney disease    Plan:    Plan for lexiscan myoview today to evaluate for underlying ischemia as  of recurrent decompensation/hospital admissions. If reassuring testing, then will be stable for discharge this afternoon from cardiac standpoint.  Continue current cardiac medications. Volume management per nephrology. Now on PO torsemide 40 mg daily.       Plan of care discussed with patient, RN.    SEBASTIAN Vasques  10/23/2024  9:06 AM

## 2024-10-23 NOTE — PLAN OF CARE
Discharged to [place]  via wheelchair  Accompanied by Support staff   Belongings taken by patient/family  PIV removed  Tele box removed & placed in the drawer  Discharge Navigator completed  Discharge instructions reviewed with patient a bedside  All questions & concerns addressed at his time.   Gave report to JESSICA Yates. Gave 2 medication prescriptions to daughter.         Problem: Diabetes/Glucose Control  Goal: Glucose maintained within prescribed range  Description: INTERVENTIONS:  - Monitor Blood Glucose as ordered  - Assess for signs and symptoms of hyperglycemia and hypoglycemia  - Administer ordered medications to maintain glucose within target range  - Assess barriers to adequate nutritional intake and initiate nutrition consult as needed  - Instruct patient on self management of diabetes  Outcome: Progressing     Problem: Patient/Family Goals  Goal: Patient/Family Long Term Goal  Description: Patient's Long Term Goal: Stay out of the hospital when dishcharged    Interventions:  - Attend follow up appointments as needed  - Follow medication regimen at home  - See additional Care Plan goals for specific interventions  Outcome: Progressing  Goal: Patient/Family Short Term Goal  Description: Patient's Short Term Goal: Go home    Interventions:   - Tele monitoring  - Monitor labs  - IV lasix BID  - Cardiology to see  - Daily weights   - Stool sample to be sent   - Wound care to see  - See additional Care Plan goals for specific interventions  Outcome: Progressing     Problem: CARDIOVASCULAR - ADULT  Goal: Maintains optimal cardiac output and hemodynamic stability  Description: INTERVENTIONS:  - Monitor vital signs, rhythm, and trends  - Monitor for bleeding, hypotension and signs of decreased cardiac output  - Evaluate effectiveness of vasoactive medications to optimize hemodynamic stability  - Monitor arterial and/or venous puncture sites for bleeding and/or hematoma  - Assess quality of pulses, skin  color and temperature  - Assess for signs of decreased coronary artery perfusion - ex. Angina  - Evaluate fluid balance, assess for edema, trend weights  Outcome: Progressing  Goal: Absence of cardiac arrhythmias or at baseline  Description: INTERVENTIONS:  - Continuous cardiac monitoring, monitor vital signs, obtain 12 lead EKG if indicated  - Evaluate effectiveness of antiarrhythmic and heart rate control medications as ordered  - Initiate emergency measures for life threatening arrhythmias  - Monitor electrolytes and administer replacement therapy as ordered  Outcome: Progressing     Problem: RESPIRATORY - ADULT  Goal: Achieves optimal ventilation and oxygenation  Description: INTERVENTIONS:  - Assess for changes in respiratory status  - Assess for changes in mentation and behavior  - Position to facilitate oxygenation and minimize respiratory effort  - Oxygen supplementation based on oxygen saturation or ABGs  - Provide Smoking Cessation handout, if applicable  - Encourage broncho-pulmonary hygiene including cough, deep breathe, Incentive Spirometry  - Assess the need for suctioning and perform as needed  - Assess and instruct to report SOB or any respiratory difficulty  - Respiratory Therapy support as indicated  - Manage/alleviate anxiety  - Monitor for signs/symptoms of CO2 retention  Outcome: Progressing     Problem: SKIN/TISSUE INTEGRITY - ADULT  Goal: Skin integrity remains intact  Description: INTERVENTIONS  - Assess and document risk factors for pressure ulcer development  - Assess and document skin integrity  - Monitor for areas of redness and/or skin breakdown  - Initiate interventions, skin care algorithm/standards of care as needed  Outcome: Progressing

## 2024-10-23 NOTE — CM/SW NOTE
MARTIN placed call to [facility] to update on pt progress. [phone]. Spoke with floor RN. She had to transfer to [facility]. Awaiting call back from DON. Informed of anticipated discharge today.     Angelika CONTRERAS, LSW  Discharge Planner

## 2024-10-23 NOTE — CM/SW NOTE
Received update that daughter can not bring pt an O2 tank. SW verified w/ Baylee at [facility] that we can dispense an O2 tank for discharge. SW placed call to RT at [phone] and requested to dispense an O2 tank.     Angelika CONTRERAS, LSW  Discharge Planner

## 2024-10-23 NOTE — PROGRESS NOTES
[facility]   part of [facility]     Hospitalist Progress Note     Ciarra Salcido Patient Status:  Inpatient    1940 MRN [id number]   Location [facility] 8NE-A Attending Shanika Farias MD   Hosp Day # 2 PCP JUDY CYR MD     Chief Complaint::LE edema   Subjective:     Patient no CP/SOB/N/V.    Objective:    Review of Systems:   A comprehensive review of systems was completed; pertinent positive and negatives stated in subjective.    Vital signs:  Temp:  [97.3 °F (36.3 °C)-97.6 °F (36.4 °C)] 97.4 °F (36.3 °C)  Pulse:  [60-62] 62  Resp:  [17-20] 18  BP: (104-136)/(46-59) 127/53  SpO2:  [90 %-97 %] 94 %    Physical Exam:    General: No acute distress  Respiratory: No wheezes, no rhonchi  Cardiovascular: S1, S2, regular rate and rhythm  Abdomen: Soft, Non-tender, non-distended, positive bowel sounds  Neuro: No new focal deficits.   Extremities: No edema      Diagnostic Data:    Labs:  Recent Labs   Lab 10/21/24  1229   WBC 8.2   HGB 9.9*   MCV 89.3   .0       Recent Labs   Lab 10/21/24  1229 10/21/24  1954 10/22/24  0524   * 241* 76   BUN 79* 72* 80*   CREATSERUM 3.80* 3.65* 3.46*   CA 9.2 9.4 9.4   ALB 4.4  --  3.4   * 133* 137   K 6.0* 5.5* 5.1    104 107   CO2 22.0 22.0 26.0   ALKPHO 111  --  87   AST 41*  --  23   ALT 64*  --  42   BILT 0.3  --  0.3   TP 7.8  --  6.3       Estimated Creatinine Clearance: 10.1 mL/min (A) (based on SCr of 3.46 mg/dL (H)).    Recent Labs   Lab 10/21/24  1229   TROPHS 16       No results for input(s): \"PTP\", \"INR\" in the last 168 hours.               Microbiology    Hospital Encounter on 10/21/24   1. Urine Culture, Routine     Status: Abnormal    Collection Time: 10/21/24 12:42 PM    Specimen: Urine, clean catch   Result Value Ref Range    Urine Culture >100,000 CFU/ML Escherichia coli (A) N/A       Susceptibility    Escherichia coli -  (no method available)     Ampicillin <=2 Sensitive      Cefazolin <=4 Sensitive      Ciprofloxacin  <=0.25 Sensitive      Gentamicin <=1 Sensitive      Meropenem <=0.25 Sensitive      Levofloxacin <=0.12 Sensitive      Nitrofurantoin <=16 Sensitive      Piperacillin + Tazobactam <=4 Sensitive      Trimethoprim/Sulfa <=20 Sensitive          Imaging: Reviewed in Epic.    Medications:    torsemide  40 mg Oral Daily    amiodarone  200 mg Oral Daily    amLODIPine  5 mg Oral Daily    apixaban  2.5 mg Oral BID    carvedilol  25 mg Oral BID with meals    dilTIAZem ER  120 mg Oral Daily    escitalopram  5 mg Oral Daily    gabapentin  100 mg Oral TID    insulin degludec  15 Units Subcutaneous Nightly    levothyroxine  75 mcg Oral Before breakfast    atorvastatin  10 mg Oral Nightly    insulin aspart  1-10 Units Subcutaneous TID AC and HS       Assessment & Plan:      #Acute on chronic diastolic heart failure  -IV diuresis per renal assistance   -daily weights and I/Os  -Echo 7/24 reviewed   -Cardiology consulted  -ST completed and unremarkable for acute changes     #PRISCILLA on CKDIV  -renal consulted   -Cr improving    #Hyperkalemia  -cont diuresis     #Transaminitis  -? Due to passive liver congestion from #1  -improved     #Chronic hypoxic respiratory failure secondary to diastolic heart failure  -O2 needs at baseline      #Right heel ulcer  -Wound care  -Advised OP podiatry f/u   -No evidence of infection at this time     #DMII  -Tresiba/SSI     #Normocytic anemia  -At baseline      #Hypothyroidism  -Synthroid     #PAF with prior DCCV  #S/p PPM  -Amio/BB/CCB/DOAC     #Gout     #DL  -Statin     #E.coli UTI  -present on admission  -start Abx      Supplementary Documentation:     Quality:  DVT Mechanical Prophylaxis:     Early ambuation  DVT Pharmacologic Prophylaxis   Medication    apixaban (Eliquis) tab 2.5 mg                Code Status: DNAR/Selective Treatment  Hutchinson: External urinary catheter in place  Hutchinson Duration (in days):   Central line:    AMINTA:     Discharge is dependent on: curse  At this point Ms. Salcido is  expected to be discharge to: TBD    The 21st Century Cures Act makes medical notes like these available to patients in the interest of transparency. Please be advised this is a medical document. Medical documents are intended to carry relevant information, facts as evident, and the clinical opinion of the practitioner. The medical note is intended as peer to peer communication and may appear blunt or direct. It is written in medical language and may contain abbreviations or verbiage that are unfamiliar.              **Certification      PHYSICIAN Certification of Need for Inpatient Hospitalization - Initial Certification    Patient will require inpatient services that will reasonably be expected to span two midnight's based on the clinical documentation in H+P.   Based on patients current state of illness, I anticipate that, after discharge, patient will require TBD.

## 2024-10-24 ENCOUNTER — PATIENT OUTREACH (OUTPATIENT)
Dept: CASE MANAGEMENT | Age: 84
End: 2024-10-24

## 2024-10-24 NOTE — PROGRESS NOTES
Hospital follow up.    Last A1C Value: 9.3% Date: [8/13/2024]    Estevan Bah MD  Family Medicine  [facility]  [address]  [place] [address]  [phone]  Thursday 10/31 @11:30  Existing appointment.    ISRAEL Azul  Diabetes Management  [facility]  [address]  [place] [address]  [phone]  Wednesday 1/22 @1  Existing appointment.    Patient's daughter is happy with existing appointments.  Confirmed with patient's daughter.    Closing encounter.

## 2024-10-24 NOTE — DISCHARGE SUMMARY
[facility]     Ciarra Salcido Patient Status:  Inpatient    1940 MRN [id number]   Location [facility] 8NE-A Attending No att. providers found   Hosp Day # 2 PCP JUDY CYR MD     Date of Admission: 10/21/2024  Date of Discharge:  10/23/2024     Discharge Disposition: Assisted Living    Discharge Diagnosis:  Acute on chronic diastolic heart failure  Acute kidney injury on chronic kidney disease stage IV  Hypokalemia  Transaminitis due to passive liver congestion  Chronic hypoxic respiratory failure due to diastolic heart failure  Right heel ulcer   diabetes mellitus type 2  Normocytic anemia    History of Present Illness:   Ciarra Salcido is a 84 year old female with a PMH of diastolic heart failure, afib, CKDIV, hypothyroidism, and DMII presented to ED due to increased leg swelling over the past 7 days. Denies trauma or injury. Both legs equally swollen. Denies CP/SOB/palpitations. Denies N/V/D. Patient called PCP who advised ED evaluation.     Brief Synopsis:   Patient was admitted with leg swelling due to acute CHF exacerbation.  She was placed on IV diuretics with improvement in her symptoms.  Once her was euvolemic she had a stress test with normal perfusion and EF 67%.  She was discharged home in stable condition.    Lace+ Score: 79  59-90 High Risk  29-58 Medium Risk  0-28   Low Risk       TCM Follow-Up Recommendation:  LACE 29-58: Moderate Risk of readmission after discharge from the hospital.      Procedures during hospitalization:   none    Consultants:  Cardiology  Nephrology    Discharge Medication List:     Discharge Medications        START taking these medications        Instructions Prescription details   cephalexin 250 MG Caps  Commonly known as: Keflex      Take 1 capsule (250 mg total) by mouth 2 (two) times daily for 5 days.   Stop taking on: 2024  Quantity: 10 capsule  Refills: 0            CHANGE how you take these medications        Instructions  Prescription details   Insulin Lispro (1 Unit Dial) 100 UNIT/ML Sopn  Commonly known as: HumaLOG KwikPen  What changed:   how much to take  how to take this  when to take this  reasons to take this      Start 2 units subcutaneous TID with each meal.  Do not give if patient skipping that meal.   Quantity: 9 mL  Refills: 1     Torsemide 40 MG Tabs  What changed:   medication strength  how much to take      Take 40 mg by mouth daily.   Quantity: 30 tablet  Refills: 11            CONTINUE taking these medications        Instructions Prescription details   Accu-Chek FastClix Lancets Misc      1 Lancet by Finger stick route. Use as directed.   Refills: 0     Accu-Chek Guide Strp       Refills: 0     Accu-Chek Guide w/Device Kit       Refills: 0     acetaminophen 500 MG Tabs  Commonly known as: Tylenol Extra Strength      Take 2 tablets (1,000 mg total) by mouth every 6 (six) hours as needed for Pain or Fever.   Refills: 0     allopurinol 100 MG Tabs  Commonly known as: Zyloprim      Take 1 tablet (100 mg total) by mouth daily.   Quantity: 30 tablet  Refills: 0     amiodarone 200 MG Tabs  Commonly known as: Pacerone      Take 1 tablet (200 mg total) by mouth daily.   Refills: 0     amLODIPine 5 MG Tabs  Commonly known as: Norvasc      Take 1 tablet (5 mg total) by mouth daily.   Quantity: 30 tablet  Refills: 11     apixaban 2.5 MG Tabs  Commonly known as: Eliquis      Take 1 tablet (2.5 mg total) by mouth 2 (two) times daily.   Quantity: 60 tablet  Refills: 0     carvedilol 25 MG Tabs  Commonly known as: Coreg      Take 1 tablet (25 mg total) by mouth 2 (two) times daily with meals.   Quantity: 60 tablet  Refills: 5     cholecalciferol 25 MCG (1000 UT) Tabs  Commonly known as: Vitamin D3      Take 1 tablet (1,000 Units total) by mouth daily.   Refills: 0     Dexcom G7 Sensor Misc      1 each Every 10 days. Dx: E11.22, N18.31, Z79.4 (patient is using insulin 4x/day)   Quantity: 9 each  Refills: 3     dilTIAZem  MG  Cp24  Commonly known as: Cardizem CD      Take 1 capsule (120 mg total) by mouth daily.   Refills: 0     escitalopram 5 MG Tabs  Commonly known as: Lexapro      TAKE 1 TABLET EVERY DAY   Quantity: 90 tablet  Refills: 3     gabapentin 100 MG Caps  Commonly known as: Neurontin      TAKE 1 CAPSULE THREE TIMES DAILY   Quantity: 270 capsule  Refills: 3     Insulin Pen Needle 32G X 4 MM Misc      Use as directed to inject insulin once daily   Quantity: 100 each  Refills: 0     BD Pen Needle Joanie U/F 32G X 4 MM Misc  Generic drug: Insulin Pen Needle      Inject 1 Pen into the skin As Directed. Dx: E11.65   Quantity: 100 each  Refills: 3     BD AutoShield Duo 30G X 5 MM Misc  Generic drug: Insulin Pen Needle      Inject 1 Pen into the skin in the morning, at noon, in the evening, and at bedtime. Dx: E11.65   Quantity: 360 each  Refills: 3     Lantus SoloStar 100 UNIT/ML Sopn  Generic drug: insulin glargine      Inject 20 Units into the skin nightly.   Quantity: 18 mL  Refills: 0     levothyroxine 75 MCG Tabs  Commonly known as: Synthroid      Take 1 tablet (75 mcg total) by mouth before breakfast.   Quantity: 90 tablet  Refills: 3     Lidocaine (Anorectal) 5 % Gel      Apply 1 Application topically daily as needed (rectal pain).   Refills: 0     simvastatin 20 MG Tabs  Commonly known as: Zocor  Notes to patient: Take by mouth      TAKE 1 TABLET EVERY NIGHT   Quantity: 90 tablet  Refills: 3               Where to Get Your Medications        Please  your prescriptions at the location directed by your doctor or nurse    Bring a paper prescription for each of these medications  cephalexin 250 MG Caps  Torsemide 40 MG Tabs         [place] reviewed[place] n[place]    Follow-up appointment:   Estevan Bah MD  [address] [address] [address] [phone]    Schedule an appointment as soon as possible for a visit in 1 week(s)  Diabetes / Hospital follow up    [facility]  [address]  [address]  [phone]    Go on 2024  Your appointment time is 2:00 pm    Appointments for Next 30 Days 10/24/2024 - 2024        Date Arrival Time Visit Type Length Department Provider     10/31/2024 11:30 AM  EXAM - ESTABLISHED [2844] 30 min [facility] Estevan Bah MD    Patient Instructions:         Location Instructions:     Masks are optional for all patients and visitors, unless otherwise indicated.                      Vital signs:  Temp:  [97.5 °F (36.4 °C)-98.5 °F (36.9 °C)] 97.5 °F (36.4 °C)  Pulse:  [61] 61  Resp:  [20] 20  BP: (134-143)/(55-60) 134/55  SpO2:  [92 %-97 %] 93 %    Physical Exam:    General: No acute distress   Lungs: clear to auscultation  Cardiovascular: S1, S2  Abdomen: Soft      -----------------------------------------------------------------------------------------------  PATIENT DISCHARGE INSTRUCTIONS: See electronic chart    Ciera Sawyer MD    Total time spent on discharge plannin minutes     The  Cures Act makes medical notes like these available to patients in the interest of transparency. Please be advised this is a medical document. Medical documents are intended to carry relevant information, facts as evident, and the clinical opinion of the practitioner. The medical note is intended as peer to peer communication and may appear blunt or direct. It is written in medical language and may contain abbreviations or verbiage that are unfamiliar.

## 2024-10-25 ENCOUNTER — PATIENT OUTREACH (OUTPATIENT)
Dept: CASE MANAGEMENT | Age: 84
End: 2024-10-25

## 2024-10-25 DIAGNOSIS — Z02.9 ENCOUNTERS FOR UNSPECIFIED ADMINISTRATIVE PURPOSE: Primary | ICD-10-CM

## 2024-10-25 NOTE — PROGRESS NOTES
Spoke with patient she states currently the wound doctor was seeing her asked that nurse care  her back.

## 2024-10-28 PROCEDURE — 1111F DSCHRG MED/CURRENT MED MERGE: CPT

## 2024-10-28 RX ORDER — TORSEMIDE 20 MG/1
20 TABLET ORAL 2 TIMES DAILY
COMMUNITY
Start: 2024-10-24

## 2024-10-28 NOTE — PROGRESS NOTES
Transitions of Care Navigation  Discharge Date: 10/23/24  Contact Date: 10/28/2024    Transitions of Care Assessment:  JEFF Initial Assessment    General:  Assessment completed with: Children (Elsie daughter)  Patient Subjective: Spoke with daughter Elsie.  She reports patient is doing about the same since she has been home from the hospital.  Home health physical therapy was out today.  There is some concern that patient's insurance will not approve additional home health physical therapy.  The home health agency is working on this.  Patient continues to be weak since being discharged and it is difficult for her to ambulate to the restroom on her own.  She does have a caregiver with her daily.  Patient resides @ [facility] in [place]. She is still using oxygen @ 3 liters per nasal cannula.  She has the oxygen tanks.  Patient refuses to wear the oxygen when she goes to the dining lorenzo or down for any activity as the tanks are hard to navigate.  Daughter has been trying to get patient portable oxygen but her insurance will not approve. Discussed with daughter that [employer] portable oxygen is another option and has a lower out of pocket cost.  Daughter advised to contact current oxygen supplier to see if that is an option. Reviewed medication changes with daughter and new mediations prescribed. Patient has appointment with primary care physician on 10/31/24 and physician assistant from cardiology on 11/4/24.  Daughter will be out of town on the 4th therefore she will be calling and rescheduling that appointment.   Daughter denies patient has any of the following symptoms:  fever, headache, dizziness, chest pain, shortness of breath, abdominal pain, nausea or vomiting. Daughter is aware of when to take patient back to emergency room.  Chief Complaint: Acute on chronic diastolic heart failure  Acute kidney injury on chronic kidney disease stage IV  Hypokalemia  Transaminitis due to passive liver congestion   Chronic hypoxic respiratory failure due to diastolic heart failure  Right heel ulcer   diabetes mellitus type 2  Normocytic anemia  Verify patient name and  with patient/ caregiver: Yes    Hospital Stay/Discharge:  Tell me what you understand of why you were in the hospital or emergency department: Daughter reports patient's legs were swollen.  Prior to leaving the hospital were your Discharge Instructions reviewed with you?: Yes  Did you receive a copy of your written Discharge Instructions?: Yes  What questions do you have about your Discharge Instructions?: Daughter did not have any additional questions  Do you feel better or worse since you left the hospital or emergency department?: Same    Follow - Up Appointment:  Do you have a follow-up appointment?: Yes  Date: 10/31/24  Physician: PCP  Are there any barriers to getting to your follow-up appointment?: No    Home Health/DME:  Prior to leaving the hospital was Home Health (HH) arranged for you?: Yes  Has HH been out or set up a visit to see you?: Been out     Prior to leaving the hospital or emergency department was Durable Medical Equipment (DME), medical supplies, or infusions arranged for you?: N/A  Are DME/medical supply/infusions needs identified by staff during this assessment?: No     Medications/Diet:  Did any of your medications change, during or after your hospital stay or ED visit?: Yes  Do you have your new or updated medications?: Yes  Do you understand what your medications are for and possible side effects?: Yes  Are there any reasons that keep you from taking your medication as prescribed?: No  Any concerns about medication refills?: No    Were you given a different diet per your Discharge Instructions?: No  Reason: N/A     Questions/Concerns:  Do you have any questions or concerns that have not been discussed?: No       Nursing Interventions:  Assessed for signs/symptoms.  Reviewed medications. Instructed when to return to emergency room.  Confirmed appointment with primary care physician and/or specialist.          Medications:  Reviewed medications with patient.  Also, discussed new medication and potential side effects.  Patient did not have any additional questions.     Current Outpatient Medications   Medication Sig Dispense Refill    torsemide 20 MG Oral Tab Take 1 tablet (20 mg total) by mouth in the morning and 1 tablet (20 mg total) before bedtime.      cephalexin 250 MG Oral Cap Take 1 capsule (250 mg total) by mouth 2 (two) times daily for 5 days. 10 capsule 0    Lidocaine, Anorectal, 5 % External Gel Apply 1 Application topically daily as needed (rectal pain).      apixaban 2.5 MG Oral Tab Take 1 tablet (2.5 mg total) by mouth 2 (two) times daily. 60 tablet 0    Insulin Pen Needle (BD AUTOSHIELD DUO) 30G X 5 MM Does not apply Misc Inject 1 Pen into the skin in the morning, at noon, in the evening, and at bedtime. Dx: E11.65 360 each 3    SIMVASTATIN 20 MG Oral Tab TAKE 1 TABLET EVERY NIGHT (Patient taking differently: Take 1 tablet (20 mg total) by mouth nightly.) 90 tablet 3    ESCITALOPRAM 5 MG Oral Tab TAKE 1 TABLET EVERY DAY 90 tablet 3    allopurinol 100 MG Oral Tab Take 1 tablet (100 mg total) by mouth daily. 30 tablet 0    Insulin Lispro, 1 Unit Dial, (HUMALOG KWIKPEN) 100 UNIT/ML Subcutaneous Solution Pen-injector Start 2 units subcutaneous TID with each meal.  Do not give if patient skipping that meal. (Patient taking differently: Inject 2 Units into the skin 3 (three) times daily before meals as needed (Do not give if patient skipping meal). Start 2 units subcutaneous TID with each meal.  Do not give if patient skipping that meal.) 9 mL 1    carvedilol 25 MG Oral Tab Take 1 tablet (25 mg total) by mouth 2 (two) times daily with meals. 60 tablet 5    insulin glargine (LANTUS SOLOSTAR) 100 UNIT/ML Subcutaneous Solution Pen-injector Inject 20 Units into the skin nightly. 18 mL 0    levothyroxine 75 MCG Oral Tab Take 1 tablet (75 mcg  total) by mouth before breakfast. 90 tablet 3    Insulin Pen Needle (BD PEN NEEDLE LALI U/F) 32G X 4 MM Does not apply Misc Inject 1 Pen into the skin As Directed. Dx: E11.65 100 each 3    Continuous Glucose Sensor (DEXCOM G7 SENSOR) Does not apply Misc 1 each Every 10 days. Dx: E11.22, N18.31, Z79.4 (patient is using insulin 4x/day) 9 each 3    Insulin Pen Needle 32G X 4 MM Does not apply Misc Use as directed to inject insulin once daily 100 each 0    amLODIPine 5 MG Oral Tab Take 1 tablet (5 mg total) by mouth daily. 30 tablet 11    GABAPENTIN 100 MG Oral Cap TAKE 1 CAPSULE THREE TIMES DAILY 270 capsule 3    amiodarone 200 MG Oral Tab Take 1 tablet (200 mg total) by mouth daily.      dilTIAZem  MG Oral Capsule SR 24 Hr Take 1 capsule (120 mg total) by mouth daily.      acetaminophen 500 MG Oral Tab Take 2 tablets (1,000 mg total) by mouth every 6 (six) hours as needed for Pain or Fever.      cholecalciferol 25 MCG (1000 UT) Oral Tab Take 1 tablet (1,000 Units total) by mouth daily. (Patient not taking: Reported on 10/28/2024)      Glucose Blood (ACCU-CHEK GUIDE) In Vitro Strip  (Patient not taking: Reported on 10/28/2024)      Blood Glucose Monitoring Suppl (ACCU-CHEK GUIDE) w/Device Does not apply Kit  (Patient not taking: Reported on 10/28/2024)      Accu-Chek FastClix Lancets Does not apply Misc 1 Lancet by Finger stick route. Use as directed. (Patient not taking: Reported on 10/28/2024)         Diagnosis specifics:  With your CHF diagnosis weighing yourself is very important.  How often are you weighing yourself? daily   Is there any reason you are unable to weigh yourself daily? No   What was your weight yesterday? 175.4   Today? 176.4  Were you told about any fluid restrictions? Yes  Have you noticed any shortness of breath or waking up short of breath? no  Since discharge do you feel you are urinating more or less? normal  Are you urinating more at night? no  Do you notice any pain or swelling in your  abdomen? yes   Ankles or legs? Yes    Follow-up Appointments:  Your appointments       Date & Time Appointment Department ([place])    Oct 31, 2024 11:30 AM CDT Exam - Established with sEtevan Bah MD [facility] ([facility])        Jan 22, 2025 1:00 PM CST Diabetes Provider Visit with Sendy Crawley APRN [facility] ([facility])              [facility] [place][note 2[place]  [address]  [place] [facility] [address]  [phone] [facility]  [address]  [facility] [id number] [phone]            Trans[facility]  Was TCC Ordered: No      Primary Care Provider (If no TCC appointment)  Does patient already have a PCP appointment scheduled? Yes  Nurse Care Manager Confirmed PCP office HFU appointment with patient      Specialist  Does the patient have any other follow-up appointment(s) need to be scheduled? Yes   -If yes: Nurse Care Manager reviewed upcoming specialist appointments with patient: Yes   -Does the patient need assistance scheduling appointment(s): No    [x]  Patient's daughter verbally agrees to additional follow-up calls from Nurse Care Manager    Book By Date: 10/30/24

## 2024-10-31 ENCOUNTER — OFFICE VISIT (OUTPATIENT)
Dept: FAMILY MEDICINE CLINIC | Facility: CLINIC | Age: 84
End: 2024-10-31
Payer: MEDICARE

## 2024-10-31 ENCOUNTER — TELEPHONE (OUTPATIENT)
Dept: FAMILY MEDICINE CLINIC | Facility: CLINIC | Age: 84
End: 2024-10-31

## 2024-10-31 VITALS
TEMPERATURE: 98 F | HEART RATE: 60 BPM | DIASTOLIC BLOOD PRESSURE: 68 MMHG | WEIGHT: 172 LBS | BODY MASS INDEX: 30 KG/M2 | SYSTOLIC BLOOD PRESSURE: 132 MMHG | OXYGEN SATURATION: 94 %

## 2024-10-31 DIAGNOSIS — I50.33 ACUTE ON CHRONIC DIASTOLIC CONGESTIVE HEART FAILURE (HCC): ICD-10-CM

## 2024-10-31 DIAGNOSIS — N18.31 TYPE 2 DIABETES MELLITUS WITH STAGE 3A CHRONIC KIDNEY DISEASE, WITH LONG-TERM CURRENT USE OF INSULIN (HCC): Primary | ICD-10-CM

## 2024-10-31 DIAGNOSIS — I48.91 ATRIAL FIBRILLATION WITH RAPID VENTRICULAR RESPONSE (HCC): ICD-10-CM

## 2024-10-31 DIAGNOSIS — E11.22 TYPE 2 DIABETES MELLITUS WITH STAGE 3A CHRONIC KIDNEY DISEASE, WITH LONG-TERM CURRENT USE OF INSULIN (HCC): Primary | ICD-10-CM

## 2024-10-31 DIAGNOSIS — Z79.4 TYPE 2 DIABETES MELLITUS WITH STAGE 3A CHRONIC KIDNEY DISEASE, WITH LONG-TERM CURRENT USE OF INSULIN (HCC): Primary | ICD-10-CM

## 2024-10-31 DIAGNOSIS — I50.33 ACUTE ON CHRONIC DIASTOLIC CONGESTIVE HEART FAILURE (HCC): Primary | ICD-10-CM

## 2024-10-31 PROBLEM — N18.5 CKD (CHRONIC KIDNEY DISEASE) STAGE 5, GFR LESS THAN 15 ML/MIN (HCC): Chronic | Status: ACTIVE | Noted: 2024-10-31

## 2024-10-31 PROBLEM — N18.5 CKD (CHRONIC KIDNEY DISEASE) STAGE 5, GFR LESS THAN 15 ML/MIN (HCC): Chronic | Status: ACTIVE | Noted: 2024-01-01

## 2024-10-31 LAB — HEMOGLOBIN A1C: 8.4 % (ref 4.3–5.6)

## 2024-10-31 PROCEDURE — 1160F RVW MEDS BY RX/DR IN RCRD: CPT | Performed by: FAMILY MEDICINE

## 2024-10-31 PROCEDURE — 1159F MED LIST DOCD IN RCRD: CPT | Performed by: FAMILY MEDICINE

## 2024-10-31 PROCEDURE — 3075F SYST BP GE 130 - 139MM HG: CPT | Performed by: FAMILY MEDICINE

## 2024-10-31 PROCEDURE — 83036 HEMOGLOBIN GLYCOSYLATED A1C: CPT | Performed by: FAMILY MEDICINE

## 2024-10-31 PROCEDURE — 3078F DIAST BP <80 MM HG: CPT | Performed by: FAMILY MEDICINE

## 2024-10-31 PROCEDURE — 99215 OFFICE O/P EST HI 40 MIN: CPT | Performed by: FAMILY MEDICINE

## 2024-10-31 PROCEDURE — 1111F DSCHRG MED/CURRENT MED MERGE: CPT | Performed by: FAMILY MEDICINE

## 2024-10-31 RX ORDER — INSULIN LISPRO 100 [IU]/ML
INJECTION, SOLUTION INTRAVENOUS; SUBCUTANEOUS
Qty: 9 ML | Refills: 1 | Status: SHIPPED | OUTPATIENT
Start: 2024-10-31

## 2024-10-31 RX ORDER — INSULIN LISPRO 100 [IU]/ML
INJECTION, SOLUTION INTRAVENOUS; SUBCUTANEOUS
COMMUNITY
Start: 2024-10-31 | End: 2024-10-31

## 2024-10-31 NOTE — PATIENT INSTRUCTIONS
Increase mealtime insulin at dinner to 4 units.  Continue 2 units with breakfast and lunch.    Continue all other meds.    Check non-fasting bloodwork 1 wk before cardiology appt    Followup in 2 months

## 2024-10-31 NOTE — TELEPHONE ENCOUNTER
[facility]ed [facility] and spoke with Jackie to order humidifier for her concentrator and portable O2 instead of tanks. Per Jackie need order and fax to them. Order placed and faxed to [phone].

## 2024-11-01 NOTE — PROGRESS NOTES
Ciarra Salcido is a 84 year old female here for   Chief Complaint   Patient presents with    Diabetes    Referral     Cardiologist          HPI:       1. Type 2 diabetes mellitus with stage 3a chronic kidney disease, with long-term current use of insulin ([facility])  -sugars improving  -seems like spiking into 300s after dinner  -otherwise look better with small dose of meal time insulin  -no significant drops    2. Acute on chronic diastolic congestive heart failure (HCC)  3. Atrial fibrillation with rapid ventricular response (HCC)  -recently admitted for CHF and responded to IV lasix  -doing better on torsemide 20mg bid (instead of daily)  -weight stable at low 170s  -was 182 lbs on admission  -breathing better  -needs new orders for O2 humidifier and portable o2 instead of tanks (too heavy for her)    HISTORY:  Past Medical History:    (HFpEF) heart failure with preserved ejection fraction ([facility])    Acute cystitis without hematuria    Acute deep vein thrombosis (DVT) of popliteal vein of right lower extremity ([facility])    Cataract    CHF exacerbation (HCC)    CKD (chronic kidney disease)    Congestive heart disease (HCC)    COVID-19    Diabetes ([facility])    Disorder of thyroid    DM2 (diabetes mellitus, type 2) ([facility])    Hearing impairment    High blood pressure    HTN (hypertension)    Hyperlipidemia    Hypothyroidism    Pulmonary embolism (HCC)    Shingles    Visual impairment      Past Surgical History:   Procedure Laterality Date    Cardiac pacemaker placement      Cataract      Eye surgery      Hysterectomy  1980    [facility]    Pacemaker monitor        Family History   Problem Relation Age of Onset    Other (Other) Mother         Old Age    Other (Other) Father         Old age      Social History:   Social History     Socioeconomic History    Marital status: Single   Tobacco Use    Smoking status: Former     Current packs/day: 0.00     Average packs/day: 1 pack/day for 50.0 years (50.0 ttl pk-yrs)     Types: Cigarettes      Start date:      Quit date:      Years since quittin.8    Smokeless tobacco: Never   Vaping Use    Vaping status: Never Used   Substance and Sexual Activity    Alcohol use: Never    Drug use: Never   Other Topics Concern    Caffeine Concern Yes     Comment: 2 cups of coffee daily     Stress Concern No    Weight Concern No    Special Diet No    Exercise Yes     Comment: PT 2 X Weekly, OT 2 x weekly    Seat Belt Yes     Social Drivers of Health     Financial Resource Strain: Low Risk  (10/28/2024)    Financial Resource Strain     Difficulty of Paying Living Expenses: Not very hard     Med Affordability: No   Food Insecurity: No Food Insecurity (10/21/2024)    Food Insecurity     Food Insecurity: Never true   Transportation Needs: No Transportation Needs (10/28/2024)    Transportation Needs     Lack of Transportation: No   Physical Activity: Inactive (1/3/2024)    Exercise Vital Sign     Days of Exercise per Week: 0 days     Minutes of Exercise per Session: 0 min   Stress: No Stress Concern Present (1/3/2024)    Stress     Feeling of Stress : No   Social Connections: Socially Isolated (1/3/2024)    Social Connections     Frequency of Socialization with Friends and Family: 0   Housing Stability: Low Risk  (10/21/2024)    Housing Stability     Housing Instability: No        Medications (Active prior to today's visit):  Current Outpatient Medications   Medication Sig Dispense Refill    Insulin Lispro, 1 Unit Dial, (HUMALOG KWIKPEN) 100 UNIT/ML Subcutaneous Solution Pen-injector 2 units with breakfast and lunch.  4 units with dinner.  Do not give if patient skipping that meal. 9 mL 1    torsemide 20 MG Oral Tab Take 1 tablet (20 mg total) by mouth in the morning and 1 tablet (20 mg total) before bedtime.      Lidocaine, Anorectal, 5 % External Gel Apply 1 Application topically daily as needed (rectal pain).      apixaban 2.5 MG Oral Tab Take 1 tablet (2.5 mg total) by mouth 2 (two) times daily. 60 tablet 0     Insulin Pen Needle (BD AUTOSHIELD DUO) 30G X 5 MM Does not apply Misc Inject 1 Pen into the skin in the morning, at noon, in the evening, and at bedtime. Dx: E11.65 360 each 3    SIMVASTATIN 20 MG Oral Tab TAKE 1 TABLET EVERY NIGHT 90 tablet 3    ESCITALOPRAM 5 MG Oral Tab TAKE 1 TABLET EVERY DAY 90 tablet 3    allopurinol 100 MG Oral Tab Take 1 tablet (100 mg total) by mouth daily. 30 tablet 0    carvedilol 25 MG Oral Tab Take 1 tablet (25 mg total) by mouth 2 (two) times daily with meals. 60 tablet 5    insulin glargine (LANTUS SOLOSTAR) 100 UNIT/ML Subcutaneous Solution Pen-injector Inject 20 Units into the skin nightly. 18 mL 0    levothyroxine 75 MCG Oral Tab Take 1 tablet (75 mcg total) by mouth before breakfast. 90 tablet 3    Insulin Pen Needle (BD PEN NEEDLE LALI U/F) 32G X 4 MM Does not apply Misc Inject 1 Pen into the skin As Directed. Dx: E11.65 100 each 3    Continuous Glucose Sensor (DEXCOM G7 SENSOR) Does not apply Misc 1 each Every 10 days. Dx: E11.22, N18.31, Z79.4 (patient is using insulin 4x/day) 9 each 3    Insulin Pen Needle 32G X 4 MM Does not apply Misc Use as directed to inject insulin once daily 100 each 0    amLODIPine 5 MG Oral Tab Take 1 tablet (5 mg total) by mouth daily. 30 tablet 11    GABAPENTIN 100 MG Oral Cap TAKE 1 CAPSULE THREE TIMES DAILY 270 capsule 3    amiodarone 200 MG Oral Tab Take 1 tablet (200 mg total) by mouth daily.      dilTIAZem  MG Oral Capsule SR 24 Hr Take 1 capsule (120 mg total) by mouth daily.      cholecalciferol 25 MCG (1000 UT) Oral Tab Take 1 tablet (1,000 Units total) by mouth daily. (Patient not taking: Reported on 10/31/2024)      Glucose Blood (ACCU-CHEK GUIDE) In Vitro Strip  (Patient not taking: Reported on 10/31/2024)      Blood Glucose Monitoring Suppl (ACCU-CHEK GUIDE) w/Device Does not apply Kit  (Patient not taking: Reported on 10/31/2024)      Accu-Chek FastClix Lancets Does not apply Misc 1 Lancet by Finger stick route. Use as directed.  (Patient not taking: Reported on 10/31/2024)      acetaminophen 500 MG Oral Tab Take 2 tablets (1,000 mg total) by mouth every 6 (six) hours as needed for Pain or Fever. (Patient not taking: Reported on 10/31/2024)         Allergies:  Allergies[1]      ROS:   See HPI for relevant ROS    --GEN: No other complaints  --HEENT: No other complaints  --RESP: No other complaints  --CV: No other complaints  --GI: No other complaints  --MSK: No other complaints    All other systems reviewed are negative    PHYSICAL EXAM:   /68 (BP Location: Left arm, Patient Position: Sitting, Cuff Size: adult)   Pulse 60   Temp 97.7 °F (36.5 °C) (Temporal)   Wt 172 lb (78 kg)   SpO2 94%   BMI 29.52 kg/m²   O2 dropped to 80% on ambulation    Gen: NAD  HEENT: NCAT, pupils equal and round  Pulm: CTAB, no wheezing  CV: RRR  Ext: full ROM  Psych: normal affect     ASSESSMENT/PLAN:     1. Type 2 diabetes mellitus with stage 3a chronic kidney disease, with long-term current use of insulin (HCC)  -improving A1c down to 8.2 from 9.3  -c/w lantus 20 units  -c/w 2 units with breakfast and lunch  -increase dinner to 4 units  -f/u in 6 wks  -will also check urine microalbumin    - POC Hemoglobin A1C    2. Acute on chronic diastolic congestive heart failure (HCC)  3. Atrial fibrillation with rapid ventricular response (HCC)  -better  -c/w torsemide 20mg bid  -f/u with cardiology in 3 wks  -check labs before that appt  - Cardio Referral - External        Chronic Conditions:    No problem-specific Assessment & Plan notes found for this encounter.       Health Maintenance:  Health Maintenance   Topic Date Due    Diabetes Care: Microalb/Creat Ratio  04/25/2024    Diabetes Care Dilated Eye Exam  08/22/2024    Diabetes Care A1C  01/31/2025    Diabetes Care Foot Exam  09/19/2025    Diabetes Care: GFR  10/23/2025    Influenza Vaccine  Completed    MA Annual Health Assessment  Completed    Annual Depression Screening  Completed    Fall Risk Screening  (Annual)  Completed    Pneumococcal Vaccine: 65+ Years  Completed    Zoster Vaccines  Completed               The patient is asked to return in 6 wks.    Orders This Visit:  Orders Placed This Encounter   Procedures    POC Hemoglobin A1C    Basic Metabolic Panel (8) [E]       Meds This Visit:  Requested Prescriptions     Signed Prescriptions Disp Refills    Insulin Lispro, 1 Unit Dial, (HUMALOG KWIKPEN) 100 UNIT/ML Subcutaneous Solution Pen-injector 9 mL 1     Si units with breakfast and lunch.  4 units with dinner.  Do not give if patient skipping that meal.       Imaging & Referrals:  CARDIO - INTERNAL  CARDIO - EXTERNAL     JUDY CYR MD    I spent a total of 40 minutes, more than half of which was spent counseling/coordinating care regarding dm, chf, afib       [1] No Known Allergies

## 2024-11-06 ENCOUNTER — TELEPHONE (OUTPATIENT)
Dept: FAMILY MEDICINE CLINIC | Facility: CLINIC | Age: 84
End: 2024-11-06

## 2024-11-06 ENCOUNTER — MED REC SCAN ONLY (OUTPATIENT)
Dept: FAMILY MEDICINE CLINIC | Facility: CLINIC | Age: 84
End: 2024-11-06

## 2024-11-07 ENCOUNTER — MED REC SCAN ONLY (OUTPATIENT)
Dept: ORTHOPEDICS CLINIC | Facility: CLINIC | Age: 84
End: 2024-11-07

## 2024-11-08 ENCOUNTER — TELEPHONE (OUTPATIENT)
Dept: FAMILY MEDICINE CLINIC | Facility: CLINIC | Age: 84
End: 2024-11-08

## 2024-11-08 ENCOUNTER — PATIENT OUTREACH (OUTPATIENT)
Dept: CASE MANAGEMENT | Age: 84
End: 2024-11-08

## 2024-11-08 DIAGNOSIS — I50.33 ACUTE ON CHRONIC DIASTOLIC CONGESTIVE HEART FAILURE (HCC): Primary | ICD-10-CM

## 2024-11-08 NOTE — TELEPHONE ENCOUNTER
Ciarra Salcido calling for referral to    Specialist Name: Davy Kaur MD     Specialty: Cardiology     New referral vs. Follow-up: follow up     Reason/Diagnosis: Dx: Atrial fibrillation with rapid ventricular response (HCC) (I48.91)       Informed patient may take 5 - 7 business days to complete.      Once referral is approved patient will be notified via [employer] or patient may contact Referral Dept at [phone] to check status.

## 2024-11-08 NOTE — PROGRESS NOTES
JEFF Follow-up Assessment    General:  Assessment completed with: Children  Community Resources: Other;Home Health;[facility];Private Caregiver    Progress/Care Plan:  Is the patient progressing as planned?: Yes  Care Plan Update: Spoke with daughter.  Patient seen primary care physician on 10/31 and has appointment with nurse practitioner @ cardiology today.  Home health physical therapy has ended but patient needs more. Daughter is trying to more sessions approved as patient cannot ambulate on her own. Patient has not received her portable oxygen yet.  Primary care physician did send order to home health.  Patient currently on 3.5 liters of oxygen. Patient reports she is not having any shortness of breath patient but daughter states when you watch her breathe it seems labored. Patient's weight has been stable.  She does have swelling in her legs/ankles/feet but it is at baseline and no worse than usually.  Daughter did not have any additional questions or concerns.  New Care Plan: Continue to advocate for more HH PT and portable oxygen, daily weights, monitor swelling and shortness of breath  Frequency/Follow Up Plan: 1 week     Notes:  Navigator Notes: review cardio notes, HH PT, oxygen, assess weight/breathing/swelling

## 2024-11-11 ENCOUNTER — TELEPHONE (OUTPATIENT)
Dept: FAMILY MEDICINE CLINIC | Facility: CLINIC | Age: 84
End: 2024-11-11

## 2024-11-11 DIAGNOSIS — I50.33 ACUTE ON CHRONIC DIASTOLIC CONGESTIVE HEART FAILURE (HCC): Primary | ICD-10-CM

## 2024-11-11 RX ORDER — ALLOPURINOL 100 MG/1
100 TABLET ORAL DAILY
Qty: 90 TABLET | Refills: 1 | Status: SHIPPED | OUTPATIENT
Start: 2024-11-11

## 2024-11-11 NOTE — TELEPHONE ENCOUNTER
Doc,   Per [facility] this patient needs to have an overnight oxygen test within 30 days or it wont qualify for her oxygen coverage order that was placed in [place].   She has to be 88% or less for her to qualify for the portable oxygen machine.  The order was placed in [place] on Friday 11/8/24 for her portable oxygen.     Is this something that the assisted living staff could do?

## 2024-11-12 ENCOUNTER — TELEPHONE (OUTPATIENT)
Dept: FAMILY MEDICINE CLINIC | Facility: CLINIC | Age: 84
End: 2024-11-12

## 2024-11-12 DIAGNOSIS — I48.91 ATRIAL FIBRILLATION WITH RAPID VENTRICULAR RESPONSE (HCC): Primary | ICD-10-CM

## 2024-11-12 NOTE — TELEPHONE ENCOUNTER
Elsie is requesting a referral to Dr Hamlin for [facility]'s Electrophysiologist. Patient is transferring all care to [facility]. Patient has a referral for cardiology, but needs for Electrophysiologist as well.   Referral placed per protocol.

## 2024-11-12 NOTE — TELEPHONE ENCOUNTER
Can you please the daughter to update her on this requirement.  She has been on top of it, and can find out if assisted living can do it    Thanks

## 2024-11-12 NOTE — TELEPHONE ENCOUNTER
Spoke with Elsie. She requested Physical Therapy order sent to [facility] as the patient's insurance does not cover Physical Therapy at her facility.    Faxed to [phone]

## 2024-11-12 NOTE — TELEPHONE ENCOUNTER
Ok for order with pulse volume of around 215ml / min (3L divided by about 14 breaths per minute)    Thanks

## 2024-11-12 NOTE — TELEPHONE ENCOUNTER
Spoke with Elsie. She does not believe that the assisted living facility would do this, due to only 1 nurse for whole facility.     Would you like to cancel parachute order and send order for portable concentrator with the pulse volume setting included?

## 2024-11-15 ENCOUNTER — PATIENT OUTREACH (OUTPATIENT)
Dept: CASE MANAGEMENT | Age: 84
End: 2024-11-15

## 2024-11-15 NOTE — PROGRESS NOTES
JEFF Follow-up Assessment    General:  Assessment completed with: Children  Community Resources: Private Caregiver;Home Health    Progress/Care Plan:  Is the patient progressing as planned?: Yes  Care Plan Update: Spoke with daughter patient is doing good.  Insurance has approved her portable oxygen and that should be delivered next week.  She has lost 7 to 8 pounds.  She will be able to continue home health it just needs to be with a new company that is in network.  Living facility is now weighing patient daily. Today she was 170 and previously 174.  She is still on 3 liters of oxygen.  She breathing is stable but worse with ambulation as she does not wear her oxygen.  Her lower extremity swelling is improving. Patient did receive mail order shipment for torsemide 20 mg but directions state once daily dosing. Daughter reports this was increased to BID when she was admitted and discharged.  Daughter advised to contact cardiology and ask them to send a corrected prescription. Daughter did not have any additional questions or concerns.  New Care Plan: continue to monitor weight/shortness of breath/swelling, wear oxygen at all times once portable is delivered  Frequency/Follow Up Plan: 1 week     Notes:  Navigator Notes: oxygen, weight, swelling, shortness of breath

## 2024-11-18 RX ORDER — INSULIN GLARGINE 100 [IU]/ML
20 INJECTION, SOLUTION SUBCUTANEOUS NIGHTLY
Qty: 15 ML | Refills: 1 | Status: SHIPPED | OUTPATIENT
Start: 2024-11-18

## 2024-11-20 ENCOUNTER — TELEPHONE (OUTPATIENT)
Dept: FAMILY MEDICINE CLINIC | Facility: CLINIC | Age: 84
End: 2024-11-20

## 2024-11-20 ENCOUNTER — MED REC SCAN ONLY (OUTPATIENT)
Dept: FAMILY MEDICINE CLINIC | Facility: CLINIC | Age: 84
End: 2024-11-20

## 2024-11-20 NOTE — TELEPHONE ENCOUNTER
Spoke with daughter. Elsie said she hasn't heard anything regarding Oxygen. She called [facility] and they were not helpful to the daughter. Elsie is concerned that there has been no word on this yet. Informed her that we will look into it and call her back. Voiced understanding.

## 2024-11-20 NOTE — TELEPHONE ENCOUNTER
Elsie wants to make sure that the prescription for portable oxygen concentrator was completed and faxed to correct place. I told her that it was sent to [facility]. Elsie wants to know if it needs to be sent to [facility] because she is not aware about it being sent to [facility]. She knew it would possibly be sent somewhere other than [facility] but she asks if nurse can call her back ASAP to explain more. She wants to make sure her mom will have oxygen before Thanksgiving.

## 2024-11-20 NOTE — TELEPHONE ENCOUNTER
Prescription for portable oxygen concentrator completed signed [facility]ed to [facility]@[phone]

## 2024-11-21 NOTE — TELEPHONE ENCOUNTER
Spoke with Elsie to let her know that we have faxed and refaxed orders to [facility]. Number for [facility] given to Elsie so that she can call them as well.

## 2024-11-22 ENCOUNTER — PATIENT OUTREACH (OUTPATIENT)
Dept: CASE MANAGEMENT | Age: 84
End: 2024-11-22

## 2024-11-22 NOTE — PROGRESS NOTES
JEFF Follow-up Assessment    General:  Assessment completed with: Children  Community Resources: Home Health    Progress/Care Plan:  Is the patient progressing as planned?: Yes  Care Plan Update: Spoke with daughter.  Patient still has not received portable oxygen.  Order is in process.  Home health physical therapy has not started either.  Daughter has been visiting patient daily and ambulating her down the halls with her wheelchair behind in case she needs to stop and rest. Patient's weight, lower extremity swelling is stable.  Patient only has shortness of breath with ambulation due to not having portable oxygen.  Patient's A1C has improved. Her blood pressure has been good. Patient has not been experiencing any chest pain or dizziness. Daughter did not have any additional questions or concerns.  New Care Plan: daily weights, ambulation, montior swelling  Frequency/Follow Up Plan: 1 week     Notes:  Navigator Notes: portable oxygen weight  swelling  shortness of breath

## 2024-11-22 NOTE — TELEPHONE ENCOUNTER
Called [facility] to clarify if they received fax or if needed any new information. Spoke with Kelsea  and she stated that the order has been approved and that they need to send a respiratory specialist to [facility] to set this up. Transferred to Della for this department to discuss if they can make this happen before the holiday. Della is going to call Elsie to discuss a possible window for Monday.

## 2024-11-22 NOTE — TELEPHONE ENCOUNTER
Daughter requesting for orders to be faxed again as [facility] has not received orders. Daughter states if we can have them emailed. Aware email would not be an option..

## 2024-12-02 RX ORDER — TORSEMIDE 20 MG/1
20 TABLET ORAL DAILY
Qty: 30 TABLET | Refills: 11 | Status: SHIPPED | OUTPATIENT
Start: 2024-12-02

## 2024-12-02 RX ORDER — TORSEMIDE 20 MG/1
20 TABLET ORAL DAILY
Qty: 90 TABLET | Refills: 3 | OUTPATIENT
Start: 2024-12-02

## 2024-12-02 RX ORDER — PEN NEEDLE, DIABETIC 32GX 5/32"
1 NEEDLE, DISPOSABLE MISCELLANEOUS AS DIRECTED
Qty: 300 EACH | Refills: 3 | Status: SHIPPED | OUTPATIENT
Start: 2024-12-02

## 2024-12-03 ENCOUNTER — PATIENT OUTREACH (OUTPATIENT)
Dept: CASE MANAGEMENT | Age: 84
End: 2024-12-03

## 2024-12-03 NOTE — PROGRESS NOTES
Dear JUDY CYR MD,    My name is Lor Cruz RN and I am this patient's Transition of Care Navigator.     Thank you for allowing me to participate in your patient's care over the past 30 days.     The goal of this program is to assist individuals in meeting their health care needs post discharge.  This is done by providing consistent care coordination and connecting patients with needed resources throughout this episode of care. I'd like to inform you that your patient Ciarra Salcido has completed the 30 day JEFF navigation program as of today.     In discussion with the patient/family contact, I reviewed their providers and ongoing support contacts for the future.     Areas of need: none  Areas of progress: seen primary care physician and cardiology for follow up, decrease in weight, heel ulcer almost healed, swelling in lower extremities improved, received portable oxygen, ambulating more, shortness of breath stable, vitals stable     It has been a pleasure communicating and working with you and thank you for allowing me to participate in the care of your patient.     If for any reason you have questions about the program, please feel free to contact me at the information below.      Sincerely,    Lor Cruz RN        JFEF Graduation Assessment    General:  Assessment completed with: Children  Community Resources: Private Caregiver    Care Plan/Instructions:   Care Plan Summary (Recap of navigation period including # of ED & Hospital Admission, and if goals met or unmet): Spoke with daughter and patient is doing very well.  She received her portable oxygen before Thanksgiving.  Patient was able to go to daughter's house for Thanksgiving and stayed until after 8 pm.  Insurance is not approving further Home health physical therapy. Daughter tries to visit patient every other day and walk her up and down the halls.  She also calls patient daily and reminds her to go and attend exercise class that is  offered.  Patient is going to the [employer] for meals since she has her portable oxygen now. Patient's weight continues to go down.  Her last weight was 166.  She still has slight swelling in her ankles but it has improved significantly. She still struggles with shortness of breath off/on but it is stable. Goals have been met, therefore TRANSITIONS OF CARE will end.  This will be last outreach call from nurse care manager.  Daughter is in agreement. Primary care physician updated.  Navigation period 10/28/24 - 12/3/24.  One admission on 10/21/24 that initiated JEFF.  Goals have been met.  Patient Graduation Instructions (Ongoing barriers to care identified, Areas of Need, Areas of Progress): Barriers:  none  Needs:  none  Progress:  seen primary care physician and cardiology for follow up, decrease in weight, heel ulcer almost healed, swelling in lower extremities improved, received portable oxygen, ambulating more, shortness of breath stable, vitals stable

## 2025-01-01 ENCOUNTER — HOSPITAL ENCOUNTER (INPATIENT)
Facility: HOSPITAL | Age: 85
LOS: 3 days | Discharge: HOME HEALTH | End: 2025-01-01
Attending: EMERGENCY MEDICINE | Admitting: INTERNAL MEDICINE
Payer: MEDICARE

## 2025-01-01 ENCOUNTER — APPOINTMENT (OUTPATIENT)
Dept: GENERAL RADIOLOGY | Facility: HOSPITAL | Age: 85
End: 2025-01-01
Attending: EMERGENCY MEDICINE
Payer: MEDICARE

## 2025-01-01 ENCOUNTER — APPOINTMENT (OUTPATIENT)
Dept: CV DIAGNOSTICS | Facility: HOSPITAL | Age: 85
End: 2025-01-01
Attending: INTERNAL MEDICINE
Payer: MEDICARE

## 2025-01-01 VITALS
SYSTOLIC BLOOD PRESSURE: 137 MMHG | DIASTOLIC BLOOD PRESSURE: 54 MMHG | RESPIRATION RATE: 16 BRPM | WEIGHT: 175.94 LBS | TEMPERATURE: 98 F | OXYGEN SATURATION: 92 % | BODY MASS INDEX: 30 KG/M2 | HEART RATE: 60 BPM

## 2025-01-01 DIAGNOSIS — I10 PRIMARY HYPERTENSION: ICD-10-CM

## 2025-01-01 DIAGNOSIS — I50.9 ACUTE ON CHRONIC CONGESTIVE HEART FAILURE, UNSPECIFIED HEART FAILURE TYPE (HCC): Primary | ICD-10-CM

## 2025-01-01 DIAGNOSIS — I50.9 ACUTE CONGESTIVE HEART FAILURE, UNSPECIFIED HEART FAILURE TYPE (HCC): ICD-10-CM

## 2025-01-01 LAB
ADENOVIRUS PCR:: NOT DETECTED
ALBUMIN SERPL-MCNC: 4 G/DL (ref 3.2–4.8)
ALBUMIN SERPL-MCNC: 4.2 G/DL (ref 3.2–4.8)
ALBUMIN/GLOB SERPL: 1.3 {RATIO} (ref 1–2)
ALBUMIN/GLOB SERPL: 1.4 {RATIO} (ref 1–2)
ALP LIVER SERPL-CCNC: 86 U/L
ALP LIVER SERPL-CCNC: 93 U/L
ALT SERPL-CCNC: 19 U/L
ALT SERPL-CCNC: 22 U/L
ANION GAP SERPL CALC-SCNC: 11 MMOL/L (ref 0–18)
ANION GAP SERPL CALC-SCNC: 8 MMOL/L (ref 0–18)
APTT PPP: 30.5 SECONDS (ref 23–36)
ARTERIAL PATENCY WRIST A: POSITIVE
AST SERPL-CCNC: 16 U/L (ref ?–34)
AST SERPL-CCNC: 20 U/L (ref ?–34)
ATRIAL RATE: 63 BPM
B PARAPERT DNA SPEC QL NAA+PROBE: NOT DETECTED
B PERT DNA SPEC QL NAA+PROBE: NOT DETECTED
BASE EXCESS BLDA CALC-SCNC: 6.1 MMOL/L (ref ?–2)
BASOPHILS # BLD AUTO: 0.04 X10(3) UL (ref 0–0.2)
BASOPHILS # BLD AUTO: 0.04 X10(3) UL (ref 0–0.2)
BASOPHILS NFR BLD AUTO: 0.5 %
BASOPHILS NFR BLD AUTO: 0.5 %
BILIRUB SERPL-MCNC: 0.3 MG/DL (ref 0.2–1.1)
BILIRUB SERPL-MCNC: 0.3 MG/DL (ref 0.2–1.1)
BODY TEMPERATURE: 98.6 F
BUN BLD-MCNC: 69 MG/DL (ref 9–23)
BUN BLD-MCNC: 69 MG/DL (ref 9–23)
BUN BLD-MCNC: 71 MG/DL (ref 9–23)
BUN BLD-MCNC: 79 MG/DL (ref 9–23)
C PNEUM DNA SPEC QL NAA+PROBE: NOT DETECTED
CA-I BLD-SCNC: 1.2 MMOL/L (ref 0.95–1.32)
CALCIUM BLD-MCNC: 8.8 MG/DL (ref 8.7–10.6)
CALCIUM BLD-MCNC: 8.9 MG/DL (ref 8.7–10.6)
CHLORIDE SERPL-SCNC: 100 MMOL/L (ref 98–112)
CHLORIDE SERPL-SCNC: 101 MMOL/L (ref 98–112)
CHLORIDE SERPL-SCNC: 97 MMOL/L (ref 98–112)
CHLORIDE SERPL-SCNC: 99 MMOL/L (ref 98–112)
CHOLEST SERPL-MCNC: 90 MG/DL (ref ?–200)
CO2 SERPL-SCNC: 26 MMOL/L (ref 21–32)
CO2 SERPL-SCNC: 27 MMOL/L (ref 21–32)
CO2 SERPL-SCNC: 29 MMOL/L (ref 21–32)
CO2 SERPL-SCNC: 31 MMOL/L (ref 21–32)
COHGB MFR BLD: 0.9 % SAT (ref 0–3)
CORONAVIRUS 229E PCR:: NOT DETECTED
CORONAVIRUS HKU1 PCR:: NOT DETECTED
CORONAVIRUS NL63 PCR:: NOT DETECTED
CORONAVIRUS OC43 PCR:: DETECTED
CREAT BLD-MCNC: 2.99 MG/DL
CREAT BLD-MCNC: 3.1 MG/DL
CREAT BLD-MCNC: 3.29 MG/DL
CREAT BLD-MCNC: 3.38 MG/DL
DEPRECATED HBV CORE AB SER IA-ACNC: 15 NG/ML
EGFRCR SERPLBLD CKD-EPI 2021: 13 ML/MIN/1.73M2 (ref 60–?)
EGFRCR SERPLBLD CKD-EPI 2021: 13 ML/MIN/1.73M2 (ref 60–?)
EGFRCR SERPLBLD CKD-EPI 2021: 14 ML/MIN/1.73M2 (ref 60–?)
EGFRCR SERPLBLD CKD-EPI 2021: 15 ML/MIN/1.73M2 (ref 60–?)
EOSINOPHIL # BLD AUTO: 0.07 X10(3) UL (ref 0–0.7)
EOSINOPHIL # BLD AUTO: 0.09 X10(3) UL (ref 0–0.7)
EOSINOPHIL NFR BLD AUTO: 0.9 %
EOSINOPHIL NFR BLD AUTO: 1.2 %
ERYTHROCYTE [DISTWIDTH] IN BLOOD BY AUTOMATED COUNT: 16.5 %
ERYTHROCYTE [DISTWIDTH] IN BLOOD BY AUTOMATED COUNT: 16.6 %
ERYTHROCYTE [DISTWIDTH] IN BLOOD BY AUTOMATED COUNT: 16.9 %
FLUAV RNA SPEC QL NAA+PROBE: NOT DETECTED
FLUBV RNA SPEC QL NAA+PROBE: NOT DETECTED
GLOBULIN PLAS-MCNC: 2.9 G/DL (ref 2–3.5)
GLOBULIN PLAS-MCNC: 3.2 G/DL (ref 2–3.5)
GLUCOSE BLD-MCNC: 122 MG/DL (ref 70–99)
GLUCOSE BLD-MCNC: 128 MG/DL (ref 70–99)
GLUCOSE BLD-MCNC: 207 MG/DL (ref 70–99)
GLUCOSE BLD-MCNC: 212 MG/DL (ref 70–99)
GLUCOSE BLD-MCNC: 214 MG/DL (ref 70–99)
GLUCOSE BLD-MCNC: 218 MG/DL (ref 70–99)
GLUCOSE BLD-MCNC: 230 MG/DL (ref 70–99)
GLUCOSE BLD-MCNC: 243 MG/DL (ref 70–99)
GLUCOSE BLD-MCNC: 260 MG/DL (ref 70–99)
GLUCOSE BLD-MCNC: 268 MG/DL (ref 70–99)
GLUCOSE BLD-MCNC: 271 MG/DL (ref 70–99)
GLUCOSE BLD-MCNC: 284 MG/DL (ref 70–99)
GLUCOSE BLD-MCNC: 298 MG/DL (ref 70–99)
GLUCOSE BLD-MCNC: 369 MG/DL (ref 70–99)
GLUCOSE BLD-MCNC: 81 MG/DL (ref 70–99)
GLUCOSE BLD-MCNC: 87 MG/DL (ref 70–99)
HCO3 BLDA-SCNC: 29.7 MEQ/L (ref 21–27)
HCT VFR BLD AUTO: 28.2 %
HCT VFR BLD AUTO: 28.4 %
HCT VFR BLD AUTO: 29.6 %
HDLC SERPL-MCNC: 32 MG/DL (ref 40–59)
HGB BLD-MCNC: 8.9 G/DL
HGB BLD-MCNC: 8.9 G/DL
HGB BLD-MCNC: 9.1 G/DL
HGB BLD-MCNC: 9.1 G/DL
IMM GRANULOCYTES # BLD AUTO: 0.03 X10(3) UL (ref 0–1)
IMM GRANULOCYTES # BLD AUTO: 0.04 X10(3) UL (ref 0–1)
IMM GRANULOCYTES NFR BLD: 0.4 %
IMM GRANULOCYTES NFR BLD: 0.5 %
INR BLD: 1.57 (ref 0.8–1.2)
IRON SATN MFR SERPL: 5 %
IRON SERPL-MCNC: 18 UG/DL
L/M: 3 L/MIN
LACTATE BLD-SCNC: 0.5 MMOL/L (ref 0.5–2)
LDLC SERPL CALC-MCNC: 44 MG/DL (ref ?–100)
LYMPHOCYTES # BLD AUTO: 1.11 X10(3) UL (ref 1–4)
LYMPHOCYTES # BLD AUTO: 1.28 X10(3) UL (ref 1–4)
LYMPHOCYTES NFR BLD AUTO: 14.6 %
LYMPHOCYTES NFR BLD AUTO: 16.1 %
MAGNESIUM SERPL-MCNC: 2 MG/DL (ref 1.6–2.6)
MCH RBC QN AUTO: 23.6 PG (ref 26–34)
MCH RBC QN AUTO: 23.9 PG (ref 26–34)
MCH RBC QN AUTO: 24 PG (ref 26–34)
MCHC RBC AUTO-ENTMCNC: 30.7 G/DL (ref 31–37)
MCHC RBC AUTO-ENTMCNC: 31.6 G/DL (ref 31–37)
MCHC RBC AUTO-ENTMCNC: 32 G/DL (ref 31–37)
MCV RBC AUTO: 74.5 FL
MCV RBC AUTO: 76 FL
MCV RBC AUTO: 76.9 FL
METAPNEUMOVIRUS PCR:: NOT DETECTED
METHGB MFR BLD: 0 % SAT (ref 0.4–1.5)
MONOCYTES # BLD AUTO: 0.57 X10(3) UL (ref 0.1–1)
MONOCYTES # BLD AUTO: 0.62 X10(3) UL (ref 0.1–1)
MONOCYTES NFR BLD AUTO: 7.5 %
MONOCYTES NFR BLD AUTO: 7.8 %
MYCOPLASMA PNEUMONIA PCR:: NOT DETECTED
NEUTROPHILS # BLD AUTO: 5.76 X10 (3) UL (ref 1.5–7.7)
NEUTROPHILS # BLD AUTO: 5.76 X10(3) UL (ref 1.5–7.7)
NEUTROPHILS # BLD AUTO: 5.9 X10 (3) UL (ref 1.5–7.7)
NEUTROPHILS # BLD AUTO: 5.9 X10(3) UL (ref 1.5–7.7)
NEUTROPHILS NFR BLD AUTO: 74.3 %
NEUTROPHILS NFR BLD AUTO: 75.7 %
NONHDLC SERPL-MCNC: 58 MG/DL (ref ?–130)
NT-PROBNP SERPL-MCNC: ABNORMAL PG/ML (ref ?–450)
NT-PROBNP SERPL-MCNC: ABNORMAL PG/ML (ref ?–450)
OSMOLALITY SERPL CALC.SUM OF ELEC: 301 MOSM/KG (ref 275–295)
OSMOLALITY SERPL CALC.SUM OF ELEC: 307 MOSM/KG (ref 275–295)
OSMOLALITY SERPL CALC.SUM OF ELEC: 310 MOSM/KG (ref 275–295)
OSMOLALITY SERPL CALC.SUM OF ELEC: 311 MOSM/KG (ref 275–295)
OXYHGB MFR BLDA: 95.9 % (ref 92–100)
P AXIS: 54 DEGREES
P-R INTERVAL: 332 MS
PARAINFLUENZA 1 PCR:: NOT DETECTED
PARAINFLUENZA 2 PCR:: NOT DETECTED
PARAINFLUENZA 3 PCR:: NOT DETECTED
PARAINFLUENZA 4 PCR:: NOT DETECTED
PCO2 BLDA: 47 MM HG (ref 35–45)
PH BLDA: 7.43 [PH] (ref 7.35–7.45)
PHOSPHATE SERPL-MCNC: 4.5 MG/DL (ref 2.4–5.1)
PLATELET # BLD AUTO: 236 10(3)UL (ref 150–450)
PLATELET # BLD AUTO: 240 10(3)UL (ref 150–450)
PLATELET # BLD AUTO: 265 10(3)UL (ref 150–450)
PO2 BLDA: 80 MM HG (ref 80–100)
POTASSIUM BLD-SCNC: 5.3 MMOL/L (ref 3.6–5.1)
POTASSIUM SERPL-SCNC: 4.9 MMOL/L (ref 3.5–5.1)
POTASSIUM SERPL-SCNC: 5 MMOL/L (ref 3.5–5.1)
POTASSIUM SERPL-SCNC: 5.2 MMOL/L (ref 3.5–5.1)
POTASSIUM SERPL-SCNC: 5.2 MMOL/L (ref 3.5–5.1)
PROCALCITONIN SERPL-MCNC: 0.16 NG/ML (ref ?–0.05)
PROT SERPL-MCNC: 6.9 G/DL (ref 5.7–8.2)
PROT SERPL-MCNC: 7.4 G/DL (ref 5.7–8.2)
PROTHROMBIN TIME: 18.9 SECONDS (ref 11.6–14.8)
Q-T INTERVAL: 482 MS
QRS DURATION: 196 MS
QTC CALCULATION (BEZET): 493 MS
R AXIS: -64 DEGREES
RBC # BLD AUTO: 3.71 X10(6)UL
RBC # BLD AUTO: 3.81 X10(6)UL
RBC # BLD AUTO: 3.85 X10(6)UL
RHINOVIRUS/ENTERO PCR:: NOT DETECTED
RSV RNA SPEC QL NAA+PROBE: NOT DETECTED
SARS-COV-2 RNA NPH QL NAA+NON-PROBE: NOT DETECTED
SODIUM BLD-SCNC: 132 MMOL/L (ref 135–145)
SODIUM SERPL-SCNC: 136 MMOL/L (ref 136–145)
SODIUM SERPL-SCNC: 137 MMOL/L (ref 136–145)
T AXIS: 113 DEGREES
T4 FREE SERPL-MCNC: 1.5 NG/DL (ref 0.8–1.7)
TOTAL IRON BINDING CAPACITY: 356 UG/DL (ref 250–425)
TRANSFERRIN SERPL-MCNC: 285 MG/DL (ref 250–380)
TRIGL SERPL-MCNC: 63 MG/DL (ref 30–149)
TROPONIN I SERPL HS-MCNC: 29 NG/L
TROPONIN I SERPL HS-MCNC: 30 NG/L
TROPONIN I SERPL HS-MCNC: 31 NG/L
TROPONIN I SERPL HS-MCNC: 37 NG/L
TSI SER-ACNC: 7.68 UIU/ML (ref 0.55–4.78)
VENTRICULAR RATE: 63 BPM
VLDLC SERPL CALC-MCNC: 9 MG/DL (ref 0–30)
WBC # BLD AUTO: 7.5 X10(3) UL (ref 4–11)
WBC # BLD AUTO: 7.6 X10(3) UL (ref 4–11)
WBC # BLD AUTO: 7.9 X10(3) UL (ref 4–11)

## 2025-01-01 PROCEDURE — 99223 1ST HOSP IP/OBS HIGH 75: CPT | Performed by: INTERNAL MEDICINE

## 2025-01-01 PROCEDURE — 93306 TTE W/DOPPLER COMPLETE: CPT | Performed by: INTERNAL MEDICINE

## 2025-01-01 PROCEDURE — 99239 HOSP IP/OBS DSCHRG MGMT >30: CPT | Performed by: HOSPITALIST

## 2025-01-01 PROCEDURE — 71045 X-RAY EXAM CHEST 1 VIEW: CPT | Performed by: EMERGENCY MEDICINE

## 2025-01-01 PROCEDURE — 99232 SBSQ HOSP IP/OBS MODERATE 35: CPT | Performed by: INTERNAL MEDICINE

## 2025-01-01 RX ORDER — ALLOPURINOL 100 MG/1
100 TABLET ORAL DAILY
Status: DISCONTINUED | OUTPATIENT
Start: 2025-01-01 | End: 2025-01-01

## 2025-01-01 RX ORDER — GABAPENTIN 100 MG/1
100 CAPSULE ORAL 3 TIMES DAILY
Qty: 270 CAPSULE | Refills: 1 | Status: SHIPPED | OUTPATIENT
Start: 2025-01-01 | End: 2025-03-14

## 2025-01-01 RX ORDER — TORSEMIDE 20 MG/1
40 TABLET ORAL DAILY
Status: DISCONTINUED | OUTPATIENT
Start: 2025-01-01 | End: 2025-01-01

## 2025-01-01 RX ORDER — GABAPENTIN 100 MG/1
100 CAPSULE ORAL 3 TIMES DAILY
Status: DISCONTINUED | OUTPATIENT
Start: 2025-01-01 | End: 2025-01-01

## 2025-01-01 RX ORDER — POLYETHYLENE GLYCOL 3350 17 G/17G
17 POWDER, FOR SOLUTION ORAL DAILY PRN
Status: DISCONTINUED | OUTPATIENT
Start: 2025-01-01 | End: 2025-01-01

## 2025-01-01 RX ORDER — NICOTINE POLACRILEX 4 MG
15 LOZENGE BUCCAL
Status: DISCONTINUED | OUTPATIENT
Start: 2025-01-01 | End: 2025-01-01

## 2025-01-01 RX ORDER — GUAIFENESIN 600 MG/1
600 TABLET, EXTENDED RELEASE ORAL 2 TIMES DAILY
Status: DISCONTINUED | OUTPATIENT
Start: 2025-01-01 | End: 2025-01-01

## 2025-01-01 RX ORDER — CARVEDILOL 25 MG/1
25 TABLET ORAL 2 TIMES DAILY WITH MEALS
Qty: 180 TABLET | Refills: 1 | Status: SHIPPED | OUTPATIENT
Start: 2025-01-01 | End: 2025-03-14

## 2025-01-01 RX ORDER — SENNOSIDES 8.6 MG
17.2 TABLET ORAL NIGHTLY PRN
Status: DISCONTINUED | OUTPATIENT
Start: 2025-01-01 | End: 2025-01-01

## 2025-01-01 RX ORDER — ATORVASTATIN CALCIUM 10 MG/1
10 TABLET, FILM COATED ORAL NIGHTLY
Status: DISCONTINUED | OUTPATIENT
Start: 2025-01-01 | End: 2025-01-01

## 2025-01-01 RX ORDER — TORSEMIDE 20 MG/1
20 TABLET ORAL
Qty: 30 TABLET | Refills: 1 | Status: SHIPPED | OUTPATIENT
Start: 2025-01-01 | End: 2025-03-14

## 2025-01-01 RX ORDER — BISACODYL 10 MG
10 SUPPOSITORY, RECTAL RECTAL
Status: DISCONTINUED | OUTPATIENT
Start: 2025-01-01 | End: 2025-01-01

## 2025-01-01 RX ORDER — FUROSEMIDE 10 MG/ML
40 INJECTION INTRAMUSCULAR; INTRAVENOUS
Status: DISCONTINUED | OUTPATIENT
Start: 2025-01-01 | End: 2025-01-01

## 2025-01-01 RX ORDER — TORSEMIDE 20 MG/1
20 TABLET ORAL
Status: DISCONTINUED | OUTPATIENT
Start: 2025-01-01 | End: 2025-01-01

## 2025-01-01 RX ORDER — ESCITALOPRAM OXALATE 5 MG/1
5 TABLET ORAL DAILY
Status: DISCONTINUED | OUTPATIENT
Start: 2025-01-01 | End: 2025-01-01

## 2025-01-01 RX ORDER — TORSEMIDE 20 MG/1
20 TABLET ORAL
Qty: 30 TABLET | Refills: 1 | Status: SHIPPED | OUTPATIENT
Start: 2025-01-01 | End: 2025-01-01

## 2025-01-01 RX ORDER — ONDANSETRON 2 MG/ML
4 INJECTION INTRAMUSCULAR; INTRAVENOUS EVERY 6 HOURS PRN
Status: DISCONTINUED | OUTPATIENT
Start: 2025-01-01 | End: 2025-01-01

## 2025-01-01 RX ORDER — AMIODARONE HYDROCHLORIDE 200 MG/1
200 TABLET ORAL DAILY
Status: DISCONTINUED | OUTPATIENT
Start: 2025-01-01 | End: 2025-01-01

## 2025-01-01 RX ORDER — METOCLOPRAMIDE HYDROCHLORIDE 5 MG/ML
5 INJECTION INTRAMUSCULAR; INTRAVENOUS DAILY
Status: DISCONTINUED | OUTPATIENT
Start: 2025-01-01 | End: 2025-01-01

## 2025-01-01 RX ORDER — FUROSEMIDE 10 MG/ML
20 INJECTION INTRAMUSCULAR; INTRAVENOUS ONCE
Status: COMPLETED | OUTPATIENT
Start: 2025-01-01 | End: 2025-01-01

## 2025-01-01 RX ORDER — ONDANSETRON 2 MG/ML
4 INJECTION INTRAMUSCULAR; INTRAVENOUS EVERY 4 HOURS PRN
Status: DISCONTINUED | OUTPATIENT
Start: 2025-01-01 | End: 2025-01-01

## 2025-01-01 RX ORDER — FUROSEMIDE 10 MG/ML
60 INJECTION INTRAMUSCULAR; INTRAVENOUS
Status: DISCONTINUED | OUTPATIENT
Start: 2025-01-01 | End: 2025-01-01

## 2025-01-01 RX ORDER — ACETAMINOPHEN 500 MG
500 TABLET ORAL EVERY 4 HOURS PRN
Status: DISCONTINUED | OUTPATIENT
Start: 2025-01-01 | End: 2025-01-01

## 2025-01-01 RX ORDER — FUROSEMIDE 10 MG/ML
40 INJECTION INTRAMUSCULAR; INTRAVENOUS ONCE
Status: COMPLETED | OUTPATIENT
Start: 2025-01-01 | End: 2025-01-01

## 2025-01-01 RX ORDER — CARVEDILOL 12.5 MG/1
25 TABLET ORAL 2 TIMES DAILY WITH MEALS
Status: DISCONTINUED | OUTPATIENT
Start: 2025-01-01 | End: 2025-01-01

## 2025-01-01 RX ORDER — DEXTROSE MONOHYDRATE 25 G/50ML
50 INJECTION, SOLUTION INTRAVENOUS
Status: DISCONTINUED | OUTPATIENT
Start: 2025-01-01 | End: 2025-01-01

## 2025-01-01 RX ORDER — NICOTINE POLACRILEX 4 MG
30 LOZENGE BUCCAL
Status: DISCONTINUED | OUTPATIENT
Start: 2025-01-01 | End: 2025-01-01

## 2025-01-01 RX ORDER — INSULIN DEGLUDEC 100 U/ML
15 INJECTION, SOLUTION SUBCUTANEOUS NIGHTLY
Status: DISCONTINUED | OUTPATIENT
Start: 2025-01-01 | End: 2025-01-01

## 2025-01-01 RX ORDER — AMLODIPINE BESYLATE 5 MG/1
5 TABLET ORAL DAILY
Status: DISCONTINUED | OUTPATIENT
Start: 2025-01-01 | End: 2025-01-01

## 2025-01-01 RX ORDER — LEVOTHYROXINE SODIUM 75 UG/1
75 TABLET ORAL
Status: DISCONTINUED | OUTPATIENT
Start: 2025-01-01 | End: 2025-01-01

## 2025-01-01 RX ORDER — INSULIN DEGLUDEC 100 U/ML
20 INJECTION, SOLUTION SUBCUTANEOUS NIGHTLY
Status: DISCONTINUED | OUTPATIENT
Start: 2025-01-01 | End: 2025-01-01

## 2025-01-01 RX ORDER — BENZONATATE 100 MG/1
100 CAPSULE ORAL 3 TIMES DAILY PRN
Status: DISCONTINUED | OUTPATIENT
Start: 2025-01-01 | End: 2025-01-01

## 2025-01-06 RX ORDER — DILTIAZEM HYDROCHLORIDE 120 MG/1
120 CAPSULE, COATED, EXTENDED RELEASE ORAL DAILY
Qty: 90 CAPSULE | Refills: 3 | Status: SHIPPED | OUTPATIENT
Start: 2025-01-06

## 2025-01-09 ENCOUNTER — OFFICE VISIT (OUTPATIENT)
Dept: FAMILY MEDICINE CLINIC | Facility: CLINIC | Age: 85
End: 2025-01-09
Payer: MEDICARE

## 2025-01-09 VITALS
WEIGHT: 172.81 LBS | RESPIRATION RATE: 16 BRPM | BODY MASS INDEX: 30 KG/M2 | DIASTOLIC BLOOD PRESSURE: 68 MMHG | TEMPERATURE: 97 F | HEART RATE: 69 BPM | OXYGEN SATURATION: 94 % | SYSTOLIC BLOOD PRESSURE: 122 MMHG

## 2025-01-09 DIAGNOSIS — M85.851 OSTEOPENIA OF NECK OF RIGHT FEMUR: ICD-10-CM

## 2025-01-09 DIAGNOSIS — E78.2 MIXED HYPERLIPIDEMIA: ICD-10-CM

## 2025-01-09 DIAGNOSIS — E11.22 TYPE 2 DIABETES MELLITUS WITH STAGE 4 CHRONIC KIDNEY DISEASE, WITHOUT LONG-TERM CURRENT USE OF INSULIN (HCC): ICD-10-CM

## 2025-01-09 DIAGNOSIS — F32.1 CURRENT MODERATE EPISODE OF MAJOR DEPRESSIVE DISORDER WITHOUT PRIOR EPISODE (HCC): ICD-10-CM

## 2025-01-09 DIAGNOSIS — N18.4 TYPE 2 DIABETES MELLITUS WITH STAGE 4 CHRONIC KIDNEY DISEASE, WITHOUT LONG-TERM CURRENT USE OF INSULIN (HCC): ICD-10-CM

## 2025-01-09 DIAGNOSIS — I48.91 ATRIAL FIBRILLATION WITH RAPID VENTRICULAR RESPONSE (HCC): ICD-10-CM

## 2025-01-09 DIAGNOSIS — M10.9 GOUT, UNSPECIFIED CAUSE, UNSPECIFIED CHRONICITY, UNSPECIFIED SITE: ICD-10-CM

## 2025-01-09 DIAGNOSIS — N18.4 CHRONIC RENAL DISEASE, STAGE IV (HCC): ICD-10-CM

## 2025-01-09 DIAGNOSIS — J41.0 SMOKERS' COUGH (HCC): ICD-10-CM

## 2025-01-09 DIAGNOSIS — I50.32 CHRONIC HEART FAILURE WITH PRESERVED EJECTION FRACTION (HCC): ICD-10-CM

## 2025-01-09 DIAGNOSIS — I82.5Y9 CHRONIC DEEP VEIN THROMBOSIS (DVT) OF PROXIMAL VEIN OF LOWER EXTREMITY, UNSPECIFIED LATERALITY (HCC): ICD-10-CM

## 2025-01-09 DIAGNOSIS — Z00.00 ENCOUNTER FOR ANNUAL HEALTH EXAMINATION: Primary | ICD-10-CM

## 2025-01-09 DIAGNOSIS — Z79.01 ANTICOAGULANT LONG-TERM USE: ICD-10-CM

## 2025-01-09 DIAGNOSIS — I26.94 MULTIPLE SUBSEGMENTAL PULMONARY EMBOLI WITHOUT ACUTE COR PULMONALE (HCC): ICD-10-CM

## 2025-01-09 DIAGNOSIS — E11.9 TYPE 2 DIABETES MELLITUS WITHOUT COMPLICATION, WITHOUT LONG-TERM CURRENT USE OF INSULIN (HCC): ICD-10-CM

## 2025-01-09 DIAGNOSIS — I50.33 ACUTE ON CHRONIC DIASTOLIC CONGESTIVE HEART FAILURE (HCC): ICD-10-CM

## 2025-01-09 DIAGNOSIS — I10 PRIMARY HYPERTENSION: ICD-10-CM

## 2025-01-09 DIAGNOSIS — E03.9 ACQUIRED HYPOTHYROIDISM: ICD-10-CM

## 2025-01-09 DIAGNOSIS — E11.42 DIABETIC POLYNEUROPATHY ASSOCIATED WITH TYPE 2 DIABETES MELLITUS (HCC): ICD-10-CM

## 2025-01-09 LAB — HEMOGLOBIN A1C: 8.3 % (ref 4.3–5.6)

## 2025-01-09 RX ORDER — ESCITALOPRAM OXALATE 5 MG/1
5 TABLET ORAL DAILY
Qty: 90 TABLET | Refills: 3 | Status: SHIPPED | OUTPATIENT
Start: 2025-01-09

## 2025-01-09 NOTE — PROGRESS NOTES
Subjective:   Ciarra Salcido is a 84 year old female who presents for a MA (Medicare Advantage) Supervisit (Once per calendar year).     1. Encounter for annual health examination  -due for wellness    2. Type 2 diabetes mellitus with stage 3a chronic kidney disease, without long-term current use of insulin (HCC)  3. Diabetic polyneuropathy associated with type 2 diabetes mellitus (HCC)  -sugars about the same  -A1c 8.3 from 8.4  -no lows  -daughter thinks she may not be getting insulin consistently at assisted living  -also ran out of dexcom - waiting for next refill      4. Acute on chronic congestive heart failure, unspecified heart failure type ([facility])  5. Atrial fibrillation with rapid ventricular response ([facility])  6. Anticoagulant long-term use  7. Chronic heart failure with preserved ejection fraction ([facility])  8. Primary hypertension  9. Mixed hyperlipidemia  -doing much better from cardiac perspective  -tolerating meds  -energy levels better  -denies palpitations or chest pain  -continues to see Dr. Fong and Boubacar - needs referral to both    10. Smokers' cough ([facility])  -stable, will continue to monitor    11. Chronic renal disease, stage IV ([facility])  -stable on last labs - due for repeat    12. Multiple subsegmental pulmonary emboli without acute cor pulmonale ([facility])  13. Chronic deep vein thrombosis (DVT) of proximal vein of lower extremity, unspecified laterality ([facility])  -recently seen by hematology  -plan is to c/w apixaban    14. Current moderate episode of major depressive disorder without prior episode ([facility])  -mood better    15. Acquired hypothyroidism  -due for repeat labs  -tolerating 75mcg    16. Osteopenia of neck of right femur   -on vitamins  -due for DEXA    17. Gout, unspecified cause, unspecified chronicity, unspecified site  -no attacks  -c/w allopurinol  -recheck lab        History/Other:   Fall Risk Assessment:   She has been screened for Falls and is High Risk. Fall Prevention information  provided to patient in After Visit Summary.    Have you fallen in the last 12 months?: 0-No  Fall/Risk Scorin  Do you feel unsteady when standing or walking?: Yes  Do you worry about falling?: No  Have you fallen in the past year?: No     Cognitive Assessment:   She had a completely normal cognitive assessment - see flowsheet entries       Functional Ability/Status:   Ciarra Salcido has some abnormal functions as listed below:  She has Driving difficulties based on screening of functional status. She has Meal Preparation difficulties based on screening of functional status.She has difficulties Managing Money/Bills based on screening of functional status.She has difficulties Shopping for Groceries based on screening of functional status. She has difficulties Taking Meds as Rx'd based on screening of functional status. She has Hearing problems based on screening of functional status.She has Walking problems based on screening of functional status.       Depression Screening (PHQ-2/PHQ-9): PHQ-9 TOTAL SCORE: 3  , done 2025   Trouble concentrating on things, such as reading the newspaper or watching television: 3    If you checked off any problems, how difficult have these problems made it for you to do your work, take care of things at home, or get along with other people?: Not difficult at all    Last [place] Suicide Screening on 2025 was No Risk.       Advanced Directives:   She has a Living Will on file in [employer]; reviewed and discussed documents with patient (and family/surrogate if present).  She has a Power of  for Health Care on file in [facility].  Discussed Advance Care Planning with patient (and family/surrogate if present). Standard forms made available to patient in After Visit Summary.      Patient Active Problem List   Diagnosis    CKD (chronic kidney disease) stage 4, GFR 15-29 ml/min (HCC)    Diabetes mellitus, type 2 (HCC)    Primary hypertension    Mixed hyperlipidemia    Acquired  hypothyroidism    Osteopenia    PRISCILLA (acute kidney injury) ([facility])    Hyperkalemia    Diabetic polyneuropathy associated with type 2 diabetes mellitus ([facility])    Current moderate episode of major depressive disorder without prior episode (HCC)    Acute on chronic heart failure with preserved ejection fraction ([facility])    Multiple subsegmental pulmonary emboli without acute cor pulmonale ([facility])    Smokers' cough ([facility])    Chronic deep vein thrombosis (DVT) of proximal vein of lower extremity ([facility])    Atrial fibrillation with rapid ventricular response ([facility])    Chronic heart failure with preserved ejection fraction ([facility])    Stage 3 chronic kidney disease ([facility])    Hypertension, unspecified type    Acute cystitis with hematuria    Altered mental status, unspecified altered mental status type    Arthralgia of right lower leg    Rectal bleeding    Hyperglycemia    Hypertensive urgency    Hyperlipidemia    Hypothyroidism    ATN (acute tubular necrosis) ([facility])    Acute gout of multiple sites    Cardiorenal syndrome    Right sided weakness    Cognitive decline    Urinary retention    COVID-19    Hypoxia    Acute on chronic congestive heart failure, unspecified heart failure type ([facility])    Respiratory insufficiency    Anemia, chronic disease    Hyponatremia    Anemia    Acute congestive heart failure, unspecified heart failure type ([facility])    Transaminitis    Acute renal failure superimposed on chronic kidney disease, unspecified acute renal failure type, unspecified CKD stage ([facility])    CKD (chronic kidney disease) stage 5, GFR less than 15 ml/min ([facility])     Allergies:  She has No Known Allergies.    Current Medications:  Outpatient Medications Marked as Taking for the 1/9/25 encounter (Office Visit) with Estevan Bah MD   Medication Sig    escitalopram 5 MG Oral Tab Take 1 tablet (5 mg total) by mouth daily.    gabapentin 100 MG Oral Cap Take 1 capsule (100 mg total) by mouth 3 (three) times daily.    apixaban (ELIQUIS) 2.5 MG Oral Tab  Take 1 tablet (2.5 mg total) by mouth 2 (two) times daily.    DROPLET PEN NEEDLES 32G X 4 MM Does not apply Misc USE AS DIRECTED    LANTUS SOLOSTAR 100 UNIT/ML Subcutaneous Solution Pen-injector INJECT 20 UNITS INTO THE SKIN NIGHTLY    allopurinol 100 MG Oral Tab Take 1 tablet (100 mg total) by mouth daily.    Insulin Lispro, 1 Unit Dial, (HUMALOG KWIKPEN) 100 UNIT/ML Subcutaneous Solution Pen-injector 2 units with breakfast and lunch.  4 units with dinner.  Do not give if patient skipping that meal.    torsemide 20 MG Oral Tab Take 1 tablet (20 mg total) by mouth in the morning and 1 tablet (20 mg total) before bedtime.    Lidocaine, Anorectal, 5 % External Gel Apply 1 Application topically daily as needed (rectal pain).    Insulin Pen Needle (BD AUTOSHIELD DUO) 30G X 5 MM Does not apply Misc Inject 1 Pen into the skin in the morning, at noon, in the evening, and at bedtime. Dx: E11.65    SIMVASTATIN 20 MG Oral Tab TAKE 1 TABLET EVERY NIGHT    carvedilol 25 MG Oral Tab Take 1 tablet (25 mg total) by mouth 2 (two) times daily with meals.    levothyroxine 75 MCG Oral Tab Take 1 tablet (75 mcg total) by mouth before breakfast.    Insulin Pen Needle (BD PEN NEEDLE LALI U/F) 32G X 4 MM Does not apply Misc Inject 1 Pen into the skin As Directed. Dx: E11.65    Continuous Glucose Sensor (DEXCOM G7 SENSOR) Does not apply Misc 1 each Every 10 days. Dx: E11.22, N18.31, Z79.4 (patient is using insulin 4x/day)    Insulin Pen Needle 32G X 4 MM Does not apply Misc Use as directed to inject insulin once daily    amLODIPine 5 MG Oral Tab Take 1 tablet (5 mg total) by mouth daily.    amiodarone 200 MG Oral Tab Take 1 tablet (200 mg total) by mouth daily.    dilTIAZem  MG Oral Capsule SR 24 Hr Take 1 capsule (120 mg total) by mouth daily.       Medical History:  She  has a past medical history of (HFpEF) heart failure with preserved ejection fraction (HCC), Acute cystitis without hematuria (11/14/2023), Acute deep vein  thrombosis (DVT) of popliteal vein of right lower extremity ([facility]) (2023), Cataract, CHF exacerbation ([facility]) (2023), CKD (chronic kidney disease), Congestive heart disease ([facility]), COVID-19 (2023), Diabetes ([facility]), Disorder of thyroid, DM2 (diabetes mellitus, type 2) ([facility]), Hearing impairment, High blood pressure, HTN (hypertension), Hyperlipidemia, Hypothyroidism, Pulmonary embolism ([facility]), Shingles (2020), and Visual impairment.  Surgical History:  She  has a past surgical history that includes hysterectomy (); cataract; Eye surgery; pacemaker monitor; and Cardiac pacemaker placement.   Family History:  Her family history includes Other in her father and mother.  Social History:  She  reports that she quit smoking about 14 years ago. Her smoking use included cigarettes. She started smoking about 64 years ago. She has a 50 pack-year smoking history. She has never used smokeless tobacco. She reports that she does not drink alcohol and does not use drugs.    Tobacco:  She smoked tobacco in the past but quit greater than 12 months ago.  Social History     Tobacco Use   Smoking Status Former    Current packs/day: 0.00    Average packs/day: 1 pack/day for 50.0 years (50.0 ttl pk-yrs)    Types: Cigarettes    Start date:     Quit date:     Years since quittin.0   Smokeless Tobacco Never          CAGE Alcohol Screen:   Cage screening not needed because reported NO to Alcohol use on social history.      Patient Care Team:  Estevan Bah MD as PCP - General (Family Medicine)  Lucas Fong MD (CARDIOLOGY)  Vinnie Villanueva MD (ONCOLOGY)  Davy Kaur MD (Cardiac Electrophysiology)  Oscar Xiong MD (Ophthalmology, Retinal-Vitreous)  Sendy Crawley APRN (Nurse Practitioner)    Review of Systems     Negative except above    Objective:   Physical Exam     General Appearance:  Alert, cooperative, no distress, appears stated age   Head:  Normocephalic, without obvious  abnormality, atraumatic   Eyes:  PERRL   Ears:  Normal TM's and external ear canals, both ears   Lungs:   Clear to auscultation bilaterally, respirations unlabored   Heart:  irregular rhythm   Extremities: Extremities normal, no cyanosis or edema   Skin: Skin color, texture, turgor normal   Neurologic: No appreciable abnormality     Bilateral barefoot skin diabetic exam is normal, visualized feet and the appearance is normal.  Bilateral monofilament/sensation of both feet is normal.  Pulsation pedal pulse exam of both lower legs/feet is normal as well.      /68 (BP Location: Left arm, Patient Position: Sitting, Cuff Size: adult)   Pulse 69   Temp 97.2 °F (36.2 °C) (Temporal)   Resp 16   Wt 172 lb 12.8 oz (78.4 kg)   SpO2 94%   BMI 29.66 kg/m²  Estimated body mass index is 29.66 kg/m² as calculated from the following:    Height as of 9/9/24: 5' 4\" (1.626 m).    Weight as of this encounter: 172 lb 12.8 oz (78.4 kg).    Medicare Hearing Assessment:  Hearing Screening    Screening Method: Finger Rub  Finger Rub Result: Pass             Assessment & Plan:   Ciarra Salcido is a 84 year old female who presents for a Medicare Assessment.     1. Encounter for annual health examination  -reviewed age appropriate screening  -reviewed code status discussion and DPOA  -patient is DNR - this has been updated  -documents completed and sent to scanning  -due for DEXA - ordered    2. Type 2 diabetes mellitus with stage 3a chronic kidney disease, without long-term current use of insulin (HCC)  Diabetic polyneuropathy associated with type 2 diabetes mellitus (HCC)  -sugars slightly better at 8.3  -encouraged restarting dexcom to get more data prior to making insulin adjustment  -c/w lispro 2, 2, 4 and lantus 20  -counseled on limiting carbs a bit  -update me on sugars in 3-4 wks  -needs eye exam  -f/u in 3 months    4. Acute on chronic congestive heart failure, unspecified heart failure type ([facility])  5. Atrial fibrillation with  rapid ventricular response (HCC)  6. Anticoagulant long-term use  7. Chronic heart failure with preserved ejection fraction (HCC)  8. Primary hypertension  9. Mixed hyperlipidemia  -much better  -c/w meds  -f/u with cardiology and EP as planned - referrals placed    10. Smokers' cough (HCC)  -stable, will continue to monitor    11. Chronic renal disease, stage IV (HCC)  -has been stable - recheck labs    12. Multiple subsegmental pulmonary emboli without acute cor pulmonale (HCC)  13. Chronic deep vein thrombosis (DVT) of proximal vein of lower extremity, unspecified laterality (HCC)  -recently seen by hematology  -plan is to c/w apixaban  -f/u with hematology as planned    14. Current moderate episode of major depressive disorder without prior episode (HCC)  -mood better - will continue to monitor    15. Acquired hypothyroidism  -tolerating 75mcg    16. Osteopenia of other site  -on vitamins  -ordered dexa    17. Gout, unspecified cause, unspecified chronicity, unspecified site  -no attacks  -c/w allopurinol    The patient indicates understanding of these issues and agrees to the plan.  Reinforced healthy diet, lifestyle, and exercise.      Return in about 3 months (around 4/9/2025).     JUDY CYR MD, 2/22/2022     Supplementary Documentation:   General Health:  In the past six months, have you lost more than 10 pounds without trying?: 2 - No  Has your appetite been poor?: No  Type of Diet: Balanced  How does the patient maintain a good energy level?: Appropriate Exercise  How would you describe your daily physical activity?: Moderate  How would you describe your current health state?: Good  How do you maintain positive mental well-being?: Visiting Friends;Visiting Family  Have you had any immunizations at another office such as Influenza, Hepatitis B, Tetanus, or Pneumococcal?: No       Ciarra Salcido's SCREENING SCHEDULE   Tests on this list are recommended by your physician but may not be covered, or covered  at this frequency, by your insurer.   Please check with your insurance carrier before scheduling to verify coverage.   PREVENTATIVE SERVICES FREQUENCY &  COVERAGE DETAILS LAST COMPLETION DATE   Diabetes Screening    Fasting Blood Sugar /  Glucose    One screening every 12 months if never tested or if previously tested but not diagnosed with pre-diabetes   One screening every 6 months if diagnosed with pre-diabetes Lab Results   Component Value Date     (H) 10/23/2024        Cardiovascular Disease Screening    Lipid Panel  Cholesterol  Lipoprotein (HDL)  Triglycerides Covered every 5 years for all Medicare beneficiaries without apparent signs or symptoms of cardiovascular disease Lab Results   Component Value Date    CHOLEST 124 07/17/2024    HDL 37 (L) 07/17/2024    LDL 72 07/17/2024    LDLD 72 08/20/2019    TRIG 76 07/17/2024         Electrocardiogram (EKG)   Covered if needed at Welcome to Medicare, and non-screening if indicated for medical reasons 10/21/2024      Ultrasound Screening for Abdominal Aortic Aneurysm (AAA) Covered once in a lifetime for one of the following risk factors    Men who are 65-75 years old and have ever smoked    Anyone with a family history -     Colorectal Cancer Screening  Covered for ages 50-85; only need ONE of the following:    Colonoscopy   Covered every 10 years    Covered every 2 years if patient is at high risk or previous colonoscopy was abnormal -    No recommendations at this time    Flexible Sigmoidoscopy   Covered every 4 years -    Fecal Occult Blood Test Covered annually 07/08/2024   Bone Density Screening    Bone density screening    Covered every 2 years after age 65 if diagnosed with risk of osteoporosis or estrogen deficiency.    Covered yearly for long-term glucocorticoid medication use (Steroids) Last Dexa Scan:    XR DEXA BONE DENSITOMETRY (CPT=77080) 12/13/2019      No recommendations at this time   Pap and Pelvic    Pap   Covered every 2 years for women at  normal risk; Annually if at high risk -  No recommendations at this time    Chlamydia Annually if high risk 07/08/2024  No recommendations at this time   Screening Mammogram    Mammogram     Recommend annually for all female patients aged 40 and older    One baseline mammogram covered for patients aged 35-39 -    No recommendations at this time    Immunizations    Influenza Covered once per flu season  Please get every year 10/23/2024  No recommendations at this time    Pneumococcal Each vaccine ([id number] & [id number]) covered once after 65 Prevnar 13: 09/28/2015    [id number]: 09/17/2020     No recommendations at this time    Hepatitis B One screening covered for patients with certain risk factors   -  No recommendations at this time    Tetanus Toxoid Not covered by Medicare Part B unless medically necessary (cut with metal); may be covered with your pharmacy prescription benefits -    Tetanus, Diptheria and Pertusis TD and TDaP Not covered by Medicare Part B -  No recommendations at this time    Zoster Not covered by Medicare Part B; may be covered with your pharmacy  prescription benefits -  No recommendations at this time     Diabetes      Hemoglobin A1C Annually; if last result is elevated, may be asked to retest more frequently.    Medicare covers every 3 months Lab Results   Component Value Date     (H) 07/17/2024    A1C 8.3 (A) 01/09/2025       No recommendations at this time    Creat/alb ratio Annually Lab Results   Component Value Date    MICROALBCREA  04/25/2023      Comment:      Unable to calculate due to Urine Creatinine <13.00 mg/dL         LDL Annually Lab Results   Component Value Date    LDL 72 07/17/2024       Dilated Eye Exam Annually Last Diabetic Eye Exam:  No data recorded  No data recorded       Annual Monitoring of Persistent Medications (ACE/ARB, digoxin diuretics, anticonvulsants)    Potassium Annually Lab Results   Component Value Date    K 5.2 (H) 10/23/2024         Creatinine    Annually Lab Results   Component Value Date    CREATSERUM 3.38 (H) 10/23/2024         BUN Annually Lab Results   Component Value Date    BUN 87 (H) 10/23/2024       Drug Serum Conc Annually No results found for: \"DIGOXIN\", \"DIG\", \"VALP\"           Chronic Obstructive Pulmonary Disease (COPD)    Spirometry Annually Spirometry date:              Overall 60 minutes was spent on this encounter, 40 mins spent reviewing and providing care coordination for these conditions: afib, chf, dm, htn, lipid, ckd  20  minutes was spent reviewing wellness elements and providing age appropriate screenings.

## 2025-01-15 ENCOUNTER — TELEPHONE (OUTPATIENT)
Dept: CARDIOLOGY | Age: 85
End: 2025-01-15

## 2025-01-27 RX ORDER — INSULIN GLARGINE 100 [IU]/ML
20 INJECTION, SOLUTION SUBCUTANEOUS NIGHTLY
Qty: 30 ML | Refills: 3 | Status: SHIPPED | OUTPATIENT
Start: 2025-01-27

## 2025-02-05 RX ORDER — INSULIN LISPRO 100 [IU]/ML
INJECTION, SOLUTION INTRAVENOUS; SUBCUTANEOUS
Qty: 15 ML | Refills: 3 | Status: SHIPPED | OUTPATIENT
Start: 2025-02-05

## 2025-02-05 NOTE — TELEPHONE ENCOUNTER
Requested Prescriptions     Pending Prescriptions Disp Refills    Insulin Lispro, 1 Unit Dial, 100 UNIT/ML Subcutaneous Solution Pen-injector [Pharmacy Med Name: Insulin Lispro (1 Unit Dial) Subcutaneous Solution Pen-injector 100 UNIT/ML] 15 mL 3     Sig: INJECT UNITS PER SCALE: 150-200=1, 201-250=2, 251-300=3, 301-350=4, 351-400=5, OVER 400 CALL MD, NEW PEN EVERY 28 DAY       Last Refill: 10/31    Last OV: 1/9    Next OV: 4/9  Please review and advise

## 2025-02-11 ENCOUNTER — TELEPHONE (OUTPATIENT)
Dept: FAMILY MEDICINE CLINIC | Facility: CLINIC | Age: 85
End: 2025-02-11

## 2025-02-11 RX ORDER — INSULIN LISPRO 100 [IU]/ML
INJECTION, SOLUTION INTRAVENOUS; SUBCUTANEOUS
Qty: 15 ML | Refills: 3 | Status: SHIPPED | OUTPATIENT
Start: 2025-02-11

## 2025-02-11 NOTE — TELEPHONE ENCOUNTER
Patient daughter states she received a letter from pharmacy regarding Insulin Lispro insurance will no longer cover medication. Would like to know if Dr. Estevan Bah can send a substitute on medication.     Please advise

## 2025-02-26 ENCOUNTER — TELEPHONE (OUTPATIENT)
Dept: FAMILY MEDICINE CLINIC | Facility: CLINIC | Age: 85
End: 2025-02-26

## 2025-02-26 DIAGNOSIS — N18.4 TYPE 2 DIABETES MELLITUS WITH STAGE 4 CHRONIC KIDNEY DISEASE, WITHOUT LONG-TERM CURRENT USE OF INSULIN (HCC): Primary | ICD-10-CM

## 2025-02-26 DIAGNOSIS — E11.22 TYPE 2 DIABETES MELLITUS WITH STAGE 4 CHRONIC KIDNEY DISEASE, WITHOUT LONG-TERM CURRENT USE OF INSULIN (HCC): Primary | ICD-10-CM

## 2025-02-26 DIAGNOSIS — Z79.4 TYPE 2 DIABETES MELLITUS WITHOUT COMPLICATION, WITH LONG-TERM CURRENT USE OF INSULIN (HCC): ICD-10-CM

## 2025-02-26 DIAGNOSIS — E11.9 TYPE 2 DIABETES MELLITUS WITHOUT COMPLICATION, WITH LONG-TERM CURRENT USE OF INSULIN (HCC): ICD-10-CM

## 2025-02-26 RX ORDER — INSULIN LISPRO 100 [IU]/ML
INJECTION, SOLUTION INTRAVENOUS; SUBCUTANEOUS
Qty: 15 ML | Refills: 3 | Status: SHIPPED | OUTPATIENT
Start: 2025-02-26

## 2025-02-26 RX ORDER — ACYCLOVIR 800 MG/1
1 TABLET ORAL
Qty: 6 EACH | Refills: 1 | Status: SHIPPED | OUTPATIENT
Start: 2025-02-26

## 2025-02-26 NOTE — TELEPHONE ENCOUNTER
Patients daughter would like to speak to nurse regarding switching form Dexcom to Freestyle - they are having issues with decom going bad    Please advise.

## 2025-02-26 NOTE — TELEPHONE ENCOUNTER
Spoke with patient's daughter, Eslie. Dexcom 7 has been having issues. Not sure what the issue is. They have gone thru 4 sensors a couple of days.    Pharmacy said Freestyle is easier and cheaper, can we change from the Dexcom  to the newest Freestyle?    Insurance is not covering lispro 100 any more. Humalog Kwikpen was sent 2/11/25.  Janet stating that the Humalog order was not received. Please resend    Letter will need need to be sent to [facility] in regards to med change and glucose monitor change.     Please review and advise

## 2025-02-26 NOTE — TELEPHONE ENCOUNTER
Sent both to [facility] for letter to facility noting these changes   -Change dexcom to freestyle josie changed out every 14 days  -Change lispro to humalog insulin per prescription (can also fax facility copy of both scripts)    Thanks   normal...

## 2025-03-05 ENCOUNTER — APPOINTMENT (OUTPATIENT)
Dept: CARDIOLOGY | Age: 85
End: 2025-03-05

## 2025-03-07 ENCOUNTER — TELEPHONE (OUTPATIENT)
Dept: FAMILY MEDICINE CLINIC | Facility: CLINIC | Age: 85
End: 2025-03-07

## 2025-03-07 RX ORDER — INSULIN LISPRO 100 [IU]/ML
INJECTION, SOLUTION INTRAVENOUS; SUBCUTANEOUS
Qty: 15 ML | Refills: 3 | Status: ON HOLD | OUTPATIENT
Start: 2025-03-07

## 2025-03-07 NOTE — TELEPHONE ENCOUNTER
Patient daughter requesting a call back from Luisana Francisco, states would like to clarify a medication sent to pharmacy by Dr. Estevan Bah.

## 2025-03-07 NOTE — TELEPHONE ENCOUNTER
Spoke with Elsie. She talked to [facility] regarding insulin, they stated that they did not receive the humalog order. Will resend script.

## 2025-03-09 NOTE — H&P
[facility]     Ciarra Salcido Patient Status:  Emergency    1940 MRN [id number]   Location [facility] EMERGENCY DEPARTMENT Attending Praveen Newell*   Hosp Day # 0 PCP JUDY CYR MD     Chief Complaint: Shortness of breath, edema, confusion     Subjective:    History of Present Illness:     Ciarra Salcido is a 84 year old female with history of afib on eliquis, chronic HFpEF, CKD stage IV, hypercoagulable state with hx of DVT/PE on eliquis, type II DM, HTN, HLD, hypothyroidism, depression, gout presented with SOB, edema, weight gain and confusion.     Patient presented from assisted living with shortness of breath, edema.  According to her daughter who provided history she has been sleeping more.  Since her prior admission last year, patient has been mostly wheelchair-bound.  No known fevers, chills.  Patient has a chronic cough and she denies any acute changes.    She was sating 100% on 3 L NC. EKG with paced rhythm. CXR with small left pleural effusion and left lower lobe infiltrate. She was given IV lasix.     History/Other:    Past Medical History:  Past Medical History:    (HFpEF) heart failure with preserved ejection fraction (HCC)    Acute cystitis without hematuria    Acute deep vein thrombosis (DVT) of popliteal vein of right lower extremity (HCC)    Cataract    CHF exacerbation (HCC)    CKD (chronic kidney disease)    Congestive heart disease (HCC)    COVID-19    Diabetes (HCC)    Disorder of thyroid    DM2 (diabetes mellitus, type 2) (HCC)    Hearing impairment    High blood pressure    HTN (hypertension)    Hyperlipidemia    Hypothyroidism    Pulmonary embolism (HCC)    Shingles    Visual impairment     Past Surgical History:   Past Surgical History:   Procedure Laterality Date    Cardiac pacemaker placement      Cataract      Eye surgery      Hysterectomy      [facility]    Pacemaker monitor        Family History:   Family History   Problem  Relation Age of Onset    Other (Other) Mother         Old Age    Other (Other) Father         Old age     Social History:    reports that she quit smoking about 14 years ago. Her smoking use included cigarettes. She started smoking about 64 years ago. She has a 50 pack-year smoking history. She has never used smokeless tobacco. She reports that she does not drink alcohol and does not use drugs.     Allergies: Allergies[1]    Medications:  Medications Ordered Prior to Encounter[2]    Review of Systems:   A comprehensive review of systems was completed.    Pertinent positives and negatives noted in the HPI.    Objective:   Physical Exam:    /69   Pulse 62   Temp 98.7 °F (37.1 °C) (Temporal)   Resp 14   SpO2 100%   General: No acute distress, Alert  Respiratory: No rhonchi, no wheezes  Cardiovascular: S1, S2. Regular rate and rhythm.  Positive JVD, systolic murmur  Abdomen: Soft, Non-tender, non-distended, positive bowel sounds  Neuro: Lethargic.  Following simple commands.  Smile is symmetric.  Moving all 4 extremities to command.  Extremities: Pitting edema up to the knees    Results:    Labs:      Labs Last 24 Hours:    Recent Labs   Lab 03/09/25  1424   RBC 3.85   HGB 9.1*   HCT 29.6*   MCV 76.9*   MCH 23.6*   MCHC 30.7*   RDW 16.9   NEPRELIM 5.90   WBC 7.9   .0       Recent Labs   Lab 03/09/25  1424   GLU 81   BUN 69*   CREATSERUM 3.38*   EGFRCR 13*   CA 8.8   ALB 4.2      K 5.0      CO2 27.0   ALKPHO 93   AST 20   ALT 22   BILT 0.3   TP 7.4       Estimated Glomerular Filtration Rate: 13 mL/min/1.73m2 (A) (result from lab).    Lab Results   Component Value Date    INR 1.57 (H) 03/09/2025    INR 1.38 (H) 09/09/2024    INR 1.58 (H) 07/08/2024       Recent Labs   Lab 03/09/25  1424   TROPHS 30       Recent Labs   Lab 03/09/25  1424   PBNP 16,938*       No results for input(s): \"PCT\" in the last 168 hours.    Imaging: Imaging data reviewed in Epic.    Assessment & Plan:      #Acute on  chronic HFpEF  #Mild to moderate MR  #Pulmonary hypertension  ProBNP 99657.   -Continue IV lasix  -Cardiology consult  -TTE  -Strict I&O, sodium and fluid restriction     #Acute hypoxic respiratory failure 2/2 acute on chronic HFpEF - wean o2 with diuresis    #PAF s/p PPM     #CKD stage IV 2/2 DM/HTN - Scr close to her baseline. Trend.     #Hypercoagulable state, hx of VTE on eliquis     #HTN  #Microcytic anemia of CKD -hgb close to her baseline. Check ferritin and iron studies.   #DMII - A1c 8.3   #HLD  #Hypothyroidism   #Gout  #Depression     confirmed CODE STATUS DNAR select [facility]    Plan of care discussed with patient    Catarina Barros MD    Supplementary Documentation:     The 21st Century Cures Act makes medical notes like these available to patients in the interest of transparency. Please be advised this is a medical document. Medical documents are intended to carry relevant information, facts as evident, and the clinical opinion of the practitioner. The medical note is intended as peer to peer communication and may appear blunt or direct. It is written in medical language and may contain abbreviations or verbiage that are unfamiliar.                                       [1] No Known Allergies  [2]   No current facility-administered medications on file prior to encounter.     Current Outpatient Medications on File Prior to Encounter   Medication Sig Dispense Refill    Insulin Lispro, 1 Unit Dial, (HUMALOG KWIKPEN) 100 UNIT/ML Subcutaneous Solution Pen-injector INJECT UNITS PER SCALE: 150-200=1, 201-250=2, 251-300=3, 301-350=4, 351-400=5, OVER 400 CALL MD, NEW PEN EVERY 28 DAY 15 mL 3    Continuous Glucose Sensor (FREESTYLE SHARIF 3 SENSOR) Does not apply Misc 1 each every 14 (fourteen) days. 6 each 1    carvedilol 25 MG Oral Tab Take 1 tablet (25 mg total) by mouth 2 (two) times daily with meals. 180 tablet 1    Insulin Lispro, 1 Unit Dial, 100 UNIT/ML Subcutaneous Solution Pen-injector INJECT UNITS PER SCALE:  150-200=1, 201-250=2, 251-300=3, 301-350=4, 351-400=5, OVER 400 CALL MD, NEW PEN EVERY 28 DAY 15 mL 3    LANTUS SOLOSTAR 100 UNIT/ML Subcutaneous Solution Pen-injector INJECT 20 UNITS INTO THE SKIN NIGHTLY 30 mL 3    escitalopram 5 MG Oral Tab Take 1 tablet (5 mg total) by mouth daily. 90 tablet 3    gabapentin 100 MG Oral Cap Take 1 capsule (100 mg total) by mouth 3 (three) times daily. 270 capsule 1    apixaban (ELIQUIS) 2.5 MG Oral Tab Take 1 tablet (2.5 mg total) by mouth 2 (two) times daily. 60 tablet 5    DROPLET PEN NEEDLES 32G X 4 MM Does not apply Misc USE AS DIRECTED 300 each 3    allopurinol 100 MG Oral Tab Take 1 tablet (100 mg total) by mouth daily. 90 tablet 1    torsemide 20 MG Oral Tab Take 1 tablet (20 mg total) by mouth in the morning and 1 tablet (20 mg total) before bedtime.      Insulin Pen Needle (BD AUTOSHIELD DUO) 30G X 5 MM Does not apply Misc Inject 1 Pen into the skin in the morning, at noon, in the evening, and at bedtime. Dx: E11.65 360 each 3    SIMVASTATIN 20 MG Oral Tab TAKE 1 TABLET EVERY NIGHT 90 tablet 3    levothyroxine 75 MCG Oral Tab Take 1 tablet (75 mcg total) by mouth before breakfast. 90 tablet 3    Insulin Pen Needle (BD PEN NEEDLE LALI U/F) 32G X 4 MM Does not apply Misc Inject 1 Pen into the skin As Directed. Dx: E11.65 100 each 3    Continuous Glucose Sensor (DEXCOM G7 SENSOR) Does not apply Misc 1 each Every 10 days. Dx: E11.22, N18.31, Z79.4 (patient is using insulin 4x/day) 9 each 3    Insulin Pen Needle 32G X 4 MM Does not apply Misc Use as directed to inject insulin once daily 100 each 0    amLODIPine 5 MG Oral Tab Take 1 tablet (5 mg total) by mouth daily. 30 tablet 11    amiodarone 200 MG Oral Tab Take 1 tablet (200 mg total) by mouth daily.      cholecalciferol 25 MCG (1000 UT) Oral Tab Take 1 tablet (1,000 Units total) by mouth daily. (Patient not taking: Reported on 10/28/2024)      Lidocaine, Anorectal, 5 % External Gel Apply 1 Application topically daily as  needed (rectal pain).      Glucose Blood (ACCU-CHEK GUIDE) In Vitro Strip  (Patient not taking: Reported on 10/28/2024)      Blood Glucose Monitoring Suppl (ACCU-CHEK GUIDE) w/Device Does not apply Kit  (Patient not taking: Reported on 10/28/2024)      Accu-Chek FastClix Lancets Does not apply Misc 1 Lancet by Finger stick route. Use as directed. (Patient not taking: Reported on 10/28/2024)      dilTIAZem  MG Oral Capsule SR 24 Hr Take 1 capsule (120 mg total) by mouth daily.      acetaminophen 500 MG Oral Tab Take 2 tablets (1,000 mg total) by mouth every 6 (six) hours as needed for Pain or Fever. (Patient not taking: Reported on 1/9/2025)

## 2025-03-09 NOTE — ED PROVIDER NOTES
Patient Seen in: [facility] Emergency Department      History     Chief Complaint   Patient presents with    Shortness Of Breath     Stated Complaint: shortness of breath, weakness, confusion    Subjective:   HPI      84-year-old female with past medical history of diabetes, CKD, CHF presents today for evaluation.  Patient is here with family who help supplement history.  Over the last several weeks, patient has had increasing leg swelling.  Patient also has had fatigue and generalized weakness.  Patient denies any chest pain.  She has had some shortness of breath.  Family states she is on 3 L nasal cannula at baseline and has been compliant with her diuretic.    Objective:     Past Medical History:    (HFpEF) heart failure with preserved ejection fraction (HCC)    Acute cystitis without hematuria    Acute deep vein thrombosis (DVT) of popliteal vein of right lower extremity (HCC)    Arrhythmia    Cataract    CHF exacerbation (HCC)    CKD (chronic kidney disease)    Congestive heart disease (HCC)    COVID-19    Diabetes (HCC)    Disorder of thyroid    DM2 (diabetes mellitus, type 2) (HCC)    Hearing impairment    High blood pressure    High cholesterol    History of stomach ulcers    HTN (hypertension)    Hyperlipidemia    Hypothyroidism    Pulmonary embolism (HCC)    Shingles    Visual impairment              Past Surgical History:   Procedure Laterality Date    Cardiac pacemaker placement      Cataract      Eye surgery      Hysterectomy      [facility]    Pacemaker monitor                  Social History     Socioeconomic History    Marital status: Single   Tobacco Use    Smoking status: Former     Current packs/day: 0.00     Average packs/day: 1 pack/day for 50.0 years (50.0 ttl pk-yrs)     Types: Cigarettes     Start date:      Quit date:      Years since quittin.1    Smokeless tobacco: Never   Vaping Use    Vaping status: Never Used   Substance and Sexual Activity    Alcohol use: Never     Drug use: Never   Other Topics Concern    Caffeine Concern Yes     Comment: 2 cups of coffee daily     Stress Concern No    Weight Concern No    Special Diet No    Exercise Yes     Comment: PT 2 X Weekly, OT 2 x weekly    Seat Belt Yes     Social Drivers of Health     Food Insecurity: No Food Insecurity (3/9/2025)    NCSS - Food Insecurity     Worried About Running Out of Food in the Last Year: No     Ran Out of Food in the Last Year: No   Transportation Needs: No Transportation Needs (3/9/2025)    NCSS - Transportation     Lack of Transportation: No   Housing Stability: Not At Risk (3/9/2025)    NCSS - Housing/Utilities     Has Housing: Yes     Worried About Losing Housing: No     Unable to Get Utilities: No                  Physical Exam     ED Triage Vitals [03/09/25 1410]   /75   Pulse 62   Resp (!) 27   Temp 98.7 °F (37.1 °C)   Temp src Temporal   SpO2 99 %   O2 Device Nasal cannula       Current Vitals:   Vital Signs  BP: 154/65  Pulse: 60  Resp: 17  Temp: 97.7 °F (36.5 °C)  Temp src: Axillary  MAP (mmHg): 91    Oxygen Therapy  SpO2: 99 %  O2 Device: Nasal cannula  O2 Flow Rate (L/min): 6 L/min  Pulse Oximetry Type: Continuous  Oximetry Probe Site Changed: No  Pulse Ox Probe Location: Right hand        Physical Exam  Vitals and nursing note reviewed.   Constitutional:       Appearance: Normal appearance.   HENT:      Head: Normocephalic.      Nose: Nose normal.      Mouth/Throat:      Mouth: Mucous membranes are moist.   Eyes:      Extraocular Movements: Extraocular movements intact.   Cardiovascular:      Rate and Rhythm: Normal rate and regular rhythm.   Pulmonary:      Effort: Pulmonary effort is normal.      Breath sounds: Rhonchi present.   Abdominal:      General: Abdomen is flat.   Musculoskeletal:         General: Normal range of motion.      Right lower leg: Edema present.      Left lower leg: Edema present.   Skin:     General: Skin is warm.   Neurological:      General: No focal deficit  present.      Mental Status: She is alert.   Psychiatric:         Mood and Affect: Mood normal.         ED Course     Labs Reviewed   CBC WITH DIFFERENTIAL WITH PLATELET - Abnormal; Notable for the following components:       Result Value    HGB 9.1 (*)     HCT 29.6 (*)     MCV 76.9 (*)     MCH 23.6 (*)     MCHC 30.7 (*)     All other components within normal limits   COMP METABOLIC PANEL (14) - Abnormal; Notable for the following components:    BUN 69 (*)     Creatinine 3.38 (*)     Calculated Osmolality 301 (*)     eGFR-Cr 13 (*)     All other components within normal limits   PRO BETA NATRIURETIC PEPTIDE - Abnormal; Notable for the following components:    Pro-Beta Natriuretic Peptide 16,938 (*)     All other components within normal limits   PROTHROMBIN TIME (PT) - Abnormal; Notable for the following components:    PT 18.9 (*)     INR 1.57 (*)     All other components within normal limits   TROPONIN I HIGH SENSITIVITY - Normal   PTT, ACTIVATED - Normal   T4, FREE (S) - Normal   POCT GLUCOSE - Normal   TSH W REFLEX TO FREE T4     EKG    Rate, intervals and axes as noted on EKG Report.  Rate: 63  Rhythm: Paced   Reading: No STEMI                XR CHEST AP PORTABLE  (CPT=71045)    Result Date: 3/9/2025  PROCEDURE:  XR CHEST AP PORTABLE  (CPT=71045)  TECHNIQUE:  AP chest radiograph was obtained.  COMPARISON:  [facility] , XR, XR CHEST AP PORTABLE  (CPT=71045), 10/21/2024, 12:42 PM.  INDICATIONS:  shortness of breath, weakness, confusion, sat 78% upon arrival on 3l/nc, pt placed on 6l/nc and sat up to 95%  PATIENT STATED HISTORY: (As transcribed by Technologist)  patients family states she has been having difficulty breathing and weakness, has pacemaker             CONCLUSION:    Small left pleural effusion and left lower lobe infiltrate.  Right chest clear.  Stable cardiac enlargement without CHF.  No pneumothorax.  Stable pacemaker.  LOCATION:  [facility]      Dictated by (CST): Mahamed Duran MD on 3/09/2025 at 2:34 PM      Finalized by (CST): Mahamed Duran MD on 3/09/2025 at 2:34 PM             [facility]      Differential Diagnosis  84-year-old female presents today for evaluation of several weeks of increasing leg swelling and shortness of breath along with generalized weakness.  Patient appears fluid overloaded on exam.  Differential would include CHF, pneumonia, PRISCILLA.  Plan for x-ray along with labs, will reassess.    3:14 pm  Patient's proBNP is significantly elevated.  X-ray demonstrates pleural effusion with note of left lower lobe infiltrate.  She has no leukocytosis or fevers, so I favor her symptoms to be secondary to CHF over pneumonia.  IV Lasix ordered for diuresis.  I  updated patient and family on plan, they are agreeable to admission.  Hospitalist notified of need for admission.    Discussions of Management  Hospitalist notified of need for admission.      Admission disposition: 3/9/2025  3:14 PM           Medical Decision Making      Disposition and Plan     Clinical Impression:  1. Acute on chronic congestive heart failure, unspecified heart failure type (HCC)         Disposition:  Admit  3/9/2025  3:14 pm    Follow-up:  No follow-up provider specified.        Medications Prescribed:  Current Discharge Medication List              Supplementary Documentation:         Hospital Problems       Present on Admission  Date Reviewed: 1/9/2025            ICD-10-CM Noted POA    * (Principal) Acute on chronic congestive heart failure, unspecified heart failure type (HCC) I50.9 9/9/2024 Unknown

## 2025-03-09 NOTE — ED QUICK NOTES
Orders for admission, patient is aware of plan and ready to go upstairs. Any questions, please call ED RN Issa at extension [phone].     Patient Covid vaccination status: Fully vaccinated     COVID Test Ordered in ED: None    COVID Suspicion at Admission: N/A    Running Infusions:  None    Mental Status/LOC at time of transport: alert    Other pertinent information: 3L NC  CIWA score: N/A   NIH score:  N/A

## 2025-03-09 NOTE — ED INITIAL ASSESSMENT (HPI)
Pt to ER in wheelchair,  accompanied by daughter and son, 78%in triage, placed on 6L NC up to 95% shortness of breath, weakness, dizziness, generalized edema, confusion for 1 week progressively worsening. Denies pain, states improvement in SOB with O2. 5lb weight gain in 4 days

## 2025-03-09 NOTE — PLAN OF CARE
Received patient from ED approx 1700, requiring total slide assist from stretcher to bed. AOX2-3, short term memory loss, hard of hearing. Reports no pain. Lung sounds diminished bilaterally, O2 saturation adequate on 3L nasal cannula.  AV paced on tele. Bowel sounds present and active in all quadrants, last bowel movement yesterday per Nicolas RN. Patient incontinent and voiding with increased frequency due to diuretics external catheter placed upon admission to floor.     Plan of Care: IV diuretics. Cardiology to see. Echo pending.    Discussed plan of care with patient, family at bedside, verbalized understanding. Call light within reach. Fall precautions in place.     2 Person skin check complete with PCT Franc on admission, skin is intact.

## 2025-03-10 NOTE — PLAN OF CARE
Patient alert and oriented x 2 with period forgetfulness .sleepy early shift ,but alert and talking,eating full meal,co operating well, On 3L oxygen support. Av paced  on cardiac monitor.on Eliquis, Patient denies any chest pain or chest discomfort at this time. Patient voids, last BM 3/8,ABG.and troponin  checked . Result doctor aware , no acute distress noted Plan of care updated, all questions answered. Safety precautions in place. Bed alarm on. Will continue to monitor tele/labs/vital signs closely.     Plan:    Echo    Cardiology to see   Responsa to interrogate device in am   Trop trend   PBNP 16,938 diuresing lasix 40 mg IV   Cmp mg cbc in am   Daily weight   STRICT  I&O           - PT / OT   Problem: Diabetes/Glucose Control  Goal: Glucose maintained within prescribed range  Description: INTERVENTIONS:  - Monitor Blood Glucose as ordered  - Assess for signs and symptoms of hyperglycemia and hypoglycemia  - Administer ordered medications to maintain glucose within target range  - Assess barriers to adequate nutritional intake and initiate nutrition consult as needed  - Instruct patient on self management of diabetes  Outcome: Progressing     Problem: Patient/Family Goals  Goal: Patient/Family Long Term Goal  Description: Patient's Long Term Goal: discharge soon    Interventions:  - Appropriate consult and treatments, follow up appointment after discharge, pain mgt , ABG, Monitor Tele, labs, V/S, Labs, medication regimes, heart healthy carb controlled diet and exercise, ABG , follow up appointment after discharge  - See additional Care Plan goals for specific interventions  Outcome: Progressing  Goal: Patient/Family Short Term Goal  Description: Patient's Short Term Goal: breath comfortable, pain mgt    Interventions:   - Frequent assess and assist in ADL, pain mgt, safety, frequent reposition,skin care, ch  - See additional Care Plan goals for specific interventions  Outcome: Progressing     Problem:  PAIN - ADULT  Goal: Verbalizes/displays adequate comfort level or patient's stated pain goal  Description: INTERVENTIONS:  - Encourage pt to monitor pain and request assistance  - Assess pain using appropriate pain scale  - Administer analgesics based on type and severity of pain and evaluate response  - Implement non-pharmacological measures as appropriate and evaluate response  - Consider cultural and social influences on pain and pain management  - Manage/alleviate anxiety  - Utilize distraction and/or relaxation techniques  - Monitor for opioid side effects  - Notify MD/LIP if interventions unsuccessful or patient reports new pain  - Anticipate increased pain with activity and pre-medicate as appropriate  Outcome: Progressing     Problem: RISK FOR INFECTION - ADULT  Goal: Absence of fever/infection during anticipated neutropenic period  Description: INTERVENTIONS  - Monitor WBC  - Administer growth factors as ordered  - Implement neutropenic guidelines  Outcome: Progressing     Problem: SAFETY ADULT - FALL  Goal: Free from fall injury  Description: INTERVENTIONS:  - Assess pt frequently for physical needs  - Identify cognitive and physical deficits and behaviors that affect risk of falls.  - [place] fall precautions as indicated by assessment.  - Educate pt/family on patient safety including physical limitations  - Instruct pt to call for assistance with activity based on assessment  - Modify environment to reduce risk of injury  - Provide assistive devices as appropriate  - Consider OT/PT consult to assist with strengthening/mobility  - Encourage toileting schedule  Outcome: Progressing     Problem: DISCHARGE PLANNING  Goal: Discharge to home or other facility with appropriate resources  Description: INTERVENTIONS:  - Identify barriers to discharge w/pt and caregiver  - Include patient/family/discharge partner in discharge planning  - Arrange for needed discharge resources and transportation as appropriate  -  Identify discharge learning needs (meds, wound care, etc)  - Arrange for interpreters to assist at discharge as needed  - Consider post-discharge preferences of patient/family/discharge partner  - Complete POLST form as appropriate  - Assess patient's ability to be responsible for managing their own health  - Refer to Case Management Department for coordinating discharge planning if the patient needs post-hospital services based on physician/LIP order or complex needs related to functional status, cognitive ability or social support system  Outcome: Progressing     Problem: CARDIOVASCULAR - ADULT  Goal: Maintains optimal cardiac output and hemodynamic stability  Description: INTERVENTIONS:  - Monitor vital signs, rhythm, and trends  - Monitor for bleeding, hypotension and signs of decreased cardiac output  - Evaluate effectiveness of vasoactive medications to optimize hemodynamic stability  - Monitor arterial and/or venous puncture sites for bleeding and/or hematoma  - Assess quality of pulses, skin color and temperature  - Assess for signs of decreased coronary artery perfusion - ex. Angina  - Evaluate fluid balance, assess for edema, trend weights  Outcome: Progressing     Problem: RESPIRATORY - ADULT  Goal: Achieves optimal ventilation and oxygenation  Description: INTERVENTIONS:  - Assess for changes in respiratory status  - Assess for changes in mentation and behavior  - Position to facilitate oxygenation and minimize respiratory effort  - Oxygen supplementation based on oxygen saturation or ABGs  - Provide Smoking Cessation handout, if applicable  - Encourage broncho-pulmonary hygiene including cough, deep breathe, Incentive Spirometry  - Assess the need for suctioning and perform as needed  - Assess and instruct to report SOB or any respiratory difficulty  - Respiratory Therapy support as indicated  - Manage/alleviate anxiety  - Monitor for signs/symptoms of CO2 retention  Outcome: Progressing     Problem:  GENITOURINARY - ADULT  Goal: Absence of urinary retention  Description: INTERVENTIONS:  - Assess patient’s ability to void and empty bladder  - Monitor intake/output and perform bladder scan as needed  - Follow urinary retention protocol/standard of care  - Consider collaborating with pharmacy to review patient's medication profile  - Implement strategies to promote bladder emptying  Outcome: Progressing     Problem: METABOLIC/FLUID AND ELECTROLYTES - ADULT  Goal: Glucose maintained within prescribed range  Description: INTERVENTIONS:  - Monitor Blood Glucose as ordered  - Assess for signs and symptoms of hyperglycemia and hypoglycemia  - Administer ordered medications to maintain glucose within target range  - Assess barriers to adequate nutritional intake and initiate nutrition consult as needed  - Instruct patient on self management of diabetes  Outcome: Progressing  Goal: Electrolytes maintained within normal limits  Description: INTERVENTIONS:  - Monitor labs and rhythm and assess patient for signs and symptoms of electrolyte imbalances  - Administer electrolyte replacement as ordered  - Monitor response to electrolyte replacements, including rhythm and repeat lab results as appropriate  - Fluid restriction as ordered  - Instruct patient on fluid and nutrition restrictions as appropriate  Outcome: Progressing  Goal: Hemodynamic stability and optimal renal function maintained  Description: INTERVENTIONS:  - Monitor labs and assess for signs and symptoms of volume excess or deficit  - Monitor intake, output and patient weight  - Monitor urine specific gravity, serum osmolarity and serum sodium as indicated or ordered  - Monitor response to interventions for patient's volume status, including labs, urine output, blood pressure (other measures as available)  - Encourage oral intake as appropriate  - Instruct patient on fluid and nutrition restrictions as appropriate  Outcome: Progressing     Problem: HEMATOLOGIC -  ADULT  Goal: Maintains hematologic stability  Description: INTERVENTIONS  - Assess for signs and symptoms of bleeding or hemorrhage  - Monitor labs and vital signs for trends  - Administer supportive blood products/factors, fluids and medications as ordered and appropriate  - Administer supportive blood products/factors as ordered and appropriate  Outcome: Progressing

## 2025-03-10 NOTE — PLAN OF CARE
Assumed care of patient @ 0730 patient resting in bed, AOX2-3, reports no pain, some short term memory loss. Lung sounds course bilaterally, O2 saturation adequate on 3L nasal cannula, baseline from home. No complaints of shortness of breath or chest pain at this time. AV paced on tele. Bowel sounds present and active in all quadrants, last bowel movement prior to admission. Patient incontinent with external catheter in place. voiding without issue. Up to chair today with therapy. Skin is intact. Swelling to BLE.     Plan of Care: IV diuretics. Pacemaker interrogation. Daily weights. I/O. Increase activity as able.    Discussed plan of care with patient, family at bedside, verbalized understanding. Call light within reach. Fall precautions in place.     Problem: Diabetes/Glucose Control  Goal: Glucose maintained within prescribed range  Description: INTERVENTIONS:  - Monitor Blood Glucose as ordered  - Assess for signs and symptoms of hyperglycemia and hypoglycemia  - Administer ordered medications to maintain glucose within target range  - Assess barriers to adequate nutritional intake and initiate nutrition consult as needed  - Instruct patient on self management of diabetes  Outcome: Progressing     Problem: Patient/Family Goals  Goal: Patient/Family Long Term Goal  Description: Patient's Long Term Goal: discharge soon    Interventions:  - Appropriate consult and treatments, follow up appointment after discharge, pain mgt , ABG, Monitor Tele, labs, V/S, Labs, medication regimes, heart healthy carb controlled diet and exercise, ABG , follow up appointment after discharge  - See additional Care Plan goals for specific interventions  Outcome: Progressing  Goal: Patient/Family Short Term Goal  Description: Patient's Short Term Goal: breath comfortable, pain mgt    Interventions:   - Frequent assess and assist in ADL, pain mgt, safety, frequent reposition,skin care, ch  - See additional Care Plan goals for specific  interventions  Outcome: Progressing     Problem: PAIN - ADULT  Goal: Verbalizes/displays adequate comfort level or patient's stated pain goal  Description: INTERVENTIONS:  - Encourage pt to monitor pain and request assistance  - Assess pain using appropriate pain scale  - Administer analgesics based on type and severity of pain and evaluate response  - Implement non-pharmacological measures as appropriate and evaluate response  - Consider cultural and social influences on pain and pain management  - Manage/alleviate anxiety  - Utilize distraction and/or relaxation techniques  - Monitor for opioid side effects  - Notify MD/LIP if interventions unsuccessful or patient reports new pain  - Anticipate increased pain with activity and pre-medicate as appropriate  Outcome: Progressing     Problem: RISK FOR INFECTION - ADULT  Goal: Absence of fever/infection during anticipated neutropenic period  Description: INTERVENTIONS  - Monitor WBC  - Administer growth factors as ordered  - Implement neutropenic guidelines  Outcome: Progressing     Problem: SAFETY ADULT - FALL  Goal: Free from fall injury  Description: INTERVENTIONS:  - Assess pt frequently for physical needs  - Identify cognitive and physical deficits and behaviors that affect risk of falls.  - [place] fall precautions as indicated by assessment.  - Educate pt/family on patient safety including physical limitations  - Instruct pt to call for assistance with activity based on assessment  - Modify environment to reduce risk of injury  - Provide assistive devices as appropriate  - Consider OT/PT consult to assist with strengthening/mobility  - Encourage toileting schedule  Outcome: Progressing     Problem: DISCHARGE PLANNING  Goal: Discharge to home or other facility with appropriate resources  Description: INTERVENTIONS:  - Identify barriers to discharge w/pt and caregiver  - Include patient/family/discharge partner in discharge planning  - Arrange for needed  discharge resources and transportation as appropriate  - Identify discharge learning needs (meds, wound care, etc)  - Arrange for interpreters to assist at discharge as needed  - Consider post-discharge preferences of patient/family/discharge partner  - Complete POLST form as appropriate  - Assess patient's ability to be responsible for managing their own health  - Refer to Case Management Department for coordinating discharge planning if the patient needs post-hospital services based on physician/LIP order or complex needs related to functional status, cognitive ability or social support system  Outcome: Progressing     Problem: CARDIOVASCULAR - ADULT  Goal: Maintains optimal cardiac output and hemodynamic stability  Description: INTERVENTIONS:  - Monitor vital signs, rhythm, and trends  - Monitor for bleeding, hypotension and signs of decreased cardiac output  - Evaluate effectiveness of vasoactive medications to optimize hemodynamic stability  - Monitor arterial and/or venous puncture sites for bleeding and/or hematoma  - Assess quality of pulses, skin color and temperature  - Assess for signs of decreased coronary artery perfusion - ex. Angina  - Evaluate fluid balance, assess for edema, trend weights  Outcome: Progressing     Problem: RESPIRATORY - ADULT  Goal: Achieves optimal ventilation and oxygenation  Description: INTERVENTIONS:  - Assess for changes in respiratory status  - Assess for changes in mentation and behavior  - Position to facilitate oxygenation and minimize respiratory effort  - Oxygen supplementation based on oxygen saturation or ABGs  - Provide Smoking Cessation handout, if applicable  - Encourage broncho-pulmonary hygiene including cough, deep breathe, Incentive Spirometry  - Assess the need for suctioning and perform as needed  - Assess and instruct to report SOB or any respiratory difficulty  - Respiratory Therapy support as indicated  - Manage/alleviate anxiety  - Monitor for  signs/symptoms of CO2 retention  Outcome: Progressing     Problem: GENITOURINARY - ADULT  Goal: Absence of urinary retention  Description: INTERVENTIONS:  - Assess patient’s ability to void and empty bladder  - Monitor intake/output and perform bladder scan as needed  - Follow urinary retention protocol/standard of care  - Consider collaborating with pharmacy to review patient's medication profile  - Implement strategies to promote bladder emptying  Outcome: Progressing     Problem: METABOLIC/FLUID AND ELECTROLYTES - ADULT  Goal: Glucose maintained within prescribed range  Description: INTERVENTIONS:  - Monitor Blood Glucose as ordered  - Assess for signs and symptoms of hyperglycemia and hypoglycemia  - Administer ordered medications to maintain glucose within target range  - Assess barriers to adequate nutritional intake and initiate nutrition consult as needed  - Instruct patient on self management of diabetes  Outcome: Progressing  Goal: Electrolytes maintained within normal limits  Description: INTERVENTIONS:  - Monitor labs and rhythm and assess patient for signs and symptoms of electrolyte imbalances  - Administer electrolyte replacement as ordered  - Monitor response to electrolyte replacements, including rhythm and repeat lab results as appropriate  - Fluid restriction as ordered  - Instruct patient on fluid and nutrition restrictions as appropriate  Outcome: Progressing  Goal: Hemodynamic stability and optimal renal function maintained  Description: INTERVENTIONS:  - Monitor labs and assess for signs and symptoms of volume excess or deficit  - Monitor intake, output and patient weight  - Monitor urine specific gravity, serum osmolarity and serum sodium as indicated or ordered  - Monitor response to interventions for patient's volume status, including labs, urine output, blood pressure (other measures as available)  - Encourage oral intake as appropriate  - Instruct patient on fluid and nutrition  restrictions as appropriate  Outcome: Progressing     Problem: HEMATOLOGIC - ADULT  Goal: Maintains hematologic stability  Description: INTERVENTIONS  - Assess for signs and symptoms of bleeding or hemorrhage  - Monitor labs and vital signs for trends  - Administer supportive blood products/factors, fluids and medications as ordered and appropriate  - Administer supportive blood products/factors as ordered and appropriate  Outcome: Progressing

## 2025-03-10 NOTE — PROGRESS NOTES
[facility]   part of [facility]     Hospitalist Progress Note     Ciarra Salcido Patient Status:  Inpatient    1940 MRN [id number]   Location [facility] 8NE-A Attending Catarina Barros MD   Hosp Day # 1 PCP JUDY CYR MD     Chief Complaint: SOB, edema, confusion    Subjective:     Patient is sitting up in bedside chair. No acute complaints. Cough with greenish sputum. Denies fevers, chills.     Objective:    Review of Systems:   A comprehensive review of systems was completed; pertinent positive and negatives stated in subjective.    Vital signs:  Temp:  [97.6 °F (36.4 °C)-99 °F (37.2 °C)] 99 °F (37.2 °C)  Pulse:  [60-74] 74  Resp:  [14-27] 18  BP: (144-168)/(58-98) 164/69  SpO2:  [93 %-100 %] 97 %    Physical Exam:    General: No acute distress  Respiratory: No wheezes, no rhonchi  Cardiovascular: S1, S2, regular rate and rhythm. Grade III systolic murmur  Abdomen: Soft, Non-tender, non-distended, positive bowel sounds  Neuro: No new focal deficits.   Extremities:pitting edema to knees     Diagnostic Data:    Labs:  Recent Labs   Lab 03/09/25  1424 03/10/25  0159   WBC 7.9 7.6   HGB 9.1* 8.9*   MCV 76.9* 76.0*   .0 236.0   INR 1.57*  --        Recent Labs   Lab 03/09/25  1424 03/10/25  0200   GLU 81 212*   BUN 69* 69*   CREATSERUM 3.38* 3.29*   CA 8.8 8.9   ALB 4.2 4.0    137   K 5.0 5.2*    100   CO2 27.0 26.0   ALKPHO 93 86   AST 20 16   ALT 22 19   BILT 0.3 0.3   TP 7.4 6.9       Estimated Glomerular Filtration Rate: 13 mL/min/1.73m2 (A) (result from lab).    Recent Labs   Lab 03/09/25  1424 03/10/25  0200 03/10/25  0549   TROPHS 30 29 37*       Recent Labs   Lab 25  142   PTP 18.9*   INR 1.57*       Lab Results   Component Value Date    TSH 7.685 2025    T4F 1.5 2025             Microbiology    No results found for this visit on 25.      Imaging: Reviewed in Epic.    Medications:    metoclopramide  5 mg Intravenous Daily    allopurinol  100 mg  Oral Daily    amiodarone  200 mg Oral Daily    amLODIPine  5 mg Oral Daily    apixaban  2.5 mg Oral BID    carvedilol  25 mg Oral BID with meals    escitalopram  5 mg Oral Daily    gabapentin  100 mg Oral TID    levothyroxine  75 mcg Oral Before breakfast    atorvastatin  10 mg Oral Nightly    insulin degludec  15 Units Subcutaneous Nightly    insulin aspart  1-5 Units Subcutaneous TID AC and HS       Assessment & Plan:      #Acute on chronic HFpEF  #Mild to moderate MR  #Pulmonary hypertension  ProBNP 45551.   -Continue IV lasix  -Cardiology consult  -TTE  -Strict I&O, sodium and fluid restriction      #Acute hypoxic respiratory failure 2/2 acute on chronic HFpEF - wean o2 with diuresis. Check procal. IF low, will continue monitor off antibiotics. Check RPAN.     #Metabolic encephalopathy   #Underlying dementia without behavioral disturbance  -PTOT     #PAF s/p PPM      #CKD stage IV 2/2 DM/HTN - Scr close to her baseline. Trend.      #Hypercoagulable state, hx of VTE on eliquis      #HTN  #Microcytic anemia of CKD -hgb close to her baseline. Check ferritin and iron studies.   #DMII - A1c 8.3   #HLD  #Hypothyroidism   #Gout  #Depression        Catarina Barros MD    Supplementary Documentation:     Quality:  DVT Mechanical Prophylaxis:     Early ambuation  DVT Pharmacologic Prophylaxis   Medication    apixaban (Eliquis) tab 2.5 mg                Code Status: DNAR/Selective Treatment  [place]: No urinary catheter in place  [place] Duration (in days):   Central line:    AMINTA:     Discharge is dependent on: course  At this point Ms. Salcido is expected to be discharge to: home pending diuresis    The 21st Century Cures Act makes medical notes like these available to patients in the interest of transparency. Please be advised this is a medical document. Medical documents are intended to carry relevant information, facts as evident, and the clinical opinion of the practitioner. The medical note is intended as peer to peer  communication and may appear blunt or direct. It is written in medical language and may contain abbreviations or verbiage that are unfamiliar.              **Certification      PHYSICIAN Certification of Need for Inpatient Hospitalization - Initial Certification    Patient will require inpatient services that will reasonably be expected to span two midnight's based on the clinical documentation in H+P.   Based on patients current state of illness, I anticipate that, after discharge, patient will require TBD.

## 2025-03-10 NOTE — PLAN OF CARE
Ciarra Salcido Patient Status:  Inpatient    1940 MRN [id number]   Location [facility] 8NE-A Attending Caatrina Barros MD   Hosp Day # 0 PCP JUDY CYR MD     Cardiology Nocturnal APN Note    Briefly: (Documentation from chart review)     Ciarra Salcido is a 84 F over past few weeks increasing leg swelling.  Patient also experiencing fatigu and generalized weakness Has dana SOB on 3L nc at baseline and has been compliant with diuretic at home.  and has a PMH/PSH of: CHF afib (s/p [facility] sci DC ppm,  pHTN HTN MR CKD IV, DM2 h/o PE copd, hypothyroidism        Past Medical History:    (HFpEF) heart failure with preserved ejection fraction (HCC)    Acute cystitis without hematuria    Acute deep vein thrombosis (DVT) of popliteal vein of right lower extremity (HCC)    Arrhythmia    Cataract    CHF exacerbation (HCC)    CKD (chronic kidney disease)    Congestive heart disease (HCC)    COVID-19    Diabetes (HCC)    Disorder of thyroid    DM2 (diabetes mellitus, type 2) (HCC)    Hearing impairment    High blood pressure    High cholesterol    History of stomach ulcers    HTN (hypertension)    Hyperlipidemia    Hypothyroidism    Pulmonary embolism (HCC)    Shingles    Visual impairment       Primary Cardiologist Ajit Hamlin    Vital Signs:       3/9/2025     4:36 PM 3/9/2025     4:45 PM   Vitals History   /65    BP Location Right arm    Pulse 60    Resp 17    Temp 97.7 °F (36.5 °C)    SpO2 99 %    Weight  187 lbs 11 oz   BMI  32.22 kg/m2        Labs:   Lab Results   Component Value Date    WBC 7.9 2025    HGB 9.1 2025    HCT 29.6 2025    .0 2025    CREATSERUM 3.38 2025    BUN 69 2025     2025    K 5.0 2025     2025    CO2 27.0 2025    GLU 81 2025    CA 8.8 2025    ALB 4.2 2025    ALKPHO 93 2025    BILT 0.3 2025    TP 7.4 2025    AST 20 2025    ALT 22 2025    PTT 30.5  03/09/2025    INR 1.57 03/09/2025    T4F 1.5 03/09/2025    TSH 7.685 03/09/2025    TROPHS 30 03/09/2025       Diagnostics:   XR CHEST AP PORTABLE  (CPT=71045)    Result Date: 3/9/2025  PROCEDURE:  XR CHEST AP PORTABLE  (CPT=71045)  TECHNIQUE:  AP chest radiograph was obtained.  COMPARISON:  [facility] , XR, XR CHEST AP PORTABLE  (CPT=71045), 10/21/2024, 12:42 PM.  INDICATIONS:  shortness of breath, weakness, confusion, sat 78% upon arrival on 3l/nc, pt placed on 6l/nc and sat up to 95%  PATIENT STATED HISTORY: (As transcribed by Technologist)  patients family states she has been having difficulty breathing and weakness, has pacemaker             CONCLUSION:    Small left pleural effusion and left lower lobe infiltrate.  Right chest clear.  Stable cardiac enlargement without CHF.  No pneumothorax.  Stable pacemaker.  LOCATION:  [facility]      Dictated by (CST): Mahamed Duran MD on 3/09/2025 at 2:34 PM     Finalized by (CST): Mahamed Duran MD on 3/09/2025 at 2:34 PM       Echo 7/12/24  Conclusions:     1. Left ventricle: The cavity size was normal. Wall thickness was mildly      increased. Systolic function was hyperdynamic. The estimated ejection      fraction was 65-70%, by visual assessment. No diagnostic evidence for      regional wall motion abnormalities. Left ventricular diastolic function      parameters were normal for the patient's age.   2. Right ventricle: The cavity size was mildly increased. Pacer wire noted      in the right ventricle. Systolic function was normal.   3. Left atrium: The left atrial volume was markedly increased.   4. Right atrium: The atrium was moderately dilated. Pacer wire noted in      right atrium.   5. Aortic valve: There was thickening, consistent with sclerosis. There was      mild regurgitation.   6. Mitral valve: There was mild to moderate regurgitation.   7. Pulmonary arteries: Systolic pressure was moderately increased, in the      range of 50mm Hg to 55mm Hg. Estimated  pulmonary artery diastolic      pressure was 17mm Hg.   8. Pericardium, extracardiac: There was no pericardial effusion.   9. Inferior vena cava: The IVC was normal-sized.   Impressions:  This study is compared with previous dated 11/14/2023: No   significant change since prior study.   *   Allergies:  Allergies[1]    Medications:    acetaminophen    melatonin    polyethylene glycol (PEG 3350)    sennosides    bisacodyl    ondansetron    metoclopramide    [START ON 3/10/2025] allopurinol    [START ON 3/10/2025] amiodarone    [START ON 3/10/2025] amLODIPine    apixaban    carvedilol    [START ON 3/10/2025] escitalopram    gabapentin    [START ON 3/10/2025] levothyroxine    atorvastatin    glucose **OR** glucose **OR** glucose-vitamin C **OR** dextrose **OR** glucose **OR** glucose **OR** glucose-vitamin C    insulin degludec    insulin aspart    Assessment:   EKG shows atrial sensed ventricular paced rhythm rate 60's  CxR shows Small left pleural effusion and left lower lobe infiltrate.  Right chest clear.  Stable cardiac enlargement without CHF.  No pneumothorax.  Stable pacemaker.   Trop 30 pBNP 16.938  K  5  Creat 3.38   WBC 7.9   H/H 9.1/29.6   plt 265  TSH 7.685 T4- 1.5  Received lasix 40 mg IV & able to dec o2 from 6L to baseline 3L nc  Patient more alert since she has eaten per nurse  Echo EF 65-70% pasp 50-55 padp 17 est        Plan:  [employer] to interrogate device in am  Trop mildly inc will trend no cp likely demand  PBNP 16,938 diuresing rec'd lasix 40 mg IV in ED  Strict I/O   Echo pending  Cmp mg cbc in am  - Continue to monitor overnight  - Formal Cardiology consult to follow in AM.       Dona Talbert NP  [facility]  3/9/2025  7:23 PM         [1] No Known Allergies

## 2025-03-10 NOTE — CM/SW NOTE
03/10/25 1100   /SW Referral Data   Referral Source Social Work (self-referral)   Reason for Referral Discharge planning   Informant EMR;Clinical Staff Member   Patient Info   Patient's Home Environment Assisted Living   Post Acute Care Provider Upon Admission   ([facility])   Patient Status Prior to Admission   Services in place prior to admission DME/Supplies at home   Type of DME/Supplies Home Oxygen   Discharge Needs   Anticipated D/C needs No anticipated discharge needs     Chart reviewed for discharge planning. Pt admitted from [facility]: [phone].  updated NAOMI Yates on pt's anticipated return at discharge. PT/OT evaluated pt and state that pt is appropriate to return at discharge. Pt getting IV diuretics, unsure of when pt would be ready for discharge at this time. Pt has baseline O2 already.     RN will have to call report at the above phone # at discharge.     Angelika CONTRERAS, ANGIW  Discharge Planner

## 2025-03-10 NOTE — HISTORICAL OFFICE NOTE
Facility Logo [facility]  [address], 4th [place] [address]  [phone]      Ciarra Salcido  Progress Note  Demographics:  Name: Ciarra Salcido YOB: 1940  Age: 84, Female Medical Record No: [id number]  Visited Date/Time: 01/07/2025 03:00 PM    Chief Complaints  6mo f/u  History of Present Illness  Follow-up visit for Ms. Salcido.  84-year-old with history of hypertension, hyperlipidemia, diabetes, CKD, COPD on 3 L of oxygen as well as atrial fibrillation managed by my EP colleagues.  In the office today her daughter tells me that the patient is very sedentary.  Rarely goes for walks anymore but does do some lifting of weights when sitting in her wheelchair.  Dyspnea is at his baseline.  Patient has no angina.  She had a recent stress test in November.  Cardiac risk factors Hypertension, Dyslipidemia and Former smoker  Past Medical History  1.Atrial fibrillation with RVR  2.History of pulmonary embolus (PE) - Hx  3.Bradycardia  4.Hypertension (HTN), primary  5.Hyperlipidemia (unspecified)  6.Pulmonary hypertension, not otherwise specified or secondary  7.DM Type 2 (diabetes mellitus)  8.CKD (chronic kidney disease) stage 3, GFR 30-59 ml/min  9.Shingles  10.Essential hypertension, benign  11.CKD (chronic kidney disease), stage III  12.Hyperlipidemia  13.Acquired hypothyroidism  14.Osteopenia  15.Symptomatic bradycardia  16.Diabetic polyneuropathy associated with type 2 diabetes mellitus  17.Current moderate episode of major depressive disorder without prior episode  18.CHF exacerbation  19.Acute on chronic congestive heart failure, unspecified heart failure type  20.Acute pulmonary edema  21.Acute respiratory failure with hypoxia  22.Pleural effusion  23.Multiple subsegmental pulmonary emboli without acute cor pulmonale  24.Acute deep vein thrombosis (DVT) of popliteal vein of right lower extremity  25.Acute on chronic congestive heart failure, unspecified heart  failure type (HCC)  Past Surgical History  1.Total abdominal hysterectomy nec  Family History  1. Father - Acute myocardial infarction, unspecified  2. Mother - Acute myocardial infarction, unspecified  Social History  Smoking status Former [id number] (End Date)  Tobacco usage - No (Ex-smoker (finding))  Review of systems  Cardiovascular No history of Chest pain, MARIE, Palpitations, Syncope, PND, Orthopnea, Edema and Claudication  Respiratory No history of SOB  Physical Examination  Vitals Right Arm Sitting  / 60 mmHg, Pulse rate 60 bpm, Height in 5' 6\", BMI: 27.3, Weight in 169 lbs (or) 76.66 kgs and BSA : 1.91 cc/m²  General Appearance No Acute Distress  Cardiovascular s1, s2 reg and  EKG/Other abnormalities  HEENT:  EOMI, PERRL  Neck: trachea midline  Lungs: prolonged expiratory phase  Skin: warm and dry  Neuro: moving extremities appropriately  Allergies  No medication allergies noted.  Medications  1.acetaminophen 500 mg capsule, Take 2 capsules orally every 6 hours.  2.allopurinoL 100 mg tablet, Take 1 tablet orally once a day.  3.amiodarone 200 mg tablet, Take 1 tablet orally once a day.  4.carvediloL 12.5 mg tablet, Take 1 tablet orally 2 times a day.  5.dilTIAZem  mg tablet,extended release 24 hr, Take 1 tablet orally once a day.  6.Eliquis 2.5 mg tablet, Take 1 tablet orally 2 times a day.  7.ESCITALOPRAM 5 MG Oral Tab, TAKE 1 TABLET EVERY DAY  8.GABAPENTIN 100 MG Oral Cap, TAKE 1 CAPSULE THREE TIMES DAILY  9.HumaLOG KwikPen (U-100) Insulin 100 unit/mL subcutaneous, Inject (subcutaneous) once a day.  10.HYDROcodone 5 mg-acetaminophen 300 mg tablet, Take 1 tablet orally every 8 hours as needed.  11.Lantus Solostar U-100 Insulin 100 unit/mL (3 mL) subcutaneous pen, Inject (subcutaneous) once a day.  12.levothyroxine 75 MCG Oral Tab, Take 1 tablet (75 mcg total) by mouth before breakfast.  13.simvastatin (ZOCOR) 20 MG tablet, TAKE 1 TABLET EVERY NIGHT  14.torsemide 20 mg tablet, Take 2  tablets orally once a day.  Impression  1.Atrial fibrillation with RVR  2.History of pulmonary embolus (PE) - Hx  3.Bradycardia  4.Hypertension (HTN), primary  5.Hyperlipidemia (unspecified)  6.Pulmonary hypertension, not otherwise specified or secondary  Assessment & Plan  Compensated cardiac status.  Continue current cardiac regimen.  Recent stress test last year.  This was reassuring.  No additional cardiac testing needed.  Amiodarone for the A-fib is managed by my EP colleagues.  She has blood work to check her thyroid function and liver enzymes before follow-up visit in 6 months.  Would like to see her in follow-up in 6 months myself.  Labs and Diagnostics ordered  1.Echocardiography - Complete ([facility]) (6 Months)  Future appointments  1.Referral Visit - Estevan Bah ([email or web address]) : (Today)  2.Follow up visit - Lucas Fong MD after testing is done (6 Months)  Miscellaneous  1.Reviewed pro-beta natriuretic peptide, platelets, ptt, glucose, sodium, potassium, chloride, co2, anion gap, bun, creatinine, calcium, osmolality calculated, e gfr cr, troponin i high sensitivity and magnesium with the patient.  2.Reviewed ambulatory telemetry monitor with the patient.  3.Weight monitoring (regime/therapy)  Nurses documentation  Upcoming surgeries/procedures: None  Use of assistive devices: None  Verbal order for EKG: No  Refills: None  Triage and medication list reviewed by: MASTER COSME   Patient instructions  Medications:   1. Continue current medications.    Testin.Echocardiogram in 6 months    Your provider has ordered an echocardiogram. This is an ultrasound of your heart to further assess its function and valves. You may require an IV.  You will be required to remove your shirt AND BRA, no all in-in-ones, dresses or coveralls. This test takes approximately 45 to 60 minutes.       Provider Follow Up:  1. Follow up with Dr. Fong after the Echocardiogram is done     Please bring in you  medication bottles or updated medicine list to your next appointment.  Call [facility] if you have any problems or concerns at [phone]   Lab Details  PRO BETA NATRIURETIC PEPTIDE  10/23/2024 02:55:25 PM  PRO-BETA NATRIURETIC PEPTIDE 4421 <450 pg/mL H F  PLATELET COUNT  01/31/2023 05:18:17 AM  PLATELETS 208.0 150.0-450.0 10(3)uL  F  PTT, ACTIVATED  01/29/2023 01:58:08 AM  PTT 73.8 23.3-35.6 seconds H F  BASIC METABOLIC PANEL (8)  01/28/2023 01:03:10 PM  GLUCOSE 321 70-99 mg/dL H F  SODIUM 135 136-145 mmol/L L F  POTASSIUM 4.1 3.5-5.1 mmol/L  F  CHLORIDE 99  mmol/L  F  CO2 32.0 21.0-32.0 mmol/L  F  ANION GAP 4 0-18 mmol/L  F  BUN 42 7-18 mg/dL H F  CREATININE 1.42 0.55-1.02 mg/dL H F  CALCIUM 8.9 8.5-10.1 mg/dL  F  OSMOLALITY CALCULATED 303 275-295 mOsm/kg H F  E GFR CR 36 >=60 mL/min/1.73m2 L F  TROPONIN I HIGH SENSITIVITY  01/28/2023 11:02:30 AM  TROPONIN I HIGH SENSITIVITY 42 <=54 ng/L  F  MAGNESIUM  01/27/2023 07:50:27 AM  MAGNESIUM 1.7 1.6-2.6 mg/dL  F  Diagnostics Details  ACTMonitoring 05/04/2023  1.This is an excellent quality study.    2.Predominant rhythm is normal sinus rhythm.    3.The minimum heart rate recorded was 43 beats / minute. The maximum heart rate is 167 beats / minute. The mean heart rate is 85 beats / minute.    4.Afib burden 22% at times with RVR.    5.This monitor shows a pattern of tachy-olivia with sinus rates in the 50s and AF with RVR.    CPOE Orders carried out by: Tiffanie Wilkins  Care Providers: Lucas Fong MD, Tiffany Hill and Tiffanie Wilkins  Electronically Authenticated by  Lucas Fong MD  01/07/2025 04:55:12 PM  Disclaimer: Components of this note were documented using voice recognition system and are subject to errors not corrected at proofreading. Contact the author of this note for any clarifications.

## 2025-03-10 NOTE — PHYSICAL THERAPY NOTE
PHYSICAL THERAPY EVALUATION - INPATIENT     Room Number: 8607/8607-A  Evaluation Date: 3/10/2025  Type of Evaluation: Initial  Physician Order: PT Eval and Treat    Presenting Problem: acute on chronic heart failure  Co-Morbidities : DM, CKD, CHF, pacemaker, pulmonary embolism, high cholesterol, high BP, HTN, HLD, hypothryoidism  Reason for Therapy: Mobility Dysfunction and Discharge Planning    PHYSICAL THERAPY ASSESSMENT   Patient is a 84 year old female admitted 3/9/2025 for acute on chronic heart failure.  Prior to admission, patient's baseline is modified independent with wheelchair. Patient is currently functioning near baseline with bed mobility, transfers, and wheelchair mobility.  Patient is requiring supervision and contact guard assist as a result of the following impairments: decreased functional strength and decreased endurance/aerobic capacity.  Physical Therapy will continue to follow for duration of hospitalization.    Patient will benefit from continued skilled PT Services at discharge to promote prior level of function.  Anticipate patient will return home with home health PT.    PLAN DURING HOSPITALIZATION  Nursing Mobility Recommendation : 1 Assist  PT Device Recommendation: Rolling walker  PT Treatment Plan: Bed mobility, Endurance, Energy conservation, Patient education, Gait training, Strengthening, Transfer training, Stair training, Balance training  Rehab Potential : Good  Frequency (Obs): 3-5x/week     CURRENT GOALS    Goal #1 Patient is able to demonstrate supine - sit EOB @ level: supervision     Goal #2 Patient is able to demonstrate transfers EOB to/from Chair/Wheelchair at assistance level: supervision     Goal #3 Patient is able to ambulate 25 feet with assist device: walker - rolling at assistance level: CGA     Goal #4    Goal #5    Goal #6    Goal Comments: Goals established on 3/10/2025      PHYSICAL THERAPY MEDICAL/SOCIAL HISTORY  History related to current admission: Patient is  a 84 year old female admitted on 3/9/2025 from [facility] for SOB, BLE swelling. Pt diagnosed with acute on chronic heart failure.    HOME SITUATION  Type of Home: Assisted living facility  Home Layout: One level                     Lives With: Staff 24 hours    Drives: No   Patient Regularly Uses: Wheelchair, Rolling walker, Home O2      Prior Level of [place]: Pt was modified independent with wheelchair for mobility. Pt able to transfer self from bed to w/c. Pt ind with dressing and toileting but receives assistance for showering. Pt reports she only uses RW during PT sessions.     SUBJECTIVE  Pt pleasant and cooperative     OBJECTIVE  Precautions: Bed/chair alarm  Fall Risk: High fall risk    WEIGHT BEARING RESTRICTION     PAIN ASSESSMENT  Rating:  (No complaints of pain)          COGNITION  Overall Cognitive Status:  WFL - within functional limits  Orientation Level:  oriented to place, oriented to situation, oriented to person, and disoriented to time    RANGE OF MOTION AND STRENGTH ASSESSMENT  Upper extremity ROM and strength are within functional limits     Lower extremity ROM is within functional limits     Lower extremity strength is within functional limits    BALANCE  Static Sitting: Fair +  Dynamic Sitting: Fair +  Static Standing: Poor +  Dynamic Standing: Poor +    ADDITIONAL TESTS                                    ACTIVITY TOLERANCE   Pt denies dizziness throughout the session.                       O2 WALK  Oxygen Therapy  At rest oxygen flow (liters per minute): 3    NEUROLOGICAL FINDINGS                        AM-PAC '6-Clicks' INPATIENT SHORT FORM - BASIC MOBILITY  How much difficulty does the patient currently have...  Patient Difficulty: Turning over in bed (including adjusting bedclothes, sheets and blankets)?: A Little   Patient Difficulty: Sitting down on and standing up from a chair with arms (e.g., wheelchair, bedside commode, etc.): A Little   Patient Difficulty: Moving from lying on back  to sitting on the side of the bed?: A Little   How much help from another person does the patient currently need...   Help from Another: Moving to and from a bed to a chair (including a wheelchair)?: A Little   Help from Another: Need to walk in hospital room?: A Little   Help from Another: Climbing 3-5 steps with a railing?: A Lot     AM-PAC Score:  Raw Score: 17   Approx Degree of Impairment: 50.57%   Standardized Score (AM-PAC Scale): 42.13   CMS Modifier (G-Code): CK    FUNCTIONAL ABILITY STATUS  Gait Assessment   Functional Mobility/Gait Assessment  Gait Assistance: Contact guard assist  Distance (ft): 3  Assistive Device: Rolling walker  Pattern: Shuffle    Skilled Therapy Provided: Per RN, jose for pt to be seen. Pt received in bed and agreeable to PT session.     Bed Mobility:  Rolling: NT  Supine to sit: supervision   Sit to supine: NT     Transfer Mobility:  Sit to stand: CGA   Stand to sit: CGA  Gait = CGA with RW. Pt ambulated 3ft from EOB to C  Pt transferred by performing SPT with RW.     Therapist's Comments: Pt was educated on goals for the session and safety.     Exercise/Education Provided:  Bed mobility  Energy conservation  Functional activity tolerated  Gait training  Posture  Strengthening  Transfer training    Patient End of Session: Up in chair, Needs met, Call light within reach, RN aware of session/findings, All patient questions and concerns addressed, Hospital anti-slip socks, Alarm set      Patient Evaluation Complexity Level:  History Moderate - 1 or 2 personal factors and/or co-morbidities   Examination of body systems Low -  addressing 1-2 elements   Clinical Presentation Low- Stable   Clinical Decision Making Low Complexity       PT Session Time: 20 minutes  Therapeutic Activity: 8 minutes

## 2025-03-10 NOTE — DISCHARGE INSTRUCTIONS
Sometimes managing your health at home requires assistance.  The [facility] team has recognized your preference to use [facility].  They can be reached by phone at [phone].  The fax number for your reference is [phone].  A representative from the home health agency will contact you or your family to schedule your first visit.  '    Going Home Instructions  In this section you will find the tools which will guide you through the first few days after you leave the hospital. Continued use of these tools will help you develop the skills necessary to keep your heart failure under control.     Home Care Instructions Following Heart Failure - the most important things to do every day include:   Weigh yourself and review the “Self-Check Plan” sheet every morning.   Call your cardiologist office if you are in the “Pay Attention-Use Caution” (yellow zone) or “Medical Alert-Warning!” (red zone) as outlined in the Self-Check Plan sheet.  Take your medicines as prescribed.  Limit your sodium (salt) intake.  Know when to call your cardiologist, primary doctor, or nurse.  Know when to seek emergency care.      Things for You to Remember:   1. See your doctor or healthcare provider as written on your discharge instructions.  It is important that you attend this appointment to make sure your symptoms are under control.     2. Your recommended sodium intake is 0111-8179 mg daily.    3.  Weigh yourself every day.    4. Some exercise and activity is important to help keep your heart functioning and strong. Unless instructed not to exercise, you may walk at a slow to moderate pace for 10-15 minutes 2-3 days per week to start. Pace your activity to prevent shortness of breath or fatigue. Stop exercising if you develop chest pain, lightheadedness, or significant shortness of breath.       Call Your Cardiologist If:   You gain 2-3 pounds in one day or 5 pounds in one  week.  You have more difficulty breathing.  You are getting more tired with normal activity.  You are more short of breath lying down, or awaken at night short of breath.  You have swelling of your feet or legs.  You urinate less often during the day and more often at night.  You have cramps in your legs.  You have blurred vision or see yellowish-green halos around objects of lights.    Go to the Emergency Room If:   You have pain or tightness in your chest  You are extremely short of breath  You are coughing up pink-frothy mucus  You are traveling and develop symptoms of worsening heart failure      ** Please follow up with your cardiologist or Advanced Practice Provider as written on your discharge instructions. If you are not provided with an appointment, let your nurse know so you can get an appointment**

## 2025-03-10 NOTE — OCCUPATIONAL THERAPY NOTE
OCCUPATIONAL THERAPY EVALUATION - INPATIENT    Room Number: 8607/8607-A  Evaluation Date: 3/10/2025     Type of Evaluation: Initial  Presenting Problem: HF    Physician Order: IP Consult to Occupational Therapy  Reason for Therapy:  ADL/IADL Dysfunction and Discharge Planning    OCCUPATIONAL THERAPY ASSESSMENT   Patient is a 84 year old female admitted on 3/9/2025 with Presenting Problem: HF. Co-Morbidities : DM, CKD, CHF, pacemaker, pulmonary embolism, high cholesterol, high BP, HTN, HLD, hypothryoidism  Patient is currently functioning near baseline with  all ADL .  Prior to admission, patient's baseline is Modified independent chair level, pivot transfer wheelchair/toilet/bed. Patient met all OT goals at near baseline level.  Patient reports no further questions/concerns at this time.     Recent admission:  10/21 to 12/23/24 for HF to AL    EVALUATION SESSION:  Patient at start of session: supine    FUNCTIONAL TRANSFER ASSESSMENT  Sit to Stand: Edge of Bed  Edge of Bed: Stand-by Assist    BED MOBILITY  Supine to Sit : Supervision  Sit to Supine (OT): Not Tested    BALANCE ASSESSMENT  Static Sitting: Independent  Static Standing: Supervision    O2 SATURATIONS  Oxygen Therapy  SPO2% on Oxygen at Rest: 96  At rest oxygen flow (liters per minute): 3    COGNITION  Overall Cognitive Status:  WFL - within functional limits    COGNITION ASSESSMENTS     Upper Extremity:   ROM: within functional limits   Strength: is within functional limits     EDUCATION PROVIDED  Patient Education : Role of Occupational Therapy; Plan of Care  Patient's Response to Education: Verbalized Understanding    Equipment used: rw  Demonstrates functional use    Therapist comments: 3 liters, 96% at rest.  Supervision supine to sit, cueing provided to initiate each task. SBA to stand and to take steps bed to chair.     Patient End of Session: Up in chair, Call light within reach, Needs met, RN aware of session/findings, All patient questions and  concerns addressed, Alarm set    OCCUPATIONAL PROFILE    HOME SITUATION  Type of Home: Assisted living facility  Home Layout: One level  Lives With: Staff 24 hours    Toilet and Equipment: Comfort height toilet, Grab bar  Shower/Tub and Equipment: Walk-in shower, Grab bar, Shower chair             Drives: No  Patient Regularly Uses: Wheelchair, Rolling walker, Home O2    Prior Level of Function: pt lives at assisted living facility.  Modified independent toileting and dressing, seated. Modified independent transfer bed/wheelchair/toilet. Assist with showering. Only walks with PT.     PAIN ASSESSMENT  Ratin          OBJECTIVE  Precautions: Bed/chair alarm  Fall Risk: High fall risk    WEIGHT BEARING RESTRICTION       AM-PAC ‘6-Clicks’ Inpatient Daily Activity Short Form  -   Putting on and taking off regular lower body clothing?: A Little  -   Bathing (including washing, rinsing, drying)?: A Little  -   Toileting, which includes using toilet, bedpan or urinal? : A Little  -   Putting on and taking off regular upper body clothing?: None  -   Taking care of personal grooming such as brushing teeth?: None  -   Eating meals?: None    AM-PAC Score:  Score: 21  Approx Degree of Impairment: 32.79%  Standardized Score (AM-PAC Scale): 44.27    ADDITIONAL TESTS     NEUROLOGICAL FINDINGS      PLAN   Patient has been evaluated and presents with no skilled Occupational Therapy needs at this time.  Patient discharged from Occupational Therapy services.  Please re-order if a new functional limitation presents during this admission.         Patient Evaluation Complexity Level:   Occupational Profile/Medical History LOW - Brief history including review of medical or therapy records    Specific performance deficits impacting engagement in ADL/IADL LOW  1 - 3 performance deficits    Client Assessment/Performance Deficits LOW - No comorbidities nor modifications of tasks    Clinical Decision Making LOW - Analysis of occupational  profile, problem-focused assessments, limited treatment options    Overall Complexity LOW     OT Session Time: 15   minutes

## 2025-03-11 NOTE — PLAN OF CARE
Heart Failure Nurse  Progress Note    Patient was seen for Heart Failure Coaching on 7/13/24, 9/11/24, 10/22/24     Patient is adherent to daily weight monitoring?                                      x___ yes   ___ no    If no, barriers to daily weight monitoring: patient states she has been monitoring weights and weight was going up but she did not notify cardiology. She has been given free scales from our facility.     Patient is adherent to revieing the Symptom Tracker Worksheet daily?  ___ yes   __x_ no    If no, barriers to reviewing daily:    Patient is following a 2000 mg sodium diet                                             __x_ yes  ___ no    If no, barriers to 2000 mg sodium diet:    Patient is adherent to medication regimen                                              __x_ yes  ___ no    If no, barriers to medication regimen:    Patient went to follow-up appointment(s)      _x__ yes  ___ no    If no, barriers to attending appointment: per patient report    Instructed patient to let their nurse know if they have any questions or need reeducation regarding heart failure and their nurse can contact the Heart Failure Coaches who will come see them again.      Adela CROWEN, RN, PCCN  Heart Failure   Extension [phone]

## 2025-03-11 NOTE — PLAN OF CARE
Rec'd pt at 0730. A&O x 3, forgetful. Tele shows AV-pacing. O2 sats adequate on 3L NC, which is pt's home O2 baseline. Pt incontinent, purewick in place, briefed. No C/O pain or SOB. Strong cough present. Skin dry and intact. Bed locked and in low position, call light and personal items within reach. Will continue to monitor. POC - IV Lasix BID, PT/OT recs home w/ HH, mucinex BID for congestion.    1200 -- Per pt's daughter, pt was able to ambulate to bathroom until insurance stopped covering PT sessions. Since then, pt has been WCB. Daughter requesting pt be evaled for ZACHERY, this RN messaged PT. PT stated they will re-eval the patient tomorrow.   1830 -- Pt desaturating to 80s, productive cough present. Declines prn cough medicine. O2 incr to 5L NC w/ sats >90%.    Problem: Diabetes/Glucose Control  Goal: Glucose maintained within prescribed range  Description: INTERVENTIONS:  - Monitor Blood Glucose as ordered  - Assess for signs and symptoms of hyperglycemia and hypoglycemia  - Administer ordered medications to maintain glucose within target range  - Assess barriers to adequate nutritional intake and initiate nutrition consult as needed  - Instruct patient on self management of diabetes  Outcome: Progressing     Problem: Patient/Family Goals  Goal: Patient/Family Long Term Goal  Description: Patient's Long Term Goal: discharge soon    Interventions:  - Appropriate consult and treatments, follow up appointment after discharge, pain mgt , ABG, Monitor Tele, labs, V/S, Labs, medication regimes, heart healthy carb controlled diet and exercise, ABG , follow up appointment after discharge  - See additional Care Plan goals for specific interventions  Outcome: Progressing  Goal: Patient/Family Short Term Goal  Description: Patient's Short Term Goal: breath comfortable, pain mgt    Interventions:   - Frequent assess and assist in ADL, pain mgt, safety, frequent reposition,skin care, ch  - See additional Care Plan goals for  specific interventions  Outcome: Progressing     Problem: PAIN - ADULT  Goal: Verbalizes/displays adequate comfort level or patient's stated pain goal  Description: INTERVENTIONS:  - Encourage pt to monitor pain and request assistance  - Assess pain using appropriate pain scale  - Administer analgesics based on type and severity of pain and evaluate response  - Implement non-pharmacological measures as appropriate and evaluate response  - Consider cultural and social influences on pain and pain management  - Manage/alleviate anxiety  - Utilize distraction and/or relaxation techniques  - Monitor for opioid side effects  - Notify MD/LIP if interventions unsuccessful or patient reports new pain  - Anticipate increased pain with activity and pre-medicate as appropriate  Outcome: Progressing     Problem: RISK FOR INFECTION - ADULT  Goal: Absence of fever/infection during anticipated neutropenic period  Description: INTERVENTIONS  - Monitor WBC  - Administer growth factors as ordered  - Implement neutropenic guidelines  Outcome: Progressing     Problem: SAFETY ADULT - FALL  Goal: Free from fall injury  Description: INTERVENTIONS:  - Assess pt frequently for physical needs  - Identify cognitive and physical deficits and behaviors that affect risk of falls.  - [place] fall precautions as indicated by assessment.  - Educate pt/family on patient safety including physical limitations  - Instruct pt to call for assistance with activity based on assessment  - Modify environment to reduce risk of injury  - Provide assistive devices as appropriate  - Consider OT/PT consult to assist with strengthening/mobility  - Encourage toileting schedule  Outcome: Progressing     Problem: GENITOURINARY - ADULT  Goal: Absence of urinary retention  Description: INTERVENTIONS:  - Assess patient’s ability to void and empty bladder  - Monitor intake/output and perform bladder scan as needed  - Follow urinary retention protocol/standard of care  -  Consider collaborating with pharmacy to review patient's medication profile  - Implement strategies to promote bladder emptying  Outcome: Progressing     Problem: METABOLIC/FLUID AND ELECTROLYTES - ADULT  Goal: Glucose maintained within prescribed range  Description: INTERVENTIONS:  - Monitor Blood Glucose as ordered  - Assess for signs and symptoms of hyperglycemia and hypoglycemia  - Administer ordered medications to maintain glucose within target range  - Assess barriers to adequate nutritional intake and initiate nutrition consult as needed  - Instruct patient on self management of diabetes  Outcome: Progressing  Goal: Electrolytes maintained within normal limits  Description: INTERVENTIONS:  - Monitor labs and rhythm and assess patient for signs and symptoms of electrolyte imbalances  - Administer electrolyte replacement as ordered  - Monitor response to electrolyte replacements, including rhythm and repeat lab results as appropriate  - Fluid restriction as ordered  - Instruct patient on fluid and nutrition restrictions as appropriate  Outcome: Progressing  Goal: Hemodynamic stability and optimal renal function maintained  Description: INTERVENTIONS:  - Monitor labs and assess for signs and symptoms of volume excess or deficit  - Monitor intake, output and patient weight  - Monitor urine specific gravity, serum osmolarity and serum sodium as indicated or ordered  - Monitor response to interventions for patient's volume status, including labs, urine output, blood pressure (other measures as available)  - Encourage oral intake as appropriate  - Instruct patient on fluid and nutrition restrictions as appropriate  Outcome: Progressing     Problem: HEMATOLOGIC - ADULT  Goal: Maintains hematologic stability  Description: INTERVENTIONS  - Assess for signs and symptoms of bleeding or hemorrhage  - Monitor labs and vital signs for trends  - Administer supportive blood products/factors, fluids and medications as ordered  and appropriate  - Administer supportive blood products/factors as ordered and appropriate  Outcome: Progressing

## 2025-03-11 NOTE — PLAN OF CARE
Patient alert and oriented x 2x3 with period of forgetfulness.little sleepy but more  alert awake On 3L Oxygen support , Lungs sounds coarse / congested with frequent cough and spitting yellow sputum, house doctor  aware and started  mucinex ,tessalon ,Glucose level elevated to 369 ,orders obtained and carried out ,AV Paced on cardiac monitor. Patient denies any chest pain or chest discomfort at this time. Patient voids, last BM 3/8.skin care , Reposition provided and made comfortable, no acute distress noted. Plan of care updated, all questions answered. Safety precautions in place. Bed alarm on. Will continue to monitor tele/labs/vital signs closely.     Plan:   Plan: - Cardiology to see            - IV Lasix BID -> STRICT  I&O, DW            - PT / OT   Droplet isolation precautions     Problem: Diabetes/Glucose Control  Goal: Glucose maintained within prescribed range  Description: INTERVENTIONS:  - Monitor Blood Glucose as ordered  - Assess for signs and symptoms of hyperglycemia and hypoglycemia  - Administer ordered medications to maintain glucose within target range  - Assess barriers to adequate nutritional intake and initiate nutrition consult as needed  - Instruct patient on self management of diabetes  Outcome: Progressing     Problem: Patient/Family Goals  Goal: Patient/Family Long Term Goal  Description: Patient's Long Term Goal: discharge soon    Interventions:  - Appropriate consult and treatments, follow up appointment after discharge, pain mgt , ABG, Monitor Tele, labs, V/S, Labs, medication regimes, heart healthy carb controlled diet and exercise, ABG , follow up appointment after discharge  - See additional Care Plan goals for specific interventions  Outcome: Progressing  Goal: Patient/Family Short Term Goal  Description: Patient's Short Term Goal: breath comfortable, pain mgt    Interventions:   - Frequent assess and assist in ADL, pain mgt, safety, frequent reposition,skin care, ch  - See  additional Care Plan goals for specific interventions  Outcome: Progressing     Problem: PAIN - ADULT  Goal: Verbalizes/displays adequate comfort level or patient's stated pain goal  Description: INTERVENTIONS:  - Encourage pt to monitor pain and request assistance  - Assess pain using appropriate pain scale  - Administer analgesics based on type and severity of pain and evaluate response  - Implement non-pharmacological measures as appropriate and evaluate response  - Consider cultural and social influences on pain and pain management  - Manage/alleviate anxiety  - Utilize distraction and/or relaxation techniques  - Monitor for opioid side effects  - Notify MD/LIP if interventions unsuccessful or patient reports new pain  - Anticipate increased pain with activity and pre-medicate as appropriate  Outcome: Progressing     Problem: RISK FOR INFECTION - ADULT  Goal: Absence of fever/infection during anticipated neutropenic period  Description: INTERVENTIONS  - Monitor WBC  - Administer growth factors as ordered  - Implement neutropenic guidelines  Outcome: Progressing     Problem: SAFETY ADULT - FALL  Goal: Free from fall injury  Description: INTERVENTIONS:  - Assess pt frequently for physical needs  - Identify cognitive and physical deficits and behaviors that affect risk of falls.  - [place] fall precautions as indicated by assessment.  - Educate pt/family on patient safety including physical limitations  - Instruct pt to call for assistance with activity based on assessment  - Modify environment to reduce risk of injury  - Provide assistive devices as appropriate  - Consider OT/PT consult to assist with strengthening/mobility  - Encourage toileting schedule  Outcome: Progressing     Problem: DISCHARGE PLANNING  Goal: Discharge to home or other facility with appropriate resources  Description: INTERVENTIONS:  - Identify barriers to discharge w/pt and caregiver  - Include patient/family/discharge partner in  discharge planning  - Arrange for needed discharge resources and transportation as appropriate  - Identify discharge learning needs (meds, wound care, etc)  - Arrange for interpreters to assist at discharge as needed  - Consider post-discharge preferences of patient/family/discharge partner  - Complete POLST form as appropriate  - Assess patient's ability to be responsible for managing their own health  - Refer to Case Management Department for coordinating discharge planning if the patient needs post-hospital services based on physician/LIP order or complex needs related to functional status, cognitive ability or social support system  Outcome: Progressing     Problem: CARDIOVASCULAR - ADULT  Goal: Maintains optimal cardiac output and hemodynamic stability  Description: INTERVENTIONS:  - Monitor vital signs, rhythm, and trends  - Monitor for bleeding, hypotension and signs of decreased cardiac output  - Evaluate effectiveness of vasoactive medications to optimize hemodynamic stability  - Monitor arterial and/or venous puncture sites for bleeding and/or hematoma  - Assess quality of pulses, skin color and temperature  - Assess for signs of decreased coronary artery perfusion - ex. Angina  - Evaluate fluid balance, assess for edema, trend weights  Outcome: Progressing     Problem: RESPIRATORY - ADULT  Goal: Achieves optimal ventilation and oxygenation  Description: INTERVENTIONS:  - Assess for changes in respiratory status  - Assess for changes in mentation and behavior  - Position to facilitate oxygenation and minimize respiratory effort  - Oxygen supplementation based on oxygen saturation or ABGs  - Provide Smoking Cessation handout, if applicable  - Encourage broncho-pulmonary hygiene including cough, deep breathe, Incentive Spirometry  - Assess the need for suctioning and perform as needed  - Assess and instruct to report SOB or any respiratory difficulty  - Respiratory Therapy support as indicated  -  Manage/alleviate anxiety  - Monitor for signs/symptoms of CO2 retention  Outcome: Progressing     Problem: GENITOURINARY - ADULT  Goal: Absence of urinary retention  Description: INTERVENTIONS:  - Assess patient’s ability to void and empty bladder  - Monitor intake/output and perform bladder scan as needed  - Follow urinary retention protocol/standard of care  - Consider collaborating with pharmacy to review patient's medication profile  - Implement strategies to promote bladder emptying  Outcome: Progressing     Problem: METABOLIC/FLUID AND ELECTROLYTES - ADULT  Goal: Glucose maintained within prescribed range  Description: INTERVENTIONS:  - Monitor Blood Glucose as ordered  - Assess for signs and symptoms of hyperglycemia and hypoglycemia  - Administer ordered medications to maintain glucose within target range  - Assess barriers to adequate nutritional intake and initiate nutrition consult as needed  - Instruct patient on self management of diabetes  Outcome: Progressing  Goal: Electrolytes maintained within normal limits  Description: INTERVENTIONS:  - Monitor labs and rhythm and assess patient for signs and symptoms of electrolyte imbalances  - Administer electrolyte replacement as ordered  - Monitor response to electrolyte replacements, including rhythm and repeat lab results as appropriate  - Fluid restriction as ordered  - Instruct patient on fluid and nutrition restrictions as appropriate  Outcome: Progressing  Goal: Hemodynamic stability and optimal renal function maintained  Description: INTERVENTIONS:  - Monitor labs and assess for signs and symptoms of volume excess or deficit  - Monitor intake, output and patient weight  - Monitor urine specific gravity, serum osmolarity and serum sodium as indicated or ordered  - Monitor response to interventions for patient's volume status, including labs, urine output, blood pressure (other measures as available)  - Encourage oral intake as appropriate  - Instruct  patient on fluid and nutrition restrictions as appropriate  Outcome: Progressing     Problem: HEMATOLOGIC - ADULT  Goal: Maintains hematologic stability  Description: INTERVENTIONS  - Assess for signs and symptoms of bleeding or hemorrhage  - Monitor labs and vital signs for trends  - Administer supportive blood products/factors, fluids and medications as ordered and appropriate  - Administer supportive blood products/factors as ordered and appropriate  Outcome: Progressing

## 2025-03-11 NOTE — PAYOR COMM NOTE
3/9 THRU 3/11  ADMISSION REVIEW     Payor: [facility]REG[facility] [place]  Subscriber #:  [id number]  Authorization Number: [id number]    Admit date: 3/9/25  Admit time:  4:35 PM       REVIEW DOCUMENTATION:     ED Provider Notes        ED Provider Notes signed by Moo Newell MD at 3/9/2025  5:57 PM       Author: Moo Newell MD Service: Emergency Medicine Author Type: Physician    Filed: 3/9/2025  5:57 PM Date of Service: 3/9/2025  2:24 PM Status: Signed    : Moo Newell MD (Physician)           Patient Seen in: [facility] Emergency Department      History     Chief Complaint   Patient presents with    Shortness Of Breath     Stated Complaint: shortness of breath, weakness, confusion    Subjective:   HPI      84-year-old female with past medical history of diabetes, CKD, CHF presents today for evaluation.  Patient is here with family who help supplement history.  Over the last several weeks, patient has had increasing leg swelling.  Patient also has had fatigue and generalized weakness.  Patient denies any chest pain.  She has had some shortness of breath.  Family states she is on 3 L nasal cannula at baseline and has been compliant with her diuretic.    Objective:     Past Medical History:    (HFpEF) heart failure with preserved ejection fraction (HCC)    Acute cystitis without hematuria    Acute deep vein thrombosis (DVT) of popliteal vein of right lower extremity (HCC)    Arrhythmia    Cataract    CHF exacerbation (HCC)    CKD (chronic kidney disease)    Congestive heart disease (HCC)    COVID-19    Diabetes (HCC)    Disorder of thyroid    DM2 (diabetes mellitus, type 2) (HCC)    Hearing impairment    High blood pressure    High cholesterol    History of stomach ulcers    HTN (hypertension)    Hyperlipidemia    Hypothyroidism    Pulmonary embolism (HCC)    Shingles    Visual impairment              Past Surgical History:   Procedure Laterality Date    Cardiac pacemaker  placement      Cataract      Eye surgery      Hysterectomy      [facility]    Pacemaker monitor                  Social History     Socioeconomic History    Marital status: Single   Tobacco Use    Smoking status: Former     Current packs/day: 0.00     Average packs/day: 1 pack/day for 50.0 years (50.0 ttl pk-yrs)     Types: Cigarettes     Start date:      Quit date:      Years since quittin.1    Smokeless tobacco: Never   Vaping Use    Vaping status: Never Used   Substance and Sexual Activity    Alcohol use: Never    Drug use: Never   Other Topics Concern    Caffeine Concern Yes     Comment: 2 cups of coffee daily     Stress Concern No    Weight Concern No    Special Diet No    Exercise Yes     Comment: PT 2 X Weekly, OT 2 x weekly    Seat Belt Yes     Social Drivers of Health     Food Insecurity: No Food Insecurity (3/9/2025)    NCSS - Food Insecurity     Worried About Running Out of Food in the Last Year: No     Ran Out of Food in the Last Year: No   Transportation Needs: No Transportation Needs (3/9/2025)    NCSS - Transportation     Lack of Transportation: No   Housing Stability: Not At Risk (3/9/2025)    NCSS - Housing/Utilities     Has Housing: Yes     Worried About Losing Housing: No     Unable to Get Utilities: No                  Physical Exam     ED Triage Vitals [25 1410]   /75   Pulse 62   Resp (!) 27   Temp 98.7 °F (37.1 °C)   Temp src Temporal   SpO2 99 %   O2 Device Nasal cannula       Current Vitals:   Vital Signs  BP: 154/65  Pulse: 60  Resp: 17  Temp: 97.7 °F (36.5 °C)  Temp src: Axillary  MAP (mmHg): 91    Oxygen Therapy  SpO2: 99 %  O2 Device: Nasal cannula  O2 Flow Rate (L/min): 6 L/min  Pulse Oximetry Type: Continuous  Oximetry Probe Site Changed: No  Pulse Ox Probe Location: Right hand        Physical Exam  Vitals and nursing note reviewed.   Constitutional:       Appearance: Normal appearance.   HENT:      Head: Normocephalic.      Nose: Nose normal.       Mouth/Throat:      Mouth: Mucous membranes are moist.   Eyes:      Extraocular Movements: Extraocular movements intact.   Cardiovascular:      Rate and Rhythm: Normal rate and regular rhythm.   Pulmonary:      Effort: Pulmonary effort is normal.      Breath sounds: Rhonchi present.   Abdominal:      General: Abdomen is flat.   Musculoskeletal:         General: Normal range of motion.      Right lower leg: Edema present.      Left lower leg: Edema present.   Skin:     General: Skin is warm.   Neurological:      General: No focal deficit present.      Mental Status: She is alert.   Psychiatric:         Mood and Affect: Mood normal.         ED Course     Labs Reviewed   CBC WITH DIFFERENTIAL WITH PLATELET - Abnormal; Notable for the following components:       Result Value    HGB 9.1 (*)     HCT 29.6 (*)     MCV 76.9 (*)     MCH 23.6 (*)     MCHC 30.7 (*)     All other components within normal limits   COMP METABOLIC PANEL (14) - Abnormal; Notable for the following components:    BUN 69 (*)     Creatinine 3.38 (*)     Calculated Osmolality 301 (*)     eGFR-Cr 13 (*)     All other components within normal limits   PRO BETA NATRIURETIC PEPTIDE - Abnormal; Notable for the following components:    Pro-Beta Natriuretic Peptide 16,938 (*)     All other components within normal limits   PROTHROMBIN TIME (PT) - Abnormal; Notable for the following components:    PT 18.9 (*)     INR 1.57 (*)     All other components within normal limits   TROPONIN I HIGH SENSITIVITY - Normal   PTT, ACTIVATED - Normal   T4, FREE (S) - Normal   POCT GLUCOSE - Normal   TSH W REFLEX TO FREE T4     EKG    Rate, intervals and axes as noted on EKG Report.  Rate: 63  Rhythm: Paced   Reading: No STEMI                XR CHEST AP PORTABLE  (CPT=71045)    Result Date: 3/9/2025  PROCEDURE:  XR CHEST AP PORTABLE  (CPT=71045)  TECHNIQUE:  AP chest radiograph was obtained.  COMPARISON:  [facility] , XR, XR CHEST AP PORTABLE  (CPT=71045), 10/21/2024, 12:42 PM.   INDICATIONS:  shortness of breath, weakness, confusion, sat 78% upon arrival on 3l/nc, pt placed on 6l/nc and sat up to 95%  PATIENT STATED HISTORY: (As transcribed by Technologist)  patients family states she has been having difficulty breathing and weakness, has pacemaker             CONCLUSION:    Small left pleural effusion and left lower lobe infiltrate.  Right chest clear.  Stable cardiac enlargement without CHF.  No pneumothorax.  Stable pacemaker.  LOCATION:  [facility]      Dictated by (CST): Mahamed Duran MD on 3/09/2025 at 2:34 PM     Finalized by (CST): Mahamed Duran MD on 3/09/2025 at 2:34 PM            [facility]      Differential Diagnosis  84-year-old female presents today for evaluation of several weeks of increasing leg swelling and shortness of breath along with generalized weakness.  Patient appears fluid overloaded on exam.  Differential would include CHF, pneumonia, PRISCILLA.  Plan for x-ray along with labs, will reassess.    3:14 pm  Patient's proBNP is significantly elevated.  X-ray demonstrates pleural effusion with note of left lower lobe infiltrate.  She has no leukocytosis or fevers, so I favor her symptoms to be secondary to CHF over pneumonia.  IV Lasix ordered for diuresis.  I  updated patient and family on plan, they are agreeable to admission.  Hospitalist notified of need for admission.    Discussions of Management  Hospitalist notified of need for admission.      Admission disposition: 3/9/2025  3:14 PM           Medical Decision Making      Disposition and Plan     Clinical Impression:  1. Acute on chronic congestive heart failure, unspecified heart failure type (HCC)         Disposition:  Admit  3/9/2025  3:14 pm    Follow-up:  No follow-up provider specified.        Medications Prescribed:  Current Discharge Medication List              Supplementary Documentation:         Hospital Problems       Present on Admission  Date Reviewed: 1/9/2025            ICD-10-CM Noted POA    * (Principal)  Acute on chronic congestive heart failure, unspecified heart failure type (HCC) I50.9 9/9/2024 Unknown                                                               Signed by Moo Newell MD on 3/9/2025  5:57 PM         MEDICATIONS ADMINISTERED IN LAST 1 DAY:  allopurinol (Zyloprim) tab 100 mg       Date Action Dose Route User    3/11/2025 0839 Given 100 mg Oral Germán Lopez RN          amiodarone (Pacerone) tab 200 mg       Date Action Dose Route User    3/11/2025 0839 Given 200 mg Oral Germán Lopez RN          amLODIPine (Norvasc) tab 5 mg       Date Action Dose Route User    3/11/2025 0839 Given 5 mg Oral Germán Lopez RN          apixaban (Eliquis) tab 2.5 mg       Date Action Dose Route User    3/11/2025 0839 Given 2.5 mg Oral Germán Lopez RN    3/10/2025 2200 Given 2.5 mg Oral Ana Laura Jensen RN          atorvastatin (Lipitor) tab 10 mg       Date Action Dose Route User    3/10/2025 2200 Given 10 mg Oral Ana Laura Jensen RN          benzonatate (Tessalon) cap 100 mg       Date Action Dose Route User    3/10/2025 2259 Given 100 mg Oral Ana Laura Jensen RN          carvedilol (Coreg) tab 25 mg       Date Action Dose Route User    3/11/2025 0839 Given 25 mg Oral Germán Lopez RN    3/10/2025 1720 Given 25 mg Oral Cinthia Navarro RN          dextromethorphan-guaiFENesin (Robitussin-DM)  mg/5mL oral solution 5 mL       Date Action Dose Route User    3/11/2025 0545 Given 5 mL Oral Ana Laura Jensen RN          escitalopram (Lexapro) tab 5 mg       Date Action Dose Route User    3/11/2025 0841 Given 5 mg Oral Germán Lopez RN          furosemide (Lasix) 10 mg/mL injection 40 mg       Date Action Dose Route User    3/11/2025 0839 Given 40 mg Intravenous Germán Lopez RN    3/10/2025 1720 Given 40 mg Intravenous Cinthia Navarro RN          furosemide (Lasix) 10 mg/mL injection 20 mg       Date Action Dose Route User    3/11/2025 0955 Given 20 mg Intravenous  Germán Lopez RN          gabapentin (Neurontin) cap 100 mg       Date Action Dose Route User    3/11/2025 0839 Given 100 mg Oral Germán Lopez RN    3/10/2025 2200 Given 100 mg Oral Ana Laura Jensen RN    3/10/2025 1720 Given 100 mg Oral Cinthia Navarro RN          guaiFENesin ER (Mucinex) 12 hr tab 600 mg       Date Action Dose Route User    3/11/2025 0839 Given 600 mg Oral Germán Lopez RN    3/10/2025 2259 Given 600 mg Oral Ana Laura Jensen RN          insulin aspart (NovoLOG) 100 Units/mL FlexPen 1-5 Units       Date Action Dose Route User    3/11/2025 1231 Given 4 Units Subcutaneous (Right Upper Arm) Germán Lopez RN    3/11/2025 0520 Given 4 Units Subcutaneous (Left Upper Arm) Ana Laura Jensen RN    3/10/2025 2215 Given 5 Units Subcutaneous (Left Upper Arm) Ana Laura Jensen RN    3/10/2025 1730 Given 4 Units Subcutaneous (Left Upper Arm) Cinthia Navarro RN          insulin aspart (NovoLOG) 100 Units/mL FlexPen 5 Units       Date Action Dose Route User    3/10/2025 2230 Given 5 Units Subcutaneous (Left Upper Arm) Ana Laura Jensen RN          insulin degludec (Tresiba) 100 units/mL flextouch 15 Units       Date Action Dose Route User    3/10/2025 2200 Given 15 Units Subcutaneous (Left Lower Abdomen) Ana Laura Jensen RN          levothyroxine (Synthroid) tab 75 mcg       Date Action Dose Route User    3/11/2025 0545 Given 75 mcg Oral Ana Laura Jensen RN            Vitals (last day)       Date/Time Temp Pulse Resp BP SpO2 Weight O2 Device O2 Flow Rate (L/min) Who    03/11/25 1215 97.6 °F (36.4 °C) 60 20 148/60 92 % -- Nasal cannula 3 L/min MB    03/11/25 1100 -- 62 -- -- 92 % -- -- -- MB    03/11/25 0819 98.3 °F (36.8 °C) 63 20 155/62 90 % -- Nasal cannula 3 L/min MB    03/11/25 0500 -- -- -- -- -- -- -- 3 L/min SC    03/11/25 0500 98.5 °F (36.9 °C) 60 19 144/52 93 % 181 lb 10.5 oz (82.4 kg) -- -- PM    03/10/25 2305 98.8 °F (37.1 °C) 61 16 150/55 94 % -- -- -- PM    03/10/25 1927  98.2 °F (36.8 °C) 62 18 139/58 93 % -- -- -- PM    03/10/25 1605 98.3 °F (36.8 °C) -- 20 -- -- -- Nasal cannula 2 L/min RY    03/10/25 1152 98 °F (36.7 °C) 67 20 144/100 96 % -- Nasal cannula 2 L/min RY    03/10/25 0741 99 °F (37.2 °C) 74 18 164/69 97 % -- Nasal cannula 2 L/min RY    03/10/25 0528 -- 60 -- -- 96 % 184 lb 15.5 oz (83.9 kg) -- -- PM    03/10/25 0500 -- 60 -- 144/59 -- -- -- -- DA    03/10/25 0500 98 °F (36.7 °C) -- 18 -- -- -- Nasal cannula 2 L/min SC    03/10/25 0400 -- 60 -- -- 96 % -- -- -- SC    03/10/25 0300 -- 60 -- -- 95 % -- -- -- SC    03/10/25 0247 97.7 °F (36.5 °C) 60 18 148/58 95 % -- -- -- PM    03/10/25 0200 -- 67 -- 162/83 93 % -- -- -- SC       H&P    Subjective:  History of Present Illness:      Ciarra Salcido is a 84 year old female with history of afib on eliquis, chronic HFpEF, CKD stage IV, hypercoagulable state with hx of DVT/PE on eliquis, type II DM, HTN, HLD, hypothyroidism, depression, gout presented with SOB, edema, weight gain and confusion.      Patient presented from assisted living with shortness of breath, edema.  According to her daughter who provided history she has been sleeping more.  Since her prior admission last year, patient has been mostly wheelchair-bound.  No known fevers, chills.  Patient has a chronic cough and she denies any acute changes.     She was sating 100% on 3 L NC. EKG with paced rhythm. CXR with small left pleural effusion and left lower lobe infiltrate. She was given IV lasix.         Assessment & Plan:  #Acute on chronic HFpEF  #Mild to moderate MR  #Pulmonary hypertension  ProBNP 12263.   -Continue IV lasix  -Cardiology consult  -TTE  -Strict I&O, sodium and fluid restriction      #Acute hypoxic respiratory failure 2/2 acute on chronic HFpEF - wean o2 with diuresis     #PAF s/p PPM      #CKD stage IV 2/2 DM/HTN - Scr close to her baseline. Trend.      #Hypercoagulable state, hx of VTE on eliquis      #HTN  #Microcytic anemia of CKD -hgb close to  her baseline. Check ferritin and iron studies.   #DMII - A1c 8.3   #HLD  #Hypothyroidism   #Gout  #Depression     3/10 HOSPITALIST NOTE    Chief Complaint: SOB, edema, confusion        Subjective:  Patient is sitting up in bedside chair. No acute complaints. Cough with greenish sputum. Denies fevers, chills.         Objective:  Review of Systems:   A comprehensive review of systems was completed; pertinent positive and negatives stated in subjective.     Vital signs:  Temp:  [97.6 °F (36.4 °C)-99 °F (37.2 °C)] 99 °F (37.2 °C)  Pulse:  [60-74] 74  Resp:  [14-27] 18  BP: (144-168)/(58-98) 164/69  SpO2:  [93 %-100 %] 97 %     Physical Exam:    General: No acute distress  Respiratory: No wheezes, no rhonchi  Cardiovascular: S1, S2, regular rate and rhythm. Grade III systolic murmur  Abdomen: Soft, Non-tender, non-distended, positive bowel sounds  Neuro: No new focal deficits.   Extremities:pitting edema to knees      Diagnostic Data:    Labs:       Recent Labs   Lab 03/09/25  1424 03/10/25  0159   WBC 7.9 7.6   HGB 9.1* 8.9*   MCV 76.9* 76.0*   .0 236.0   INR 1.57*  --               Recent Labs   Lab 03/09/25  1424 03/10/25  0200   GLU 81 212*   BUN 69* 69*   CREATSERUM 3.38* 3.29*   CA 8.8 8.9   ALB 4.2 4.0    137   K 5.0 5.2*    100   CO2 27.0 26.0   ALKPHO 93 86   AST 20 16   ALT 22 19   BILT 0.3 0.3   TP 7.4 6.9          Assessment & Plan:  #Acute on chronic HFpEF  #Mild to moderate MR  #Pulmonary hypertension  ProBNP 88022.   -Continue IV lasix  -Cardiology consult  -TTE  -Strict I&O, sodium and fluid restriction      #Acute hypoxic respiratory failure 2/2 acute on chronic HFpEF - wean o2 with diuresis. Check procal. IF low, will continue monitor off antibiotics. Check RPAN.      #Metabolic encephalopathy   #Underlying dementia without behavioral disturbance  -PTOT      #PAF s/p PPM      #CKD stage IV 2/2 DM/HTN - Scr close to her baseline. Trend.      #Hypercoagulable state, hx of VTE on eliquis       #HTN  #Microcytic anemia of CKD -hgb close to her baseline. Check ferritin and iron studies.   #DMII - A1c 8.3   #HLD  #Hypothyroidism   #Gout  #Depression      3/11 CARDIOLOGY CONSULT NOTE  Reason for Consultation:  CHf        History of Present Illness:  Ciarra Salcido is a a(n) 84 year old female. Very nice woman with some memory impairment.  She is on 3 L NC for COPD, DVT/PE, DM, HTN, HL, HFpEF, afib/PPM, and CRI.  She presented from assisted living on 3/9 with worsening dyspnea and edema.   Started on IV lasix, found to be + for coronavirus.  On baseline 3 L NC.  Weight is down, renal fx stable.  Echo 3/10 with low normal EF, mod pulm HTN, mod-severe TR.       Physical Exam:        Temp:  [98 °F (36.7 °C)-98.8 °F (37.1 °C)] 98.3 °F (36.8 °C)  Pulse:  [60-67] 63  Resp:  [16-20] 20  BP: (139-155)/() 155/62  SpO2:  [90 %-96 %] 90 %      Labs:   HEM:        Recent Labs   Lab 03/09/25  1424 03/10/25  0159 03/11/25  0514   WBC 7.9 7.6 7.5   HGB 9.1* 8.9* 9.1*   .0 236.0 240.0         Chem:        Recent Labs   Lab 03/09/25  1424 03/10/25  0200 03/11/25  0514    137 136   K 5.0 5.2* 5.2*    100 99   CO2 27.0 26.0 29.0   BUN 69* 69* 71*   CREATSERUM 3.38* 3.29* 2.99*   CA 8.8 8.9 8.9   MG  --  2.0  --    PHOS  --  4.5  --        Impression:   Acute on chronic hypoxic resp failure - due to acute HFpEF and coronavirus.  COPD, on 3 L NC O2 chronically.  PAF/PPM - on amio and eliquis  CRI, Cr stable near 3.0.  potassium chronically elevated.  Low normal EF, Moderate pulm HTN, mod-severe TR.  H/o DVT/PE, on eliquis  DM  Anemia, chronic.  In setting of CRI.     Plan:  Increase lasix to 60 mg IVP BID, following BMP daily.  Mobilize, dc planning.    3/11 HOSPITALIST NOTE  mplaint: SOB, edema, confusion        Subjective:  Still with productive cough. Denies SOB. Updated daughter over the phone today.        Objective:  Review of Systems:   A comprehensive review of systems was completed; pertinent  positive and negatives stated in subjective.     Vital signs:  Temp:  [97.6 °F (36.4 °C)-98.8 °F (37.1 °C)] 97.6 °F (36.4 °C)  Pulse:  [60-63] 60  Resp:  [16-20] 20  BP: (139-155)/(52-62) 148/60  SpO2:  [90 %-94 %] 92 %   Extremities:pitting edema to knees with some improvement     Diagnostic Data:    Labs:        Recent Labs   Lab 03/09/25  1424 03/10/25  0159 03/11/25  0514   WBC 7.9 7.6 7.5   HGB 9.1* 8.9* 9.1*   MCV 76.9* 76.0* 74.5*   .0 236.0 240.0   INR 1.57*  --   --                Recent Labs   Lab 03/09/25  1424 03/10/25  0200 03/11/25  0514   GLU 81 212* 243*   BUN 69* 69* 71*   CREATSERUM 3.38* 3.29* 2.99*   CA 8.8 8.9 8.9   ALB 4.2 4.0  --     137 136   K 5.0 5.2* 5.2*    100 99   CO2 27.0 26.0 29.0   ALKPHO 93 86  --    AST 20 16  --    ALT 22 19  --    BILT 0.3 0.3  --    TP 7.4 6.9  --           Assessment & Plan:  #Acute on chronic HFpEF  #Mild to moderate MR  #Pulmonary hypertension  ProBNP 53770.   -Continue IV lasix  -Cardiology following   -TTE with MR and TR bit worse compared to prior  -Strict I&O, sodium and fluid restriction      #Acute on chronic hypoxic respiratory failure 2/2 acute on chronic HFpEF and coronavirus - wean o2 with diuresis. Monitor off antibiotics     #Metabolic encephalopathy   #Underlying dementia without behavioral disturbance  -PTOT      #PAF s/p PPM      #CKD stage IV 2/2 DM/HTN - Scr close to her baseline. Trend.      #Hypercoagulable state, hx of VTE on eliquis      #HTN  #Microcytic anemia of CKD -hgb close to her baseline. Check ferritin and iron studies.   #DMII - A1c 8.3   #HLD  #Hypothyroidism   #Gout  #Depression

## 2025-03-11 NOTE — PROGRESS NOTES
[facility]   part of [facility]     Hospitalist Progress Note     Ciarra Salcido Patient Status:  Inpatient    1940 MRN [id number]   Location [facility] 8NE-A Attending Catarina Barros MD   Hosp Day # 2 PCP JUDY CYR MD     Chief Complaint: SOB, edema, confusion    Subjective:     Still with productive cough. Denies SOB. Updated daughter over the phone today.    Objective:    Review of Systems:   A comprehensive review of systems was completed; pertinent positive and negatives stated in subjective.    Vital signs:  Temp:  [97.6 °F (36.4 °C)-98.8 °F (37.1 °C)] 97.6 °F (36.4 °C)  Pulse:  [60-63] 60  Resp:  [16-20] 20  BP: (139-155)/(52-62) 148/60  SpO2:  [90 %-94 %] 92 %    Physical Exam:    General: No acute distress  Respiratory: No wheezes, no rhonchi  Cardiovascular: S1, S2, regular rate and rhythm. Grade III systolic murmur  Abdomen: Soft, Non-tender, non-distended, positive bowel sounds  Neuro: No new focal deficits.   Extremities:pitting edema to knees with some improvement    Diagnostic Data:    Labs:  Recent Labs   Lab 25  1424 03/10/25  0159 25  0514   WBC 7.9 7.6 7.5   HGB 9.1* 8.9* 9.1*   MCV 76.9* 76.0* 74.5*   .0 236.0 240.0   INR 1.57*  --   --        Recent Labs   Lab 25  1424 03/10/25  0200 25  0514   GLU 81 212* 243*   BUN 69* 69* 71*   CREATSERUM 3.38* 3.29* 2.99*   CA 8.8 8.9 8.9   ALB 4.2 4.0  --     137 136   K 5.0 5.2* 5.2*    100 99   CO2 27.0 26.0 29.0   ALKPHO 93 86  --    AST 20 16  --    ALT 22 19  --    BILT 0.3 0.3  --    TP 7.4 6.9  --        Estimated Glomerular Filtration Rate: 15 mL/min/1.73m2 (A) (result from lab).    Recent Labs   Lab 03/10/25  0200 03/10/25  0549 03/10/25  1233   TROPHS 29 37* 31       Recent Labs   Lab 25  1424   PTP 18.9*   INR 1.57*                    Microbiology    No results found for this visit on 25.      Imaging: Reviewed in Epic.    Medications:    furosemide  60 mg  Intravenous BID (Diuretic)    insulin degludec  20 Units Subcutaneous Nightly    insulin aspart  1-10 Units Subcutaneous TID CC and HS    guaiFENesin ER  600 mg Oral BID    metoclopramide  5 mg Intravenous Daily    allopurinol  100 mg Oral Daily    amiodarone  200 mg Oral Daily    amLODIPine  5 mg Oral Daily    apixaban  2.5 mg Oral BID    carvedilol  25 mg Oral BID with meals    escitalopram  5 mg Oral Daily    gabapentin  100 mg Oral TID    levothyroxine  75 mcg Oral Before breakfast    atorvastatin  10 mg Oral Nightly    insulin aspart  1-5 Units Subcutaneous TID AC and HS       Assessment & Plan:      #Acute on chronic HFpEF  #Mild to moderate MR  #Pulmonary hypertension  ProBNP 94762.   -Continue IV lasix  -Cardiology following   -TTE with MR and TR bit worse compared to prior  -Strict I&O, sodium and fluid restriction      #Acute on chronic hypoxic respiratory failure 2/2 acute on chronic HFpEF and coronavirus - wean o2 with diuresis. Monitor off antibiotics    #Metabolic encephalopathy   #Underlying dementia without behavioral disturbance  -PTOT     #PAF s/p PPM      #CKD stage IV 2/2 DM/HTN - Scr close to her baseline. Trend.      #Hypercoagulable state, hx of VTE on eliquis      #HTN  #Microcytic anemia of CKD -hgb close to her baseline. Check ferritin and iron studies.   #DMII - A1c 8.3   #HLD  #Hypothyroidism   #Gout  #Depression      Catarina Barros MD    Supplementary Documentation:     Quality:  DVT Mechanical Prophylaxis:     Early ambuation  DVT Pharmacologic Prophylaxis   Medication    apixaban (Eliquis) tab 2.5 mg                Code Status: DNAR/Selective Treatment  Hutchinson: No urinary catheter in place  Hutchinson Duration (in days):   Central line:    AMINTA:     Discharge is dependent on: course  At this point Ms. Salcido is expected to be discharge to: home pending diuresis    The 21st Century Cures Act makes medical notes like these available to patients in the interest of transparency. Please be  advised this is a medical document. Medical documents are intended to carry relevant information, facts as evident, and the clinical opinion of the practitioner. The medical note is intended as peer to peer communication and may appear blunt or direct. It is written in medical language and may contain abbreviations or verbiage that are unfamiliar.              **Certification      PHYSICIAN Certification of Need for Inpatient Hospitalization - Initial Certification    Patient will require inpatient services that will reasonably be expected to span two midnight's based on the clinical documentation in H+P.   Based on patients current state of illness, I anticipate that, after discharge, patient will require TBD.

## 2025-03-11 NOTE — CONSULTS
Cardiology Consultation    Ciarra Salcido Patient Status:  Inpatient    1940 MRN [id number]   Location [facility] 8NE-A Attending Catarina Barros MD   Hosp Day # 2 PCP JUDY CYR MD     Reason for Consultation:  CHf      History of Present Illness:  Ciarra Salcido is a a(n) 84 year old female. Very nice woman with some memory impairment.  She is on 3 L NC for COPD, DVT/PE, DM, HTN, HL, HFpEF, afib/PPM, and CRI.  She presented from assisted living on 3/9 with worsening dyspnea and edema.   Started on IV lasix, found to be + for coronavirus.  On baseline 3 L NC.  Weight is down, renal fx stable.  Echo 3/10 with low normal EF, mod pulm HTN, mod-severe TR.    History:  Past Medical History:    (HFpEF) heart failure with preserved ejection fraction (HCC)    Acute cystitis without hematuria    Acute deep vein thrombosis (DVT) of popliteal vein of right lower extremity (HCC)    Arrhythmia    Cataract    CHF exacerbation (HCC)    CKD (chronic kidney disease)    Congestive heart disease (HCC)    COVID-19    Diabetes (HCC)    Disorder of thyroid    DM2 (diabetes mellitus, type 2) (HCC)    Hearing impairment    High blood pressure    High cholesterol    History of stomach ulcers    HTN (hypertension)    Hyperlipidemia    Hypothyroidism    Pulmonary embolism (HCC)    Shingles    Visual impairment     Past Surgical History:   Procedure Laterality Date    Cardiac pacemaker placement      Cataract      Eye surgery      Hysterectomy      [facility]    Pacemaker monitor       Family History   Problem Relation Age of Onset    Other (Other) Mother         Old Age    Other (Other) Father         Old age         Allergies:  Allergies[1]    Medications:   furosemide  40 mg Intravenous BID (Diuretic)    guaiFENesin ER  600 mg Oral BID    metoclopramide  5 mg Intravenous Daily    allopurinol  100 mg Oral Daily    amiodarone  200 mg Oral Daily    amLODIPine  5 mg Oral Daily    apixaban  2.5 mg Oral BID    carvedilol  25  mg Oral BID with meals    escitalopram  5 mg Oral Daily    gabapentin  100 mg Oral TID    levothyroxine  75 mcg Oral Before breakfast    atorvastatin  10 mg Oral Nightly    insulin degludec  15 Units Subcutaneous Nightly    insulin aspart  1-5 Units Subcutaneous TID AC and HS       Continuous Infusions:      Social History:   reports that she quit smoking about 14 years ago. Her smoking use included cigarettes. She started smoking about 64 years ago. She has a 50 pack-year smoking history. She has never used smokeless tobacco. She reports that she does not drink alcohol and does not use drugs.    Review of Systems:  All systems were reviewed and are negative except as described above in HPI.    Physical Exam:      Temp:  [98 °F (36.7 °C)-98.8 °F (37.1 °C)] 98.3 °F (36.8 °C)  Pulse:  [60-67] 63  Resp:  [16-20] 20  BP: (139-155)/() 155/62  SpO2:  [90 %-96 %] 90 %    Last 3 Weights   03/11/25 0500 181 lb 10.5 oz (82.4 kg)   03/10/25 0528 184 lb 15.5 oz (83.9 kg)   03/09/25 1645 187 lb 11.2 oz (85.1 kg)   01/09/25 1314 172 lb 12.8 oz (78.4 kg)   10/31/24 1117 172 lb (78 kg)           General: No apparent distress  HEENT: No focal deficits.  Neck: supple. JVP normal  Cardiac: Regular rhythm, S1, S2 normal,   No rub or gallop.  No murmur.  Lungs: diminished  Abdomen: Soft, non-tender.   Extremities: No edema  Neurologic: no focal deficits  Skin: Warm and dry.          Telemetry: paced    Laboratories and Data:  Diagnostics:    EKG, 3/11/2025:  paced    CXR, 3/11/2025:  reviewed    Labs:   HEM:  Recent Labs   Lab 03/09/25  1424 03/10/25  0159 03/11/25  0514   WBC 7.9 7.6 7.5   HGB 9.1* 8.9* 9.1*   .0 236.0 240.0       Chem:  Recent Labs   Lab 03/09/25  1424 03/10/25  0200 03/11/25  0514    137 136   K 5.0 5.2* 5.2*    100 99   CO2 27.0 26.0 29.0   BUN 69* 69* 71*   CREATSERUM 3.38* 3.29* 2.99*   CA 8.8 8.9 8.9   MG  --  2.0  --    PHOS  --  4.5  --    GLU 81 212* 243*       Recent Labs   Lab  03/09/25  1424 03/10/25  0200   ALT 22 19   AST 20 16   ALB 4.2 4.0       Recent Labs   Lab 03/09/25  1424   PTP 18.9*   INR 1.57*       No results for input(s): \"TROP\", \"CK\" in the last 168 hours.      Impression:   Acute on chronic hypoxic resp failure - due to acute HFpEF and coronavirus.  COPD, on 3 L NC O2 chronically.  PAF/PPM - on amio and eliquis  CRI, Cr stable near 3.0.  potassium chronically elevated.  Low normal EF, Moderate pulm HTN, mod-severe TR.  H/o DVT/PE, on eliquis  DM  Anemia, chronic.  In setting of CRI.    Plan:  Increase lasix to 60 mg IVP BID, following BMP daily.  Mobilize, dc planning.    Efra Hill MD  3/11/2025  8:41 AM  C5       [1] No Known Allergies

## 2025-03-12 NOTE — PLAN OF CARE
Rec'd pt at 0730. A&O x 3, forgetful. Tele shows AV-pacing. O2 sats adequate on 3L NC, which is pt's baseline. Strong productive cough present. Pt incontinent, briefed, purewick in place. Pt up to chair this afternoon. No BM in a multiple days, miralax given this AM. No C/O pain or SOB. Skin dry and intact. Bed locked and in low position, call light and personal items within reach. Will continue to monitor. POC - IV diuretics transitioned to PO today, mucinex BID, poss dc back to [facility]/ home health later today.    1415 -- Called to give report to RN at [facility], RN is in meeting now, gave  my name and # to call back for report.   1500 -- Attempted to call again for report, sent to [place].     Problem: Diabetes/Glucose Control  Goal: Glucose maintained within prescribed range  Description: INTERVENTIONS:  - Monitor Blood Glucose as ordered  - Assess for signs and symptoms of hyperglycemia and hypoglycemia  - Administer ordered medications to maintain glucose within target range  - Assess barriers to adequate nutritional intake and initiate nutrition consult as needed  - Instruct patient on self management of diabetes  Outcome: Progressing     Problem: Patient/Family Goals  Goal: Patient/Family Long Term Goal  Description: Patient's Long Term Goal: discharge soon    Interventions:  - Appropriate consult and treatments, follow up appointment after discharge, pain mgt , ABG, Monitor Tele, labs, V/S, Labs, medication regimes, heart healthy carb controlled diet and exercise, ABG , follow up appointment after discharge  - See additional Care Plan goals for specific interventions  Outcome: Progressing  Goal: Patient/Family Short Term Goal  Description: Patient's Short Term Goal: breath comfortable, pain mgt    Interventions:   - Frequent assess and assist in ADL, pain mgt, safety, frequent reposition,skin care, ch  - See additional Care Plan goals for specific interventions  Outcome:  Progressing     Problem: PAIN - ADULT  Goal: Verbalizes/displays adequate comfort level or patient's stated pain goal  Description: INTERVENTIONS:  - Encourage pt to monitor pain and request assistance  - Assess pain using appropriate pain scale  - Administer analgesics based on type and severity of pain and evaluate response  - Implement non-pharmacological measures as appropriate and evaluate response  - Consider cultural and social influences on pain and pain management  - Manage/alleviate anxiety  - Utilize distraction and/or relaxation techniques  - Monitor for opioid side effects  - Notify MD/LIP if interventions unsuccessful or patient reports new pain  - Anticipate increased pain with activity and pre-medicate as appropriate  Outcome: Progressing     Problem: RISK FOR INFECTION - ADULT  Goal: Absence of fever/infection during anticipated neutropenic period  Description: INTERVENTIONS  - Monitor WBC  - Administer growth factors as ordered  - Implement neutropenic guidelines  Outcome: Progressing     Problem: SAFETY ADULT - FALL  Goal: Free from fall injury  Description: INTERVENTIONS:  - Assess pt frequently for physical needs  - Identify cognitive and physical deficits and behaviors that affect risk of falls.  - [place] fall precautions as indicated by assessment.  - Educate pt/family on patient safety including physical limitations  - Instruct pt to call for assistance with activity based on assessment  - Modify environment to reduce risk of injury  - Provide assistive devices as appropriate  - Consider OT/PT consult to assist with strengthening/mobility  - Encourage toileting schedule  Outcome: Progressing     Problem: GENITOURINARY - ADULT  Goal: Absence of urinary retention  Description: INTERVENTIONS:  - Assess patient’s ability to void and empty bladder  - Monitor intake/output and perform bladder scan as needed  - Follow urinary retention protocol/standard of care  - Consider collaborating with  pharmacy to review patient's medication profile  - Implement strategies to promote bladder emptying  Outcome: Progressing     Problem: METABOLIC/FLUID AND ELECTROLYTES - ADULT  Goal: Glucose maintained within prescribed range  Description: INTERVENTIONS:  - Monitor Blood Glucose as ordered  - Assess for signs and symptoms of hyperglycemia and hypoglycemia  - Administer ordered medications to maintain glucose within target range  - Assess barriers to adequate nutritional intake and initiate nutrition consult as needed  - Instruct patient on self management of diabetes  Outcome: Progressing  Goal: Electrolytes maintained within normal limits  Description: INTERVENTIONS:  - Monitor labs and rhythm and assess patient for signs and symptoms of electrolyte imbalances  - Administer electrolyte replacement as ordered  - Monitor response to electrolyte replacements, including rhythm and repeat lab results as appropriate  - Fluid restriction as ordered  - Instruct patient on fluid and nutrition restrictions as appropriate  Outcome: Progressing  Goal: Hemodynamic stability and optimal renal function maintained  Description: INTERVENTIONS:  - Monitor labs and assess for signs and symptoms of volume excess or deficit  - Monitor intake, output and patient weight  - Monitor urine specific gravity, serum osmolarity and serum sodium as indicated or ordered  - Monitor response to interventions for patient's volume status, including labs, urine output, blood pressure (other measures as available)  - Encourage oral intake as appropriate  - Instruct patient on fluid and nutrition restrictions as appropriate  Outcome: Progressing

## 2025-03-12 NOTE — DISCHARGE SUMMARY
[facility]     Ciarra Salcido Patient Status:  Inpatient    1940 MRN [id number]   Location [facility] 8NE-A Attending No att. providers found   Hosp Day # 3 PCP JUDY CYR MD     Date of Admission: 3/9/2025  Date of Discharge:  3/12/2025     Discharge Disposition: Home Health Care Services    Discharge Diagnosis:    #Acute on chronic HFpEF  #Mild to moderate MR  #Pulmonary hypertension  #Acute on chronic hypoxic respiratory failure  #COVID  #Metabolic encephalopathy   #Underlying dementia without behavioral disturbance  #PAF s/p PPM   #CKD stage IV 2/2 DM/HTN   #Hypercoagulable state, hx of VTE on eliquis   #HTN  #Microcytic anemia of CKD studies.   #DMII - A1c 8.3   #HLD  #Hypothyroidism   #Gout  #Depression     History of Present Illness: Ciarra Salcido is a 84 year old female with history of afib on eliquis, chronic HFpEF, CKD stage IV, hypercoagulable state with hx of DVT/PE on eliquis, type II DM, HTN, HLD, hypothyroidism, depression, gout presented with SOB, edema, weight gain and confusion.      Patient presented from assisted living with shortness of breath, edema.  According to her daughter who provided history she has been sleeping more.  Since her prior admission last year, patient has been mostly wheelchair-bound.  No known fevers, chills.  Patient has a chronic cough and she denies any acute changes.     She was sating 100% on 3 L NC. EKG with paced rhythm. CXR with small left pleural effusion and left lower lobe infiltrate. She was given IV lasix.     Brief Synopsis:   Patient presented with shortness of breath and edema.  She is admitted treated for acute on chronic heart failure with preserved EF.  Patient was brought about IV diuresis.  She was weaned back to her home oxygen requirement.  She was doing well, cleared for discharge home by all services.  Patient to follow-up with her PCP after discharge.  All question concerns addressed with patient, she is  agreeable plan of care as stated.  She was encouraged return to ER symptoms come back or worsen.    Lace+ Score: 80  59-90 High Risk  29-58 Medium Risk  0-28   Low Risk       TCM Follow-Up Recommendation:  LACE > 58: High Risk of readmission after discharge from the hospital.      Procedures during hospitalization:   None    Consultants:  Cardiology     Discharge Medication List:     Discharge Medications        CHANGE how you take these medications        Instructions Prescription details   torsemide 20 MG Tabs  Commonly known as: Demadex  What changed: when to take this      Take 1 tablet (20 mg total) by mouth before evening meal.   Quantity: 30 tablet  Refills: 1     Torsemide 40 MG Tabs  What changed: You were already taking a medication with the same name, and this prescription was added. Make sure you understand how and when to take each.      Take 40 mg by mouth every morning.   Quantity: 60 tablet  Refills: 1            CONTINUE taking these medications        Instructions Prescription details   Accu-Chek FastClix Lancets Misc      1 Lancet by Finger stick route. Use as directed.   Refills: 0     Accu-Chek Guide Strp       Refills: 0     Accu-Chek Guide w/Device Kit       Refills: 0     acetaminophen 500 MG Tabs  Commonly known as: Tylenol Extra Strength      Take 2 tablets (1,000 mg total) by mouth every 6 (six) hours as needed for Pain or Fever.   Refills: 0     allopurinol 100 MG Tabs  Commonly known as: Zyloprim      Take 1 tablet (100 mg total) by mouth daily.   Quantity: 90 tablet  Refills: 1     amiodarone 200 MG Tabs  Commonly known as: Pacerone      Take 1 tablet (200 mg total) by mouth daily.   Refills: 0     amLODIPine 5 MG Tabs  Commonly known as: Norvasc      Take 1 tablet (5 mg total) by mouth daily.   Quantity: 30 tablet  Refills: 11     apixaban 2.5 MG Tabs  Commonly known as: Eliquis      Take 1 tablet (2.5 mg total) by mouth 2 (two) times daily.   Quantity: 60 tablet  Refills: 5      carvedilol 25 MG Tabs  Commonly known as: Coreg      Take 1 tablet (25 mg total) by mouth 2 (two) times daily with meals.   Quantity: 180 tablet  Refills: 1     cholecalciferol 25 MCG (1000 UT) Tabs  Commonly known as: Vitamin D3      Take 1 tablet (1,000 Units total) by mouth daily.   Refills: 0     Dexcom G7 Sensor Misc      1 each Every 10 days. Dx: E11.22, N18.31, Z79.4 (patient is using insulin 4x/day)   Quantity: 9 each  Refills: 3     FreeStyle Christine 3 Sensor Misc      1 each every 14 (fourteen) days.   Quantity: 6 each  Refills: 1     dilTIAZem  MG Cp24  Commonly known as: Cardizem CD      Take 1 capsule (120 mg total) by mouth daily.   Refills: 0     escitalopram 5 MG Tabs  Commonly known as: Lexapro      Take 1 tablet (5 mg total) by mouth daily.   Quantity: 90 tablet  Refills: 3     gabapentin 100 MG Caps  Commonly known as: Neurontin      Take 1 capsule (100 mg total) by mouth 3 (three) times daily.   Quantity: 270 capsule  Refills: 1     Insulin Lispro (1 Unit Dial) 100 UNIT/ML Sopn      INJECT UNITS PER SCALE: 150-200=1, 201-250=2, 251-300=3, 301-350=4, 351-400=5, OVER 400 CALL MD, NEW PEN EVERY 28 DAY   Quantity: 15 mL  Refills: 3     Insulin Lispro (1 Unit Dial) 100 UNIT/ML Sopn  Commonly known as: HumaLOG KwikPen      INJECT UNITS PER SCALE: 150-200=1, 201-250=2, 251-300=3, 301-350=4, 351-400=5, OVER 400 CALL MD, NEW PEN EVERY 28 DAY   Quantity: 15 mL  Refills: 3     Insulin Pen Needle 32G X 4 MM Misc      Use as directed to inject insulin once daily   Quantity: 100 each  Refills: 0     BD Pen Needle Joanie U/F 32G X 4 MM Misc  Generic drug: Insulin Pen Needle      Inject 1 Pen into the skin As Directed. Dx: E11.65   Quantity: 100 each  Refills: 3     BD AutoShield Duo 30G X 5 MM Misc  Generic drug: Insulin Pen Needle      Inject 1 Pen into the skin in the morning, at noon, in the evening, and at bedtime. Dx: E11.65   Quantity: 360 each  Refills: 3     Droplet Pen Needles 32G X 4 MM  Misc  Generic drug: Insulin Pen Needle      USE AS DIRECTED   Quantity: 300 each  Refills: 3     Lantus SoloStar 100 UNIT/ML Sopn  Generic drug: insulin glargine      INJECT 20 UNITS INTO THE SKIN NIGHTLY   Quantity: 30 mL  Refills: 3     levothyroxine 75 MCG Tabs  Commonly known as: Synthroid      Take 1 tablet (75 mcg total) by mouth before breakfast.   Quantity: 90 tablet  Refills: 3     Lidocaine (Anorectal) 5 % Gel      Apply 1 Application topically daily as needed (rectal pain).   Refills: 0     simvastatin 20 MG Tabs  Commonly known as: Zocor      TAKE 1 TABLET EVERY NIGHT   Quantity: 90 tablet  Refills: 3               Where to Get Your Medications        Please  your prescriptions at the location directed by your doctor or nurse    Bring a paper prescription for each of these medications  torsemide 20 MG Tabs  Torsemide 40 MG Tabs         [place]    Follow-up appointment:   Frommelt, Julie A, APRN  [address]  [facility] [id number] [phone]    Go on 3/19/2025  11 am    ** please do not remove this appointment, contact   [phone] first **  Please have labwork drawn BMP in one week, ideally the day prior to this appointment    Estevan Bah MD  [address]  [facility] [address] [phone]    Schedule an appointment as soon as possible for a visit in 1 week(s)      Appointments for Next 30 Days 3/12/2025 - 4/11/2025        Date Arrival Time Visit Type Length Department Provider     4/9/2025  1:00 PM  EXAM - ESTABLISHED [4938] [facility]    Patient Instructions:         Location Instructions:     Masks are optional for all patients and visitors, unless otherwise indicated.                      Vital signs:  Temp:  [97.7 °F (36.5 °C)-98.8 °F (37.1 °C)] 98.3 °F (36.8 °C)  Pulse:  [60-62] 60  Resp:  [16-20] 16  BP: (120-156)/(51-59) 137/54  SpO2:  [91 %-96 %] 92 %    Physical Exam:     General: No acute distress   Lungs: clear to auscultation  Cardiovascular: S1, S2, +BEHZAD, RRR  Abdomen: Soft    -----------------------------------------------------------------------------------------------  PATIENT DISCHARGE INSTRUCTIONS: See electronic chart    Carlos Calderon MD    Total time spent on discharge plannin minutes     The  [place] Cures Act makes medical notes like these available to patients in the interest of transparency. Please be advised this is a medical document. Medical documents are intended to carry relevant information, facts as evident, and the clinical opinion of the practitioner. The medical note is intended as peer to peer communication and may appear blunt or direct. It is written in medical language and may contain abbreviations or verbiage that are unfamiliar.

## 2025-03-12 NOTE — PROGRESS NOTES
Explained discharge instructions including medications and follow-up appointments to pt, verbalized understanding. IV removed. Tele monitor discontinued. Will be transported via wheelchair. Daughter is transporting patient back to her assisted living.

## 2025-03-12 NOTE — CM/SW NOTE
03/12/25 1400   Discharge disposition   Expected discharge disposition Home-Health   Post Acute Care Provider   ([facility])     AVS sent to [facility].    Pascale Valenzuela LCSW  /Discharge Planner

## 2025-03-12 NOTE — PLAN OF CARE
Patient alert and oriented x 2 with period of forgetfulness,reality orientation provided and made comfortable, oxygen weaned to 3L n/c ,tolerating good . AV Paced on cardiac monitor. Patient denies any chest pain or chest discomfort at this time. Patient voids, last BM 3/8 ,senna tabs provided, 2L fluid restriction and NA restriction diet maintained . Glucose level obtained , diabetic teaching provided, not able to follow well , no acute distress noted, skin care and reposition provided ,Plan of care updated, all questions answered. Safety precautions in place. Bed alarm on. Will continue to monitor tele/labs/vital signs closely.     Plan  IV Lasix  60 mg BID / I&O's / DW  -Strict I&O, sodium and fluid restriction          PT/OT re-eval tomorrow for ZACHERY     Problem: Diabetes/Glucose Control  Goal: Glucose maintained within prescribed range  Description: INTERVENTIONS:  - Monitor Blood Glucose as ordered  - Assess for signs and symptoms of hyperglycemia and hypoglycemia  - Administer ordered medications to maintain glucose within target range  - Assess barriers to adequate nutritional intake and initiate nutrition consult as needed  - Instruct patient on self management of diabetes  Outcome: Progressing     Problem: Patient/Family Goals  Goal: Patient/Family Long Term Goal  Description: Patient's Long Term Goal: discharge soon    Interventions:  - Appropriate consult and treatments, follow up appointment after discharge, pain mgt , ABG, Monitor Tele, labs, V/S, Labs, medication regimes, heart healthy carb controlled diet and exercise, ABG , follow up appointment after discharge  - See additional Care Plan goals for specific interventions  Outcome: Progressing  Goal: Patient/Family Short Term Goal  Description: Patient's Short Term Goal: breath comfortable, pain mgt    Interventions:   - Frequent assess and assist in ADL, pain mgt, safety, frequent reposition,skin care, ch  - See additional Care Plan goals for  specific interventions  Outcome: Progressing     Problem: PAIN - ADULT  Goal: Verbalizes/displays adequate comfort level or patient's stated pain goal  Description: INTERVENTIONS:  - Encourage pt to monitor pain and request assistance  - Assess pain using appropriate pain scale  - Administer analgesics based on type and severity of pain and evaluate response  - Implement non-pharmacological measures as appropriate and evaluate response  - Consider cultural and social influences on pain and pain management  - Manage/alleviate anxiety  - Utilize distraction and/or relaxation techniques  - Monitor for opioid side effects  - Notify MD/LIP if interventions unsuccessful or patient reports new pain  - Anticipate increased pain with activity and pre-medicate as appropriate  Outcome: Progressing     Problem: RISK FOR INFECTION - ADULT  Goal: Absence of fever/infection during anticipated neutropenic period  Description: INTERVENTIONS  - Monitor WBC  - Administer growth factors as ordered  - Implement neutropenic guidelines  Outcome: Progressing     Problem: SAFETY ADULT - FALL  Goal: Free from fall injury  Description: INTERVENTIONS:  - Assess pt frequently for physical needs  - Identify cognitive and physical deficits and behaviors that affect risk of falls.  - [place] fall precautions as indicated by assessment.  - Educate pt/family on patient safety including physical limitations  - Instruct pt to call for assistance with activity based on assessment  - Modify environment to reduce risk of injury  - Provide assistive devices as appropriate  - Consider OT/PT consult to assist with strengthening/mobility  - Encourage toileting schedule  Outcome: Progressing     Problem: DISCHARGE PLANNING  Goal: Discharge to home or other facility with appropriate resources  Description: INTERVENTIONS:  - Identify barriers to discharge w/pt and caregiver  - Include patient/family/discharge partner in discharge planning  - Arrange for  needed discharge resources and transportation as appropriate  - Identify discharge learning needs (meds, wound care, etc)  - Arrange for interpreters to assist at discharge as needed  - Consider post-discharge preferences of patient/family/discharge partner  - Complete POLST form as appropriate  - Assess patient's ability to be responsible for managing their own health  - Refer to Case Management Department for coordinating discharge planning if the patient needs post-hospital services based on physician/LIP order or complex needs related to functional status, cognitive ability or social support system  Outcome: Progressing     Problem: CARDIOVASCULAR - ADULT  Goal: Maintains optimal cardiac output and hemodynamic stability  Description: INTERVENTIONS:  - Monitor vital signs, rhythm, and trends  - Monitor for bleeding, hypotension and signs of decreased cardiac output  - Evaluate effectiveness of vasoactive medications to optimize hemodynamic stability  - Monitor arterial and/or venous puncture sites for bleeding and/or hematoma  - Assess quality of pulses, skin color and temperature  - Assess for signs of decreased coronary artery perfusion - ex. Angina  - Evaluate fluid balance, assess for edema, trend weights  Outcome: Progressing     Problem: RESPIRATORY - ADULT  Goal: Achieves optimal ventilation and oxygenation  Description: INTERVENTIONS:  - Assess for changes in respiratory status  - Assess for changes in mentation and behavior  - Position to facilitate oxygenation and minimize respiratory effort  - Oxygen supplementation based on oxygen saturation or ABGs  - Provide Smoking Cessation handout, if applicable  - Encourage broncho-pulmonary hygiene including cough, deep breathe, Incentive Spirometry  - Assess the need for suctioning and perform as needed  - Assess and instruct to report SOB or any respiratory difficulty  - Respiratory Therapy support as indicated  - Manage/alleviate anxiety  - Monitor for  signs/symptoms of CO2 retention  Outcome: Progressing     Problem: GENITOURINARY - ADULT  Goal: Absence of urinary retention  Description: INTERVENTIONS:  - Assess patient’s ability to void and empty bladder  - Monitor intake/output and perform bladder scan as needed  - Follow urinary retention protocol/standard of care  - Consider collaborating with pharmacy to review patient's medication profile  - Implement strategies to promote bladder emptying  Outcome: Progressing     Problem: METABOLIC/FLUID AND ELECTROLYTES - ADULT  Goal: Glucose maintained within prescribed range  Description: INTERVENTIONS:  - Monitor Blood Glucose as ordered  - Assess for signs and symptoms of hyperglycemia and hypoglycemia  - Administer ordered medications to maintain glucose within target range  - Assess barriers to adequate nutritional intake and initiate nutrition consult as needed  - Instruct patient on self management of diabetes  Outcome: Progressing  Goal: Electrolytes maintained within normal limits  Description: INTERVENTIONS:  - Monitor labs and rhythm and assess patient for signs and symptoms of electrolyte imbalances  - Administer electrolyte replacement as ordered  - Monitor response to electrolyte replacements, including rhythm and repeat lab results as appropriate  - Fluid restriction as ordered  - Instruct patient on fluid and nutrition restrictions as appropriate  Outcome: Progressing  Goal: Hemodynamic stability and optimal renal function maintained  Description: INTERVENTIONS:  - Monitor labs and assess for signs and symptoms of volume excess or deficit  - Monitor intake, output and patient weight  - Monitor urine specific gravity, serum osmolarity and serum sodium as indicated or ordered  - Monitor response to interventions for patient's volume status, including labs, urine output, blood pressure (other measures as available)  - Encourage oral intake as appropriate  - Instruct patient on fluid and nutrition  restrictions as appropriate  Outcome: Progressing     Problem: HEMATOLOGIC - ADULT  Goal: Maintains hematologic stability  Description: INTERVENTIONS  - Assess for signs and symptoms of bleeding or hemorrhage  - Monitor labs and vital signs for trends  - Administer supportive blood products/factors, fluids and medications as ordered and appropriate  - Administer supportive blood products/factors as ordered and appropriate  Outcome: Progressing

## 2025-03-12 NOTE — CM/SW NOTE
Delfina  spoke to pt's daughter Seda re : therapy recs for HHC.  She is in agreement with pt needing HHC.  Pt lives in [facility]isted [facility] at [facility] in [place].  Pt is mainly in a wheelchair there and uses Home O2.    HHC referral in aidin- will follow up with daughter re; HHC list/choice once responses received.    Daughter able to transport home today and will being pt clothes and her portable O2 tank. If not dc'd today, pt will need ambulance transport tomorrow- daughter not available to transport tomorrow.    /Pascale Valenzuela LCSW  /Discharge Planner

## 2025-03-12 NOTE — CM/SW NOTE
03/12/25 1100   Choice of Post-Acute Provider   Informed patient of right to choose their preferred provider Yes   List of appropriate post-acute services provided to patient/family with quality data Yes   Patient/family choice [facility]   Information given to Daughter       Sw discussed HHC list/options with daughter and she chose [facility]- pt has used them in the past.    Pascale Valenzuela, VERONICAW  /Discharge Planner

## 2025-03-12 NOTE — PROGRESS NOTES
Cardiology Progress Note        Ciarra Salcido Patient Status:  Inpatient    1940 MRN [id number]   Formerly [facility] 8NE-A Attending Catarina Barros MD   Hosp Day # 3 PCP JUDY CYR MD     Subjective:  Coughing a lot.  Remains on baseline 3 L NC.    Medications:   furosemide  60 mg Intravenous BID (Diuretic)    insulin degludec  20 Units Subcutaneous Nightly    insulin aspart  1-10 Units Subcutaneous TID CC and HS    guaiFENesin ER  600 mg Oral BID    metoclopramide  5 mg Intravenous Daily    allopurinol  100 mg Oral Daily    amiodarone  200 mg Oral Daily    amLODIPine  5 mg Oral Daily    apixaban  2.5 mg Oral BID    carvedilol  25 mg Oral BID with meals    escitalopram  5 mg Oral Daily    gabapentin  100 mg Oral TID    levothyroxine  75 mcg Oral Before breakfast    atorvastatin  10 mg Oral Nightly    insulin aspart  1-5 Units Subcutaneous TID AC and HS       Continuous Infusions:        Allergies:  Allergies[1]      Intake/Output:    Intake/Output Summary (Last 24 hours) at 3/12/2025 0733  Last data filed at 3/12/2025 0500  Gross per 24 hour   Intake 870 ml   Output 1600 ml   Net -730 ml           Last 3 Weights   25 0500 175 lb 14.8 oz (79.8 kg)   25 0500 181 lb 10.5 oz (82.4 kg)   03/10/25 0528 184 lb 15.5 oz (83.9 kg)   25 1645 187 lb 11.2 oz (85.1 kg)   25 1314 172 lb 12.8 oz (78.4 kg)   10/31/24 1117 172 lb (78 kg)            Physical Exam:    Temp:  [97.6 °F (36.4 °C)-98.8 °F (37.1 °C)] 97.7 °F (36.5 °C)  Pulse:  [60-64] 60  Resp:  [16-20] 16  BP: (112-155)/(51-62) 132/57  SpO2:  [90 %-96 %] 91 %    General: No apparent distress  HEENT: No focal deficits.  Neck: supple. JVP normal  Cardiac: Regular rhythm, S1, S2 normal,  rub or gallop.  No murmur.  Lungs: Coarse.  Abdomen: Soft, non-tender.   Extremities: No edema  Neurologic: no focal deficits  Skin: Warm and dry.     Telemetry: paced    EKG:      Echo:      Cardiac Cath:      Labs:  HEM:  Recent Labs   Lab  03/09/25  1424 03/10/25  0159 03/11/25  0514   WBC 7.9 7.6 7.5   HGB 9.1* 8.9* 9.1*   .0 236.0 240.0       Chem:  Recent Labs   Lab 03/09/25  1424 03/10/25  0200 03/11/25  0514 03/12/25  0506    137 136 136   K 5.0 5.2* 5.2* 4.9    100 99 97*   CO2 27.0 26.0 29.0 31.0   BUN 69* 69* 71* 79*   CREATSERUM 3.38* 3.29* 2.99* 3.10*   CA 8.8 8.9 8.9 8.9   MG  --  2.0  --   --    PHOS  --  4.5  --   --    GLU 81 212* 243* 122*       Recent Labs   Lab 03/09/25  1424 03/10/25  0200   ALT 22 19   AST 20 16   ALB 4.2 4.0       No results for input(s): \"TROP\", \"CK\" in the last 168 hours.    Recent Labs   Lab 03/09/25 1424   PTP 18.9*   INR 1.57*                 Impression:  Acute on chronic hypoxic resp failure - due to acute HFpEF and coronavirus.  COPD, on 3 L NC O2 chronically.  PAF/PPM - on amio and eliquis  CRI, Cr stable near 3.0.  potassium chronically elevated.  Echo 3/10/25 - Low normal EF, Moderate pulm HTN, mod-severe TR.  H/o DVT/PE, on eliquis  DM  Anemia, chronic.  In setting of CRI.          Plan:  Volume wise, much improved, weight near baseline.  Will change to PO torsemide.  Change daily dosing to 40 mg qam, 20 mg qpm.  Dc planning ok with us.        Efra Hill MD  3/12/2025  7:33 AM  L3         [1] No Known Allergies

## 2025-03-13 ENCOUNTER — PATIENT OUTREACH (OUTPATIENT)
Dept: CASE MANAGEMENT | Age: 85
End: 2025-03-13

## 2025-03-13 ENCOUNTER — TELEPHONE (OUTPATIENT)
Dept: FAMILY MEDICINE CLINIC | Facility: CLINIC | Age: 85
End: 2025-03-13

## 2025-03-13 NOTE — PROGRESS NOTES
thyromegaly present. Cardiovascular: Normal rate, regular rhythm, S1 normal, S2 normal, normal heart sounds, intact distal pulses and normal pulses. Exam reveals no gallop and no friction rub. No murmur heard. Pulmonary/Chest: Effort normal and breath sounds normal.   Abdominal: Soft. Bowel sounds are normal.   Musculoskeletal: Normal range of motion. Neurological: She is alert and oriented to person, place, and time. Skin: Skin is warm and dry. Psychiatric: Her mood appears anxious. Nursing note and vitals reviewed. No results found for requested labs within last 30 days. Hemoglobin A1C (%)   Date Value   11/22/2017 6.6 (H)     LDL Calculated (mg/dL)   Date Value   08/14/2017 64         Lab Results   Component Value Date    WBC 7.7 04/26/2017    NEUTROABS 4.2 04/26/2017    HGB 12.5 04/26/2017    HCT 38.1 04/26/2017    HCT 37.3 05/25/2012    MCV 89.6 04/26/2017     04/26/2017     05/25/2012       Lab Results   Component Value Date    TSH 5.10 04/26/2017         ASSESSMENT:   1. Type 2 diabetes mellitus without complication, without long-term current use of insulin (HCC)  metFORMIN (GLUCOPHAGE) 850 MG tablet        PLAN:  No orders of the defined types were placed in this encounter. There are no discontinued medications.     Controlled Substances Monitoring: Quality 226: Preventive Care And Screening: Tobacco Use: Screening And Cessation Intervention: Patient screened for tobacco use and is an ex/non-smoker Detail Level: Detailed

## 2025-03-13 NOTE — PROGRESS NOTES
Outreach call to Elsie, patient's daughter, to complete TRANSITIONS OF CARE initial assessment. Daughter reports patient passed away yesterday at her assisted living facility.  Offered condolences.  Daughter inquiring if cardiologist wants her to return the pacemaker monitor that patient had in the home.  Daughter informed nurse care manager will contact [facility] to report death and also inquire what she needs to do with pacemaker monitoring device.  Nurse care manager will also notify primary care physician. Daughter was appreciative.  Telephone encounter sent to primary care physician.     Spoke with Ning @ [facility] pacemaker clinic and informed patient patient's death.  Ning reports she will update chart and notify Dr. Hamlin and Dr. Fong.  She instructed that  of pace maker does not take back monitors or refurbish them.  Ning recommended daughter either dispose of device or take to a electronic recycle place.    Spoke with daughter and informed of above.

## 2025-03-13 NOTE — TELEPHONE ENCOUNTER
Outreach call to Elsie, patient's daughter, to complete TRANSITIONS OF CARE initial assessment. Daughter reports patient passed away yesterday at her assisted living facility.

## 2025-03-14 NOTE — PAYOR COMM NOTE
--------------  DISCHARGE REVIEW    Payor: MANDO GREGORY [facility]  Subscriber #:  [id number]  Authorization Number: [id number]    Admit date: 3/9/25  Admit time:   4:35 PM  Discharge Date: 3/12/2025  3:45 PM     Admitting Physician: Cortez Thibodeaux DO  Attending Physician:  No att. providers found  Primary Care Physician: Estevan Bah MD          Discharge Summary Notes        Discharge Summary signed by Carlos Calderon MD at 3/12/2025  5:14 PM       Author: Carlos Calderon MD Specialty: HOSPITALIST Author Type: Physician    Filed: 3/12/2025  5:14 PM Date of Service: 3/12/2025  5:03 PM Status: Signed    : Carlos Calderon MD (Physician)           [facility]     Ciarra Salcido Patient Status:  Inpatient    1940 MRN [id number]   Location [facility] 8NE-A Attending No att. 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   #DMII - A1c 8.3   #HLD  #Hypothyroidism   #Gout  #Depression     History of Present Illness: Ciarra Salcido is a 84 year old female with history of afib on eliquis, chronic HFpEF, CKD stage IV, hypercoagulable state with hx of DVT/PE on eliquis, type II DM, HTN, HLD, hypothyroidism, depression, gout presented with SOB, edema, weight gain and confusion.      Patient presented from assisted living with shortness of breath, edema.  According to her daughter who provided history she has been sleeping more.  Since her prior admission last year, patient has been mostly wheelchair-bound.  No known fevers,  chills.  Patient has a chronic cough and she denies any acute changes.     She was sating 100% on 3 L NC. EKG with paced rhythm. CXR with small left pleural effusion and left lower lobe infiltrate. She was given IV lasix.     Brief Synopsis:   Patient presented with shortness of breath and edema.  She is admitted treated for acute on chronic heart failure with preserved EF.  Patient was brought about IV diuresis.  She was weaned back to her home oxygen requirement.  She was doing well, cleared for discharge home by all services.  Patient to follow-up with her PCP after discharge.  All question concerns addressed with patient, she is agreeable plan of care as stated.  She was encouraged return to ER symptoms come back or worsen.    Lace+ Score: 80  59-90 High Risk  29-58 Medium Risk  0-28   Low Risk       TCM Follow-Up Recommendation:  LACE > 58: High Risk of readmission after discharge from the hospital.      Procedures during hospitalization:   None    Consultants:  Cardiology     Discharge Medication List:     Discharge Medications        CHANGE how you take these medications        Instructions Prescription details   torsemide 20 MG Tabs  Commonly known as: Demadex  What changed: when to take this      Take 1 tablet (20 mg total) by mouth before evening meal.   Quantity: 30 tablet  Refills: 1     Torsemide 40 MG Tabs  What changed: You were already taking a medication with the same name, and this prescription was added. Make sure you understand how and when to take each.      Take 40 mg by mouth every morning.   Quantity: 60 tablet  Refills: 1            CONTINUE taking these medications        Instructions Prescription details   Accu-Chek FastClix Lancets Misc      1 Lancet by Finger stick route. Use as directed.   Refills: 0     Accu-Chek Guide Strp       Refills: 0     Accu-Chek Guide w/Device Kit       Refills: 0     acetaminophen 500 MG Tabs  Commonly known as: Tylenol Extra Strength      Take 2 tablets  (1,000 mg total) by mouth every 6 (six) hours as needed for Pain or Fever.   Refills: 0     allopurinol 100 MG Tabs  Commonly known as: Zyloprim      Take 1 tablet (100 mg total) by mouth daily.   Quantity: 90 tablet  Refills: 1     amiodarone 200 MG Tabs  Commonly known as: Pacerone      Take 1 tablet (200 mg total) by mouth daily.   Refills: 0     amLODIPine 5 MG Tabs  Commonly known as: Norvasc      Take 1 tablet (5 mg total) by mouth daily.   Quantity: 30 tablet  Refills: 11     apixaban 2.5 MG Tabs  Commonly known as: Eliquis      Take 1 tablet (2.5 mg total) by mouth 2 (two) times daily.   Quantity: 60 tablet  Refills: 5     carvedilol 25 MG Tabs  Commonly known as: Coreg      Take 1 tablet (25 mg total) by mouth 2 (two) times daily with meals.   Quantity: 180 tablet  Refills: 1     cholecalciferol 25 MCG (1000 UT) Tabs  Commonly known as: Vitamin D3      Take 1 tablet (1,000 Units total) by mouth daily.   Refills: 0     Dexcom G7 Sensor Misc      1 each Every 10 days. Dx: E11.22, N18.31, Z79.4 (patient is using insulin 4x/day)   Quantity: 9 each  Refills: 3     FreeStyle Christine 3 Sensor Misc      1 each every 14 (fourteen) days.   Quantity: 6 each  Refills: 1     dilTIAZem  MG Cp24  Commonly known as: Cardizem CD      Take 1 capsule (120 mg total) by mouth daily.   Refills: 0     escitalopram 5 MG Tabs  Commonly known as: Lexapro      Take 1 tablet (5 mg total) by mouth daily.   Quantity: 90 tablet  Refills: 3     gabapentin 100 MG Caps  Commonly known as: Neurontin      Take 1 capsule (100 mg total) by mouth 3 (three) times daily.   Quantity: 270 capsule  Refills: 1     Insulin Lispro (1 Unit Dial) 100 UNIT/ML Sopn      INJECT UNITS PER SCALE: 150-200=1, 201-250=2, 251-300=3, 301-350=4, 351-400=5, OVER 400 CALL MD, NEW PEN EVERY 28 DAY   Quantity: 15 mL  Refills: 3     Insulin Lispro (1 Unit Dial) 100 UNIT/ML Sopn  Commonly known as: HumaLOG KwikPen      INJECT UNITS PER SCALE: 150-200=1, 201-250=2,  251-300=3, 301-350=4, 351-400=5, OVER 400 CALL MD, NEW PEN EVERY 28 DAY   Quantity: 15 mL  Refills: 3     Insulin Pen Needle 32G X 4 MM Misc      Use as directed to inject insulin once daily   Quantity: 100 each  Refills: 0     BD Pen Needle Joanie U/F 32G X 4 MM Misc  Generic drug: Insulin Pen Needle      Inject 1 Pen into the skin As Directed. Dx: E11.65   Quantity: 100 each  Refills: 3     BD AutoShield Duo 30G X 5 MM Misc  Generic drug: Insulin Pen Needle      Inject 1 Pen into the skin in the morning, at noon, in the evening, and at bedtime. Dx: E11.65   Quantity: 360 each  Refills: 3     Droplet Pen Needles 32G X 4 MM Misc  Generic drug: Insulin Pen Needle      USE AS DIRECTED   Quantity: 300 each  Refills: 3     Lantus SoloStar 100 UNIT/ML Sopn  Generic drug: insulin glargine      INJECT 20 UNITS INTO THE SKIN NIGHTLY   Quantity: 30 mL  Refills: 3     levothyroxine 75 MCG Tabs  Commonly known as: Synthroid      Take 1 tablet (75 mcg total) by mouth before breakfast.   Quantity: 90 tablet  Refills: 3     Lidocaine (Anorectal) 5 % Gel      Apply 1 Application topically daily as needed (rectal pain).   Refills: 0     simvastatin 20 MG Tabs  Commonly known as: Zocor      TAKE 1 TABLET EVERY NIGHT   Quantity: 90 tablet  Refills: 3               Where to Get Your Medications        Please  your prescriptions at the location directed by your doctor or nurse    Bring a paper prescription for each of these medications  torsemide 20 MG Tabs  Torsemide 40 MG Tabs         [place]    Follow-up appointment:   Frommelt, Julie A, APRN  [address]  [address] [id number] [phone]    Go on 3/19/2025  11 am    ** please do not remove this appointment, contact   [phone] first **  Please have labwork drawn BMP in one week, ideally the day prior to this appointment    Estevan Bah MD  [address] [address] [address] [phone]    Schedule an appointment as soon as  possible for a visit in 1 week(s)      Appointments for Next 30 Days 3/12/2025 - 2025        Date Arrival Time Visit Type Length Department Provider     2025  1:00 PM  EXAM - ESTABLISHED [2844] 60 min [facility] Estevan Bah MD    Patient Instructions:         Location Instructions:     Masks are optional for all patients and visitors, unless otherwise indicated.                      Vital signs:  Temp:  [97.7 °F (36.5 °C)-98.8 °F (37.1 °C)] 98.3 °F (36.8 °C)  Pulse:  [60-62] 60  Resp:  [16-20] 16  BP: (120-156)/(51-59) 137/54  SpO2:  [91 %-96 %] 92 %    Physical Exam:    General: No acute distress   Lungs: clear to auscultation  Cardiovascular: S1, S2, +BEHZAD, RRR  Abdomen: Soft    -----------------------------------------------------------------------------------------------  PATIENT DISCHARGE INSTRUCTIONS: See electronic chart    Carlos Calderon MD    Total time spent on discharge plannin minutes     The  Cures Act makes medical notes like these available to patients in the interest of transparency. Please be advised this is a medical document. Medical documents are intended to carry relevant information, facts as evident, and the clinical opinion of the practitioner. The medical note is intended as peer to peer communication and may appear blunt or direct. It is written in medical language and may contain abbreviations or verbiage that are unfamiliar.       Electronically signed by Carlos Calderon MD on 3/12/2025  5:14 PM         REVIEWER COMMENTS

## 2025-03-18 ENCOUNTER — MED REC SCAN ONLY (OUTPATIENT)
Dept: FAMILY MEDICINE CLINIC | Facility: CLINIC | Age: 85
End: 2025-03-18

## (undated) DIAGNOSIS — E03.9 ACQUIRED HYPOTHYROIDISM: ICD-10-CM

## (undated) DIAGNOSIS — E87.5 HYPERKALEMIA: ICD-10-CM

## (undated) DIAGNOSIS — N18.31 TYPE 2 DIABETES MELLITUS WITH STAGE 3A CHRONIC KIDNEY DISEASE, WITHOUT LONG-TERM CURRENT USE OF INSULIN (HCC): ICD-10-CM

## (undated) DIAGNOSIS — E11.22 TYPE 2 DIABETES MELLITUS WITH STAGE 3A CHRONIC KIDNEY DISEASE, WITHOUT LONG-TERM CURRENT USE OF INSULIN (HCC): ICD-10-CM

## (undated) DIAGNOSIS — I10 ESSENTIAL HYPERTENSION: ICD-10-CM

## (undated) DIAGNOSIS — E03.9 ACQUIRED HYPOTHYROIDISM: Primary | ICD-10-CM

## (undated) DIAGNOSIS — E78.2 MIXED HYPERLIPIDEMIA: ICD-10-CM

## (undated) DEVICE — SUTURE VICRYL 3-0 SH 27IN BRAID COAT ABS UNDYED J416H

## (undated) DEVICE — KIT MICROINTRODUCER 4FR .018IN 40CM 7CM .018IN SFTP MNDRL

## (undated) DEVICE — SOLUTION IRR 1000ML 0.9% NACL PLASTIC POUR BTL ISTNC N-PYRG

## (undated) DEVICE — SHIELD RAD RADPAD RAD PRTC STRL FEM ENTRY ANGIO

## (undated) DEVICE — BLANKET WRM UNDERBODY ADULT 221X91IN 24X48IN BR HGR PLMR 7OZ

## (undated) DEVICE — ELECTRODE DFBR A 6X4.25IN MLFNC RDTRNS POUCH PADPRO ADULT

## (undated) DEVICE — SUTURE VICRYL 2-0 CT2 27IN BRAID COAT ABS UNDYED J269H

## (undated) DEVICE — ELECTRODE PT RTN C30- LB 9FT CORD NONIRRITATE NONSENSITIZE

## (undated) DEVICE — HOLDER LIMB THK.25IN 13X3IN 31IN 2 PC 1 STRAP QRLSE BCKL

## (undated) DEVICE — Device

## (undated) DEVICE — HEMOSTAT ABS BIONERT ARISTA MPH PLANT STRH 1GM

## (undated) DEVICE — SUTURE ETHIBOND EXCEL 0 SH 30IN BRAID NABSB GRN X834H

## (undated) DEVICE — DEVICE V-LOC 180 9IN 3-0 5-20 ABS GRN CLSR

## (undated) DEVICE — GLOVE SURG 7.5 PROTEXIS LF CRM PF SMTH BEAD CUFF STRL

## (undated) DEVICE — SCALPEL SURG 10 BLADE D INDCTR SPCL GRV HNDL STRL DISP PRSNA

## (undated) DEVICE — SLING ORTHO ARM 8IN MED 16IN CNVS PAD ENV BLUE 35

## (undated) DEVICE — PAD DRSG 3X2IN CRD POLY CTN NADH ABS PERFORATE FILM CUT TO

## (undated) DEVICE — SUTURE VCL+ 4-0 SH 27IN BRAID COAT ABS UNDYED

## (undated) DEVICE — STRIP 4X.5IN STRSTRP IPHR POLY POR ADH REINFORCE

## (undated) DEVICE — GLOVE SURG 7 PROTEXIS LF CRM PF BEAD CUFF STRL PLISPRN 12IN

## (undated) NOTE — LETTER
Date & Time: 5/14/2024, 1:39 PM  Patient: Ciarra Salcido  Encounter Provider(s):    Prisca Delcid PA-C       To Whom It May Concern:    Ciarra Salcido was seen and treated in our department on 5/14/2024. She  is able to take hydrocodone 5 mg/325mg as needed up to every 8 hours if Tylenol is not helping    Dx: T12 Compression Fracture   If you have any questions or concerns, please do not hesitate to call.      Prisca Delcid PA-C    _____________________________  Physician/APC Signature

## (undated) NOTE — IP AVS SNAPSHOT
[address]            (For Outpatient Use Only) Initial Admit Date: 2023   Inpt/Obs Admit Date: Inpt: 23 / Obs: N/A   Discharge Date:    Yimi Noland:  [de-identified]   MRN: [de-identified]   CSN: [id number]   CEID: [id number]        ENCOUNTER  Patient Class: Inpatient Admitting Provider: Lydia Castellon MD Unit: [place] [address] Service: Cardiac Telemetry Attending Provider: Marian Stacy MD   Bed: 8802-H   Visit Type:   Referring Physician: No ref. provider found Billing Flag:    Admit Diagnosis: Acute pulmonary edema ([address] [J81.0]      PATIENT  Legal Name:   Miguelina Vidal    Legal Sex: Female  Gender ID:              [address] [place] [address],[address] [place] [address] Name:    PCP:  Roxana Gao MD Home: [phone]   Address:  [address] [place] [address] : 1940 (82 yrs) Mobile: [phone]         City/State/Zip: [address] [place] [address], [facility] [address] [place] [address] Marital: Single Language: [address] [place] [address] Drive: Will SSN4: [id number] Mu-ism:      Race: White Ethnicity: Non  Or  O*   EMERGENCY CONTACT   Name Relationship Legal Guardian? Home Phone Work Phone Mobile Phone   1. Kathlee Skiff  2.  Alireza Salcido Daughter  Son          [phone]     GUARANTOR  Guarantor: ZHOU ASLCIDO : 1940 Home Phone: [phone]   Address: [address] [place] [address]  Sex: Female Work Phone:    City/State/Zip: [address] [place] [address], [facility] [address] [place] [address]   Rel. to Patient: Self Guarantor ID: [id number]   [address]   Employer:  Status: RETIRED     COVERAGE  PRIMARY INSURANCE   PayorNohsamantha LAW Plan: [employer] MEDICARE ADV PPO   Group Number: [id number] Insurance Type: [address] Name: Hardeep Becerra : 1940   Subscriber ID: [id number] Pt Rel to Subscriber: Self   SECONDARY INSURANCE   Payor:  Plan:    Group Number:  Insurance Type:    Subscriber Name:  Subscriber :    Subscriber ID:  Pt Rel to Subscriber:    TERTIARY INSURANCE   Payor:  Plan:    Group Number:  Insurance Type:    Subscriber Name:  Subscriber :    Subscriber ID:  Pt Rel to Subscriber:    Hospital Account Financial Class: Medicare Advantage    February 4, 2023

## (undated) NOTE — LETTER
Date: 2/26/2025    Patient Name: Ciarra Salcido          To Whom it may concern:    Please change dexcom to freestlye josie. The freestyle josie needs to be changed out every 14 days. Change lispro to humalog insulin per prescription when new prescription arrives.      Sincerely,    JUDY CYR MD

## (undated) NOTE — LETTER
Date: 4/15/2024    Patient Name: Ciarra Salcido  Patient : 1940        To Whom it may concern:    This patient no longer needs home oxygen therapy. Please  the equipment from the patient's home since she is no longer using it. Feel free to reach out with any questions.     Sincerely,    JUDY CYR MD

## (undated) NOTE — LETTER
August 10, 2023      Dear Maria Del Rosario Maddox: It was a pleasure speaking with you over the phone recently. To follow up, I wanted to send you my contact information to utilize when you have a question and or need some assistance. We are excited that you have decided to give our Chronic Care Management program a try. I am available to provide you with the support and education needed to keep you healthy. Together We Will Work On:    Coordination of Care: Helping to reduce duplicate orders/tests to save you time and money  Medications: Review, Educate, & Discuss any concerns or issues you may have  Personalized education and support regarding your health. These services, among others will help you take control of your health and provide you with optimal quality care. I have attached a more in depth review of the program to help outline the information we had talked about over the phone. If you have any questions or concerns please feel free to contact myself your Health Care Manager and/or your insurance company for more details. I look forward to working with you,    [address] [place] [address] Management Dept. [address]  [address] [place] [address]   Office [phone]  [address] org

## (undated) NOTE — Clinical Note
Initial assessment completed with patient.  Appointment scheduled for 12/31/24.  Patient agrees to additional follow-up calls from nurse care manager.  Thank you!